# Patient Record
Sex: FEMALE | Race: WHITE | NOT HISPANIC OR LATINO | Employment: OTHER | URBAN - METROPOLITAN AREA
[De-identification: names, ages, dates, MRNs, and addresses within clinical notes are randomized per-mention and may not be internally consistent; named-entity substitution may affect disease eponyms.]

---

## 2017-01-25 ENCOUNTER — COMMUNICATION - HEALTHEAST (OUTPATIENT)
Dept: FAMILY MEDICINE | Facility: CLINIC | Age: 72
End: 2017-01-25

## 2017-01-25 DIAGNOSIS — G89.29 CHRONIC MIDLINE LOW BACK PAIN WITHOUT SCIATICA: ICD-10-CM

## 2017-01-25 DIAGNOSIS — M54.50 CHRONIC MIDLINE LOW BACK PAIN WITHOUT SCIATICA: ICD-10-CM

## 2017-02-16 ENCOUNTER — COMMUNICATION - HEALTHEAST (OUTPATIENT)
Dept: SCHEDULING | Facility: CLINIC | Age: 72
End: 2017-02-16

## 2017-02-27 ENCOUNTER — RECORDS - HEALTHEAST (OUTPATIENT)
Dept: BONE DENSITY | Facility: CLINIC | Age: 72
End: 2017-02-27

## 2017-02-27 ENCOUNTER — RECORDS - HEALTHEAST (OUTPATIENT)
Dept: ADMINISTRATIVE | Facility: OTHER | Age: 72
End: 2017-02-27

## 2017-02-27 DIAGNOSIS — M81.0 AGE-RELATED OSTEOPOROSIS WITHOUT CURRENT PATHOLOGICAL FRACTURE: ICD-10-CM

## 2017-02-28 ENCOUNTER — OFFICE VISIT - HEALTHEAST (OUTPATIENT)
Dept: INTERNAL MEDICINE | Facility: CLINIC | Age: 72
End: 2017-02-28

## 2017-02-28 DIAGNOSIS — M81.0 OSTEOPOROSIS, SENILE: ICD-10-CM

## 2017-03-23 ENCOUNTER — COMMUNICATION - HEALTHEAST (OUTPATIENT)
Dept: FAMILY MEDICINE | Facility: CLINIC | Age: 72
End: 2017-03-23

## 2017-03-23 DIAGNOSIS — I10 HYPERTENSION: ICD-10-CM

## 2017-03-31 ENCOUNTER — COMMUNICATION - HEALTHEAST (OUTPATIENT)
Dept: FAMILY MEDICINE | Facility: CLINIC | Age: 72
End: 2017-03-31

## 2017-03-31 DIAGNOSIS — F41.9 ANXIETY: ICD-10-CM

## 2017-04-14 ENCOUNTER — COMMUNICATION - HEALTHEAST (OUTPATIENT)
Dept: FAMILY MEDICINE | Facility: CLINIC | Age: 72
End: 2017-04-14

## 2017-04-14 DIAGNOSIS — F41.9 ANXIETY: ICD-10-CM

## 2017-06-14 ENCOUNTER — COMMUNICATION - HEALTHEAST (OUTPATIENT)
Dept: FAMILY MEDICINE | Facility: CLINIC | Age: 72
End: 2017-06-14

## 2017-06-15 ENCOUNTER — COMMUNICATION - HEALTHEAST (OUTPATIENT)
Dept: FAMILY MEDICINE | Facility: CLINIC | Age: 72
End: 2017-06-15

## 2017-06-15 DIAGNOSIS — F41.9 ANXIETY: ICD-10-CM

## 2017-08-07 ENCOUNTER — COMMUNICATION - HEALTHEAST (OUTPATIENT)
Dept: FAMILY MEDICINE | Facility: CLINIC | Age: 72
End: 2017-08-07

## 2017-08-29 ENCOUNTER — AMBULATORY - HEALTHEAST (OUTPATIENT)
Dept: NURSING | Facility: CLINIC | Age: 72
End: 2017-08-29

## 2017-08-29 DIAGNOSIS — M81.0 OSTEOPOROSIS, SENILE: ICD-10-CM

## 2017-09-01 ENCOUNTER — COMMUNICATION - HEALTHEAST (OUTPATIENT)
Dept: FAMILY MEDICINE | Facility: CLINIC | Age: 72
End: 2017-09-01

## 2017-09-11 ENCOUNTER — COMMUNICATION - HEALTHEAST (OUTPATIENT)
Dept: FAMILY MEDICINE | Facility: CLINIC | Age: 72
End: 2017-09-11

## 2017-09-11 DIAGNOSIS — I10 HYPERTENSION: ICD-10-CM

## 2017-09-11 DIAGNOSIS — Z78.0 MENOPAUSE: ICD-10-CM

## 2017-10-12 ENCOUNTER — OFFICE VISIT - HEALTHEAST (OUTPATIENT)
Dept: FAMILY MEDICINE | Facility: CLINIC | Age: 72
End: 2017-10-12

## 2017-10-12 DIAGNOSIS — Z23 NEED FOR IMMUNIZATION AGAINST INFLUENZA: ICD-10-CM

## 2017-10-12 DIAGNOSIS — F41.9 ANXIETY: ICD-10-CM

## 2017-10-12 DIAGNOSIS — E55.9 VITAMIN D DEFICIENCY: ICD-10-CM

## 2017-10-12 DIAGNOSIS — Z80.3 FAMILY HISTORY OF BREAST CANCER: ICD-10-CM

## 2017-10-12 DIAGNOSIS — I10 ESSENTIAL HYPERTENSION WITH GOAL BLOOD PRESSURE LESS THAN 130/80: ICD-10-CM

## 2017-10-12 DIAGNOSIS — Z00.01 ENCOUNTER FOR GENERAL ADULT MEDICAL EXAMINATION WITH ABNORMAL FINDINGS: ICD-10-CM

## 2017-10-12 DIAGNOSIS — M81.0 OSTEOPOROSIS, SENILE: ICD-10-CM

## 2017-10-12 DIAGNOSIS — G89.29 CHRONIC MIDLINE LOW BACK PAIN WITHOUT SCIATICA: ICD-10-CM

## 2017-10-12 DIAGNOSIS — M54.50 CHRONIC MIDLINE LOW BACK PAIN WITHOUT SCIATICA: ICD-10-CM

## 2017-10-12 DIAGNOSIS — M25.511 RIGHT SHOULDER PAIN: ICD-10-CM

## 2017-10-12 LAB
CHOLEST SERPL-MCNC: 149 MG/DL
FASTING STATUS PATIENT QL REPORTED: NORMAL
HDLC SERPL-MCNC: 57 MG/DL
LDLC SERPL CALC-MCNC: 75 MG/DL
TRIGL SERPL-MCNC: 85 MG/DL

## 2017-10-12 ASSESSMENT — MIFFLIN-ST. JEOR: SCORE: 836.52

## 2017-10-13 LAB — HCV AB SERPL QL IA: NEGATIVE

## 2017-10-28 ENCOUNTER — COMMUNICATION - HEALTHEAST (OUTPATIENT)
Dept: FAMILY MEDICINE | Facility: CLINIC | Age: 72
End: 2017-10-28

## 2017-10-28 DIAGNOSIS — F41.9 ANXIETY: ICD-10-CM

## 2017-11-13 ENCOUNTER — HOSPITAL ENCOUNTER (OUTPATIENT)
Dept: MAMMOGRAPHY | Facility: HOSPITAL | Age: 72
Discharge: HOME | End: 2017-11-13
Attending: FAMILY MEDICINE

## 2017-11-13 DIAGNOSIS — Z80.3 FAMILY HISTORY OF BREAST CANCER: ICD-10-CM

## 2017-11-27 ENCOUNTER — RECORDS - HEALTHEAST (OUTPATIENT)
Dept: ADMINISTRATIVE | Facility: OTHER | Age: 72
End: 2017-11-27

## 2017-11-29 ENCOUNTER — RECORDS - HEALTHEAST (OUTPATIENT)
Dept: ADMINISTRATIVE | Facility: OTHER | Age: 72
End: 2017-11-29

## 2017-12-18 ENCOUNTER — RECORDS - HEALTHEAST (OUTPATIENT)
Dept: ADMINISTRATIVE | Facility: OTHER | Age: 72
End: 2017-12-18

## 2017-12-27 ENCOUNTER — OFFICE VISIT - HEALTHEAST (OUTPATIENT)
Dept: FAMILY MEDICINE | Facility: CLINIC | Age: 72
End: 2017-12-27

## 2017-12-27 ENCOUNTER — AMBULATORY - HEALTHEAST (OUTPATIENT)
Dept: FAMILY MEDICINE | Facility: CLINIC | Age: 72
End: 2017-12-27

## 2017-12-27 DIAGNOSIS — G89.29 CHRONIC MIDLINE LOW BACK PAIN WITHOUT SCIATICA: ICD-10-CM

## 2017-12-27 DIAGNOSIS — F41.9 ANXIETY: ICD-10-CM

## 2017-12-27 DIAGNOSIS — M54.50 CHRONIC MIDLINE LOW BACK PAIN WITHOUT SCIATICA: ICD-10-CM

## 2018-02-26 ENCOUNTER — COMMUNICATION - HEALTHEAST (OUTPATIENT)
Dept: SCHEDULING | Facility: CLINIC | Age: 73
End: 2018-02-26

## 2018-03-03 ENCOUNTER — COMMUNICATION - HEALTHEAST (OUTPATIENT)
Dept: FAMILY MEDICINE | Facility: CLINIC | Age: 73
End: 2018-03-03

## 2018-03-03 ENCOUNTER — COMMUNICATION - HEALTHEAST (OUTPATIENT)
Dept: SCHEDULING | Facility: CLINIC | Age: 73
End: 2018-03-03

## 2018-03-03 DIAGNOSIS — R00.2 PALPITATIONS: ICD-10-CM

## 2018-03-05 ENCOUNTER — COMMUNICATION - HEALTHEAST (OUTPATIENT)
Dept: FAMILY MEDICINE | Facility: CLINIC | Age: 73
End: 2018-03-05

## 2018-03-05 DIAGNOSIS — E16.2 HYPOGLYCEMIA: ICD-10-CM

## 2018-03-06 ENCOUNTER — OFFICE VISIT - HEALTHEAST (OUTPATIENT)
Dept: INTERNAL MEDICINE | Facility: CLINIC | Age: 73
End: 2018-03-06

## 2018-03-06 ENCOUNTER — COMMUNICATION - HEALTHEAST (OUTPATIENT)
Dept: FAMILY MEDICINE | Facility: CLINIC | Age: 73
End: 2018-03-06

## 2018-03-06 DIAGNOSIS — M81.0 OSTEOPOROSIS, SENILE: ICD-10-CM

## 2018-03-06 DIAGNOSIS — E16.2 HYPOGLYCEMIA: ICD-10-CM

## 2018-03-06 ASSESSMENT — MIFFLIN-ST. JEOR: SCORE: 846.82

## 2018-03-16 ENCOUNTER — OFFICE VISIT - HEALTHEAST (OUTPATIENT)
Dept: ENDOCRINOLOGY | Facility: CLINIC | Age: 73
End: 2018-03-16

## 2018-03-16 DIAGNOSIS — E16.2 HYPOGLYCEMIA: ICD-10-CM

## 2018-03-16 DIAGNOSIS — M81.0 OSTEOPOROSIS, SENILE: ICD-10-CM

## 2018-03-16 ASSESSMENT — MIFFLIN-ST. JEOR: SCORE: 822.01

## 2018-03-22 ENCOUNTER — COMMUNICATION - HEALTHEAST (OUTPATIENT)
Dept: FAMILY MEDICINE | Facility: CLINIC | Age: 73
End: 2018-03-22

## 2018-03-22 ENCOUNTER — OFFICE VISIT - HEALTHEAST (OUTPATIENT)
Dept: FAMILY MEDICINE | Facility: CLINIC | Age: 73
End: 2018-03-22

## 2018-03-22 DIAGNOSIS — E16.1 POSTPRANDIAL HYPOGLYCEMIA: ICD-10-CM

## 2018-03-22 DIAGNOSIS — R00.2 PALPITATIONS: ICD-10-CM

## 2018-03-22 DIAGNOSIS — I10 ESSENTIAL HYPERTENSION: ICD-10-CM

## 2018-03-22 DIAGNOSIS — E16.2 HYPOGLYCEMIA: ICD-10-CM

## 2018-03-26 ENCOUNTER — COMMUNICATION - HEALTHEAST (OUTPATIENT)
Dept: FAMILY MEDICINE | Facility: CLINIC | Age: 73
End: 2018-03-26

## 2018-04-05 ENCOUNTER — COMMUNICATION - HEALTHEAST (OUTPATIENT)
Dept: ADMINISTRATIVE | Facility: CLINIC | Age: 73
End: 2018-04-05

## 2018-04-15 ENCOUNTER — COMMUNICATION - HEALTHEAST (OUTPATIENT)
Dept: SCHEDULING | Facility: CLINIC | Age: 73
End: 2018-04-15

## 2018-04-16 ENCOUNTER — OFFICE VISIT - HEALTHEAST (OUTPATIENT)
Dept: FAMILY MEDICINE | Facility: CLINIC | Age: 73
End: 2018-04-16

## 2018-04-16 ENCOUNTER — RECORDS - HEALTHEAST (OUTPATIENT)
Dept: GENERAL RADIOLOGY | Facility: CLINIC | Age: 73
End: 2018-04-16

## 2018-04-16 DIAGNOSIS — M54.2 NECK PAIN: ICD-10-CM

## 2018-04-16 DIAGNOSIS — M54.2 CERVICALGIA: ICD-10-CM

## 2018-04-16 ASSESSMENT — MIFFLIN-ST. JEOR: SCORE: 838.8

## 2018-04-23 ENCOUNTER — HOSPITAL ENCOUNTER (OUTPATIENT)
Dept: NUTRITION | Facility: HOSPITAL | Age: 73
Discharge: HOME | End: 2018-04-23
Attending: FAMILY MEDICINE

## 2018-04-23 DIAGNOSIS — E16.2 HYPOGLYCEMIA: ICD-10-CM

## 2018-04-24 ENCOUNTER — COMMUNICATION - HEALTHEAST (OUTPATIENT)
Dept: FAMILY MEDICINE | Facility: CLINIC | Age: 73
End: 2018-04-24

## 2018-04-24 ENCOUNTER — AMBULATORY - HEALTHEAST (OUTPATIENT)
Dept: FAMILY MEDICINE | Facility: CLINIC | Age: 73
End: 2018-04-24

## 2018-04-24 DIAGNOSIS — M54.2 NECK PAIN: ICD-10-CM

## 2018-04-27 ENCOUNTER — HOSPITAL ENCOUNTER (OUTPATIENT)
Dept: PHYSICAL MEDICINE AND REHAB | Facility: CLINIC | Age: 73
Discharge: HOME | End: 2018-04-27
Attending: NURSE PRACTITIONER

## 2018-04-27 DIAGNOSIS — M79.18 MYOFASCIAL PAIN: ICD-10-CM

## 2018-04-27 DIAGNOSIS — M47.812 FACET ARTHROPATHY, CERVICAL: ICD-10-CM

## 2018-04-27 DIAGNOSIS — M50.30 DEGENERATION OF CERVICAL INTERVERTEBRAL DISC: ICD-10-CM

## 2018-04-27 ASSESSMENT — MIFFLIN-ST. JEOR: SCORE: 826.55

## 2018-04-29 ENCOUNTER — COMMUNICATION - HEALTHEAST (OUTPATIENT)
Dept: FAMILY MEDICINE | Facility: CLINIC | Age: 73
End: 2018-04-29

## 2018-04-30 ENCOUNTER — RECORDS - HEALTHEAST (OUTPATIENT)
Dept: ADMINISTRATIVE | Facility: OTHER | Age: 73
End: 2018-04-30

## 2018-05-07 ENCOUNTER — OFFICE VISIT - HEALTHEAST (OUTPATIENT)
Dept: PHYSICAL THERAPY | Facility: REHABILITATION | Age: 73
End: 2018-05-07

## 2018-05-07 ENCOUNTER — COMMUNICATION - HEALTHEAST (OUTPATIENT)
Dept: ADMINISTRATIVE | Facility: CLINIC | Age: 73
End: 2018-05-07

## 2018-05-07 DIAGNOSIS — M81.0 OSTEOPOROSIS, SENILE: ICD-10-CM

## 2018-05-07 DIAGNOSIS — R29.3 POOR POSTURE: ICD-10-CM

## 2018-05-07 DIAGNOSIS — M62.81 GENERALIZED MUSCLE WEAKNESS: ICD-10-CM

## 2018-05-07 DIAGNOSIS — M54.2 CERVICAL PAIN: ICD-10-CM

## 2018-05-10 ENCOUNTER — OFFICE VISIT - HEALTHEAST (OUTPATIENT)
Dept: PHYSICAL THERAPY | Facility: REHABILITATION | Age: 73
End: 2018-05-10

## 2018-05-10 DIAGNOSIS — M62.81 GENERALIZED MUSCLE WEAKNESS: ICD-10-CM

## 2018-05-10 DIAGNOSIS — M54.2 CERVICAL PAIN: ICD-10-CM

## 2018-05-10 DIAGNOSIS — R29.3 POOR POSTURE: ICD-10-CM

## 2018-05-14 ENCOUNTER — RECORDS - HEALTHEAST (OUTPATIENT)
Dept: ADMINISTRATIVE | Facility: OTHER | Age: 73
End: 2018-05-14

## 2018-05-14 ENCOUNTER — OFFICE VISIT - HEALTHEAST (OUTPATIENT)
Dept: PHYSICAL THERAPY | Facility: REHABILITATION | Age: 73
End: 2018-05-14

## 2018-05-14 DIAGNOSIS — M62.81 GENERALIZED MUSCLE WEAKNESS: ICD-10-CM

## 2018-05-14 DIAGNOSIS — R29.3 POOR POSTURE: ICD-10-CM

## 2018-05-14 DIAGNOSIS — M54.2 CERVICAL PAIN: ICD-10-CM

## 2018-05-17 ENCOUNTER — OFFICE VISIT - HEALTHEAST (OUTPATIENT)
Dept: PHYSICAL THERAPY | Facility: REHABILITATION | Age: 73
End: 2018-05-17

## 2018-05-17 ENCOUNTER — COMMUNICATION - HEALTHEAST (OUTPATIENT)
Dept: ADMINISTRATIVE | Facility: CLINIC | Age: 73
End: 2018-05-17

## 2018-05-17 DIAGNOSIS — R29.3 POOR POSTURE: ICD-10-CM

## 2018-05-17 DIAGNOSIS — M62.81 GENERALIZED MUSCLE WEAKNESS: ICD-10-CM

## 2018-05-17 DIAGNOSIS — M54.2 CERVICAL PAIN: ICD-10-CM

## 2018-05-23 ENCOUNTER — OFFICE VISIT - HEALTHEAST (OUTPATIENT)
Dept: PHYSICAL THERAPY | Facility: REHABILITATION | Age: 73
End: 2018-05-23

## 2018-05-23 DIAGNOSIS — M62.81 GENERALIZED MUSCLE WEAKNESS: ICD-10-CM

## 2018-05-23 DIAGNOSIS — M54.2 CERVICAL PAIN: ICD-10-CM

## 2018-05-23 DIAGNOSIS — R29.3 POOR POSTURE: ICD-10-CM

## 2018-05-25 ENCOUNTER — HOSPITAL ENCOUNTER (OUTPATIENT)
Dept: PHYSICAL MEDICINE AND REHAB | Facility: CLINIC | Age: 73
Discharge: HOME | End: 2018-05-25
Attending: PHYSICIAN ASSISTANT

## 2018-05-25 DIAGNOSIS — M47.812 FACET ARTHROPATHY, CERVICAL: ICD-10-CM

## 2018-05-25 DIAGNOSIS — M50.30 DEGENERATION OF CERVICAL INTERVERTEBRAL DISC: ICD-10-CM

## 2018-05-25 DIAGNOSIS — M79.18 MYOFASCIAL PAIN: ICD-10-CM

## 2018-05-30 ENCOUNTER — OFFICE VISIT - HEALTHEAST (OUTPATIENT)
Dept: PHYSICAL THERAPY | Facility: REHABILITATION | Age: 73
End: 2018-05-30

## 2018-05-30 DIAGNOSIS — M62.81 GENERALIZED MUSCLE WEAKNESS: ICD-10-CM

## 2018-05-30 DIAGNOSIS — M54.2 CERVICAL PAIN: ICD-10-CM

## 2018-05-30 DIAGNOSIS — R29.3 POOR POSTURE: ICD-10-CM

## 2018-06-07 ENCOUNTER — OFFICE VISIT - HEALTHEAST (OUTPATIENT)
Dept: PHYSICAL THERAPY | Facility: REHABILITATION | Age: 73
End: 2018-06-07

## 2018-06-07 DIAGNOSIS — M62.81 GENERALIZED MUSCLE WEAKNESS: ICD-10-CM

## 2018-06-07 DIAGNOSIS — M54.2 CERVICAL PAIN: ICD-10-CM

## 2018-06-07 DIAGNOSIS — R29.3 POOR POSTURE: ICD-10-CM

## 2018-06-13 ENCOUNTER — OFFICE VISIT - HEALTHEAST (OUTPATIENT)
Dept: PHYSICAL THERAPY | Facility: REHABILITATION | Age: 73
End: 2018-06-13

## 2018-06-13 DIAGNOSIS — M54.2 CERVICAL PAIN: ICD-10-CM

## 2018-06-13 DIAGNOSIS — R29.3 POOR POSTURE: ICD-10-CM

## 2018-06-13 DIAGNOSIS — M62.81 GENERALIZED MUSCLE WEAKNESS: ICD-10-CM

## 2018-06-14 ENCOUNTER — OFFICE VISIT - HEALTHEAST (OUTPATIENT)
Dept: FAMILY MEDICINE | Facility: CLINIC | Age: 73
End: 2018-06-14

## 2018-06-14 DIAGNOSIS — E55.9 VITAMIN D DEFICIENCY: ICD-10-CM

## 2018-06-14 DIAGNOSIS — E16.2 HYPOGLYCEMIA: ICD-10-CM

## 2018-06-14 DIAGNOSIS — I10 ESSENTIAL HYPERTENSION: ICD-10-CM

## 2018-06-14 DIAGNOSIS — M81.0 OSTEOPOROSIS, SENILE: ICD-10-CM

## 2018-06-14 LAB
ANION GAP SERPL CALCULATED.3IONS-SCNC: 12 MMOL/L (ref 5–18)
BUN SERPL-MCNC: 29 MG/DL (ref 8–28)
CALCIUM SERPL-MCNC: 9 MG/DL (ref 8.5–10.5)
CHLORIDE BLD-SCNC: 103 MMOL/L (ref 98–107)
CO2 SERPL-SCNC: 25 MMOL/L (ref 22–31)
CREAT SERPL-MCNC: 0.77 MG/DL (ref 0.6–1.1)
GFR SERPL CREATININE-BSD FRML MDRD: >60 ML/MIN/1.73M2
GLUCOSE BLD-MCNC: 84 MG/DL (ref 70–125)
HBA1C MFR BLD: 5.8 % (ref 3.5–6)
POTASSIUM BLD-SCNC: 4.3 MMOL/L (ref 3.5–5)
SODIUM SERPL-SCNC: 140 MMOL/L (ref 136–145)

## 2018-06-22 ENCOUNTER — HOSPITAL ENCOUNTER (OUTPATIENT)
Dept: PHYSICAL MEDICINE AND REHAB | Facility: CLINIC | Age: 73
Discharge: HOME | End: 2018-06-22
Attending: PHYSICIAN ASSISTANT

## 2018-06-22 DIAGNOSIS — M54.2 CERVICAL SPINE PAIN: ICD-10-CM

## 2018-06-22 DIAGNOSIS — M50.30 DEGENERATION OF CERVICAL INTERVERTEBRAL DISC: ICD-10-CM

## 2018-06-22 DIAGNOSIS — M47.812 FACET ARTHROPATHY, CERVICAL: ICD-10-CM

## 2018-06-22 DIAGNOSIS — M12.88 OTHER SPECIFIC ARTHROPATHIES, NOT ELSEWHERE CLASSIFIED, OTHER SPECIFIED SITE: ICD-10-CM

## 2018-06-27 ENCOUNTER — OFFICE VISIT - HEALTHEAST (OUTPATIENT)
Dept: PHYSICAL THERAPY | Facility: REHABILITATION | Age: 73
End: 2018-06-27

## 2018-06-27 DIAGNOSIS — R29.3 POOR POSTURE: ICD-10-CM

## 2018-06-27 DIAGNOSIS — M62.81 GENERALIZED MUSCLE WEAKNESS: ICD-10-CM

## 2018-06-27 DIAGNOSIS — M54.2 CERVICAL PAIN: ICD-10-CM

## 2018-06-29 ENCOUNTER — HOSPITAL ENCOUNTER (OUTPATIENT)
Dept: CT IMAGING | Facility: CLINIC | Age: 73
Discharge: HOME | End: 2018-06-29
Attending: PHYSICIAN ASSISTANT

## 2018-07-03 ENCOUNTER — COMMUNICATION - HEALTHEAST (OUTPATIENT)
Dept: PHYSICAL MEDICINE AND REHAB | Facility: CLINIC | Age: 73
End: 2018-07-03

## 2018-07-03 DIAGNOSIS — M47.812 FACET ARTHROPATHY, CERVICAL: ICD-10-CM

## 2018-07-03 DIAGNOSIS — M54.2 NECK PAIN: ICD-10-CM

## 2018-07-09 ENCOUNTER — HOSPITAL ENCOUNTER (OUTPATIENT)
Dept: RADIOLOGY | Facility: CLINIC | Age: 73
Discharge: HOME | End: 2018-07-09
Attending: PHYSICIAN ASSISTANT

## 2018-07-09 DIAGNOSIS — M54.2 NECK PAIN: ICD-10-CM

## 2018-07-09 DIAGNOSIS — M47.812 FACET ARTHROPATHY, CERVICAL: ICD-10-CM

## 2018-07-09 DIAGNOSIS — M12.88 OTHER SPECIFIC ARTHROPATHIES, NOT ELSEWHERE CLASSIFIED, OTHER SPECIFIED SITE: ICD-10-CM

## 2018-07-10 ENCOUNTER — COMMUNICATION - HEALTHEAST (OUTPATIENT)
Dept: FAMILY MEDICINE | Facility: CLINIC | Age: 73
End: 2018-07-10

## 2018-07-11 ENCOUNTER — COMMUNICATION - HEALTHEAST (OUTPATIENT)
Dept: PHYSICAL MEDICINE AND REHAB | Facility: CLINIC | Age: 73
End: 2018-07-11

## 2018-07-12 ENCOUNTER — COMMUNICATION - HEALTHEAST (OUTPATIENT)
Dept: FAMILY MEDICINE | Facility: CLINIC | Age: 73
End: 2018-07-12

## 2018-07-13 ENCOUNTER — COMMUNICATION - HEALTHEAST (OUTPATIENT)
Dept: ADMINISTRATIVE | Facility: CLINIC | Age: 73
End: 2018-07-13

## 2018-07-13 ENCOUNTER — HOSPITAL ENCOUNTER (OUTPATIENT)
Dept: PHYSICAL MEDICINE AND REHAB | Facility: CLINIC | Age: 73
Discharge: HOME | End: 2018-07-13
Attending: PHYSICIAN ASSISTANT

## 2018-07-13 DIAGNOSIS — M47.812 FACET ARTHROPATHY, CERVICAL: ICD-10-CM

## 2018-07-13 DIAGNOSIS — M50.30 DEGENERATION OF CERVICAL INTERVERTEBRAL DISC: ICD-10-CM

## 2018-07-23 ENCOUNTER — AMBULATORY - HEALTHEAST (OUTPATIENT)
Dept: INTERNAL MEDICINE | Facility: CLINIC | Age: 73
End: 2018-07-23

## 2018-07-23 DIAGNOSIS — M81.0 OSTEOPOROSIS, SENILE: ICD-10-CM

## 2018-08-15 ENCOUNTER — AMBULATORY - HEALTHEAST (OUTPATIENT)
Dept: ENDOCRINOLOGY | Facility: CLINIC | Age: 73
End: 2018-08-15

## 2018-08-15 DIAGNOSIS — M81.0 OSTEOPOROSIS, SENILE: ICD-10-CM

## 2018-08-31 ENCOUNTER — AMBULATORY - HEALTHEAST (OUTPATIENT)
Dept: FAMILY MEDICINE | Facility: CLINIC | Age: 73
End: 2018-08-31

## 2018-08-31 ENCOUNTER — AMBULATORY - HEALTHEAST (OUTPATIENT)
Dept: LAB | Facility: CLINIC | Age: 73
End: 2018-08-31

## 2018-08-31 DIAGNOSIS — Z00.00 HEALTH CARE MAINTENANCE: ICD-10-CM

## 2018-08-31 DIAGNOSIS — M81.0 OSTEOPOROSIS, SENILE: ICD-10-CM

## 2018-08-31 LAB
CALCIUM SERPL-MCNC: 9.4 MG/DL (ref 8.5–10.5)
CREAT SERPL-MCNC: 0.76 MG/DL (ref 0.6–1.1)
GFR SERPL CREATININE-BSD FRML MDRD: >60 ML/MIN/1.73M2
POTASSIUM BLD-SCNC: 4.7 MMOL/L (ref 3.5–5)

## 2018-09-04 LAB
25(OH)D3 SERPL-MCNC: 41 NG/ML (ref 30–80)
25(OH)D3 SERPL-MCNC: 41 NG/ML (ref 30–80)

## 2018-09-07 ENCOUNTER — OFFICE VISIT - HEALTHEAST (OUTPATIENT)
Dept: ENDOCRINOLOGY | Facility: CLINIC | Age: 73
End: 2018-09-07

## 2018-09-07 DIAGNOSIS — M81.0 OSTEOPOROSIS, SENILE: ICD-10-CM

## 2018-09-07 ASSESSMENT — MIFFLIN-ST. JEOR: SCORE: 850.14

## 2018-09-11 ENCOUNTER — AMBULATORY - HEALTHEAST (OUTPATIENT)
Dept: NURSING | Facility: CLINIC | Age: 73
End: 2018-09-11

## 2018-10-23 ENCOUNTER — COMMUNICATION - HEALTHEAST (OUTPATIENT)
Dept: FAMILY MEDICINE | Facility: CLINIC | Age: 73
End: 2018-10-23

## 2018-10-23 DIAGNOSIS — M54.50 CHRONIC MIDLINE LOW BACK PAIN WITHOUT SCIATICA: ICD-10-CM

## 2018-10-23 DIAGNOSIS — G89.29 CHRONIC MIDLINE LOW BACK PAIN WITHOUT SCIATICA: ICD-10-CM

## 2018-11-12 ENCOUNTER — RECORDS - HEALTHEAST (OUTPATIENT)
Dept: ADMINISTRATIVE | Facility: OTHER | Age: 73
End: 2018-11-12

## 2018-11-13 ENCOUNTER — COMMUNICATION - HEALTHEAST (OUTPATIENT)
Dept: FAMILY MEDICINE | Facility: CLINIC | Age: 73
End: 2018-11-13

## 2018-11-16 ENCOUNTER — HOSPITAL ENCOUNTER (OUTPATIENT)
Dept: MAMMOGRAPHY | Facility: CLINIC | Age: 73
Discharge: HOME | End: 2018-11-16
Attending: FAMILY MEDICINE

## 2018-11-16 DIAGNOSIS — Z12.31 VISIT FOR SCREENING MAMMOGRAM: ICD-10-CM

## 2018-11-23 ENCOUNTER — OFFICE VISIT - HEALTHEAST (OUTPATIENT)
Dept: FAMILY MEDICINE | Facility: CLINIC | Age: 73
End: 2018-11-23

## 2018-11-23 DIAGNOSIS — Z00.01 ENCOUNTER FOR GENERAL ADULT MEDICAL EXAMINATION WITH ABNORMAL FINDINGS: ICD-10-CM

## 2018-11-23 DIAGNOSIS — H90.3 BILATERAL SENSORINEURAL HEARING LOSS: ICD-10-CM

## 2018-11-23 DIAGNOSIS — I10 ESSENTIAL HYPERTENSION WITH GOAL BLOOD PRESSURE LESS THAN 130/80: ICD-10-CM

## 2018-11-23 DIAGNOSIS — H61.23 CERUMINOSIS, BILATERAL: ICD-10-CM

## 2018-11-23 DIAGNOSIS — E55.9 VITAMIN D DEFICIENCY: ICD-10-CM

## 2018-11-23 DIAGNOSIS — R73.01 IMPAIRED FASTING GLUCOSE: ICD-10-CM

## 2018-11-23 DIAGNOSIS — R05.3 CHRONIC COUGH: ICD-10-CM

## 2018-11-23 LAB
ANION GAP SERPL CALCULATED.3IONS-SCNC: 12 MMOL/L (ref 5–18)
BUN SERPL-MCNC: 25 MG/DL (ref 8–28)
CALCIUM SERPL-MCNC: 9.2 MG/DL (ref 8.5–10.5)
CHLORIDE BLD-SCNC: 102 MMOL/L (ref 98–107)
CHOLEST SERPL-MCNC: 195 MG/DL
CO2 SERPL-SCNC: 26 MMOL/L (ref 22–31)
CREAT SERPL-MCNC: 0.77 MG/DL (ref 0.6–1.1)
ERYTHROCYTE [DISTWIDTH] IN BLOOD BY AUTOMATED COUNT: 13.6 % (ref 11–14.5)
FASTING STATUS PATIENT QL REPORTED: YES
GFR SERPL CREATININE-BSD FRML MDRD: >60 ML/MIN/1.73M2
GLUCOSE BLD-MCNC: 85 MG/DL (ref 70–125)
HBA1C MFR BLD: 5.8 % (ref 3.5–6)
HCT VFR BLD AUTO: 40.5 % (ref 35–47)
HDLC SERPL-MCNC: 71 MG/DL
HGB BLD-MCNC: 14 G/DL (ref 12–16)
LDLC SERPL CALC-MCNC: 112 MG/DL
MCH RBC QN AUTO: 30.3 PG (ref 27–34)
MCHC RBC AUTO-ENTMCNC: 34.5 G/DL (ref 32–36)
MCV RBC AUTO: 88 FL (ref 80–100)
PLATELET # BLD AUTO: 209 THOU/UL (ref 140–440)
PMV BLD AUTO: 7.8 FL (ref 7–10)
POTASSIUM BLD-SCNC: 4.4 MMOL/L (ref 3.5–5)
RBC # BLD AUTO: 4.61 MILL/UL (ref 3.8–5.4)
SODIUM SERPL-SCNC: 140 MMOL/L (ref 136–145)
TRIGL SERPL-MCNC: 58 MG/DL
WBC: 5.5 THOU/UL (ref 4–11)

## 2018-11-23 ASSESSMENT — MIFFLIN-ST. JEOR: SCORE: 869.63

## 2018-11-24 ENCOUNTER — COMMUNICATION - HEALTHEAST (OUTPATIENT)
Dept: FAMILY MEDICINE | Facility: CLINIC | Age: 73
End: 2018-11-24

## 2018-11-24 DIAGNOSIS — I10 ESSENTIAL HYPERTENSION: ICD-10-CM

## 2018-11-26 ENCOUNTER — RECORDS - HEALTHEAST (OUTPATIENT)
Dept: ADMINISTRATIVE | Facility: OTHER | Age: 73
End: 2018-11-26

## 2018-12-06 ENCOUNTER — COMMUNICATION - HEALTHEAST (OUTPATIENT)
Dept: FAMILY MEDICINE | Facility: CLINIC | Age: 73
End: 2018-12-06

## 2018-12-06 DIAGNOSIS — E16.1 POSTPRANDIAL HYPOGLYCEMIA: ICD-10-CM

## 2018-12-11 ENCOUNTER — COMMUNICATION - HEALTHEAST (OUTPATIENT)
Dept: FAMILY MEDICINE | Facility: CLINIC | Age: 73
End: 2018-12-11

## 2018-12-13 ENCOUNTER — COMMUNICATION - HEALTHEAST (OUTPATIENT)
Dept: FAMILY MEDICINE | Facility: CLINIC | Age: 73
End: 2018-12-13

## 2018-12-13 DIAGNOSIS — M54.50 CHRONIC MIDLINE LOW BACK PAIN WITHOUT SCIATICA: ICD-10-CM

## 2018-12-13 DIAGNOSIS — G89.29 CHRONIC MIDLINE LOW BACK PAIN WITHOUT SCIATICA: ICD-10-CM

## 2018-12-19 ENCOUNTER — OFFICE VISIT - HEALTHEAST (OUTPATIENT)
Dept: FAMILY MEDICINE | Facility: CLINIC | Age: 73
End: 2018-12-19

## 2018-12-19 DIAGNOSIS — G89.29 CHRONIC MIDLINE LOW BACK PAIN WITHOUT SCIATICA: ICD-10-CM

## 2018-12-19 DIAGNOSIS — M54.50 CHRONIC MIDLINE LOW BACK PAIN WITHOUT SCIATICA: ICD-10-CM

## 2019-02-06 ENCOUNTER — RECORDS - HEALTHEAST (OUTPATIENT)
Dept: ADMINISTRATIVE | Facility: OTHER | Age: 74
End: 2019-02-06

## 2019-02-13 ENCOUNTER — AMBULATORY - HEALTHEAST (OUTPATIENT)
Dept: ENDOCRINOLOGY | Facility: CLINIC | Age: 74
End: 2019-02-13

## 2019-02-13 DIAGNOSIS — E16.2 HYPOGLYCEMIA: ICD-10-CM

## 2019-03-01 ENCOUNTER — RECORDS - HEALTHEAST (OUTPATIENT)
Dept: BONE DENSITY | Facility: CLINIC | Age: 74
End: 2019-03-01

## 2019-03-01 ENCOUNTER — RECORDS - HEALTHEAST (OUTPATIENT)
Dept: ADMINISTRATIVE | Facility: OTHER | Age: 74
End: 2019-03-01

## 2019-03-01 DIAGNOSIS — M81.0 AGE-RELATED OSTEOPOROSIS WITHOUT CURRENT PATHOLOGICAL FRACTURE: ICD-10-CM

## 2019-03-04 ENCOUNTER — AMBULATORY - HEALTHEAST (OUTPATIENT)
Dept: LAB | Facility: CLINIC | Age: 74
End: 2019-03-04

## 2019-03-04 DIAGNOSIS — E16.2 HYPOGLYCEMIA: ICD-10-CM

## 2019-03-04 LAB
CREAT SERPL-MCNC: 0.76 MG/DL (ref 0.6–1.1)
GFR SERPL CREATININE-BSD FRML MDRD: >60 ML/MIN/1.73M2
POTASSIUM BLD-SCNC: 4.2 MMOL/L (ref 3.5–5)

## 2019-03-13 ENCOUNTER — COMMUNICATION - HEALTHEAST (OUTPATIENT)
Dept: FAMILY MEDICINE | Facility: CLINIC | Age: 74
End: 2019-03-13

## 2019-03-19 ENCOUNTER — OFFICE VISIT - HEALTHEAST (OUTPATIENT)
Dept: INTERNAL MEDICINE | Facility: CLINIC | Age: 74
End: 2019-03-19

## 2019-03-19 DIAGNOSIS — M81.0 OSTEOPOROSIS, SENILE: ICD-10-CM

## 2019-03-22 ENCOUNTER — COMMUNICATION - HEALTHEAST (OUTPATIENT)
Dept: ENDOCRINOLOGY | Facility: CLINIC | Age: 74
End: 2019-03-22

## 2019-03-22 DIAGNOSIS — M81.0 OSTEOPOROSIS, SENILE: ICD-10-CM

## 2019-05-15 ENCOUNTER — RECORDS - HEALTHEAST (OUTPATIENT)
Dept: ADMINISTRATIVE | Facility: OTHER | Age: 74
End: 2019-05-15

## 2019-05-16 ENCOUNTER — RECORDS - HEALTHEAST (OUTPATIENT)
Dept: ADMINISTRATIVE | Facility: OTHER | Age: 74
End: 2019-05-16

## 2019-06-06 ENCOUNTER — COMMUNICATION - HEALTHEAST (OUTPATIENT)
Dept: FAMILY MEDICINE | Facility: CLINIC | Age: 74
End: 2019-06-06

## 2019-06-06 DIAGNOSIS — F41.9 ANXIETY: ICD-10-CM

## 2019-06-07 ENCOUNTER — AMBULATORY - HEALTHEAST (OUTPATIENT)
Dept: ENDOCRINOLOGY | Facility: CLINIC | Age: 74
End: 2019-06-07

## 2019-06-07 DIAGNOSIS — M81.0 OSTEOPOROSIS, SENILE: ICD-10-CM

## 2019-08-27 ENCOUNTER — OFFICE VISIT - HEALTHEAST (OUTPATIENT)
Dept: FAMILY MEDICINE | Facility: CLINIC | Age: 74
End: 2019-08-27

## 2019-08-27 DIAGNOSIS — S01.81XD LACERATION OF FOREHEAD, SUBSEQUENT ENCOUNTER: ICD-10-CM

## 2019-09-10 ENCOUNTER — COMMUNICATION - HEALTHEAST (OUTPATIENT)
Dept: FAMILY MEDICINE | Facility: CLINIC | Age: 74
End: 2019-09-10

## 2019-09-10 ENCOUNTER — COMMUNICATION - HEALTHEAST (OUTPATIENT)
Dept: ENDOCRINOLOGY | Facility: CLINIC | Age: 74
End: 2019-09-10

## 2019-09-10 DIAGNOSIS — F41.9 ANXIETY: ICD-10-CM

## 2019-09-10 DIAGNOSIS — M81.0 OSTEOPOROSIS, SENILE: ICD-10-CM

## 2019-09-19 ENCOUNTER — COMMUNICATION - HEALTHEAST (OUTPATIENT)
Dept: SCHEDULING | Facility: CLINIC | Age: 74
End: 2019-09-19

## 2019-09-24 ENCOUNTER — COMMUNICATION - HEALTHEAST (OUTPATIENT)
Dept: ENDOCRINOLOGY | Facility: CLINIC | Age: 74
End: 2019-09-24

## 2019-09-24 DIAGNOSIS — M81.0 OSTEOPOROSIS, SENILE: ICD-10-CM

## 2019-09-24 RX ORDER — ACARBOSE 25 MG/1
TABLET ORAL
Qty: 270 TABLET | Refills: 1 | Status: SHIPPED | OUTPATIENT
Start: 2019-09-24 | End: 2024-04-23

## 2019-10-08 ENCOUNTER — AMBULATORY - HEALTHEAST (OUTPATIENT)
Dept: NURSING | Facility: CLINIC | Age: 74
End: 2019-10-08

## 2019-10-18 ENCOUNTER — OFFICE VISIT - HEALTHEAST (OUTPATIENT)
Dept: FAMILY MEDICINE | Facility: CLINIC | Age: 74
End: 2019-10-18

## 2019-10-18 DIAGNOSIS — H90.3 BILATERAL SENSORINEURAL HEARING LOSS: ICD-10-CM

## 2019-10-18 DIAGNOSIS — R73.01 IMPAIRED FASTING GLUCOSE: ICD-10-CM

## 2019-10-18 DIAGNOSIS — Z00.01 ENCOUNTER FOR GENERAL ADULT MEDICAL EXAMINATION WITH ABNORMAL FINDINGS: ICD-10-CM

## 2019-10-18 DIAGNOSIS — I10 ESSENTIAL HYPERTENSION WITH GOAL BLOOD PRESSURE LESS THAN 130/80: ICD-10-CM

## 2019-10-18 DIAGNOSIS — E55.9 VITAMIN D DEFICIENCY: ICD-10-CM

## 2019-10-18 DIAGNOSIS — M81.0 OSTEOPOROSIS, SENILE: ICD-10-CM

## 2019-10-18 DIAGNOSIS — E16.2 HYPOGLYCEMIA: ICD-10-CM

## 2019-10-18 LAB
ANION GAP SERPL CALCULATED.3IONS-SCNC: 6 MMOL/L (ref 5–18)
BUN SERPL-MCNC: 21 MG/DL (ref 8–28)
CALCIUM SERPL-MCNC: 9.7 MG/DL (ref 8.5–10.5)
CHLORIDE BLD-SCNC: 101 MMOL/L (ref 98–107)
CO2 SERPL-SCNC: 32 MMOL/L (ref 22–31)
CREAT SERPL-MCNC: 0.78 MG/DL (ref 0.6–1.1)
GFR SERPL CREATININE-BSD FRML MDRD: >60 ML/MIN/1.73M2
GLUCOSE BLD-MCNC: 76 MG/DL (ref 70–125)
HBA1C MFR BLD: 5.8 % (ref 3.5–6)
POTASSIUM BLD-SCNC: 4.7 MMOL/L (ref 3.5–5)
SODIUM SERPL-SCNC: 139 MMOL/L (ref 136–145)

## 2019-10-18 ASSESSMENT — MIFFLIN-ST. JEOR: SCORE: 889.76

## 2019-10-18 ASSESSMENT — ANXIETY QUESTIONNAIRES
1. FEELING NERVOUS, ANXIOUS, OR ON EDGE: SEVERAL DAYS
2. NOT BEING ABLE TO STOP OR CONTROL WORRYING: SEVERAL DAYS

## 2019-10-21 LAB
25(OH)D3 SERPL-MCNC: 43.8 NG/ML (ref 30–80)
25(OH)D3 SERPL-MCNC: 43.8 NG/ML (ref 30–80)

## 2019-10-30 ENCOUNTER — COMMUNICATION - HEALTHEAST (OUTPATIENT)
Dept: ADMINISTRATIVE | Facility: CLINIC | Age: 74
End: 2019-10-30

## 2019-11-06 ENCOUNTER — RECORDS - HEALTHEAST (OUTPATIENT)
Dept: ADMINISTRATIVE | Facility: OTHER | Age: 74
End: 2019-11-06

## 2019-11-08 ENCOUNTER — AMBULATORY - HEALTHEAST (OUTPATIENT)
Dept: ENDOCRINOLOGY | Facility: CLINIC | Age: 74
End: 2019-11-08

## 2019-11-08 ENCOUNTER — COMMUNICATION - HEALTHEAST (OUTPATIENT)
Dept: ENDOCRINOLOGY | Facility: CLINIC | Age: 74
End: 2019-11-08

## 2019-11-08 DIAGNOSIS — E16.2 HYPOGLYCEMIA: ICD-10-CM

## 2019-11-20 ENCOUNTER — HOSPITAL ENCOUNTER (OUTPATIENT)
Dept: MAMMOGRAPHY | Facility: CLINIC | Age: 74
Discharge: HOME | End: 2019-11-20
Attending: FAMILY MEDICINE

## 2019-11-20 DIAGNOSIS — Z12.31 VISIT FOR SCREENING MAMMOGRAM: ICD-10-CM

## 2019-12-05 ENCOUNTER — RECORDS - HEALTHEAST (OUTPATIENT)
Dept: ADMINISTRATIVE | Facility: OTHER | Age: 74
End: 2019-12-05

## 2019-12-09 ENCOUNTER — COMMUNICATION - HEALTHEAST (OUTPATIENT)
Dept: FAMILY MEDICINE | Facility: CLINIC | Age: 74
End: 2019-12-09

## 2019-12-09 DIAGNOSIS — G89.29 CHRONIC MIDLINE LOW BACK PAIN WITHOUT SCIATICA: ICD-10-CM

## 2019-12-09 DIAGNOSIS — M54.50 CHRONIC MIDLINE LOW BACK PAIN WITHOUT SCIATICA: ICD-10-CM

## 2019-12-11 ENCOUNTER — OFFICE VISIT - HEALTHEAST (OUTPATIENT)
Dept: FAMILY MEDICINE | Facility: CLINIC | Age: 74
End: 2019-12-11

## 2019-12-11 ENCOUNTER — AMBULATORY - HEALTHEAST (OUTPATIENT)
Dept: FAMILY MEDICINE | Facility: CLINIC | Age: 74
End: 2019-12-11

## 2019-12-11 DIAGNOSIS — M54.50 CHRONIC MIDLINE LOW BACK PAIN WITHOUT SCIATICA: ICD-10-CM

## 2019-12-11 DIAGNOSIS — G89.29 CHRONIC MIDLINE LOW BACK PAIN WITHOUT SCIATICA: ICD-10-CM

## 2019-12-11 DIAGNOSIS — I10 ESSENTIAL HYPERTENSION: ICD-10-CM

## 2019-12-13 ENCOUNTER — RECORDS - HEALTHEAST (OUTPATIENT)
Dept: ADMINISTRATIVE | Facility: OTHER | Age: 74
End: 2019-12-13

## 2020-01-14 ENCOUNTER — COMMUNICATION - HEALTHEAST (OUTPATIENT)
Dept: FAMILY MEDICINE | Facility: CLINIC | Age: 75
End: 2020-01-14

## 2020-01-14 DIAGNOSIS — M54.50 CHRONIC MIDLINE LOW BACK PAIN WITHOUT SCIATICA: ICD-10-CM

## 2020-01-14 DIAGNOSIS — G89.29 CHRONIC MIDLINE LOW BACK PAIN WITHOUT SCIATICA: ICD-10-CM

## 2020-01-20 ENCOUNTER — AMBULATORY - HEALTHEAST (OUTPATIENT)
Dept: FAMILY MEDICINE | Facility: CLINIC | Age: 75
End: 2020-01-20

## 2020-01-20 DIAGNOSIS — M81.0 SENILE OSTEOPOROSIS: ICD-10-CM

## 2020-01-20 DIAGNOSIS — Z92.29 HISTORY OF DRUG THERAPY: ICD-10-CM

## 2020-03-16 ENCOUNTER — COMMUNICATION - HEALTHEAST (OUTPATIENT)
Dept: FAMILY MEDICINE | Facility: CLINIC | Age: 75
End: 2020-03-16

## 2020-03-16 DIAGNOSIS — F41.9 ANXIETY: ICD-10-CM

## 2020-03-21 ENCOUNTER — COMMUNICATION - HEALTHEAST (OUTPATIENT)
Dept: FAMILY MEDICINE | Facility: CLINIC | Age: 75
End: 2020-03-21

## 2020-04-09 ENCOUNTER — OFFICE VISIT - HEALTHEAST (OUTPATIENT)
Dept: FAMILY MEDICINE | Facility: CLINIC | Age: 75
End: 2020-04-09

## 2020-04-09 DIAGNOSIS — E16.2 HYPOGLYCEMIA: ICD-10-CM

## 2020-04-09 DIAGNOSIS — R73.01 IMPAIRED FASTING GLUCOSE: ICD-10-CM

## 2020-04-09 DIAGNOSIS — M81.0 OSTEOPOROSIS, SENILE: ICD-10-CM

## 2020-04-09 DIAGNOSIS — M81.0 SENILE OSTEOPOROSIS: ICD-10-CM

## 2020-04-09 DIAGNOSIS — H90.3 BILATERAL SENSORINEURAL HEARING LOSS: ICD-10-CM

## 2020-04-09 DIAGNOSIS — I10 ESSENTIAL HYPERTENSION WITH GOAL BLOOD PRESSURE LESS THAN 130/80: ICD-10-CM

## 2020-04-14 ENCOUNTER — OFFICE VISIT - HEALTHEAST (OUTPATIENT)
Dept: INTERNAL MEDICINE | Facility: CLINIC | Age: 75
End: 2020-04-14

## 2020-04-14 DIAGNOSIS — M81.0 OSTEOPOROSIS, SENILE: ICD-10-CM

## 2020-05-28 ENCOUNTER — RECORDS - HEALTHEAST (OUTPATIENT)
Dept: ADMINISTRATIVE | Facility: OTHER | Age: 75
End: 2020-05-28

## 2020-06-25 ENCOUNTER — COMMUNICATION - HEALTHEAST (OUTPATIENT)
Dept: FAMILY MEDICINE | Facility: CLINIC | Age: 75
End: 2020-06-25

## 2020-06-25 DIAGNOSIS — F41.9 ANXIETY: ICD-10-CM

## 2020-08-05 ENCOUNTER — RECORDS - HEALTHEAST (OUTPATIENT)
Dept: ADMINISTRATIVE | Facility: OTHER | Age: 75
End: 2020-08-05

## 2020-08-06 ENCOUNTER — OFFICE VISIT - HEALTHEAST (OUTPATIENT)
Dept: FAMILY MEDICINE | Facility: CLINIC | Age: 75
End: 2020-08-06

## 2020-08-06 DIAGNOSIS — F41.9 ANXIETY: ICD-10-CM

## 2020-08-06 DIAGNOSIS — M81.0 OSTEOPOROSIS, SENILE: ICD-10-CM

## 2020-08-06 DIAGNOSIS — I10 ESSENTIAL HYPERTENSION WITH GOAL BLOOD PRESSURE LESS THAN 130/80: ICD-10-CM

## 2020-08-06 DIAGNOSIS — K21.9 GASTROESOPHAGEAL REFLUX DISEASE WITHOUT ESOPHAGITIS: ICD-10-CM

## 2020-08-06 DIAGNOSIS — R05.3 CHRONIC COUGH: ICD-10-CM

## 2020-08-06 DIAGNOSIS — E16.2 HYPOGLYCEMIA: ICD-10-CM

## 2020-08-06 DIAGNOSIS — H90.3 BILATERAL SENSORINEURAL HEARING LOSS: ICD-10-CM

## 2020-08-06 DIAGNOSIS — M81.0 SENILE OSTEOPOROSIS: ICD-10-CM

## 2020-10-08 ENCOUNTER — COMMUNICATION - HEALTHEAST (OUTPATIENT)
Dept: INTERNAL MEDICINE | Facility: CLINIC | Age: 75
End: 2020-10-08

## 2020-10-20 ENCOUNTER — OFFICE VISIT - HEALTHEAST (OUTPATIENT)
Dept: FAMILY MEDICINE | Facility: CLINIC | Age: 75
End: 2020-10-20

## 2020-10-20 DIAGNOSIS — E16.2 HYPOGLYCEMIA: ICD-10-CM

## 2020-10-20 DIAGNOSIS — E55.9 VITAMIN D DEFICIENCY: ICD-10-CM

## 2020-10-20 DIAGNOSIS — R05.3 CHRONIC COUGH: ICD-10-CM

## 2020-10-20 DIAGNOSIS — I10 ESSENTIAL HYPERTENSION WITH GOAL BLOOD PRESSURE LESS THAN 130/80: ICD-10-CM

## 2020-10-20 DIAGNOSIS — Z00.01 ENCOUNTER FOR GENERAL ADULT MEDICAL EXAMINATION WITH ABNORMAL FINDINGS: ICD-10-CM

## 2020-10-20 DIAGNOSIS — K21.9 GASTROESOPHAGEAL REFLUX DISEASE WITHOUT ESOPHAGITIS: ICD-10-CM

## 2020-10-20 DIAGNOSIS — H90.3 BILATERAL SENSORINEURAL HEARING LOSS: ICD-10-CM

## 2020-10-20 DIAGNOSIS — R73.01 IMPAIRED FASTING GLUCOSE: ICD-10-CM

## 2020-10-20 DIAGNOSIS — M81.0 SENILE OSTEOPOROSIS: ICD-10-CM

## 2020-10-20 DIAGNOSIS — F41.9 ANXIETY: ICD-10-CM

## 2020-10-20 LAB
ANION GAP SERPL CALCULATED.3IONS-SCNC: 8 MMOL/L (ref 5–18)
BUN SERPL-MCNC: 17 MG/DL (ref 8–28)
CALCIUM SERPL-MCNC: 9.4 MG/DL (ref 8.5–10.5)
CHLORIDE BLD-SCNC: 101 MMOL/L (ref 98–107)
CO2 SERPL-SCNC: 30 MMOL/L (ref 22–31)
CREAT SERPL-MCNC: 0.78 MG/DL (ref 0.6–1.1)
GFR SERPL CREATININE-BSD FRML MDRD: >60 ML/MIN/1.73M2
GLUCOSE BLD-MCNC: 96 MG/DL (ref 70–125)
HBA1C MFR BLD: 5.8 %
POTASSIUM BLD-SCNC: 4.4 MMOL/L (ref 3.5–5)
SODIUM SERPL-SCNC: 139 MMOL/L (ref 136–145)

## 2020-10-20 ASSESSMENT — MIFFLIN-ST. JEOR: SCORE: 892.88

## 2020-10-21 LAB
25(OH)D3 SERPL-MCNC: 41 NG/ML (ref 30–80)
25(OH)D3 SERPL-MCNC: 41 NG/ML (ref 30–80)

## 2020-10-21 ASSESSMENT — ANXIETY QUESTIONNAIRES
5. BEING SO RESTLESS THAT IT IS HARD TO SIT STILL: NOT AT ALL
3. WORRYING TOO MUCH ABOUT DIFFERENT THINGS: SEVERAL DAYS
7. FEELING AFRAID AS IF SOMETHING AWFUL MIGHT HAPPEN: NOT AT ALL
6. BECOMING EASILY ANNOYED OR IRRITABLE: NOT AT ALL
IF YOU CHECKED OFF ANY PROBLEMS ON THIS QUESTIONNAIRE, HOW DIFFICULT HAVE THESE PROBLEMS MADE IT FOR YOU TO DO YOUR WORK, TAKE CARE OF THINGS AT HOME, OR GET ALONG WITH OTHER PEOPLE: NOT DIFFICULT AT ALL
4. TROUBLE RELAXING: NOT AT ALL
GAD7 TOTAL SCORE: 1
1. FEELING NERVOUS, ANXIOUS, OR ON EDGE: NOT AT ALL
2. NOT BEING ABLE TO STOP OR CONTROL WORRYING: NOT AT ALL

## 2020-10-21 ASSESSMENT — PATIENT HEALTH QUESTIONNAIRE - PHQ9: SUM OF ALL RESPONSES TO PHQ QUESTIONS 1-9: 0

## 2020-11-01 ENCOUNTER — COMMUNICATION - HEALTHEAST (OUTPATIENT)
Dept: FAMILY MEDICINE | Facility: CLINIC | Age: 75
End: 2020-11-01

## 2020-11-01 DIAGNOSIS — M54.50 CHRONIC MIDLINE LOW BACK PAIN WITHOUT SCIATICA: ICD-10-CM

## 2020-11-01 DIAGNOSIS — G89.29 CHRONIC MIDLINE LOW BACK PAIN WITHOUT SCIATICA: ICD-10-CM

## 2020-11-02 ENCOUNTER — HOSPITAL ENCOUNTER (OUTPATIENT)
Dept: MAMMOGRAPHY | Facility: CLINIC | Age: 75
Discharge: HOME | End: 2020-11-02
Attending: FAMILY MEDICINE

## 2020-11-02 DIAGNOSIS — Z12.31 VISIT FOR SCREENING MAMMOGRAM: ICD-10-CM

## 2020-11-02 RX ORDER — CELECOXIB 200 MG/1
CAPSULE ORAL
Qty: 90 CAPSULE | Refills: 3 | Status: SHIPPED | OUTPATIENT
Start: 2020-11-02 | End: 2022-03-02

## 2020-11-05 ENCOUNTER — AMBULATORY - HEALTHEAST (OUTPATIENT)
Dept: NURSING | Facility: CLINIC | Age: 75
End: 2020-11-05

## 2020-11-30 ENCOUNTER — COMMUNICATION - HEALTHEAST (OUTPATIENT)
Dept: FAMILY MEDICINE | Facility: CLINIC | Age: 75
End: 2020-11-30

## 2020-11-30 DIAGNOSIS — G89.29 CHRONIC MIDLINE LOW BACK PAIN WITHOUT SCIATICA: ICD-10-CM

## 2020-11-30 DIAGNOSIS — M54.50 CHRONIC MIDLINE LOW BACK PAIN WITHOUT SCIATICA: ICD-10-CM

## 2020-12-01 RX ORDER — NORTRIPTYLINE HYDROCHLORIDE 50 MG/1
CAPSULE ORAL
Qty: 90 CAPSULE | Refills: 3 | Status: SHIPPED | OUTPATIENT
Start: 2020-12-01 | End: 2021-12-01

## 2020-12-08 ENCOUNTER — RECORDS - HEALTHEAST (OUTPATIENT)
Dept: ADMINISTRATIVE | Facility: OTHER | Age: 75
End: 2020-12-08

## 2020-12-29 ENCOUNTER — RECORDS - HEALTHEAST (OUTPATIENT)
Dept: ADMINISTRATIVE | Facility: OTHER | Age: 75
End: 2020-12-29

## 2020-12-30 ENCOUNTER — COMMUNICATION - HEALTHEAST (OUTPATIENT)
Dept: FAMILY MEDICINE | Facility: CLINIC | Age: 75
End: 2020-12-30

## 2020-12-30 DIAGNOSIS — F41.9 ANXIETY: ICD-10-CM

## 2020-12-31 ENCOUNTER — OFFICE VISIT - HEALTHEAST (OUTPATIENT)
Dept: FAMILY MEDICINE | Facility: CLINIC | Age: 75
End: 2020-12-31

## 2020-12-31 DIAGNOSIS — G89.29 CHRONIC MIDLINE LOW BACK PAIN WITHOUT SCIATICA: ICD-10-CM

## 2020-12-31 DIAGNOSIS — E55.9 VITAMIN D DEFICIENCY: ICD-10-CM

## 2020-12-31 DIAGNOSIS — I10 ESSENTIAL HYPERTENSION WITH GOAL BLOOD PRESSURE LESS THAN 130/80: ICD-10-CM

## 2020-12-31 DIAGNOSIS — M81.0 OSTEOPOROSIS, SENILE: ICD-10-CM

## 2020-12-31 DIAGNOSIS — H90.3 BILATERAL SENSORINEURAL HEARING LOSS: ICD-10-CM

## 2020-12-31 DIAGNOSIS — F41.9 ANXIETY: ICD-10-CM

## 2020-12-31 DIAGNOSIS — E16.2 HYPOGLYCEMIA: ICD-10-CM

## 2020-12-31 DIAGNOSIS — R05.3 CHRONIC COUGH: ICD-10-CM

## 2020-12-31 DIAGNOSIS — R00.2 PALPITATIONS: ICD-10-CM

## 2020-12-31 DIAGNOSIS — M54.50 CHRONIC MIDLINE LOW BACK PAIN WITHOUT SCIATICA: ICD-10-CM

## 2020-12-31 ASSESSMENT — MIFFLIN-ST. JEOR: SCORE: 892.88

## 2021-03-18 ENCOUNTER — COMMUNICATION - HEALTHEAST (OUTPATIENT)
Dept: FAMILY MEDICINE | Facility: CLINIC | Age: 76
End: 2021-03-18

## 2021-03-18 DIAGNOSIS — F41.9 ANXIETY: ICD-10-CM

## 2021-03-18 RX ORDER — LORAZEPAM 0.5 MG/1
TABLET ORAL
Qty: 180 TABLET | Refills: 1 | Status: SHIPPED | OUTPATIENT
Start: 2021-03-18 | End: 2021-08-25

## 2021-04-01 ENCOUNTER — OFFICE VISIT - HEALTHEAST (OUTPATIENT)
Dept: FAMILY MEDICINE | Facility: CLINIC | Age: 76
End: 2021-04-01

## 2021-04-01 ENCOUNTER — COMMUNICATION - HEALTHEAST (OUTPATIENT)
Dept: ADMINISTRATIVE | Facility: CLINIC | Age: 76
End: 2021-04-01

## 2021-04-01 DIAGNOSIS — E16.2 HYPOGLYCEMIA: ICD-10-CM

## 2021-04-01 DIAGNOSIS — H90.3 BILATERAL SENSORINEURAL HEARING LOSS: ICD-10-CM

## 2021-04-01 DIAGNOSIS — I10 ESSENTIAL HYPERTENSION WITH GOAL BLOOD PRESSURE LESS THAN 130/80: ICD-10-CM

## 2021-04-01 DIAGNOSIS — Z01.818 PREOPERATIVE EXAMINATION: ICD-10-CM

## 2021-04-01 DIAGNOSIS — H26.9 CATARACT OF BOTH EYES, UNSPECIFIED CATARACT TYPE: ICD-10-CM

## 2021-04-01 DIAGNOSIS — G89.29 CHRONIC MIDLINE LOW BACK PAIN WITHOUT SCIATICA: ICD-10-CM

## 2021-04-01 DIAGNOSIS — Z01.818 PREOP GENERAL PHYSICAL EXAM: ICD-10-CM

## 2021-04-01 DIAGNOSIS — M54.50 CHRONIC MIDLINE LOW BACK PAIN WITHOUT SCIATICA: ICD-10-CM

## 2021-04-01 DIAGNOSIS — E55.9 VITAMIN D DEFICIENCY: ICD-10-CM

## 2021-04-01 DIAGNOSIS — M81.0 OSTEOPOROSIS, SENILE: ICD-10-CM

## 2021-04-01 DIAGNOSIS — F41.9 ANXIETY: ICD-10-CM

## 2021-04-27 ENCOUNTER — AMBULATORY - HEALTHEAST (OUTPATIENT)
Dept: INTERNAL MEDICINE | Facility: CLINIC | Age: 76
End: 2021-04-27

## 2021-04-27 DIAGNOSIS — M81.0 OSTEOPOROSIS, SENILE: ICD-10-CM

## 2021-04-27 DIAGNOSIS — Z92.29 PERSONAL HISTORY OF OTHER DRUG THERAPY: ICD-10-CM

## 2021-05-04 ENCOUNTER — RECORDS - HEALTHEAST (OUTPATIENT)
Dept: ADMINISTRATIVE | Facility: OTHER | Age: 76
End: 2021-05-04

## 2021-05-04 ENCOUNTER — OFFICE VISIT - HEALTHEAST (OUTPATIENT)
Dept: INTERNAL MEDICINE | Facility: CLINIC | Age: 76
End: 2021-05-04

## 2021-05-04 ENCOUNTER — RECORDS - HEALTHEAST (OUTPATIENT)
Dept: BONE DENSITY | Facility: CLINIC | Age: 76
End: 2021-05-04

## 2021-05-04 DIAGNOSIS — M81.0 OSTEOPOROSIS, SENILE: ICD-10-CM

## 2021-05-04 DIAGNOSIS — M81.0 AGE-RELATED OSTEOPOROSIS WITHOUT CURRENT PATHOLOGICAL FRACTURE: ICD-10-CM

## 2021-05-06 ENCOUNTER — COMMUNICATION - HEALTHEAST (OUTPATIENT)
Dept: FAMILY MEDICINE | Facility: CLINIC | Age: 76
End: 2021-05-06

## 2021-05-06 DIAGNOSIS — I10 ESSENTIAL HYPERTENSION WITH GOAL BLOOD PRESSURE LESS THAN 130/80: ICD-10-CM

## 2021-05-07 LAB
GLIADIN IGA SER-ACNC: 0.7 U/ML
GLIADIN IGG SER-ACNC: <0.4 U/ML
IGA SERPL-MCNC: 135 MG/DL (ref 65–400)
TTG IGA SER-ACNC: 0.3 U/ML
TTG IGG SER-ACNC: <0.6 U/ML

## 2021-05-23 ENCOUNTER — COMMUNICATION - HEALTHEAST (OUTPATIENT)
Dept: FAMILY MEDICINE | Facility: CLINIC | Age: 76
End: 2021-05-23

## 2021-05-23 DIAGNOSIS — I10 ESSENTIAL HYPERTENSION WITH GOAL BLOOD PRESSURE LESS THAN 130/80: ICD-10-CM

## 2021-05-24 RX ORDER — ATENOLOL 25 MG/1
TABLET ORAL
Qty: 90 TABLET | Refills: 3 | Status: SHIPPED | OUTPATIENT
Start: 2021-05-24 | End: 2021-11-09

## 2021-05-25 ENCOUNTER — RECORDS - HEALTHEAST (OUTPATIENT)
Dept: ADMINISTRATIVE | Facility: CLINIC | Age: 76
End: 2021-05-25

## 2021-05-26 ENCOUNTER — RECORDS - HEALTHEAST (OUTPATIENT)
Dept: ADMINISTRATIVE | Facility: CLINIC | Age: 76
End: 2021-05-26

## 2021-05-26 VITALS — SYSTOLIC BLOOD PRESSURE: 110 MMHG | DIASTOLIC BLOOD PRESSURE: 72 MMHG

## 2021-05-27 ENCOUNTER — RECORDS - HEALTHEAST (OUTPATIENT)
Dept: ADMINISTRATIVE | Facility: CLINIC | Age: 76
End: 2021-05-27

## 2021-05-27 VITALS
HEART RATE: 72 BPM | DIASTOLIC BLOOD PRESSURE: 80 MMHG | OXYGEN SATURATION: 99 % | WEIGHT: 105.7 LBS | SYSTOLIC BLOOD PRESSURE: 140 MMHG

## 2021-05-27 VITALS — DIASTOLIC BLOOD PRESSURE: 82 MMHG | SYSTOLIC BLOOD PRESSURE: 120 MMHG

## 2021-05-27 ASSESSMENT — PATIENT HEALTH QUESTIONNAIRE - PHQ9: SUM OF ALL RESPONSES TO PHQ QUESTIONS 1-9: 0

## 2021-05-28 ENCOUNTER — RECORDS - HEALTHEAST (OUTPATIENT)
Dept: ADMINISTRATIVE | Facility: CLINIC | Age: 76
End: 2021-05-28

## 2021-05-28 ASSESSMENT — ANXIETY QUESTIONNAIRES: GAD7 TOTAL SCORE: 1

## 2021-05-29 NOTE — TELEPHONE ENCOUNTER
Controlled Substance Refill Request  Medication:   Requested Prescriptions     Pending Prescriptions Disp Refills     LORazepam (ATIVAN) 0.5 MG tablet [Pharmacy Med Name: LORAZEPAM  0.5MG  TAB] 180 tablet      Sig: TAKE 1 TABLET BY MOUTH  EVERY 6 HOURS AS NEEDED FOR ANXIETY     Date Last Fill: 3/14/19  Pharmacy: [facility]   Submit electronically to pharmacy  Controlled Substance Agreement on File:   Encounter-Level CSA Scan Date:    There are no encounter-level csa scan date.       Last office visit: Last office visit pertaining to requested medication was 3/19/19.

## 2021-05-30 VITALS — BODY MASS INDEX: 21.32 KG/M2 | WEIGHT: 102 LBS

## 2021-05-31 ENCOUNTER — RECORDS - HEALTHEAST (OUTPATIENT)
Dept: ADMINISTRATIVE | Facility: CLINIC | Age: 76
End: 2021-05-31

## 2021-05-31 VITALS — WEIGHT: 100.7 LBS | HEIGHT: 58 IN | BODY MASS INDEX: 21.14 KG/M2

## 2021-05-31 VITALS — BODY MASS INDEX: 20.96 KG/M2 | WEIGHT: 100.3 LBS

## 2021-05-31 NOTE — PROGRESS NOTES
Assessment/Plan:    1. Laceration of forehead, subsequent encounter  Forehead laceration status post fall August 18, 2019.  8 simple interrupted sutures removed without difficulty.  Steri-Strip placed following.  Local wound hygiene reviewed.  Follow-up with worsening or nonimprovement.          Subjective:    Bibiana Mcrae is seen today for forehead laceration.  Occurred August 18, 2019.  Was leaving [employer].  Tripped where concrete and that blacktop with uneven edge.  Caused her to fall forward landing unfortunately on left side of face.  Laceration occurring.  Seen for suture placement x8.  Mild headache initially continues to improve.  No confusion or sedation.  No vision change.    Past Surgical History:   Procedure Laterality Date     BREAST CYST ASPIRATION Left     While ago     HYSTERECTOMY  1985     OOPHORECTOMY Bilateral 1985     DC REMOVAL OF OVARY(S)      Description: Oophorectomy;  Recorded: 12/17/2009;     DC TOTAL ABDOM HYSTERECTOMY      Description: Total Abdominal Hysterectomy;  Recorded: 12/17/2009;        Family History   Problem Relation Age of Onset     Breast cancer Mother 52     Cancer Maternal Grandfather 71        prostate     Prostate cancer Maternal Grandfather         Past Medical History:   Diagnosis Date     Anxiety      Breast cyst     While ago        Social History     Tobacco Use     Smoking status: Never Smoker     Smokeless tobacco: Never Used   Substance Use Topics     Alcohol use: No     Drug use: No        Current Outpatient Medications   Medication Sig Dispense Refill     acarbose (PRECOSE) 25 MG tablet TAKE 1 TABLET BY MOUTH 3  TIMES A DAY WITH MEALS 270 tablet 1     aspirin (RIA ASPIRIN) 325 MG tablet Take 2 tablets (650 mg total) by mouth 2 (two) times a day before meals. 100 tablet 3     atenolol (TENORMIN) 25 MG tablet TAKE 1 TABLET BY MOUTH  DAILY 90 tablet 3     BIOTIN ORAL Take 5,000 mcg by mouth 2 (two) times a day.        blood glucose control high,low (ACCU-CHEK  JB CONTROL SOLN) Soln Meter check once ever 3 months 1 each 0     blood glucose test (ACCU-CHEK JB PLUS TEST STRP) strips USE TO TEST 3 TIMES DAILY.. 300 strip 3     calcium-vitamin D (CALCIUM-VITAMIN D) 500 mg(1,250mg) -200 unit per tablet Take 2 tablets by mouth 2 (two) times a day with meals.       celecoxib (CELEBREX) 200 MG capsule TAKE ONE CAPSULE BY MOUTH  DAILY 90 capsule 3     denosumab 60 mg/mL Syrg Inject 60 mg under the skin once.       diphenhydrAMINE (BENADRYL) 25 mg tablet Take 50 mg by mouth at bedtime.       generic lancets (ACCU-CHEK MULTICLIX LANCET) Dispense brand per patient's insurance at pharmacy discretion. 300 each 3     ketoconazole (NIZORAL) 2 % shampoo   11     LORazepam (ATIVAN) 0.5 MG tablet TAKE 1 TABLET BY MOUTH  EVERY 6 HOURS AS NEEDED FOR ANXIETY 180 tablet 0     multivitamin with minerals (THERA-M) 9 mg iron-400 mcg Tab tablet Take 1 tablet by mouth daily.       nortriptyline (PAMELOR) 50 MG capsule Take 1 capsule (50 mg total) by mouth at bedtime. 90 capsule 3     oxyCODONE (ROXICODONE) 5 MG immediate release tablet Take 1 tablet (5 mg total) by mouth every 6 (six) hours as needed for pain. 8 tablet 0     azithromycin (ZITHROMAX) 250 MG tablet Take 2 tabs on day one, and then 1 tab on days 2-5.. 6 tablet 0     SHINGRIX, PF, 50 mcg/0.5 mL SusR ADM 0.5ML IM UTD  0     Current Facility-Administered Medications   Medication Dose Route Frequency Provider Last Rate Last Dose     denosumab 60 mg (PROLIA 60 mg/ml)  60 mg Subcutaneous Q6 Months Gustavo Tran MD   60 mg at 03/19/19 1634          Objective:    Vitals:    08/27/19 1433   BP: 100/60   Pulse: 67   SpO2: 99%   Weight: 109 lb (49.4 kg)      Body mass index is 22.78 kg/m .    Alert.  Facial ecchymoses from recent trauma otherwise irregular laceration forehead with 8 simple sutures in place removed without difficulty.  Steri-Strip Place following.  Neurologic exam otherwise appears nonfocal.      This note has been  dictated using voice recognition software and as a result may contain minor grammatical errors and unintended word substitutions.

## 2021-06-01 ENCOUNTER — RECORDS - HEALTHEAST (OUTPATIENT)
Dept: ADMINISTRATIVE | Facility: CLINIC | Age: 76
End: 2021-06-01

## 2021-06-01 VITALS — BODY MASS INDEX: 22.59 KG/M2 | HEIGHT: 57 IN | WEIGHT: 104.7 LBS

## 2021-06-01 VITALS — WEIGHT: 105 LBS | BODY MASS INDEX: 22.72 KG/M2

## 2021-06-01 VITALS — BODY MASS INDEX: 22.72 KG/M2 | WEIGHT: 105 LBS

## 2021-06-01 VITALS — BODY MASS INDEX: 22.07 KG/M2 | WEIGHT: 102 LBS

## 2021-06-01 VITALS — WEIGHT: 102 LBS | HEIGHT: 57 IN | BODY MASS INDEX: 22.01 KG/M2

## 2021-06-01 VITALS — BODY MASS INDEX: 21.62 KG/M2 | HEIGHT: 58 IN | WEIGHT: 103 LBS

## 2021-06-01 VITALS — BODY MASS INDEX: 23.13 KG/M2 | HEIGHT: 57 IN | WEIGHT: 107.2 LBS

## 2021-06-01 VITALS — BODY MASS INDEX: 21.79 KG/M2 | WEIGHT: 101 LBS | HEIGHT: 57 IN

## 2021-06-01 NOTE — TELEPHONE ENCOUNTER
Controlled Substance Refill Request  Medication:   Requested Prescriptions     Pending Prescriptions Disp Refills     LORazepam (ATIVAN) 0.5 MG tablet [Pharmacy Med Name: LORAZEPAM  0.5MG  TAB] 180 tablet      Sig: TAKE 1 TABLET BY MOUTH  EVERY 6 HOURS AS NEEDED FOR ANXIETY     Date Last Fill: 6/10/19  [facility]   Submit electronically to pharmacy  Controlled Substance Agreement on File:   Encounter-Level CSA Scan Date:    There are no encounter-level csa scan date.       Last office visit: Last office visit pertaining to requested medication was 8/27/19.

## 2021-06-01 NOTE — TELEPHONE ENCOUNTER
Pending appointment w/[facility]ella scheduled for 01.30.2020    Please authorize refill asap.     [facility] on file     Bibiana @ [phone]

## 2021-06-01 NOTE — TELEPHONE ENCOUNTER
RN triage   Call from daughter Rosa Isela - incomplete consent on chart -- pt gave verbal consent   Pt stung yesterday around 1900 by bee or wasp -- L flank   Red last evening   Today - lots of swelling - softball size -- and itchy --   Has applied ice and hydrocotisone cream -- and taken benadryl -- --not helping   No diff breathing or swallowing -- no other swelling - no hives/ rash   Center area is red  -- no streaks   Blood sugar this AM = 77 --- usually 99 in AM   Pt has not eaten yet and has not taken acarbose   Daughter will have pt eat now  Reviewed home care advice   Per protocol = should be seen  Transferred to    Goldie Calero RN [facility] RN triage        Reason for Disposition    Swelling is huge (e.g., > 4 inches or 10 cm, spreads beyond wrist or ankle)    Protocols used: BEE STING-A-OH

## 2021-06-02 VITALS — BODY MASS INDEX: 22.67 KG/M2 | HEIGHT: 58 IN | WEIGHT: 108 LBS

## 2021-06-02 VITALS — BODY MASS INDEX: 22.78 KG/M2 | WEIGHT: 109 LBS

## 2021-06-02 VITALS — BODY MASS INDEX: 23.58 KG/M2 | WEIGHT: 112.8 LBS

## 2021-06-02 NOTE — PROGRESS NOTES
Assessment and Plan:       1. Encounter for general adult medical examination with abnormal findings  Annual wellness visit completed.  Risks associated with some noted decreased memory with clock drawing and word recall.  Utilizes bilateral hearing aids with good results.  Did fall previously however no recurrent concerns for this.  Immunizations were reviewed and up-to-date.  Annual wellness visits to continue.    2. Essential hypertension with goal blood pressure less than 130/80  Hypertension reviewed.  Atenolol 25 mg daily continues.  No side effects described.  - Basic Metabolic Panel    3. Impaired fasting glucose  Dietary and exercise modifications reviewed.  Will check A1c.  - Glycosylated Hemoglobin A1c  - Basic Metabolic Panel    4. Hypoglycemia  History of hypoglycemia for which she uses Precose 25 mg 3 times daily with meals to prevent low blood sugar.    5. Vitamin D deficiency  Vitamin D deficiency.  Ensure adequate replacement.  - Vitamin D, Total (25-Hydroxy)    6. Bilateral sensorineural hearing loss  Bilaterally sensorineural hearing loss.  Bilateral hearing aids in place.    7. Osteoporosis, senile  Prolia 60 mg every 6 months.        The patient's current medical problems were reviewed.    I have had an Advance Directives discussion with the patient.     The following health maintenance schedule was reviewed with the patient and provided in printed form in the after visit summary:   Health Maintenance   Topic Date Due     MEDICARE ANNUAL WELLNESS VISIT  11/23/2019     FALL RISK ASSESSMENT  11/23/2019     MAMMOGRAM  11/16/2020     DXA SCAN  03/01/2021     ADVANCE CARE PLANNING  11/23/2023     COLONOSCOPY  09/06/2026     TD 18+ HE  11/23/2028     HEPATITIS C SCREENING  Completed     PNEUMOCOCCAL IMMUNIZATION 65+ LOW/MEDIUM RISK  Completed     INFLUENZA VACCINE RULE BASED  Completed     ZOSTER VACCINES  Completed        Subjective:     Chief Complaint: Bibiana Mcrae is an 74 y.o. female here  for an Annual Wellness visit.     HPI: In general doing well.  Atenolol 25 mg daily for hypertension.  History of impaired fasting glucose.  Has had episode of hypoglycemia previously and now is on Precose 25 mg 3 times daily with meals.  Vitamin D deficiency history.  Prolia every 6 months for osteoporosis.  Bilateral hearing aids for sensorineural hearing loss.  No chest pain.  Denies recent illness.  Immunizations reviewed and up-to-date he did receive flu shot in the last month through local pharmacy.  Prior fall 2019 with forehead laceration otherwise.  Had tripped on uneven blacktop after leaving [employer].  No recurrent concerns for falls etc.    Review of Systems:  Please see above.  The rest of the review of systems are negative for all systems.    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   No siblings   Surgery: emergency RLQ incarcerated strangulated femoral hernia repair 08; [facility]  due to endometriosis; ulcer surgery ; [id number] hernia repair (Dr. JULY Wen); lap choly 12 with Dr. Hayder Montgomery [phone]    Patient Care Team:  Z[facility] Woody [facility] as PCP [facility] (Endocrinology)  Zack Woody MD as Assigned PCP     Patient Active Problem List   Diagnosis     Vitamin D Deficiency     Essential hypertension with goal blood pressure less than 130/80     Lump Or Mass In Breast     Postmenopausal Atrophic Vaginitis     Lower Back Pain     Anxiety     Osteoporosis, senile     Peptic ulcer     Osteoarthritis of thumb, left     Palpitations     Heart palpitations     Shortness of breath     Hypoglycemia     Bilateral sensorineural hearing loss     Impaired fasting glucose     Diverticular disease of  large intestine     First degree hemorrhoids     Past Medical History:   Diagnosis Date     Anxiety      Breast cyst     While ago      Past Surgical History:   Procedure Laterality Date     BREAST CYST ASPIRATION Left     While ago     HYSTERECTOMY  1985     OOPHORECTOMY Bilateral 1985     GA REMOVAL OF OVARY(S)      Description: Oophorectomy;  Recorded: 12/17/2009;     GA TOTAL ABDOM HYSTERECTOMY      Description: Total Abdominal Hysterectomy;  Recorded: 12/17/2009;      Family History   Problem Relation Age of Onset     Breast cancer Mother 52     Cancer Maternal Grandfather 71        prostate     Prostate cancer Maternal Grandfather       Social History     Socioeconomic History     Marital status:      Spouse name: Not on file     Number of children: Not on file     Years of education: Not on file     Highest education level: Not on file   Occupational History     Not on file   Social Needs     Financial resource strain: Not on file     Food insecurity:     Worry: Not on file     Inability: Not on file     Transportation needs:     Medical: Not on file     Non-medical: Not on file   Tobacco Use     Smoking status: Never Smoker     Smokeless tobacco: Never Used   Substance and Sexual Activity     Alcohol use: No     Drug use: No     Sexual activity: Not on file   Lifestyle     Physical activity:     Days per week: Not on file     Minutes per session: Not on file     Stress: Not on file   Relationships     Social connections:     Talks on phone: Not on file     Gets together: Not on file     Attends Rastafari service: Not on file     Active member of club or organization: Not on file     Attends meetings of clubs or organizations: Not on file     Relationship status: Not on file     Intimate partner violence:     Fear of current or ex partner: Not on file     Emotionally abused: Not on file     Physically abused: Not on file     Forced sexual activity: Not on file   Other Topics Concern     Not on file    Social History Narrative     Not on file      Current Outpatient Medications   Medication Sig Dispense Refill     acarbose (PRECOSE) 25 MG tablet TAKE 1 TABLET BY MOUTH 3  TIMES A DAY WITH MEALS 270 tablet 1     aspirin (RIA ASPIRIN) 325 MG tablet Take 2 tablets (650 mg total) by mouth 2 (two) times a day before meals. 100 tablet 3     atenolol (TENORMIN) 25 MG tablet TAKE 1 TABLET BY MOUTH  DAILY 90 tablet 3     BIOTIN ORAL Take 5,000 mcg by mouth 2 (two) times a day.        blood glucose control high,low (ACCU-CHEK JB CONTROL SOLN) Soln Meter check once ever 3 months 1 each 0     blood glucose test (ACCU-CHEK JB PLUS TEST STRP) strips USE TO TEST 3 TIMES DAILY.. 300 strip 3     calcium-vitamin D (CALCIUM-VITAMIN D) 500 mg(1,250mg) -200 unit per tablet Take 2 tablets by mouth 2 (two) times a day with meals.       celecoxib (CELEBREX) 200 MG capsule TAKE ONE CAPSULE BY MOUTH  DAILY 90 capsule 3     denosumab 60 mg/mL Syrg Inject 60 mg under the skin once.       diphenhydrAMINE (BENADRYL) 25 mg tablet Take 50 mg by mouth at bedtime.       generic lancets (ACCU-CHEK MULTICLIX LANCET) Dispense brand per patient's insurance at pharmacy discretion. 300 each 3     LORazepam (ATIVAN) 0.5 MG tablet TAKE 1 TABLET BY MOUTH  EVERY 6 HOURS AS NEEDED FOR ANXIETY 180 tablet 1     multivitamin with minerals (THERA-M) 9 mg iron-400 mcg Tab tablet Take 1 tablet by mouth daily.       nortriptyline (PAMELOR) 50 MG capsule Take 1 capsule (50 mg total) by mouth at bedtime. 90 capsule 3     ketoconazole (NIZORAL) 2 % shampoo   11     oxyCODONE (ROXICODONE) 5 MG immediate release tablet Take 1 tablet (5 mg total) by mouth every 6 (six) hours as needed for pain. 8 tablet 0     Current Facility-Administered Medications   Medication Dose Route Frequency Provider Last Rate Last Dose     denosumab 60 mg (PROLIA 60 mg/ml)  60 mg Subcutaneous Q6 Months Gustavo Tran MD   60 mg at 10/08/19 1309      Objective:   Vital Signs:   Visit  "Vitals  /68 (Patient Site: Left Arm, Patient Position: Sitting, Cuff Size: Adult Regular)   Pulse 66   Ht 4' 10.27\" (1.48 m)   Wt 111 lb 8 oz (50.6 kg)   LMP 11/16/1985   SpO2 98%   BMI 23.09 kg/m         VisionScreening:  No exam data present     PHYSICAL EXAM  General Appearance: Alert, pleasant, appears stated age  Head: Normocephalic, without obvious abnormality  Eyes: PERRL, conjunctiva/corneas clear, EOM's intact.  Glasses.  Ears: Normal TM's and external ear canals, both ears  Nose: Nares normal, septum midline,mucosa normal, no drainage  Throat: Lips, mucosa, and tongue normal; teeth and gums normal; oropharynx is clear  Neck: Supple,without lymphadenopathy or thyromegally  Lungs: Clear to auscultation bilaterally, respirations unlabored  Breasts: Nopalpable masses, tenderness, asymmetry, or nipple discharge. No axillary or supraclavicular lymphadenopathy  Heart: Regular rate and rhythm, no murmur   Abdomen: Soft, non-tender, no masses, no organomegaly  Pelvic:Not examined  Extremities: Extremities with strong and symmetric pulses, no cyanosis or edema  Skin: Skin color, texture normal, no rashes or lesions  Neurologic: Normal      Assessment Results 10/18/2019   Activities of Daily Living No help needed   Instrumental Activities of Daily Living No help needed   Get Up and Go Score -   Mini Cog Total Score 5   Some recent data might be hidden     A Mini-Cog score of 0-2 suggests the possibility of dementia, score of 3-5 suggests no dementia    Identified Health Risks:     Patient's advanced directive was discussed and I am comfortable with the patient's wishes.        "

## 2021-06-02 NOTE — PROGRESS NOTES
"Prolia Injection Phone Screen      Screening questions have been asked 2-3 days prior to administration visit for Prolia. If any questions are answered with \"Yes,\" this phone encounter were will routed to ordering provider for further evaluation.     1.  When was the last injection?  3/19/19    2.  Has insurance for this injection been verified?  Yes    3.  Did you experience any new onset achiness or rashes that lasted for over a month with your previous Prolia injection?   No    4.  Do you have a fever over 101?F or a new deep cough that is unusual for you today? No    5.  Have you started any new medications in the last 6 months that you were told could affect your immune system? These may have been prescribed by oncologist, transplant, rheumatology, or dermatology.   No    6.  In the last 6 months have you have gastric bypass or parathyroid surgery?   No    7.  Do you plan dental work requiring drilling into the bone such as implants/extractions or oral surgery in the next 2-3 months?   No    8. Do you have new insurance since the last injection? No    Patient informed if symptoms discussed above present prior to their administration appointment, they are to notify clinic immediately.     Michelle Whiting              "

## 2021-06-02 NOTE — TELEPHONE ENCOUNTER
Dr Tran,    Patient believes she is due for a bone dxa and is wondering if you can put in an order for that.    She will be coming to see you in April and is hoping to schedule a bone Dxa for the same day.     OKTLDM

## 2021-06-02 NOTE — TELEPHONE ENCOUNTER
Left message informing pt that she just had a DEXA scan on 3/1/2019, so she is not due until after 3/1/2021. No need for DEXA order at this time

## 2021-06-02 NOTE — TELEPHONE ENCOUNTER
Patient Returning Call  Reason for call:  Order   Information relayed to patient:  Informed patient that she had her last scan completed on 3/01/2019 and is not due to have this repeated until 2021.  Patient has additional questions:  No  If YES, what are your questions/concerns:  N/A  Okay to leave a detailed message?: No call back needed

## 2021-06-02 NOTE — PATIENT INSTRUCTIONS - HE
Advance Directive  Patient s advance directive was discussed and I am comfortable with the patient s wishes.  Patient Education   Personalized Prevention Plan  You are due for the preventive services outlined below.  Your care team is available to assist you in scheduling these services.  If you have already completed any of these items, please share that information with your care team to update in your medical record.  Health Maintenance   Topic Date Due     MEDICARE ANNUAL WELLNESS VISIT  11/23/2019     FALL RISK ASSESSMENT  11/23/2019     MAMMOGRAM  11/16/2020     DXA SCAN  03/01/2021     ADVANCE CARE PLANNING  11/23/2023     COLONOSCOPY  09/06/2026     TD 18+ HE  11/23/2028     HEPATITIS C SCREENING  Completed     PNEUMOCOCCAL IMMUNIZATION 65+ LOW/MEDIUM RISK  Completed     INFLUENZA VACCINE RULE BASED  Completed     ZOSTER VACCINES  Completed

## 2021-06-03 VITALS — BODY MASS INDEX: 22.78 KG/M2 | WEIGHT: 109 LBS

## 2021-06-03 VITALS
DIASTOLIC BLOOD PRESSURE: 68 MMHG | HEART RATE: 66 BPM | OXYGEN SATURATION: 98 % | HEIGHT: 58 IN | SYSTOLIC BLOOD PRESSURE: 110 MMHG | BODY MASS INDEX: 23.41 KG/M2 | WEIGHT: 111.5 LBS

## 2021-06-04 VITALS
HEART RATE: 89 BPM | WEIGHT: 113.7 LBS | BODY MASS INDEX: 23.55 KG/M2 | SYSTOLIC BLOOD PRESSURE: 122 MMHG | OXYGEN SATURATION: 92 % | DIASTOLIC BLOOD PRESSURE: 79 MMHG

## 2021-06-04 VITALS
DIASTOLIC BLOOD PRESSURE: 60 MMHG | SYSTOLIC BLOOD PRESSURE: 120 MMHG | BODY MASS INDEX: 23.19 KG/M2 | HEART RATE: 85 BPM | WEIGHT: 112 LBS | OXYGEN SATURATION: 100 %

## 2021-06-04 NOTE — PROGRESS NOTES
Assessment/Plan:    1. 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  No sciatica.  Stable currently.  No longer on bare aspirin.  Using Aleve 220 mg using 1 tablet twice daily.  Celebrex 200 mg once daily with nortriptyline 50 mg at bedtime.  No side effects.  Will continue currently and reassess on annual basis per prior recommendation.          Subjective:    Bibiana Mcrae is seen today for  work comp follow-up.  Date of injury .  Initial injury occurred while working for [employer].  Had worked for [employer] for 31 years.  Had done significant heavy lifting, repetitive in nature, placing files into a tub.  Subsequently did see specialist with the decision to pursue conservative treatments.  In general doing well.  Does update med list.  Using Aleve 220 mg one tablet twice daily.  No longer on Mal aspirin 325 mg taking 2 capsules twice daily with meals.  Also using Celebrex 200 mg daily as well as nortriptyline 50 mg at bedtime.  In general feels that symptoms have been relatively stable with infrequent exacerbation only.      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   No siblings   Surgery: emergency RLQ incarcerated strangulated femoral hernia repair 08; [facility]  due to endometriosis; ulcer surgery ; [id number] hernia repair (Dr. JULY Wen); lap choly 12 with Dr. Hayder Montgomery [phone]    Patient h/o metal in right eye. Cannot have MRI exams.    Past Surgical History:   Procedure Laterality Date     BREAST CYST ASPIRATION Left     While ago     HYSTERECTOMY  1985     OOPHORECTOMY Bilateral 1985     ND REMOVAL OF OVARY(S)      Description: Oophorectomy;   Recorded: 12/17/2009;     MT TOTAL ABDOM HYSTERECTOMY      Description: Total Abdominal Hysterectomy;  Recorded: 12/17/2009;        Family History   Problem Relation Age of Onset     Breast cancer Mother 52     Cancer Maternal Grandfather 71        prostate     Prostate cancer Maternal Grandfather         Past Medical History:   Diagnosis Date     Anxiety      Breast cyst     While ago        Social History     Tobacco Use     Smoking status: Never Smoker     Smokeless tobacco: Never Used   Substance Use Topics     Alcohol use: No     Drug use: No        Current Outpatient Medications   Medication Sig Dispense Refill     acarbose (PRECOSE) 25 MG tablet TAKE 1 TABLET BY MOUTH 3  TIMES A DAY WITH MEALS 270 tablet 1     BIOTIN ORAL Take 5,000 mcg by mouth 2 (two) times a day.        blood glucose control high,low (ACCU-CHEK JB CONTROL SOLN) Soln Meter check once ever 3 months 1 each 0     blood glucose test (ACCU-CHEK JB PLUS TEST STRP) strips USE TO TEST 3 TIMES DAILY.. 300 strip 3     calcium-vitamin D (CALCIUM-VITAMIN D) 500 mg(1,250mg) -200 unit per tablet Take 2 tablets by mouth 2 (two) times a day with meals.       celecoxib (CELEBREX) 200 MG capsule TAKE ONE CAPSULE BY MOUTH  DAILY 90 capsule 3     denosumab 60 mg/mL Syrg Inject 60 mg under the skin once.       diphenhydrAMINE (BENADRYL) 25 mg tablet Take 50 mg by mouth at bedtime.       generic lancets (ACCU-CHEK MULTICLIX LANCET) Dispense brand per patient's insurance at pharmacy discretion. 300 each 3     LORazepam (ATIVAN) 0.5 MG tablet TAKE 1 TABLET BY MOUTH  EVERY 6 HOURS AS NEEDED FOR ANXIETY 180 tablet 1     multivitamin with minerals (THERA-M) 9 mg iron-400 mcg Tab tablet Take 1 tablet by mouth daily.       nortriptyline (PAMELOR) 50 MG capsule Take 1 capsule (50 mg total) by mouth at bedtime. 90 capsule 3     aspirin (RIA ASPIRIN) 325 MG tablet Take 2 tablets (650 mg total) by mouth 2 (two) times a day before meals. 100 tablet 3     atenolol  (TENORMIN) 25 MG tablet Take 1 tablet (25 mg total) by mouth daily. 90 tablet 3     Current Facility-Administered Medications   Medication Dose Route Frequency Provider Last Rate Last Dose     denosumab 60 mg (PROLIA 60 mg/ml)  60 mg Subcutaneous Q6 Months Gustavo Tran MD   60 mg at 10/08/19 1309          Objective:    Vitals:    12/11/19 1501   BP: 120/60   Pulse: 85   SpO2: 100%   Weight: 112 lb (50.8 kg)      Body mass index is 23.19 kg/m .    Alert and oriented.  No apparent distress at rest.  Transfers slowly due to chronic lower back pain.  Exam with DTRs symmetric today at patella bilateral as well as as bilateral Achilles, symmetric.      This note has been dictated using voice recognition software and as a result may contain minor grammatical errors and unintended word substitutions.

## 2021-06-04 NOTE — TELEPHONE ENCOUNTER
RN cannot approve Refill Request    RN can NOT refill this medication med is not covered by policy/route to provider. Last office visit: 8/27/2019 Zack Woody MD Last Physical: 10/18/2019 Last MTM visit: Visit date not found Last visit same specialty: 8/27/2019 Zack Woody MD.  Next visit within 3 mo: Visit date not found  Next physical within 3 mo: Visit date not found      oRsana Polo, [facility] Triage/Med Refill 12/9/2019    Requested Prescriptions   Pending Prescriptions Disp Refills     celecoxib (CELEBREX) 200 MG capsule [Pharmacy Med Name: CELECOXIB  200MG  CAP] 90 capsule 3     Sig: TAKE ONE CAPSULE BY MOUTH  DAILY       There is no refill protocol information for this order

## 2021-06-05 VITALS
HEART RATE: 77 BPM | DIASTOLIC BLOOD PRESSURE: 64 MMHG | OXYGEN SATURATION: 99 % | BODY MASS INDEX: 22.56 KG/M2 | SYSTOLIC BLOOD PRESSURE: 142 MMHG | TEMPERATURE: 98.9 F | WEIGHT: 107 LBS

## 2021-06-05 VITALS
OXYGEN SATURATION: 98 % | DIASTOLIC BLOOD PRESSURE: 70 MMHG | WEIGHT: 114 LBS | HEART RATE: 81 BPM | HEIGHT: 58 IN | BODY MASS INDEX: 23.93 KG/M2 | SYSTOLIC BLOOD PRESSURE: 138 MMHG

## 2021-06-05 VITALS
HEART RATE: 75 BPM | HEIGHT: 58 IN | WEIGHT: 114 LBS | SYSTOLIC BLOOD PRESSURE: 122 MMHG | BODY MASS INDEX: 23.93 KG/M2 | OXYGEN SATURATION: 98 % | DIASTOLIC BLOOD PRESSURE: 74 MMHG

## 2021-06-05 NOTE — TELEPHONE ENCOUNTER
Refill Approved    Rx renewed per Medication Renewal Policy. Medication was last renewed on 12/19/18.    Edith Garcia, [facility] Triage/Med Refill 1/17/2020     Requested Prescriptions   Pending Prescriptions Disp Refills     nortriptyline (PAMELOR) 50 MG capsule [Pharmacy Med Name: NORTRIPTYLINE  50MG  CAP] 90 capsule 3     Sig: TAKE 1 CAPSULE BY MOUTH AT  BEDTIME       Tricyclics/Misc Antidepressant/Antianxiety Meds Refill Protocol Passed - 1/14/2020  2:16 PM        Passed - PCP or prescribing provider visit in last year     Last office visit with prescriber/PCP: 12/11/2019 Zack Woody MD OR same dept: 12/11/2019 Zack Woody MD OR same specialty: 12/11/2019 Zack Woody MD  Last physical: 10/18/2019 Last MTM visit: Visit date not found   Next visit within 3 mo: Visit date not found  Next physical within 3 mo: Visit date not found  Prescriber OR PCP: Zack Woody MD  Last diagnosis associated with med order: 1. Chronic midline low back pain without sciatica  - nortriptyline (PAMELOR) 50 MG capsule [Pharmacy Med Name: NORTRIPTYLINE  50MG  CAP]; TAKE 1 CAPSULE BY MOUTH AT  BEDTIME  Dispense: 90 capsule; Refill: 3    If protocol passes may refill for 12 months if within 3 months of last provider visit (or a total of 15 months).

## 2021-06-06 NOTE — TELEPHONE ENCOUNTER
Controlled Substance Refill Request  Medication Name:   Requested Prescriptions     Pending Prescriptions Disp Refills     LORazepam (ATIVAN) 0.5 MG tablet [Pharmacy Med Name: LORAZEPAM  0.5MG  TAB] 180 tablet 0     Sig: TAKE 1 TABLET BY MOUTH  EVERY 6 HOURS AS NEEDED FOR ANXIETY     Date Last Fill: 9/11/19  [facility]  Submit electronically to pharmacy  Controlled Substance Agreement on file:   Encounter-Level CSA Scan Date:    There are no encounter-level csa scan date.        Last office visit:  12/11/19

## 2021-06-07 NOTE — PATIENT INSTRUCTIONS - HE
Prolia 9th today.  Prolia 10th in 6 months with my nurse. I will see you in 1 year.    DXA in 4/2021 .   Phone number to schedule [phone].    Daily calcium need is 0822-2242 mg a day from the diet and supplements.  Calcium citrate is easier to digest.  Vitamin D 2000 IU daily recommended.

## 2021-06-07 NOTE — PROGRESS NOTES
1. Osteoporosis, senile  DXA Bone Density Scan     Patient was educated on safety of Prolia utilizing Patient Counseling Chart for Healthcare Providers, as outlined by the Prolia REMS progam.     Return in about 6 months (around 10/14/2020) for Prolia injection.    Patient Instructions   Prolia 9th today.  Prolia 10th in 6 months with my nurse. I will see you in 1 year.    DXA in 4/2021 .   Phone number to schedule [phone].    Daily calcium need is 9949-7959 mg a day from the diet and supplements.  Calcium citrate is easier to digest.  Vitamin D 2000 IU daily recommended.        Chief Complaint   Patient presents with     Osteoporosis Follow Up     Osteo F/U       /79   Pulse 89   Wt 113 lb 11.2 oz (51.6 kg)   LMP 11/16/1985   SpO2 92%   BMI 23.55 kg/m        Did you experience any problems with previous Prolia injection? no  Any medication change in the last 6 months? no  Did you take prednisone or other immunosupressant drugs in the last 6 months   (chemo, transplant, rheum, dermatology conditions)? no  Did you have any serious infection in the last 6 months?no  Any recent hospitalizations?no  Do you plan any dental work in the next 2-3 months?no  How much calcium do you take daily from the diet and supplements?1200 mg  How much vit D do you take daily? 2000 IU  Last DXA? 4/2019 Reviewed and discussed      Patient is here today for the 9th Prolia injection. Patient tolerated previous injections well.   We discussed calcium and vit D daily needs today.   Next Prolia injection will be in 6 months.     Patient was educated on safety of Prolia utilizing Patient Counseling Chart for Healthcare Providers, as outlined by the Prolia REMS progam.     15 minutes spent with the patient and more then 50 % of the time in counseling about osteoporosis, available osteoporosis treatment options, medication side effects and contraindications, supplement use and weightbearing exercise.    This note has been  dictated using voice recognition software. Any grammatical or context distortions are unintentional and inherent to the software      Patient Active Problem List   Diagnosis     Vitamin D Deficiency     Essential hypertension with goal blood pressure less than 130/80     Lump Or Mass In Breast     Postmenopausal Atrophic Vaginitis     Lower Back Pain     Anxiety     Osteoporosis, senile     Peptic ulcer     Osteoarthritis of thumb, left     Palpitations     Heart palpitations     Shortness of breath     Hypoglycemia     Bilateral sensorineural hearing loss     Impaired fasting glucose     Diverticular disease of large intestine     First degree hemorrhoids       Current Outpatient Medications on File Prior to Visit   Medication Sig Dispense Refill     atenolol (TENORMIN) 25 MG tablet Take 1 tablet (25 mg total) by mouth daily. 90 tablet 3     BIOTIN ORAL Take 5,000 mcg by mouth 2 (two) times a day.        calcium-vitamin D (CALCIUM-VITAMIN D) 500 mg(1,250mg) -200 unit per tablet Take 2 tablets by mouth 2 (two) times a day with meals.       celecoxib (CELEBREX) 200 MG capsule TAKE ONE CAPSULE BY MOUTH  DAILY 90 capsule 3     denosumab 60 mg/mL Syrg Inject 60 mg under the skin once.       LORazepam (ATIVAN) 0.5 MG tablet TAKE 1 TABLET BY MOUTH  EVERY 6 HOURS AS NEEDED FOR ANXIETY 180 tablet 0     multivitamin with minerals (THERA-M) 9 mg iron-400 mcg Tab tablet Take 1 tablet by mouth daily.       nortriptyline (PAMELOR) 50 MG capsule TAKE 1 CAPSULE BY MOUTH AT  BEDTIME 90 capsule 3     acarbose (PRECOSE) 25 MG tablet TAKE 1 TABLET BY MOUTH 3  TIMES A DAY WITH MEALS 270 tablet 1     blood glucose control high,low (ACCU-CHEK JB CONTROL SOLN) Soln Meter check once ever 3 months 1 each 0     blood glucose test (ACCU-CHEK JB PLUS TEST STRP) strips USE TO TEST 3 TIMES DAILY.. 300 strip 3     diphenhydrAMINE (BENADRYL) 25 mg tablet Take 50 mg by mouth at bedtime.       generic lancets (ACCU-CHEK MULTICLIX LANCET)  Dispense brand per patient's insurance at pharmacy discretion. 300 each 3     Current Facility-Administered Medications on File Prior to Visit   Medication Dose Route Frequency Provider Last Rate Last Dose     denosumab 60 mg (PROLIA 60 mg/ml)  60 mg Subcutaneous Q6 Months Gustavo Tran MD   60 mg at 10/08/19 1309     denosumab 60 mg (PROLIA 60 mg/ml)  60 mg Subcutaneous Q6 Months Zack Woody MD

## 2021-06-07 NOTE — PROGRESS NOTES
"Bibiana Mcrae is a 74 y.o. female who is being evaluated via a billable telephone visit.      The patient has been notified of following:     \"This telephone visit will be conducted via a call between you and your physician/provider. We have found that certain health care needs can be provided without the need for a physical exam.  This service lets us provide the care you need with a short phone conversation.  If a prescription is necessary we can send it directly to your pharmacy.  If lab work is needed we can place an order for that and you can then stop by our lab to have the test done at a later time.    Telephone visits are billed at different rates depending on your insurance coverage. During this emergency period, for some insurers they may be billed the same as an in-person visit.  Please reach out to your insurance provider with any questions.    If during the course of the call the physician/provider feels a telephone visit is not appropriate, you will not be charged for this service.\"    Patient has given verbal consent to a Telephone visit? Yes    Patient would like to receive their AVS by AVS Preference: Kathy.    Bibiana Mcrae complains of    Chief Complaint   Patient presents with     Medication Management       I have reviewed and updated the patient's Past Medical History, Social History, Family History and Medication List.    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   No siblings   Surgery: emergency RLQ incarcerated strangulated femoral hernia repair 08; [facility]  due to endometriosis; ulcer surgery ; [id number] hernia repair (Dr. JULY Wen); lap choly 12 with Dr. Hayder Montgomery " [phone]      Additional provider notes: Virtual visit completed today.  History of hypertension.  Continues atenolol 25 mg daily.  Today's blood pressure 120/82.  Did not check pulse rate.  Has not had any issues and pulse rate of 85 noted at prior visit December 11, 2019 while on atenolol.  Patient utilizing Precose 25 mg 3 times daily for hypoglycemia.  Prior A1c of 5.8% October 18, 2019.  Lorazepam infrequently for anxiety management.  Prolia 60 mg every 6 months and is scheduled to receive this next Tuesday.  History of vitamin D deficiency.  Noted bilateral sensorineural hearing loss and utilizes bilateral hearing aids.          Assessment/Plan:    1. Essential hypertension with goal blood pressure less than 130/80  Underlying hypertension.  Atenolol 25 mg daily.  Tolerating well.  Blood pressure 120/82 on recheck.    2. Impaired fasting glucose  Impaired fasting glucose.  Prior A1c of 5.8% and will reassess at annual wellness visit in 6 months.    3. Hypoglycemia  Patient has had issues with hypoglycemia historically and utilizes Precose 25 mg 3 times daily.    4. Senile osteoporosis  Known history of osteoporosis.  Continues Prolia 60 mg every 6 months and has repeat injection next Tuesday.    5. Bilateral sensorineural hearing loss  Bilateral hearing aids utilized.        Phone call duration:  11 minutes    Zack Woody MD

## 2021-06-07 NOTE — TELEPHONE ENCOUNTER
Central PA team  [phone]  Pool: [facility] PA MED ([id number])          PA has been initiated.       PA form completed and faxed insurance via Cover My Meds     Key:  [id number]     Medication:  Nortriptyline     Insurance:  [employer] part D        Response will be received via fax and may take up to 5-10 business days depending on plan

## 2021-06-07 NOTE — TELEPHONE ENCOUNTER
Prior Authorization Request  Who s requesting:  Pharmacy  Pharmacy Name and Location: [facility]   Medication Name: nortriptyline (PAMELOR) 50 MG capsule   Insurance Plan: [employer]   Insurance Member ID Number:  [id number]  CoverMyMeds Key: N/A  Informed patient that prior authorizations can take up to 10 business days for response:   NA  Okay to leave a detailed message: No

## 2021-06-09 NOTE — TELEPHONE ENCOUNTER
Controlled Substance Refill Request  Medication Name:   Requested Prescriptions     Pending Prescriptions Disp Refills     LORazepam (ATIVAN) 0.5 MG tablet 180 tablet 0     Sig: Take 1 tablet (0.5 mg total) by mouth every 6 (six) hours as needed for anxiety.     Date Last Fill: 3/18/2020  [facility]  Submit electronically to pharmacy  Controlled Substance Agreement on file:   Encounter-Level CSA Scan Date:    There are no encounter-level csa scan date.        Last office visit:  4/14/2020

## 2021-06-09 NOTE — PROGRESS NOTES
1. Osteoporosis, senile  denosumab 60 mg (PROLIA 60 mg/ml)       Return in about 6 months (around 8/28/2017) for Recheck.    Patient Instructions   Prolia 3rd today.  Prolia 4th in 6 months with my nurse. I will see you in 1 year.  DXA in 2 year .   Phone number to schedule [phone].  Please avoid any extensive dental work as implants and teeth extractions for the next 1-2 months.  Daily calcium need is 8887-1780 mg a day from the diet and supplements.  Calcium citrate is easier to digest.  Vitamin D 2000 IU daily recommended.      Chief Complaint   Patient presents with     Osteoporosis Follow Up       Visit Vitals     /70     Pulse 68     Wt 102 lb (46.3 kg)     BMI 21.32 kg/m2         Did you experience any problems with previous Prolia injection? no  Any medication change in the last 6 months? no  Did you take prednisone or other immunosupressant drugs in the last 6 months   (chemo, transplant, rheum, dermatology conditions)? no  Did you have any serious infection in the last 6 months?no  Any recent hospitalizations?no  Do you plan any dental work in the next 2-3 months?no  How much calcium do you take daily from the diet and supplements?1200 mg  How much vit D do you take daily? 2000 IU  Last DXA?2/27/17 - reviewed and dicussed today      Patient is here today for the 3rd Prolia injection. Patient tolerated previous injections well.   We discussed calcium and vit D daily needs today.   Next Prolia injection will be in 6 months.     15 minutes spent with the patient and more then 50 % of the time in counseling.  This note has been dictated using voice recognition software. Any grammatical or context distortions are unintentional and inherent to the software      Patient Active Problem List   Diagnosis     Vitamin D Deficiency     Hypertension     Lump Or Mass In Breast     Postmenopausal Atrophic Vaginitis     Lower Back Pain     Anxiety     Osteoporosis, senile     Peptic ulcer     Osteoarthritis of  thumb, left       Current Outpatient Prescriptions on File Prior to Visit   Medication Sig Dispense Refill     atenolol (TENORMIN) 50 MG tablet TAKE 1 TABLET DAILY 90 tablet 3     BIOTIN ORAL Take 5,000 mcg by mouth 3 (three) times a day.       CALCIUM CITRATE/VITAMIN D3 (CITRACAL + D ORAL) Take by mouth.       celecoxib (CELEBREX) 200 MG capsule Take 1 capsule (200 mg total) by mouth daily. 90 capsule 3     hydrocortisone (ANUSOL-HC) 2.5 % rectal cream Apply rectally 2 times daily 30 g 5     ketoconazole (NIZORAL) 2 % shampoo Apply topically 2 (two) times a week. Apply to damp skin, lather, leave on 5 minutes, and rinse       LORazepam (ATIVAN) 0.5 MG tablet TAKE 1/2 TO 1 TABLET EVERY 8 HOURS AS NEEDED FOR ANXIETY 180 tablet 1     MULTIVIT &MINERALS/FERROUS FUM (MULTI VITAMIN ORAL) Take 1 tablet by mouth daily.       nortriptyline (PAMELOR) 25 MG capsule Take 1-2 capsules (25-50 mg total) by mouth bedtime. 180 capsule 3     PREMARIN vaginal cream INSERT 1 APPLICATORFUL     VAGINALLY ONCE EVERY THIRD DAY 60 g 3     No current facility-administered medications on file prior to visit.

## 2021-06-10 NOTE — PROGRESS NOTES
"Bibiana Mcrae is a 75 y.o. female who is being evaluated via a billable telephone visit.      The patient has been notified of following:     \"This telephone visit will be conducted via a call between you and your physician/provider. We have found that certain health care needs can be provided without the need for a physical exam.  This service lets us provide the care you need with a short phone conversation.  If a prescription is necessary we can send it directly to your pharmacy.  If lab work is needed we can place an order for that and you can then stop by our lab to have the test done at a later time.    Telephone visits are billed at different rates depending on your insurance coverage. During this emergency period, for some insurers they may be billed the same as an in-person visit.  Please reach out to your insurance provider with any questions.    If during the course of the call the physician/provider feels a telephone visit is not appropriate, you will not be charged for this service.\"    Patient has given verbal consent to a Telephone visit? Yes    What phone number would you like to be contacted at? [phone]    Patient would like to receive their AVS by AVS Preference: Kathy.    Additional provider notes: Virtual visit completed.  Med check.  Underlying hypertension.  States blood pressure 110/72 today.  Tolerates atenolol 25 mg daily.  Needs refill for mail order pharmacy.  Lorazepam half milligram typically twice daily however will take 3 times a day at times.  Needs refill for this as well for anxiety management.  Precose 25 mg 3 times daily with history of hypoglycemia.  No history of diabetes.  Bilateral hearing aids for sensorineural hearing loss.  Prolia every 6 months for osteoporosis management.  Omeprazole 20 mg recently increased from once a day up to twice a day with reflux as well as described chronic cough.  Normal appetite.  No unintentional weight loss.  Does have scheduled " physical October 20, 2020.  Comprehensive review of systems as above otherwise all negative.        Assessment/Plan:    1. Essential hypertension with goal blood pressure less than 130/80  Hypertension well-controlled blood pressure 110/72 on home check today.  Refill on atenolol 25 mg daily provided.  Would consider discontinuation at follow-up with history of hypoglycemia if blood pressures remain well controlled.  - atenoloL (TENORMIN) 25 MG tablet; Take 1 tablet (25 mg total) by mouth daily.  Dispense: 90 tablet; Refill: 3    2. Hypoglycemia  Currently continues Precose 25 mg 3 times daily without recurrent concerns for hypoglycemia.    3. Anxiety  Lorazepam half milligram twice daily with occasional 3 times daily use.  Refill of medication provided with caution for fatigue, falls risk etc. reviewed.  Patient elects to remain on medication because of benefits and stability of dosing.  - LORazepam (ATIVAN) 0.5 MG tablet; Take 1 tablet (0.5 mg total) by mouth every 6 (six) hours as needed for anxiety.  Dispense: 180 tablet; Refill: 1    4. Senile osteoporosis  Prolia every 6 months continues.    5. Osteoporosis, senile  As above.    6. Bilateral sensorineural hearing loss  Lateral hearing aids utilized.    7. Chronic cough  Recent dose increase of omeprazole 20 mg from once a day up to twice a day.    8. Gastroesophageal reflux disease without esophagitis  Patient does also describe reflux symptoms that is benefited by omeprazole use.        Phone call duration:  11 minutes    Zack Woody MD

## 2021-06-12 NOTE — PATIENT INSTRUCTIONS - HE
Advance Directive  Patient s advance directive was discussed and I am comfortable with the patient s wishes.  Patient Education   Personalized Prevention Plan  You are due for the preventive services outlined below.  Your care team is available to assist you in scheduling these services.  If you have already completed any of these items, please share that information with your care team to update in your medical record.  Health Maintenance   Topic Date Due     INFLUENZA VACCINE RULE BASED (1) 08/01/2020     DXA SCAN  03/01/2021     MEDICARE ANNUAL WELLNESS VISIT  10/20/2021     FALL RISK ASSESSMENT  10/20/2021     LIPID  11/23/2023     ADVANCE CARE PLANNING  10/18/2024     COLORECTAL CANCER SCREENING  09/06/2026     TD 18+ HE  11/23/2028     HEPATITIS C SCREENING  Completed     Pneumococcal Vaccine: 65+ Years  Completed     ZOSTER VACCINES  Completed     Pneumococcal Vaccine: Pediatrics (0 to 5 Years) and At-Risk Patients (6 to 64 Years)  Aged Out     HEPATITIS B VACCINES  Aged Out

## 2021-06-12 NOTE — TELEPHONE ENCOUNTER
Patient had a tooth extraction today and would like to know if it's going to be safe for her to have her prolia injection on Thursday 10.15.2020.  She was told it was a tooth extraction, but it's considered oral surgery.    Please advise    Bibiana @ [phone]

## 2021-06-12 NOTE — PROGRESS NOTES
Chief Complaint   Patient presents with     PROLIA #4     1. Did you experience any problems with previous Prolia injection? NO  2. Do you feel sick today?(fever, RS, GI,  issues)? NO  3. Any medication changes in the last 6 months? NO  4. Did you take prednisone or other immunosuppressant drugs in the last 6 months?(chemo, transplant, rheu, dermatology conditions)? NO  5. Did you have any serious infection in the last 6 months? (pancreatitis) NO  6. Any recent hospitalizations/ surgeries (especially gastric bypass, thyroid, parathyroid)? NO  7. Do you plan any dental work?(especially implants and extractions) in the next 2-3 months? NO/ will be putting a crow on around September 8. Did you have any fractures in the last year? NO    Pt states she had root canal done last Wednesday, Dr. Tran notified and is okay to give Prolia.  Per Cherrie MCDOWELL No needed PA for Prolia for this time visit. Any questions/ concerns regarding to PA please contact to Cherrie MCDOWELL Thank you   Yearly F/U appointment  with Dr. Tran is made.     Romina Vernon, [facility] 8/29/2017 2:09 PM

## 2021-06-12 NOTE — TELEPHONE ENCOUNTER
Patient rescheduled for 11/5/2020 for prolia.  Aminata Bain CMA ............... 5:15 PM, 10/08/20

## 2021-06-12 NOTE — PROGRESS NOTES
Assessment and Plan:     Patient has been advised of split billing requirements and indicates understanding: No     1. Encounter for general adult medical examination with abnormal findings  Annual wellness visit completed.  Risks associated with poor clock face and time indication otherwise 3 word recall normal.  Utilizes hearing aids for hearing loss.  Risk assessment otherwise unremarkable.  Annual wellness visits to continue.    2. Essential hypertension with goal blood pressure less than 130/80  Hypertension appears stable continues use of atenolol 25 mg daily.  - Basic Metabolic Panel    3. Hypoglycemia  History of hypoglycemia.  Benefits of acarbose 25 mg 3 times daily continues.    4. Anxiety  Anxiety history.  Lorazepam half milligram using twice daily typically up to 3 times daily as needed.    5. Senile osteoporosis  Prolia 60 mg every 6 months continues.  Ensure adequate vitamin D and calcium replacement as well as exercise as tolerated.    6. Bilateral sensorineural hearing loss  Bilateral hearing aids in place.  Benefits described.    7. Chronic cough  Chronic cough resolved with omeprazole 20 mg twice daily.    8. Gastroesophageal reflux disease without esophagitis  As above, reflux benefits with resolution of chronic cough described.    9. Impaired fasting glucose  Impaired fasting glucose history with prior A1c of 5.8% October 18, 2019.  - Glycosylated Hemoglobin A1c    10. Vitamin D deficiency  Vitamin D deficiency.  Ensure adequate replacement.  - Vitamin D, Total (25-Hydroxy)    PHQ 9 questionnaire 0 out of 27 and ALLISON-7 questionnaire 1 out of 21.    The patient's current medical problems were reviewed.    I have had an Advance Directives discussion with the patient.     The following health maintenance schedule was reviewed with the patient and provided in printed form in the after visit summary:   Health Maintenance   Topic Date Due     DXA SCAN  03/01/2021     MEDICARE ANNUAL WELLNESS VISIT   10/20/2021     FALL RISK ASSESSMENT  10/20/2021     LIPID  2023     ADVANCE CARE PLANNING  10/20/2025     COLORECTAL CANCER SCREENING  2026     TD 18+ HE  2028     HEPATITIS C SCREENING  Completed     Pneumococcal Vaccine: 65+ Years  Completed     INFLUENZA VACCINE RULE BASED  Completed     ZOSTER VACCINES  Completed     Pneumococcal Vaccine: Pediatrics (0 to 5 Years) and At-Risk Patients (6 to 64 Years)  Aged Out     HEPATITIS B VACCINES  Aged Out        Subjective:     Chief Complaint: Bibiana Mcrae is an 75 y.o. female here for an Annual Wellness visit.     HPI: In general has been doing relatively well.  History of hypoglycemia.  Acarbose 25 mg 3 times daily utilized.  No concerns.  Lorazepam half milligram typically 1 dose twice daily however may take up to 3 times daily for anxiety management.  Chronic lower back pain with benefits of Celebrex 200 mg once daily.  Nortriptyline 50 mg at bedtime for benefits of insomnia as well.  Atenolol 25 mg daily for underlying hypertension.  Does not require refill.  Prolia every 6 months currently for osteoporosis management.  Hearing aid utilization for bilateral sensorineural hearing loss.  Chronic cough improved with omeprazole 20 mg twice daily without breakthrough symptoms or reflux.  Mammogram scheduled for .  Immunizations up-to-date and did receive seasonal flu shot outside of this office.  Most recent bone density scan 2020.  Colonoscopy 2016 and told no further need for colon cancer screening.  Denies recent fever cough or shortness of breath.    Review of Systems:  Please see above.  The rest of the review of systems are negative for all systems.    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   No siblings   Surgery: emergency RLQ incarcerated strangulated femoral hernia repair 9/28/08; [place]-BSO 1985 due to endometriosis; ulcer surgery 1978; 12/04/0915-[id number] hernia repair (Dr. JULY Wen); lap choly 9/6/12 with Dr. Hayder Montgomery [phone]     Patient Care Team:  [facility] Woody [facility] as PCP [facility] (Endocrinology)  Zack Woody MD as Assigned PCP     Patient Active Problem List   Diagnosis     Vitamin D Deficiency     Essential hypertension with goal blood pressure less than 130/80     Postmenopausal Atrophic Vaginitis     Lower Back Pain     Anxiety     Osteoporosis, senile     Osteoarthritis of thumb, left     Palpitations     Heart palpitations     Shortness of breath     Hypoglycemia     Bilateral sensorineural hearing loss     Impaired fasting glucose     Diverticular disease of large intestine     First degree hemorrhoids     Chronic cough     Past Medical History:   Diagnosis Date     Anxiety      Breast cyst     While ago      Past Surgical History:   Procedure Laterality Date     BREAST CYST ASPIRATION Left     While ago     HYSTERECTOMY  1985     OOPHORECTOMY Bilateral 1985     DE REMOVAL OF OVARY(S)      Description: Oophorectomy;  Recorded: 12/17/2009;     DE TOTAL ABDOM HYSTERECTOMY      Description: Total Abdominal Hysterectomy;  Recorded: 12/17/2009;      Family History   Problem Relation Age of Onset     Breast cancer Mother 52     Cancer Maternal Grandfather 71        prostate     Prostate cancer Maternal Grandfather       Social History     Socioeconomic History     Marital status:      Spouse name: Not on file     Number of children: Not on file     Years of education: Not on file     Highest education level: Not on file   Occupational History     Not on file   Social Needs     Financial resource strain: Not on file     Food insecurity     Worry: Not on file     Inability: Not on file     Transportation needs     Medical:  Not on file     Non-medical: Not on file   Tobacco Use     Smoking status: Never Smoker     Smokeless tobacco: Never Used   Substance and Sexual Activity     Alcohol use: No     Drug use: No     Sexual activity: Not on file   Lifestyle     Physical activity     Days per week: Not on file     Minutes per session: Not on file     Stress: Not on file   Relationships     Social connections     Talks on phone: Not on file     Gets together: Not on file     Attends Congregational service: Not on file     Active member of club or organization: Not on file     Attends meetings of clubs or organizations: Not on file     Relationship status: Not on file     Intimate partner violence     Fear of current or ex partner: Not on file     Emotionally abused: Not on file     Physically abused: Not on file     Forced sexual activity: Not on file   Other Topics Concern     Not on file   Social History Narrative     Not on file      Current Outpatient Medications   Medication Sig Dispense Refill     acarbose (PRECOSE) 25 MG tablet TAKE 1 TABLET BY MOUTH 3  TIMES A DAY WITH MEALS 270 tablet 1     atenoloL (TENORMIN) 25 MG tablet Take 1 tablet (25 mg total) by mouth daily. 90 tablet 3     BIOTIN ORAL Take 5,000 mcg by mouth 2 (two) times a day.        blood glucose control high,low (ACCU-CHEK JB CONTROL SOLN) Soln Meter check once ever 3 months 1 each 0     blood glucose test (ACCU-CHEK JB PLUS TEST STRP) strips USE TO TEST 3 TIMES DAILY.. 300 strip 3     calcium-vitamin D (CALCIUM-VITAMIN D) 500 mg(1,250mg) -200 unit per tablet Take 2 tablets by mouth 2 (two) times a day with meals.       celecoxib (CELEBREX) 200 MG capsule TAKE ONE CAPSULE BY MOUTH  DAILY 90 capsule 3     denosumab 60 mg/mL Syrg Inject 60 mg under the skin once.       generic lancets (ACCU-CHEK MULTICLIX LANCET) Dispense brand per patient's insurance at pharmacy discretion. 300 each 3     LORazepam (ATIVAN) 0.5 MG tablet Take 1 tablet (0.5 mg total) by mouth every 6  "(six) hours as needed for anxiety. 180 tablet 1     multivitamin with minerals (THERA-M) 9 mg iron-400 mcg Tab tablet Take 1 tablet by mouth daily.       nortriptyline (PAMELOR) 50 MG capsule TAKE 1 CAPSULE BY MOUTH AT  BEDTIME 90 capsule 3     diphenhydrAMINE (BENADRYL) 25 mg tablet Take 50 mg by mouth at bedtime.       Current Facility-Administered Medications   Medication Dose Route Frequency Provider Last Rate Last Dose     denosumab 60 mg (PROLIA 60 mg/ml)  60 mg Subcutaneous Q6 Months Gustavo Tran MD   60 mg at 04/14/20 1317      Objective:   Vital Signs:   Visit Vitals  /70   Pulse 81   Ht 4' 9.75\" (1.467 m)   Wt 114 lb (51.7 kg)   LMP 11/16/1985   SpO2 98%   BMI 24.03 kg/m           VisionScreening:  No exam data present     PHYSICAL EXAM  General Appearance: Alert, pleasant, appears stated age  Head: Normocephalic, without obvious abnormality  Eyes: PERRL, conjunctiva/corneas clear, EOM's intact.  Glasses.  Ears:  Bilateral hearing aids in place.  Patient declines external auditory canal evaluation.  Nose: Nares normal, septum midline,mucosa normal, no drainage  Throat: Lips, mucosa, and tongue normal; teeth and gums normal; oropharynx is clear  Neck: Supple,without lymphadenopathy or thyromegally  Lungs: Clear to auscultation bilaterally, respirations unlabored  Breasts: Nopalpable masses, tenderness, asymmetry, or nipple discharge. No axillary or supraclavicular lymphadenopathy  Heart: Regular rate and rhythm, no murmur   Abdomen: Soft, non-tender, no masses, no organomegaly  Pelvic:Not examined  Extremities: Extremities with strong and symmetric pulses, no cyanosis or edema  Skin: Skin color, texture normal, no rashes or lesions  Neurologic: Normal      Assessment Results 10/20/2020   Activities of Daily Living No help needed   Instrumental Activities of Daily Living No help needed   Get Up and Go Score Less than 12 seconds   Mini Cog Total Score 3   Some recent data might be hidden     A " Mini-Cog score of 0-2 suggests the possibility of dementia, score of 3-5 suggests no dementia      Identified Health Risks:     Patient's advanced directive was discussed and I am comfortable with the patient's wishes.

## 2021-06-12 NOTE — TELEPHONE ENCOUNTER
RN cannot approve Refill Request    RN can NOT refill this medication med is not covered by policy/route to provider. Last office visit: 12/11/2019 Zack Woody MD Last Physical: 10/20/2020 Last MTM visit: Visit date not found Last visit same specialty: 12/11/2019 Zack Woody MD.  Next visit within 3 mo: Visit date not found  Next physical within 3 mo: Visit date not found      Ana Luisa Nieves, [facility] Triage/Med Refill 11/1/2020    Requested Prescriptions   Pending Prescriptions Disp Refills     celecoxib (CELEBREX) 200 MG capsule [Pharmacy Med Name: CELECOXIB  200MG  CAP] 90 capsule 3     Sig: TAKE ONE CAPSULE BY MOUTH  DAILY       There is no refill protocol information for this order

## 2021-06-12 NOTE — PROGRESS NOTES
"Prolia Injection Phone Screen      Screening questions have been asked 2-3 days prior to administration visit for Prolia. If any questions are answered with \"Yes,\" this phone encounter were will routed to ordering provider for further evaluation.     1.  When was the last injection?  4/14/2020    2.  Has insurance for this injection been verified?  Yes    3.  Did you experience any new onset achiness or rashes that lasted for over a month with your previous Prolia injection?   No    4.  Do you have a fever over 101?F or a new deep cough that is unusual for you today? No    5.  Have you started any new medications in the last 6 months that you were told could affect your immune system? These may have been prescribed by oncologist, transplant, rheumatology, or dermatology.   No    6.  In the last 6 months have you have gastric bypass or parathyroid surgery?   No    7.  Do you plan dental work requiring drilling into the bone such as implants/extractions or oral surgery in the next 2-3 months?   No    8. Do you have new insurance since the last injection?    Patient informed if symptoms discussed above present prior to their administration appointment, they are to notify clinic immediately.     Lisa M Bloch              "

## 2021-06-13 NOTE — TELEPHONE ENCOUNTER
Refill Approved    Rx renewed per Medication Renewal Policy. Medication was last renewed on 1/17/20.    Edith Garcia, [facility] Triage/Med Refill 12/1/2020     Requested Prescriptions   Pending Prescriptions Disp Refills     nortriptyline (PAMELOR) 50 MG capsule [Pharmacy Med Name: NORTRIPTYLINE  50MG  CAP] 90 capsule 3     Sig: TAKE 1 CAPSULE BY MOUTH AT  BEDTIME       Tricyclics/Misc Antidepressant/Antianxiety Meds Refill Protocol Passed - 11/30/2020  1:46 PM        Passed - PCP or prescribing provider visit in last year     Last office visit with prescriber/PCP: 12/11/2019 Zack Woody MD OR same dept: 12/11/2019 Zack Woody MD OR same specialty: 12/11/2019 Zack Woody MD  Last physical: 10/20/2020 Last MTM visit: Visit date not found   Next visit within 3 mo: Visit date not found  Next physical within 3 mo: Visit date not found  Prescriber OR PCP: Zack Woody MD  Last diagnosis associated with med order: 1. Chronic midline low back pain without sciatica  - nortriptyline (PAMELOR) 50 MG capsule [Pharmacy Med Name: NORTRIPTYLINE  50MG  CAP]; TAKE 1 CAPSULE BY MOUTH AT  BEDTIME  Dispense: 90 capsule; Refill: 3    If protocol passes may refill for 12 months if within 3 months of last provider visit (or a total of 15 months).

## 2021-06-13 NOTE — PROGRESS NOTES
"  Assessment and Plan:     1. Encounter for general adult medical examination with abnormal findings  Annual wellness visit completed.  High-dose flu shot given otherwise immunizations reviewed and up-to-date.  Cognitive testing normal.  Risks associated with decreased hearing however patient utilizes bilateral hearing aids and will continue with her current concern.  Routine hepatitis C screen based on age recommendations.  Prior colonoscopy September 6, 2016.  - Hepatitis C Antibody (Anti-HCV)    2. Need for immunization against influenza    - Influenza High Dose, Seasonal 65+ yrs    3. Essential hypertension with goal blood pressure less than 130/80  Hypertension at goal with blood pressure 126/70 on recheck.  Continue atenolol 50 mg daily.  - Lipid Cascade    4. Osteoporosis, senile  We will check routine labs as follow-up otherwise continue Prolia every 6 months.  Patient will clarify with osteoporosis MD regarding future need for vaginal estrogen however I have suggested that she can wean from this safely due to potential risks.  - Basic Metabolic Panel  - HM2(CBC w/o Differential)  - Thyroid Stimulating Hormone (TSH)  - Parathyroid Hormone Intact    5. Vitamin D deficiency  Recheck vitamin D level.  Currently using \"4 tablets\" daily.  Will confirm dose likely between 1000 and 5000 units daily.  - Vitamin D, Total (25-Hydroxy)    6. Chronic midline low back pain without sciatica  Work comp related mid  Low back pain without current sciatica, stable.  Routine follow-up for work comp related issues as noted.    7. Anxiety  As needed treatment for anxiety with lorazepam otherwise doing well.    8. Family history of breast cancer  Family history of breast cancer identified.  Patient request annual mammogram.  Referral placed.  - Mammo Screening Bilateral; Future    9. Right shoulder pain  Please see procedure note regarding right shoulder pain question rotator cuff tendinitis currently with impingement syndrome.  " Improvement noted following injection will follow-up on as-needed basis if persistent concerns or worsening with consideration of shoulder x-ray etc. at that time with likely benefit with outpatient physical therapy services if needed.  - Arthrocentesis  - methylPREDNISolone acetate injection 80 mg (DEPO-MEDROL); Inject 1 mL (80 mg total) into the joint once.         The patient's current medical problems were reviewed.    I have had an Advance Directives discussion with the patient.     The following health maintenance schedule was reviewed with the patient and provided in printed form in the after visit summary:   Health Maintenance   Topic Date Due     TD 18+ HE  08/21/2018     FALL RISK ASSESSMENT  10/12/2018     MAMMOGRAM  11/01/2018     DXA SCAN  02/27/2019     ADVANCE DIRECTIVES DISCUSSED WITH PATIENT  11/03/2021     COLONOSCOPY  09/06/2026     PNEUMOCOCCAL POLYSACCHARIDE VACCINE AGE 65 AND OVER  Completed     INFLUENZA VACCINE RULE BASED  Completed     PNEUMOCOCCAL CONJUGATE VACCINE FOR ADULTS (PCV13 OR PREVNAR)  Completed     ZOSTER VACCINE  Completed        Subjective:   Chief Complaint: Bibiana Mcrae is an 72 y.o. female here for an Annual Wellness visit.     HPI: Patient seen today for annual wellness visit.  In general doing well.  History of hypertension.  Continues atenolol 50 mg daily.  Does have some chronic low back pain issues historically did work comp related injury however this has been stable.  Uses lorazepam on as-needed basis for anxiety.  History of atrophic vaginitis.  Uses estrogen vaginal every 3 days.  History of osteoporosis with T score -2.8 lumbar spine L1-3 most recently February 27, 2017 with recommendation for two-year follow-up otherwise continues Prolia injection every 6 months.  Has not had hepatitis C screen in the past would like to have this completed.  Needs flu shot today.  Wants her ears flushed.  Right shoulder pain.  Worse over past 3 weeks.  Denies significant  concerns for osteoarthritis issues historically.  No specific injury or trauma.  No bruising.  Distal CMS appears intact regarding patient concerns.  Apprehensive review of systems as above otherwise all negative.    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   No siblings   Surgery: emergency RLQ incarcerated strangulated femoral hernia repair 08; [place]-BSO  due to endometriosis; ulcer surgery ; [id number] hernia repair (Dr. JULY Wen); lap choly 12 with Dr. Hayder Montgomery [phone]     Review of Systems:  Please see above.  The rest of the review of systems are negative for all systems.    Patient Care Team:  Zack Woody MD as PCP - General     Patient Active Problem List   Diagnosis     Vitamin D Deficiency     Hypertension     Lump Or Mass In Breast     Postmenopausal Atrophic Vaginitis     Lower Back Pain     Anxiety     Osteoporosis, senile     Peptic ulcer     Osteoarthritis of thumb, left     Past Medical History:   Diagnosis Date     Anxiety      Breast cyst       Past Surgical History:   Procedure Laterality Date     BREAST CYST ASPIRATION Bilateral     Rt      Llt  ??     HYSTERECTOMY      age 40     OOPHORECTOMY Bilateral     age 40     [place] REMOVAL OF OVARY(S)      Description: Oophorectomy;  Recorded: 2009;     TN TOTAL ABDOM HYSTERECTOMY      Description: Total Abdominal Hysterectomy;  Recorded: 2009;      Family History   Problem Relation Age of Onset     Breast cancer Mother 52     Cancer Maternal Grandfather 71     prostate     Prostate cancer Maternal Grandfather       Social History     Social History     Marital status:      Spouse name: N/A     Number of children: N/A     Years of education: N/A     Occupational History  "    Not on file.     Social History Main Topics     Smoking status: Never Smoker     Smokeless tobacco: Never Used     Alcohol use No     Drug use: No     Sexual activity: Not on file     Other Topics Concern     Not on file     Social History Narrative      Current Outpatient Prescriptions   Medication Sig Dispense Refill     atenolol (TENORMIN) 50 MG tablet Take 1 tablet by mouth  daily 90 tablet 3     BIOTIN ORAL Take 5,000 mcg by mouth 3 (three) times a day.       CALCIUM CITRATE/VITAMIN D3 (CITRACAL + D ORAL) Take by mouth.       celecoxib (CELEBREX) 200 MG capsule Take 1 capsule (200 mg total) by mouth daily. 90 capsule 3     ketoconazole (NIZORAL) 2 % shampoo Apply topically 2 (two) times a week. Apply to damp skin, lather, leave on 5 minutes, and rinse       LORazepam (ATIVAN) 0.5 MG tablet Take 1/2 to 1 tablet by  mouth every 8 hours as  needed for anxiety 180 tablet 1     MULTIVIT &MINERALS/FERROUS FUM (MULTI VITAMIN ORAL) Take 1 tablet by mouth daily.       nortriptyline (PAMELOR) 25 MG capsule Take 1-2 capsules (25-50 mg total) by mouth bedtime. 180 capsule 3     PREMARIN vaginal cream Insert 1 applicatorful  vaginally once every third  day 60 g 1     No current facility-administered medications for this visit.       Objective:   Vital Signs:   Visit Vitals     /70     Pulse 72     Ht 4' 10\" (1.473 m)     Wt 100 lb 11.2 oz (45.7 kg)     Breastfeeding No     BMI 21.05 kg/m2        VisionScreening:  No exam data present     PHYSICAL EXAM    General Appearance: Alert, pleasant, appears stated age.    Head: Normocephalic, without obvious abnormality  Eyes: PERRL, conjunctiva/corneas clear, EOM's intact  Ears: Normal TM's and external ear canals, both ears.  Bilateral dry cerumen removed with curette without complication.  Bilateral hearing aids utilized.  Nose: Nares normal, septum midline,mucosa normal, no drainage  Throat: Lips, mucosa, and tongue normal; teeth and gums normal; oropharynx is " clear  Neck: Supple,without lymphadenopathy or thyromegally  Lungs: Clear to auscultation bilaterally, respirations unlabored  Breasts: Nopalpable masses, tenderness, asymmetry, or nipple discharge. No axillary or supraclavicular lymphadenopathy  Heart: Regular rate and rhythm, no murmur   Abdomen: Soft, non-tender, no masses, no organomegaly  Pelvic:Not examined  Extremities: Extremities with strong and symmetric pulses, no cyanosis or edema.  Positive Chun impingement sign involving right shoulder with equivocal Neer impingement sign.  Distal CMS appears intact upper and lower extremities.  Skin: Skin color, texture normal, no rashes or lesions  Neurologic: Normal      Assessment Results 10/12/2017   Activities of Daily Living No help needed   Instrumental Activities of Daily Living No help needed   Get Up and Go Score Less than 12 seconds   Mini Cog Total Score 4   Some recent data might be hidden     A Mini-Cog score of 0-2 suggests the possibility of dementia, score of 3-5 suggests no dementia    Identified Health Risks:     Patient's advanced directive was discussed and I am comfortable with the patient's wishes.

## 2021-06-13 NOTE — PROGRESS NOTES
Joint Aspiration/Injection  Date/Time: 10/12/2017 12:33 PM  Performed by: ESTER GUSMAN  Authorized by: ESTER GUSMAN   Indications: pain   Body area: shoulder  Joint: right shoulder  Local anesthesia used: yes    Anesthesia:  Local anesthesia used: yes  Local Anesthetic: lidocaine 1% without epinephrine  Anesthetic total: 1 mL    Sedation:  Patient sedated: no  Preparation: Patient was prepped and draped in the usual sterile fashion.  Needle size: 22 G  Ultrasound guidance: no  Approach: posterior  Methylprednisolone amount: 80 mg  Lidocaine 1% amount: 2 mL  Patient tolerance: Patient tolerated the procedure well with no immediate complications

## 2021-06-14 NOTE — TELEPHONE ENCOUNTER
Controlled Substance Refill Request  Medication Name:   Requested Prescriptions     Pending Prescriptions Disp Refills     LORazepam (ATIVAN) 0.5 MG tablet [Pharmacy Med Name: LORAZEPAM  0.5MG  TAB] 180 tablet 0     Sig: TAKE 1 TABLET BY MOUTH  EVERY 6 HOURS AS NEEDED FOR ANXIETY     Date Last Fill: 8/6/20  Requested Pharmacy: [facility]  Submit electronically to pharmacy  Controlled Substance Agreement on file:   Encounter-Level CSA Scan Date:    There are no encounter-level csa scan date.        Last office visit:  12/31/20

## 2021-06-14 NOTE — PROGRESS NOTES
Assessment/Plan:    1. Chronic midline low back pain without sciatica  Chronic midline lower back pain secondary to work comp injury August 1999.  No sciatica. She reports having intermittent dull pain in her low back, relieved with her Celebrex (200 mg once daily) and nortriptyline (50 mg at bedtime). No radiation of pain. No numbness or tingling distally. No other associated symptoms.  Anticipate recheck in office in 1 year.      I have had an Advance Directives discussion with the patient.         Subjective:    Bibiana Mcrae is seen today for work comp follow-up. Date of injury was August, 1999. Initial injury occurred while working for [employer]. Had worked for [employer] for 31 years. Had done significant heavy lifting, repetitive in nature, placing files into a tub. Subsequently did see specialist with the decision to pursue conservative treatments. In general has been doing well. She has had intermittent episodes of dull low back pain similar to prior episodes of chronic low back pain. Denies numbness or tingling distally. No radiation of pain. No other associated symptoms. She takes Celebrex 200 mg daily and nortriptyline 50 mg at bedtime which have provided relief.  She has otherwise been in her usual state of health.     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, )         Past Surgical History:   Procedure Laterality Date     BREAST CYST ASPIRATION Left     While ago     HYSTERECTOMY  1985     OOPHORECTOMY Bilateral 1985     ID REMOVAL OF OVARY(S)      Description: Oophorectomy;  Recorded: 12/17/2009;     ID TOTAL ABDOM HYSTERECTOMY      Description: Total Abdominal Hysterectomy;  Recorded: 12/17/2009;        Family History   Problem Relation Age of Onset     Breast cancer Mother 52     Cancer Maternal Grandfather 71        prostate     Prostate cancer Maternal Grandfather         Past Medical History:   Diagnosis Date     Anxiety      Breast cyst     While ago     "    Social History     Tobacco Use     Smoking status: Never Smoker     Smokeless tobacco: Never Used   Substance Use Topics     Alcohol use: No     Drug use: No        Current Outpatient Medications   Medication Sig Dispense Refill     acarbose (PRECOSE) 25 MG tablet TAKE 1 TABLET BY MOUTH 3  TIMES A DAY WITH MEALS 270 tablet 1     atenoloL (TENORMIN) 25 MG tablet Take 1 tablet (25 mg total) by mouth daily. 90 tablet 3     BIOTIN ORAL Take 5,000 mcg by mouth 2 (two) times a day.        blood glucose control high,low (ACCU-CHEK JB CONTROL SOLN) Soln Meter check once ever 3 months 1 each 0     blood glucose test (ACCU-CHEK JB PLUS TEST STRP) strips USE TO TEST 3 TIMES DAILY.. 300 strip 3     calcium-vitamin D (CALCIUM-VITAMIN D) 500 mg(1,250mg) -200 unit per tablet Take 2 tablets by mouth 2 (two) times a day with meals.       celecoxib (CELEBREX) 200 MG capsule TAKE ONE CAPSULE BY MOUTH  DAILY 90 capsule 3     denosumab 60 mg/mL Syrg Inject 60 mg under the skin once.       generic lancets (ACCU-CHEK MULTICLIX LANCET) Dispense brand per patient's insurance at pharmacy discretion. 300 each 3     LORazepam (ATIVAN) 0.5 MG tablet Take 1 tablet (0.5 mg total) by mouth every 6 (six) hours as needed for anxiety. 180 tablet 1     multivitamin with minerals (THERA-M) 9 mg iron-400 mcg Tab tablet Take 1 tablet by mouth daily.       nortriptyline (PAMELOR) 50 MG capsule TAKE 1 CAPSULE BY MOUTH AT  BEDTIME 90 capsule 3     No current facility-administered medications for this visit.           Objective:    Vitals:    12/31/20 1647   BP: 122/74   Pulse: 75   SpO2: 98%   Weight: 114 lb (51.7 kg)   Height: 4' 9.75\" (1.467 m)      Body mass index is 24.03 kg/m .    Awake, alert, and oriented x3. No apparent distress at rest. Transfers slowly due to chronic lower back pain. Exam with DTRs symmetric today at patella bilaterally.       This note has been dictated using voice recognition software and as a result may contain minor " grammatical errors and unintended word substitutions.

## 2021-06-15 NOTE — PROGRESS NOTES
Assessment:    1. Chronic midline low back pain without sciatica  nortriptyline (PAMELOR) 50 MG capsule    celecoxib (CELEBREX) 200 MG capsule         Plan:    25 minutes total time with patient, > 50% with counseling and coordination of cares.  Discussed chronic back pain management.  Recommend maintaining maximum dose of Celebrex as 200 mg daily and to not increase dose to BID dosing (400 mg total dose/day) due to cardiovascular risk factors.  Patient agrees.  Will continue Celebrex 200 mg daily. Previously had increased nortriptyline from 25 mg to 50 mg daily at bedtime for improved pain control as well as benefits with secondary insomnia.  New rx sent to pharmacy with new sig.  Notify with worsening nonimprovement, otherwise annual physical exams anticipated.  Declines PT/OT eval and treat.          Subjective:    Bibiana Mcrae is seen today for work comp follow-up.  Work comp injury 1999.  Stable.  Initial injury occurred while working  for [employer] x31 years.  Had done significant heavy lifting, repetitive in nature, placing files into a tub.  Subsequently, did see specialist with a  decision to pursue conservative treatments.   Using Celebrex 200 mg daily.  Nortriptyline 25 mg at bedtime.  No radicular symptoms of lower back pain.  Needs refills on medications.  Would be interested in trying higher dose nortriptyline at bedtime due to sleep disruption due to lower back pain.     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   No siblings   Surgery: emergency RLQ incarcerated strangulated femoral hernia repair 08; [facility]  due to endometriosis; ulcer surgery ; [id number] hernia repair (Dr. JULY Wen); lap choly 12 with Dr. Hayder Montgomery [phone]      Past Surgical History:   Procedure Laterality Date     BREAST CYST ASPIRATION Bilateral     Rt 2013     Llt  ??     HYSTERECTOMY      age 40     OOPHORECTOMY Bilateral     age 40     [facility] REMOVAL OF OVARY(S)      Description: Oophorectomy;  Recorded: 12/17/2009;     VA TOTAL ABDOM HYSTERECTOMY      Description: Total Abdominal Hysterectomy;  Recorded: 12/17/2009;        Family History   Problem Relation Age of Onset     Breast cancer Mother 52     Cancer Maternal Grandfather 71     prostate     Prostate cancer Maternal Grandfather         Past Medical History:   Diagnosis Date     Anxiety      Breast cyst         Social History   Substance Use Topics     Smoking status: Never Smoker     Smokeless tobacco: Never Used     Alcohol use No        Current Outpatient Prescriptions   Medication Sig Dispense Refill     atenolol (TENORMIN) 50 MG tablet Take 1 tablet by mouth  daily 90 tablet 3     BIOTIN ORAL Take 5,000 mcg by mouth 3 (three) times a day.       CALCIUM CITRATE/VITAMIN D3 (CITRACAL + D ORAL) Take by mouth.       celecoxib (CELEBREX) 200 MG capsule Take 1 capsule (200 mg total) by mouth daily. 90 capsule 3     ketoconazole (NIZORAL) 2 % shampoo Apply topically 2 (two) times a week. Apply to damp skin, lather, leave on 5 minutes, and rinse       MULTIVIT &MINERALS/FERROUS FUM (MULTI VITAMIN ORAL) Take 1 tablet by mouth daily.       nortriptyline (PAMELOR) 50 MG capsule Take 1 capsule (50 mg total) by mouth at bedtime. 90 capsule 3     PREMARIN vaginal cream Insert 1 applicatorful  vaginally once every third  day 60 g 1     LORazepam (ATIVAN) 0.5 MG tablet TAKE 1/2 TO 1 TABLET BY  MOUTH EVERY 8 HOURS AS  NEEDED FOR ANXIETY 180 tablet 1     No current facility-administered medications for this visit.           Objective:    Vitals:    12/27/17 1302   BP: 130/70   Pulse: 64   Weight: 100 lb 4.8 oz (45.5 kg)      Body mass index is 20.96 kg/(m^2).    Alert.  No apparent distress.  Transfers independently however  slowly.  Minimal midline lumbar back tenderness without significant evidence for scoliosis or thoracic kyphosis etc.  DTRs symmetric lower extremities.  No peripheral edema.

## 2021-06-16 PROBLEM — H90.3 BILATERAL SENSORINEURAL HEARING LOSS: Status: ACTIVE | Noted: 2018-11-23

## 2021-06-16 PROBLEM — R73.01 IMPAIRED FASTING GLUCOSE: Status: ACTIVE | Noted: 2018-11-23

## 2021-06-16 NOTE — PROGRESS NOTES
Progress Note    Reason for Visit:      Progress Note:    HPI: I am seeing this patient on emergent basis she came to the clinic late by half an hour.  I treat her daughter for diabetes.  She is a 72-year-old female patient who 3 weeks ago her daughter went to see how she found her to have chest discomfort shortness of breath and she was confused she went to the hospital and in the hospital they found that her blood sugar was 59 after she has eating breakfast.    The patient has significant past past medical history of partial gastrectomy in 1979 because of stomach ulcers and she also had cholecystectomy and hysterectomy back in 85.    She takes Tylenol quite regularly 4 tablets a day and she also takes Celebrex.    She takes calcium 2 tablets a day and Librax 200 mg once a day and atenolol 50 mg for blood pressure.    The patient lives alone she does not smoke or drink she also takes night nortriptyline.    Thyroid exam is normal.      Component      Latest Ref Rng & Units 10/12/2017 2/26/2018 2/26/2018 2/26/2018            3:58 PM  5:01 PM  8:07 PM   Cholesterol      <=199 mg/dL 149      Triglycerides      <=149 mg/dL 85      HDL Cholesterol      >=50 mg/dL 57      LDL Calculated      <=129 mg/dL 75      Patient Fasting > 8hrs?       Unknown      Vitamin D, Total (25-Hydroxy)      30.0 - 80.0 ng/mL 47.7      TSH      0.30 - 5.00 uIU/mL 0.75      PTH      10 - 86 pg/mL 78      Glucose, POC      mg/dL   187 167   Hemoglobin A1c      4.2 - 6.1 %         Component      Latest Ref Rng & Units 2/27/2018 2/27/2018 2/27/2018 2/27/2018           6:05 AM  7:46 AM 11:26 AM  4:27 PM   Cholesterol      <=199 mg/dL       Triglycerides      <=149 mg/dL       HDL Cholesterol      >=50 mg/dL       LDL Calculated      <=129 mg/dL       Patient Fasting > 8hrs?             Vitamin D, Total (25-Hydroxy)      30.0 - 80.0 ng/mL       TSH      0.30 - 5.00 uIU/mL       PTH      10 - 86 pg/mL       Glucose, POC      mg/dL  69 155 121    Hemoglobin A1c      4.2 - 6.1 % 5.8        Component      Latest Ref Rng & Units 2/27/2018           8:44 PM   Cholesterol      <=199 mg/dL    Triglycerides      <=149 mg/dL    HDL Cholesterol      >=50 mg/dL    LDL Calculated      <=129 mg/dL    Patient Fasting > 8hrs?          Vitamin D, Total (25-Hydroxy)      30.0 - 80.0 ng/mL    TSH      0.30 - 5.00 uIU/mL    PTH      10 - 86 pg/mL    Glucose, POC      mg/dL 113   Hemoglobin A1c      4.2 - 6.1 %          Review of Systems:    Nervous System: No headache, dizziness, fainting or memory loss. No tingling sensation of hand or feet.  Ears: No hearing loss or ringing in the ears  Eyes: No blurring of vision, redness, itching or dryness.  Nose: No nosebleed or loss of smell  Mouth: No mouth sores or loss of taste  Throat: No hoarseness or difficulty swallowing  Neck: No enlarged thyroid or lymph nodes.  Heart: No chest pain, palpitation or irregular heartbeat. No swelling of hands or feet  Lungs: No shortness of breath, cough, night sweats, wheezing or hemoptysis.  Gastrointestinal: No nausea or vomiting, constipation or diarrhea.  No acid reflux, abdominal pain or blood in stools.  Kidney/Bladdr: No polyuria, polydipsia, nocturia or hematuria.  Genital/Sexual: No loss of libido  Skin: No rash, hair loss or hirsutism.  No abnormal striae  Muscles/Joints/Bones: No morning stiffness, muscle aches and pain or loss of height.    Current Medications:  Current Outpatient Prescriptions   Medication Sig     acetaminophen (TYLENOL) 500 MG tablet Take 1,000 mg by mouth every 6 (six) hours as needed for pain.     atenolol (TENORMIN) 50 MG tablet Take 50 mg by mouth daily.     BIOTIN ORAL Take 5,000 mcg by mouth 3 (three) times a day.     blood glucose test (CONTOUR TEST STRIPS) strips Contour Next Test Strips. Dispense brand per patient's insurance at pharmacy discretion.     blood-glucose meter Misc Use 1 Device As Directed as needed.     calcium-vitamin D (CALCIUM-VITAMIN D)  500 mg(1,250mg) -200 unit per tablet Take 2 tablets by mouth 2 (two) times a day with meals.     celecoxib (CELEBREX) 200 MG capsule Take 1 capsule (200 mg total) by mouth daily.     conjugated estrogens (PREMARIN) vaginal cream Insert 2 g into the vagina 3 (three) times a week.      diphenhydrAMINE (BENADRYL) 25 mg tablet Take 50 mg by mouth at bedtime.     generic lancets (ACCU-CHEK FASTCLIX) Use 1 each As Directed daily. Dispense brand per patient's insurance at pharmacy discretion.     generic lancets (MICROLET LANCET) Use 1 each As Directed as needed. Dispense brand per patient's insurance at pharmacy discretion.     ketoconazole (NIZORAL) 2 % shampoo Apply 1 application topically as needed. Apply to damp skin, lather, leave on 5 minutes, and rinse      LORazepam (ATIVAN) 0.5 MG tablet Take 0.25-0.5 mg by mouth every 8 (eight) hours as needed for anxiety.     multivitamin with minerals (THERA-M) 9 mg iron-400 mcg Tab tablet Take 1 tablet by mouth daily.     nortriptyline (PAMELOR) 50 MG capsule Take 1 capsule (50 mg total) by mouth at bedtime.     ONETOUCH ULTRA TEST strips USE 1 EACH AS DIRECTED DAILY.       Patients Active Problems:  Patient Active Problem List   Diagnosis     Vitamin D Deficiency     Hypertension     Lump Or Mass In Breast     Postmenopausal Atrophic Vaginitis     Lower Back Pain     Anxiety     Osteoporosis, senile     Peptic ulcer     Osteoarthritis of thumb, left     Palpitations     Heart palpitations     Shortness of breath     Hypoglycemia       History:   reports that she has never smoked. She has never used smokeless tobacco. She reports that she does not drink alcohol or use illicit drugs.   reports that she has never smoked. She has never used smokeless tobacco. She reports that she does not drink alcohol or use illicit drugs.  History   Smoking Status     Never Smoker   Smokeless Tobacco     Never Used      reports that she has never smoked. She has never used smokeless tobacco.  She reports that she does not drink alcohol or use illicit drugs.  History   Sexual Activity     Sexual activity: Not on file     Past Medical History:   Diagnosis Date     Anxiety      Breast cyst      Family History   Problem Relation Age of Onset     Breast cancer Mother 52     Cancer Maternal Grandfather 71     prostate     Prostate cancer Maternal Grandfather      Past Medical History:   Diagnosis Date     Anxiety      Breast cyst      Past Surgical History:   Procedure Laterality Date     BREAST CYST ASPIRATION Bilateral     Rt 2013     Llt  ??     HYSTERECTOMY      age 40     OOPHORECTOMY Bilateral     age 40     AL REMOVAL OF OVARY(S)      Description: Oophorectomy;  Recorded: 12/17/2009;     AL TOTAL ABDOM HYSTERECTOMY      Description: Total Abdominal Hysterectomy;  Recorded: 12/17/2009;       Vitals   vitals were not taken for this visit.        Exam  General appearance: The patient looked well, not in acute distress.  Eyes: no evidence of thyroid eye disease.   Retinal exam: No evidence of diabetic retinopathy.  Mouth and Throat: Normal  Neck: No evidence of thyromegaly, enlarged lymph node or tenderness  Chest: Trachea is central. Chest is clear to auscultation and percussion. Breat sounds are normal.  Cardiovascular exam: JVP is not raised. Heart sounds are normal, no murmurs or rub  Peripheral pulses are palpable.   Abdomen: No masses or tenderness.    Back: No vertebral tenderness or kyphosis.  Extremities: No evidence of leg edema.   Skin: Normal to touch.  No abnormal striae  Neurologic exam:  Visual fields are intact by confrontation, grossly intact. No evidence of peripheral neuropathy.  Detailed foot exam normal.        Diagnosis:  No diagnosis found.    Orders:   No orders of the defined types were placed in this encounter.        Assessment and Plan: prediabetes and postprandial hypoglycemia.    I discussed with the patient the pathophysiology of the disease that due to her gastric surgery she  "is more prone to develop dumping syndrome or postprandial hypoglycemia I.  I did advise patient avoid simple carbohydrates and eat more \"complex carbs diet.    Hypoglycemia mainly happens after she eats breakfast.    I would put the patient on Precose 25 mg with breakfast.    Her vitamin D is 47.7 TSH 0.75 creatinine 0.69 A1c 5.8.    I discussed with the patient avoid taking Tylenol.    I will see the patient again in 6 month.  We can consider also given her metformin.    I have reviewed and ordered clinical lab test    I have reviewed and ordered radiology tests.    I have reviewed and ordered her medication as required.    I have reviewed her test results and advised with the performing physician.    I have reviewed the patient's old records.    I have reviewed and summarized the patient old records.    I did spend 60 minutes with the patient more than 50% was spent on counseling and managing her care.    "

## 2021-06-16 NOTE — PROGRESS NOTES
"  1. Osteoporosis, senile  DXA Bone Density Scan       Return in about 6 months (around 9/6/2018) for Recheck.    Patient Instructions   Prolia 5th today.  Prolia 6th in 6 months with my nurse. I will see you in 1 year.  DXA in 2/2019 .   Phone number to schedule [phone].  Please avoid any extensive dental work as implants and teeth extractions for the next 1-2 months.  Daily calcium need is 3831-9270 mg a day from the diet and supplements.  Calcium citrate is easier to digest.  Vitamin D 2000 IU daily recommended.      Chief Complaint   Patient presents with     Osteoporosis Follow Up       /58  Pulse (!) 58  Ht 4' 9.99\" (1.473 m)  Wt 103 lb (46.7 kg)  SpO2 98%  BMI 21.53 kg/m2      Did you experience any problems with previous Prolia injection? no  Any medication change in the last 6 months? no  Did you take prednisone or other immunosupressant drugs in the last 6 months   (chemo, transplant, rheum, dermatology conditions)? no  Did you have any serious infection in the last 6 months?no  Any recent hospitalizations?no  Do you plan any dental work in the next 2-3 months?no  How much calcium do you take daily from the diet and supplements?1200 mg  How much vit D do you take daily? 2000 IU  Last DXA?2/2017      Patient is here today for the 5th Prolia injection. Patient tolerated previous injections well.   We discussed calcium and vit D daily needs today.   Next Prolia injection will be in 6 months.     15 minutes spent with the patient and more then 50 % of the time in counseling.  This note has been dictated using voice recognition software. Any grammatical or context distortions are unintentional and inherent to the software      Patient Active Problem List   Diagnosis     Vitamin D Deficiency     Hypertension     Lump Or Mass In Breast     Postmenopausal Atrophic Vaginitis     Lower Back Pain     Anxiety     Osteoporosis, senile     Peptic ulcer     Osteoarthritis of thumb, left     Palpitations "     Heart palpitations     Shortness of breath     Hypoglycemia       Current Outpatient Prescriptions on File Prior to Visit   Medication Sig Dispense Refill     acetaminophen (TYLENOL) 500 MG tablet Take 1,000 mg by mouth every 6 (six) hours as needed for pain.       acetaminophen-caffeine (EXCEDRIN TENSION HEADACHE) 500-65 mg Tab per tablet Take 1 tablet by mouth daily.       atenolol (TENORMIN) 50 MG tablet Take 50 mg by mouth daily.       BIOTIN ORAL Take 5,000 mcg by mouth 3 (three) times a day.       blood glucose test (CONTOUR TEST STRIPS) strips Contour Next Test Strips. Dispense brand per patient's insurance at pharmacy discretion. 50 strip 11     calcium-vitamin D (CALCIUM-VITAMIN D) 500 mg(1,250mg) -200 unit per tablet Take 2 tablets by mouth 2 (two) times a day with meals.       celecoxib (CELEBREX) 200 MG capsule Take 1 capsule (200 mg total) by mouth daily. 90 capsule 3     conjugated estrogens (PREMARIN) vaginal cream Insert 2 g into the vagina 3 (three) times a week.        diphenhydrAMINE (BENADRYL) 25 mg tablet Take 50 mg by mouth at bedtime.       generic lancets (MICROLET LANCET) Use 1 each As Directed as needed. Dispense brand per patient's insurance at pharmacy discretion. 50 each 11     ketoconazole (NIZORAL) 2 % shampoo Apply 1 application topically as needed. Apply to damp skin, lather, leave on 5 minutes, and rinse        LORazepam (ATIVAN) 0.5 MG tablet Take 0.25-0.5 mg by mouth every 8 (eight) hours as needed for anxiety.       multivitamin with minerals (THERA-M) 9 mg iron-400 mcg Tab tablet Take 1 tablet by mouth daily.       nortriptyline (PAMELOR) 50 MG capsule Take 1 capsule (50 mg total) by mouth at bedtime. 90 capsule 3     No current facility-administered medications on file prior to visit.

## 2021-06-16 NOTE — PROGRESS NOTES
[facility]  [address]  [facility] [address]  Dept: [phone]  Dept Fax: [phone]  Primary Provider: Ester Gusman MD  Pre-op Performing Provider: ESTER GUSMAN    {Provider  Link to PREOP SmartSet Use this to apply standard patient instructions to AVS; includes medication directions, common orders, guidelines for anemia, warfarin, additional testing :[id number]}  PREOPERATIVE EVALUATION:  Today's date: 4/1/2021    Bibiana Mcrae is a 75 y.o. female who presents for a preoperative evaluation.    Surgical Information:  Surgery/Procedure:  bilateral cataracts  Surgery Location: [facility]  Surgeon: LICO  Surgery Date:  left eye on 4/8/21  &  right eye on 4/15/21  Time of Surgery: TBD  Where patient plans to recover: At home with family  Fax number for surgical facility: [phone]    Type of Anesthesia Anticipated: to be determined    Assessment & Plan      The proposed surgical procedure is considered LOW risk.    Patient with h/o metal in right eye. Cannot have MRI exams.    Preoperative examination  Preoperative examination completed for scheduled bilateral cataract repair.  No contraindication identified to scheduled procedure.    Cataract of both eyes, unspecified cataract type  Bilateral cataracts noted with scheduled repair as above.    Essential hypertension with goal blood pressure less than 130/80  Blood pressure managed currently with atenolol 25 mg daily with blood pressure 142/64 on recheck.    Hypoglycemia  Described history of hypoglycemia and continues Precose 25 mg 3 times daily without recurrent concerns.    Vitamin D deficiency  Vitamin D deficiency history and does continue current vitamin D supplementation along with calcium.    Osteoporosis, senile  Prolia 60 mg every 6 months.    Anxiety  Anxiety managed with lorazepam half milligram 1 or 2 doses daily historically.    Bilateral sensorineural hearing loss  Bilateral sensorineural hearing loss and  does utilize hearing aids however not currently.    Chronic midline low back pain without sciatica  Chronic lower back pain work comp related, stable.           Risks and Recommendations:  The patient has the following additional risks and recommendations for perioperative complications:   - No identified additional risk factors other than previously addressed    Medication Instructions:  Patient is to take all scheduled medications on the day of surgery    RECOMMENDATION:  APPROVAL GIVEN to proceed with proposed procedure, without further diagnostic evaluation.    [id number]}    Subjective     HPI related to upcoming procedure: Recently noted bilateral cataracts.  Scheduled cataract repair procedure.  Patient with work comp related back injury 1999 and continues to do well with Celebrex 200 mg daily nortriptyline 50 mg at bedtime.  Treatment for hypertension with atenolol 25 mg daily.  Due to history of mild hypoglycemia does benefit from Precose 25 mg 3 times daily.  Lorazepam half milligram using 2-3 times daily on as-needed basis however typically no more than 2 doses.  Prolia 60 mg every 6 months for osteoporosis management and continues vitamin D and calcium supplementation.  Has bilateral hearing aids however not using currently today.  Chronic cough resolved with omeprazole use.  Denies recent illness.  Had Moderna COVID-19 vaccination series in February.    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   No siblings   Surgery: emergency RLQ incarcerated strangulated femoral hernia repair 08; [place]-BSO  due to endometriosis; ulcer surgery ; [id number] hernia repair (Dr. JULY Wen); lap choly 12 with Dr. Hayder Montgomery [phone]       Preop Questions 2021    Have you ever had a heart attack or stroke? No   Have you ever had surgery on your heart or blood vessels, such as a stent placement, a coronary artery bypass, or surgery on an artery in your head, neck, heart, or legs? No   Do you have chest pain with activity? No   Do you have a history of  heart failure? No   Do you currently have a cold, bronchitis or symptoms of other infection? No   Do you have a cough, shortness of breath, or wheezing? No   Do you or anyone in your family have previous history of blood clots? No   Do you or does anyone in your family have a serious bleeding problem such as prolonged bleeding following surgeries or cuts? No   Have you ever had problems with anemia or been told to take iron pills? No   Have you had any abnormal blood loss such as black, tarry or bloody stools, or abnormal vaginal bleeding? No   Have you ever had a blood transfusion? YES - tolerated well   Have you ever had a transfusion reaction? No   Are you willing to have a blood transfusion if it is medically needed before, during, or after your surgery? Yes   Have you or any of your relatives ever had problems with anesthesia? No   Do you have sleep apnea, excessive snoring or daytime drowsiness? No   Do you have any artifical heart valves or other implanted medical devices like a pacemaker, defibrillator, or continuous glucose monitor? No   Do you have artificial joints? No   Are you allergic to latex? No     Health Care Directive:  Patient has a Health Care Directive on file.     Preoperative Review of :    reviewed - controlled substances reflected in medication list.    HYPERTENSION - Patient has longstanding history of HTN , currently denies any symptoms referable to elevated blood pressure. Specifically denies chest pain, palpitations, dyspnea, orthopnea, PND or peripheral edema. Blood pressure readings have been in normal range. Current medication regimen is as listed below. Patient denies any side effects  of medication.       Review of Systems  CONSTITUTIONAL: NEGATIVE for fever, chills, change in weight  ENT/MOUTH: Negative for ear, mouth, and throat problems  RESP: NEGATIVE for significant cough or SOB  CV: NEGATIVE for chest pain, palpitations or peripheral edema    Patient Active Problem List    Diagnosis Date Noted     Chronic cough 08/06/2020     Bilateral sensorineural hearing loss 11/23/2018     Impaired fasting glucose 11/23/2018     Palpitations 02/26/2018     Heart palpitations 02/26/2018     Shortness of breath      Hypoglycemia      Osteoarthritis of thumb, left 11/03/2016     Diverticular disease of large intestine 09/06/2016     First degree hemorrhoids 09/06/2016     Osteoporosis, senile 02/18/2016     Anxiety 11/06/2015     Vitamin D Deficiency      Essential hypertension with goal blood pressure less than 130/80      Postmenopausal Atrophic Vaginitis      Lower Back Pain      Past Medical History:   Diagnosis Date     Anxiety      Breast cyst     While ago     Past Surgical History:   Procedure Laterality Date     BREAST CYST ASPIRATION Left     While ago     HYSTERECTOMY  1985     OOPHORECTOMY Bilateral 1985     SD REMOVAL OF OVARY(S)      Description: Oophorectomy;  Recorded: 12/17/2009;     SD TOTAL ABDOM HYSTERECTOMY      Description: Total Abdominal Hysterectomy;  Recorded: 12/17/2009;     Current Outpatient Medications   Medication Sig Dispense Refill     acarbose (PRECOSE) 25 MG tablet TAKE 1 TABLET BY MOUTH 3  TIMES A DAY WITH MEALS 270 tablet 1     atenoloL (TENORMIN) 25 MG tablet Take 1 tablet (25 mg total) by mouth daily. 90 tablet 3     BIOTIN ORAL Take 5,000 mcg by mouth 2 (two) times a day.        calcium-vitamin D (CALCIUM-VITAMIN D) 500 mg(1,250mg) -200 unit per tablet Take 2 tablets by mouth 2 (two) times a day with meals.       celecoxib (CELEBREX) 200 MG capsule TAKE ONE CAPSULE BY MOUTH  DAILY 90 capsule 3     denosumab 60 mg/mL Syrg Inject 60 mg under the skin once.        LORazepam (ATIVAN) 0.5 MG tablet TAKE 1 TABLET BY MOUTH  EVERY 6 HOURS AS NEEDED FOR ANXIETY 180 tablet 1     multivitamin with minerals (THERA-M) 9 mg iron-400 mcg Tab tablet Take 1 tablet by mouth daily.       nortriptyline (PAMELOR) 50 MG capsule TAKE 1 CAPSULE BY MOUTH AT  BEDTIME 90 capsule 3     blood glucose control high,low (ACCU-CHEK JB CONTROL SOLN) Soln Meter check once ever 3 months 1 each 0     blood glucose test (ACCU-CHEK JB PLUS TEST STRP) strips USE TO TEST 3 TIMES DAILY.. 300 strip 3     generic lancets (ACCU-CHEK MULTICLIX LANCET) Dispense brand per patient's insurance at pharmacy discretion. 300 each 3     No current facility-administered medications for this visit.        Allergies   Allergen Reactions     Prochlorperazine Edisylate Other (See Comments)     Extrapyramidal side effects       Social History     Tobacco Use     Smoking status: Never Smoker     Smokeless tobacco: Never Used   Substance Use Topics     Alcohol use: No      Family History   Problem Relation Age of Onset     Breast cancer Mother 52     Cancer Maternal Grandfather 71        prostate     Prostate cancer Maternal Grandfather      Social History     Substance and Sexual Activity   Drug Use No        Objective     /64   Pulse 77   Temp 98.9  F (37.2  C)   Wt 107 lb (48.5 kg)   LMP 11/16/1985   SpO2 99%   BMI 22.56 kg/m    Physical Exam  GENERAL APPEARANCE: healthy, alert and no distress  EYES: Eyes grossly normal to inspection, PERRL, conjunctivae and sclerae normal and bilateral cataracts.  Wearing glasses.  HENT: ear canals and TM's normal and nose and mouth without ulcers or lesions.  Not currently using her hearing aids.  RESP: lungs clear to auscultation - no rales, rhonchi or wheezes  CV: regular rate and rhythm, normal S1 S2, no S3 or S4 and no murmur, click or rub  ABDOMEN: soft, nontender, no HSM or masses and bowel sounds normal  NEURO: Normal strength and tone, sensory exam grossly normal,  mentation intact and speech normal    Recent Labs   Lab Test 10/20/20  1432 10/18/19  1435    139   K 4.4 4.7   CREATININE 0.78 0.78        PRE-OP Diagnostics:   No labs were ordered during this visit.  No EKG required for low risk surgery (cataract, skin procedure, breast biopsy, etc).    REVISED CARDIAC RISK INDEX (RCRI)   The patient has the following serious cardiovascular risks for perioperative complications:   - No serious cardiac risks = 0 points    RCRI INTERPRETATION: 0 points: Class I (very low risk - 0.4% complication rate)         Signed Electronically by: Zack Woody MD    Copy of this evaluation report is provided to requesting physician.

## 2021-06-16 NOTE — TELEPHONE ENCOUNTER
Telephone Encounter by Aliya Moon at 3/23/2020  4:02 PM     Author: Aliya Moon Service: -- Author Type: --    Filed: 3/23/2020  4:04 PM Encounter Date: 3/21/2020 Status: Signed    : Aliya Moon APPROVED:    Approval start date: 3/23/2020  Approval end date:  12/31/2020    Pharmacy has been notified of approval and will contact patient when medication is ready for pickup.

## 2021-06-16 NOTE — TELEPHONE ENCOUNTER
Reason for Call:  Other      Detailed comments: Work comp insurance company calling to review medication with PCP to determine which are work comp related and which are general health related.  Anjali @ [facility] [phone]    Call taken on 4/1/2021 at 3:17 PM by Laura L Goldberg

## 2021-06-16 NOTE — TELEPHONE ENCOUNTER
Controlled Substance Refill Request  Medication Name:   Requested Prescriptions     Pending Prescriptions Disp Refills     LORazepam (ATIVAN) 0.5 MG tablet [Pharmacy Med Name: LORAZEPAM  0.5MG  TAB] 180 tablet 0     Sig: TAKE 1 TABLET BY MOUTH  EVERY 6 HOURS AS NEEDED FOR ANXIETY     Date Last Fill: 1/4/21  Requested Pharmacy: [facility]  Submit electronically to pharmacy  Controlled Substance Agreement on file:   Encounter-Level CSA Scan Date:    There are no encounter-level csa scan date.        Last office visit:  12/31/20

## 2021-06-16 NOTE — PROGRESS NOTES
Assessment/Plan:    1. Essential hypertension  Hypertension well controlled.  Resolved palpitations.  Patient elects to decrease atenolol from 50 mg 125 mg daily for hypertension management as well as concerns with postprandial hypoglycemia.  Will reassess at follow-up in 3 months.  - atenolol (TENORMIN) 25 MG tablet; Take 1 tablet (25 mg total) by mouth daily.  Dispense: 90 tablet; Refill: 3    2. Palpitations  Reviewed 2 week event monitor with out significant cardiac arrhythmia and only mildly abnormal due to intraventricular conduction delay.  Continue beta-blocker as above.    3. Postprandial hypoglycemia  Postprandial hypoglycemia noted.  Gastric surgery previously.  Seen by Dr. Albert as outpatient who recommended use of acarbose 25 mg daily in the morning due to potential for dumping syndrome versus postprandial hypoglycemia.  Was to monitor blood sugars and states ranging between 60 and 70 often.  Checking blood sugars 3 times daily currently.  Lancets, test strips etc. sent to local pharmacy.  - blood glucose test (ACCU-CHEK JB PLUS TEST STRP) strips; 1 test strip 3x day  Dispense: 300 strip; Refill: 3  - blood glucose control high,low (ACCU-CHEK JB CONTROL SOLN) Soln; Meter check once ever 3 months  Dispense: 1 each; Refill: 0  - Ambulatory referral to Nutrition Services  - generic lancets (ACCU-CHEK MULTICLIX LANCET); Dispense brand per patient's insurance at pharmacy discretion.  Dispense: 300 each; Refill: 3          Subjective:    Bibiana Mcrae is seen today for hospital follow-up.  Was admitted to [facility] debris 26 to February 28, 2018 with noted palpitations and symptoms of shortness of breath and chest tightness.  Patient underwent coronary CT angiogram without evidence for detectable stenosis or plaque.  Total Agatston calcium score of 6.  Due to episode of hypoglycemia had been provided blood glucose monitoring and outpatient follow-up with Dr. Albert who recommended trial of acarbose  25 mg daily with breakfast and consideration for metformin to help with dumping syndrome versus postprandial hypoglycemia.  Advised complex carbohydrate diet.  No history of type 2 diabetes.    Reviewed hospital discharge summary from 3/1/18:  72-year-old female history of hypertension, anxiety who presented with episode of shortness of breath palpitations and chest discomfort.  In the emergency room patient's heart rate ranged between 56 and 73 bpm.  Blood pressure range between 108 239 mmHg systolic.  Chest x-ray was normal and d-dimer was negative.  EKG revealed no acute ischemic changes and troponin I was normal.  Patient's BNP was mildly elevated at 187 and her plasma glucose level was 56.  Patient did receive D50 in the emergency room.     Patient was admitted for chest pain rule out protocol and serial troponins were negative.  Echocardiogram revealed normal LVEF with no wall motion abnormality.  Patient had mild tricuspid, mitral and aortic valve insufficiency.  Cardiac monitoring did not reveal significant arrhythmia.  She did have frequent PVCs noted.  She underwent nuclear medicine stress test on February 28 which revealed reversible change in mid to distal inferolateral wall which was felt to be most likely secondary to diaphragm attenuation.  After discussion with Dr. Basilio from cardiology was recommended patient undergoes coronary CT angiogram to further assess.  CTA coronary revealed normal coronary anatomy with no detectable stenosis or plaque.  Patient had a total Agatston calcium score of 6.     Patient was recommended blood glucose monitoring if recurrence of palpitations or symptoms.  Diabetes education provided patient with blood glucose monitor.  She will check fasting blood sugars over the next week to 2 until follow-up with primary care.  Patient was recommended ACT monitor for 14 days to further assess for arrhythmia versus PVC frequency.  Of note patient was not symptomatic with episodes of  PVCs during hospitalization.    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   No siblings   Surgery: emergency RLQ incarcerated strangulated femoral hernia repair 08; [place]-[facility]  due to endometriosis; ulcer surgery ; [id number] hernia repair (Dr. JULY Wen); lap choly 12 with Dr. Hayder Montgomery [phone]    Patient h/o metal in right eye. Cannot have MRI exams.    Past Surgical History:   Procedure Laterality Date     BREAST CYST ASPIRATION Bilateral     Rt      Llt  ??     HYSTERECTOMY      age 40     OOPHORECTOMY Bilateral     age 40     TN REMOVAL OF OVARY(S)      Description: Oophorectomy;  Recorded: 2009;     TN TOTAL ABDOM HYSTERECTOMY      Description: Total Abdominal Hysterectomy;  Recorded: 2009;        Family History   Problem Relation Age of Onset     Breast cancer Mother 52     Cancer Maternal Grandfather 71     prostate     Prostate cancer Maternal Grandfather         Past Medical History:   Diagnosis Date     Anxiety      Breast cyst         Social History   Substance Use Topics     Smoking status: Never Smoker     Smokeless tobacco: Never Used     Alcohol use No        Current Outpatient Prescriptions   Medication Sig Dispense Refill     acarbose (PRECOSE) 25 MG tablet Take 1 tablet (25 mg total) by mouth daily. With breakfast 90 tablet 1     acetaminophen (TYLENOL) 500 MG tablet Take 1,000 mg by mouth every 6 (six) hours as needed for pain.       atenolol (TENORMIN) 25 MG tablet Take 1 tablet (25 mg total) by mouth daily. 90 tablet 3     BIOTIN ORAL Take 5,000 mcg by mouth 3 (three) times a day.       blood glucose control high,low (ACCU-CHEK JB CONTROL SOLN) Soln Meter check once ever 3 months 1 each 0     blood glucose test  (ACCU-CHEK JB PLUS TEST STRP) strips 1 test strip 3x day 300 strip 3     calcium-vitamin D (CALCIUM-VITAMIN D) 500 mg(1,250mg) -200 unit per tablet Take 2 tablets by mouth 2 (two) times a day with meals.       celecoxib (CELEBREX) 200 MG capsule Take 1 capsule (200 mg total) by mouth daily. 90 capsule 3     conjugated estrogens (PREMARIN) vaginal cream Insert 2 g into the vagina 3 (three) times a week.        diphenhydrAMINE (BENADRYL) 25 mg tablet Take 50 mg by mouth at bedtime.       ketoconazole (NIZORAL) 2 % shampoo Apply 1 application topically as needed. Apply to damp skin, lather, leave on 5 minutes, and rinse        LANCETS (ACCU-CHEK MULTICLIX LANCET [facility]) Use As Directed. Testing 3x a day       LORazepam (ATIVAN) 0.5 MG tablet Take 0.25-0.5 mg by mouth every 8 (eight) hours as needed for anxiety.       multivitamin with minerals (THERA-M) 9 mg iron-400 mcg Tab tablet Take 1 tablet by mouth daily.       nortriptyline (PAMELOR) 50 MG capsule Take 1 capsule (50 mg total) by mouth at bedtime. 90 capsule 3     generic lancets (ACCU-CHEK MULTICLIX LANCET) Dispense brand per patient's insurance at pharmacy discretion. 300 each 3     No current facility-administered medications for this visit.           Objective:    Vitals:    03/22/18 1335   BP: 110/60   Pulse: 60   SpO2: 99%   Weight: 102 lb (46.3 kg)      Body mass index is 22.07 kg/(m^2).    Alert.  No apparent distress.  Chest clear.  Cardiac exam regular.  No cardiac ectopy or murmur.  Extremities warm and dry.  Transfers independently.      NM Pharmacologic Stress Test 2/28/18:  Conclusion        When compared to the images of 4/12/2012, poor image quality not able to compare.    Lexiscan stress ECG is negative for inducible myocardial ischemia. Frequent PVCs are noticed during stress test.    Lexiscan stress nuclear study is abnormal. There is mild reversible change in mid to distal inferolateral wall which is caused by most likely diaphragm  attenuation, could not completely exclude ischemia.    It is also noticed that there is transient ischemic dilation during stress test which could represent multiple vessel disease versus nonspecific of findings.    Normal left ventricular size, wall motion and function. The calculated left ventricular ejection fraction is 66%.         Comments: The patient had significant artifact of diaphragm attenuation in current and previous nuclear stress tests.  Based on current image findings, the patient may have a coronary CT angiogram for further cardiac evaluation if clinically indicated.

## 2021-06-16 NOTE — TELEPHONE ENCOUNTER
Pt only has 2 meds that are part of her work comp - celebrex 200mg once a day. and nortiptyline 50mg at bedtime

## 2021-06-16 NOTE — PATIENT INSTRUCTIONS - HE

## 2021-06-17 NOTE — PROGRESS NOTES
[facility]   Cervical Thoracic Initial Evaluation    Patient Name: Bibiana Mcrae  Date of evaluation: 5/7/2018  Referral Diagnosis: Facet arthropathy, cervical  Referring provider: Lay Downs PA-C  Visit Diagnosis:     ICD-10-CM    1. Cervical pain M54.2    2. Generalized muscle weakness M62.81    3. Poor posture R29.3        Assessment:      Pt. is appropriate for skilled PT intervention as outlined in the Plan of Care (POC).  Pt. is a good candidate for skilled PT services to improve pain levels and function.  Patient presents with cervical pain after plunging a toilet, carrying the wet rugs and the clean up last month.  She did have an X-ray with the results listed below.  She has limited and painful cervical AROM and decreased strength as well as demonstrating poor posture.  Functional limitations are described as occurring with: turning head, sleeping, carrying objects, reaching out with either UE.  Feel patient will benefit from physical therapy to work on limitations and return to prior level of function.  Patient does have a torn right rotator cuff which is generally painful for her.  She did not want to do anything today that may irritate that shoulder.    Goals:  Pt. will be independent with home exercise program in : 12 weeks  Pt will: Able to turn head to the left with more ease for driving or conversation within 12 weeks  Pt will: Able to sleep through the night without waking due to pain within 12 weeks  Pt will: Able to reach out with either UE and not have any increase in neck pain within 12 weeks  Pt will: Able to carry objects, > 8#, such as laundry, with pain 0-2/10 within 12 weeks    Patient's expectations/goals are realistic.    Barriers to Learning or Achieving Goals:  Co-morbidities or other medical factors.  HTN, osteoporosis    POC, goals, and pathology of condition were reviewed with patient.  Patient is in agreement with the POC.       Plan / Patient Instructions:         Plan of Care:   Authorization / Certification Start Date: 05/07/18  Authorization / Certification End Date: 08/04/18  Authorization / Certification Number of Visits: 12  Communication with: Referral Source  Patient Related Instruction: Nature of Condition;Treatment plan and rationale;Self Care instruction;Basis of treatment;Posture  Times per Week: 2  Number of Weeks: 12  Number of Visits: 12  Therapeutic Exercise: ROM;Stretching;Strengthening  Neuromuscular Reeducation: kinesio tape;posture;core  Manual Therapy: soft tissue mobilization;myofascial release;joint mobilization;muscle energy;strain counterstrain  Modalities: TENS;electrical stimulation    Plan for next visit: review cervical stretches                               Add: pectoralis stretch, midback stretch                               MFR and gentle cervical traction                               Edu on posture     Subjective:         Social information:   Living Situation:single family home, lives alone and stairs  with railing (stairs only for laundry in basement)   Occupation:retired   Equipment Available: None    History of Present Illness:    Bibiana is a 73 y.o. female who presents to therapy today with complaints of right cervical pain. Date of onset/duration of symptoms is April 2018.  Onset was sudden.  She was plunging the toilet and noticed some soreness in her neck.  She then had to carry the wet rugs down the stairs and clean up the mess.  She has had a twinge in her neck here and there but nothing like the pain she experienced with this.  Symptoms are intermittent and not improving. She had an X-ray: FINDINGS: Mild cervical dextrocurvature and cervicothoracic levocurvature. No definite radiographic evidence for fracture or significant subluxation. Advanced multilevel cervical spondylosis including advanced loss of disc height with prominent endplate/uncovertebral spurring from C3 through C7. Mild cervical facet arthropathy.  Prevertebral soft tissues within normal limits for thickness. Included lung apices are clear  Denies numbness or tingling into either extremity.    Patient has a right RC tear.  No plans for surgery in the near future      Pain Rating:3  Pain rating at best: 0  Pain rating at worst: 7  Pain description: sharp, shooting and zings    Functional limitations are described as occurring with:   turning head, sleeping, carrying objects, reaching out with either UE    Patient reports benefit from:  Ice, Aspirin         Objective:      Note: Items left blank indicates the item was not performed or not indicated at the time of the evaluation.    Patient Outcome Measures :    Neck Disability Score in %: 50   Scores range from 0-100%, where a score of 0% represents minimal pain and maximal function. The minmal clinically important difference is a score reduction of 10%.    Cervical Thoracic Examination  1. Cervical pain     2. Generalized muscle weakness     3. Poor posture       Involved side: Right   Right side dominant  Posture Observation:      General standing posture is Mild to moderate winging of scapula, moderate decrease in cervical, thoracic and lumbar lordosis, . Right shoulder complex moderately elevated compared to left, moderate to severe forward shoulders, moderate forward head    Cervical ROM:    Within Normal Limits Unless Noted  Date: 5/7/18     *Indicate scale AROM AROM AROM   Cervical Flexion 23     Cervical Extension 64      Right Left Right Left Right Left   Cervical Sidebending 8, pain 12, tender       Cervical Rotation 42, pain 30, most pain       Cervical Protraction      Cervical Retraction      Thoracic Flexion      Thoracic Extension      Thoracic Sidebending         Thoracic Rotation           Strength   Due to R-RC tear pt did not want strength or AROM testing on right  Date: 5/7/18     Cervical Myotomes/5 Right Left Right Left Right Left   Cervical Flexion (C1-2)  4       Cervical Sidebending (C3)  4 4       Shoulder Elevation (C4) 4 4       Shoulder Abduction (C5)  4       Elbow Flexion (C6)  5       Elbow Extension (C7)  4+       Wrist Flexion (C7)         Wrist Extension (C6)         Thumb abduction (C8)         Finger Abduction (T1)           Sensation         Reflex Testing  Cervical Dermatomes Right Left UE Reflexes Right Left   Back of the Head (C2) - - Biceps (C5-6)     Supraclavicular Fossa (C3) - - Brachioradialis (C5-6)     AC Joint (C4) - - Triceps (C7-8)     Lateral Biceps (C5) - - Shell s test     Palmar Thumb (C6) - - LE Reflexes     Palmar 3rd Finger (C7)   Patellar (L3-4)     Palmar 5th Finger (C8)   Achilles (S1-2)     Ulnar Forearm (T1)   Babinski Response         Flexibility: fair to poor throughout cervical region             Palpation: bilateral: UT, LS, SO region, rhomboids, pectoralis major and minor with all R>L,                    Right SCM,     Passive Mobility-Joint Integrity: Hypomobile.    Cervical Special Tests    Cervical Special Tests Right Left UE Nerve Mobility Right Left   Cervical compression pain  Median nerve     Cervical distraction pain  Ulnar nerve     Spurling s test   Radial nerve     Shoulder abduction sign   Thoracic outlet     Deep neck flexor endurance test   Reyes     Upper cervical rotation   Adson s     Sharper-Stalin   Cervical rotation lateral flexion     Alar ligament test   Other:     Other:   Other:       UE Screen: unable to screen right UE due to torn RC.  Left UE is weak, but did nto reproduce any neck symptoms                      Left shoulder AROM is within functional limits for all    Exercises:  Exercise #1: Cervical AROM: flexion, extension and rotation   Hold 10-15 seconds X 1-3 reps  Comment #1: chin tuck  10 seconds x 10 reps  Exercise #2: scapular retraction   10 seconds X 10 reps    Treatment Today     TREATMENT MINUTES COMMENTS   Evaluation 25 Cervical/thoracic   Self-care/ Home management     Manual therapy 15 Pt supine with bolster  under knees;  MFR bilateral SO region, UT, LS, cervical extensors,  right SCM,--all very light pressure layers I only  Gentle manual cervical traction   Neuromuscular Re-education     Therapeutic Activity     Therapeutic Exercises 15 Edu on DX, POC, see above or flow sheet for start of HEP   Gait training     Modality__________________                Total 55    Blank areas are intentional and mean the treatment did not include these items.     PT Evaluation Code: (Please list factors)  Patient History/Comorbidities: HTN, osteoporosis  Examination: decrease cervical AROM, decrease UE and cervical strength, decrease functional activities-see above  Clinical Presentation: stable  Clinical Decision Making: low    Patient History/  Comorbidities Examination  (body structures and functions, activity limitations, and/or participation restrictions) Clinical Presentation Clinical Decision Making (Complexity)   No documented Comorbidities or personal factors 1-2 Elements Stable and/or uncomplicated Low   1-2 documented comorbidities or personal factor 3 Elements Evolving clinical presentation with changing characteristics Moderate   3-4 documented comorbidities or personal factors 4 or more Unstable and unpredictable High              Sheri Tatum, PT  5/7/2018  11:58 AM

## 2021-06-17 NOTE — TELEPHONE ENCOUNTER
RN cannot approve Refill Request    RN can NOT refill this medication   Patient request early refill. Medication last filled 8/6/20 for qty 90 refill 3. Provider to advise on request. . Last office visit: 12/31/2020 Zack Woody MD Last Physical: 4/1/2021 Last MTM visit: Visit date not found Last visit same specialty: 12/31/2020 Zack Woody MD.  Next visit within 3 mo: Visit date not found  Next physical within 3 mo: Visit date not found      Aleksey Waldrop, [facility] Connection Triage/Med Refill 5/24/2021    Requested Prescriptions   Pending Prescriptions Disp Refills     atenoloL (TENORMIN) 25 MG tablet [Pharmacy Med Name: ATENOLOL  25MG  TAB] 90 tablet 3     Sig: TAKE 1 TABLET BY MOUTH  DAILY       Beta-Blockers Refill Protocol Passed - 5/23/2021  7:25 PM        Passed - PCP or prescribing provider visit in past 12 months or next 3 months     Last office visit with prescriber/PCP: 12/31/2020 Zack Woody MD OR same dept: 12/31/2020 Zack Woody MD OR same specialty: 12/31/2020 Zack Woody MD  Last physical: 4/1/2021 Last MTM visit: Visit date not found   Next visit within 3 mo: Visit date not found  Next physical within 3 mo: Visit date not found  Prescriber OR PCP: Zack Woody MD  Last diagnosis associated with med order: 1. Essential hypertension with goal blood pressure less than 130/80  - atenoloL (TENORMIN) 25 MG tablet [Pharmacy Med Name: ATENOLOL  25MG  TAB]; TAKE 1 TABLET BY MOUTH  DAILY  Dispense: 90 tablet; Refill: 3    If protocol passes may refill for 12 months if within 3 months of last provider visit (or a total of 15 months).             Passed - Blood pressure filed in past 12 months     BP Readings from Last 1 Encounters:   05/04/21 140/80

## 2021-06-17 NOTE — TELEPHONE ENCOUNTER
RN cannot approve Refill Request    RN can NOT refill this medication RX was sent on 08/06/2020 for a 1 year supply. Should have 1 refill on file.. Last office visit: 12/31/2020 Zack Woody MD Last Physical: 4/1/2021 Last MTM visit: Visit date not found Last visit same specialty: 12/31/2020 Zack Woody MD.  Next visit within 3 mo: Visit date not found  Next physical within 3 mo: Visit date not found      Elvira Edward, [facility] Connection Triage/Med Refill 5/6/2021    Requested Prescriptions   Pending Prescriptions Disp Refills     atenoloL (TENORMIN) 25 MG tablet [Pharmacy Med Name: ATENOLOL  25MG  TAB] 90 tablet 3     Sig: TAKE 1 TABLET BY MOUTH  DAILY       Beta-Blockers Refill Protocol Passed - 5/6/2021  3:47 AM        Passed - PCP or prescribing provider visit in past 12 months or next 3 months     Last office visit with prescriber/PCP: 12/31/2020 Zack Woody MD OR same dept: 12/31/2020 Zack Woody MD OR same specialty: 12/31/2020 Zack Woody MD  Last physical: 4/1/2021 Last MTM visit: Visit date not found   Next visit within 3 mo: Visit date not found  Next physical within 3 mo: Visit date not found  Prescriber OR PCP: Zack Woody MD  Last diagnosis associated with med order: 1. Essential hypertension with goal blood pressure less than 130/80  - atenoloL (TENORMIN) 25 MG tablet [Pharmacy Med Name: ATENOLOL  25MG  TAB]; TAKE 1 TABLET BY MOUTH  DAILY  Dispense: 90 tablet; Refill: 3    If protocol passes may refill for 12 months if within 3 months of last provider visit (or a total of 15 months).             Passed - Blood pressure filed in past 12 months     BP Readings from Last 1 Encounters:   05/04/21 140/80

## 2021-06-17 NOTE — PROGRESS NOTES
ASSESSMENT: Bibiana Mcrae is a 73 y.o. female with past medical history significant for vitamin D deficiency, hypertension, anxiety, osteoporosis, heart palpitations who presents today for new patient evaluation of a 2 week history of right upper cervical pain without radicular symptoms.  X-ray of the cervical spine shows advanced multilevel cervical spondylosis.  I believe that the patient's pain is related to a flareup of cervical facet arthropathy.  Patient also has secondary myofascial pain.  She is neurologically intact.    NDI:  56  Who 5: 5    PLAN:  A shared decision making model was used.  The patient's values and choices were respected.  The following represents what was discussed and decided upon by the physician assistant and the patient.      1.  DIAGNOSTIC TESTS: I reviewed the x-ray cervical spine.  No further diagnostic tests were ordered as it would not change our plans at this time.  Patient is neurologically intact and does not have any red flags.  If pain fails to improve with conservative treatment, recommend obtaining a CT cervical spine.  Patient cannot have an MRI because she has some steel in her eye, I believe    2.  PHYSICAL THERAPY:  Discussed the importance of core strengthening, ROM, stretching exercises with the patient and how each of these entities is important in decreasing pain.  Explained to the patient that the purpose of physical therapy is to teach the patient a home exercise program.  These exercises need to be performed every day in order to decrease pain and prevent future occurrences of pain.   I placed an order for the patient begin physical therapy.  The patient's daughter requested that she be seen by Annette.    3.  MEDICATIONS: No changes are made to the patient's medications.  She was prescribed Tylenol 3 by her primary care provider.  If patient needs additional refills of Tylenol 3, I recommend that she request these from her primary care provider.  If her primary  care provider is not comfortable providing a refill, I would provide a one-time telephone refill for Tylenol No. 3 one tab every 6 hours as needed, #30 with no refills.  -Patient is also using a full-strength aspirin for pain.    4.  INTERVENTIONS: No interventions were ordered.  Patient may benefit from interventional pain management she feels improved with conservative treatment, depending on the results of advanced imaging studies.    5.  PATIENT EDUCATION: I recommended that the patient avoid heavy lifting and excessive strain on her neck.  Recommended that she continue using ice as needed.  -The patient is in agreement the above plan.  All questions were answered.    6.  FOLLOW-UP:   I will see the patient back in the clinic in about 4 weeks.  If she has any questions or concerns in the meantime, she should not hesitate contact our clinic.        SUBJECTIVE:  Bibiana Mcrae  Is a 73 y.o. female who presents today in consultation at the request of Ruth Ann Madera CNP for new patient evaluation of neck pain.  Patient states that her pain began about 2 weeks ago.  Her toilet flooded and she was plunging the toilet aggressively.  She then had to carry heavy wet rugs down the stairs to clean them.  Later that night, she had difficulty sleeping because of pain in her neck.  It became more severe and she went to her primary care clinic on April 16, 2018.  An x-ray of the cervical spine was obtained and she was prescribed Tylenol 3.  She is referred to our clinic for further evaluation and treatment.  She states that since she was seen at her primary care clinic her pain has improved.  However, she continues to have a great deal of pain.  The patient states that she has not had any previous history of neck pain.    The patient complains of pain localized to the right upper cervical region, toward the base of the skull.  She describes the pain as a sharp pain.  She states that she hears cracking in her neck.  The  cracking in the neck was actually present before the pain began.  She denies any radiation of the pain into the shoulder or down the arm.  She does have occasional headache associated with the neck pain.  Her pain is aggravated with moving her neck, especially turning into the left.  The patient's daughter notes that her range of motion was very restricted with rotation to the left.  Her pain is alleviated with rest and ice.  The patient denies any numbness, tingling, or weakness down the arms.  She denies any loss of bowel or bladder control.  She admits to some difficulty with balance over the past several weeks.  She denies any falls.  She denies any recent fevers, chills, or sweats.    The patient has not had any previous treatments for her neck pain.  No physical therapy.  No chiropractor.  No spine surgery or spine injections.  The patient has been using Tylenol 3.  This has been helpful, especially with sleeping at night.  She is also taking a full-strength aspirin for pain.    Current Outpatient Prescriptions on File Prior to Encounter   Medication Sig Dispense Refill     acarbose (PRECOSE) 25 MG tablet Take 1 tablet (25 mg total) by mouth daily. With breakfast 90 tablet 1     acetaminophen-codeine (TYLENOL #3) 300-30 mg per tablet Take 1 tablet by mouth every 6 (six) hours as needed for pain. 30 tablet 0     atenolol (TENORMIN) 25 MG tablet Take 1 tablet (25 mg total) by mouth daily. 90 tablet 3     BIOTIN ORAL Take 5,000 mcg by mouth 3 (three) times a day.       blood glucose control high,low (ACCU-CHEK JB CONTROL SOLN) Soln Meter check once ever 3 months 1 each 0     blood glucose test (ACCU-CHEK JB PLUS TEST STRP) strips 1 test strip 3x day 300 strip 3     calcium-vitamin D (CALCIUM-VITAMIN D) 500 mg(1,250mg) -200 unit per tablet Take 2 tablets by mouth 2 (two) times a day with meals.       celecoxib (CELEBREX) 200 MG capsule Take 1 capsule (200 mg total) by mouth daily. 90 capsule 3     conjugated  estrogens (PREMARIN) vaginal cream Insert 2 g into the vagina 3 (three) times a week.        denosumab 60 mg/mL Syrg Inject 60 mg under the skin once.       diphenhydrAMINE (BENADRYL) 25 mg tablet Take 50 mg by mouth at bedtime.       generic lancets (ACCU-CHEK FASTCLIX) Use 1 each As Directed 3 (three) times a day. Dispense brand per patient's insurance at pharmacy discretion. 300 each 3     generic lancets (ACCU-CHEK MULTICLIX LANCET) Dispense brand per patient's insurance at pharmacy discretion. 300 each 3     ketoconazole (NIZORAL) 2 % shampoo Apply 1 application topically as needed. Apply to damp skin, lather, leave on 5 minutes, and rinse        LANCETS (ACCU-CHEK MULTICLIX LANCET MISC) Use As Directed. Testing 3x a day       LORazepam (ATIVAN) 0.5 MG tablet Take 0.25-0.5 mg by mouth every 8 (eight) hours as needed for anxiety.       multivitamin with minerals (THERA-M) 9 mg iron-400 mcg Tab tablet Take 1 tablet by mouth daily.       nortriptyline (PAMELOR) 50 MG capsule Take 1 capsule (50 mg total) by mouth at bedtime. 90 capsule 3       Allergies   Allergen Reactions     Prochlorperazine Edisylate Other (See Comments)     Extrapyramidal side effects       Past Medical History:   Diagnosis Date     Anxiety      Breast cyst         Patient Active Problem List   Diagnosis     Vitamin D Deficiency     Hypertension     Lump Or Mass In Breast     Postmenopausal Atrophic Vaginitis     Lower Back Pain     Anxiety     Osteoporosis, senile     Peptic ulcer     Osteoarthritis of thumb, left     Palpitations     Heart palpitations     Shortness of breath     Hypoglycemia       Past Surgical History:   Procedure Laterality Date     BREAST CYST ASPIRATION Bilateral     Rt 2013     Llt  ??     HYSTERECTOMY      age 40     OOPHORECTOMY Bilateral     age 40     NV REMOVAL OF OVARY(S)      Description: Oophorectomy;  Recorded: 12/17/2009;     NV TOTAL ABDOM HYSTERECTOMY      Description: Total Abdominal Hysterectomy;   Recorded: 12/17/2009;       Family History   Problem Relation Age of Onset     Breast cancer Mother 52     Cancer Maternal Grandfather 71     prostate     Prostate cancer Maternal Grandfather    Daughter has diabetes      Social history: The patient is .  She is at home.  She is retired from bank.  He denies tobacco.    ROS: Positive for irregular heartbeat, anxiety, poor sleep quality, back pain, joint pain, headache.  Specifically negative for dysphagia, imbalance, fine motor skill difficulties, bowel/bladder dysfunction, fevers,chills, appetite changes, unexplained weight loss.   Otherwise 13 systems reviewed are negative.  Please see the patient's intake questionnaire from today for details.      OBJECTIVE:  PHYSICAL EXAMINATION:  CONSTITUTIONAL:  Vital signs as above.  No acute distress.  The patient is well nourished and well groomed.  PSYCHIATRIC:  The patient is awake, alert, oriented to person, place, time and answering questions appropriately with clear speech.    HEENT:  Normocephalic, atraumatic.  Sclera clear.    SKIN:  Skin over the face, bilateral upper extremities, and neck is clean, dry, intact without rashes.  LYMPH NODES:  No palpable or tender anterior/posterior cervical, submandibular, or supraclavicular lymph nodes.    MUSCLE STRENGTH:  5/5 strength for the bilateral shoulder abductors, elbow flexors/extensors, wrist extensors, finger flexors/abductors.  NEURO:  CN III-XII are grossly intact.  2/4 symmetric biceps, brachioradialis, triceps reflexes bilaterally.  Sensation to light touch is intact over bilateral upper extremities throughout.  Negative Fuentes's bilaterally.    VASCULAR:  2/4 radial pulses bilaterally.  Warm upper limbs bilaterally.  Capillary refill in the upper extremities is less than 1 second.  MUSCULOSKELETAL: Tender to palpation over the right upper cervical facets, at C1-2 and C2-3. Hypertonicity of the right upper cervical paraspinals.  No significant tenderness  palpation of the greater occipital nerve.  Cervical range of motion is intact with extension, mildly restricted with extension, severely restricted with lateral rotation to the left and moderately restricted with lateral rotation to the right.  Patient had a positive Kemps test on the right.    RESULTS:    XR CERVICAL SPINE 2 - 3 VWS  4/16/2018 2:40 PM     INDICATION: Neck pain for 3 days  COMPARISON: None.     FINDINGS: Mild cervical dextrocurvature and cervicothoracic levocurvature. No definite radiographic evidence for fracture or significant subluxation. Advanced multilevel cervical spondylosis including advanced loss of disc height with prominent   endplate/uncovertebral spurring from C3 through C7. Mild cervical facet arthropathy. Prevertebral soft tissues within normal limits for thickness. Included lung apices are clear.

## 2021-06-17 NOTE — PROGRESS NOTES
1. Osteoporosis, senile  Celiac(Gluten)Antibody Panel     The following steps were completed to comply with the REMS program for Prolia:    Reviewed information in the Medication Guide and Patient Counseling Chart, including the serious risks of Prolia  and the symptoms of each risk.    Advised patient to seek prompt medical attention if they have signs or symptoms of any of the serious risks.    Provided each patient a copy of the Medication Guide and Patient Brochure    Return in about 6 months (around 11/4/2021) for Prolia injection.    Patient Instructions   Prolia 11th today.  Prolia 12th in 6 months with my nurse. I will see you in 1 year.    DXA in 2 years .   Phone number to schedule [phone].    Daily calcium need is 0327-7107 mg a day from the diet and supplements.  Calcium citrate is easier to digest.  Vitamin D 2000 IU daily recommended.      Chief Complaint   Patient presents with     Osteoporosis Follow Up     Osteo F/U       /80   Pulse 72   Wt 105 lb 11.2 oz (47.9 kg)   LMP 11/16/1985   SpO2 99%   BMI 22.28 kg/m        Did you experience any problems with previous Prolia injection? no  Any medication change in the last 6 months? no  Did you take prednisone or other immunosupressant drugs in the last 6 months   (chemo, transplant, rheum, dermatology conditions)? no  Did you have any serious infection in the last 6 months?no  Any recent hospitalizations?no  Do you plan any dental work in the next 2-3 months?no  How much calcium do you take daily from the diet and supplements?1200 mg  How much vit D do you take daily? 2000 IU  Last DXA?Done today,  Reviewed and discussed      Patient is here today for the 11th Prolia injection. Patient tolerated previous injections well.   We discussed calcium and vit D daily needs today.   I reviewed the lab results:  Component      Latest Ref Rng & Units 10/20/2020   Sodium      136 - 145 mmol/L 139   Potassium      3.5 - 5.0 mmol/L 4.4   Chloride       98 - 107 mmol/L 101   CO2      22 - 31 mmol/L 30   Anion Gap, Calculation      5 - 18 mmol/L 8   Glucose      70 - 125 mg/dL 96   Calcium      8.5 - 10.5 mg/dL 9.4   BUN      8 - 28 mg/dL 17   Creatinine      0.60 - 1.10 mg/dL 0.78   GFR MDRD Af Amer      >60 mL/min/1.73m2 >60   GFR MDRD Non Af Amer      >60 mL/min/1.73m2 >60   Vitamin D, Total (25-Hydroxy)      30.0 - 80.0 ng/mL 41.0       ROS: complete ROS negative.  Physical exam:  Constitutional:  oriented to person, place, and time, appears well-nourished. No distress.     I would like to check celiac panel since spine bone density is very low and did not improve as expected.    Next Prolia injection will be in 6 months.     Patient was educated on safety of Prolia utilizing Patient Counseling Chart for Healthcare Providers, as outlined by the Prolia REMS progam.         This note has been dictated using voice recognition software. Any grammatical or context distortions are unintentional and inherent to the software      Patient Active Problem List   Diagnosis     Essential hypertension with goal blood pressure less than 130/80     Postmenopausal Atrophic Vaginitis     Anxiety     Osteoporosis, senile     Bilateral sensorineural hearing loss     Impaired fasting glucose     Diverticular disease of large intestine     First degree hemorrhoids       Current Outpatient Medications on File Prior to Visit   Medication Sig Dispense Refill     acarbose (PRECOSE) 25 MG tablet TAKE 1 TABLET BY MOUTH 3  TIMES A DAY WITH MEALS 270 tablet 1     atenoloL (TENORMIN) 25 MG tablet Take 1 tablet (25 mg total) by mouth daily. 90 tablet 3     BIOTIN ORAL Take 5,000 mcg by mouth 2 (two) times a day.        calcium-vitamin D (CALCIUM-VITAMIN D) 500 mg(1,250mg) -200 unit per tablet Take 2 tablets by mouth 2 (two) times a day with meals.       celecoxib (CELEBREX) 200 MG capsule TAKE ONE CAPSULE BY MOUTH  DAILY 90 capsule 3     denosumab 60 mg/mL Syrg Inject 60 mg under the skin  once.       LORazepam (ATIVAN) 0.5 MG tablet TAKE 1 TABLET BY MOUTH  EVERY 6 HOURS AS NEEDED FOR ANXIETY 180 tablet 1     multivitamin with minerals (THERA-M) 9 mg iron-400 mcg Tab tablet Take 1 tablet by mouth daily.       nortriptyline (PAMELOR) 50 MG capsule TAKE 1 CAPSULE BY MOUTH AT  BEDTIME 90 capsule 3     omeprazole (PRILOSEC) 20 MG capsule Take 20 mg by mouth daily before breakfast.       Current Facility-Administered Medications on File Prior to Visit   Medication Dose Route Frequency Provider Last Rate Last Admin     [START ON 5/11/2021] denosumab 60 mg (PROLIA 60 mg/ml)  60 mg Subcutaneous Q6 Months Gustavo Tran MD
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
## 2021-06-17 NOTE — PROGRESS NOTES
"Nutrition Assessment    Bibiana Mcrae is a 72 yo female seen for out patient MNT inital assessment. Her daughter, Rosa Isela, is present for this appointment. Pt came prepared with a food diary of the past 2 months as well as blood sugar logs for the same time period. Her blood sugars are lower in the am (~80) and higher at night, ranging from 113-248. It is immediately apparent to me that the pt consumes almost nothing by fruit in the evening (~3-4 servings from 8pm-10 pm) with maybe 2 oz nuts/seeds during this time.    Her acarbose was recently increased to 25 mg BID. Prior to starting acarbose she was symptomatically hypoglycemic (slurring, weak, shaky, confused) in the morning and after working out. 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    Pt and dtr would like to know if pt could possibly come off meds if her diet improves. RD told pt and dtr that MD and pt must make that decision.    Referring diagnosis: Other non-diabetic hypoglycemia    Height:  4'9\"  Most recent weight: 104 lbs  BMI: 22  BMI indication: 18.5-24.9 normal weight    Nutrition intervention that addressed medical nutrition therapy for above diagnosis: consistent meal times, consistent CHO intake across all meals    RD reviewed other: pairing CHO with protein/fat at meals, complex CHO from whole foods.      Lifestyle changes made prior to nutrition session: Initiation of acarbose. Pt's daughter is diabetic and pt has attended many diabetic diet education appointments with her. Pt also checking BG TID    Assessment of diet/weight history: Pt is an extremely picky eater and will not eat most condiments, fish, beans except in chili, dairy products, protein/granola bars, avocado, olives, and certain food combinations (i.e. Peanut butter and apples).   Does not drink caffeine or alcohol.    Breakfast (10 am) -- Whole grain toast, 1 poached egg, OJ  Lunch--- 1/2 [employer] sandwich most days (only eats meat, does not eat bun), sometimes a " dessert  Snack: air-popped popcorn  Dinner (8-10pm): fruit, vegetables, ~2 oz sunflower seeds and/or nuts    Assessment of physical activity: [employer] treadmill 3-4 days/week    Goals:  1.) Get in both CHO and protein/fat with each meal/snack. We drafted ~8 options in the appointment today.  2.) Split meals into 4-6 small meals/large snacks per day (~2 CHO choices). May be helpful to have set snack times.  3.) Eat something before and after exercising to prevent hypoglycemic episodes.    Patient had no barriers to learning, verbalized understanding, and compliance is expected.    Phone number provided for questions. Recommended follow-up in  as needed.    Thank you for this consult. Call me at [phone] for questions.

## 2021-06-17 NOTE — PATIENT INSTRUCTIONS - HE
Prolia 11th today.  Prolia 12th in 6 months with my nurse. I will see you in 1 year.    DXA in 2 years .   Phone number to schedule [phone].    Daily calcium need is 7146-5209 mg a day from the diet and supplements.  Calcium citrate is easier to digest.  Vitamin D 2000 IU daily recommended.

## 2021-06-17 NOTE — PROGRESS NOTES
"[facility] Daily Progress Note    Patient Name: Bibiana Mcrae  Date of evaluation: 5/7/2018  Today's Date: 5/10/2018   Visit Number: 2/12  Referral Diagnosis: Facet arthropathy, cervical  Referring provider: Lay Downs PA-C  Visit Diagnosis:     ICD-10-CM    1. Cervical pain M54.2    2. Generalized muscle weakness M62.81    3. Poor posture R29.3        Assessment:       Patient is gradually improving since onset a few weeks ago. She is having less sharp pains overall, but still has them. Continues to benefit from further PT.    Goals:  Pt. will be independent with home exercise program in : 12 weeks  Pt will: Able to turn head to the left with more ease for driving or conversation within 12 weeks  Pt will: Able to sleep through the night without waking due to pain within 12 weeks  Pt will: Able to reach out with either UE and not have any increase in neck pain within 12 weeks  Pt will: Able to carry objects, > 8#, such as laundry, with pain 0-2/10 within 12 weeks     Plan / Patient Instructions:      Plan for next visit: Add: pectoralis stretch (if comfortable on right shoulder), midback stretch                               MFR and gentle cervical traction                               Edu on posture         Try prone scap retraction ex as able    Note: patient has known rotator cuff tear on right shoulder and feels like it's sore at times     Subjective:       Patient states the sharp shooting pains are less frequent now, is starting to improve.   Pain: \"a little tender\" 5-6/10, not shooting today.       Objective:       Slightly more sore after working on MT today.   States she is sleeping pretty well lately.   Tender to light pressure on right sub occipitals, myofascial restrictions on L UT/Levator > R.    Date: 5/7/18     *Indicate scale AROM AROM AROM   Cervical Flexion 23     Cervical Extension 64      Right Left Right Left Right Left   Cervical Sidebending 8, pain 12, tender     "   Cervical Rotation 42, pain 30, most pain       Cervical Protraction      Cervical Retraction      Thoracic Flexion      Thoracic Extension      Thoracic Sidebending         Thoracic Rotation           Exercises:  Exercise #1: Cervical AROM: flexion, extension and rotation  verbal reives  Comment #1: chin tuck watched form  Exercise #2: scapular retraction watche form, mild cues    Treatment Today     TREATMENT MINUTES COMMENTS   Evaluation     Self-care/ Home management     Manual therapy 18 Pt supine with bolster under knees;  MFR bilateral SO region, UT, LS, cervical extensors,  --all very light pressure layers I-II only  Gentle manual cervical traction   Neuromuscular Re-education     Therapeutic Activity     Therapeutic Exercises 8 Reviewed her HEP, patient doing well so far   Gait training     Modality__________________                Total 26    Blank areas are intentional and mean the treatment did not include these items.        Nella Starks, PT  5/10/2018

## 2021-06-18 NOTE — PROGRESS NOTES
Assessment:   Bibiana Mcrae is a 73 y.o. y.o. female with past medical history significant for vitamin D deficiency, hypertension, anxiety, osteoporosis, heart palpitations who presents today for follow-up regarding a 6 week history of right upper cervical pain without radicular symptoms.  X-ray cervical spine shows advanced multilevel cervical spondylosis.  I believe patient's pain is related to flareup of cervical facet arthropathy.  She has secondary myofascial pain.  She has had 60% improvement in her pain with therapy over the past 4 weeks. She remains neurologically intact.       Plan:     A shared decision making plan was used.  The patient's values and choices were respected.  The following represents what was discussed and decided upon by the physician assistant and the patient.      1.  DIAGNOSTIC TESTS: I reviewed the cervical spine.  I offered to obtain a CT cervical spine, but the patient declined.  If pain fails to improve further, or if it flares up again, I would recommend a CT cervical spine.  She cannot have an MRI because she has some steel in her eye.    2.  PHYSICAL THERAPY: The patient is currently in physical therapy.  She has had 5 symptoms of March.  She is doing her home exercises twice daily.  I recommended that she continue with the home exercises.    3.  MEDICATIONS: No changes are made to the patient's medications.  She uses Tylenol 3 as needed.  She uses aspirin as needed.  She uses Celebrex 200 mg twice daily.  Uses lorazepam 2 tabs twice daily.    4.  INTERVENTIONS: No interventions were ordered.  If pain fails to improve further, or if her pain flares up, she may benefit from interventional pain management, depending on the results of the CT scan.    5.  PATIENT EDUCATION: The patient is in agreement with the above plan.  All questions were answered.    6.  FOLLOW-UP: I will see the patient back in clinic in 4 weeks.  If she has any questions or concerns in the meantime, she  should not hesitate to contact our clinic.    Subjective:     Bibiana Mcrae is a 73 y.o. female who presents today for follow-up regarding a 6 week history of right neck pain.  I saw the patient in consultation on April 27, 2018.  At that time I referred her to physical therapy.  The patient has had 5 sessions of physical therapy.  She reports 60% improvement in her symptoms.    The patient states that she has occasional zinging pain in the right upper neck.  She states that this occurs several times per week.  It only lasts for a few seconds.  It resolved on its own.  Turning her neck seems to bring on the zinging pain.  It is alleviated with keeping her neck in neutral position.  She denies any pain radiating into the shoulders or down the arms.  She denies any numbness, tingling, weakness down the arms.  She does have daily headaches.  She does not think that they are related to her neck, although her daughter feels that they could be related.  She rates her pain today as a 5-10.  At its best it is a 4-10.  At its worst it is a 10 out of 10.    As mentioned above, the patient had 5 sessions of physical therapy.  She is doing her home exercises twice per day every day.  She is a Tylenol 3 and aspirin as needed.  She uses Celebrex 200 mg daily.  She also has Lorazepam 2 tabs daily.    Medical history is reviewed and is unchanged in the interim.    Family history is reviewed and is unchanged in the interim.    Review of Systems:  Positive for headache, nausea/vomiting.  Negative for numbness/tingling, loss of bowel/bladder control, footdrop, weakness, dizziness, blurry vision, balance changes.     Objective:   CONSTITUTIONAL:  Vital signs as above.  No acute distress.  The patient is well nourished and well groomed.    PSYCHIATRIC:  The patient is awake, alert, oriented to person, place and time.  The patient is answering questions appropriately with clear speech.  Normal affect.  HEENT: Normocephalic,  atraumatic.  Sclera clear.  Neck is supple.  SKIN:  Skin over the face, neck bilateral upper extremities is clean, dry, intact without rashes.  MUSCULOSKELETAL: The patient has 5/5 strength for the bilateral shoulder abductors, elbow flexors/extensors, wrist extensors, finger flexors/abductors.  Tender to palpation over the right upper cervical facets, at the C2-3 region, and extending into the occipital area.  NEUROLOGICAL:   Sensation to light touch is intact over bilateral upper extremities throughout.    RESULTS:    XR CERVICAL SPINE 2 - 3 VWS  4/16/2018 2:40 PM      INDICATION: Neck pain for 3 days  COMPARISON: None.      FINDINGS: Mild cervical dextrocurvature and cervicothoracic levocurvature. No definite radiographic evidence for fracture or significant subluxation. Advanced multilevel cervical spondylosis including advanced loss of disc height with prominent   endplate/uncovertebral spurring from C3 through C7. Mild cervical facet arthropathy. Prevertebral soft tissues within normal limits for thickness. Included lung apices are clear.

## 2021-06-18 NOTE — PROGRESS NOTES
[facility] Daily Progress Note    Patient Name: Bibiana Mcrae  Date of evaluation: 5/7/2018  Today's Date: 5/23/2018   Visit Number: 5/12  Referral Diagnosis: Facet arthropathy, cervical  Referring provider: Lay Downs PA-C  Visit Diagnosis:     ICD-10-CM    1. Cervical pain M54.2    2. Generalized muscle weakness M62.81    3. Poor posture R29.3        Assessment:       Patient is gradually improving since onset a few weeks ago. She is having less sharp pains overall, but still has them. Continues to benefit from further PT.     Goals:  Pt. will be independent with home exercise program: PROGRESSING  Pt will: Able to turn head to the left with more ease for driving or conversation: PROGRESSING  Pt will: Able to sleep through the night without waking due to pain: MET  Pt will: Able to reach out with either UE and not have any increase in neck pain: PROGRESSING, but still bothers neck a little.  Pt will: Able to carry objects, > 8#, such as laundry, with pain 0-2/10: MET, able to do most of her normal lifting without neck pain.     Plan / Patient Instructions:      Plan for next visit: continue with MT as indicated.    Note: patient has known rotator cuff tear on right shoulder and feels like it's sore at times     Subjective:        Patient is reporting 60-70% improvement overall.   She does still get zingers periodically, but much less often, not even every day any longer.   Still going to the gym regularly.     Objective:       Tender to light pressure on right sub occipitals, myofascial restrictions improving and overall, is improving in tenderness    Date: 5/7/18 5/14/2018 5/23/2018   *Indicate scale AROM AROM AROM   Cervical Flexion 23 50 (chin to chest) 50 (chin to chest)   Cervical Extension 64 60 65, full ROM    Right Left Right Left Right Left   Cervical Sidebending 8, pain 12, tender 10, pulling on the left 10, pulling on the right 12 12   Cervical Rotation 42, pain 30, most pain 60,  "\"tender but not bad\" 40, more pain than going to the right 60 50     Exercises:  Exercise #1: Cervical AROM: flexion, extension and rotation  verbal reives  Comment #1: chin tuck watched form  Exercise #2: scapular retraction watche form, mild cues    Treatment Today     TREATMENT MINUTES COMMENTS   Evaluation     Self-care/ Home management     Manual therapy 15 Pt supine:  MFR bilateral SO region, UT, LS, cervical extensors,  --all very light pressure layers I-II only   Neuromuscular Re-education     Therapeutic Activity     Therapeutic Exercises 10 Time included for reassessment of ROM and reiterating keeping up with CA routines and CROM/scap motions.   Gait training     Modality__________________                Total 25    Blank areas are intentional and mean the treatment did not include these items.        Nella Starks, DPT, CLT  5/23/2018               "

## 2021-06-18 NOTE — PROGRESS NOTES
Assessment:   Bibiana Mcrae is a 73 y.o. y.o. female with past medical history significant for vitamin D deficiency, hypertension, anxiety, osteoporosis, heart palpitations who presents today for follow-up regarding a 10 week history of right upper cervical pain without radicular symptoms.  X-ray cervical spine shows advanced multilevel cervical spondylosis.  I believe patient's pain is related to flareup of cervical facet arthropathy.  She is secondary myofascial pain.  She has had 70-75% improvement in her pain with physical therapy.       Plan:     A shared decision making plan was used.  The patient's values and choices were respected.  The following represents what was discussed and decided upon by the physician assistant and the patient.      1.  DIAGNOSTIC TESTS: I reviewed the x-ray cervical spine.  I did order a CT cervical spine for further evaluation.  She cannot have an MRI because she has some steel in her eye.    2.  PHYSICAL THERAPY: Patient is currently in physical therapy.  She has had 8 sessions so far.  She has 1 more scheduled.  I encouraged her to continue doing her home exercises.    3.  MEDICATIONS: No changes are made to the patient's medications.  She has Tylenol 3 to use as needed.  She uses aspirin as needed.  She uses Celebrex 200 mg twice daily.  She also uses lorazepam 2 tabs twice daily.    4.  INTERVENTIONS: No interventions were ordered.  We will await the results of the CT cervical spine.  The patient states that with her current pain level, she does not think that she is interested in a injection, but if her pain were to worsen, she would like to pursue this.    5.  PATIENT EDUCATION: Patient is in agreement with the above plan.  All questions were answered.    6.  FOLLOW-UP: A nurse will call patient with the results of the CT cervical spine.  She has any questions or concerns in the meantime, she should not contact our clinic.    Subjective:     Bibiana Mcrae is a 73 y.o.  "female who presents today for follow-up regarding a 10 week history of right neck pain.  I last saw the patient on May 25, 2018.  At that time I recommended the patient continue physical therapy, she was making improvement.  Patient has now had 8 sessions of physical therapy.  She states that she has had 70-75% improvement in her pain.    Patient states that she has ongoing intermittent right neck pain.  Pain is most severe in the upper cervical spine.  She states that she has \"zingers\" of pain which come and go.  She states that the length of time that the pain lasts is getting shorter.  He denies any pain radiating down the arm.  She rates her pain today as a 2 out of 10.  At its best it is a 0-10.  At its worst it is an 8 out of 10.  Patient's pain is aggravated with turning her neck to the left.  He denies any new symptoms since she was last seen.  She has a numb sensation in the same area as her pain in her neck, but she denies any numbness or tingling down the arms.  She denies weakness.    Patient has had 8 sessions of physical therapy.  She does her home exercises.  She uses Tylenol 3 as needed.  She takes aspirin as needed.  She uses Celebrex 200 mg twice daily.  She also uses lorazepam 2 tabs twice daily.    Medical history is reviewed and is unchanged in the interim.    Family history is reviewed and is unchanged in the interim.    Review of Systems:  Positive for numbness/gently, headache.  Negative for loss of bowel/bladder control, footdrop, weakness, dizziness, nausea/vomiting, blurry vision, balance changes.     Objective:   CONSTITUTIONAL:  Vital signs as above.  No acute distress.  The patient is well nourished and well groomed.    PSYCHIATRIC:  The patient is awake, alert, oriented to person, place and time.  The patient is answering questions appropriately with clear speech.  Normal affect.  HEENT: Normocephalic, atraumatic.  Sclera clear.  Neck is supple.  SKIN:  Skin over the face, neck " bilateral upper extremities is clean, dry, intact without rashes.  MUSCULOSKELETAL: The patient has 5/5 strength for the bilateral shoulder abductors, elbow flexors/extensors, wrist extensors, finger flexors/abductors.  Cervical range of motion is moderately restricted lateral rotation left.  Otherwise, cervical range of motion okay with flexion, extension, and lateral rotation right.  Patient has a positive Kemps test on the right.  NEUROLOGICAL:    Sensation to light touch is intact over bilateral upper extremities throughout.    RESULTS:  XR CERVICAL SPINE 2 - 3 VWS  4/16/2018 2:40 PM      INDICATION: Neck pain for 3 days  COMPARISON: None.      FINDINGS: Mild cervical dextrocurvature and cervicothoracic levocurvature. No definite radiographic evidence for fracture or significant subluxation. Advanced multilevel cervical spondylosis including advanced loss of disc height with prominent   endplate/uncovertebral spurring from C3 through C7. Mild cervical facet arthropathy. Prevertebral soft tissues within normal limits for thickness. Included lung apices are clear.

## 2021-06-18 NOTE — PROGRESS NOTES
[facility] Daily Progress Note    Patient Name: Bibiana Mcrae  Date of evaluation: 5/7/2018  Today's Date: 5/17/2018   Visit Number: 4/12  Referral Diagnosis: Facet arthropathy, cervical  Referring provider: Lay Downs PA-C  Visit Diagnosis:     ICD-10-CM    1. Cervical pain M54.2    2. Generalized muscle weakness M62.81    3. Poor posture R29.3        Assessment:       Patient is gradually improving since onset a few weeks ago. She is having less sharp pains overall, but still has them. Continues to benefit from further PT. ROM is much improved already since initial visit.    Goals:  Pt. will be independent with home exercise program in : 12 weeks  Pt will: Able to turn head to the left with more ease for driving or conversation within 12 weeks  Pt will: Able to sleep through the night without waking due to pain within 12 weeks  Pt will: Able to reach out with either UE and not have any increase in neck pain within 12 weeks  Pt will: Able to carry objects, > 8#, such as laundry, with pain 0-2/10 within 12 weeks     Plan / Patient Instructions:      Plan for next visit: Add: pectoralis stretch (if comfortable on right shoulder), midback stretch                               MFR and gentle cervical traction - focus on left rotation next session.                                Edu on posture         Try prone scap retraction ex as able    Note: patient has known rotator cuff tear on right shoulder and feels like it's sore at times     Subjective:       Doing well with her neck. Feeling 70% improved thus far. Less zingers in general. Did have zinger for about a minute while driving last week.      Objective:       Tender to light pressure on right sub occipitals, myofascial restrictions improving and overall, is improving in tenderness    Date: 5/7/18 5/14/2018    *Indicate scale AROM AROM AROM   Cervical Flexion 23 50 (chin to chest)    Cervical Extension 64 60     Right Left Right Left Right  "Left   Cervical Sidebending 8, pain 12, tender 10, pulling on the left 10, pulling on the right     Cervical Rotation 42, pain 30, most pain 60, \"tender but not bad\" 40, more pain than going to the right     Cervical Protraction      Cervical Retraction      Thoracic Flexion      Thoracic Extension      Thoracic Sidebending         Thoracic Rotation           Exercises:  Exercise #1: Cervical AROM: flexion, extension and rotation  verbal reives  Comment #1: chin tuck watched form  Exercise #2: scapular retraction watche form, mild cues    Treatment Today     TREATMENT MINUTES COMMENTS   Evaluation     Self-care/ Home management     Manual therapy 25 Pt supine:  MFR bilateral SO region, UT, LS, cervical extensors,  --all very light pressure layers I-II only  Gentle manual cervical traction   Neuromuscular Re-education     Therapeutic Activity     Therapeutic Exercises     Gait training     Modality__________________                Total 25    Blank areas are intentional and mean the treatment did not include these items.        Nella Starks, DPT, CLT  5/17/2018               "

## 2021-06-18 NOTE — PROGRESS NOTES
[facility] Daily Progress Note    Patient Name: Bibiana Mcrae  Date of evaluation: 5/7/2018  Today's Date: 5/30/2018   Visit Number: 5/12  Referral Diagnosis: Facet arthropathy, cervical  Referring provider: Lay Downs PA-C  Visit Diagnosis:     ICD-10-CM    1. Cervical pain M54.2    2. Generalized muscle weakness M62.81    3. Poor posture R29.3        Assessment:       Patient is doing very well. No issues lately with her neck. We will start to spread visits out to weekly vs. 2 times per week. Patient ok with this plan. Her ROM has improved since eval.   Patient is gradually improving since onset a few weeks ago. She is having less sharp pains overall, but still has them. Continues to benefit from further PT.     Goals:  Pt. will be independent with home exercise program: PROGRESSING  Pt will: Able to turn head to the left with more ease for driving or conversation: PROGRESSING  Pt will: Able to sleep through the night without waking due to pain: MET  Pt will: Able to reach out with either UE and not have any increase in neck pain: PROGRESSING, but still bothers neck a little.  Pt will: Able to carry objects, > 8#, such as laundry, with pain 0-2/10: MET, able to do most of her normal lifting without neck pain.     Plan / Patient Instructions:      Plan for next visit: continue with MT as indicated. Continue 1x/wk for 2-4 more weeks.    Note: patient has known rotator cuff tear on right shoulder and feels like it's sore at times     Subjective:        Patient is reporting 60-70% improvement overall.   She does still get zingers periodically, but much less often, not even every day any longer.   Still going to the gym regularly.     Objective:       Tender to light pressure on right sub occipitals, myofascial restrictions improving and overall, is improving in tenderness    Date: 5/7/18 5/14/2018 5/23/2018   *Indicate scale AROM AROM AROM   Cervical Flexion 23 50 (chin to chest) 50 (chin to  "chest)   Cervical Extension 64 60 65, full ROM    Right Left Right Left Right Left   Cervical Sidebending 8, pain 12, tender 10, pulling on the left 10, pulling on the right 12 12   Cervical Rotation 42, pain 30, most pain 60, \"tender but not bad\" 40, more pain than going to the right 60 50     Exercises:  Exercise #1: Cervical AROM: flexion, extension and rotation  verbal reives  Comment #1: chin tuck watched form  Exercise #2: scapular retraction watche form, mild cues    Treatment Today   5/30/2018   TREATMENT MINUTES COMMENTS   Evaluation     Self-care/ Home management     Manual therapy 23 Pt supine:  MFR bilateral SO region, UT, LS, cervical extensors,  --all very light pressure layers I-II only   Neuromuscular Re-education     Therapeutic Activity     Therapeutic Exercises     Gait training     Modality__________________                Total 23    Blank areas are intentional and mean the treatment did not include these items.        Nella Starks, DPT, CLT  5/30/2018               "

## 2021-06-18 NOTE — PROGRESS NOTES
"[facility] Discharge Summary    Patient Name: Bibiana Mcrae  Date of evaluation: 5/7/2018  Today's Date: 6/27/2018   Visit Number: 9/12  Referral Diagnosis: Facet arthropathy, cervical  Referring provider: Lay Downs PA-C  Visit Diagnosis:     ICD-10-CM    1. Cervical pain M54.2    2. Generalized muscle weakness M62.81    3. Poor posture R29.3        Assessment:       Overall, patient is feeling pretty good with her neck. She isn't having pain on a regular basis. She is 90% improved. She still does experience sharp pains at times. No further PT scheduled at this time.     Goals:  Pt. will be independent with home exercise program: MET  Pt will: Able to turn head to the left with more ease for driving or conversation: IMPROVED, still limited going to the left  Pt will: Able to sleep through the night without waking due to pain: MET  Pt will: Able to reach out with either UE and not have any increase in neck pain: MET, less pain in neck, but still has some residual shoulder pain that hs been there for a long time.  Pt will: Able to carry objects, > 8#, such as laundry, with pain 0-2/10: MET, able to do most of her normal lifting without neck pain.     Plan / Patient Instructions:      DC PT     Subjective:        Patient is reporting she still feels improved overall, getting zingers periodically, but much less often, not even every day any longer and the pain isn't lingering.  Still going to the gym regularly. Still doing her neck stretches.     Objective:       NDI improved from 50% to 22%    Date: 5/7/18 5/14/2018 6/27/2018   *Indicate scale AROM AROM AROM   Cervical Flexion 23 50 (chin to chest) 50 (chin to chest)   Cervical Extension 64 60 65, full ROM    Right Left Right Left Right Left   Cervical Sidebending 8, pain 12, tender 10, pulling on the left 10, pulling on the right 15 15   Cervical Rotation 42, pain 30, most pain 60, \"tender but not bad\" 40, more pain than going to the right 60 45 "     Exercises:  Exercise #1: Cervical AROM: flexion, extension and rotation  verbal reives  Comment #1: chin tuck watched form  Exercise #2: scapular retraction watche form, mild cues    Treatment Today   6/27/2018   TREATMENT MINUTES COMMENTS   Evaluation     Self-care/ Home management     Manual therapy 15 Pt supine:  MFR bilateral SO region, UT, LS, cervical extensors,  Layer 1-3, doing well with heavier pressure in areas compared to initial visit.   Neuromuscular Re-education     Therapeutic Activity     Therapeutic Exercises 10 Time included for remasuring ROM   Gait training     Modality__________________                Total 25    Blank areas are intentional and mean the treatment did not include these items.        Nella Starks, DPT, CLT  6/27/2018

## 2021-06-18 NOTE — PROGRESS NOTES
[facility] Daily Progress Note    Patient Name: Bibiana Mcrae  Date of evaluation: 5/7/2018  Today's Date: 6/7/2018   Visit Number: 7/12  Referral Diagnosis: Facet arthropathy, cervical  Referring provider: Lay Downs PA-C  Visit Diagnosis:     ICD-10-CM    1. Cervical pain M54.2    2. Generalized muscle weakness M62.81    3. Poor posture R29.3        Assessment:       Patient is doing very well. She is getting a few shots of pain that last a few seconds occasionally, but not like they previously had been. Her ROM has improved since eval.     Goals:  Pt. will be independent with home exercise program: PROGRESSING  Pt will: Able to turn head to the left with more ease for driving or conversation: PROGRESSING  Pt will: Able to sleep through the night without waking due to pain: MET  Pt will: Able to reach out with either UE and not have any increase in neck pain: PROGRESSING, but still bothers neck a little.  Pt will: Able to carry objects, > 8#, such as laundry, with pain 0-2/10: MET, able to do most of her normal lifting without neck pain.     Plan / Patient Instructions:      Plan for next visit: continue with MT as indicated.     Note: patient has known rotator cuff tear on right shoulder and feels like it's sore at times     Subjective:        Patient is reporting she still feels improved overall, getting zingers periodically, but much less often, not even every day any longer and the pain isn't lingering.  Still going to the gym regularly. Still doing her neck stretches.     Objective:       Tender to light pressure on right sub occipitals, myofascial restrictions improving and overall, is improving in tenderness    Date: 5/7/18 5/14/2018 5/23/2018   *Indicate scale AROM AROM AROM   Cervical Flexion 23 50 (chin to chest) 50 (chin to chest)   Cervical Extension 64 60 65, full ROM    Right Left Right Left Right Left   Cervical Sidebending 8, pain 12, tender 10, pulling on the left 10, pulling  "on the right 12 12   Cervical Rotation 42, pain 30, most pain 60, \"tender but not bad\" 40, more pain than going to the right 60 50     Exercises:  Exercise #1: Cervical AROM: flexion, extension and rotation  verbal reives  Comment #1: chin tuck watched form  Exercise #2: scapular retraction watche form, mild cues    Treatment Today   6/7/2018   TREATMENT MINUTES COMMENTS   Evaluation     Self-care/ Home management     Manual therapy 23 Pt supine:  MFR bilateral SO region, UT, LS, cervical extensors,  Layer 1-3, doing well with heavier pressure in areas.   Neuromuscular Re-education     Therapeutic Activity     Therapeutic Exercises 4 Reviewed all ex today.    Gait training     Modality__________________                Total 27    Blank areas are intentional and mean the treatment did not include these items.        Nella Starks, DPT, CLT  6/7/2018               "

## 2021-06-18 NOTE — PROGRESS NOTES
[facility] Daily Progress Note    Patient Name: Bibiana Mcrae  Date of evaluation: 5/7/2018  Today's Date: 6/13/2018   Visit Number: 8/12  Referral Diagnosis: Facet arthropathy, cervical  Referring provider: Lay Downs PA-C  Visit Diagnosis:     ICD-10-CM    1. Cervical pain M54.2    2. Generalized muscle weakness M62.81    3. Poor posture R29.3        Assessment:       Patient is doing very well. She is getting a few shots of pain that last a few seconds occasionally, but not like they previously had been. Her ROM has improved since eval.     Goals:  Pt. will be independent with home exercise program: PROGRESSING  Pt will: Able to turn head to the left with more ease for driving or conversation: PROGRESSING  Pt will: Able to sleep through the night without waking due to pain: MET  Pt will: Able to reach out with either UE and not have any increase in neck pain: PROGRESSING, but still bothers neck a little.  Pt will: Able to carry objects, > 8#, such as laundry, with pain 0-2/10: MET, able to do most of her normal lifting without neck pain.     Plan / Patient Instructions:      Plan for next visit: continue with MT as indicated. Patient going to f/u in 1.5-2 weeks, possible DC that day.    Note: patient has known rotator cuff tear on right shoulder and feels like it's sore at times     Subjective:        Patient is reporting she still feels improved overall, getting zingers periodically, but much less often, not even every day any longer and the pain isn't lingering.  Still going to the gym regularly. Still doing her neck stretches.     Objective:         Still feels a little tight to patient going to the left with rotation, but she notes she can check her blind spots better.    Date: 5/7/18 5/14/2018 6/13/2018   *Indicate scale AROM AROM AROM   Cervical Flexion 23 50 (chin to chest) 50 (chin to chest)   Cervical Extension 64 60 65, full ROM    Right Left Right Left Right Left   Cervical  "Sidebending 8, pain 12, tender 10, pulling on the left 10, pulling on the right 15 15   Cervical Rotation 42, pain 30, most pain 60, \"tender but not bad\" 40, more pain than going to the right 63 50     Exercises:  Exercise #1: Cervical AROM: flexion, extension and rotation  verbal reives  Comment #1: chin tuck watched form  Exercise #2: scapular retraction watche form, mild cues    Treatment Today   6/13/2018   TREATMENT MINUTES COMMENTS   Evaluation     Self-care/ Home management     Manual therapy 25 Pt supine:  MFR bilateral SO region, UT, LS, cervical extensors,  Layer 1-3, doing well with heavier pressure in areas compared to initial visit.   Neuromuscular Re-education     Therapeutic Activity     Therapeutic Exercises 5 Time included for remasuring ROM   Gait training     Modality__________________                Total 30    Blank areas are intentional and mean the treatment did not include these items.        Nella Starks, DPT, CLT  6/13/2018               "

## 2021-06-19 NOTE — PROGRESS NOTES
Assessment:   Bibiana Mcrae is a 73 y.o. y.o. female with past medical history significant for vitamin D deficiency, hypertension, anxiety, osteoporosis, heart palpitations who presents today for follow-up regarding a 3 to four-month history of right upper cervical pain without radicular symptoms.  CT cervical spine shows advanced degenerative changes throughout the cervical spine.  I think she is most symptomatic from the advanced right atlantoaxial degenerative change.  She has had 70-75% improvement in her pain with physical therapy, but continues to have significant restriction with lateral rotation to the left.         Plan:     A shared decision making plan was used.  The patient's values and choices were respected.  The following represents what was discussed and decided upon by the physician assistant and the patient.      1.  DIAGNOSTIC TESTS: I reviewed the CT cervical spine and flexion-extension cervical spine x-rays in detail with the patient and her daughter.she cannot have an MRI because she has some steel in her eye.    2.  PHYSICAL THERAPY: Patient completed 9 sessions of physical therapy with improvement.  Encouraged to continue doing her home exercises.    3.  MEDICATIONS: No changes are made to the patient's medications.  Uses Celebrex 200 mg twice daily.  She has Tylenol 3 available which he takes as needed.  She also uses lorazepam twice daily.    4.  INTERVENTIONS: No interventions were ordered.  I told the patient that as long as her pain is at a low level and the pain is intermittent, I would not recommend interventional pain management.  If her pain were to become more severe, we could consider a right C1-C2 facet joint injection.    5.  PATIENT EDUCATION: I told the patient that I would recommend heavy lifting and overhead reaching/work.  Otherwise, I think she do activity as tolerated.    -The patient unfortunately with advanced degenerative change at C1-C2, loss of range of motion is  "difficult.  She should keep doing her range of motion exercises to try to maintain the range of motion that she does have.  I told the patient that sometimes people fuse these joints, but then you lose your lateral rotation range of motion entirely.    6.  FOLLOW-UP: Patient follow-up with me as needed.  If she has any questions or concerns, she should not hesitate to call.    Subjective:     Bibiana Mcrae is a 73 y.o. female who presents today for follow-up regarding right neck pain.  I last saw the patient on June 22, 2018.  At that time I ordered a CT cervical spine.  She returns today to review those results.    Patient denies any significant change in her pain since she was last seen.  She continues to have intermittent pain on the right side of her neck.  The pain is at the craniocervical junction.  She states that she gets \"zingers\" of pain when she turns her neck the wrong way.  The patient states that the pain comes quickly but then resolves.  Previously, the pain would persist.  She denies any pain radiating down the arms.  She denies any numbness or tingling down the arms.  She feels some weakness.  She also has headaches.  She rates her pain today is a 0-10.  At its best it is a 0-10.  At its worst it is a 10 out of 10.  Pain tends to be aggravated with the neck.  She states that she has restriction with lateral rotation to the left.  Pain is alleviated with applying ice.    Patient attended 9 sessions of physical therapy.  She does her home exercises.  Uses Celebrex 200 mg twice daily, lorazepam twice daily, Tylenol 3 as needed, aspirin as needed.    Past medical history is reviewed and is unchanged in the interim.    Family history is reviewed and is unchanged in the interim.      Review of Systems:  Positive for weakness, headache, dizziness.  Negative for numbness/tingling, loss of bowel/bladder control, footdrop, nausea/vomiting, blurry vision, balance changes.       Objective:   CONSTITUTIONAL:  " Vital signs as above.  No acute distress.  The patient is well nourished and well groomed.    PSYCHIATRIC:  The patient is awake, alert, oriented to person, place and time.  The patient is answering questions appropriately with clear speech.  Normal affect.  HEENT: Normocephalic, atraumatic.  Sclera clear.  Neck is supple.  SKIN:  Skin over the face, neck bilateral upper extremities is clean, dry, intact without rashes.  MUSCULOSKELETAL: The patient has 5/5 strength for the bilateral shoulder abductors, elbow flexors/extensors, wrist extensors, finger flexors/abductors.  Tender to palpation right cervical spine at the craniocervical junction.  Range of motion is significantly restricted with lateral rotation to the  left.  More mild restriction with lateral rotation to the right.  NEUROLOGICAL:   Sensation to light touch is intact over bilateral upper extremities throughout.    RESULTS:    XR CERVICAL SPINE FLEXION EXTENSION 2 - 3 VWS  7/9/2018 1:39 PM     INDICATION: Neck pain, C3-C4 subluxation on CT  COMPARISON: CT cervical spine 06/29/2018; plain film cervical spine 04/16/2018.     FINDINGS: Cervical levels seen to C7-T1 on the lateral view below this are obscured by overlying shoulder artifact. Neutral flexion and extension radiographs are obtained. 1.5 mm anterolisthesis C3 on C4 on the neutral view which increases to 2.5 mm on   flexion and reduces to 1 mm on extension. No other subluxation. Marked degenerative disc disease C3-C4 and C6-C7 and mild changes C2-C3 and C7-T1. The prevertebral soft tissues are normal in thickness.    CT cervical spine from [facility] dated June 29, 2018.  This shows 25  extra scoliosis apex C4-5 with 30  cervicothoracic levoscoliosis.  There is a chronic right C7 pedicle fracture.  There is advanced right atlantoaxial degenerative change.  There is moderate right T1-T2 foraminal stenosis.  Moderate to severe left C3-4 and C4-5 foraminal stenoses.  No high-grade spinal canal  stenosis.  Please see report for further details.

## 2021-06-20 NOTE — PROGRESS NOTES
1. Did you experience any problems with previous Prolia injection? no  2. Do you feel sick today?(fever, RS, GI,  issues)? no  3. Any medication changes in the last 6 months? no  4. Did you take prednisone or other immunosuppressant drugs in the last 6 months?(chemo, transplant, rheu, dermatology conditions)? no  5. Did you have any serious infection in the last 6 months? (pancreatitis) no  6. Any recent hospitalizations/ surgeries (especially gastric bypass, thyroid, parathyroid)? no  7. Do you plan any dental work?(especially implants and extractions) in the next 2-3 months? no  8. Did you have any fractures in the last year? no      Bibiana did sign Advanced Beneficiary Notice in clinic.

## 2021-06-20 NOTE — PROGRESS NOTES
Progress Note    Reason for Visit:  Chief Complaint     Osteoporosis; reactive Hypoglycemia          Progress Note:    HPI:     This pleasant 73-year-old female patient is here for follow-up because of postprandial hypoglycemia.  She came to the clinic accompanied by her daughter.      Component      Latest Ref Rng & Units 2/27/2018 6/14/2018 8/31/2018   Sodium      136 - 145 mmol/L 138 140    Anion Gap, Calculation      5 - 18 mmol/L 5 12    Glucose      70 - 125 mg/dL 89 84    Potassium      3.5 - 5.0 mmol/L 4.6 4.3 4.7   BUN      8 - 28 mg/dL 16 29 (H)    Calcium      8.5 - 10.5 mg/dL 8.6 9.0 9.4   Chloride      98 - 107 mmol/L 107 103    Creatinine      0.60 - 1.10 mg/dL 0.69 0.77 0.76   CO2      22 - 31 mmol/L 26 25    GFR MDRD Non Af Amer      >60 mL/min/1.73m2 >60 >60 >60   GFR MDRD Af Amer      >60 mL/min/1.73m2 >60 >60 >60   Vitamin D, Total (25-Hydroxy)      30.0 - 80.0 ng/mL   41.0   Hemoglobin A1c      3.5 - 6.0 % 5.8 5.8        Review of Systems:    Nervous System: No headache, dizziness, fainting or memory loss. No tingling sensation of hand or feet.  Ears: No hearing loss or ringing in the ears  Eyes: No blurring of vision, redness, itching or dryness.  Nose: No nosebleed or loss of smell  Mouth: No mouth sores or loss of taste  Throat: No hoarseness or difficulty swallowing  Neck: No enlarged thyroid or lymph nodes.  Heart: No chest pain, palpitation or irregular heartbeat. No swelling of hands or feet  Lungs: No shortness of breath, cough, night sweats, wheezing or hemoptysis.  Gastrointestinal: No nausea or vomiting, constipation or diarrhea.  No acid reflux, abdominal pain or blood in stools.  Kidney/Bladdr: No polyuria, polydipsia, nocturia or hematuria.  Genital/Sexual: No loss of libido  Skin: No rash, hair loss or hirsutism.  No abnormal striae  Muscles/Joints/Bones: No morning stiffness, muscle aches and pain or loss of height.    Current Medications:  Current Outpatient Prescriptions    Medication Sig     acarbose (PRECOSE) 25 MG tablet Take 1 tablet (25 mg total) by mouth 2 (two) times a day. With breakfast     atenolol (TENORMIN) 25 MG tablet Take 1 tablet (25 mg total) by mouth daily.     BIOTIN ORAL Take 5,000 mcg by mouth 2 (two) times a day.      blood glucose control high,low (ACCU-CHEK JB CONTROL SOLN) Soln Meter check once ever 3 months     blood glucose test (ACCU-CHEK JB PLUS TEST STRP) strips 1 test strip 3x day (Patient taking differently: Test 2 times daily..)     calcium-vitamin D (CALCIUM-VITAMIN D) 500 mg(1,250mg) -200 unit per tablet Take 2 tablets by mouth 2 (two) times a day with meals.     celecoxib (CELEBREX) 200 MG capsule Take 1 capsule (200 mg total) by mouth daily.     conjugated estrogens (PREMARIN) vaginal cream Insert 2 g into the vagina 3 (three) times a week.      denosumab 60 mg/mL Syrg Inject 60 mg under the skin once.     diphenhydrAMINE (BENADRYL) 25 mg tablet Take 50 mg by mouth at bedtime.     generic lancets (ACCU-CHEK FASTCLIX) Use 1 each As Directed 3 (three) times a day. Dispense brand per patient's insurance at pharmacy discretion.     generic lancets (ACCU-CHEK MULTICLIX LANCET) Dispense brand per patient's insurance at pharmacy discretion.     ketoconazole (NIZORAL) 2 % shampoo Apply 1 application topically as needed. Apply to damp skin, lather, leave on 5 minutes, and rinse      LANCETS (ACCU-CHEK MULTICLIX LANCET MISC) Use As Directed. Testing 3x a day     LORazepam (ATIVAN) 0.5 MG tablet Take 0.5-1 tablets (0.25-0.5 mg total) by mouth every 8 (eight) hours as needed for anxiety.     multivitamin with minerals (THERA-M) 9 mg iron-400 mcg Tab tablet Take 1 tablet by mouth daily.     nortriptyline (PAMELOR) 50 MG capsule Take 1 capsule (50 mg total) by mouth at bedtime.       Patients Active Problems:  Patient Active Problem List   Diagnosis     Vitamin D Deficiency     Hypertension     Lump Or Mass In Breast     Postmenopausal Atrophic Vaginitis  "    Lower Back Pain     Anxiety     Osteoporosis, senile     Peptic ulcer     Osteoarthritis of thumb, left     Palpitations     Heart palpitations     Shortness of breath     Hypoglycemia       History:   reports that she has never smoked. She has never used smokeless tobacco. She reports that she does not drink alcohol or use illicit drugs.   reports that she has never smoked. She has never used smokeless tobacco. She reports that she does not drink alcohol or use illicit drugs.  History   Smoking Status     Never Smoker   Smokeless Tobacco     Never Used      reports that she has never smoked. She has never used smokeless tobacco. She reports that she does not drink alcohol or use illicit drugs.  History   Sexual Activity     Sexual activity: Not on file     Past Medical History:   Diagnosis Date     Anxiety      Breast cyst      Family History   Problem Relation Age of Onset     Breast cancer Mother 52     Cancer Maternal Grandfather 71     prostate     Prostate cancer Maternal Grandfather      Past Medical History:   Diagnosis Date     Anxiety      Breast cyst      Past Surgical History:   Procedure Laterality Date     BREAST CYST ASPIRATION Bilateral     Rt 2013     Llt  ??     HYSTERECTOMY      age 40     OOPHORECTOMY Bilateral     age 40     SC REMOVAL OF OVARY(S)      Description: Oophorectomy;  Recorded: 12/17/2009;     SC TOTAL ABDOM HYSTERECTOMY      Description: Total Abdominal Hysterectomy;  Recorded: 12/17/2009;       Vitals   height is 4' 9\" (1.448 m) and weight is 107 lb 3.2 oz (48.6 kg). Her blood pressure is 104/60.         Exam  General appearance: The patient looked well, not in acute distress.  Eyes: no evidence of thyroid eye disease.   Retinal exam: No evidence of diabetic retinopathy.  Mouth and Throat: Normal  Neck: No evidence of thyromegaly, enlarged lymph node or tenderness  Chest: Trachea is central. Chest is clear to auscultation and percussion. Breat sounds are normal.  Cardiovascular " exam: JVP is not raised. Heart sounds are normal, no murmurs or rub  Peripheral pulses are palpable.   Abdomen: No masses or tenderness.    Back: No vertebral tenderness or kyphosis.  Extremities: No evidence of leg edema.   Skin: Normal to touch.  No abnormal striae  Neurologic exam:  Visual fields are intact by confrontation, grossly intact. No evidence of peripheral neuropathy.  Detailed foot exam normal.        Diagnosis:  No diagnosis found.    Orders:   No orders of the defined types were placed in this encounter.        Assessment and Plan: Postprandial hypoglycemia.  I have started the patient on Precose 25 mg twice a day the hypoglycemia has improved she gets it once or twice a week but it is not as severe and it is definitely related to food which she become dramatic with slurring.    I did advise her to check her blood sugar when she is getting these symptoms.  And we will increase that Precose to, 25 mg 3 times a day.    She takes atenolol 25 mg for blood pressure blood pressure is well controlled 104/60.  Calcium 2 tablets twice a day Celebrex Prolia she has taken roughly between 4 and 6 injection this is arranged by Dr. mcarthur at [facility] she is doing well with no side effects.    I reviewed her glucometer it ranges between 87 barely 220.  Her A1c is 5.8.    She had partial gastrectomy back in 79 creatinine is normal at 0.76 vitamin D is 41.  She is taking calcium 2 tablets twice a day will continue as above in 6 months.    I have reviewed and ordered clinical lab test    I have reviewed and ordered radiology tests.    I have reviewed and ordered her medication as required.    I have reviewed her test results and advised with the performing physician.    I have reviewed the patient's old records.    I have reviewed and summarized the patient old records.    I did spend 40 minutes with the patient more than 50% was spent on counseling and managing her care.

## 2021-06-21 NOTE — PROGRESS NOTES
Assessment and Plan:       1. Encounter for general adult medical examination with abnormal findings  Annual wellness visit completed.  Risks associated with hearing loss for which she utilizes hearing aids.  This is remained stable.  Annual wellness visits to continue.  Tetanus booster provided.  Immunizations otherwise up-to-date.  - Td, Preservative Free (green label)    2. Essential hypertension with goal blood pressure less than 130/80  Check basic metabolic panel for med monitoring otherwise continues atenolol 25 mg daily.  - Basic Metabolic Panel  - Lipid Cascade    3. Impaired fasting glucose  Check A1c and fasting glucose today with impaired fasting glucose history.  - Basic Metabolic Panel  - Glycosylated Hemoglobin A1c    4. Vitamin D deficiency  History of vitamin D deficiency most recent level of 41 on August 31, 2018 and continues current supplement.    5. Bilateral sensorineural hearing loss  Chronic bilateral sensorineural hearing loss.  Hearing aids in place.  Cerumen impaction.  Ear lavage performed today with curette removal personally performed.  Tolerated well.    6. Chronic cough  Chronic cough.  Chest x-ray appears stable.  Prolonged concerns.  Requesting antibiotic coverage.  Z-Vu prescribed.  Notify persistent concerns or worsening.  Check CBC.  - XR Chest 2 Views  - HM2(CBC w/o Differential)  - azithromycin (ZITHROMAX) 250 MG tablet; Take 2 tabs on day one, and then 1 tab on days 2-5..  Dispense: 6 tablet; Refill: 0    7. Ceruminosis, bilateral  As above, ear lavage performed personally.  Curette removal of cerumen.  Tolerated well.  No evidence for middle ear infection.  - Ear cerumen removal; Future        The patient's current medical problems were reviewed.    I have had an Advance Directives discussion with the patient.     The following health maintenance schedule was reviewed with the patient and provided in printed form in the after visit summary:   Health Maintenance   Topic  Date Due     ZOSTER VACCINES (2 of 3) 02/10/2009     TD 18+ HE  2018     FALL RISK ASSESSMENT  10/12/2018     DXA SCAN  2019     MAMMOGRAM  2020     ADVANCE DIRECTIVES DISCUSSED WITH PATIENT  10/12/2022     COLONOSCOPY  2026     PNEUMOCOCCAL POLYSACCHARIDE VACCINE AGE 65 AND OVER  Completed     INFLUENZA VACCINE RULE BASED  Completed     PNEUMOCOCCAL CONJUGATE VACCINE FOR ADULTS (PCV13 OR PREVNAR)  Completed        Subjective:     Chief Complaint: Bibiana Mcrae is an 73 y.o. female here for an Annual Wellness visit.     HPI: Patient seen today for annual wellness visit.  Decreased hearing.  Uses hearing aids.  Wants ears were rinsed due to wax buildup which is happened in the past.  Hypertension history as well as palpitations.  Continues atenolol 25 mg after prior dose of 50 mg daily.  Impaired fasting glucose history of prior A1c of 5.8%.  Tries to stay active.  No significant dietary indiscretions noted.  Has cough.  Somewhat productive.  No hemoptysis.  Symptoms since .  No change in medication etc.  No fevers.  No nausea or vomiting.  No diarrhea.    Review of Systems:  Please see above.  The rest of the review of systems are negative for all systems.    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   No siblings   Surgery: emergency RLQ incarcerated strangulated femoral hernia repair 08; [place]  due to endometriosis; ulcer surgery ; 0914-[id number]-spylghau hernia repair (Dr. JULY Wen); lap choly 12 with Dr. Hayder Montgomery [phone]    Patient Care Team:  Zack Woody MD as PCP - General  Jordi Albert MD as Physician (Endocrinology)     Patient Active Problem List   Diagnosis     Vitamin D Deficiency     Essential hypertension with  goal blood pressure less than 130/80     Lump Or Mass In Breast     Postmenopausal Atrophic Vaginitis     Lower Back Pain     Anxiety     Osteoporosis, senile     Peptic ulcer     Osteoarthritis of thumb, left     Palpitations     Heart palpitations     Shortness of breath     Hypoglycemia     Bilateral sensorineural hearing loss     Impaired fasting glucose     Past Medical History:   Diagnosis Date     Anxiety      Breast cyst     While ago      Past Surgical History:   Procedure Laterality Date     BREAST CYST ASPIRATION Left     While ago     HYSTERECTOMY  1985     OOPHORECTOMY Bilateral 1985     [place] REMOVAL OF OVARY(S)      Description: Oophorectomy;  Recorded: 12/17/2009;     [place] TOTAL ABDOM HYSTERECTOMY      Description: Total Abdominal Hysterectomy;  Recorded: 12/17/2009;      Family History   Problem Relation Age of Onset     Breast cancer Mother 52     Cancer Maternal Grandfather 71        prostate     Prostate cancer Maternal Grandfather       Social History     Socioeconomic History     Marital status:      Spouse name: Not on file     Number of children: Not on file     Years of education: Not on file     Highest education level: Not on file   Social Needs     Financial resource strain: Not on file     Food insecurity - worry: Not on file     Food insecurity - inability: Not on file     Transportation needs - medical: Not on file     Transportation needs - non-medical: Not on file   Occupational History     Not on file   Tobacco Use     Smoking status: Never Smoker     Smokeless tobacco: Never Used   Substance and Sexual Activity     Alcohol use: No     Drug use: No     Sexual activity: Not on file   Other Topics Concern     Not on file   Social History Narrative     Not on file      Current Outpatient Medications   Medication Sig Dispense Refill     acarbose (PRECOSE) 25 MG tablet Take 1 tablet (25 mg total) by mouth 3 (three) times a day. With breakfast 180 tablet 1     atenolol (TENORMIN) 25 MG  tablet Take 1 tablet (25 mg total) by mouth daily. 90 tablet 3     BIOTIN ORAL Take 5,000 mcg by mouth 2 (two) times a day.        blood glucose control high,low (ACCU-CHEK JB CONTROL SOLN) Soln Meter check once ever 3 months 1 each 0     blood glucose test (ACCU-CHEK JB PLUS TEST STRP) strips 1 test strip 3x day (Patient taking differently: Test 2 times daily..) 300 strip 3     calcium-vitamin D (CALCIUM-VITAMIN D) 500 mg(1,250mg) -200 unit per tablet Take 2 tablets by mouth 2 (two) times a day with meals.       celecoxib (CELEBREX) 200 MG capsule Take 1 capsule (200 mg total) by mouth daily. 90 capsule 3     denosumab 60 mg/mL Syrg Inject 60 mg under the skin once.       diphenhydrAMINE (BENADRYL) 25 mg tablet Take 50 mg by mouth at bedtime.       generic lancets (ACCU-CHEK FASTCLIX) Use 1 each As Directed 3 (three) times a day. Dispense brand per patient's insurance at pharmacy discretion. 300 each 3     generic lancets (ACCU-CHEK MULTICLIX LANCET) Dispense brand per patient's insurance at pharmacy discretion. 300 each 3     LORazepam (ATIVAN) 0.5 MG tablet Take 0.5-1 tablets (0.25-0.5 mg total) by mouth every 8 (eight) hours as needed for anxiety. 180 tablet 1     multivitamin with minerals (THERA-M) 9 mg iron-400 mcg Tab tablet Take 1 tablet by mouth daily.       nortriptyline (PAMELOR) 50 MG capsule TAKE 1 CAPSULE BY MOUTH AT  BEDTIME 90 capsule 1     azithromycin (ZITHROMAX) 250 MG tablet Take 2 tabs on day one, and then 1 tab on days 2-5.. 6 tablet 0     conjugated estrogens (PREMARIN) vaginal cream Insert 2 g into the vagina 3 (three) times a week.        ketoconazole (NIZORAL) 2 % shampoo Apply 1 application topically as needed. Apply to damp skin, lather, leave on 5 minutes, and rinse        LANCETS (ACCU-CHEK MULTICLIX LANCET MISC) Use As Directed. Testing 3x a day       No current facility-administered medications for this visit.       Objective:   Vital Signs:   Visit Vitals  /66   Pulse 78  "  Ht 4' 10\" (1.473 m)   Wt 108 lb (49 kg)   LMP 11/16/1985   BMI 22.57 kg/m         VisionScreening:  No exam data present     PHYSICAL EXAM  General Appearance: Alert, pleasant, appears stated age  Head: Normocephalic, without obvious abnormality  Eyes: PERRL, conjunctiva/corneas clear, EOM's intact  Ears: Normal TM's and external ear canals, both ears.  Initial bilateral cerumen impaction removed with curette and ear lavage.  Tolerated well.  Nose: Nares normal, septum midline,mucosa normal, no drainage  Throat: Lips, mucosa, and tongue normal; teeth and gums normal; oropharynx is clear  Neck: Supple,without lymphadenopathy or thyromegally  Lungs: Clear to auscultation bilaterally, respirations unlabored  Breasts: Nopalpable masses, tenderness, asymmetry, or nipple discharge. No axillary or supraclavicular lymphadenopathy  Heart: Regular rate and rhythm, no murmur   Abdomen: Soft, non-tender, no masses, no organomegaly  Pelvic:Not examined  Extremities: Extremities with strong and symmetric pulses, no cyanosis or edema  Skin: Skin color, texture normal, no rashes or lesions  Neurologic: Normal      Assessment Results 11/23/2018   Activities of Daily Living No help needed   Instrumental Activities of Daily Living No help needed   Get Up and Go Score Less than 12 seconds   Mini Cog Total Score 3   Some recent data might be hidden     A Mini-Cog score of 0-2 suggests the possibility of dementia, score of 3-5 suggests no dementia    Identified Health Risks:     Patient's advanced directive was discussed and I am comfortable with the patient's wishes.        XR CHEST 2 VIEWS  11/23/2018 1:15 PM     INDICATION: Cough  COMPARISON: 02/26/2018.     FINDINGS: The lungs are clear of focal consolidation. There is no pneumothorax or pleural effusion. The heart size and pulmonary vascularity are normal. Thoracic aorta is mildly tortuous. There are surgical clips in the right upper quadrant and in the   expected region of the " gastroesophageal junction. There has been no significant change from the prior study.          BILATERAL FULL FIELD DIGITAL SCREENING MAMMOGRAM WITH TOMOSYNTHESIS     Performed on: 11/16/18        Compared to: 11/13/2017 Mammo Screening Bilateral, 11/01/2016 Mammo Screening Bilateral, and 11/03/2015 Mammo Screening Bilateral     Findings:The breasts are heterogeneously dense, which may obscure small masses.There is no radiographic evidence of malignancy.  Benign appearing calcifications are not significantly changed.  This study was evaluated with the assistance of Computer-Aided Detection.  Breast Tomosynthesis was used in interpretation. Repeat routine screening mammogram in one year is recommended.     ACR BI-RADS Category 2: Benign

## 2021-06-22 NOTE — PROGRESS NOTES
Assessment/Plan:    1. Chronic midline low back pain without sciatica  Chronic lower back pain, work comp related, without sciatica.  States using Ria aspirin 325 mg taking 2 tablets twice daily with meals.  Also continues nortriptyline 50 mg at bedtime and Celebrex 200 mg once daily.  - aspirin (RIA ASPIRIN) 325 MG tablet; Take 2 tablets (650 mg total) by mouth 2 (two) times a day before meals.  Dispense: 100 tablet; Refill: 3  - nortriptyline (PAMELOR) 50 MG capsule; Take 1 capsule (50 mg total) by mouth at bedtime.  Dispense: 90 capsule; Refill: 3          Subjective:    Bibiana Mcrae is seen today for work comp follow-up.  Date of injury 1990.  In general doing well.  Does update med list and states using barrier aspirin 325 mg taking 2 capsules twice daily with meals without GI distress.  Also using Celebrex 200 mg daily nortriptyline 50 mg at bedtime.  In general feels that symptoms have been relatively stable with infrequent exacerbation only..        Reviewed office note from 17:  Bibiana Mcrae is seen today for work comp follow-up.  Work comp injury 1999.  Stable.  Initial injury occurred while working for [employer] x31 years.  Had done significant heavy lifting, repetitive in nature, placing files into a tub.  Subsequently, did see specialist with a decision to pursue conservative treatments.   Continues Ria  mg two capsules (650 mg) twice daily with meals.  Using Celebrex 200 mg daily.  Nortriptyline 50 mg at bedtime.  No radicular symptoms of lower back pain.  Needs refills on medications.  Would be interested in trying higher dose nortriptyline at bedtime due to sleep disruption due to lower back pain.        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   No siblings   Surgery: emergency RLQ incarcerated strangulated femoral hernia repair 9/28/08; [place] 1985 due to endometriosis; ulcer surgery 1978; 12/04/0943-X-aassccyk hernia repair (Dr. JULY Wen); lap choly 9/6/12 with Dr. Hayder Montgomery [phone]    Past Surgical History:   Procedure Laterality Date     BREAST CYST ASPIRATION Left     While ago     HYSTERECTOMY  1985     OOPHORECTOMY Bilateral 1985     [place] REMOVAL OF OVARY(S)      Description: Oophorectomy;  Recorded: 12/17/2009;     [place] TOTAL ABDOM HYSTERECTOMY      Description: Total Abdominal Hysterectomy;  Recorded: 12/17/2009;        Family History   Problem Relation Age of Onset     Breast cancer Mother 52     Cancer Maternal Grandfather 71        prostate     Prostate cancer Maternal Grandfather         Past Medical History:   Diagnosis Date     Anxiety      Breast cyst     While ago        Social History     Tobacco Use     Smoking status: Never Smoker     Smokeless tobacco: Never Used   Substance Use Topics     Alcohol use: No     Drug use: No        Current Outpatient Medications   Medication Sig Dispense Refill     acarbose (PRECOSE) 25 MG tablet Take 1 tablet (25 mg total) by mouth 3 (three) times a day. With breakfast 180 tablet 1     atenolol (TENORMIN) 25 MG tablet TAKE 1 TABLET BY MOUTH  DAILY 90 tablet 3     azithromycin (ZITHROMAX) 250 MG tablet Take 2 tabs on day one, and then 1 tab on days 2-5.. 6 tablet 0     BIOTIN ORAL Take 5,000 mcg by mouth 2 (two) times a day.        blood glucose control high,low (ACCU-CHEK JB CONTROL SOLN) Soln Meter check once ever 3 months 1 each 0     blood glucose test (ACCU-CHEK JB PLUS TEST STRP) strips USE TO TEST 3 TIMES DAILY.. 300 strip 3     calcium-vitamin D (CALCIUM-VITAMIN D) 500 mg(1,250mg) -200 unit per tablet Take 2 tablets by mouth 2 (two) times a day with meals.       celecoxib (CELEBREX) 200 MG capsule TAKE ONE CAPSULE BY MOUTH  DAILY 90 capsule 3     denosumab 60 mg/mL  Syrg Inject 60 mg under the skin once.       diphenhydrAMINE (BENADRYL) 25 mg tablet Take 50 mg by mouth at bedtime.       generic lancets (ACCU-CHEK MULTICLIX LANCET) Dispense brand per patient's insurance at pharmacy discretion. 300 each 3     LORazepam (ATIVAN) 0.5 MG tablet TAKE 1/2 TO 1 TABLET(0.25 TO 0.5 MG) BY MOUTH EVERY 8 HOURS AS NEEDED FOR ANXIETY 180 tablet 0     multivitamin with minerals (THERA-M) 9 mg iron-400 mcg Tab tablet Take 1 tablet by mouth daily.       nortriptyline (PAMELOR) 50 MG capsule Take 1 capsule (50 mg total) by mouth at bedtime. 90 capsule 3     aspirin (RIA ASPIRIN) 325 MG tablet Take 2 tablets (650 mg total) by mouth 2 (two) times a day before meals. 100 tablet 3     No current facility-administered medications for this visit.           Objective:    Vitals:    12/19/18 1444   BP: 130/60   Pulse: 67   SpO2: 98%   Weight: 109 lb (49.4 kg)      Body mass index is 22.78 kg/m .    Alert.  Transfers somewhat slowly otherwise independently.  Musculoskeletal exam appears baseline with deep tendon reflexes diminished at the patella bilateral otherwise 2+ Achilles DTRs symmetric lower extremities.      This note has been dictated using voice recognition software and as a result may contain minor grammatical errors and unintended word substitutions.

## 2021-06-25 NOTE — PATIENT INSTRUCTIONS - HE
Prolia 7th today.  Prolia 8th in 6 months with my nurse. I will see you in 1 year    DXA in 3/2021 .   Phone number to schedule [phone].    Daily calcium need is 8371-2143 mg a day from the diet and supplements.  Calcium citrate is easier to digest.  Vitamin D 2000 IU daily recommended.

## 2021-06-25 NOTE — PROGRESS NOTES
No diagnosis found.  Patient was educated on safety of Prolia utilizing Patient Counseling Chart for Healthcare Providers, as outlined by the Prolia REMS progam.     Return in about 6 months (around 9/19/2019) for Recheck, Prolia injection.    Patient Instructions   Prolia 7th today.  Prolia 8th in 6 months with my nurse. I will see you in 1 year    DXA in 3/2021 .   Phone number to schedule [phone].    Daily calcium need is 4965-7114 mg a day from the diet and supplements.  Calcium citrate is easier to digest.  Vitamin D 2000 IU daily recommended.        Chief Complaint   Patient presents with     Osteoporosis Follow Up     discuss dexa results       /60   Pulse 68   Wt 112 lb 12.8 oz (51.2 kg)   LMP 11/16/1985   BMI 23.58 kg/m        Did you experience any problems with previous Prolia injection? no  Any medication change in the last 6 months? no  Did you take prednisone or other immunosupressant drugs in the last 6 months   (chemo, transplant, rheum, dermatology conditions)? no  Did you have any serious infection in the last 6 months?no  Any recent hospitalizations?no  Do you plan any dental work in the next 2-3 months?no  How much calcium do you take daily from the diet and supplements?1200 mg  How much vit D do you take daily? 2000 IU  Last DXA? 3/1/19  Reviewed and discussed      Patient is here today for the 7th Prolia injection. Patient tolerated previous injections well.   We discussed calcium and vit D daily needs today.   Next Prolia injection will be in 6 months.     15 minutes spent with the patient and more then 50 % of the time in counseling.  This note has been dictated using voice recognition software. Any grammatical or context distortions are unintentional and inherent to the software      Patient Active Problem List   Diagnosis     Vitamin D Deficiency     Essential hypertension with goal blood pressure less than 130/80     Lump Or Mass In Breast     Postmenopausal Atrophic  Vaginitis     Lower Back Pain     Anxiety     Osteoporosis, senile     Peptic ulcer     Osteoarthritis of thumb, left     Palpitations     Heart palpitations     Shortness of breath     Hypoglycemia     Bilateral sensorineural hearing loss     Impaired fasting glucose       Current Outpatient Medications on File Prior to Visit   Medication Sig Dispense Refill     acarbose (PRECOSE) 25 MG tablet Take 1 tablet (25 mg total) by mouth 3 (three) times a day. With breakfast 180 tablet 1     aspirin (RIA ASPIRIN) 325 MG tablet Take 2 tablets (650 mg total) by mouth 2 (two) times a day before meals. 100 tablet 3     atenolol (TENORMIN) 25 MG tablet TAKE 1 TABLET BY MOUTH  DAILY 90 tablet 3     azithromycin (ZITHROMAX) 250 MG tablet Take 2 tabs on day one, and then 1 tab on days 2-5.. 6 tablet 0     BIOTIN ORAL Take 5,000 mcg by mouth 2 (two) times a day.        blood glucose control high,low (ACCU-CHEK JB CONTROL SOLN) Soln Meter check once ever 3 months 1 each 0     blood glucose test (ACCU-CHEK JB PLUS TEST STRP) strips USE TO TEST 3 TIMES DAILY.. 300 strip 3     calcium-vitamin D (CALCIUM-VITAMIN D) 500 mg(1,250mg) -200 unit per tablet Take 2 tablets by mouth 2 (two) times a day with meals.       celecoxib (CELEBREX) 200 MG capsule TAKE ONE CAPSULE BY MOUTH  DAILY 90 capsule 3     denosumab 60 mg/mL Syrg Inject 60 mg under the skin once.       diphenhydrAMINE (BENADRYL) 25 mg tablet Take 50 mg by mouth at bedtime.       generic lancets (ACCU-CHEK MULTICLIX LANCET) Dispense brand per patient's insurance at pharmacy discretion. 300 each 3     ketoconazole (NIZORAL) 2 % shampoo   11     LORazepam (ATIVAN) 0.5 MG tablet Take 1 tablet (0.5 mg total) by mouth every 6 (six) hours as needed for anxiety. 180 tablet 0     multivitamin with minerals (THERA-M) 9 mg iron-400 mcg Tab tablet Take 1 tablet by mouth daily.       nortriptyline (PAMELOR) 50 MG capsule Take 1 capsule (50 mg total) by mouth at bedtime. 90 capsule 3      SHINGRIX, PF, 50 mcg/0.5 mL SusR ADM 0.5ML IM UTD  0     No current facility-administered medications on file prior to visit.

## 2021-07-03 NOTE — PROGRESS NOTES
[facility] Daily Progress Note    Patient Name: Bibiana Mcrae  Date of evaluation: 5/7/2018  Today's Date: 5/14/2018   Visit Number: 3/12  Referral Diagnosis: Facet arthropathy, cervical  Referring provider: Lay Downs PA-C  Visit Diagnosis:     ICD-10-CM    1. Cervical pain M54.2    2. Generalized muscle weakness M62.81    3. Poor posture R29.3        Assessment:       Patient is gradually improving since onset a few weeks ago. She is having less sharp pains overall, but still has them. Continues to benefit from further PT. ROM is much improved already since initial visit.    Goals:  Pt. will be independent with home exercise program in : 12 weeks  Pt will: Able to turn head to the left with more ease for driving or conversation within 12 weeks  Pt will: Able to sleep through the night without waking due to pain within 12 weeks  Pt will: Able to reach out with either UE and not have any increase in neck pain within 12 weeks  Pt will: Able to carry objects, > 8#, such as laundry, with pain 0-2/10 within 12 weeks     Plan / Patient Instructions:      Plan for next visit: Add: pectoralis stretch (if comfortable on right shoulder), midback stretch                               MFR and gentle cervical traction - focus on left rotation next session.                                Edu on posture         Try prone scap retraction ex as able    Note: patient has known rotator cuff tear on right shoulder and feels like it's sore at times     Subjective:       Shooting pains, hasn't had since last visit that she was in.    Pain: don't really have any pain right now.      Objective:       States she is sleeping pretty well.  Tender to light pressure on right sub occipitals, myofascial restrictions on L UT/Levator > R.    Date: 5/7/18 5/14/2018    *Indicate scale AROM AROM AROM   Cervical Flexion 23 50 (chin to chest)    Cervical Extension 64 60     Right Left Right Left Right Left   Cervical Sidebending  "8, pain 12, tender 10, pulling on the left 10, pulling on the right     Cervical Rotation 42, pain 30, most pain 60, \"tender but not bad\" 40, more pain than going to the right     Cervical Protraction      Cervical Retraction      Thoracic Flexion      Thoracic Extension      Thoracic Sidebending         Thoracic Rotation           Exercises:  Exercise #1: Cervical AROM: flexion, extension and rotation  verbal reives  Comment #1: chin tuck watched form  Exercise #2: scapular retraction watche form, mild cues    Treatment Today     TREATMENT MINUTES COMMENTS   Evaluation     Self-care/ Home management     Manual therapy 25 Pt supine:  MFR bilateral SO region, UT, LS, cervical extensors,  --all very light pressure layers I-II only  Gentle manual cervical traction   Neuromuscular Re-education     Therapeutic Activity     Therapeutic Exercises 5 Time included for remeasuring ROM.    Gait training     Modality__________________                Total 30    Blank areas are intentional and mean the treatment did not include these items.        Nella Starks, DPT, CLT  5/14/2018               " Awake/Alert/Cooperative

## 2021-07-03 NOTE — ADDENDUM NOTE
Addendum Note by Court Donohue at 3/6/2018  3:52 PM     Author: Court Donohue Service: -- Author Type: Certified Medical Assistant    Filed: 3/6/2018  3:52 PM Encounter Date: 3/6/2018 Status: Signed    : Court Donohue    Addended by: COURT DONOHUE on: 3/6/2018 03:52 PM        Modules accepted: Orders         no

## 2021-07-03 NOTE — ADDENDUM NOTE
Addendum Note by Bloch, Lisa M, CMA at 3/19/2019  1:40 PM     Author: Bloch, Lisa M, CMA Service: -- Author Type: Certified Medical Assistant    Filed: 3/19/2019  4:34 PM Encounter Date: 3/19/2019 Status: Signed    : Bloch, Lisa M, CMA (Certified Medical Assistant)    Addended by: BLOCH, LISA M on: 3/19/2019 04:34 PM        Modules accepted: Orders

## 2021-07-06 ENCOUNTER — RECORDS - HEALTHEAST (OUTPATIENT)
Dept: ADMINISTRATIVE | Facility: OTHER | Age: 76
End: 2021-07-06

## 2021-07-13 ENCOUNTER — RECORDS - HEALTHEAST (OUTPATIENT)
Dept: ADMINISTRATIVE | Facility: CLINIC | Age: 76
End: 2021-07-13

## 2021-07-21 ENCOUNTER — RECORDS - HEALTHEAST (OUTPATIENT)
Dept: ADMINISTRATIVE | Facility: CLINIC | Age: 76
End: 2021-07-21

## 2021-07-22 ENCOUNTER — RECORDS - HEALTHEAST (OUTPATIENT)
Dept: FAMILY MEDICINE | Facility: CLINIC | Age: 76
End: 2021-07-22

## 2021-07-22 DIAGNOSIS — Z12.31 OTHER SCREENING MAMMOGRAM: ICD-10-CM

## 2021-08-25 DIAGNOSIS — F41.9 ANXIETY: ICD-10-CM

## 2021-08-25 RX ORDER — LORAZEPAM 0.5 MG/1
TABLET ORAL
Qty: 180 TABLET | Refills: 1 | Status: SHIPPED | OUTPATIENT
Start: 2021-08-25 | End: 2021-11-09

## 2021-10-26 ENCOUNTER — TRANSCRIBE ORDERS (OUTPATIENT)
Dept: INTERNAL MEDICINE | Facility: CLINIC | Age: 76
End: 2021-10-26

## 2021-10-26 DIAGNOSIS — M81.0 OSTEOPOROSIS, SENILE: Primary | ICD-10-CM

## 2021-10-26 DIAGNOSIS — Z92.29 PERSONAL HISTORY OF OTHER DRUG THERAPY: ICD-10-CM

## 2021-10-26 NOTE — PROGRESS NOTES
As part of the required manual data conversion process for integration, this encounter was created to document a CAM (Clinic Administered Medication) order. This information was copied from the [facility] patient's chart to the [facility] patient chart.     Loli Edwards, Margarita  October 26, 2021

## 2021-10-28 ENCOUNTER — TELEPHONE (OUTPATIENT)
Dept: INTERNAL MEDICINE | Facility: CLINIC | Age: 76
End: 2021-10-28

## 2021-10-28 NOTE — TELEPHONE ENCOUNTER
Patient has a Prolia injection apointment scheduled for 11.15.2021 and is also scheduled for a root canal on 12.10.2021.   Should she hold off on receiving the prolia injection until after the root canal? If yes, how long should she hold off on receiving the prolia injection for?     Bibiana @ [phone]

## 2021-10-29 NOTE — TELEPHONE ENCOUNTER
CECILIA Mccarty with  Message that she can proceed with her Prolia injection, Root canal will not effect it or vice versa.

## 2021-11-03 ENCOUNTER — ANCILLARY PROCEDURE (OUTPATIENT)
Dept: MAMMOGRAPHY | Facility: CLINIC | Age: 76
End: 2021-11-03
Attending: FAMILY MEDICINE
Payer: COMMERCIAL

## 2021-11-03 DIAGNOSIS — Z12.31 SCREENING MAMMOGRAM, ENCOUNTER FOR: ICD-10-CM

## 2021-11-03 PROCEDURE — 77063 BREAST TOMOSYNTHESIS BI: CPT

## 2021-11-05 ASSESSMENT — ACTIVITIES OF DAILY LIVING (ADL): CURRENT_FUNCTION: NO ASSISTANCE NEEDED

## 2021-11-09 ENCOUNTER — OFFICE VISIT (OUTPATIENT)
Dept: FAMILY MEDICINE | Facility: CLINIC | Age: 76
End: 2021-11-09
Payer: COMMERCIAL

## 2021-11-09 ENCOUNTER — TELEPHONE (OUTPATIENT)
Dept: FAMILY MEDICINE | Facility: CLINIC | Age: 76
End: 2021-11-09

## 2021-11-09 VITALS
HEART RATE: 65 BPM | WEIGHT: 99.4 LBS | HEIGHT: 57 IN | SYSTOLIC BLOOD PRESSURE: 140 MMHG | DIASTOLIC BLOOD PRESSURE: 70 MMHG | OXYGEN SATURATION: 99 % | BODY MASS INDEX: 21.45 KG/M2

## 2021-11-09 DIAGNOSIS — F41.9 ANXIETY: ICD-10-CM

## 2021-11-09 DIAGNOSIS — E16.2 HYPOGLYCEMIA: ICD-10-CM

## 2021-11-09 DIAGNOSIS — G89.29 CHRONIC MIDLINE LOW BACK PAIN WITHOUT SCIATICA: ICD-10-CM

## 2021-11-09 DIAGNOSIS — H90.3 BILATERAL SENSORINEURAL HEARING LOSS: ICD-10-CM

## 2021-11-09 DIAGNOSIS — I10 ESSENTIAL HYPERTENSION WITH GOAL BLOOD PRESSURE LESS THAN 130/80: ICD-10-CM

## 2021-11-09 DIAGNOSIS — M54.50 CHRONIC MIDLINE LOW BACK PAIN WITHOUT SCIATICA: ICD-10-CM

## 2021-11-09 DIAGNOSIS — M81.0 OSTEOPOROSIS, SENILE: ICD-10-CM

## 2021-11-09 DIAGNOSIS — Z00.01 ENCOUNTER FOR GENERAL ADULT MEDICAL EXAMINATION WITH ABNORMAL FINDINGS: Primary | ICD-10-CM

## 2021-11-09 DIAGNOSIS — R73.01 IMPAIRED FASTING GLUCOSE: ICD-10-CM

## 2021-11-09 LAB
ANION GAP SERPL CALCULATED.3IONS-SCNC: 10 MMOL/L (ref 5–18)
BUN SERPL-MCNC: 13 MG/DL (ref 8–28)
CALCIUM SERPL-MCNC: 9.8 MG/DL (ref 8.5–10.5)
CHLORIDE BLD-SCNC: 102 MMOL/L (ref 98–107)
CO2 SERPL-SCNC: 26 MMOL/L (ref 22–31)
CREAT SERPL-MCNC: 0.8 MG/DL (ref 0.6–1.1)
GFR SERPL CREATININE-BSD FRML MDRD: 72 ML/MIN/1.73M2
GLUCOSE BLD-MCNC: 101 MG/DL (ref 70–125)
HBA1C MFR BLD: 5.6 % (ref 0–5.6)
POTASSIUM BLD-SCNC: 4.8 MMOL/L (ref 3.5–5)
SODIUM SERPL-SCNC: 138 MMOL/L (ref 136–145)

## 2021-11-09 PROCEDURE — 99397 PER PM REEVAL EST PAT 65+ YR: CPT | Performed by: FAMILY MEDICINE

## 2021-11-09 PROCEDURE — 80048 BASIC METABOLIC PNL TOTAL CA: CPT | Performed by: FAMILY MEDICINE

## 2021-11-09 PROCEDURE — 99214 OFFICE O/P EST MOD 30 MIN: CPT | Mod: 25 | Performed by: FAMILY MEDICINE

## 2021-11-09 PROCEDURE — 83036 HEMOGLOBIN GLYCOSYLATED A1C: CPT | Performed by: FAMILY MEDICINE

## 2021-11-09 PROCEDURE — 36415 COLL VENOUS BLD VENIPUNCTURE: CPT | Performed by: FAMILY MEDICINE

## 2021-11-09 PROCEDURE — 82306 VITAMIN D 25 HYDROXY: CPT | Performed by: FAMILY MEDICINE

## 2021-11-09 RX ORDER — HYDROXYZINE PAMOATE 25 MG/1
25 CAPSULE ORAL 3 TIMES DAILY PRN
Qty: 180 CAPSULE | Refills: 3 | Status: SHIPPED | OUTPATIENT
Start: 2021-11-09 | End: 2022-03-02

## 2021-11-09 RX ORDER — ATENOLOL 50 MG/1
50 TABLET ORAL DAILY
Qty: 90 TABLET | Refills: 3 | Status: SHIPPED | OUTPATIENT
Start: 2021-11-09 | End: 2022-09-30

## 2021-11-09 ASSESSMENT — ACTIVITIES OF DAILY LIVING (ADL): CURRENT_FUNCTION: NO ASSISTANCE NEEDED

## 2021-11-09 ASSESSMENT — ANXIETY QUESTIONNAIRES
4. TROUBLE RELAXING: NOT AT ALL
GAD7 TOTAL SCORE: 0
2. NOT BEING ABLE TO STOP OR CONTROL WORRYING: NOT AT ALL
8. IF YOU CHECKED OFF ANY PROBLEMS, HOW DIFFICULT HAVE THESE MADE IT FOR YOU TO DO YOUR WORK, TAKE CARE OF THINGS AT HOME, OR GET ALONG WITH OTHER PEOPLE?: NOT DIFFICULT AT ALL
GAD7 TOTAL SCORE: 0
5. BEING SO RESTLESS THAT IT IS HARD TO SIT STILL: NOT AT ALL
3. WORRYING TOO MUCH ABOUT DIFFERENT THINGS: NOT AT ALL
1. FEELING NERVOUS, ANXIOUS, OR ON EDGE: NOT AT ALL
7. FEELING AFRAID AS IF SOMETHING AWFUL MIGHT HAPPEN: NOT AT ALL
7. FEELING AFRAID AS IF SOMETHING AWFUL MIGHT HAPPEN: NOT AT ALL
GAD7 TOTAL SCORE: 0
6. BECOMING EASILY ANNOYED OR IRRITABLE: NOT AT ALL

## 2021-11-09 ASSESSMENT — MIFFLIN-ST. JEOR: SCORE: 818.72

## 2021-11-09 ASSESSMENT — PATIENT HEALTH QUESTIONNAIRE - PHQ9
10. IF YOU CHECKED OFF ANY PROBLEMS, HOW DIFFICULT HAVE THESE PROBLEMS MADE IT FOR YOU TO DO YOUR WORK, TAKE CARE OF THINGS AT HOME, OR GET ALONG WITH OTHER PEOPLE: NOT DIFFICULT AT ALL
SUM OF ALL RESPONSES TO PHQ QUESTIONS 1-9: 0
SUM OF ALL RESPONSES TO PHQ QUESTIONS 1-9: 0

## 2021-11-09 NOTE — PATIENT INSTRUCTIONS
Patient Education   Personalized Prevention Plan  You are due for the preventive services outlined below.  Your care team is available to assist you in scheduling these services.  If you have already completed any of these items, please share that information with your care team to update in your medical record.  Health Maintenance Due   Topic Date Due     ANNUAL REVIEW OF  ORDERS  Never done     FALL RISK ASSESSMENT  10/20/2021       Signs of Hearing Loss      Hearing much better with one ear can be a sign of hearing loss.   Hearing loss is a problem shared by many people. In fact, it is one of the most common health problems, particularly as people age. Most people age 65 and older have some hearing loss. By age 80, almost everyone does. Hearing loss often occurs slowly over the years. So you may not realize your hearing has gotten worse.  Have your hearing checked  Call your healthcare provider if you:    Have to strain to hear normal conversation    Have to watch other people s faces very carefully to follow what they re saying    Need to ask people to repeat what they ve said    Often misunderstand what people are saying    Turn the volume of the television or radio up so high that others complain    Feel that people are mumbling when they re talking to you    Find that the effort to hear leaves you feeling tired and irritated    Notice, when using the phone, that you hear better with one ear than the other  [employer] last reviewed this educational content on 1/1/2020 2000-2021 [employer]. All rights reserved. This information is not intended as a substitute for professional medical care. Always follow your healthcare professional's instructions.

## 2021-11-09 NOTE — PROGRESS NOTES
"SUBJECTIVE:   Bibiana Mcrae is a 76 year old female who presents for Preventive Visit.      Patient seen today for annual wellness visit.  Reviewed history showing evidence for hypertension currently on atenolol 25 mg daily.  Prior concern for hypoglycemia now on acarbose 25 mg 3 times daily without recurrent issues.  Using Prolia 60 mg every 6 months for osteoporosis management.  Vitamin D deficiency history as well.  Lorazepam half milligram typically up to 2 tablets daily for described anxiety benefits.  Using nortriptyline 50 mg at bedtime and Celebrex 200 mg each day for chronic lower back pain described as work-related, longstanding, stable.  Hearing aids utilized for hearing loss associate with bilateral sensorineural hearing loss.  Comprehensive review of systems as above otherwise all negative.      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   No siblings   Surgery: emergency RLQ incarcerated strangulated femoral hernia repair 08; [facility]  due to endometriosis; ulcer surgery ; [id number] hernia repair (Dr. JULY Wen); lap choly 12 with Dr. Hayder Montgomery [phone]      Patient has been advised of split billing requirements and indicates understanding: Yes   Are you in the first 12 months of your Medicare coverage?  No    Healthy Habits:     In general, how would you rate your overall health?  Good    Frequency of exercise:  6-7 days/week    Duration of exercise:  15-30 minutes    Do you usually eat at least 4 servings of fruit and vegetables a day, include whole grains    & fiber and avoid regularly eating high fat or \"junk\" foods?  Yes    Taking medications regularly:  Yes    Medication side effects:  None    Ability to successfully perform activities " of daily living:  No assistance needed    Home Safety:  No safety concerns identified    Hearing Impairment:  Difficulty following a conversation in a noisy restaurant or crowded room, feel that people are mumbling or not speaking clearly and difficult to understand a speaker at a public meeting or [employer] service    In the past 6 months, have you been bothered by leaking of urine?  No    In general, how would you rate your overall mental or emotional health?  Good      PHQ-2 Total Score: 0    Additional concerns today:  No    Do you feel safe in your environment? Yes    Have you ever done Advance Care Planning? (For example, a Health Directive, POLST, or a discussion with a medical provider or your loved ones about your wishes): Yes, advance care planning is on file.       Fall risk  Fallen 2 or more times in the past year?: No  Any fall with injury in the past year?: No    Cognitive Screening   1) Repeat 3 items (Leader, Season, Table)    2) Clock draw: ABNORMAL   3) 3 item recall: Recalls 2 objects   Results: ABNORMAL clock, 1-2 items recalled: PROBABLE COGNITIVE IMPAIRMENT, **INFORM PROVIDER**    Mini-CogTM Copyright RUBA Rodriguez. Licensed by the author for use in [facility]; reprinted with permission ([email or web address]@[email or web address]). All rights reserved.      Do you have sleep apnea, excessive snoring or daytime drowsiness?: no    Reviewed and updated as needed this visit by clinical staff  Tobacco  Allergies  Meds  Problems            Reviewed and updated as needed this visit by Provider   Allergies  Meds  Problems           Social History     Tobacco Use     Smoking status: Never Smoker     Smokeless tobacco: Never Used   Substance Use Topics     Alcohol use: No     If you drink alcohol do you typically have >3 drinks per day or >7 drinks per week? No    Alcohol Use 11/9/2021   Prescreen: >3 drinks/day or >7 drinks/week? -   Prescreen: >3 drinks/day or >7 drinks/week? No               Current providers  sharing in care for this patient include:   Patient Care Team:  Zack Woody MD as PCP - General  Zack Woody MD as Assigned PCP  Gustavo Tran MD as Physician (Internal Medicine)    The following health maintenance items are reviewed in Epic and correct as of today:  Health Maintenance Due   Topic Date Due     ANNUAL REVIEW OF HM ORDERS  Never done     Lab work is in process  Labs reviewed in EPIC  BP Readings from Last 3 Encounters:   11/09/21 (!) 140/70   05/04/21 (!) 140/80   04/01/21 (!) 142/64    Wt Readings from Last 3 Encounters:   11/09/21 45.1 kg (99 lb 6.4 oz)   05/04/21 47.9 kg (105 lb 11.2 oz)   04/01/21 48.5 kg (107 lb)                  Patient Active Problem List   Diagnosis     Essential hypertension with goal blood pressure less than 130/80     Postmenopausal Atrophic Vaginitis     Anxiety     Osteoporosis, senile     Bilateral sensorineural hearing loss     Impaired fasting glucose     Diverticular disease of large intestine     First degree hemorrhoids     Past Surgical History:   Procedure Laterality Date     BREAST CYST ASPIRATION Left     While ago     C REMOVAL OF OVARY(S)      Description: Oophorectomy;  Recorded: 12/17/2009;     C TOTAL ABDOM HYSTERECTOMY      Description: Total Abdominal Hysterectomy;  Recorded: 12/17/2009;     HYSTERECTOMY  1985     OOPHORECTOMY Bilateral 1985       Social History     Tobacco Use     Smoking status: Never Smoker     Smokeless tobacco: Never Used   Substance Use Topics     Alcohol use: No     Family History   Problem Relation Age of Onset     Breast Cancer Mother 52.00     Cancer Maternal Grandfather 71.00        prostate     Prostate Cancer Maternal Grandfather          Current Outpatient Medications   Medication Sig Dispense Refill     acarbose (PRECOSE) 25 MG tablet [ACARBOSE (PRECOSE) 25 MG TABLET] TAKE 1 TABLET BY MOUTH 3  TIMES A DAY WITH MEALS 270 tablet 1     atenolol (TENORMIN) 50 MG tablet Take 1 tablet (50 mg) by mouth daily 90  "tablet 3     BIOTIN ORAL [BIOTIN ORAL] Take 5,000 mcg by mouth 2 (two) times a day.        calcium-vitamin D (CALCIUM-VITAMIN D) 500 mg(1,250mg) -200 unit per tablet [CALCIUM-VITAMIN D (CALCIUM-VITAMIN D) 500 MG(1,250MG) -200 UNIT PER TABLET] Take 2 tablets by mouth 2 (two) times a day with meals.       celecoxib (CELEBREX) 200 MG capsule [CELECOXIB (CELEBREX) 200 MG CAPSULE] TAKE ONE CAPSULE BY MOUTH  DAILY 90 capsule 3     denosumab 60 mg/mL Syrg [DENOSUMAB 60 MG/ML SYRG] Inject 60 mg under the skin once.       hydrOXYzine (VISTARIL) 25 MG capsule Take 1 capsule (25 mg) by mouth 3 times daily as needed for anxiety 180 capsule 3     multivitamin with minerals (THERA-M) 9 mg iron-400 mcg Tab tablet [MULTIVITAMIN WITH MINERALS (THERA-M) 9 MG IRON-400 MCG TAB TABLET] Take 1 tablet by mouth daily.       nortriptyline (PAMELOR) 50 MG capsule [NORTRIPTYLINE (PAMELOR) 50 MG CAPSULE] TAKE 1 CAPSULE BY MOUTH AT  BEDTIME 90 capsule 3     omeprazole (PRILOSEC) 20 MG capsule [OMEPRAZOLE (PRILOSEC) 20 MG CAPSULE] Take 20 mg by mouth daily before breakfast.       Allergies   Allergen Reactions     Prochlorperazine Edisylate [Prochlorperazine] Other (See Comments)     Extrapyramidal side effects     Immunizations reviewed and up-to-date including Moderna COVID-19 third shot booster November 1, 2021.    Mammogram Screening - Patient over age 75, has elected to continue with screening.  Pertinent mammograms are reviewed under the imaging tab.  Recent mammogram November 3, 2021 without evidence for malignancy.    Review of Systems  Constitutional, HEENT, cardiovascular, pulmonary, GI, , musculoskeletal, neuro, skin, endocrine and psych systems are negative, except as otherwise noted.    OBJECTIVE:   BP (!) 140/70   Pulse 65   Ht 1.454 m (4' 9.25\")   Wt 45.1 kg (99 lb 6.4 oz)   SpO2 99%   BMI 21.32 kg/m   Estimated body mass index is 21.32 kg/m  as calculated from the following:    Height as of this encounter: 1.454 m (4' " "9.25\").    Weight as of this encounter: 45.1 kg (99 lb 6.4 oz).     Physical Exam  GENERAL: healthy, alert and no distress.  EYES: Eyes grossly normal to inspection, PERRL and conjunctivae and sclerae normal  HENT: ear canals and TM's normal, nose and mouth without ulcers or lesions  NECK: no adenopathy, no asymmetry, masses, or scars and thyroid normal to palpation  RESP: lungs clear to auscultation - no rales, rhonchi or wheezes  BREAST: normal without masses, tenderness or nipple discharge and no palpable axillary masses or adenopathy  CV: regular rate and rhythm, normal S1 S2, no S3 or S4, no murmur, click or rub, no peripheral edema and peripheral pulses strong  ABDOMEN: soft, nontender, no hepatosplenomegaly, no masses and bowel sounds normal  MS: no gross musculoskeletal defects noted, no edema  SKIN: no suspicious lesions or rashes  NEURO: Normal strength and tone, mentation intact and speech normal  PSYCH: mentation appears delayed, affect flat; appears somewhat sedated    Diagnostic Test Results:  Labs reviewed in Epic  Results for orders placed or performed in visit on 11/09/21 (from the past 24 hour(s))   Hemoglobin A1c   Result Value Ref Range    Hemoglobin A1C 5.6 0.0 - 5.6 %       ASSESSMENT / PLAN:     Encounter for general adult medical examination with abnormal findings  Annual wellness visit completed.  Risks associated with hearing difficulties, chronic.  Difficulties with clock face drawing and proper placement of hands of clock in order to demonstrate 11:10.  Did discuss need to wean from chronic benzodiazepine use and reassess at follow-up no later than 3 months.    Essential hypertension with goal blood pressure less than 130/80  Hypertension suboptimal control blood pressure 146/66 on recheck.  Increase atenolol from 25 mg daily up to 50 mg daily reassess blood pressure at follow-up in 3 months.  - atenolol (TENORMIN) 50 MG tablet  Dispense: 90 tablet; Refill: 3  - Basic metabolic panel  - " "Basic metabolic panel    Anxiety  Hydroxyzine 25 mg use 1 tablet 3 times daily as needed sparingly for anxiety management.  Sedative side effects reviewed.  Wean from lorazepam half milligram which she is using up to 2 tablets daily currently.  - hydrOXYzine (VISTARIL) 25 MG capsule  Dispense: 180 capsule; Refill: 3    Hypoglycemia  Acarbose 25 mg 3 times daily continues.  Prior A1c 5.9% February 24, 2021.    Osteoporosis, senile  Ensure normal electrolytes and vitamin D \"continue use of Prolia 60 mg every 6 months.  - Vitamin D Deficiency  - Vitamin D Deficiency    Bilateral sensorineural hearing loss  Bilateral sensorineural hearing loss reviewed.  Stable.    Chronic midline low back pain without sciatica  Chronic midline lower back pain without sciatica.  Utilizes nortriptyline 50 mg in the evening as well as Celebrex 200 mg once daily for work-related low back issues, stable.    Impaired fasting glucose  As above, recent A1c 5.9% will reassess to ensure stable findings while continuing acarbose 25 mg 3 times daily for hypoglycemic prevention.  - Hemoglobin A1c  - Basic metabolic panel  - Hemoglobin A1c  - Basic metabolic panel       Patient has been advised of split billing requirements and indicates understanding: No  COUNSELING:  Reviewed preventive health counseling, as reflected in patient instructions       Regular exercise       Healthy diet/nutrition       Vision screening       Hearing screening       Dental care       Bladder control       Fall risk prevention       Osteoporosis prevention/bone health       Colon cancer screening       (Razia)menopause management    Estimated body mass index is 21.32 kg/m  as calculated from the following:    Height as of this encounter: 1.454 m (4' 9.25\").    Weight as of this encounter: 45.1 kg (99 lb 6.4 oz).    Weight management plan noted, stable and monitoring    She reports that she has never smoked. She has never used smokeless tobacco.      Appropriate " preventive services were discussed with this patient, including applicable screening as appropriate for cardiovascular disease, diabetes, osteopenia/osteoporosis, and glaucoma.  As appropriate for age/gender, discussed screening for colorectal cancer, prostate cancer, breast cancer, and cervical cancer. Checklist reviewing preventive services available has been given to the patient.    Reviewed patients plan of care and provided an AVS. The Basic Care Plan (routine screening as documented in Health Maintenance) for Bibiana meets the Care Plan requirement. This Care Plan has been established and reviewed with the Patient.    Counseling Resources:  ATP IV Guidelines  Pooled Cohorts Equation Calculator  Breast Cancer Risk Calculator  Breast Cancer: Medication to Reduce Risk  FRAX Risk Assessment  ICSI Preventive Guidelines  Dietary Guidelines for Americans, 2010  [employer] MyPlate  ASA Prophylaxis  Lung CA Screening    Zack Woody MD  [facility]    Identified Health Risks:  Answers for HPI/ROS submitted by the patient on 11/9/2021  If you checked off any problems, how difficult have these problems made it for you to do your work, take care of things at home, or get along with other people?: Not difficult at all  PHQ9 TOTAL SCORE: 0  ALLISON 7 TOTAL SCORE: 0        The patient was provided with written information regarding signs of hearing loss.

## 2021-11-09 NOTE — TELEPHONE ENCOUNTER
PRIOR AUTHORIZATION DENIED - COMPLETED VIA EPA     Medication: hydrOXYzine (VISTARIL) 25 MG capsule - DENIED     Denial Date:  11/09/2021    Denial Rational: You must first try and fail at least TWO of the following:  (A) Buspirone.  (B) Mirtazapine.  (C) Sertraline        Appeal Information:

## 2021-11-10 LAB — DEPRECATED CALCIDIOL+CALCIFEROL SERPL-MC: 52 UG/L (ref 30–80)

## 2021-11-10 ASSESSMENT — ANXIETY QUESTIONNAIRES: GAD7 TOTAL SCORE: 0

## 2021-11-10 ASSESSMENT — PATIENT HEALTH QUESTIONNAIRE - PHQ9: SUM OF ALL RESPONSES TO PHQ QUESTIONS 1-9: 0

## 2021-11-12 DIAGNOSIS — F41.9 ANXIETY: Primary | ICD-10-CM

## 2021-11-12 RX ORDER — MIRTAZAPINE 7.5 MG/1
7.5 TABLET, FILM COATED ORAL AT BEDTIME
Qty: 30 TABLET | Refills: 5 | Status: SHIPPED | OUTPATIENT
Start: 2021-11-12 | End: 2022-03-02

## 2021-11-29 ENCOUNTER — ALLIED HEALTH/NURSE VISIT (OUTPATIENT)
Dept: FAMILY MEDICINE | Facility: CLINIC | Age: 76
End: 2021-11-29
Payer: COMMERCIAL

## 2021-11-29 DIAGNOSIS — M81.0 OSTEOPOROSIS: Primary | ICD-10-CM

## 2021-11-29 PROCEDURE — 96372 THER/PROPH/DIAG INJ SC/IM: CPT | Performed by: INTERNAL MEDICINE

## 2021-11-29 PROCEDURE — 99207 PR NO CHARGE NURSE ONLY: CPT

## 2021-11-29 NOTE — PROGRESS NOTES
"Prolia Injection Phone Screen      Screening questions have been asked 2-3 days prior to administration visit for Prolia. If any questions are answered with \"Yes,\" this phone encounter were will routed to ordering provider for further evaluation.     1.  When was the last injection?  5/4/21    2.  Has insurance for this injection been verified?  Yes    3.  Did you experience any new onset achiness or rashes that lasted for over a month with your previous Prolia injection?   No    4.  Do you have a fever over 101?F or a new deep cough that is unusual for you today? No    5.  Have you started any new medications in the last 6 months that you were told could affect your immune system? These may have been prescribed by oncologist, transplant, rheumatology, or dermatology.   No    6.  In the last 6 months have you have gastric bypass or parathyroid surgery?   No    7.  Do you plan dental work requiring drilling into the bone such as implants/extractions or oral surgery in the next 2-3 months?   No    8. Do you have new insurance since the last injection? No    9. Have you received the Covid-19 vaccine? Yes  If No - Proceed with Prolia injection  If Yes - Date of vaccination 2/18/2021, 1/21/2021, 1/20/2021. Will need to wait until 2 weeks after 2nd dose of Covid-19 vaccine before administering Prolia       Patient informed if symptoms discussed above present prior to their administration appointment, they are to notify clinic immediately.     Cristiane Wen            "

## 2021-11-30 DIAGNOSIS — G89.29 CHRONIC MIDLINE LOW BACK PAIN WITHOUT SCIATICA: ICD-10-CM

## 2021-11-30 DIAGNOSIS — M54.50 CHRONIC MIDLINE LOW BACK PAIN WITHOUT SCIATICA: ICD-10-CM

## 2021-12-01 RX ORDER — NORTRIPTYLINE HYDROCHLORIDE 50 MG/1
CAPSULE ORAL
Qty: 90 CAPSULE | Refills: 3 | Status: SHIPPED | OUTPATIENT
Start: 2021-12-01 | End: 2023-01-26

## 2021-12-01 NOTE — TELEPHONE ENCOUNTER
"Routing refill request to provider for review/approval because:  BP not in range.    Last Written Prescription Date:  12/1/20  Last Fill Quantity: 90,  # refills: 3   Last office visit provider:  11/9/21     Requested Prescriptions   Pending Prescriptions Disp Refills     nortriptyline (PAMELOR) 50 MG capsule [Pharmacy Med Name: NORTRIPTYLINE  50MG  CAP] 90 capsule 3     Sig: TAKE 1 CAPSULE BY MOUTH AT  BEDTIME       Tricyclic Agents ( Annual appt and no PHQ9) Failed - 11/30/2021  4:07 AM        Failed - Blood Pressure under 140/90 in past 12 mos     BP Readings from Last 3 Encounters:   11/09/21 (!) 140/70   05/04/21 (!) 140/80   04/01/21 (!) 142/64                 Passed - Recent (12 mo) or future (30 days) visit within authorizing provider's specialty     Patient has had an office visit with the authorizing provider or a provider within the authorizing providers department within the previous 12 mos or has a future within next 30 days. See \"Patient Info\" tab in inbasket, or \"Choose Columns\" in Meds & Orders section of the refill encounter.              Passed - Medication is active on med list        Passed - Patient is age 18 or older        Passed - Patient is not pregnant        Passed - No positive pregnancy test on record in past 12 mos             Aleksey Waldrop RN 12/01/21 1:10 PM  "

## 2021-12-03 ENCOUNTER — MYC MEDICAL ADVICE (OUTPATIENT)
Dept: FAMILY MEDICINE | Facility: CLINIC | Age: 76
End: 2021-12-03
Payer: COMMERCIAL

## 2021-12-03 DIAGNOSIS — F41.9 ANXIETY: Primary | ICD-10-CM

## 2021-12-06 RX ORDER — BUSPIRONE HYDROCHLORIDE 7.5 MG/1
7.5 TABLET ORAL 2 TIMES DAILY
Qty: 180 TABLET | Refills: 3 | Status: SHIPPED | OUTPATIENT
Start: 2021-12-06 | End: 2022-11-26

## 2021-12-30 ENCOUNTER — TRANSFERRED RECORDS (OUTPATIENT)
Dept: HEALTH INFORMATION MANAGEMENT | Facility: CLINIC | Age: 76
End: 2021-12-30
Payer: COMMERCIAL

## 2022-02-26 ASSESSMENT — ANXIETY QUESTIONNAIRES
5. BEING SO RESTLESS THAT IT IS HARD TO SIT STILL: NOT AT ALL
7. FEELING AFRAID AS IF SOMETHING AWFUL MIGHT HAPPEN: SEVERAL DAYS
4. TROUBLE RELAXING: SEVERAL DAYS
GAD7 TOTAL SCORE: 6
1. FEELING NERVOUS, ANXIOUS, OR ON EDGE: SEVERAL DAYS
7. FEELING AFRAID AS IF SOMETHING AWFUL MIGHT HAPPEN: SEVERAL DAYS
6. BECOMING EASILY ANNOYED OR IRRITABLE: SEVERAL DAYS
GAD7 TOTAL SCORE: 6
2. NOT BEING ABLE TO STOP OR CONTROL WORRYING: SEVERAL DAYS
3. WORRYING TOO MUCH ABOUT DIFFERENT THINGS: SEVERAL DAYS
GAD7 TOTAL SCORE: 6

## 2022-02-26 ASSESSMENT — PATIENT HEALTH QUESTIONNAIRE - PHQ9
SUM OF ALL RESPONSES TO PHQ QUESTIONS 1-9: 5
SUM OF ALL RESPONSES TO PHQ QUESTIONS 1-9: 5
10. IF YOU CHECKED OFF ANY PROBLEMS, HOW DIFFICULT HAVE THESE PROBLEMS MADE IT FOR YOU TO DO YOUR WORK, TAKE CARE OF THINGS AT HOME, OR GET ALONG WITH OTHER PEOPLE: NOT DIFFICULT AT ALL

## 2022-02-27 ASSESSMENT — PATIENT HEALTH QUESTIONNAIRE - PHQ9: SUM OF ALL RESPONSES TO PHQ QUESTIONS 1-9: 5

## 2022-02-27 ASSESSMENT — ANXIETY QUESTIONNAIRES: GAD7 TOTAL SCORE: 6

## 2022-03-02 ENCOUNTER — OFFICE VISIT (OUTPATIENT)
Dept: FAMILY MEDICINE | Facility: CLINIC | Age: 77
End: 2022-03-02
Payer: COMMERCIAL

## 2022-03-02 VITALS
OXYGEN SATURATION: 98 % | WEIGHT: 100 LBS | SYSTOLIC BLOOD PRESSURE: 140 MMHG | HEART RATE: 76 BPM | BODY MASS INDEX: 21.45 KG/M2 | DIASTOLIC BLOOD PRESSURE: 70 MMHG

## 2022-03-02 DIAGNOSIS — I10 ESSENTIAL HYPERTENSION WITH GOAL BLOOD PRESSURE LESS THAN 130/80: Primary | ICD-10-CM

## 2022-03-02 DIAGNOSIS — G89.29 CHRONIC MIDLINE LOW BACK PAIN WITHOUT SCIATICA: ICD-10-CM

## 2022-03-02 DIAGNOSIS — M54.50 CHRONIC MIDLINE LOW BACK PAIN WITHOUT SCIATICA: ICD-10-CM

## 2022-03-02 DIAGNOSIS — F41.9 ANXIETY: ICD-10-CM

## 2022-03-02 DIAGNOSIS — M81.0 OSTEOPOROSIS, SENILE: ICD-10-CM

## 2022-03-02 PROCEDURE — 99214 OFFICE O/P EST MOD 30 MIN: CPT | Performed by: FAMILY MEDICINE

## 2022-03-02 RX ORDER — CELECOXIB 200 MG/1
CAPSULE ORAL
Qty: 90 CAPSULE | Refills: 3 | Status: SHIPPED | OUTPATIENT
Start: 2022-03-02 | End: 2023-03-20

## 2022-03-02 NOTE — PROGRESS NOTES
Assessment/Plan:    Essential hypertension with goal blood pressure less than 130/80  Hypertension improved control blood pressure 122/68 on recheck today.  Continuing atenolol which had been increased from 25 mg up to 50 mg daily at time of annual wellness visit November 9, 2021.  Anticipate reassessment at annual wellness visit after November 9, 2022.  - REVIEW OF HEALTH MAINTENANCE PROTOCOL ORDERS    Anxiety  Anxiety concerns.  Improvement following weaning from lorazepam per my request.  Initial attempts at use of Remeron as well as hydroxyzine however subsequent switch to buspirone 7.5 mg twice daily with ongoing benefits.  We will continue medication as noted.    Chronic midline low back pain without sciatica  Chronic lower back pain stable.  No longer requiring work comp follow-up visits.  Celebrex 200 mg once daily continues.  Continues nortriptyline 50 mg at bedtime.  - celecoxib (CELEBREX) 200 MG capsule  Dispense: 90 capsule; Refill: 3    Osteoporosis, senile  Prolia injections every 6 months.        Subjective:    Bibiana MORGAN darin is seen today for follow-up evaluation.  Had been seen for wellness visit November 9, 2021 with suboptimal blood pressure control.  Instructed patient to increase atenolol from 25 mg up to 50 mg daily which she has tolerated well.  No side effects.  Also had been on lorazepam.  Instructions to wean from lorazepam and switch to other anxiolytic medication.  Hydroxyzine with risk based on age.  Remeron 7.5 mg substitute and apparently switched subsequently to buspirone 7.5 mg twice daily instead.  This has been helpful after 2 weeks of use and ongoing benefits noted.  Prolia 60 mg every 6 months for osteoporosis.  Nortriptyline 50 mg at bedtime and Celebrex 200 mg daily for chronic lower back pain work comp related injury however does not require any further work comp follow-up visits ongoing.  Continues acarbose 25 mg 3 times daily for hypoglycemic prevention.    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   No siblings   Surgery: emergency RLQ incarcerated strangulated femoral hernia repair 08; [facility]  due to endometriosis; ulcer surgery ; [id number] hernia repair (Dr. JULY Wen); lap choly 12 with Dr. Hayder Montgomery; left eye cataract 21 and right eye cataract 4/15/21     Past Surgical History:   Procedure Laterality Date     BREAST CYST ASPIRATION Left     While ago     HYSTERECTOMY       OOPHORECTOMY Bilateral      [facility] REMOVAL OF OVARY(S)      Description: Oophorectomy;  Recorded: 2009;     ZZC TOTAL ABDOM HYSTERECTOMY      Description: Total Abdominal Hysterectomy;  Recorded: 2009;        Family History   Problem Relation Age of Onset     Breast Cancer Mother 52.00     Cancer Maternal Grandfather 71.00        prostate     Prostate Cancer Maternal Grandfather         Past Medical History:   Diagnosis Date     Anxiety      Breast cyst     While ago        Social History     Tobacco Use     Smoking status: Never Smoker     Smokeless tobacco: Never Used   Substance Use Topics     Alcohol use: No     Drug use: No        Current Outpatient Medications   Medication Sig Dispense Refill     acarbose (PRECOSE) 25 MG tablet [ACARBOSE (PRECOSE) 25 MG TABLET] TAKE 1 TABLET BY MOUTH 3  TIMES A DAY WITH MEALS 270 tablet 1     atenolol (TENORMIN) 50 MG tablet Take 1 tablet (50 mg) by mouth daily 90 tablet 3     BIOTIN ORAL [BIOTIN ORAL] Take 5,000 mcg by mouth 2 (two) times a day.        busPIRone (BUSPAR) 7.5 MG tablet Take 1 tablet (7.5 mg) by mouth 2 times daily 180 tablet 3     calcium-vitamin D (CALCIUM-VITAMIN D) 500 mg(1,250mg) -200 unit per tablet [CALCIUM-VITAMIN D (CALCIUM-VITAMIN D) 500 MG(1,250MG) -200 UNIT PER  TABLET] Take 2 tablets by mouth 2 (two) times a day with meals.       celecoxib (CELEBREX) 200 MG capsule [CELECOXIB (CELEBREX) 200 MG CAPSULE] TAKE ONE CAPSULE BY MOUTH  DAILY 90 capsule 3     denosumab 60 mg/mL Syrg [DENOSUMAB 60 MG/ML SYRG] Inject 60 mg under the skin once.       multivitamin with minerals (THERA-M) 9 mg iron-400 mcg Tab tablet [MULTIVITAMIN WITH MINERALS (THERA-M) 9 MG IRON-400 MCG TAB TABLET] Take 1 tablet by mouth daily.       nortriptyline (PAMELOR) 50 MG capsule TAKE 1 CAPSULE BY MOUTH AT  BEDTIME 90 capsule 3     omeprazole (PRILOSEC) 20 MG capsule [OMEPRAZOLE (PRILOSEC) 20 MG CAPSULE] Take 20 mg by mouth daily before breakfast.            Objective:    Vitals:    03/02/22 1252   BP: (!) 140/70   Pulse: 76   SpO2: 98%   Weight: 45.4 kg (100 lb)      Body mass index is 21.45 kg/m .    Alert.  No apparent distress.  Cooperative and forthcoming.  Blood pressure 122/68 on recheck.  Chest clear.  Cardiac exam regular.  Extremities warm and dry.      This note has been dictated using voice recognition software and as a result may contain minor grammatical errors and unintended word substitutions.   Answers for HPI/ROS submitted by the patient on 2/26/2022  If you checked off any problems, how difficult have these problems made it for you to do your work, take care of things at home, or get along with other people?: Not difficult at all  PHQ9 TOTAL SCORE: 5  ALLISON 7 TOTAL SCORE: 6  Do you check your blood pressure regularly outside of the clinic?: No  Are your blood pressures ever more than 140 on the top number (systolic) OR more than 90 on the bottom number (diastolic)? (For example, greater than 140/90): Yes  Are you following a low salt diet?: No  Depression/Anxiety: Depression & Anxiety  Status since last visit:: good  Anxiety since last: : medium  Other associated symptoms of depression:: No  Other associated symotome: : No  Significant life event: : No  Anxious:: No  Current substance use::  No  How many servings of fruits and vegetables do you eat daily?: 4 or more  On average, how many sweetened beverages do you drink each day (Examples: soda, juice, sweet tea, etc.  Do NOT count diet or artificially sweetened beverages)?: 1  How many minutes a day do you exercise enough to make your heart beat faster?: 20 to 29  How many days a week do you exercise enough to make your heart beat faster?: 7  How many days per week do you miss taking your medication?: 0

## 2022-03-02 NOTE — PROGRESS NOTES
Answers for HPI/ROS submitted by the patient on 2/26/2022  If you checked off any problems, how difficult have these problems made it for you to do your work, take care of things at home, or get along with other people?: Not difficult at all  PHQ9 TOTAL SCORE: 5  ALLISON 7 TOTAL SCORE: 6  Do you check your blood pressure regularly outside of the clinic?: No  Are your blood pressures ever more than 140 on the top number (systolic) OR more than 90 on the bottom number (diastolic)? (For example, greater than 140/90): Yes  Are you following a low salt diet?: No  Depression/Anxiety: Depression & Anxiety  Status since last visit:: good  Anxiety since last: : medium  Other associated symptoms of depression:: No  Other associated symotome: : No  Significant life event: : No  Anxious:: No  Current substance use:: No  How many servings of fruits and vegetables do you eat daily?: 4 or more  On average, how many sweetened beverages do you drink each day (Examples: soda, juice, sweet tea, etc.  Do NOT count diet or artificially sweetened beverages)?: 1  How many minutes a day do you exercise enough to make your heart beat faster?: 20 to 29  How many days a week do you exercise enough to make your heart beat faster?: 7  How many days per week do you miss taking your medication?: 0

## 2022-05-24 ENCOUNTER — TRANSFERRED RECORDS (OUTPATIENT)
Dept: HEALTH INFORMATION MANAGEMENT | Facility: CLINIC | Age: 77
End: 2022-05-24
Payer: COMMERCIAL

## 2022-06-01 ENCOUNTER — TRANSFERRED RECORDS (OUTPATIENT)
Dept: HEALTH INFORMATION MANAGEMENT | Facility: CLINIC | Age: 77
End: 2022-06-01
Payer: COMMERCIAL

## 2022-06-14 DIAGNOSIS — M81.0 OSTEOPOROSIS, SENILE: Primary | ICD-10-CM

## 2022-06-14 DIAGNOSIS — Z92.29 PERSONAL HISTORY OF OTHER DRUG THERAPY: ICD-10-CM

## 2022-06-16 ENCOUNTER — OFFICE VISIT (OUTPATIENT)
Dept: INTERNAL MEDICINE | Facility: CLINIC | Age: 77
End: 2022-06-16
Payer: COMMERCIAL

## 2022-06-16 VITALS
OXYGEN SATURATION: 100 % | HEART RATE: 63 BPM | BODY MASS INDEX: 21.34 KG/M2 | HEIGHT: 57 IN | SYSTOLIC BLOOD PRESSURE: 136 MMHG | WEIGHT: 98.9 LBS | DIASTOLIC BLOOD PRESSURE: 60 MMHG

## 2022-06-16 DIAGNOSIS — K21.00 GASTROESOPHAGEAL REFLUX DISEASE WITH ESOPHAGITIS WITHOUT HEMORRHAGE: ICD-10-CM

## 2022-06-16 DIAGNOSIS — M81.0 OSTEOPOROSIS, SENILE: Primary | ICD-10-CM

## 2022-06-16 PROCEDURE — 99214 OFFICE O/P EST MOD 30 MIN: CPT | Mod: 25 | Performed by: INTERNAL MEDICINE

## 2022-06-16 PROCEDURE — 96372 THER/PROPH/DIAG INJ SC/IM: CPT | Performed by: INTERNAL MEDICINE

## 2022-06-16 NOTE — PATIENT INSTRUCTIONS
Prolia 13th today.  Prolia 14th in 6 months with my nurse. I will see you in 1 year.    DXA in 5/2023 .   Phone number to schedule [phone].    Daily calcium need is 4352-7739 mg a day from the diet and supplements.  Calcium citrate is easier to digest.  Vitamin D 2000 IU daily recommended.

## 2022-06-16 NOTE — PROGRESS NOTES
"  (M81.0) Osteoporosis, senile  (primary encounter diagnosis)  Comment: Tolerating Prolia treatment well.      (K21.00) Gastroesophageal reflux disease with esophagitis without hemorrhage  Comment: On omeprazole.      Acid blocking medications, such as Aciphex, Nexium, Prevacid, Protonix, Prilosec and Nexium are commonly used for heartburn. These medications can increase osteoporosis and fracture risk. A large study of more than 13,000 patients concluded that hip fracture risk increases by 22% after one year of taking the medication and by 54% after 3 years.           Patient was educated on safety of Prolia utilizing Patient Counseling Chart for Healthcare Providers, as outlined by the Prolia REMS progam.     Return in about 6 months (around 12/16/2022) for Prolia with CSS.    Patient Instructions   Prolia 13th today.  Prolia 14th in 6 months with my nurse. I will see you in 1 year.    DXA in 5/2023 .   Phone number to schedule [phone].    Daily calcium need is 3722-6435 mg a day from the diet and supplements.  Calcium citrate is easier to digest.  Vitamin D 2000 IU daily recommended.           /60 (BP Location: Right arm, Patient Position: Sitting, Cuff Size: Adult Regular)   Pulse 63   Ht 1.454 m (4' 9.25\")   Wt 44.9 kg (98 lb 14.4 oz)   SpO2 100%   BMI 21.22 kg/m        Did you experience any problems with previous Prolia injection? no  Any medication change in the last 6 months? no  Did you take prednisone or other immunosupressant drugs in the last 6 months   (chemo, transplant, rheum, dermatology conditions)? no  Did you have any serious infection in the last 6 months?no  Any recent hospitalizations?no  Do you plan any dental work in the next 2-3 months?no  How much calcium do you take daily from the diet and supplements?1200 mg  How much vit D do you take daily? 2000 IU  Last DXA? 5/2021 Reviewed and discussed      Patient is here today for the 13th Prolia injection. Patient tolerated " previous injections well.   We discussed calcium and vit D daily needs today.   I reviewed the lab results:   Ref Range & Units 2 mo ago Resulting Agency   Sodium 136 - 145 mmol/L 139  [facility]   Potassium 3.5 - 5.1 mmol/L 4.1  [facility]   Chloride 98 - 109 mmol/L 102  [facility]   CO2 20 - 29 mmol/L 28  [facility]   Anion Gap 7 - 16 mmol/L 9  [facility]   Calcium 8.4 - 10.4 mg/dL 9.4  [facility]   BUN 7 - 26 mg/dL 17  [facility]   Creatinine 0.55 - 1.02 mg/dL 0.67  [facility]   GFR, Estimated >60 mL/min/1.73m2 >60  [facility]   Glucose 70 - 100 mg/dL 100       Component      Latest Ref Rng & Units 11/9/2021   Vitamin D Deficiency screening      30 - 80 ug/L 52           We discussed high risk of rebound vertebral fractures when Prolia suddenly stopped.    Next Prolia injection will be in 6 months.           This note has been dictated using voice recognition software. Any grammatical or context distortions are unintentional and inherent to the software      Patient Active Problem List   Diagnosis     Essential hypertension with goal blood pressure less than 130/80     Postmenopausal Atrophic Vaginitis     Anxiety     Osteoporosis, senile     Bilateral sensorineural hearing loss     Impaired fasting glucose     Diverticular disease of large intestine     First degree hemorrhoids     Gastroesophageal reflux disease with esophagitis without hemorrhage       Current Outpatient Medications   Medication     acarbose (PRECOSE) 25 MG tablet     atenolol (TENORMIN) 50 MG tablet     BIOTIN ORAL     busPIRone (BUSPAR) 7.5 MG tablet     calcium-vitamin D (CALCIUM-VITAMIN D) 500 mg(1,250mg) -200 unit per tablet     celecoxib (CELEBREX) 200 MG capsule     denosumab 60 mg/mL Syrg     multivitamin with minerals (THERA-M) 9 mg iron-400 mcg Tab tablet     nortriptyline (PAMELOR) 50 MG  capsule     omeprazole (PRILOSEC) 20 MG capsule     Current Facility-Administered Medications   Medication     denosumab (PROLIA) injection 60 mg

## 2022-08-30 DIAGNOSIS — I10 ESSENTIAL HYPERTENSION WITH GOAL BLOOD PRESSURE LESS THAN 130/80: ICD-10-CM

## 2022-08-30 RX ORDER — ATENOLOL 50 MG/1
TABLET ORAL
Qty: 90 TABLET | Refills: 3 | OUTPATIENT
Start: 2022-08-30

## 2022-09-25 ENCOUNTER — HEALTH MAINTENANCE LETTER (OUTPATIENT)
Age: 77
End: 2022-09-25

## 2022-09-29 DIAGNOSIS — I10 ESSENTIAL HYPERTENSION WITH GOAL BLOOD PRESSURE LESS THAN 130/80: ICD-10-CM

## 2022-09-30 RX ORDER — ATENOLOL 50 MG/1
TABLET ORAL
Qty: 90 TABLET | Refills: 3 | Status: SHIPPED | OUTPATIENT
Start: 2022-09-30 | End: 2023-09-10

## 2022-09-30 NOTE — TELEPHONE ENCOUNTER
"Early refill request  Last Written Prescription Date:  11/9/21  Last Fill Quantity: 90,  # refills: 3   Last office visit provider:  6/16/22     Requested Prescriptions   Pending Prescriptions Disp Refills     atenolol (TENORMIN) 50 MG tablet [Pharmacy Med Name: Atenolol 50 MG Oral Tablet] 90 tablet 3     Sig: TAKE 1 TABLET BY MOUTH  DAILY       Beta-Blockers Protocol Passed - 9/29/2022  2:04 PM        Passed - Blood pressure under 140/90 in past 12 months     BP Readings from Last 3 Encounters:   06/16/22 136/60   03/02/22 (!) 140/70   11/09/21 (!) 140/70                 Passed - Patient is age 6 or older        Passed - Recent (12 mo) or future (30 days) visit within the authorizing provider's specialty     Patient has had an office visit with the authorizing provider or a provider within the authorizing providers department within the previous 12 mos or has a future within next 30 days. See \"Patient Info\" tab in inbasket, or \"Choose Columns\" in Meds & Orders section of the refill encounter.              Passed - Medication is active on med list             Delaney Castillo RN 09/30/22 12:33 PM  "

## 2022-11-07 ENCOUNTER — ANCILLARY PROCEDURE (OUTPATIENT)
Dept: MAMMOGRAPHY | Facility: CLINIC | Age: 77
End: 2022-11-07
Attending: FAMILY MEDICINE
Payer: COMMERCIAL

## 2022-11-07 DIAGNOSIS — Z12.31 VISIT FOR SCREENING MAMMOGRAM: ICD-10-CM

## 2022-11-07 PROCEDURE — 77067 SCR MAMMO BI INCL CAD: CPT

## 2022-11-15 ENCOUNTER — TRANSFERRED RECORDS (OUTPATIENT)
Dept: HEALTH INFORMATION MANAGEMENT | Facility: CLINIC | Age: 77
End: 2022-11-15

## 2022-11-25 DIAGNOSIS — F41.9 ANXIETY: ICD-10-CM

## 2022-11-26 RX ORDER — BUSPIRONE HYDROCHLORIDE 7.5 MG/1
TABLET ORAL
Qty: 180 TABLET | Refills: 1 | Status: SHIPPED | OUTPATIENT
Start: 2022-11-26 | End: 2023-05-24

## 2022-11-26 NOTE — TELEPHONE ENCOUNTER
"Last Written Prescription Date:  12/6/21  Last Fill Quantity: 180,  # refills: 3   Last office visit provider:  6/16/22     Requested Prescriptions   Pending Prescriptions Disp Refills     busPIRone (BUSPAR) 7.5 MG tablet [Pharmacy Med Name: BUSPIRONE  7.5MG  TAB] 180 tablet 3     Sig: TAKE 1 TABLET BY MOUTH  TWICE DAILY       Atypical Antidepressants Protocol Passed - 11/25/2022  3:39 PM        Passed - Recent (12 mo) or future (30 days) visit within the authorizing provider's specialty     Patient has had an office visit with the authorizing provider or a provider within the authorizing providers department within the previous 12 mos or has a future within next 30 days. See \"Patient Info\" tab in inbasket, or \"Choose Columns\" in Meds & Orders section of the refill encounter.              Passed - Medication active on med list        Passed - Patient is age 18 or older        Passed - No active pregnancy on record        Passed - No positive pregnancy test in past 12 mos             Edith Garcia RN 11/26/22 5:32 PM  "

## 2022-11-29 DIAGNOSIS — F41.9 ANXIETY: ICD-10-CM

## 2022-11-30 RX ORDER — BUSPIRONE HYDROCHLORIDE 7.5 MG/1
TABLET ORAL
Qty: 180 TABLET | Refills: 1 | OUTPATIENT
Start: 2022-11-30

## 2022-12-21 ENCOUNTER — ALLIED HEALTH/NURSE VISIT (OUTPATIENT)
Dept: FAMILY MEDICINE | Facility: CLINIC | Age: 77
End: 2022-12-21
Payer: COMMERCIAL

## 2022-12-21 DIAGNOSIS — M81.0 OSTEOPOROSIS: Primary | ICD-10-CM

## 2022-12-21 PROCEDURE — 96372 THER/PROPH/DIAG INJ SC/IM: CPT | Performed by: INTERNAL MEDICINE

## 2022-12-21 PROCEDURE — 99207 PR NO CHARGE NURSE ONLY: CPT

## 2022-12-21 NOTE — PROGRESS NOTES
"Prolia Injection Phone Screen      Screening questions have been asked 2-3 days prior to administration visit for Prolia. If any questions are answered with \"Yes,\" this phone encounter were will routed to ordering provider for further evaluation.     1.  When was the last injection?  6/16/22    2.  Has insurance for this injection been verified?  Yes    3.  Did you experience any new onset achiness or rashes that lasted for over a month with your previous Prolia injection?   No    4.  Do you have a fever over 101?F or a new deep cough that is unusual for you today? No    5.  Have you started any new medications in the last 6 months that you were told could affect your immune system? These may have been prescribed by oncologist, transplant, rheumatology, or dermatology.   No    6.  In the last 6 months have you have gastric bypass or parathyroid surgery?   No    7.  Do you plan dental work requiring drilling into the bone such as implants/extractions or oral surgery in the next 2-3 months?   No    Patient informed if symptoms discussed above present prior to their administration appointment, they are to notify clinic immediately.   The following steps were completed to comply with the REMS program for Prolia:  1. Ordering provider has previously reviewed information in the Medication Guide and Patient Counseling Chart, including the serious risks of Prolia  and the symptoms of each risk and have been advised to seek prompt medical attention if they have signs or symptoms of any of the serious risks.  2. Provided each patient a copy of the Medication Guide and Patient Brochure.  See MAR for administration details.   Indication: Prolia  (denosumab) is a prescription medicine used to treat osteoporosis in patients who:   Are at high risk for fracture, meaning patients who have had a fracture related to osteoporosis, or who have multiple risk factors for fracture; Cannot use another osteoporosis medicine or other " osteoporosis medicines did not work well.   The timeline for early/late injections would be 4 weeks early and any time after the 6 month ema. If a patient receives their injection late, then the subsequent injection would be 6 months from the date that they actually received the injection    Have the screening questions been asked prior to this administration? Yes      Joy C Steinert, [facility]

## 2022-12-29 ENCOUNTER — OFFICE VISIT (OUTPATIENT)
Dept: FAMILY MEDICINE | Facility: CLINIC | Age: 77
End: 2022-12-29
Payer: COMMERCIAL

## 2022-12-29 VITALS
HEIGHT: 57 IN | HEART RATE: 70 BPM | SYSTOLIC BLOOD PRESSURE: 126 MMHG | RESPIRATION RATE: 18 BRPM | BODY MASS INDEX: 20.76 KG/M2 | OXYGEN SATURATION: 99 % | DIASTOLIC BLOOD PRESSURE: 66 MMHG | WEIGHT: 96.2 LBS

## 2022-12-29 DIAGNOSIS — Z00.00 ENCOUNTER FOR MEDICARE ANNUAL WELLNESS EXAM: Primary | ICD-10-CM

## 2022-12-29 DIAGNOSIS — I10 ESSENTIAL HYPERTENSION WITH GOAL BLOOD PRESSURE LESS THAN 130/80: ICD-10-CM

## 2022-12-29 DIAGNOSIS — E16.2 HYPOGLYCEMIA: ICD-10-CM

## 2022-12-29 DIAGNOSIS — K21.00 GASTROESOPHAGEAL REFLUX DISEASE WITH ESOPHAGITIS WITHOUT HEMORRHAGE: ICD-10-CM

## 2022-12-29 DIAGNOSIS — F41.9 ANXIETY: ICD-10-CM

## 2022-12-29 DIAGNOSIS — R73.01 IMPAIRED FASTING GLUCOSE: ICD-10-CM

## 2022-12-29 DIAGNOSIS — H90.3 BILATERAL SENSORINEURAL HEARING LOSS: ICD-10-CM

## 2022-12-29 DIAGNOSIS — M81.0 OSTEOPOROSIS, SENILE: ICD-10-CM

## 2022-12-29 DIAGNOSIS — R41.3 MEMORY LOSS: ICD-10-CM

## 2022-12-29 DIAGNOSIS — J47.9 BRONCHIECTASIS WITHOUT COMPLICATION (H): ICD-10-CM

## 2022-12-29 DIAGNOSIS — E55.9 VITAMIN D DEFICIENCY: ICD-10-CM

## 2022-12-29 LAB
ANION GAP SERPL CALCULATED.3IONS-SCNC: 12 MMOL/L (ref 7–15)
BUN SERPL-MCNC: 11 MG/DL (ref 8–23)
CALCIUM SERPL-MCNC: 10.2 MG/DL (ref 8.8–10.2)
CHLORIDE SERPL-SCNC: 98 MMOL/L (ref 98–107)
CHOLEST SERPL-MCNC: 189 MG/DL
CREAT SERPL-MCNC: 0.61 MG/DL (ref 0.51–0.95)
DEPRECATED HCO3 PLAS-SCNC: 28 MMOL/L (ref 22–29)
ERYTHROCYTE [DISTWIDTH] IN BLOOD BY AUTOMATED COUNT: 13.6 % (ref 10–15)
GFR SERPL CREATININE-BSD FRML MDRD: >90 ML/MIN/1.73M2
GLUCOSE SERPL-MCNC: 116 MG/DL (ref 70–99)
HBA1C MFR BLD: 5.9 % (ref 0–5.6)
HCT VFR BLD AUTO: 39.6 % (ref 35–47)
HDLC SERPL-MCNC: 80 MG/DL
HGB BLD-MCNC: 13.2 G/DL (ref 11.7–15.7)
LDLC SERPL CALC-MCNC: 93 MG/DL
MCH RBC QN AUTO: 31.1 PG (ref 26.5–33)
MCHC RBC AUTO-ENTMCNC: 33.3 G/DL (ref 31.5–36.5)
MCV RBC AUTO: 93 FL (ref 78–100)
NONHDLC SERPL-MCNC: 109 MG/DL
PLATELET # BLD AUTO: 227 10E3/UL (ref 150–450)
POTASSIUM SERPL-SCNC: 4.1 MMOL/L (ref 3.4–5.3)
RBC # BLD AUTO: 4.25 10E6/UL (ref 3.8–5.2)
SODIUM SERPL-SCNC: 138 MMOL/L (ref 136–145)
TRIGL SERPL-MCNC: 80 MG/DL
WBC # BLD AUTO: 4.2 10E3/UL (ref 4–11)

## 2022-12-29 PROCEDURE — 80048 BASIC METABOLIC PNL TOTAL CA: CPT | Performed by: FAMILY MEDICINE

## 2022-12-29 PROCEDURE — 82306 VITAMIN D 25 HYDROXY: CPT | Performed by: FAMILY MEDICINE

## 2022-12-29 PROCEDURE — 80061 LIPID PANEL: CPT | Performed by: FAMILY MEDICINE

## 2022-12-29 PROCEDURE — 85027 COMPLETE CBC AUTOMATED: CPT | Performed by: FAMILY MEDICINE

## 2022-12-29 PROCEDURE — 99214 OFFICE O/P EST MOD 30 MIN: CPT | Mod: 25 | Performed by: FAMILY MEDICINE

## 2022-12-29 PROCEDURE — G0439 PPPS, SUBSEQ VISIT: HCPCS | Performed by: FAMILY MEDICINE

## 2022-12-29 PROCEDURE — 83036 HEMOGLOBIN GLYCOSYLATED A1C: CPT | Performed by: FAMILY MEDICINE

## 2022-12-29 PROCEDURE — 36415 COLL VENOUS BLD VENIPUNCTURE: CPT | Performed by: FAMILY MEDICINE

## 2022-12-29 ASSESSMENT — ENCOUNTER SYMPTOMS
DYSURIA: 0
NERVOUS/ANXIOUS: 0
HEADACHES: 0
DIZZINESS: 0
MYALGIAS: 0
FEVER: 0
HEMATURIA: 0
NAUSEA: 0
SHORTNESS OF BREATH: 0
EYE PAIN: 0
DIARRHEA: 0
HEARTBURN: 0
ABDOMINAL PAIN: 0
FREQUENCY: 0
PARESTHESIAS: 0
HEMATOCHEZIA: 0
SORE THROAT: 0
PALPITATIONS: 0
JOINT SWELLING: 0
WEAKNESS: 0
ARTHRALGIAS: 0
CHILLS: 0
COUGH: 0
CONSTIPATION: 0
BREAST MASS: 0

## 2022-12-29 ASSESSMENT — ACTIVITIES OF DAILY LIVING (ADL): CURRENT_FUNCTION: NO ASSISTANCE NEEDED

## 2022-12-29 NOTE — PROGRESS NOTES
SUBJECTIVE:   Bibiana is a 77 year old who presents for Preventive Visit.       Patient has been advised of split billing requirements and indicates understanding: Yes  Are you in the first 12 months of your Medicare coverage?  No        Annual wellness visit completed.  Risk questionnaire reviewed in detail.  Patient does not recognize significant memory difficulties.  Patient does have history of hypertension treated with atenolol 50 mg daily.  Had previously been on lorazepam for anxiety and sleep but now utilizing buspirone 7.5 mg twice daily which she tolerates well and seems to be effective.  Chronic low back pain as well.  Has used Celebrex 200 mg daily historically.  Osteoporosis historically is well treated with Prolia every 6 months.  Impaired fasting glucose.  Has had episodes of hypoglycemia and utilizes acarbose 25 mg 3 times daily.  Omeprazole 20 mg daily for reflux management.  Immunizations reviewed and up-to-date.  Had colonoscopy 2016.  Nonfasting today.  Prior A1c of 5.9%.  Comprehensive review of systems as above otherwise all negative.      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   No siblings   Surgery: emergency RLQ incarcerated strangulated femoral hernia repair 08; [place]-BSO  due to endometriosis; ulcer surgery ; [id number] hernia repair (Dr. JULY Wen); lap choly 12 with Dr. Hayder Montgomery; left eye cataract 21 and right eye cataract 4/15/21         Healthy Habits:     In general, how would you rate your overall health?  Good    Frequency of exercise:  4-5 days/week    Duration of exercise:  30-45 minutes    Do you usually eat at least 4 servings of fruit and vegetables a day, include whole grains    & fiber and avoid  "regularly eating high fat or \"junk\" foods?  Yes    Taking medications regularly:  Yes    Medication side effects:  None    Ability to successfully perform activities of daily living:  No assistance needed    Home Safety:  No safety concerns identified    Hearing Impairment:  Difficulty following a conversation in a noisy restaurant or crowded room, find that men's voices are easier to understand than woman's and difficulty understanding soft or whispered speech    In the past 6 months, have you been bothered by leaking of urine?  No    In general, how would you rate your overall mental or emotional health?  Good      PHQ-2 Total Score: 2    Additional concerns today:  No      Have you ever done Advance Care Planning? (For example, a Health Directive, POLST, or a discussion with a medical provider or your loved ones about your wishes): Yes, advance care planning is on file.       Fall risk  Fallen 2 or more times in the past year?: No  Any fall with injury in the past year?: No    Cognitive Screening   1) Repeat 3 items (Leader, Season, Table)    2) Clock draw: ABNORMAL    3) 3 item recall: Recalls 2 objects   Results: ABNORMAL clock, 1-2 items recalled: PROBABLE COGNITIVE IMPAIRMENT, **INFORM PROVIDER**    Mini-CogTM Copyright RUBA Rodriguez. Licensed by the author for use in [facility]; reprinted with permission (soob@[facility]). All rights reserved.      Do you have sleep apnea, excessive snoring or daytime drowsiness?: no    Reviewed and updated as needed this visit by clinical staff                  Reviewed and updated as needed this visit by Provider                 Social History     Tobacco Use     Smoking status: Never     Smokeless tobacco: Never   Substance Use Topics     Alcohol use: No     If you drink alcohol do you typically have >3 drinks per day or >7 drinks per week? No    Alcohol Use 12/29/2022   Prescreen: >3 drinks/day or >7 drinks/week? No   Prescreen: >3 drinks/day or >7 drinks/week? - "               Current providers sharing in care for this patient include:   Patient Care Team:  Zack Woody MD as PCP - General  Zack Woody MD as Assigned PCP  Gustavo Tran MD as Physician (Internal Medicine)    The following health maintenance items are reviewed in Epic and correct as of today:  Health Maintenance   Topic Date Due     ANNUAL REVIEW OF HM ORDERS  03/02/2023     LIPID  11/23/2023     MEDICARE ANNUAL WELLNESS VISIT  12/29/2023     FALL RISK ASSESSMENT  12/29/2023     ADVANCE CARE PLANNING  12/29/2027     DTAP/TDAP/TD IMMUNIZATION (6 - Td or Tdap) 11/23/2028     DEXA  05/04/2036     HEPATITIS C SCREENING  Completed     PHQ-2 (once per calendar year)  Completed     INFLUENZA VACCINE  Completed     Pneumococcal Vaccine: 65+ Years  Completed     ZOSTER IMMUNIZATION  Completed     COVID-19 Vaccine  Completed     IPV IMMUNIZATION  Aged Out     MENINGITIS IMMUNIZATION  Aged Out     COLORECTAL CANCER SCREENING  Discontinued     Lab work is in process  Labs reviewed in EPIC  BP Readings from Last 3 Encounters:   12/29/22 126/66   06/16/22 136/60   03/02/22 (!) 140/70    Wt Readings from Last 3 Encounters:   12/29/22 43.6 kg (96 lb 3.2 oz)   06/16/22 44.9 kg (98 lb 14.4 oz)   03/02/22 45.4 kg (100 lb)                  Patient Active Problem List   Diagnosis     Essential hypertension with goal blood pressure less than 130/80     Postmenopausal Atrophic Vaginitis     Anxiety     Osteoporosis, senile     Bilateral sensorineural hearing loss     Impaired fasting glucose     Diverticular disease of large intestine     First degree hemorrhoids     Gastroesophageal reflux disease with esophagitis without hemorrhage     Bronchiectasis without complication (H)     Past Surgical History:   Procedure Laterality Date     BREAST CYST ASPIRATION Left     While ago     HYSTERECTOMY  1985     OOPHORECTOMY Bilateral 1985     [facility] REMOVAL OF OVARY(S)      Description: Oophorectomy;  Recorded: 12/17/2009;     [facility]  TOTAL ABDOM HYSTERECTOMY      Description: Total Abdominal Hysterectomy;  Recorded: 12/17/2009;       Social History     Tobacco Use     Smoking status: Never     Smokeless tobacco: Never   Substance Use Topics     Alcohol use: No     Family History   Problem Relation Age of Onset     Breast Cancer Mother 52.00     Cancer Maternal Grandfather 71.00        prostate     Prostate Cancer Maternal Grandfather          Current Outpatient Medications   Medication Sig Dispense Refill     acarbose (PRECOSE) 25 MG tablet [ACARBOSE (PRECOSE) 25 MG TABLET] TAKE 1 TABLET BY MOUTH 3  TIMES A DAY WITH MEALS 270 tablet 1     atenolol (TENORMIN) 50 MG tablet TAKE 1 TABLET BY MOUTH  DAILY 90 tablet 3     BIOTIN ORAL [BIOTIN ORAL] Take 5,000 mcg by mouth 2 (two) times a day.        busPIRone (BUSPAR) 7.5 MG tablet TAKE 1 TABLET BY MOUTH  TWICE DAILY 180 tablet 1     calcium-vitamin D (CALCIUM-VITAMIN D) 500 mg(1,250mg) -200 unit per tablet [CALCIUM-VITAMIN D (CALCIUM-VITAMIN D) 500 MG(1,250MG) -200 UNIT PER TABLET] Take 2 tablets by mouth 2 (two) times a day with meals.       celecoxib (CELEBREX) 200 MG capsule [CELECOXIB (CELEBREX) 200 MG CAPSULE] TAKE ONE CAPSULE BY MOUTH  DAILY 90 capsule 3     denosumab 60 mg/mL Syrg [DENOSUMAB 60 MG/ML SYRG] Inject 60 mg under the skin once.       multivitamin with minerals (THERA-M) 9 mg iron-400 mcg Tab tablet [MULTIVITAMIN WITH MINERALS (THERA-M) 9 MG IRON-400 MCG TAB TABLET] Take 1 tablet by mouth daily.       nortriptyline (PAMELOR) 50 MG capsule TAKE 1 CAPSULE BY MOUTH AT  BEDTIME 90 capsule 3     omeprazole (PRILOSEC) 20 MG capsule [OMEPRAZOLE (PRILOSEC) 20 MG CAPSULE] Take 20 mg by mouth daily before breakfast.       Allergies   Allergen Reactions     Prochlorperazine Edisylate [Prochlorperazine] Other (See Comments)     Extrapyramidal side effects     Immunizations UTD    Mammogram Screening - Patient over age 75, has elected to continue with screening.  Pertinent mammograms are reviewed  "under the imaging tab.    Review of Systems   Constitutional: Negative for chills and fever.   HENT: Positive for ear pain and hearing loss. Negative for congestion and sore throat.    Eyes: Negative for pain and visual disturbance.   Respiratory: Negative for cough and shortness of breath.    Cardiovascular: Negative for chest pain, palpitations and peripheral edema.   Gastrointestinal: Negative for abdominal pain, constipation, diarrhea, heartburn, hematochezia and nausea.   Breasts:  Negative for tenderness, breast mass and discharge.   Genitourinary: Negative for dysuria, frequency, genital sores, hematuria, pelvic pain, urgency, vaginal bleeding and vaginal discharge.   Musculoskeletal: Negative for arthralgias, joint swelling and myalgias.   Skin: Negative for rash.   Neurological: Negative for dizziness, weakness, headaches and paresthesias.   Psychiatric/Behavioral: Negative for mood changes. The patient is not nervous/anxious.      Constitutional, HEENT, cardiovascular, pulmonary, GI, , musculoskeletal, neuro, skin, endocrine and psych systems are negative, except as otherwise noted.    OBJECTIVE:   /66   Pulse 70   Resp 18   Ht 1.454 m (4' 9.25\")   Wt 43.6 kg (96 lb 3.2 oz)   LMP  (LMP Unknown)   SpO2 99%   BMI 20.64 kg/m   Estimated body mass index is 20.64 kg/m  as calculated from the following:    Height as of this encounter: 1.454 m (4' 9.25\").    Weight as of this encounter: 43.6 kg (96 lb 3.2 oz).     Physical Exam  GENERAL APPEARANCE: healthy, alert and no distress  EYES: Eyes grossly normal to inspection, PERRL and conjunctivae and sclerae normal  HENT: ear canals and TM's normal, nose and mouth without ulcers or lesions, oropharynx clear and oral mucous membranes moist  NECK: no adenopathy, no asymmetry, masses, or scars and thyroid normal to palpation  RESP: lungs clear to auscultation - no rales, rhonchi or wheezes  BREAST: normal without masses, tenderness or nipple discharge and " no palpable axillary masses or adenopathy  CV: regular rate and rhythm, normal S1 S2, no S3 or S4, no murmur, click or rub, no peripheral edema and peripheral pulses strong  ABDOMEN: soft, nontender, no hepatosplenomegaly, no masses and bowel sounds normal  MS: no musculoskeletal defects are noted and gait is age appropriate without ataxia  SKIN: no suspicious lesions or rashes  NEURO: Normal strength and tone, sensory exam grossly normal, mentation intact and speech normal  PSYCH: mentation appears normal and affect normal/bright    Diagnostic Test Results:  Labs reviewed in Epic  Results for orders placed or performed in visit on 12/29/22 (from the past 24 hour(s))   CBC with platelets   Result Value Ref Range    WBC Count 4.2 4.0 - 11.0 10e3/uL    RBC Count 4.25 3.80 - 5.20 10e6/uL    Hemoglobin 13.2 11.7 - 15.7 g/dL    Hematocrit 39.6 35.0 - 47.0 %    MCV 93 78 - 100 fL    MCH 31.1 26.5 - 33.0 pg    MCHC 33.3 31.5 - 36.5 g/dL    RDW 13.6 10.0 - 15.0 %    Platelet Count 227 150 - 450 10e3/uL   Hemoglobin A1c   Result Value Ref Range    Hemoglobin A1C 5.9 (H) 0.0 - 5.6 %       ASSESSMENT / PLAN:     Encounter for Medicare annual wellness exam  Annual wellness visit completed.  Risk questionnaire reviewed in detail.  Annual wellness visits to continue.    Bronchiectasis without complication (H)  Bronchiectasis history.  Stable.  CBC updated.  Non-smoker.  No recent respiratory illness.  Immunizations up-to-date involving Covid-19, pneumococcal and influenza protection.  - CBC with platelets  - CBC with platelets    Essential hypertension with goal blood pressure less than 130/80  Hypertension appears well controlled blood pressure 126/66 on recheck and will continue atenolol 50 mg daily.  - Basic metabolic panel  - Lipid panel reflex to direct LDL Non-fasting  - Basic metabolic panel  - Lipid panel reflex to direct LDL Non-fasting    Impaired fasting glucose  Update A1c today with history of impaired fasting  glucose.  Nonfasting currently.  We will continue to follow with reassessment of 6-month interval  - Basic metabolic panel  - Hemoglobin A1c  - Basic metabolic panel  - Hemoglobin A1c    Osteoporosis, senile  Osteoporosis historically.  Prolia every 6 months.    Bilateral sensorineural hearing loss  Bilateral hearing aid utilization historically    Gastroesophageal reflux disease with esophagitis without hemorrhage  Reflux management ongoing without described breakthrough symptoms.  Omeprazole 20 mg daily as needed.    Anxiety  Benefits of buspirone 7.5 mg twice daily ongoing.    Hypoglycemia  Acarbose 25 mg 3 times daily without recurrent episodes of hypoglycemia.    Vitamin D deficiency  Ensure adequate vitamin D replacement.  - Vitamin D Deficiency  - Vitamin D Deficiency    Memory loss  Cognitive testing today with abnormal clock and hand position as well as missing 1 of 3 words on recall.  Repeat test with slums test at follow-up in 6 months.       Patient has been advised of split billing requirements and indicates understanding: Yes      COUNSELING:  Reviewed preventive health counseling, as reflected in patient instructions       Regular exercise       Healthy diet/nutrition       Vision screening       Hearing screening       Dental care       Bladder control       Fall risk prevention       Osteoporosis prevention/bone health       Colon cancer screening       (Razia)menopause management       Advanced Planning         She reports that she has never smoked. She has never used smokeless tobacco.      Appropriate preventive services were discussed with this patient, including applicable screening as appropriate for cardiovascular disease, diabetes, osteopenia/osteoporosis, and glaucoma.  As appropriate for age/gender, discussed screening for colorectal cancer, prostate cancer, breast cancer, and cervical cancer. Checklist reviewing preventive services available has been given to the patient.    Reviewed  patients plan of care and provided an AVS. The Intermediate Care Plan ( asthma action plan, low back pain action plan, and migraine action plan) for Bibiana meets the Care Plan requirement. This Care Plan has been established and reviewed with the Patient.          Zack Woody MD  [facility]    Identified Health Risks:   DISCHARGE

## 2022-12-30 LAB — DEPRECATED CALCIDIOL+CALCIFEROL SERPL-MC: 59 UG/L (ref 20–75)

## 2023-01-26 DIAGNOSIS — M54.50 CHRONIC MIDLINE LOW BACK PAIN WITHOUT SCIATICA: ICD-10-CM

## 2023-01-26 DIAGNOSIS — G89.29 CHRONIC MIDLINE LOW BACK PAIN WITHOUT SCIATICA: ICD-10-CM

## 2023-01-26 RX ORDER — NORTRIPTYLINE HYDROCHLORIDE 50 MG/1
CAPSULE ORAL
Qty: 90 CAPSULE | Refills: 3 | Status: SHIPPED | OUTPATIENT
Start: 2023-01-26 | End: 2024-01-17

## 2023-01-27 NOTE — TELEPHONE ENCOUNTER
"Last Written Prescription Date:  12/1/21  Last Fill Quantity: 90,  # refills: 3   Last office visit provider:  12/29/22     Requested Prescriptions   Pending Prescriptions Disp Refills     nortriptyline (PAMELOR) 50 MG capsule [Pharmacy Med Name: Nortriptyline HCl 50 MG Oral Capsule] 90 capsule 3     Sig: TAKE 1 CAPSULE BY MOUTH AT  BEDTIME       Tricyclic Agents ( Annual appt and no PHQ9) Passed - 1/26/2023  4:22 AM        Passed - Blood Pressure under 140/90 in past 12 mos     BP Readings from Last 3 Encounters:   12/29/22 126/66   06/16/22 136/60   03/02/22 (!) 140/70                 Passed - Recent (12 mo) or future (30 days) visit within authorizing provider's specialty     Patient has had an office visit with the authorizing provider or a provider within the authorizing providers department within the previous 12 mos or has a future within next 30 days. See \"Patient Info\" tab in inbasket, or \"Choose Columns\" in Meds & Orders section of the refill encounter.              Passed - Medication is active on med list        Passed - Patient is age 18 or older        Passed - Patient is not pregnant        Passed - No positive pregnancy test on record in past 12 mos             Edith Garcia RN 01/26/23 6:25 PM  "

## 2023-03-20 DIAGNOSIS — G89.29 CHRONIC MIDLINE LOW BACK PAIN WITHOUT SCIATICA: ICD-10-CM

## 2023-03-20 DIAGNOSIS — M54.50 CHRONIC MIDLINE LOW BACK PAIN WITHOUT SCIATICA: ICD-10-CM

## 2023-03-20 RX ORDER — CELECOXIB 200 MG/1
CAPSULE ORAL
Qty: 90 CAPSULE | Refills: 3 | Status: SHIPPED | OUTPATIENT
Start: 2023-03-20 | End: 2024-04-25

## 2023-03-20 NOTE — TELEPHONE ENCOUNTER
"Routing refill request to provider for review/approval because:  Labs not current:  Last done 2/27/2018    Last Written Prescription Date:  3/2/2022  Last Fill Quantity: 90,  # refills: 3   Last office visit provider:  12/29/2022     Requested Prescriptions   Pending Prescriptions Disp Refills     celecoxib (CELEBREX) 200 MG capsule [Pharmacy Med Name: Celecoxib 200 MG Oral Capsule] 90 capsule 3     Sig: TAKE 1 CAPSULE BY MOUTH  DAILY       NSAID Medications Failed - 3/20/2023  1:47 PM        Failed - Normal ALT on file in past 12 months     Recent Labs   Lab Test 02/27/18  0605   ALT 45             Failed - Normal AST on file in past 12 months     Recent Labs   Lab Test 02/27/18  0605   AST 36             Failed - Patient is age 6-64 years        Passed - Blood pressure under 140/90 in past 12 months     BP Readings from Last 3 Encounters:   12/29/22 126/66   06/16/22 136/60   03/02/22 (!) 140/70                 Passed - Recent (12 mo) or future (30 days) visit within the authorizing provider's specialty     Patient has had an office visit with the authorizing provider or a provider within the authorizing providers department within the previous 12 mos or has a future within next 30 days. See \"Patient Info\" tab in inbasket, or \"Choose Columns\" in Meds & Orders section of the refill encounter.              Passed - Normal CBC on file in past 12 months     Recent Labs   Lab Test 12/29/22  1456   WBC 4.2   RBC 4.25   HGB 13.2   HCT 39.6                    Passed - Medication is active on med list        Passed - No active pregnancy on record        Passed - Normal serum creatinine on file in past 12 months     Recent Labs   Lab Test 12/29/22  1456   CR 0.61       Ok to refill medication if creatinine is low          Passed - No positive pregnancy test in past 12 months             Janene De Leon RN 03/20/23 1:47 PM  "

## 2023-05-19 ENCOUNTER — TELEPHONE (OUTPATIENT)
Dept: FAMILY MEDICINE | Facility: CLINIC | Age: 78
End: 2023-05-19

## 2023-05-19 ENCOUNTER — OFFICE VISIT (OUTPATIENT)
Dept: FAMILY MEDICINE | Facility: CLINIC | Age: 78
End: 2023-05-19
Payer: COMMERCIAL

## 2023-05-19 VITALS
BODY MASS INDEX: 21.04 KG/M2 | OXYGEN SATURATION: 98 % | DIASTOLIC BLOOD PRESSURE: 66 MMHG | SYSTOLIC BLOOD PRESSURE: 104 MMHG | WEIGHT: 98.1 LBS | RESPIRATION RATE: 20 BRPM | TEMPERATURE: 98.4 F | HEART RATE: 76 BPM

## 2023-05-19 DIAGNOSIS — R41.3 MEMORY LOSS: ICD-10-CM

## 2023-05-19 DIAGNOSIS — R41.3 MEMORY LOSS: Primary | ICD-10-CM

## 2023-05-19 LAB
ERYTHROCYTE [DISTWIDTH] IN BLOOD BY AUTOMATED COUNT: 14 % (ref 10–15)
HCT VFR BLD AUTO: 39.6 % (ref 35–47)
HGB BLD-MCNC: 12.9 G/DL (ref 11.7–15.7)
MCH RBC QN AUTO: 30.6 PG (ref 26.5–33)
MCHC RBC AUTO-ENTMCNC: 32.6 G/DL (ref 31.5–36.5)
MCV RBC AUTO: 94 FL (ref 78–100)
PLATELET # BLD AUTO: 211 10E3/UL (ref 150–450)
RBC # BLD AUTO: 4.21 10E6/UL (ref 3.8–5.2)
WBC # BLD AUTO: 6.5 10E3/UL (ref 4–11)

## 2023-05-19 PROCEDURE — 80053 COMPREHEN METABOLIC PANEL: CPT | Performed by: FAMILY MEDICINE

## 2023-05-19 PROCEDURE — 82607 VITAMIN B-12: CPT | Performed by: FAMILY MEDICINE

## 2023-05-19 PROCEDURE — 85027 COMPLETE CBC AUTOMATED: CPT | Performed by: FAMILY MEDICINE

## 2023-05-19 PROCEDURE — 36415 COLL VENOUS BLD VENIPUNCTURE: CPT | Performed by: FAMILY MEDICINE

## 2023-05-19 PROCEDURE — 99214 OFFICE O/P EST MOD 30 MIN: CPT | Performed by: FAMILY MEDICINE

## 2023-05-19 PROCEDURE — 86780 TREPONEMA PALLIDUM: CPT | Performed by: FAMILY MEDICINE

## 2023-05-19 RX ORDER — DONEPEZIL HYDROCHLORIDE 5 MG/1
5 TABLET, FILM COATED ORAL AT BEDTIME
Qty: 90 TABLET | Refills: 3 | Status: SHIPPED | OUTPATIENT
Start: 2023-05-19 | End: 2023-10-20

## 2023-05-19 RX ORDER — DONEPEZIL HYDROCHLORIDE 5 MG/1
5 TABLET, FILM COATED ORAL AT BEDTIME
Qty: 30 TABLET | Refills: 3 | Status: SHIPPED | OUTPATIENT
Start: 2023-05-19 | End: 2023-05-19

## 2023-05-19 ASSESSMENT — ANXIETY QUESTIONNAIRES
5. BEING SO RESTLESS THAT IT IS HARD TO SIT STILL: NOT AT ALL
4. TROUBLE RELAXING: NOT AT ALL
3. WORRYING TOO MUCH ABOUT DIFFERENT THINGS: NOT AT ALL
GAD7 TOTAL SCORE: 0
1. FEELING NERVOUS, ANXIOUS, OR ON EDGE: NOT AT ALL
7. FEELING AFRAID AS IF SOMETHING AWFUL MIGHT HAPPEN: NOT AT ALL
GAD7 TOTAL SCORE: 0
8. IF YOU CHECKED OFF ANY PROBLEMS, HOW DIFFICULT HAVE THESE MADE IT FOR YOU TO DO YOUR WORK, TAKE CARE OF THINGS AT HOME, OR GET ALONG WITH OTHER PEOPLE?: NOT DIFFICULT AT ALL
IF YOU CHECKED OFF ANY PROBLEMS ON THIS QUESTIONNAIRE, HOW DIFFICULT HAVE THESE PROBLEMS MADE IT FOR YOU TO DO YOUR WORK, TAKE CARE OF THINGS AT HOME, OR GET ALONG WITH OTHER PEOPLE: NOT DIFFICULT AT ALL
GAD7 TOTAL SCORE: 0
2. NOT BEING ABLE TO STOP OR CONTROL WORRYING: NOT AT ALL
7. FEELING AFRAID AS IF SOMETHING AWFUL MIGHT HAPPEN: NOT AT ALL
6. BECOMING EASILY ANNOYED OR IRRITABLE: NOT AT ALL

## 2023-05-19 ASSESSMENT — PATIENT HEALTH QUESTIONNAIRE - PHQ9
SUM OF ALL RESPONSES TO PHQ QUESTIONS 1-9: 3
10. IF YOU CHECKED OFF ANY PROBLEMS, HOW DIFFICULT HAVE THESE PROBLEMS MADE IT FOR YOU TO DO YOUR WORK, TAKE CARE OF THINGS AT HOME, OR GET ALONG WITH OTHER PEOPLE: SOMEWHAT DIFFICULT
SUM OF ALL RESPONSES TO PHQ QUESTIONS 1-9: 3

## 2023-05-19 ASSESSMENT — PAIN SCALES - GENERAL: PAINLEVEL: NO PAIN (0)

## 2023-05-19 NOTE — TELEPHONE ENCOUNTER
Received fax from [employer] that patient is requesting 90 day supply at a time. I changed the qty , but kept the number of refills. Please review and send if appropriate.

## 2023-05-19 NOTE — PROGRESS NOTES
Assessment/Plan:    Memory loss  Memory loss concerns noted.  Slums test documenting 17 points out of 30 with score consistent with presumed dementia etiology.  Lab assessment updated.  Donepezil 5 mg at bedtime.  Neurology referral placed for further evaluation and and treatment options.  Reassess in office no later than 3 months.  Patient's daughter Rosa Isela was with today and able to participate in treatment plan recommendations.  - REVIEW OF HEALTH MAINTENANCE PROTOCOL ORDERS  - Vitamin B12  - CBC with platelets  - Treponema Abs w Reflex to RPR and Titer  - Adult Neurology  Referral  - Comprehensive metabolic panel  - donepezil (ARICEPT) 5 MG tablet  Dispense: 30 tablet; Refill: 3  - Vitamin B12  - CBC with platelets  - Treponema Abs w Reflex to RPR and Titer  - Comprehensive metabolic panel        Subjective:    Bibiana Mcrae is seen today for memory difficulties.  Progressive.  We did review this at her prior wellness visit 2022.  Patient has not completed slums testing apparently.  Patient does recognize some memory difficulties however does not describe significant worsening however slow progression likely.  Comprehensive review of systems as above otherwise all negative.  Patient does describe family history and patient's father of likely Alzheimer's disease.  Patient without headache.  No other focal neurologic concern.  Denies history of TIA or CVA issues.  Home medications remained stable.  No history of hepatic cirrhosis.  Does have underlying history of bronchiectasis.  No hypoxia associated with O2 sats today 98% room air.  Atenolol 50 mg daily for hypertension while utilizing buspirone 7.5 mg twice daily for anxiety management.       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   No siblings   Surgery: emergency RLQ incarcerated strangulated femoral hernia repair 9/28/08; [place]-BSO 1985 due to endometriosis; ulcer surgery 1978; 12/04/0997-[id number] hernia repair (Dr. JULY Wen); lap choly 9/6/12 with Dr. Hayder Montgomery; left eye cataract 4/8/21 and right eye cataract 4/15/21       Past Surgical History:   Procedure Laterality Date     BREAST CYST ASPIRATION Left     While ago     HYSTERECTOMY  1985     OOPHORECTOMY Bilateral 1985     [facility] REMOVAL OF OVARY(S)      Description: Oophorectomy;  Recorded: 12/17/2009;     [facility] TOTAL ABDOM HYSTERECTOMY      Description: Total Abdominal Hysterectomy;  Recorded: 12/17/2009;        Family History   Problem Relation Age of Onset     Breast Cancer Mother 52.00     Cancer Maternal Grandfather 71.00        prostate     Prostate Cancer Maternal Grandfather         Past Medical History:   Diagnosis Date     Anxiety      Breast cyst     While ago        Social History     Tobacco Use     Smoking status: Never     Smokeless tobacco: Never   Substance Use Topics     Alcohol use: No     Drug use: No        Current Outpatient Medications   Medication Sig Dispense Refill     acarbose (PRECOSE) 25 MG tablet [ACARBOSE (PRECOSE) 25 MG TABLET] TAKE 1 TABLET BY MOUTH 3  TIMES A DAY WITH MEALS 270 tablet 1     atenolol (TENORMIN) 50 MG tablet TAKE 1 TABLET BY MOUTH  DAILY 90 tablet 3     BIOTIN ORAL [BIOTIN ORAL] Take 5,000 mcg by mouth 2 (two) times a day.        busPIRone (BUSPAR) 7.5 MG tablet TAKE 1 TABLET BY MOUTH  TWICE DAILY 180 tablet 1     calcium-vitamin D (CALCIUM-VITAMIN D) 500 mg(1,250mg) -200 unit per tablet [CALCIUM-VITAMIN D (CALCIUM-VITAMIN D) 500 MG(1,250MG) -200 UNIT PER TABLET] Take 2 tablets by mouth 2 (two) times a day with meals.       celecoxib (CELEBREX) 200 MG capsule TAKE 1 CAPSULE BY MOUTH  DAILY 90 capsule 3     denosumab 60 mg/mL Syrg [DENOSUMAB 60 MG/ML SYRG] Inject 60 mg under the skin once.       donepezil (ARICEPT) 5  MG tablet Take 1 tablet (5 mg) by mouth At Bedtime 30 tablet 3     multivitamin with minerals (THERA-M) 9 mg iron-400 mcg Tab tablet [MULTIVITAMIN WITH MINERALS (THERA-M) 9 MG IRON-400 MCG TAB TABLET] Take 1 tablet by mouth daily.       nortriptyline (PAMELOR) 50 MG capsule TAKE 1 CAPSULE BY MOUTH AT  BEDTIME 90 capsule 3     omeprazole (PRILOSEC) 20 MG capsule [OMEPRAZOLE (PRILOSEC) 20 MG CAPSULE] Take 20 mg by mouth daily before breakfast.            Objective:    Vitals:    05/19/23 1057   BP: 104/66   Pulse: 76   Resp: 20   Temp: 98.4  F (36.9  C)   SpO2: 98%   Weight: 44.5 kg (98 lb 1.6 oz)      Body mass index is 21.04 kg/m .    Alert.  Quiet affect.  Patient's daughter Rosa Isela is with today.  Chest clear with symmetric expansion.  Cardiac exam regular.  Extremities warm and dry.  Does demonstrate some forgetfulness throughout history portion of visit.      This note has been dictated using voice recognition software and as a result may contain minor grammatical errors and unintended word substitutions.         Answers for HPI/ROS submitted by the patient on 5/19/2023  If you checked off any problems, how difficult have these problems made it for you to do your work, take care of things at home, or get along with other people?: Somewhat difficult  PHQ9 TOTAL SCORE: 3  ALLISON 7 TOTAL SCORE: 0  What is the reason for your visit today? : mental  How many servings of fruits and vegetables do you eat daily?: 4 or more  On average, how many sweetened beverages do you drink each day (Examples: soda, juice, sweet tea, etc.  Do NOT count diet or artificially sweetened beverages)?: 2  How many minutes a day do you exercise enough to make your heart beat faster?: 30 to 60  How many days a week do you exercise enough to make your heart beat faster?: 6  How many days per week do you miss taking your medication?: 0

## 2023-05-20 LAB
ALBUMIN SERPL BCG-MCNC: 4.4 G/DL (ref 3.5–5.2)
ALP SERPL-CCNC: 74 U/L (ref 35–104)
ALT SERPL W P-5'-P-CCNC: 30 U/L (ref 10–35)
ANION GAP SERPL CALCULATED.3IONS-SCNC: 13 MMOL/L (ref 7–15)
AST SERPL W P-5'-P-CCNC: 41 U/L (ref 10–35)
BILIRUB SERPL-MCNC: 0.4 MG/DL
BUN SERPL-MCNC: 17.6 MG/DL (ref 8–23)
CALCIUM SERPL-MCNC: 9.6 MG/DL (ref 8.8–10.2)
CHLORIDE SERPL-SCNC: 103 MMOL/L (ref 98–107)
CREAT SERPL-MCNC: 0.64 MG/DL (ref 0.51–0.95)
DEPRECATED HCO3 PLAS-SCNC: 23 MMOL/L (ref 22–29)
GFR SERPL CREATININE-BSD FRML MDRD: 90 ML/MIN/1.73M2
GLUCOSE SERPL-MCNC: 88 MG/DL (ref 70–99)
POTASSIUM SERPL-SCNC: 4.6 MMOL/L (ref 3.4–5.3)
PROT SERPL-MCNC: 6.7 G/DL (ref 6.4–8.3)
SODIUM SERPL-SCNC: 139 MMOL/L (ref 136–145)
T PALLIDUM AB SER QL: NONREACTIVE
VIT B12 SERPL-MCNC: 798 PG/ML (ref 232–1245)

## 2023-05-23 ENCOUNTER — NURSE TRIAGE (OUTPATIENT)
Dept: FAMILY MEDICINE | Facility: CLINIC | Age: 78
End: 2023-05-23
Payer: COMMERCIAL

## 2023-05-23 ENCOUNTER — TELEPHONE (OUTPATIENT)
Dept: INTERNAL MEDICINE | Facility: CLINIC | Age: 78
End: 2023-05-23
Payer: COMMERCIAL

## 2023-05-23 ENCOUNTER — TRANSFERRED RECORDS (OUTPATIENT)
Dept: HEALTH INFORMATION MANAGEMENT | Facility: CLINIC | Age: 78
End: 2023-05-23
Payer: COMMERCIAL

## 2023-05-23 DIAGNOSIS — F41.9 ANXIETY: ICD-10-CM

## 2023-05-23 DIAGNOSIS — M81.0 OSTEOPOROSIS, SENILE: Primary | ICD-10-CM

## 2023-05-23 NOTE — TELEPHONE ENCOUNTER
Reason for call:  Other     Patient called regarding (reason for call): prescription    Additional comments: Patient's daughter is calling and wondering if there is some thing different her mother can take because the medication Donepezil is making her sick. Please advise and call patient's daughter back please and thank you.    Phone number to reach patient:  Home number on file [phone] (home)    Best Time:  any    Can we leave a detailed message on this number?  YES

## 2023-05-23 NOTE — TELEPHONE ENCOUNTER
Left message with daughter, Rosa Isela, to return phone call. See previous phone call notes from 5/23/23. Please route to RN queue for nurse triage when patient or daughter call back.    Fallon Aguilera RN, BSN  [facility]

## 2023-05-23 NOTE — TELEPHONE ENCOUNTER
"Nurse SBAR    Situation:  See phone call from daughter on 5/23/23. CTC on file for daughters, Ladi and Rosa Isela. Spoke to Rosa Isela and patient regarding possible side effects of Donepezil.    Background:  Hx of memory loss. SLUMS score of 17/30. See provider notes from PCP on 5/19/23. Referral sent to Adult Neurology on 5/19/23.    Assessment:  Patient reports she is \"sick to her stomach\" since she started the donepezil. She has had nausea, vomiting, and diarrhea every time she tried the medication. Denies blood in vomit or diarrhea. The last time she took the donepezil was the night before last and she is still nauseated. She has been eating soup due to nausea. She reports some lightheadedness upon standing with taking the medication, but no dizziness or headaches.     Recommendation:  Care advice given. Patient verbalizes understanding and agrees with plan. Patient would like a different medication than the donepezil due to side effects. Will route to PCP to review and advise.     Reason for Disposition    Caller wants to use a complementary or alternative medicine    Additional Information    Negative: Intentional drug overdose and suicidal thoughts or ideas    Negative: Drug overdose and triager unable to answer question    Negative: Caller requesting a renewal or refill of a medicine patient is currently taking    Negative: Caller requesting information unrelated to medicine    Negative: Caller requesting information about COVID-19 Vaccine    Negative: Caller requesting information about Emergency Contraception    Negative: Caller requesting information about Combined Birth Control Pills    Negative: Caller requesting information about Progestin Birth Control Pills    Negative: Caller requesting information about Post-Op pain or medicines    Negative: Caller requesting a prescription antibiotic (such as penicillin) for Strep throat and has a positive culture result    Negative: Caller requesting a prescription " anti-viral med (such as Tamiflu) and has influenza (flu) symptoms    Negative: Immunization reaction suspected    Negative: Rash while taking a medicine or within 3 days of stopping it    Negative: Asthma and having symptoms of asthma (cough, wheezing, etc.)    Negative: Symptom of illness (e.g., headache, abdominal pain, earache, vomiting) that are more than mild    Negative: Breastfeeding questions about mother's medicines and diet    Negative: MORE THAN A DOUBLE DOSE of a prescription or over-the-counter (OTC) drug    Negative: DOUBLE DOSE (an extra dose or lesser amount) of prescription drug and any symptoms (e.g., dizziness, nausea, pain, sleepiness)    Negative: DOUBLE DOSE (an extra dose or lesser amount) of over-the-counter (OTC) drug and any symptoms (e.g., dizziness, nausea, pain, sleepiness)    Negative: Took another person's prescription drug    Negative: Prescription not at pharmacy and was prescribed by PCP recently  (Exception: triager has access to EMR and prescription is recorded there. Go to Home Care and confirm for pharmacy.)    Negative: Pharmacy calling with prescription question and triager unable to answer question    Negative: Caller has URGENT medicine question about med that PCP or specialist prescribed and triager unable to answer question    Negative: Caller has NON-URGENT medicine question about med that PCP or specialist prescribed and triager unable to answer question    Protocols used: MEDICATION QUESTION CALL-A-OH    Fallon Aguilera RN, BSN  [facility]

## 2023-05-23 NOTE — TELEPHONE ENCOUNTER
Pt needs Dexa order placed to she can get it done before her appt with you in June. Questions call pt at [phone]

## 2023-05-24 RX ORDER — BUSPIRONE HYDROCHLORIDE 7.5 MG/1
TABLET ORAL
Qty: 180 TABLET | Refills: 3 | Status: SHIPPED | OUTPATIENT
Start: 2023-05-24 | End: 2024-06-03

## 2023-05-24 NOTE — TELEPHONE ENCOUNTER
Provider Response to 2nd Level Triage Request    I have reviewed the RN documentation. My recommendation is:  Discontinued the donepezil 5 mg at bedtime.  Remain off medicine for at least a week.  Ensure complete resolution of symptoms.  Then resume medication at half tablet (2.5 mg) dose at bedtime.  Please notify of recurrent symptoms on this lower dose after restarting.  Otherwise we would consider medication change to Namenda.

## 2023-05-24 NOTE — TELEPHONE ENCOUNTER
Provider Recommendation Follow Up:   Reached caregiver, daughter, Rosa Isela. Informed of provider's recommendations. Daughter, Rosa Isela, verbalized understanding and agrees with the plan. CTC on file. Rosa Isela will relay information to her mother. Will call back for any questions or concerns.    Fallon Aguilera RN, BSN  [facility]

## 2023-05-24 NOTE — TELEPHONE ENCOUNTER
Let pt know the order has been placed and they should be calling to get her scheduled. Also gave her the number in case she needs to call them.

## 2023-05-24 NOTE — TELEPHONE ENCOUNTER
Prescription approved per [facility] Refill Protocol.    ROBY MachadoN, RN  [facility]

## 2023-06-01 DIAGNOSIS — Z92.29 PERSONAL HISTORY OF OTHER DRUG THERAPY: ICD-10-CM

## 2023-06-01 DIAGNOSIS — M81.0 OSTEOPOROSIS, SENILE: Primary | ICD-10-CM

## 2023-06-21 ENCOUNTER — OFFICE VISIT (OUTPATIENT)
Dept: INTERNAL MEDICINE | Facility: CLINIC | Age: 78
End: 2023-06-21
Payer: COMMERCIAL

## 2023-06-21 ENCOUNTER — ANCILLARY PROCEDURE (OUTPATIENT)
Dept: BONE DENSITY | Facility: CLINIC | Age: 78
End: 2023-06-21
Attending: INTERNAL MEDICINE
Payer: COMMERCIAL

## 2023-06-21 VITALS
HEIGHT: 57 IN | RESPIRATION RATE: 16 BRPM | HEART RATE: 58 BPM | WEIGHT: 95.1 LBS | SYSTOLIC BLOOD PRESSURE: 117 MMHG | BODY MASS INDEX: 20.52 KG/M2 | TEMPERATURE: 97.7 F | DIASTOLIC BLOOD PRESSURE: 60 MMHG | OXYGEN SATURATION: 99 %

## 2023-06-21 DIAGNOSIS — M81.0 OSTEOPOROSIS, SENILE: Primary | ICD-10-CM

## 2023-06-21 DIAGNOSIS — M81.0 OSTEOPOROSIS, SENILE: ICD-10-CM

## 2023-06-21 PROCEDURE — 99213 OFFICE O/P EST LOW 20 MIN: CPT | Mod: 25 | Performed by: INTERNAL MEDICINE

## 2023-06-21 PROCEDURE — 77080 DXA BONE DENSITY AXIAL: CPT | Performed by: PHYSICIAN ASSISTANT

## 2023-06-21 PROCEDURE — 96372 THER/PROPH/DIAG INJ SC/IM: CPT | Performed by: INTERNAL MEDICINE

## 2023-06-21 NOTE — PROGRESS NOTES
"  (M81.0) Osteoporosis, senile  (primary encounter diagnosis)  Comment: Tolerating Prolia treatment well. She reports no fragility fracture or change in her health.  DXA done today was reviewed and discussed with the patient.  Labs reviewed:  Component      Latest Ref Rng 5/19/2023  12:02 PM   Sodium      136 - 145 mmol/L 139    Potassium      3.4 - 5.3 mmol/L 4.6    Chloride      98 - 107 mmol/L 103    Carbon Dioxide (CO2)      22 - 29 mmol/L 23    Anion Gap      7 - 15 mmol/L 13    Urea Nitrogen      8.0 - 23.0 mg/dL 17.6    Creatinine      0.51 - 0.95 mg/dL 0.64    Calcium      8.8 - 10.2 mg/dL 9.6    Glucose      70 - 99 mg/dL 88    Alkaline Phosphatase      35 - 104 U/L 74    AST      10 - 35 U/L 41 (H)    ALT      10 - 35 U/L 30    Protein Total      6.4 - 8.3 g/dL 6.7    Albumin      3.5 - 5.2 g/dL 4.4    Bilirubin Total      <=1.2 mg/dL 0.4    GFR Estimate      >60 mL/min/1.73m2 90       Component      Latest Ref Rng 12/29/2022  2:56 PM   Vitamin D Deficiency screening      20 - 75 ug/L 59        Supplements reviewed and she will continue the same.        Patient was educated on safety of Prolia utilizing Patient Counseling Chart for Healthcare Providers, as outlined by the Prolia REMS progam.     Return in about 6 months (around 12/21/2023) for Prolia with CSS.    Patient Instructions   Prolia 15th today.  Prolia 16th in 6 months with my nurse. I will see you in 1 year.    DXA in 2 years .   Phone number to schedule [phone].    Daily calcium need is 0179-1808 mg a day from the diet and supplements.  Calcium citrate is easier to digest.  Vitamin D 2000 IU daily recommended.    Risk of rebound vertebral fractures is higher when Prolia suddenly stopped or dose was missed.      Prolia and Covid vaccine should be  for at least a week.           /60   Pulse 58   Temp 97.7  F (36.5  C)   Resp 16   Ht 1.454 m (4' 9.25\")   Wt 43.1 kg (95 lb 1.6 oz)   LMP  (LMP Unknown)   SpO2 99%   BMI " 20.40 kg/m        Did you experience any problems with previous Prolia injection? no  Any medication change in the last 6 months? no  Did you take prednisone or other immunosupressant drugs in the last 6 months   (chemo, transplant, rheum, dermatology conditions)? no  Did you have any serious infection in the last 6 months?no  Any recent hospitalizations?no  Do you plan any dental work in the next 2-3 months?no  How much calcium do you take daily from the diet and supplements?1200 mg  How much vit D do you take daily? 2000 IU  Last DXA? Done today  Reviewed and discussed      Patient is here today for the  Prolia injection. Patient tolerated previous injections well.   We discussed calcium and vit D daily needs today.     We discussed high risk of rebound vertebral fractures when Prolia suddenly stopped.    Next Prolia injection will be in 6 months.           This note has been dictated using voice recognition software. Any grammatical or context distortions are unintentional and inherent to the software      Patient Active Problem List   Diagnosis     Essential hypertension with goal blood pressure less than 130/80     Postmenopausal Atrophic Vaginitis     Anxiety     Osteoporosis, senile     Bilateral sensorineural hearing loss     Impaired fasting glucose     Diverticular disease of large intestine     First degree hemorrhoids     Gastroesophageal reflux disease with esophagitis without hemorrhage     Bronchiectasis without complication (H)       Current Outpatient Medications   Medication     acarbose (PRECOSE) 25 MG tablet     atenolol (TENORMIN) 50 MG tablet     BIOTIN ORAL     busPIRone (BUSPAR) 7.5 MG tablet     calcium-vitamin D (CALCIUM-VITAMIN D) 500 mg(1,250mg) -200 unit per tablet     celecoxib (CELEBREX) 200 MG capsule     denosumab 60 mg/mL Syrg     donepezil (ARICEPT) 5 MG tablet     multivitamin with minerals (THERA-M) 9 mg iron-400 mcg Tab tablet     nortriptyline (PAMELOR) 50 MG capsule      omeprazole (PRILOSEC) 20 MG capsule     Current Facility-Administered Medications   Medication     denosumab (PROLIA) injection 60 mg

## 2023-06-21 NOTE — PATIENT INSTRUCTIONS
Prolia 15th today.  Prolia 16th in 6 months with my nurse. I will see you in 1 year.    DXA in 2 years .   Phone number to schedule [phone].    Daily calcium need is 3241-0776 mg a day from the diet and supplements.  Calcium citrate is easier to digest.  Vitamin D 2000 IU daily recommended.    Risk of rebound vertebral fractures is higher when Prolia suddenly stopped or dose was missed.      Prolia and Covid vaccine should be  for at least a week.

## 2023-08-24 PROBLEM — R15.9 INCONTINENCE OF FECES: Status: ACTIVE | Noted: 2023-08-24

## 2023-08-24 PROBLEM — R19.8 DIGESTIVE SYMPTOM: Status: ACTIVE | Noted: 2022-05-24

## 2023-08-24 PROBLEM — R19.8 IRREGULAR BOWEL HABITS: Status: ACTIVE | Noted: 2023-08-24

## 2023-08-24 PROBLEM — R05.3 CHRONIC COUGH: Status: ACTIVE | Noted: 2021-05-19

## 2023-08-24 PROBLEM — Z87.19 STATUS POST DILATION OF ESOPHAGEAL NARROWING: Status: ACTIVE | Noted: 2021-05-19

## 2023-08-24 PROBLEM — Z98.890 STATUS POST DILATION OF ESOPHAGEAL NARROWING: Status: ACTIVE | Noted: 2021-05-19

## 2023-08-24 PROBLEM — Z90.49 HISTORY OF CHOLECYSTECTOMY: Status: ACTIVE | Noted: 2023-08-24

## 2023-08-25 ENCOUNTER — OFFICE VISIT (OUTPATIENT)
Dept: FAMILY MEDICINE | Facility: CLINIC | Age: 78
End: 2023-08-25
Payer: COMMERCIAL

## 2023-08-25 VITALS
OXYGEN SATURATION: 97 % | WEIGHT: 93.4 LBS | HEART RATE: 65 BPM | RESPIRATION RATE: 19 BRPM | BODY MASS INDEX: 20.15 KG/M2 | DIASTOLIC BLOOD PRESSURE: 64 MMHG | SYSTOLIC BLOOD PRESSURE: 122 MMHG | TEMPERATURE: 98.4 F | HEIGHT: 57 IN

## 2023-08-25 DIAGNOSIS — F41.9 ANXIETY: ICD-10-CM

## 2023-08-25 DIAGNOSIS — J47.9 BRONCHIECTASIS WITHOUT COMPLICATION (H): ICD-10-CM

## 2023-08-25 DIAGNOSIS — I10 ESSENTIAL HYPERTENSION WITH GOAL BLOOD PRESSURE LESS THAN 130/80: ICD-10-CM

## 2023-08-25 DIAGNOSIS — R11.0 NAUSEA: ICD-10-CM

## 2023-08-25 DIAGNOSIS — R41.3 MEMORY LOSS: Primary | ICD-10-CM

## 2023-08-25 PROCEDURE — 99214 OFFICE O/P EST MOD 30 MIN: CPT | Performed by: FAMILY MEDICINE

## 2023-08-25 ASSESSMENT — PAIN SCALES - GENERAL: PAINLEVEL: NO PAIN (0)

## 2023-08-25 NOTE — PROGRESS NOTES
"Assessment/Plan:    Memory loss  Cognitive impairment consistent with early dementia with slums score of 17 out of 30 previously noted.  Had tried donepezil 5 mg at bedtime however some nausea so decreased to 2.5 mg daily with resolution of nausea.  Patient will now increase to 5 mg at bedtime again does have scheduled evaluation with Dr. Girard neurology 2023.  Reassess at annual wellness visit in early 2024.    Nausea  Notify of recurrent nausea associated with dose increase of donepezil from 2.5 mg up to 5 mg at bedtime.    Essential hypertension with goal blood pressure less than 130/80  Atenolol 50 mg daily continuing with blood pressure 122/64 on recheck.    Anxiety  Patient continues utilization of buspirone 7.5 mg twice daily.    Bronchiectasis without complication  Stable findings.  Asymptomatic currently without hypoxia.           Subjective:    Bibiana Mcrae is seen today for follow-up evaluation.  Memory loss concerns.  Had been seen May 19, 2023.  Family history of Alzheimer's dementia in patient's father with \"hardening of the arteries\".  Patient started on donepezil 5 mg at bedtime.  Felt sick to her stomach.  Decreased to 2.5 mg daily.  Resolution of symptoms.  Lab assessment including B12, CBC, comprehensive metabolic panel and TSH as well as syphilis screen negative.  Long-term memory continues to be excellent according to patient's daughter Ladi who is with today however short-term memory not as good.  Described history of bronchiectasis.  Asymptomatic currently.  Comprehensive review of systems as above otherwise all negative.      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   No siblings   Surgery: emergency RLQ incarcerated strangulated " femoral hernia repair 9/28/08; [place]-BSO 1985 due to endometriosis; ulcer surgery 1978; 12/04/0945-[id number] hernia repair (Dr. JULY Wen); lap choly 9/6/12 with Dr. Hayder Montgomery; left eye cataract 4/8/21 and right eye cataract 4/15/21     SLUMS = 17/30 on 5/19/23     [id number]       Past Surgical History:   Procedure Laterality Date    BREAST CYST ASPIRATION Left     While ago    HYSTERECTOMY  1985    OOPHORECTOMY Bilateral 1985    [facility] REMOVAL OF OVARY(S)      Description: Oophorectomy;  Recorded: 12/17/2009;    ZZC TOTAL ABDOM HYSTERECTOMY      Description: Total Abdominal Hysterectomy;  Recorded: 12/17/2009;        Family History   Problem Relation Age of Onset    Breast Cancer Mother 52.00    Cancer Maternal Grandfather 71.00        prostate    Prostate Cancer Maternal Grandfather         Past Medical History:   Diagnosis Date    Anxiety     Breast cyst     While ago        Social History     Tobacco Use    Smoking status: Never    Smokeless tobacco: Never   Substance Use Topics    Alcohol use: No    Drug use: No        Current Outpatient Medications   Medication Sig Dispense Refill    acarbose (PRECOSE) 25 MG tablet [ACARBOSE (PRECOSE) 25 MG TABLET] TAKE 1 TABLET BY MOUTH 3  TIMES A DAY WITH MEALS 270 tablet 1    atenolol (TENORMIN) 50 MG tablet TAKE 1 TABLET BY MOUTH  DAILY 90 tablet 3    BIOTIN ORAL [BIOTIN ORAL] Take 5,000 mcg by mouth 2 (two) times a day.       busPIRone (BUSPAR) 7.5 MG tablet TAKE 1 TABLET BY MOUTH TWICE  DAILY 180 tablet 3    calcium-vitamin D (CALCIUM-VITAMIN D) 500 mg(1,250mg) -200 unit per tablet [CALCIUM-VITAMIN D (CALCIUM-VITAMIN D) 500 MG(1,250MG) -200 UNIT PER TABLET] Take 2 tablets by mouth 2 (two) times a day with meals.      celecoxib (CELEBREX) 200 MG capsule TAKE 1 CAPSULE BY MOUTH  DAILY 90 capsule 3    denosumab 60 mg/mL Syrg [DENOSUMAB 60 MG/ML SYRG] Inject 60 mg under the skin once.      donepezil (ARICEPT) 5 MG tablet Take 1 tablet (5 mg) by mouth At Bedtime 90 tablet 3     "multivitamin with minerals (THERA-M) 9 mg iron-400 mcg Tab tablet [MULTIVITAMIN WITH MINERALS (THERA-M) 9 MG IRON-400 MCG TAB TABLET] Take 1 tablet by mouth daily.      nortriptyline (PAMELOR) 50 MG capsule TAKE 1 CAPSULE BY MOUTH AT  BEDTIME 90 capsule 3    omeprazole (PRILOSEC) 20 MG capsule [OMEPRAZOLE (PRILOSEC) 20 MG CAPSULE] Take 20 mg by mouth daily before breakfast.            Objective:    Vitals:    08/25/23 1105 08/25/23 1139   BP: (!) 149/75 122/64   BP Location: Left arm    Patient Position: Sitting    Cuff Size: Adult Small    Pulse: 65    Resp: 19    Temp: 98.4  F (36.9  C)    TempSrc: Temporal    SpO2: 97%    Weight: 42.4 kg (93 lb 6.4 oz)    Height: 1.454 m (4' 9.24\")       Body mass index is 20.04 kg/m .    Alert.  No apparent distress.  Pleasant.  Long-term historian appears good.  Patient did feel that she had an appointment scheduled with me in December which is not the case.  Blood pressure 122/64 with regular heart rate on exam.      This note has been dictated using voice recognition software and as a result may contain minor grammatical errors and unintended word substitutions.         Answers submitted by the patient for this visit:  General Questionnaire (Submitted on 8/25/2023)  Chief Complaint: Chronic problems general questions HPI Form  What is the reason for your visit today? : to  How many servings of fruits and vegetables do you eat daily?: 4 or more  On average, how many sweetened beverages do you drink each day (Examples: soda, juice, sweet tea, etc.  Do NOT count diet or artificially sweetened beverages)?: 1  How many minutes a day do you exercise enough to make your heart beat faster?: 30 to 60  How many days a week do you exercise enough to make your heart beat faster?: 4  How many days per week do you miss taking your medication?: 0    "

## 2023-09-10 DIAGNOSIS — I10 ESSENTIAL HYPERTENSION WITH GOAL BLOOD PRESSURE LESS THAN 130/80: ICD-10-CM

## 2023-09-10 RX ORDER — ATENOLOL 50 MG/1
TABLET ORAL
Qty: 90 TABLET | Refills: 3 | Status: ON HOLD | OUTPATIENT
Start: 2023-09-10 | End: 2024-04-12

## 2023-09-10 NOTE — TELEPHONE ENCOUNTER
"Last Written Prescription Date:  9/30/2022  Last Fill Quantity: 90,  # refills: 3   Last office visit provider:  8/25/2023     Requested Prescriptions   Pending Prescriptions Disp Refills    atenolol (TENORMIN) 50 MG tablet [Pharmacy Med Name: Atenolol 50 MG Oral Tablet] 90 tablet 3     Sig: TAKE 1 TABLET BY MOUTH  DAILY       Beta-Blockers Protocol Passed - 9/10/2023  7:19 AM        Passed - Blood pressure under 140/90 in past 12 months     BP Readings from Last 3 Encounters:   08/25/23 122/64   06/21/23 117/60   05/19/23 104/66                 Passed - Patient is age 6 or older        Passed - Recent (12 mo) or future (30 days) visit within the authorizing provider's specialty     Patient has had an office visit with the authorizing provider or a provider within the authorizing providers department within the previous 12 mos or has a future within next 30 days. See \"Patient Info\" tab in inbasket, or \"Choose Columns\" in Meds & Orders section of the refill encounter.              Passed - Medication is active on med list             Sheree Silva RN 09/10/23 7:58 AM  "

## 2023-10-12 DIAGNOSIS — R41.3 MEMORY LOSS: ICD-10-CM

## 2023-10-12 RX ORDER — DONEPEZIL HYDROCHLORIDE 5 MG/1
5 TABLET, FILM COATED ORAL AT BEDTIME
Qty: 90 TABLET | Refills: 3 | OUTPATIENT
Start: 2023-10-12

## 2023-10-18 DIAGNOSIS — R41.3 MEMORY LOSS: ICD-10-CM

## 2023-10-18 RX ORDER — DONEPEZIL HYDROCHLORIDE 5 MG/1
5 TABLET, FILM COATED ORAL AT BEDTIME
Qty: 90 TABLET | Refills: 3 | OUTPATIENT
Start: 2023-10-18

## 2023-10-19 DIAGNOSIS — R41.3 MEMORY LOSS: ICD-10-CM

## 2023-10-19 RX ORDER — DONEPEZIL HYDROCHLORIDE 5 MG/1
5 TABLET, FILM COATED ORAL AT BEDTIME
Qty: 90 TABLET | Refills: 3 | OUTPATIENT
Start: 2023-10-19

## 2023-10-20 DIAGNOSIS — R41.3 MEMORY LOSS: ICD-10-CM

## 2023-10-20 RX ORDER — DONEPEZIL HYDROCHLORIDE 5 MG/1
5 TABLET, FILM COATED ORAL AT BEDTIME
Qty: 90 TABLET | Refills: 3 | Status: CANCELLED | OUTPATIENT
Start: 2023-10-20

## 2023-10-20 RX ORDER — DONEPEZIL HYDROCHLORIDE 5 MG/1
5 TABLET, FILM COATED ORAL AT BEDTIME
Qty: 90 TABLET | Refills: 3 | Status: SHIPPED | OUTPATIENT
Start: 2023-10-20 | End: 2024-04-16

## 2023-10-20 NOTE — TELEPHONE ENCOUNTER
Pending Prescriptions:                       Disp   Refills    donepezil (ARICEPT) 5 MG tablet           90 tab*3            Sig: Take 1 tablet (5 mg) by mouth at bedtime    Pt is low on medication.

## 2023-11-08 ENCOUNTER — ANCILLARY PROCEDURE (OUTPATIENT)
Dept: MAMMOGRAPHY | Facility: CLINIC | Age: 78
End: 2023-11-08
Attending: FAMILY MEDICINE
Payer: COMMERCIAL

## 2023-11-08 DIAGNOSIS — Z12.31 VISIT FOR SCREENING MAMMOGRAM: ICD-10-CM

## 2023-11-08 PROCEDURE — 77067 SCR MAMMO BI INCL CAD: CPT

## 2023-12-12 ENCOUNTER — TRANSFERRED RECORDS (OUTPATIENT)
Dept: HEALTH INFORMATION MANAGEMENT | Facility: CLINIC | Age: 78
End: 2023-12-12
Payer: COMMERCIAL

## 2023-12-29 ENCOUNTER — ALLIED HEALTH/NURSE VISIT (OUTPATIENT)
Dept: FAMILY MEDICINE | Facility: CLINIC | Age: 78
End: 2023-12-29
Payer: COMMERCIAL

## 2023-12-29 DIAGNOSIS — M81.0 OSTEOPOROSIS, SENILE: Primary | ICD-10-CM

## 2023-12-29 PROCEDURE — 96372 THER/PROPH/DIAG INJ SC/IM: CPT | Performed by: INTERNAL MEDICINE

## 2023-12-29 PROCEDURE — 99207 PR NO CHARGE NURSE ONLY: CPT

## 2023-12-29 NOTE — PROGRESS NOTES
Indication: Prolia  (denosumab) is a prescription medicine used to treat osteoporosis in patients who:   Are at high risk for fracture, meaning patients who have had a fracture related to osteoporosis, or who have multiple risk factors for fracture   Cannot use another osteoporosis medicine or other osteoporosis medicines did not work well   The timeline for early/late injections would be 4 weeks early and any time after the 6 month ema. If a patient receives their injection late, then the subsequent injection would be 6 months from the date that they actually received the injection    1.  When was the last injection?  6/21/23  2.  Did they check with their insurance for this calendar year?  Yes   3.  Is there an order in the chart?  Yes   4.  Has the patient had dental work involving the bone in the past month or will have work in the next 6 months?  No   5.  Did you have the patient wait 15 minutes after the injection?  Yes   6.  Remember to use .injection under the medication notes    The following steps were completed to comply with the REMS program for Prolia:  Reviewed information in the Medication Guide and Patient Counseling Chart, including the serious risks of Prolia  and the symptoms of each risk.  Advised patient to seek prompt medical attention if they have signs or symptoms of any of the serious risks.  Provided each patient a copy of the Medication Guide and Patient Brochure.     Clinic Administered Medication Documentation      Prolia Documentation    Indication: Prolia  (denosumab) is a prescription medicine used to treat osteoporosis in patients who:   Are at high risk for fracture, meaning patients who have had a fracture related to osteoporosis, or who have multiple risk factors for fracture.  Cannot use another osteoporosis medicine or other osteoporosis medicines did not work well.  The timeline for early/late injections would be 4 weeks early and any time after the 6 month ema. If a patient  receives their injection late, then the subsequent injection would be 6 months from the date that they actually received the injection.    When was the last injection?  23  Was the last injection at least 6 months ago? Yes  Has the prior authorization been completed?  Yes  Is there an active order (written within the past 365 days, with administrations remaining, not ) in the chart?  Yes  Patient denies any dental work involving the bone (e.g. tooth extraction or dental implants) in the past 4 weeks?  Yes  Patient denies plans for any dental work involving the bone (e.g. tooth extraction or dental implants) in the next 4 weeks? Yes    The following steps were completed to comply with the REMS program for Prolia:  Reviewed information in the Medication Guide and Patient Counseling Chart, including the serious risks of Prolia  and the symptoms of each risk.  Advised patient to seek prompt medical attention if they have signs or symptoms of any of the serious risks.  Provided each patient a copy of the Medication Guide and Patient Brochure.    Prior to injection, verified patient identity using patient's name and date of birth. Medication was administered. Please see MAR and medication order for additional information. Patient instructed to remain in clinic for 15 minutes and report any adverse reaction to staff immediately.    Vial/Syringe: Syringe  Was this medication supplied by the patient? No  Verified that the patient has refills remaining in their prescription.     Wale PINON RN

## 2024-01-17 DIAGNOSIS — G89.29 CHRONIC MIDLINE LOW BACK PAIN WITHOUT SCIATICA: ICD-10-CM

## 2024-01-17 DIAGNOSIS — M54.50 CHRONIC MIDLINE LOW BACK PAIN WITHOUT SCIATICA: ICD-10-CM

## 2024-01-17 RX ORDER — NORTRIPTYLINE HYDROCHLORIDE 50 MG/1
CAPSULE ORAL
Qty: 90 CAPSULE | Refills: 2 | Status: SHIPPED | OUTPATIENT
Start: 2024-01-17

## 2024-03-02 ENCOUNTER — HEALTH MAINTENANCE LETTER (OUTPATIENT)
Age: 79
End: 2024-03-02

## 2024-04-10 ENCOUNTER — APPOINTMENT (OUTPATIENT)
Dept: CT IMAGING | Facility: HOSPITAL | Age: 79
End: 2024-04-10
Payer: COMMERCIAL

## 2024-04-10 ENCOUNTER — HOSPITAL ENCOUNTER (OUTPATIENT)
Facility: HOSPITAL | Age: 79
Setting detail: OBSERVATION
Discharge: HOME | End: 2024-04-12
Attending: STUDENT IN AN ORGANIZED HEALTH CARE EDUCATION/TRAINING PROGRAM | Admitting: INTERNAL MEDICINE
Payer: COMMERCIAL

## 2024-04-10 ENCOUNTER — NURSE TRIAGE (OUTPATIENT)
Dept: NURSING | Facility: CLINIC | Age: 79
End: 2024-04-10
Payer: COMMERCIAL

## 2024-04-10 DIAGNOSIS — R41.0 CONFUSION: ICD-10-CM

## 2024-04-10 DIAGNOSIS — R53.1 GENERALIZED WEAKNESS: ICD-10-CM
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
## 2024-04-10 DIAGNOSIS — R45.1 AGITATION: ICD-10-CM

## 2024-04-10 DIAGNOSIS — I10 PRIMARY HYPERTENSION: Primary | ICD-10-CM

## 2024-04-10 DIAGNOSIS — K86.89 PANCREATIC MASS: ICD-10-CM

## 2024-04-10 DIAGNOSIS — E87.6 HYPOKALEMIA: ICD-10-CM

## 2024-04-10 DIAGNOSIS — N17.9 AKI (ACUTE KIDNEY INJURY) (H): ICD-10-CM

## 2024-04-10 PROBLEM — E83.52 HYPERCALCEMIA: Status: ACTIVE | Noted: 2024-04-10

## 2024-04-10 LAB
ALBUMIN SERPL BCG-MCNC: 3.5 G/DL (ref 3.5–5.2)
ALBUMIN UR-MCNC: NEGATIVE MG/DL
ALP SERPL-CCNC: 98 U/L (ref 40–150)
ALT SERPL W P-5'-P-CCNC: 14 U/L (ref 0–50)
AMORPH CRY #/AREA URNS HPF: ABNORMAL /HPF
ANION GAP SERPL CALCULATED.3IONS-SCNC: 11 MMOL/L (ref 7–15)
APPEARANCE UR: ABNORMAL
AST SERPL W P-5'-P-CCNC: 43 U/L (ref 0–45)
BASOPHILS # BLD AUTO: 0 10E3/UL (ref 0–0.2)
BASOPHILS NFR BLD AUTO: 1 %
BILIRUB DIRECT SERPL-MCNC: <0.2 MG/DL (ref 0–0.3)
BILIRUB SERPL-MCNC: 0.4 MG/DL
BILIRUB UR QL STRIP: NEGATIVE
BUN SERPL-MCNC: 18.6 MG/DL (ref 8–23)
CALCIUM SERPL-MCNC: 12 MG/DL (ref 8.8–10.2)
CHLORIDE SERPL-SCNC: 99 MMOL/L (ref 98–107)
COLOR UR AUTO: ABNORMAL
CREAT SERPL-MCNC: 1.08 MG/DL (ref 0.51–0.95)
DEPRECATED HCO3 PLAS-SCNC: 31 MMOL/L (ref 22–29)
EGFRCR SERPLBLD CKD-EPI 2021: 52 ML/MIN/1.73M2
EOSINOPHIL # BLD AUTO: 0.3 10E3/UL (ref 0–0.7)
EOSINOPHIL NFR BLD AUTO: 4 %
ERYTHROCYTE [DISTWIDTH] IN BLOOD BY AUTOMATED COUNT: 14 % (ref 10–15)
FLUAV RNA SPEC QL NAA+PROBE: NEGATIVE
FLUBV RNA RESP QL NAA+PROBE: NEGATIVE
GLUCOSE SERPL-MCNC: 117 MG/DL (ref 70–99)
GLUCOSE UR STRIP-MCNC: NEGATIVE MG/DL
HCT VFR BLD AUTO: 36.8 % (ref 35–47)
HGB BLD-MCNC: 11.8 G/DL (ref 11.7–15.7)
HGB UR QL STRIP: NEGATIVE
HOLD SPECIMEN: NORMAL
IMM GRANULOCYTES # BLD: 0 10E3/UL
IMM GRANULOCYTES NFR BLD: 0 %
KETONES UR STRIP-MCNC: NEGATIVE MG/DL
LEUKOCYTE ESTERASE UR QL STRIP: NEGATIVE
LIPASE SERPL-CCNC: 16 U/L (ref 13–60)
LYMPHOCYTES # BLD AUTO: 1.2 10E3/UL (ref 0.8–5.3)
LYMPHOCYTES NFR BLD AUTO: 15 %
MAGNESIUM SERPL-MCNC: 1.9 MG/DL (ref 1.7–2.3)
MCH RBC QN AUTO: 30.3 PG (ref 26.5–33)
MCHC RBC AUTO-ENTMCNC: 32.1 G/DL (ref 31.5–36.5)
MCV RBC AUTO: 94 FL (ref 78–100)
MONOCYTES # BLD AUTO: 0.8 10E3/UL (ref 0–1.3)
MONOCYTES NFR BLD AUTO: 10 %
NEUTROPHILS # BLD AUTO: 5.6 10E3/UL (ref 1.6–8.3)
NEUTROPHILS NFR BLD AUTO: 70 %
NITRATE UR QL: NEGATIVE
NRBC # BLD AUTO: 0 10E3/UL
NRBC BLD AUTO-RTO: 0 /100
PH UR STRIP: 7.5 [PH] (ref 5–7)
PLATELET # BLD AUTO: 202 10E3/UL (ref 150–450)
POTASSIUM SERPL-SCNC: 4.2 MMOL/L (ref 3.4–5.3)
PROT SERPL-MCNC: 5.9 G/DL (ref 6.4–8.3)
RBC # BLD AUTO: 3.9 10E6/UL (ref 3.8–5.2)
RBC URINE: 0 /HPF
RSV RNA SPEC NAA+PROBE: NEGATIVE
SARS-COV-2 RNA RESP QL NAA+PROBE: NEGATIVE
SODIUM SERPL-SCNC: 141 MMOL/L (ref 135–145)
SP GR UR STRIP: 1.01 (ref 1–1.03)
SQUAMOUS EPITHELIAL: <1 /HPF
TROPONIN T SERPL HS-MCNC: 19 NG/L
TROPONIN T SERPL HS-MCNC: 24 NG/L
TSH SERPL DL<=0.005 MIU/L-ACNC: 0.82 UIU/ML (ref 0.3–4.2)
UROBILINOGEN UR STRIP-MCNC: <2 MG/DL
VIT B12 SERPL-MCNC: 1129 PG/ML (ref 232–1245)
VIT D+METAB SERPL-MCNC: 52 NG/ML (ref 20–50)
WBC # BLD AUTO: 8 10E3/UL (ref 4–11)
WBC URINE: 2 /HPF

## 2024-04-10 PROCEDURE — 81001 URINALYSIS AUTO W/SCOPE: CPT | Performed by: STUDENT IN AN ORGANIZED HEALTH CARE EDUCATION/TRAINING PROGRAM

## 2024-04-10 PROCEDURE — 250N000013 HC RX MED GY IP 250 OP 250 PS 637: Performed by: HOSPITALIST

## 2024-04-10 PROCEDURE — 80053 COMPREHEN METABOLIC PANEL: CPT | Performed by: STUDENT IN AN ORGANIZED HEALTH CARE EDUCATION/TRAINING PROGRAM

## 2024-04-10 PROCEDURE — 82306 VITAMIN D 25 HYDROXY: CPT | Performed by: HOSPITALIST

## 2024-04-10 PROCEDURE — 87637 SARSCOV2&INF A&B&RSV AMP PRB: CPT | Performed by: STUDENT IN AN ORGANIZED HEALTH CARE EDUCATION/TRAINING PROGRAM

## 2024-04-10 PROCEDURE — 96361 HYDRATE IV INFUSION ADD-ON: CPT

## 2024-04-10 PROCEDURE — 99285 EMERGENCY DEPT VISIT HI MDM: CPT | Mod: 25

## 2024-04-10 PROCEDURE — 84484 ASSAY OF TROPONIN QUANT: CPT | Performed by: STUDENT IN AN ORGANIZED HEALTH CARE EDUCATION/TRAINING PROGRAM

## 2024-04-10 PROCEDURE — G0378 HOSPITAL OBSERVATION PER HR: HCPCS

## 2024-04-10 PROCEDURE — 84484 ASSAY OF TROPONIN QUANT: CPT

## 2024-04-10 PROCEDURE — 99223 1ST HOSP IP/OBS HIGH 75: CPT | Performed by: HOSPITALIST

## 2024-04-10 PROCEDURE — 120N000001 HC R&B MED SURG/OB

## 2024-04-10 PROCEDURE — 70450 CT HEAD/BRAIN W/O DYE: CPT

## 2024-04-10 PROCEDURE — 36415 COLL VENOUS BLD VENIPUNCTURE: CPT

## 2024-04-10 PROCEDURE — 36415 COLL VENOUS BLD VENIPUNCTURE: CPT | Performed by: HOSPITALIST

## 2024-04-10 PROCEDURE — 258N000003 HC RX IP 258 OP 636: Performed by: HOSPITALIST

## 2024-04-10 PROCEDURE — 250N000011 HC RX IP 250 OP 636: Performed by: HOSPITALIST

## 2024-04-10 PROCEDURE — 84443 ASSAY THYROID STIM HORMONE: CPT | Performed by: HOSPITALIST

## 2024-04-10 PROCEDURE — 72128 CT CHEST SPINE W/O DYE: CPT

## 2024-04-10 PROCEDURE — 82248 BILIRUBIN DIRECT: CPT | Performed by: HOSPITALIST

## 2024-04-10 PROCEDURE — 36415 COLL VENOUS BLD VENIPUNCTURE: CPT | Performed by: STUDENT IN AN ORGANIZED HEALTH CARE EDUCATION/TRAINING PROGRAM

## 2024-04-10 PROCEDURE — 96372 THER/PROPH/DIAG INJ SC/IM: CPT | Performed by: HOSPITALIST

## 2024-04-10 PROCEDURE — 72131 CT LUMBAR SPINE W/O DYE: CPT

## 2024-04-10 PROCEDURE — 83735 ASSAY OF MAGNESIUM: CPT | Performed by: HOSPITALIST

## 2024-04-10 PROCEDURE — 93005 ELECTROCARDIOGRAM TRACING: CPT | Performed by: STUDENT IN AN ORGANIZED HEALTH CARE EDUCATION/TRAINING PROGRAM

## 2024-04-10 PROCEDURE — 85025 COMPLETE CBC W/AUTO DIFF WBC: CPT

## 2024-04-10 PROCEDURE — 72125 CT NECK SPINE W/O DYE: CPT

## 2024-04-10 PROCEDURE — 258N000003 HC RX IP 258 OP 636

## 2024-04-10 PROCEDURE — 82607 VITAMIN B-12: CPT | Performed by: HOSPITALIST

## 2024-04-10 PROCEDURE — 83690 ASSAY OF LIPASE: CPT | Performed by: HOSPITALIST

## 2024-04-10 RX ORDER — FLUOCINOLONE ACETONIDE 0.11 MG/ML
OIL TOPICAL 2 TIMES DAILY
COMMUNITY
End: 2024-07-16

## 2024-04-10 RX ORDER — PANTOPRAZOLE SODIUM 40 MG/1
40 TABLET, DELAYED RELEASE ORAL
Status: DISCONTINUED | OUTPATIENT
Start: 2024-04-11 | End: 2024-04-12 | Stop reason: HOSPADM

## 2024-04-10 RX ORDER — BUSPIRONE HYDROCHLORIDE 7.5 MG/1
7.5 TABLET ORAL 2 TIMES DAILY
Status: DISCONTINUED | OUTPATIENT
Start: 2024-04-10 | End: 2024-04-12 | Stop reason: HOSPADM

## 2024-04-10 RX ORDER — AMOXICILLIN 250 MG
1 CAPSULE ORAL 2 TIMES DAILY PRN
Status: DISCONTINUED | OUTPATIENT
Start: 2024-04-10 | End: 2024-04-12 | Stop reason: HOSPADM

## 2024-04-10 RX ORDER — LIDOCAINE 40 MG/G
CREAM TOPICAL
Status: DISCONTINUED | OUTPATIENT
Start: 2024-04-10 | End: 2024-04-12 | Stop reason: HOSPADM

## 2024-04-10 RX ORDER — ENOXAPARIN SODIUM 100 MG/ML
40 INJECTION SUBCUTANEOUS EVERY 24 HOURS
Status: DISCONTINUED | OUTPATIENT
Start: 2024-04-10 | End: 2024-04-12 | Stop reason: HOSPADM

## 2024-04-10 RX ORDER — AMOXICILLIN 250 MG
2 CAPSULE ORAL 2 TIMES DAILY PRN
Status: DISCONTINUED | OUTPATIENT
Start: 2024-04-10 | End: 2024-04-12 | Stop reason: HOSPADM

## 2024-04-10 RX ORDER — HYDRALAZINE HYDROCHLORIDE 20 MG/ML
10 INJECTION INTRAMUSCULAR; INTRAVENOUS EVERY 4 HOURS PRN
Status: DISCONTINUED | OUTPATIENT
Start: 2024-04-10 | End: 2024-04-12 | Stop reason: HOSPADM

## 2024-04-10 RX ORDER — ACETAMINOPHEN 325 MG/1
325-650 TABLET ORAL EVERY 6 HOURS PRN
COMMUNITY

## 2024-04-10 RX ORDER — HYDRALAZINE HYDROCHLORIDE 10 MG/1
10 TABLET, FILM COATED ORAL EVERY 4 HOURS PRN
Status: DISCONTINUED | OUTPATIENT
Start: 2024-04-10 | End: 2024-04-12 | Stop reason: HOSPADM

## 2024-04-10 RX ORDER — SODIUM CHLORIDE 9 MG/ML
INJECTION, SOLUTION INTRAVENOUS CONTINUOUS
Status: DISCONTINUED | OUTPATIENT
Start: 2024-04-10 | End: 2024-04-11

## 2024-04-10 RX ORDER — DONEPEZIL HYDROCHLORIDE 5 MG/1
5 TABLET, FILM COATED ORAL AT BEDTIME
Status: DISCONTINUED | OUTPATIENT
Start: 2024-04-10 | End: 2024-04-12 | Stop reason: HOSPADM

## 2024-04-10 RX ORDER — POLYETHYLENE GLYCOL 3350 17 G/17G
17 POWDER, FOR SOLUTION ORAL 2 TIMES DAILY PRN
Status: DISCONTINUED | OUTPATIENT
Start: 2024-04-10 | End: 2024-04-12 | Stop reason: HOSPADM

## 2024-04-10 RX ORDER — ONDANSETRON 2 MG/ML
4 INJECTION INTRAMUSCULAR; INTRAVENOUS EVERY 6 HOURS PRN
Status: DISCONTINUED | OUTPATIENT
Start: 2024-04-10 | End: 2024-04-12 | Stop reason: HOSPADM

## 2024-04-10 RX ORDER — ONDANSETRON 4 MG/1
4 TABLET, ORALLY DISINTEGRATING ORAL EVERY 6 HOURS PRN
Status: DISCONTINUED | OUTPATIENT
Start: 2024-04-10 | End: 2024-04-12 | Stop reason: HOSPADM

## 2024-04-10 RX ORDER — AMLODIPINE BESYLATE 2.5 MG/1
2.5 TABLET ORAL DAILY
Status: DISCONTINUED | OUTPATIENT
Start: 2024-04-11 | End: 2024-04-11

## 2024-04-10 RX ORDER — AMLODIPINE BESYLATE 2.5 MG/1
2.5 TABLET ORAL DAILY
Status: DISCONTINUED | OUTPATIENT
Start: 2024-04-10 | End: 2024-04-10

## 2024-04-10 RX ORDER — BISACODYL 10 MG
10 SUPPOSITORY, RECTAL RECTAL DAILY PRN
Status: DISCONTINUED | OUTPATIENT
Start: 2024-04-10 | End: 2024-04-12 | Stop reason: HOSPADM

## 2024-04-10 RX ORDER — ACETAMINOPHEN 325 MG/1
650 TABLET ORAL EVERY 4 HOURS PRN
Status: DISCONTINUED | OUTPATIENT
Start: 2024-04-10 | End: 2024-04-12 | Stop reason: HOSPADM

## 2024-04-10 RX ORDER — ACETAMINOPHEN 650 MG/1
650 SUPPOSITORY RECTAL EVERY 4 HOURS PRN
Status: DISCONTINUED | OUTPATIENT
Start: 2024-04-10 | End: 2024-04-12 | Stop reason: HOSPADM

## 2024-04-10 RX ADMIN — SODIUM CHLORIDE 1000 ML: 9 INJECTION, SOLUTION INTRAVENOUS at 13:46

## 2024-04-10 RX ADMIN — DONEPEZIL HYDROCHLORIDE 5 MG: 5 TABLET, FILM COATED ORAL at 21:37

## 2024-04-10 RX ADMIN — ENOXAPARIN SODIUM 40 MG: 30 INJECTION SUBCUTANEOUS at 21:38

## 2024-04-10 RX ADMIN — BUSPIRONE HYDROCHLORIDE 7.5 MG: 7.5 TABLET ORAL at 21:37

## 2024-04-10 RX ADMIN — SODIUM CHLORIDE: 9 INJECTION, SOLUTION INTRAVENOUS at 17:12

## 2024-04-10 ASSESSMENT — ACTIVITIES OF DAILY LIVING (ADL)
ADLS_ACUITY_SCORE: 35
ADLS_ACUITY_SCORE: 35
ADLS_ACUITY_SCORE: 40
ADLS_ACUITY_SCORE: 35
DEPENDENT_IADLS:: TRANSPORTATION;SHOPPING
ADLS_ACUITY_SCORE: 35

## 2024-04-10 ASSESSMENT — COLUMBIA-SUICIDE SEVERITY RATING SCALE - C-SSRS
2. HAVE YOU ACTUALLY HAD ANY THOUGHTS OF KILLING YOURSELF IN THE PAST MONTH?: NO
1. IN THE PAST MONTH, HAVE YOU WISHED YOU WERE DEAD OR WISHED YOU COULD GO TO SLEEP AND NOT WAKE UP?: NO
6. HAVE YOU EVER DONE ANYTHING, STARTED TO DO ANYTHING, OR PREPARED TO DO ANYTHING TO END YOUR LIFE?: NO

## 2024-04-10 NOTE — ED PROVIDER NOTES
Emergency Department Midlevel Supervisory Note     I had a face to face encounter with this patient seen by the Advanced Practice Provider (RK). I personally made/approved the management plan and take responsibility for the patient management. I personally saw patient and performed a substantive portion of the visit including all aspects of the medical decision making.     ED Course:  12:00 PM  Chary Moeller PA-C  staffed patient with me. I agree with their assessment and plan of management, and I will see the patient.  12:26 PM I met with the patient to introduce myself, gather additional history, perform my initial exam, and discuss the plan.     Brief HPI:     Bibiana Mcrae is a 78 year old female who presents for evaluation of confusion, falls.    Brief Physical Exam: BP (!) 189/82   Pulse 62   Temp 98  F (36.7  C) (Temporal)   Resp 18   Wt 39.1 kg (86 lb 4.8 oz)   LMP  (LMP Unknown)   SpO2 97%   BMI 18.52 kg/m    Constitutional:  Alert, in no acute distress  EYES: Conjunctivae clear  HENT:  Atraumatic  Respiratory:  Respirations even, unlabored, in no acute respiratory distress  Cardiovascular:  Regular rate and rhythm, good peripheral perfusion  GI: Soft, non-distended, non-tender  Musculoskeletal:  Moves all 4 extremities equally, grossly symmetrical strength  Integument: Warm & dry. No appreciable rash, erythema.  Neurologic:  Alert & oriented, speech clear and fluent, no focal deficits noted  Psych: Normal mood and affect    MDM:  78-year-old female who lives alone brought in by daughters for evaluation of confusion and falls x 2.  Patient reportedly had a fall Monday afternoon or evening, then had a fall again at about 3 AM on Tuesday.  Daughter found with a bloody nose on Tuesday, slight bruise to her forehead, sounds like she likely hit her head and both of those falls.  Patient complaining of significant back pain, difficulty standing or ambulating.  Patient cannot recall the first fall, though  the second fall she reports that she thinks her foot slipped on the hardwood floor despite wearing slippers.  Patient had some C and T-spine tenderness thus imaging of the head and entire spine was done.    Will road test, if patient unable to ambulate or pass road test, will admit.     1. Fall, initial encounter    2. Generalized weakness    3. Confusion        Labs and Imaging:  Results for orders placed or performed during the hospital encounter of 04/10/24   Head CT w/o contrast    Impression    IMPRESSION:  1.  No acute intracranial hemorrhage, extra-axial fluid collection, or mass effect.  2.  Brain atrophy and presumed chronic small vessel ischemic changes, as described.  3.  Unchanged nonspecific lobulated lesion nearly completely opacifying the left sphenoid sinus.     CT Cervical Spine w/o Contrast    Impression    IMPRESSION:  1.  No acute cervical spine fracture.  2.  Multilevel degenerative changes (moderate/advanced), as described.   CT Thoracic Spine w/o Contrast    Impression    IMPRESSION:  1.  No acute thoracic spine fracture identified.  2.  Degenerative changes, as described.     CT Lumbar Spine w/o Contrast    Impression    IMPRESSION:  1.  Transitional anatomy at the lumbosacral junction, as described.  2.  No acute lumbar spine fracture is identified.  3.  Grade I anterolisthesis of L3 on L4 and grade II anterolisthesis of L4 on L5.   4.  Multilevel degenerative changes, as described.  5.  Indeterminate findings in the partially visualized abdomen and pelvis, as described. Recommend follow-up CT abdomen and pelvis with contrast for further evaluation.   Asymptomatic Influenza A/B, RSV, & SARS-CoV2 PCR (COVID-19) Nasopharyngeal    Specimen: Nasopharyngeal; Swab   Result Value Ref Range    Influenza A PCR Negative Negative    Influenza B PCR Negative Negative    RSV PCR Negative Negative    SARS CoV2 PCR Negative Negative   Extra Blue Top Tube   Result Value Ref Range    Hold Specimen JIC     Extra Red Top Tube   Result Value Ref Range    Hold Specimen JIC    Extra Green Top (Lithium Heparin) Tube   Result Value Ref Range    Hold Specimen JIC    Extra Purple Top Tube   Result Value Ref Range    Hold Specimen JIC    Extra Green Top (Lithium Heparin) ON ICE   Result Value Ref Range    Hold Specimen JIC    CBC with platelets and differential   Result Value Ref Range    WBC Count 8.0 4.0 - 11.0 10e3/uL    RBC Count 3.90 3.80 - 5.20 10e6/uL    Hemoglobin 11.8 11.7 - 15.7 g/dL    Hematocrit 36.8 35.0 - 47.0 %    MCV 94 78 - 100 fL    MCH 30.3 26.5 - 33.0 pg    MCHC 32.1 31.5 - 36.5 g/dL    RDW 14.0 10.0 - 15.0 %    Platelet Count 202 150 - 450 10e3/uL    % Neutrophils 70 %    % Lymphocytes 15 %    % Monocytes 10 %    % Eosinophils 4 %    % Basophils 1 %    % Immature Granulocytes 0 %    NRBCs per 100 WBC 0 <1 /100    Absolute Neutrophils 5.6 1.6 - 8.3 10e3/uL    Absolute Lymphocytes 1.2 0.8 - 5.3 10e3/uL    Absolute Monocytes 0.8 0.0 - 1.3 10e3/uL    Absolute Eosinophils 0.3 0.0 - 0.7 10e3/uL    Absolute Basophils 0.0 0.0 - 0.2 10e3/uL    Absolute Immature Granulocytes 0.0 <=0.4 10e3/uL    Absolute NRBCs 0.0 10e3/uL   Basic metabolic panel   Result Value Ref Range    Sodium 141 135 - 145 mmol/L    Potassium 4.2 3.4 - 5.3 mmol/L    Chloride 99 98 - 107 mmol/L    Carbon Dioxide (CO2) 31 (H) 22 - 29 mmol/L    Anion Gap 11 7 - 15 mmol/L    Urea Nitrogen 18.6 8.0 - 23.0 mg/dL    Creatinine 1.08 (H) 0.51 - 0.95 mg/dL    GFR Estimate 52 (L) >60 mL/min/1.73m2    Calcium 12.0 (H) 8.8 - 10.2 mg/dL    Glucose 117 (H) 70 - 99 mg/dL   Troponin T, High Sensitivity (now)   Result Value Ref Range    Troponin T, High Sensitivity 24 (H) <=14 ng/L     I have reviewed the relevant laboratory studies above.      EKG: I reviewed and independently interpreted the patient's EKG, with comments made as listed below. Please see scanned EKG for full report.   Sinus bradycardia with first-degree AV block and PACs, bifascicular block.   Rate 57.  .  .  QTc 408.  No STEMI.    Procedures:  I was present for the key portions of procedures documented in RK/midlevel note, see midlevel note for further details.    Tristan Martin MD  [facility] EMERGENCY DEPARTMENT  [address] [place] [id number]  [phone]     Tristan Martin MD  04/11/24 2532

## 2024-04-10 NOTE — PHARMACY-ADMISSION MEDICATION HISTORY
Pharmacist Admission Medication History    Admission medication history is complete. The information provided in this note is only as accurate as the sources available at the time of the update.    Information Source(s): Patient, Family member, and CareEverywhere/SureScripts via in-person    Pertinent Information:     Changes made to PTA medication list:  Added: fluocinolone, acetaminophen   Deleted: None  Changed: calcium     Allergies reviewed with patient and updates made in EHR: yes    Medication History Completed By: Agustina Tolliver [facility] 4/10/2024 5:58 PM    PTA Med List   Medication Sig Last Dose    acarbose (PRECOSE) 25 MG tablet [ACARBOSE (PRECOSE) 25 MG TABLET] TAKE 1 TABLET BY MOUTH 3  TIMES A DAY WITH MEALS 4/10/2024 at AM    acetaminophen (TYLENOL) 325 MG tablet Take 325-650 mg by mouth every 6 hours as needed for mild pain Past Week    atenolol (TENORMIN) 50 MG tablet TAKE 1 TABLET BY MOUTH  DAILY 4/10/2024 at AM    Biotin 5 MG TABS Take 5,000 mcg by mouth daily Past Week    busPIRone (BUSPAR) 7.5 MG tablet TAKE 1 TABLET BY MOUTH TWICE  DAILY 4/9/2024 at PM    calcium carbonate (OS-JUSTIN) 1500 (600 Ca) MG tablet Take 600 mg by mouth daily Past Week    celecoxib (CELEBREX) 200 MG capsule TAKE 1 CAPSULE BY MOUTH  DAILY 4/10/2024 at AM    donepezil (ARICEPT) 5 MG tablet Take 1 tablet (5 mg) by mouth at bedtime 4/9/2024 at PM    fluocinolone acetonide (DERMA SMOOTHE/FS BODY) 0.01 % external oil Apply topically 2 times daily 5 drops to both ears twice daily Past Week    multivitamin with minerals (THERA-M) 9 mg iron-400 mcg Tab tablet [MULTIVITAMIN WITH MINERALS (THERA-M) 9 MG IRON-400 MCG TAB TABLET] Take 1 tablet by mouth daily. Past Week    nortriptyline (PAMELOR) 50 MG capsule TAKE 1 CAPSULE BY MOUTH AT  BEDTIME 4/9/2024 at PM    omeprazole (PRILOSEC) 20 MG capsule [OMEPRAZOLE (PRILOSEC) 20 MG CAPSULE] Take 20 mg by mouth daily before breakfast. 4/10/2024 at AM

## 2024-04-10 NOTE — ED TRIAGE NOTES
Pt had been falling in the last 2 days April 8th and 9th  at 3 Am.  Pt thinks she tripped.  Daughter concerned that pt is more confused and having trouble  walking now., stumbling, not on blood thinners.  Daughter has been noticing this changes since last night and she saw her  normal last  Sunday  pt has hypertension and osteoporosis.  Has a contusion on back of head.  Deneis any c spine pain on palpation.  Also has  R ear infection on otic drops

## 2024-04-10 NOTE — TELEPHONE ENCOUNTER
Nurse Triage SBAR    Is this a 2nd Level Triage? NO    Situation:   Falls    Background:   N/a    Assessment:   Daughter is calling concerned about patient's falls.  She had a fall 2 days ago and yesterday.  She has been with the patient since 2030 yesterday and noted that her posture is different.  She is unsteady.  She also notes confusion.  Pt kept calling her daughter and kept asking her the same questions.  She is unsure about what medications she has taken.  She has a bump on her head.  Had a bloody nose yesterday.  No visible bleeding right now.    Protocol Recommended Disposition:   Call  Now    Recommendation:   Advised daughter that 911 should be called.  Pt had refused to go to the hospital last night.  Daughter states that she will bring the patient to the hospital instead.  Encouraged daughter to call 911.  Daughter states she will call if she is not able to transport patient.  Care advice given.  Daughter verbalized understanding.    Theresa Day, RN, BSN Nurse Triage Advisor 4/10/2024 5:13 AM        Reason for Disposition   Shock suspected (e.g., cold/pale/clammy skin, too weak to stand)    Additional Information   Negative: Major injury from dangerous force (e.g., fall > 10 feet or 3 meters)   Negative: [1] Major bleeding (e.g., actively dripping or spurting) AND [2] can't be stopped    Protocols used: Falls and Wonrnav-L-HC

## 2024-04-10 NOTE — ED PROVIDER NOTES
Emergency Department Encounter   NAME: Bibiana Mcrae  AGE: 78 year old female  YOB: 1945  MRN: [id number]    PCP: Zack Woody  ED PROVIDER: Chary Moeller PA-C    Evaluation Date & Time:   No admission date for patient encounter.    CHIEF COMPLAINT:  Fall and Altered Mental Status      Impression and Plan   MDM: 77 yo F with a history of dementia and osteoporosis presents for evaluation of falls and confusion. History is gathered mostly from daughter as patient is quite confused. Two falls followed by increased confusion and trouble standing straight. No thinners, vision change, headache. On arrival patient is hypertensive, but otherwise vitally stable. Afebrile. On exam patient is oriented to self. Tenderness to palpation of cervical and thoracic spine. No abdominal tenderness. Able to range extremities without pain. Given falls, confusion, and spinal tenderness will proceed with head and spine imaging to assess for bleed, fracture. Differential for fall/confusion includes viral illness, UTI, ACS, arrhythmia, electrolyte derangement, among others.    ED Course as of 04/10/24 1614   Wed Apr 10, 2024   1215 I met and introduced myself to the patient. I gathered initial history and performed my physical exam. We discussed plan for initial workup.      1220 I have staffed the patient with Dr. Martin, ED MD, who has evaluated the patient and agrees with all aspects of today's care.      1230 Asymptomatic Influenza A/B, RSV, & SARS-CoV2 PCR (COVID-19) Nasopharyngeal  Negative viral panel.    1234 CBC with platelets + differential  No leukocytosis, tachycardia, fever concerning for sepsis. No anemia.    1253 Troponin T, High Sensitivity(!): 24  Will repeat to trend. EKG without signs of ischemia.   1331 Basic metabolic panel(!)  DARLENE, likely prerenal given poor PO intake. Starting fluids. No concerning electrolyte derangement.    1331 Updated patient and family with lab results thus far. Patient  appears less confused, able to answer my questions better.   1345 BP(!): 158/70  Improving    1416 No fractures or acute intracranial pathology on imaging. Radiologist did note soft tissue thickening around the pancreas and cystic lesion of left abdomen.   1530 Discussed imaging and lab results with patient and her two daughters. We discussed the soft tissue thickening around the pancreas and the need for out patient follow up to investigate this further. We had a conversation regarding disposition and Bibiana was interested in trying to walk using a walker.    1540 Troponin T, High Sensitivity(!): 19  Decreased, low suspicion for ACS.    1540 UA with Microscopic reflex to Culture(!)  Not concerning for UTI.    1540 Discussed UA and repeat troponin with patient and family. Plan to road test.   1557 Patient unable to use the restroom or do personal cares independently, but able to ambulate with walker. Patient expresses feeling uncomfortable with going home as she lives alone. I think this is reasonable. Placed call to hospitalist.    1613 Discussed case with Dr. Ragland, hospitalist who accepts this admission. Patient and family updated.       Medical Decision Making  Obtained supplemental history:Supplemental history obtained?: Documented in chart and Family Member/Significant Other  Reviewed external records: External records reviewed?: Documented in chart, Outpatient Record: ENT 4/5/24 & family practice 8/25/23, and Prior Imaging: DXA 6/21/23  Care impacted by chronic illness:Dementia  Care significantly affected by social determinants of health:No Support for Care at Home  Did you consider but not order tests?: In addition to work-up documented, I considered the following work up: none  Did you interpret images independently?: My independent interpretation of imaging: none  Consultation discussion with other provider:Did you involve another provider (consultant, , pharmacy, etc.)?: No  Admit.   At the  conclusion of the encounter I discussed the results of all the tests and the disposition. The questions were answered. The patient or family acknowledged understanding and was agreeable with the care plan.    FINAL IMPRESSION:    ICD-10-CM    1. Fall, initial encounter  W19.XXXA Walker Order      2. Generalized weakness  R53.1 Adult Cardiology Eval  Referral     Walker Order      3. Confusion  R41.0       4. DARLENE (acute kidney injury) (H24)  N17.9             MEDICATIONS GIVEN IN THE EMERGENCY DEPARTMENT:  Medications   sodium chloride 0.9% BOLUS 1,000 mL (1,000 mLs Intravenous $Started 4/10/24 1346)         NEW PRESCRIPTIONS STARTED AT TODAY'S ED VISIT:  New Prescriptions    No medications on file         HPI   Patient information was obtained from: patient and daughter    Use of Intrepreter: N/A     Bibiana Mcrae is a 78 year old female with a pertinent history of dementia, osteoporosis,  who presents to the ED with her daughter for evaluation of falls and confusion. Patient is confused and provides a limited history. Majority of the history is gathered from her daughter.     Bibiana had two falls - Monday late afternoon and Tuesday around 3am. Her daughter was unaware of these falls until Tuesday evening when she came to check on her mother. At that time patient had a bloody nose and her bathroom was found to have blood on the floor. Patient is unable to tell us if she tripped on something or what caused the fall. Her daughter notes increased confusion since Tuesday evening and that she isn't standing up straight. Reports back pain since falling. Denies fever, chills, abdominal pain, dysuria, urinary frequency, nausea, vomiting, diarrhea.       REVIEW OF SYSTEMS:  Pertinent positive and negative symptoms per HPI.       Medical History     Past Medical History:   Diagnosis Date    Anxiety     Breast cyst        Past Surgical History:   Procedure Laterality Date    BREAST CYST ASPIRATION Left     While  ago    HYSTERECTOMY  1985    OOPHORECTOMY Bilateral 1985    [facility] REMOVAL OF OVARY(S)      Description: Oophorectomy;  Recorded: 12/17/2009;    [facility] TOTAL ABDOM HYSTERECTOMY      Description: Total Abdominal Hysterectomy;  Recorded: 12/17/2009;       Family History   Problem Relation Age of Onset    Breast Cancer Mother 52.00    Cancer Maternal Grandfather 71.00        prostate    Prostate Cancer Maternal Grandfather        Social History     Tobacco Use    Smoking status: Never    Smokeless tobacco: Never   Substance Use Topics    Alcohol use: No    Drug use: No       acarbose (PRECOSE) 25 MG tablet  atenolol (TENORMIN) 50 MG tablet  BIOTIN ORAL  busPIRone (BUSPAR) 7.5 MG tablet  calcium-vitamin D (CALCIUM-VITAMIN D) 500 mg(1,250mg) -200 unit per tablet  celecoxib (CELEBREX) 200 MG capsule  denosumab 60 mg/mL Syrg  donepezil (ARICEPT) 5 MG tablet  multivitamin with minerals (THERA-M) 9 mg iron-400 mcg Tab tablet  nortriptyline (PAMELOR) 50 MG capsule  omeprazole (PRILOSEC) 20 MG capsule          Physical Exam     First Vitals:  Patient Vitals for the past 24 hrs:   BP Temp Temp src Pulse Resp SpO2 Weight   04/10/24 1530 (!) 166/63 -- -- 63 -- 99 % --   04/10/24 1516 (!) 172/76 -- -- 63 -- 99 % --   04/10/24 1515 (!) 189/82 -- -- 62 -- 97 % --   04/10/24 1445 (!) 174/75 -- -- 63 -- 98 % --   04/10/24 1430 (!) 180/77 -- -- 63 -- 99 % --   04/10/24 1415 (!) 196/80 -- -- 50 -- 98 % --   04/10/24 1400 (!) 182/79 -- -- 58 -- 98 % --   04/10/24 1345 (!) 184/74 -- -- 53 -- 98 % --   04/10/24 1330 (!) 158/70 -- -- 58 -- 100 % --   04/10/24 1315 (!) 182/75 -- -- 60 18 98 % --   04/10/24 1300 (!) 190/81 -- -- 50 18 98 % --   04/10/24 1235 (!) 178/77 -- -- 58 20 98 % --   04/10/24 1230 (!) 178/77 -- -- 59 -- 98 % --   04/10/24 1225 (!) 192/88 -- -- 58 -- 94 % --   04/10/24 1145 -- -- -- -- -- -- 39.1 kg (86 lb 4.8 oz)   04/10/24 1142 -- -- -- -- 20 -- --   04/10/24 1132 (!) 187/86 98  F (36.7  C) Temporal 57 -- 100 % --        PHYSICAL EXAM:   General Appearance:  Alert, cooperative, no distress, appears stated age  HENT: Normocephalic. Bruising to forehead. Mucous membranes moist   Eyes: Conjunctiva clear, Lids normal. No discharge.   Respiratory: No distress. Lungs clear to ausculation bilaterally. No wheezes, rhonchi or stridor  Cardiovascular: Regular rate and rhythm, no murmur. Normal cap refill. No peripheral edema  GI: Abdomen soft, nontender, normal bowel sounds  : No CVA tenderness  Musculoskeletal: Moving all extremities. No gross deformities. Tenderness to palpation of cervical and thoracic spine - no crepitus or steps offs appreciated. Full upper and lower extremity ROM. Chest stable to AP/Lateral compression. 2+ PT & radial pulses bilaterally.  Integument: Warm, dry, no rashes or lesions  Neurologic: Alert and orientated x3. No focal deficits. CN II-XII intact.  Psych: Normal mood and affect      Results     LAB:  All pertinent labs reviewed and interpreted  Labs Ordered and Resulted from Time of ED Arrival to Time of ED Departure   ROUTINE UA WITH MICROSCOPIC REFLEX TO CULTURE - Abnormal       Result Value    Color Urine Light Yellow      Appearance Urine Turbid (*)     Glucose Urine Negative      Bilirubin Urine Negative      Ketones Urine Negative      Specific Gravity Urine 1.009      Blood Urine Negative      pH Urine 7.5 (*)     Protein Albumin Urine Negative      Urobilinogen Urine <2.0      Nitrite Urine Negative      Leukocyte Esterase Urine Negative      Amorphous Crystals Urine Few (*)     RBC Urine 0      WBC Urine 2      Squamous Epithelials Urine <1     BASIC METABOLIC PANEL - Abnormal    Sodium 141      Potassium 4.2      Chloride 99      Carbon Dioxide (CO2) 31 (*)     Anion Gap 11      Urea Nitrogen 18.6      Creatinine 1.08 (*)     GFR Estimate 52 (*)     Calcium 12.0 (*)     Glucose 117 (*)    TROPONIN T, HIGH SENSITIVITY - Abnormal    Troponin T, High Sensitivity 24 (*)    TROPONIN T, HIGH  SENSITIVITY - Abnormal    Troponin T, High Sensitivity 19 (*)    INFLUENZA A/B, RSV, & SARS-COV2 PCR - Normal    Influenza A PCR Negative      Influenza B PCR Negative      RSV PCR Negative      SARS CoV2 PCR Negative     CBC WITH PLATELETS AND DIFFERENTIAL    WBC Count 8.0      RBC Count 3.90      Hemoglobin 11.8      Hematocrit 36.8      MCV 94      MCH 30.3      MCHC 32.1      RDW 14.0      Platelet Count 202      % Neutrophils 70      % Lymphocytes 15      % Monocytes 10      % Eosinophils 4      % Basophils 1      % Immature Granulocytes 0      NRBCs per 100 WBC 0      Absolute Neutrophils 5.6      Absolute Lymphocytes 1.2      Absolute Monocytes 0.8      Absolute Eosinophils 0.3      Absolute Basophils 0.0      Absolute Immature Granulocytes 0.0      Absolute NRBCs 0.0         RADIOLOGY:  CT Lumbar Spine w/o Contrast   Final Result   IMPRESSION:   1.  Transitional anatomy at the lumbosacral junction, as described.   2.  No acute lumbar spine fracture is identified.   3.  Grade I anterolisthesis of L3 on L4 and grade II anterolisthesis of L4 on L5.    4.  Multilevel degenerative changes, as described.   5.  Indeterminate findings in the partially visualized abdomen and pelvis, as described. Recommend follow-up CT abdomen and pelvis with contrast for further evaluation.      CT Thoracic Spine w/o Contrast   Final Result   IMPRESSION:   1.  No acute thoracic spine fracture identified.   2.  Degenerative changes, as described.         CT Cervical Spine w/o Contrast   Final Result   IMPRESSION:   1.  No acute cervical spine fracture.   2.  Multilevel degenerative changes (moderate/advanced), as described.      Head CT w/o contrast   Final Result   IMPRESSION:   1.  No acute intracranial hemorrhage, extra-axial fluid collection, or mass effect.   2.  Brain atrophy and presumed chronic small vessel ischemic changes, as described.   3.  Unchanged nonspecific lobulated lesion nearly completely opacifying the left  sphenoid sinus.           Chary Moeller PA-C   Emergency Medicine   [facility] EMERGENCY DEPARTMENT       Chary Moeller PA-C  04/10/24 3147

## 2024-04-10 NOTE — CONSULTS
"Care Management Initial Consult    General Information  Assessment completed with: Patient, Children, Bibiana and daughters Prakash  Type of CM/SW Visit: Initial Assessment    Primary Care Provider verified and updated as needed: Yes   Readmission within the last 30 days: no previous admission in last 30 days      Reason for Consult: discharge planning  Advance Care Planning: Advance Care Planning Reviewed: no concerns identified          Communication Assessment  Patient's communication style: spoken language (English or Bilingual)          Living Environment:   People in home: alone     Current living Arrangements: house (\"4 steps to get inside the house and then it is a rambler so she can mostly stay on the main level. She does go to the basement to do her laundry\".)      Able to return to prior arrangements: yes       Family/Social Support:  Care provided by: self  Provides care for: no one  Marital Status:   Children          Description of Support System: Supportive, Involved    Support Assessment: Adequate family and caregiver support, Adequate social supports, Patient communicates needs well met    Current Resources:   Patient receiving home care services: No     Community Resources: None  Equipment currently used at home: none  Supplies currently used at home: Hearing Aid Batteries, Other (\"hearing aids she cannot wear right now, glasses\")    Employment/Financial:  Employment Status: retired     Employment/ Comments: \"no  history\"  Financial Concerns:     Referral to Financial Worker: No       Does the patient's insurance plan have a 3 day qualifying hospital stay waiver?  Yes     Which insurance plan 3 day waiver is available? Alternative insurance waiver    Will the waiver be used for post-acute placement? Undetermined at this time    Functional Status:  Prior to admission patient needed assistance:   Dependent ADLs:: Independent, Ambulation-no assistive device  Dependent " "IADLs:: Transportation, Shopping (\"family helps with transportation and shopping\")  Assesssment of Functional Status: Not at baseline with ADL Functioning, Not at baseline with mobility, Not at  functional baseline    Mental Health Status:          Chemical Dependency Status:                Values/Beliefs:  Spiritual, Cultural Beliefs, Hoahaoism Practices, Values that affect care:                 Additional Information:  Bibiana lives in a house alone. The house has \"4 steps to get inside the house and then it is a rambler so she can mostly stay on the main level. She does go to the basement to do her laundry\".    She is independent with ADLs and most IADLs, but lately she has been weak and had a fall. Her daughters help with transportation and shopping. She does not have any mobility equipment at home.    Unknown discharge needs at this time; awaiting PT/OT recommendations. \"She wants a walker from the hospital to go home with. They are aware it is not a 4WW and no seat. We don't want that kind. We just want a normal metal walker\".    Family to transport at discharge.    PICKERING was given and discussed. All questions were answered.    CM to follow for medical progression of care, discharge recommendations, and final discharge plan.    Anjali Saenz RN    "

## 2024-04-10 NOTE — ED NOTES
Pt ambulated to bathroom with Tech successfully. Tech recommends pt use walker for mobility needs as pt is unsteady and a fall risk without walker. Pt is unable to toilet, dress, and bathe without assistance.

## 2024-04-10 NOTE — DISCHARGE INSTRUCTIONS
[facility] (HHA)    Services Available   PT, OT, RN, SW   Home Health Services      Address   [address] [address]             Contact Information    [phone]

## 2024-04-10 NOTE — H&P
"Admission History and Physical   Bibiana Mcrae,  1945, MRN [id number]    [facility]    PCP: Zack Woody, [phone]          Extended Emergency Contact Information  Primary Emergency Contact: Rosa Isela Mcrae   [facility]  Home Phone: [phone]  Mobile Phone: [phone]  Relation: Daughter  Secondary Emergency Contact: Mary Jo Cross   [facility]  Home Phone: [phone]  Relation: Other       Assessment and Plan     78F cognitive disorder, HTN, anxiety who presents with several days of weakness, anorexia, worsening confusion and falls related to generalized weakness.    #DARLENE - Cr up to 1.06 from baseline of 0.6.  Sarcopenia and Cr likely overestimates renal function. IVF    #Hypercalcemia - likely from dehydration but also with this peripancreatic soft tissue finding which will be evaluated by CT A/P tomorrow with contrast after getting IVF tonight.  -Check vitamin D.  IVF.      #Cognitive impairment - awaiting formal neuro evaluation outpatient.  Recent SLUMS of  and atrophy on CT brain.  Likely dementia  -check TSH and B12    #Weakness/Falls - due to above  -monitor on tele given daughter report of bradycardia  -PT/OT.  Lives alone    #Anxiety    Needs med rec and appropriate home meds resumed    Checklist:  Code Status: Full Code    Diet: Combination Diet Regular Diet Adult      Deutsch Catheter: Not present  Lines: None     DVT px:  Enoxaparin (Lovenox) SQ        Auto-populated based on system request - if needs to be addressed in treatment plan they will be addressed above:  \"  Clinically Significant Risk Factors Present on Admission           # Hypercalcemia: Highest Ca = 12 mg/dL in last 2 days, will monitor as appropriate          # Hypertension: Noted on problem list                   \"  Advanced Care Planning  I anticipate the patient will be admitted to the hospital for at least 2 midnights for the evaluation and treatment of the conditions discussed " above.     Chief Complaint: Falls and weakness     HPI:    Bibiana Mcrae is a 78 year old female with cognitive disorder, HTN, anxiety who presents with several days of weakness, anorexia, worsening confusion and falls related to generalized weakness.  Hit her head on one of the falls.  No infectious symptoms.  Lives alone and daughters brought in for evaluation.    In the ER: negative trauma evaluation    History is provided by patient and daughter       Physical Exam:  Temp:  [98  F (36.7  C)] 98  F (36.7  C)  Pulse:  [50-63] 63  Resp:  [18-20] 18  BP: (158-196)/(63-88) 166/63  SpO2:  [94 %-100 %] 99 %  BP (!) 166/63   Pulse 63   Temp 98  F (36.7  C) (Temporal)   Resp 18   Wt 39.1 kg (86 lb 4.8 oz)   LMP  (LMP Unknown)   SpO2 99%   BMI 18.52 kg/m       General:  Alert, cooperative, no distress,  Appears stated age, frail  Neurologic:  oriented to person and hospital, facial symmetry preserved, fluent speech. Moves all 4 spontaneously  Psych: calm, mood and affect appropriate to situation  HEENT:  Anicteric, MMM, unremarkable dentition  CV: RRR no MRG, normal S1 and S2, no edema  Lungs: CTAB.  Easyrespirations  Abd: thin, soft, NT, normoactive BS  Skin: no rashes noted on exposed skin.   Central Lines and Tubes: None (no babin, CVC, feeding tubes)         Pertinent Test Findings  Radiology Results (results reviewed):   Results for orders placed or performed during the hospital encounter of 04/10/24   Head CT w/o contrast    Impression    IMPRESSION:  1.  No acute intracranial hemorrhage, extra-axial fluid collection, or mass effect.  2.  Brain atrophy and presumed chronic small vessel ischemic changes, as described.  3.  Unchanged nonspecific lobulated lesion nearly completely opacifying the left sphenoid sinus.     CT Cervical Spine w/o Contrast    Impression    IMPRESSION:  1.  No acute cervical spine fracture.  2.  Multilevel degenerative changes (moderate/advanced), as described.   CT Thoracic Spine  w/o Contrast    Impression    IMPRESSION:  1.  No acute thoracic spine fracture identified.  2.  Degenerative changes, as described.     CT Lumbar Spine w/o Contrast    Impression    IMPRESSION:  1.  Transitional anatomy at the lumbosacral junction, as described.  2.  No acute lumbar spine fracture is identified.  3.  Grade I anterolisthesis of L3 on L4 and grade II anterolisthesis of L4 on L5.   4.  Multilevel degenerative changes, as described.  5.  Indeterminate findings in the partially visualized abdomen and pelvis, as described. Recommend follow-up CT abdomen and pelvis with contrast for further evaluation.       EKG (personally reviewed): sinus han, no acute ischemic changes,       Medical History  Past Medical History:   Diagnosis Date    Anxiety     Breast cyst     While ago        Surgical History  Past Surgical History:   Procedure Laterality Date    BREAST CYST ASPIRATION Left     While ago    HYSTERECTOMY  1985    OOPHORECTOMY Bilateral 1985    ZZC REMOVAL OF OVARY(S)      Description: Oophorectomy;  Recorded: 12/17/2009;    ZZC TOTAL ABDOM HYSTERECTOMY      Description: Total Abdominal Hysterectomy;  Recorded: 12/17/2009;          Social History  Social History     Tobacco Use    Smoking status: Never    Smokeless tobacco: Never   Substance Use Topics    Alcohol use: No    Drug use: No          Allergies  Allergies   Allergen Reactions    Prochlorperazine Edisylate [Prochlorperazine] Other (See Comments)     Extrapyramidal side effects    Family History  I have reviewed this patient's family history and updated it with pertinent information if needed.  Family History   Problem Relation Age of Onset    Breast Cancer Mother 52.00    Cancer Maternal Grandfather 71.00        prostate    Prostate Cancer Maternal Grandfather                  Prior to Admission Medications   Prior to Admission Medications   Prescriptions Last Dose Informant Patient Reported? Taking?   BIOTIN ORAL   Yes No   Sig: [BIOTIN  ORAL] Take 5,000 mcg by mouth 2 (two) times a day.    acarbose (PRECOSE) 25 MG tablet   No No   Sig: [ACARBOSE (PRECOSE) 25 MG TABLET] TAKE 1 TABLET BY MOUTH 3  TIMES A DAY WITH MEALS   atenolol (TENORMIN) 50 MG tablet   No No   Sig: TAKE 1 TABLET BY MOUTH  DAILY   busPIRone (BUSPAR) 7.5 MG tablet   No No   Sig: TAKE 1 TABLET BY MOUTH TWICE  DAILY   calcium-vitamin D (CALCIUM-VITAMIN D) 500 mg(1,250mg) -200 unit per tablet   Yes No   Sig: [CALCIUM-VITAMIN D (CALCIUM-VITAMIN D) 500 MG(1,250MG) -200 UNIT PER TABLET] Take 2 tablets by mouth 2 (two) times a day with meals.   celecoxib (CELEBREX) 200 MG capsule   No No   Sig: TAKE 1 CAPSULE BY MOUTH  DAILY   denosumab 60 mg/mL Syrg   Yes No   Sig: [DENOSUMAB 60 MG/ML SYRG] Inject 60 mg under the skin once.   donepezil (ARICEPT) 5 MG tablet   No No   Sig: Take 1 tablet (5 mg) by mouth at bedtime   multivitamin with minerals (THERA-M) 9 mg iron-400 mcg Tab tablet   Yes No   Sig: [MULTIVITAMIN WITH MINERALS (THERA-M) 9 MG IRON-400 MCG TAB TABLET] Take 1 tablet by mouth daily.   nortriptyline (PAMELOR) 50 MG capsule   No No   Sig: TAKE 1 CAPSULE BY MOUTH AT  BEDTIME   omeprazole (PRILOSEC) 20 MG capsule   Yes No   Sig: [OMEPRAZOLE (PRILOSEC) 20 MG CAPSULE] Take 20 mg by mouth daily before breakfast.      Facility-Administered Medications: None              Pertinent Labs    Most Recent 3 CBC's:  Recent Labs   Lab Test 04/10/24  1150 05/19/23  1202 12/29/22  1456   WBC 8.0 6.5 4.2   HGB 11.8 12.9 13.2   MCV 94 94 93    211 227     Most Recent 3 BMP's:  Recent Labs   Lab Test 04/10/24  1150 05/19/23  1202 12/29/22  1456    139 138   POTASSIUM 4.2 4.6 4.1   CHLORIDE 99 103 98   CO2 31* 23 28   BUN 18.6 17.6 11.0   CR 1.08* 0.64 0.61   ANIONGAP 11 13 12   JUSTIN 12.0* 9.6 10.2   * 88 116*     Most Recent 2 LFT's:  Recent Labs   Lab Test 04/10/24  1505 05/19/23  1202   AST 43 41*   ALT 14 30   ALKPHOS 98 74   BILITOTAL 0.4 0.4     Most Recent 3 INR's:No lab  results found.  Most Recent 3 Troponin's:No lab results found.    Medical Decision Making        Medical Decision Making               Oscar Ragland MD, [facility]  Internal Medicine Hospitalist  4/10/2024

## 2024-04-10 NOTE — H&P (VIEW-ONLY)
"Admission History and Physical   Bibiana Mcrae,  1945, MRN [id number]    [facility]    PCP: Zack Woody, [phone]          Extended Emergency Contact Information  Primary Emergency Contact: Rosa Isela Mcrae   [facility]  Home Phone: [phone]  Mobile Phone: [phone]  Relation: Daughter  Secondary Emergency Contact: Mary Jo Cross   [facility]  Home Phone: [phone]  Relation: Other       Assessment and Plan     78F cognitive disorder, HTN, anxiety who presents with several days of weakness, anorexia, worsening confusion and falls related to generalized weakness.    #DARLENE - Cr up to 1.06 from baseline of 0.6.  Sarcopenia and Cr likely overestimates renal function. IVF    #Hypercalcemia - likely from dehydration but also with this peripancreatic soft tissue finding which will be evaluated by CT A/P tomorrow with contrast after getting IVF tonight.  -Check vitamin D.  IVF.      #Cognitive impairment - awaiting formal neuro evaluation outpatient.  Recent SLUMS of  and atrophy on CT brain.  Likely dementia  -check TSH and B12    #Weakness/Falls - due to above  -monitor on tele given daughter report of bradycardia  -PT/OT.  Lives alone    #Anxiety    Needs med rec and appropriate home meds resumed    Checklist:  Code Status: Full Code    Diet: Combination Diet Regular Diet Adult      Deutsch Catheter: Not present  Lines: None     DVT px:  Enoxaparin (Lovenox) SQ        Auto-populated based on system request - if needs to be addressed in treatment plan they will be addressed above:  \"  Clinically Significant Risk Factors Present on Admission           # Hypercalcemia: Highest Ca = 12 mg/dL in last 2 days, will monitor as appropriate          # Hypertension: Noted on problem list                   \"  Advanced Care Planning  I anticipate the patient will be admitted to the hospital for at least 2 midnights for the evaluation and treatment of the conditions discussed " above.     Chief Complaint: Falls and weakness     HPI:    Bibiana Mcrae is a 78 year old female with cognitive disorder, HTN, anxiety who presents with several days of weakness, anorexia, worsening confusion and falls related to generalized weakness.  Hit her head on one of the falls.  No infectious symptoms.  Lives alone and daughters brought in for evaluation.    In the ER: negative trauma evaluation    History is provided by patient and daughter       Physical Exam:  Temp:  [98  F (36.7  C)] 98  F (36.7  C)  Pulse:  [50-63] 63  Resp:  [18-20] 18  BP: (158-196)/(63-88) 166/63  SpO2:  [94 %-100 %] 99 %  BP (!) 166/63   Pulse 63   Temp 98  F (36.7  C) (Temporal)   Resp 18   Wt 39.1 kg (86 lb 4.8 oz)   LMP  (LMP Unknown)   SpO2 99%   BMI 18.52 kg/m       General:  Alert, cooperative, no distress,  Appears stated age, frail  Neurologic:  oriented to person and hospital, facial symmetry preserved, fluent speech. Moves all 4 spontaneously  Psych: calm, mood and affect appropriate to situation  HEENT:  Anicteric, MMM, unremarkable dentition  CV: RRR no MRG, normal S1 and S2, no edema  Lungs: CTAB.  Easyrespirations  Abd: thin, soft, NT, normoactive BS  Skin: no rashes noted on exposed skin.   Central Lines and Tubes: None (no babin, CVC, feeding tubes)         Pertinent Test Findings  Radiology Results (results reviewed):   Results for orders placed or performed during the hospital encounter of 04/10/24   Head CT w/o contrast    Impression    IMPRESSION:  1.  No acute intracranial hemorrhage, extra-axial fluid collection, or mass effect.  2.  Brain atrophy and presumed chronic small vessel ischemic changes, as described.  3.  Unchanged nonspecific lobulated lesion nearly completely opacifying the left sphenoid sinus.     CT Cervical Spine w/o Contrast    Impression    IMPRESSION:  1.  No acute cervical spine fracture.  2.  Multilevel degenerative changes (moderate/advanced), as described.   CT Thoracic Spine  w/o Contrast    Impression    IMPRESSION:  1.  No acute thoracic spine fracture identified.  2.  Degenerative changes, as described.     CT Lumbar Spine w/o Contrast    Impression    IMPRESSION:  1.  Transitional anatomy at the lumbosacral junction, as described.  2.  No acute lumbar spine fracture is identified.  3.  Grade I anterolisthesis of L3 on L4 and grade II anterolisthesis of L4 on L5.   4.  Multilevel degenerative changes, as described.  5.  Indeterminate findings in the partially visualized abdomen and pelvis, as described. Recommend follow-up CT abdomen and pelvis with contrast for further evaluation.       EKG (personally reviewed): sinus han, no acute ischemic changes,       Medical History  Past Medical History:   Diagnosis Date    Anxiety     Breast cyst     While ago        Surgical History  Past Surgical History:   Procedure Laterality Date    BREAST CYST ASPIRATION Left     While ago    HYSTERECTOMY  1985    OOPHORECTOMY Bilateral 1985    ZZC REMOVAL OF OVARY(S)      Description: Oophorectomy;  Recorded: 12/17/2009;    ZZC TOTAL ABDOM HYSTERECTOMY      Description: Total Abdominal Hysterectomy;  Recorded: 12/17/2009;          Social History  Social History     Tobacco Use    Smoking status: Never    Smokeless tobacco: Never   Substance Use Topics    Alcohol use: No    Drug use: No          Allergies  Allergies   Allergen Reactions    Prochlorperazine Edisylate [Prochlorperazine] Other (See Comments)     Extrapyramidal side effects    Family History  I have reviewed this patient's family history and updated it with pertinent information if needed.  Family History   Problem Relation Age of Onset    Breast Cancer Mother 52.00    Cancer Maternal Grandfather 71.00        prostate    Prostate Cancer Maternal Grandfather                  Prior to Admission Medications   Prior to Admission Medications   Prescriptions Last Dose Informant Patient Reported? Taking?   BIOTIN ORAL   Yes No   Sig: [BIOTIN  ORAL] Take 5,000 mcg by mouth 2 (two) times a day.    acarbose (PRECOSE) 25 MG tablet   No No   Sig: [ACARBOSE (PRECOSE) 25 MG TABLET] TAKE 1 TABLET BY MOUTH 3  TIMES A DAY WITH MEALS   atenolol (TENORMIN) 50 MG tablet   No No   Sig: TAKE 1 TABLET BY MOUTH  DAILY   busPIRone (BUSPAR) 7.5 MG tablet   No No   Sig: TAKE 1 TABLET BY MOUTH TWICE  DAILY   calcium-vitamin D (CALCIUM-VITAMIN D) 500 mg(1,250mg) -200 unit per tablet   Yes No   Sig: [CALCIUM-VITAMIN D (CALCIUM-VITAMIN D) 500 MG(1,250MG) -200 UNIT PER TABLET] Take 2 tablets by mouth 2 (two) times a day with meals.   celecoxib (CELEBREX) 200 MG capsule   No No   Sig: TAKE 1 CAPSULE BY MOUTH  DAILY   denosumab 60 mg/mL Syrg   Yes No   Sig: [DENOSUMAB 60 MG/ML SYRG] Inject 60 mg under the skin once.   donepezil (ARICEPT) 5 MG tablet   No No   Sig: Take 1 tablet (5 mg) by mouth at bedtime   multivitamin with minerals (THERA-M) 9 mg iron-400 mcg Tab tablet   Yes No   Sig: [MULTIVITAMIN WITH MINERALS (THERA-M) 9 MG IRON-400 MCG TAB TABLET] Take 1 tablet by mouth daily.   nortriptyline (PAMELOR) 50 MG capsule   No No   Sig: TAKE 1 CAPSULE BY MOUTH AT  BEDTIME   omeprazole (PRILOSEC) 20 MG capsule   Yes No   Sig: [OMEPRAZOLE (PRILOSEC) 20 MG CAPSULE] Take 20 mg by mouth daily before breakfast.      Facility-Administered Medications: None              Pertinent Labs    Most Recent 3 CBC's:  Recent Labs   Lab Test 04/10/24  1150 05/19/23  1202 12/29/22  1456   WBC 8.0 6.5 4.2   HGB 11.8 12.9 13.2   MCV 94 94 93    211 227     Most Recent 3 BMP's:  Recent Labs   Lab Test 04/10/24  1150 05/19/23  1202 12/29/22  1456    139 138   POTASSIUM 4.2 4.6 4.1   CHLORIDE 99 103 98   CO2 31* 23 28   BUN 18.6 17.6 11.0   CR 1.08* 0.64 0.61   ANIONGAP 11 13 12   JUSTIN 12.0* 9.6 10.2   * 88 116*     Most Recent 2 LFT's:  Recent Labs   Lab Test 04/10/24  1505 05/19/23  1202   AST 43 41*   ALT 14 30   ALKPHOS 98 74   BILITOTAL 0.4 0.4     Most Recent 3 INR's:No lab  results found.  Most Recent 3 Troponin's:No lab results found.    Medical Decision Making        Medical Decision Making               Oscar Ragland MD, [facility]  Internal Medicine Hospitalist  4/10/2024

## 2024-04-11 ENCOUNTER — APPOINTMENT (OUTPATIENT)
Dept: CT IMAGING | Facility: HOSPITAL | Age: 79
End: 2024-04-11
Attending: HOSPITALIST
Payer: COMMERCIAL

## 2024-04-11 ENCOUNTER — APPOINTMENT (OUTPATIENT)
Dept: OCCUPATIONAL THERAPY | Facility: HOSPITAL | Age: 79
End: 2024-04-11
Attending: HOSPITALIST
Payer: COMMERCIAL

## 2024-04-11 ENCOUNTER — APPOINTMENT (OUTPATIENT)
Dept: PHYSICAL THERAPY | Facility: HOSPITAL | Age: 79
End: 2024-04-11
Attending: HOSPITALIST
Payer: COMMERCIAL

## 2024-04-11 LAB
ANION GAP SERPL CALCULATED.3IONS-SCNC: 11 MMOL/L (ref 7–15)
BUN SERPL-MCNC: 16.8 MG/DL (ref 8–23)
CALCIUM SERPL-MCNC: 10.5 MG/DL (ref 8.8–10.2)
CHLORIDE SERPL-SCNC: 104 MMOL/L (ref 98–107)
CREAT SERPL-MCNC: 0.94 MG/DL (ref 0.51–0.95)
DEPRECATED HCO3 PLAS-SCNC: 27 MMOL/L (ref 22–29)
EGFRCR SERPLBLD CKD-EPI 2021: 62 ML/MIN/1.73M2
ERYTHROCYTE [DISTWIDTH] IN BLOOD BY AUTOMATED COUNT: 14.1 % (ref 10–15)
GLUCOSE SERPL-MCNC: 115 MG/DL (ref 70–99)
HCT VFR BLD AUTO: 34.5 % (ref 35–47)
HGB BLD-MCNC: 11.2 G/DL (ref 11.7–15.7)
MAGNESIUM SERPL-MCNC: 1.7 MG/DL (ref 1.7–2.3)
MCH RBC QN AUTO: 30 PG (ref 26.5–33)
MCHC RBC AUTO-ENTMCNC: 32.5 G/DL (ref 31.5–36.5)
MCV RBC AUTO: 93 FL (ref 78–100)
PLATELET # BLD AUTO: 186 10E3/UL (ref 150–450)
POTASSIUM SERPL-SCNC: 3.4 MMOL/L (ref 3.4–5.3)
POTASSIUM SERPL-SCNC: 3.4 MMOL/L (ref 3.4–5.3)
RBC # BLD AUTO: 3.73 10E6/UL (ref 3.8–5.2)
SODIUM SERPL-SCNC: 142 MMOL/L (ref 135–145)
WBC # BLD AUTO: 8.7 10E3/UL (ref 4–11)

## 2024-04-11 PROCEDURE — 97166 OT EVAL MOD COMPLEX 45 MIN: CPT | Mod: GO

## 2024-04-11 PROCEDURE — 96374 THER/PROPH/DIAG INJ IV PUSH: CPT | Mod: 59

## 2024-04-11 PROCEDURE — G0378 HOSPITAL OBSERVATION PER HR: HCPCS

## 2024-04-11 PROCEDURE — 99232 SBSQ HOSP IP/OBS MODERATE 35: CPT | Performed by: HOSPITALIST

## 2024-04-11 PROCEDURE — 258N000003 HC RX IP 258 OP 636: Performed by: HOSPITALIST

## 2024-04-11 PROCEDURE — 36415 COLL VENOUS BLD VENIPUNCTURE: CPT | Performed by: HOSPITALIST

## 2024-04-11 PROCEDURE — 97162 PT EVAL MOD COMPLEX 30 MIN: CPT | Mod: GP

## 2024-04-11 PROCEDURE — 80048 BASIC METABOLIC PNL TOTAL CA: CPT | Performed by: HOSPITALIST

## 2024-04-11 PROCEDURE — 96361 HYDRATE IV INFUSION ADD-ON: CPT

## 2024-04-11 PROCEDURE — 74177 CT ABD & PELVIS W/CONTRAST: CPT

## 2024-04-11 PROCEDURE — 85027 COMPLETE CBC AUTOMATED: CPT | Performed by: HOSPITALIST

## 2024-04-11 PROCEDURE — 84132 ASSAY OF SERUM POTASSIUM: CPT | Performed by: HOSPITALIST

## 2024-04-11 PROCEDURE — 97535 SELF CARE MNGMENT TRAINING: CPT | Mod: GO

## 2024-04-11 PROCEDURE — 250N000011 HC RX IP 250 OP 636: Performed by: HOSPITALIST

## 2024-04-11 PROCEDURE — 96372 THER/PROPH/DIAG INJ SC/IM: CPT | Mod: 59 | Performed by: HOSPITALIST

## 2024-04-11 PROCEDURE — 250N000013 HC RX MED GY IP 250 OP 250 PS 637: Performed by: HOSPITALIST

## 2024-04-11 PROCEDURE — 97530 THERAPEUTIC ACTIVITIES: CPT | Mod: GP

## 2024-04-11 PROCEDURE — 83735 ASSAY OF MAGNESIUM: CPT | Performed by: HOSPITALIST

## 2024-04-11 PROCEDURE — 97116 GAIT TRAINING THERAPY: CPT | Mod: GP

## 2024-04-11 RX ORDER — IOPAMIDOL 755 MG/ML
44 INJECTION, SOLUTION INTRAVASCULAR ONCE
Status: COMPLETED | OUTPATIENT
Start: 2024-04-11 | End: 2024-04-11

## 2024-04-11 RX ORDER — NORTRIPTYLINE HYDROCHLORIDE 50 MG/1
50 CAPSULE ORAL AT BEDTIME
Status: DISCONTINUED | OUTPATIENT
Start: 2024-04-11 | End: 2024-04-11

## 2024-04-11 RX ORDER — NORTRIPTYLINE HCL 25 MG
25 CAPSULE ORAL
Status: DISCONTINUED | OUTPATIENT
Start: 2024-04-11 | End: 2024-04-12

## 2024-04-11 RX ORDER — AMLODIPINE BESYLATE 5 MG/1
5 TABLET ORAL DAILY
Status: DISCONTINUED | OUTPATIENT
Start: 2024-04-12 | End: 2024-04-12 | Stop reason: HOSPADM

## 2024-04-11 RX ORDER — LANOLIN ALCOHOL/MO/W.PET/CERES
3 CREAM (GRAM) TOPICAL AT BEDTIME
Status: DISCONTINUED | OUTPATIENT
Start: 2024-04-11 | End: 2024-04-12

## 2024-04-11 RX ORDER — NORTRIPTYLINE HYDROCHLORIDE 50 MG/1
50 CAPSULE ORAL
Status: DISCONTINUED | OUTPATIENT
Start: 2024-04-11 | End: 2024-04-11

## 2024-04-11 RX ORDER — ACARBOSE 25 MG/1
25 TABLET ORAL
Status: DISCONTINUED | OUTPATIENT
Start: 2024-04-11 | End: 2024-04-12 | Stop reason: HOSPADM

## 2024-04-11 RX ORDER — SODIUM CHLORIDE 9 MG/ML
INJECTION, SOLUTION INTRAVENOUS CONTINUOUS
Status: DISCONTINUED | OUTPATIENT
Start: 2024-04-11 | End: 2024-04-11

## 2024-04-11 RX ORDER — POTASSIUM CHLORIDE 1500 MG/1
20 TABLET, EXTENDED RELEASE ORAL ONCE
Status: COMPLETED | OUTPATIENT
Start: 2024-04-11 | End: 2024-04-11

## 2024-04-11 RX ORDER — AMLODIPINE BESYLATE 2.5 MG/1
2.5 TABLET ORAL ONCE
Qty: 1 TABLET | Refills: 0 | Status: COMPLETED | OUTPATIENT
Start: 2024-04-11 | End: 2024-04-11

## 2024-04-11 RX ADMIN — Medication 3 MG: at 19:52

## 2024-04-11 RX ADMIN — QUETIAPINE 12.5 MG: 25 TABLET, FILM COATED ORAL at 19:51

## 2024-04-11 RX ADMIN — HYDRALAZINE HYDROCHLORIDE 12.5 MG: 25 TABLET, FILM COATED ORAL at 20:01

## 2024-04-11 RX ADMIN — POTASSIUM CHLORIDE 20 MEQ: 1500 TABLET, EXTENDED RELEASE ORAL at 21:00

## 2024-04-11 RX ADMIN — ACARBOSE 25 MG: 25 TABLET ORAL at 17:34

## 2024-04-11 RX ADMIN — IOPAMIDOL 42 ML: 755 INJECTION, SOLUTION INTRAVENOUS at 13:22

## 2024-04-11 RX ADMIN — AMLODIPINE BESYLATE 2.5 MG: 2.5 TABLET ORAL at 08:45

## 2024-04-11 RX ADMIN — BUSPIRONE HYDROCHLORIDE 7.5 MG: 7.5 TABLET ORAL at 19:54

## 2024-04-11 RX ADMIN — POTASSIUM CHLORIDE 20 MEQ: 1500 TABLET, EXTENDED RELEASE ORAL at 13:50

## 2024-04-11 RX ADMIN — SODIUM CHLORIDE: 9 INJECTION, SOLUTION INTRAVENOUS at 05:23

## 2024-04-11 RX ADMIN — HYDRALAZINE HYDROCHLORIDE 12.5 MG: 25 TABLET, FILM COATED ORAL at 16:24

## 2024-04-11 RX ADMIN — NORTRIPTYLINE HYDROCHLORIDE 25 MG: 25 CAPSULE ORAL at 19:54

## 2024-04-11 RX ADMIN — Medication 1 MG: at 01:45

## 2024-04-11 RX ADMIN — DONEPEZIL HYDROCHLORIDE 5 MG: 5 TABLET, FILM COATED ORAL at 20:02

## 2024-04-11 RX ADMIN — ACETAMINOPHEN 650 MG: 325 TABLET ORAL at 04:36

## 2024-04-11 RX ADMIN — ENOXAPARIN SODIUM 40 MG: 30 INJECTION SUBCUTANEOUS at 19:52

## 2024-04-11 RX ADMIN — BUSPIRONE HYDROCHLORIDE 7.5 MG: 7.5 TABLET ORAL at 08:45

## 2024-04-11 RX ADMIN — HYDRALAZINE HYDROCHLORIDE 10 MG: 20 INJECTION INTRAMUSCULAR; INTRAVENOUS at 00:28

## 2024-04-11 RX ADMIN — AMLODIPINE BESYLATE 2.5 MG: 2.5 TABLET ORAL at 14:41

## 2024-04-11 RX ADMIN — ACARBOSE 25 MG: 25 TABLET ORAL at 12:44

## 2024-04-11 RX ADMIN — PANTOPRAZOLE SODIUM 40 MG: 40 TABLET, DELAYED RELEASE ORAL at 08:45

## 2024-04-11 ASSESSMENT — ACTIVITIES OF DAILY LIVING (ADL)
ADLS_ACUITY_SCORE: 29
ADLS_ACUITY_SCORE: 29
ADLS_ACUITY_SCORE: 40
ADLS_ACUITY_SCORE: 29
ADLS_ACUITY_SCORE: 40
ADLS_ACUITY_SCORE: 29
ADLS_ACUITY_SCORE: 40
ADLS_ACUITY_SCORE: 40
ADLS_ACUITY_SCORE: 28
ADLS_ACUITY_SCORE: 40
ADLS_ACUITY_SCORE: 28
ADLS_ACUITY_SCORE: 28
ADLS_ACUITY_SCORE: 40

## 2024-04-11 NOTE — PLAN OF CARE
Problem: Adult Inpatient Plan of Care  Goal: Optimal Comfort and Wellbeing  4/11/2024 0449 by Valencia Monet RN  Outcome: Not Progressing  4/11/2024 0415 by Valencia Monet RN  Outcome: Not Progressing     Problem: Fall Injury Risk  Goal: Absence of Fall and Fall-Related Injury  4/11/2024 0449 by Valencia Monet RN  Outcome: Not Progressing  4/11/2024 0415 by Valencia Monet RN  Outcome: Not Progressing  Intervention: Identify and Manage Contributors  Recent Flowsheet Documentation  Taken 4/10/2024 2100 by Valencia Monet RN  Medication Review/Management: medications reviewed  Intervention: Promote Injury-Free Environment  Recent Flowsheet Documentation  Taken 4/10/2024 2100 by Valencia Monet RN  Safety Promotion/Fall Prevention:   assistive device/personal items within reach   activity supervised   room near nurse's station   Goal Outcome Evaluation:    Patient AO x2/3 but over estimates her abilities, denies nausea, reported headache around 0430 so prn tylenol was given. Patient was given melatonin but still was only able to sleep minimally. Tele was NSR/han w BBB. Assist of 1 to bathroom with walker, daughter in room at night. IVF running.     Valencia Monet RN

## 2024-04-11 NOTE — PROGRESS NOTES
"[facility] Hospitalist Progress Note  [facility]  Admission date: 4/10/2024    Summary:  78F cognitive disorder, HTN, anxiety who presents with several days of weakness, anorexia, worsening confusion and falls related to generalized weakness.     Assessment/Plan    #DARLENE - improving with IVF.  Hold celebrex.  Sarcopenia and Cr likely overestimates renal function.      #Hypercalcemia - likely from dehydration but also with this peripancreatic soft tissue finding which will be evaluated by CT A/P today.  -vitamin D slightly elevated.    -Stop os-rahat for now.    -if getting denosumab may in future need to resume supplementation.  TBD.     #Cognitive impairment - awaiting formal neuro evaluation outpatient.  Recent SLUMS of 17/30 and atrophy on CT brain.  Likely dementia  -TSH and B12 WNL  -aricept     #Weakness/Falls - due to above  -monitor on tele given daughter report of bradycardia  -PT/OT.  Lives alone    #HTN - change atenolol to norvasc to due bradycardia.     #Anxiety - continue pamelor, buspar, seroquel HS PRN.    #Insomnia - resume pamelor, melatonin PRN    #post prandial hyperglycemia - acarbose    Checklist:  Code Status: Full Code    Diet: Combination Diet Regular Diet Adult     DVT px:  Enoxaparin (Lovenox) SQ    Disposition and Discharge Planning    Medically Ready for Discharge: 1-2 days    Depends on findings on CT. Work with PT etc.    System Identified Risk Variables  Auto-populated based on system request - if needs to be addressed in treatment plan they will be addressed above:  \"  Clinically Significant Risk Factors Present on Admission           # Hypercalcemia: Highest Ca = 12 mg/dL in last 2 days, will monitor as appropriate        # Hypertension: Noted on problem list                 \"    Interval Events/Subjective/Notable results:  Didn't sleep well.  A little confused    Reviewed: BMP, CBC  Discussed with: daughter    Objective    Vital signs in last 24 hours  Temp:  [98  F (36.7 "  C)-98.7  F (37.1  C)] 98.7  F (37.1  C)  Pulse:  [50-70] 67  Resp:  [14-26] 24  BP: (142-197)/(63-89) 166/74  SpO2:  [94 %-100 %] 98 % O2 Device: None (Room air)    Weight:   86 lbs 4.8 oz  Body mass index is 18.68 kg/m .    Intake/Output last 3 shifts  I/O last 3 completed shifts:  In: 1937.5 [I.V.:937.5; IV Piggyback:1000]  Out: -     Physical Exam  General:  Alert, cooperative, no distress    Neurologic: confused, facial symmetry preserved, fluent speech.   Psych: calm  HEENT:  Anicteric, MMM  CV: no edema  Lungs:  Easyrespirations  Skin: no rashes noted on exposed skin.    Deutsch Catheter: Not present  Lines: None          Medical Decision Making               Oscar Ragland MD, [facility]  Internal Medicine Hospitalist

## 2024-04-11 NOTE — CONSULTS
Consultation - Hematology/Oncology  Bibiana Mcrae,  1945, MRN [id number]    Admitting Dx: Confusion [R41.0]  Generalized weakness [R53.1]  DARLENE (acute kidney injury) (H24) [N17.9]  Fall, initial encounter [W19.XXXA]    PCP: Zack Woody, [phone]   Code status:  Full Code       Extended Emergency Contact Information  Primary Emergency Contact: Rosa Isela Mcrae   [employer]  Home Phone: [phone]  Mobile Phone: [phone]  Relation: Daughter  Secondary Emergency Contact: Mary Jo Cross   [employer]  Home Phone: [phone]  Relation: Other       Assessment and Plan     1.  Pancreatic masses: Will need tissue biopsy.  Would also request a CT scan of the chest with contrast for staging.  The biopsy can be done either as an inpatient or an outpatient depending on the patient's overall course.  Will try to get it done as soon as possible.  I personally viewed the CT abdomen images.  I think the biopsy can be done percutaneously.  I met with the patient and her daughter.  The plan is reviewed.  Questions were answered.  We can follow-up after we have the biopsy results back.    2.  Neurologic impairment: Has some trouble providing a coherent history.  Formal neurologic evaluation as an outpatient as planned.  B12, and TSH are normal.       Chief Complaint <principal problem not specified>       HPI      We have been requested by [facility] to evaluate Bibiana Mcrae who is a 78 year old year old female for a new finding of an abdominal mass.  The patient presented to the hospital yesterday with complaints of some confusion and falls.  She was brought in by her daughters.  She lives at home by herself.  Upon evaluation in the emergency room she was complaining of back pain and difficulty standing or ambulating.  While in the emergency room she had a CT scan of the cervical, thoracic, and lumbar spine.  No evidence of any fracture noted however on the lumbar spine films there were some  indeterminant abdominal findings.  She then went on to have CT of the abdomen and pelvis today which showed multilobulated soft tissue masses in the distal body and tail of the pancreas.  Clinically she is not having any new abdominal symptoms.  She is not the best historian.  But denies abdominal pain nausea or vomiting.  Her daughter states that she has been asymptomatic from a GI standpoint.       Medical History  Past Medical History:   Diagnosis Date    Anxiety     Breast cyst     While ago      Surgical History  She  has a past surgical history that includes TOTAL ABDOM HYSTERECTOMY; REMOVAL OF OVARY(S); Hysterectomy (1985); Oophorectomy (Bilateral, 1985); and Breast Cyst Aspiration (Left).   Social History  Reviewed, and she  reports that she has never smoked. She has never used smokeless tobacco. She reports that she does not drink alcohol and does not use drugs.   Allergies  Allergies   Allergen Reactions    Prochlorperazine Edisylate [Prochlorperazine] Other (See Comments)     Extrapyramidal side effects    Family History  Reviewed, and family history includes Breast Cancer (age of onset: 52.00) in her mother; Cancer (age of onset: 71.00) in her maternal grandfather; Prostate Cancer in her maternal grandfather.   Psychosocial Needs  Social History     Social History Narrative    Not on file     Additional psychosocial needs reviewed per nursing assessment.     Prior to Admission Medications   Medications Prior to Admission   Medication Sig Dispense Refill Last Dose    acarbose (PRECOSE) 25 MG tablet [ACARBOSE (PRECOSE) 25 MG TABLET] TAKE 1 TABLET BY MOUTH 3  TIMES A DAY WITH MEALS 270 tablet 1 4/10/2024 at AM    acetaminophen (TYLENOL) 325 MG tablet Take 325-650 mg by mouth every 6 hours as needed for mild pain   Past Week    atenolol (TENORMIN) 50 MG tablet TAKE 1 TABLET BY MOUTH  DAILY 90 tablet 3 4/10/2024 at AM    Biotin 5 MG TABS Take 5,000 mcg by mouth daily   Past Week    busPIRone (BUSPAR) 7.5 MG  tablet TAKE 1 TABLET BY MOUTH TWICE  DAILY 180 tablet 3 4/9/2024 at PM    calcium carbonate (OS-JUSTIN) 1500 (600 Ca) MG tablet Take 600 mg by mouth daily   Past Week    celecoxib (CELEBREX) 200 MG capsule TAKE 1 CAPSULE BY MOUTH  DAILY 90 capsule 3 4/10/2024 at AM    donepezil (ARICEPT) 5 MG tablet Take 1 tablet (5 mg) by mouth at bedtime 90 tablet 3 4/9/2024 at PM    fluocinolone acetonide (DERMA SMOOTHE/FS BODY) 0.01 % external oil Apply topically 2 times daily 5 drops to both ears twice daily   Past Week    multivitamin with minerals (THERA-M) 9 mg iron-400 mcg Tab tablet [MULTIVITAMIN WITH MINERALS (THERA-M) 9 MG IRON-400 MCG TAB TABLET] Take 1 tablet by mouth daily.   Past Week    nortriptyline (PAMELOR) 50 MG capsule TAKE 1 CAPSULE BY MOUTH AT  BEDTIME 90 capsule 2 4/9/2024 at PM    omeprazole (PRILOSEC) 20 MG capsule [OMEPRAZOLE (PRILOSEC) 20 MG CAPSULE] Take 20 mg by mouth daily before breakfast.   4/10/2024 at AM    denosumab 60 mg/mL Syrg [DENOSUMAB 60 MG/ML SYRG] Inject 60 mg under the skin once.             Review of Systems:    As above in the history.     Review of Systems otherwise Negative for:  General: chills, fever or night sweats  Psychological: anxiety or depression  Ophthalmic: blurry vision, double vision or loss of vision, vision change  ENT: epistaxis, oral lesions, hearing changes  Hematological and Lymphatic: bleeding, bruising, jaundice, swollen lymph nodes  Endocrine: hot flashes, unexpected weight changes  Respiratory: cough, hemoptysis, orthopnea or shortness of breath/[place]  Cardiovascular: chest pain, edema, palpitations or PND  Gastrointestinal: abdominal pain, blood in stools, change in bowel habits, constipation, diarrhea or nausea/vomiting  Genito-Urinary: change in urinary stream, incontinence, frequency/urgency  Musculoskeletal: joint pain, stiffness, swelling, muscle pain or weakness  Neurological: headaches, numbness/tingling  Dermatological: lumps and rash    ECOG performance  status is 2    Physical Exam:    Vitals:    04/11/24 0442 04/11/24 0839 04/11/24 1422 04/11/24 1557   BP: (!) 166/74 (!) 173/81 (!) 189/87 (!) 202/90   Pulse: 67 69 75 73   Resp: 24 29 18 18   Temp: 98.7  F (37.1  C) 98.8  F (37.1  C) 98.5  F (36.9  C) 97.7  F (36.5  C)   TempSrc: Oral Oral Oral Oral   SpO2: 98% 98% 96% 100%   Weight:       Height:         General: patient appears stated age of 78 year old. Nontoxic and in no distress.   HEENT: Head: atraumatic, normocephalic. Sclerae anicteric.  Chest:  Normal respiratory effort  Cardiac:  No edema.   Abdomen: abdomen is non-distended  Extremities: normal tone and muscle bulk.  Skin: no lesions or rash. Warm and dry.   CNS: alert.       Pertinent Labs:    Lab Results: personally reviewed.   Lab Results   Component Value Date     04/11/2024    CO2 27 04/11/2024    CO2 26 11/09/2021    BUN 16.8 04/11/2024    BUN 13 11/09/2021     Lab Results   Component Value Date    WBC 8.7 04/11/2024    HGB 11.2 04/11/2024    HCT 34.5 04/11/2024    MCV 93 04/11/2024     04/11/2024            Pertinent Radiology  Radiology Results: Personally reviewed image/s and Personally reviewed impression/s    No results found.

## 2024-04-11 NOTE — PROGRESS NOTES
Occupational Therapy        04/11/24 1400   Appointment Info   Signing Clinician's Name / Credentials (OT) JESUS Obregon   Rehab Comments (OT) d/t cognition, information gained through daughter (present) & chart review   Quick Adds   Quick Adds Certification   Living Environment   People in Home alone   Current Living Arrangements house  (one level home)   Home Accessibility stairs to enter home   Number of Stairs, Main Entrance 2   Stair Railings, Main Entrance railing on right side (ascending)   Transportation Anticipated family or friend will provide   Living Environment Comments Pt lives alone and her daughters will work out something to have someone home with her all the time   Self-Care   Current Activity Tolerance good   Equipment Currently Used at Home none   Fall history within last six months yes   Number of times patient has fallen within last six months 2   Activity/Exercise/Self-Care Comment I w/ ADLs @ baseline   Instrumental Activities of Daily Living (IADL)   IADL Comments I w/ IADLs @ baseline, per daughter report pt completes med management & tracks in notebook   General Information   Onset of Illness/Injury or Date of Surgery 04/10/24   Referring Physician Oscar Ragland MD   Patient/Family Therapy Goal Statement (OT) none stated   Additional Occupational Profile Info/Pertinent History of Current Problem Bibiana Mcrae is a 78 year old female with cognitive disorder, HTN, anxiety who presents with several days of weakness, anorexia, worsening confusion and falls related to generalized weakness.  Hit her head on one of the falls.   Existing Precautions/Restrictions fall   Cognitive Status Examination   Orientation Status not oriented to person, place or time  (per daughter report, pt believes in hotel)   Affect/Mental Status (Cognitive) confused   Follows Commands follows one-step commands   Cognitive Status Comments per daughter, cog deficits @ baseline & has scheduled neuro eval   Range of  Motion Comprehensive   General Range of Motion no range of motion deficits identified   Strength Comprehensive (MMT)   General Manual Muscle Testing (MMT) Assessment no strength deficits identified   Muscle Tone Assessment   Muscle Tone Quick Adds No deficits were identified   Bed Mobility   Bed Mobility supine-sit;sit-supine   Supine-Sit [place] (Bed Mobility) supervision  (increased time)   Sit-Supine [place] (Bed Mobility) supervision   Assistive Device (Bed Mobility) bed rails   Comment (Bed Mobility) elevated HOB   Transfers   Transfers sit-stand transfer   Sit-Stand Transfer   Sit-Stand [place] (Transfers) contact guard   Assistive Device (Sit-Stand Transfers) walker, front-wheeled   Balance   Balance Assessment sitting static balance   Sitting Balance: Static supervision   Position, Sitting Balance sitting edge of bed   Clinical Impression   Criteria for Skilled Therapeutic Interventions Met (OT) Yes, treatment indicated   OT Diagnosis Confusion & weakness   OT Problem List-Impairments impacting ADL problems related to;activity tolerance impaired;cognition;mobility;strength   Assessment of Occupational Performance 3-5 Performance Deficits   Identified Performance Deficits g/h, LB dressing, trsfrs, functional endurance/mobility   Planned Therapy Interventions (OT) ADL retraining;IADL retraining;cognition;transfer training   Clinical Decision Making Complexity (OT) detailed assessment/moderate complexity   Risk & Benefits of therapy have been explained evaluation/treatment results reviewed;care plan/treatment goals reviewed   OT Total Evaluation Time   OT Eval, Moderate Complexity Minutes (42926) 15   OT Goals   Therapy Frequency (OT) Daily   OT Predicted Duration/Target Date for Goal Attainment 04/18/24   OT Goals Hygiene/Grooming;Lower Body Dressing;Cognition   OT: Hygiene/Grooming independent   OT: Lower Body Dressing Modified independent   OT: Cognitive Patient/caregiver will verbalize  understanding of cognitive assessment results/recommendations as needed for safe discharge planning   Interventions   Interventions Quick Adds Self-Care/Home Management   Self-Care/Home Management   Self-Care/Home Mgmt/ADL, Compensatory, Meal Prep Minutes (18471) 10   Symptoms Noted During/After Treatment (Meal Preparation/Planning Training) fatigue   Treatment Detail/Skilled Intervention Eval completed, intervention started. Pt ambulated into BR w/ CGA & FWW. Upon entering the BR, pt turned w/o walker - vocal cues for continued use. Pt. pulled down pants/briefs w/ FWW & min A. Pt stand/sit w/ CGA & grab bars. Pt i'ly completed toilet hygiene. Pt sit/stand w/ SBA, grab bars, & FWW. Pt did not initially use walker when ambulating to sink - provided verbal cues to use for stability.Pt completed handwashing routine @ sink w/ CGA & cuing for safe body mechanics (use sink for stabilization). Pt. ambualted to EOB w/ CGA & FWW, vocal cuing to keep 4 points of contact on floor w/ walker - noted fatigued. Pt stand/sit on EOB w/ CGA, FWW, & elevated seat. Pt. sit/supine w/ SBA & bedrails. Pt dependent to doff shoes (daughter completed). Pt bed alarm activated & call light w/in reach.   OT Discharge Planning   OT Plan chained g/h, LB dressing, functional endurance/mobility w/ FWW, monitor cog   OT Discharge Recommendation (DC Rec) home with home care occupational therapy  (possibly home with increased supervision due to cognition)   OT Rationale for DC Rec Pt appears confused, per pt daughter patient has cognitive deficits w/ ongoing decline at baseline. Per daughter report, pt lives alone & completes all ADLs/IADLs @ baseline, including med management. With current cognition, pt would benefit from long term care facility to ensure safety w/ ADLs/IADLs. Continue to monitor/assess cognition & modify recommendations as needed.   OT Brief overview of current status CGA, cog deficits, FWW   Total Session Time   Timed Code  Treatment Minutes 10   Total Session Time (sum of timed and untimed services) 25    [facility]  OUTPATIENT OCCUPATIONAL THERAPY  EVALUATION  PLAN OF TREATMENT FOR OUTPATIENT REHABILITATION  (COMPLETE FOR INITIAL CLAIMS ONLY)  Patient's Last Name, First Name, M.I.  YOB: 1945  Bibiana Mcrae                          Provider's Name  [facility] Medical Record No.  [id number]                             Onset Date:  04/10/24   Start of Care Date:      Type:     ___PT   _X_OT   ___SLP Medical Diagnosis:                       OT Diagnosis:  Confusion & weakness Visits from SOC:  1     See note for plan of treatment, functional goals and certification details    I CERTIFY THE NEED FOR THESE SERVICES FURNISHED UNDER        THIS PLAN OF TREATMENT AND WHILE UNDER MY CARE     (Physician co-signature of this document indicates review and certification of the therapy plan).

## 2024-04-11 NOTE — PROGRESS NOTES
04/11/24 0900   Appointment Info   Signing Clinician's Name / Credentials (PT) Lorena Staley PT   Living Environment   Current Living Arrangements house  (one level home.)   Home Accessibility stairs to enter home   Number of Stairs, Main Entrance 2   Stair Railings, Main Entrance railing on right side (ascending)   Transportation Anticipated family or friend will provide   Living Environment Comments Pt lives alone and her daughters will work out something to have someone home with her all the time.   Self-Care   Current Activity Tolerance good   Equipment Currently Used at Home none   Fall history within last six months yes   Number of times patient has fallen within last six months 2   General Information   Onset of Illness/Injury or Date of Surgery 04/10/24   Referring Physician Oscar Ragland MD   Patient/Family Therapy Goals Statement (PT) Pt and her daughters plan to take the pt home at [place]   Pertinent History of Current Problem (include personal factors and/or comorbidities that impact the POC) Per the chart, 78F cognitive disorder, HTN, anxiety who presents with several days of weakness, anorexia, worsening confusion and falls related to generalized weakness.   Existing Precautions/Restrictions fall  (Call light by the pt and pt is sitting with her family members in the room.)   Weight-Bearing Status - LLE weight-bearing as tolerated   Weight-Bearing Status - RLE weight-bearing as tolerated   Cognition   Orientation Status (Cognition) oriented to;person  (and month)   Follows Commands (Cognition) 50-74% accuracy   Cognitive Status Comments Increased cues for all tasks.   Pain Assessment   Patient Currently in Pain No   Range of Motion (ROM)   Range of Motion ROM is WNL   ROM Comment Bilat LEs   Strength (Manual Muscle Testing)   Strength Comments bilat LEs grossly 4/5   Bed Mobility   Comment, (Bed Mobility) Supine>sit with SBA   Transfers   Comment, (Transfers) Sit<>stand with the FWW with SBA   Gait/Stairs  (Locomotion)   [place] (Gait) contact guard   Assistive Device (Gait) walker, front-wheeled  (FWW)   Distance in Feet (Gait) 20'   Pattern (Gait) step-through;swing-through   Deviations/Abnormal Patterns (Gait) gait speed decreased   Balance   Balance Comments CGA with the FWW.   Sensory Examination   Sensory Perception WNL   Sensory Perception Comments Bilat LEs   Clinical Impression   Criteria for Skilled Therapeutic Intervention Yes, treatment indicated   PT Diagnosis (PT) Impaired functional mobility,   Influenced by the following impairments weakness, dec bal, confusion,   Functional limitations due to impairments bed mobility, transfers, gait and steps   Clinical Presentation (PT Evaluation Complexity) stable   Clinical Presentation Rationale Pt presents medically diagnosed.   Clinical Decision Making (Complexity) low complexity   Planned Therapy Interventions (PT) bed mobility training;gait training;stair training;strengthening;transfer training   Risk & Benefits of therapy have been explained evaluation/treatment results reviewed;care plan/treatment goals reviewed;patient;daughter;risks/benefits reviewed;participants voiced agreement with care plan   PT Total Evaluation Time   PT Eval, Moderate Complexity Minutes (12245) 15   Physical Therapy Goals   PT Frequency Daily   PT Predicted Duration/Target Date for Goal Attainment 04/13/24   PT Goals Bed Mobility;Transfers;Gait;Stairs   PT: Bed Mobility Modified independent;Supine to/from sit;Rolling   PT: Transfers Modified independent;Sit to/from stand;Bed to/from chair;Assistive device   PT: Gait Supervision/stand-by assist;Rolling walker;Greater than 200 feet   PT: Stairs Supervision/stand-by assist;2 stairs;Rail on right   Interventions   Interventions Quick Adds Gait Training;Therapeutic Activity;Therapeutic Procedure   Therapeutic Activity   Therapeutic Activities: dynamic activities to improve functional performance Minutes (45692) 23   Treatment  Detail/Skilled Intervention Supine>sit SBA with cues to use the rail.  Sit<>stand x 3 reps with cues for hand placement.  Increased cues for technique and demo.  PT educate the pt on how the walker works and how to use it.  PT was fitted and issued a FWW.  PT did review some dc recommendations for home such as having a shower chair, possible getting a bed rail and PT did recommend home PT and she should use the walker all the tiime at home and should have someone there all the time.   Gait Training   Gait Training Minutes (37868) 10   Treatment Detail/Skilled Intervention Pt walked 200' with the FWW and needed cues to stay closer to the walker and not to carry the walker.  Pt did have a lot of questions about the walker. Pt and the family wanted a FWW for home.  Cues for posture.   Distance in Feet 200'   [place] Level (Gait Training) contact guard   Physical Assistance Level (Gait Training) verbal cues;1 person assist   Weight Bearing (Gait Training) weight-bearing as tolerated   Assistive Device (Gait Training) rolling walker  (FWW)   Pattern Analysis (Gait Training) swing-through gait   Gait Analysis Deviations decreased mony;decreased step length   Impairments (Gait Analysis/Training) balance impaired;strength decreased   PT Discharge Planning   PT Plan Gait w FWW, ex, steps.   PT Discharge Recommendation (DC Rec) home with home care physical therapy   PT Rationale for DC Rec Pt is confused and needs increased cues for tasks.  PT does recommend the pt use FWW at the tiime and have assist with mobility.    PT did isssue and fit the pt with a FWW.  Family plans to take the pt home and have someone stay with her. 24 hour assist is recommended at this point.   Home PT is recommended at [place].   PT Brief overview of current status PT eval. bed mobility with SBA, transfers with FWW multiple time  with CGA.  Ambulated 200' with the FWW with CGA.   PT Equipment Needed at Discharge walker, rolling  (FWW fitted and  issued.)   Total Session Time   Timed Code Treatment Minutes 33   Total Session Time (sum of timed and untimed services) 48

## 2024-04-11 NOTE — UTILIZATION REVIEW
"Inpatient to Observation note:    Admission Status; Secondary Review Determination     Under the authority of the Utilization Management Committee, the utilization review process indicated a secondary review on the above patient.  The review outcome is based on review of the medical records, discussions with staff, and applying clinical experience noted on the date of the review.          (x) Observation Status Appropriate - This patient does not meet hospital inpatient criteria and is placed in observation status. If this patient's primary payer is Medicare and was admitted as an inpatient, Condition Code 44 should be used and patient status changed to \"observation\".     RATIONALE FOR DETERMINATION   78 years old female with past medical history significant for hypertension, anxiety, and dementia presented for evaluation of fall and confusion.  Found to have acute kidney injury and creatinine has improved with IV fluid.  CT head and spine without acute finding.      The severity of illness, intensity of service provided, expected LOS and risk for adverse outcome make the care appropriate for further observation; however, doesn't meet criteria for hospital inpatient admission. Dr Ragland notified of this determination.        The information on this document is developed by the utilization review team in order for the business office to ensure compliance.  This only denotes the appropriateness of proper admission status and does not reflect the quality of care rendered.         The definitions of Inpatient Status and Observation Status used in making the determination above are those provided in the CMS Coverage Manual, Chapter 1 and Chapter 6, section 70.4.      Sincerely,  Maykel Loyola MD  Utilization Review  Physician Advisor  [facility].   "

## 2024-04-11 NOTE — PLAN OF CARE
PRIMARY DIAGNOSIS: GENERALIZED WEAKNESS    OUTPATIENT/OBSERVATION GOALS TO BE MET BEFORE DISCHARGE  1. Orthostatic performed: Yes:                                    2. Tolerating PO medications: Yes    3. Return to near baseline physical activity: Yes    4. Cleared for discharge by consultants (if involved): No    Discharge Planner Nurse   Safe discharge environment identified: No  Barriers to discharge: Yes       Entered by: Manjit Cisneros RN 04/11/2024 2:07 PM     Please review provider order for any additional goals.   Nurse to notify provider when observation goals have been met and patient is ready for discharge.Goal Outcome Evaluation:

## 2024-04-12 ENCOUNTER — TRANSCRIBE ORDERS (OUTPATIENT)
Dept: OTHER | Age: 79
End: 2024-04-12

## 2024-04-12 ENCOUNTER — APPOINTMENT (OUTPATIENT)
Dept: PHYSICAL THERAPY | Facility: HOSPITAL | Age: 79
End: 2024-04-12
Payer: COMMERCIAL

## 2024-04-12 ENCOUNTER — TELEPHONE (OUTPATIENT)
Dept: INTERVENTIONAL RADIOLOGY/VASCULAR | Facility: HOSPITAL | Age: 79
End: 2024-04-12
Payer: COMMERCIAL

## 2024-04-12 ENCOUNTER — APPOINTMENT (OUTPATIENT)
Dept: OCCUPATIONAL THERAPY | Facility: HOSPITAL | Age: 79
End: 2024-04-12
Payer: COMMERCIAL

## 2024-04-12 VITALS
WEIGHT: 86.3 LBS | RESPIRATION RATE: 16 BRPM | DIASTOLIC BLOOD PRESSURE: 79 MMHG | SYSTOLIC BLOOD PRESSURE: 167 MMHG | HEART RATE: 92 BPM | HEIGHT: 57 IN | BODY MASS INDEX: 18.62 KG/M2 | TEMPERATURE: 98.9 F | OXYGEN SATURATION: 98 %

## 2024-04-12 DIAGNOSIS — R89.7 ABNORMAL BIOPSY RESULT: Primary | ICD-10-CM

## 2024-04-12 LAB
ANION GAP SERPL CALCULATED.3IONS-SCNC: 12 MMOL/L (ref 7–15)
BUN SERPL-MCNC: 10.9 MG/DL (ref 8–23)
CALCIUM SERPL-MCNC: 10.4 MG/DL (ref 8.8–10.2)
CHLORIDE SERPL-SCNC: 104 MMOL/L (ref 98–107)
CREAT SERPL-MCNC: 0.83 MG/DL (ref 0.51–0.95)
DEPRECATED HCO3 PLAS-SCNC: 26 MMOL/L (ref 22–29)
EGFRCR SERPLBLD CKD-EPI 2021: 72 ML/MIN/1.73M2
ERYTHROCYTE [DISTWIDTH] IN BLOOD BY AUTOMATED COUNT: 14.1 % (ref 10–15)
GLUCOSE SERPL-MCNC: 110 MG/DL (ref 70–99)
HCT VFR BLD AUTO: 34.6 % (ref 35–47)
HGB BLD-MCNC: 11.5 G/DL (ref 11.7–15.7)
HOLD SPECIMEN: NORMAL
HOLD SPECIMEN: NORMAL
INR PPP: 1.05 (ref 0.85–1.15)
MAGNESIUM SERPL-MCNC: 1.8 MG/DL (ref 1.7–2.3)
MCH RBC QN AUTO: 30.7 PG (ref 26.5–33)
MCHC RBC AUTO-ENTMCNC: 33.2 G/DL (ref 31.5–36.5)
MCV RBC AUTO: 92 FL (ref 78–100)
PLATELET # BLD AUTO: 180 10E3/UL (ref 150–450)
POTASSIUM SERPL-SCNC: 3.4 MMOL/L (ref 3.4–5.3)
POTASSIUM SERPL-SCNC: 3.9 MMOL/L (ref 3.4–5.3)
RBC # BLD AUTO: 3.75 10E6/UL (ref 3.8–5.2)
SODIUM SERPL-SCNC: 142 MMOL/L (ref 135–145)
WBC # BLD AUTO: 7.7 10E3/UL (ref 4–11)

## 2024-04-12 PROCEDURE — 97130 THER IVNTJ EA ADDL 15 MIN: CPT | Mod: GO

## 2024-04-12 PROCEDURE — 99207 PR NO BILLABLE SERVICE THIS VISIT: CPT | Performed by: INTERNAL MEDICINE

## 2024-04-12 PROCEDURE — 84132 ASSAY OF SERUM POTASSIUM: CPT | Performed by: INTERNAL MEDICINE

## 2024-04-12 PROCEDURE — 36415 COLL VENOUS BLD VENIPUNCTURE: CPT | Performed by: HOSPITALIST

## 2024-04-12 PROCEDURE — G0378 HOSPITAL OBSERVATION PER HR: HCPCS

## 2024-04-12 PROCEDURE — 36415 COLL VENOUS BLD VENIPUNCTURE: CPT | Performed by: INTERNAL MEDICINE

## 2024-04-12 PROCEDURE — 250N000011 HC RX IP 250 OP 636

## 2024-04-12 PROCEDURE — 83735 ASSAY OF MAGNESIUM: CPT | Performed by: HOSPITALIST

## 2024-04-12 PROCEDURE — 96372 THER/PROPH/DIAG INJ SC/IM: CPT

## 2024-04-12 PROCEDURE — 97110 THERAPEUTIC EXERCISES: CPT | Mod: GP

## 2024-04-12 PROCEDURE — 85027 COMPLETE CBC AUTOMATED: CPT | Performed by: HOSPITALIST

## 2024-04-12 PROCEDURE — 36415 COLL VENOUS BLD VENIPUNCTURE: CPT | Performed by: STUDENT IN AN ORGANIZED HEALTH CARE EDUCATION/TRAINING PROGRAM

## 2024-04-12 PROCEDURE — 99239 HOSP IP/OBS DSCHRG MGMT >30: CPT | Performed by: INTERNAL MEDICINE

## 2024-04-12 PROCEDURE — 250N000013 HC RX MED GY IP 250 OP 250 PS 637: Performed by: INTERNAL MEDICINE

## 2024-04-12 PROCEDURE — 97535 SELF CARE MNGMENT TRAINING: CPT | Mod: GO

## 2024-04-12 PROCEDURE — 250N000013 HC RX MED GY IP 250 OP 250 PS 637: Performed by: HOSPITALIST

## 2024-04-12 PROCEDURE — 80048 BASIC METABOLIC PNL TOTAL CA: CPT | Performed by: HOSPITALIST

## 2024-04-12 PROCEDURE — 85610 PROTHROMBIN TIME: CPT | Performed by: STUDENT IN AN ORGANIZED HEALTH CARE EDUCATION/TRAINING PROGRAM

## 2024-04-12 PROCEDURE — 84207 ASSAY OF VITAMIN B-6: CPT | Performed by: INTERNAL MEDICINE

## 2024-04-12 PROCEDURE — 99222 1ST HOSP IP/OBS MODERATE 55: CPT | Performed by: INTERNAL MEDICINE

## 2024-04-12 PROCEDURE — 97116 GAIT TRAINING THERAPY: CPT | Mod: GP

## 2024-04-12 PROCEDURE — 82525 ASSAY OF COPPER: CPT | Performed by: INTERNAL MEDICINE

## 2024-04-12 RX ORDER — POTASSIUM CHLORIDE 1500 MG/1
20 TABLET, EXTENDED RELEASE ORAL DAILY
Qty: 5 TABLET | Refills: 0 | Status: SHIPPED | OUTPATIENT
Start: 2024-04-12 | End: 2024-04-17

## 2024-04-12 RX ORDER — OLANZAPINE 10 MG/2ML
2.5 INJECTION, POWDER, FOR SOLUTION INTRAMUSCULAR ONCE
Status: COMPLETED | OUTPATIENT
Start: 2024-04-12 | End: 2024-04-12

## 2024-04-12 RX ORDER — AMLODIPINE BESYLATE 5 MG/1
5 TABLET ORAL DAILY
Qty: 30 TABLET | Refills: 0 | Status: SHIPPED | OUTPATIENT
Start: 2024-04-13 | End: 2024-04-25

## 2024-04-12 RX ORDER — QUETIAPINE FUMARATE 25 MG/1
25 TABLET, FILM COATED ORAL 2 TIMES DAILY PRN
Qty: 20 TABLET | Refills: 0 | Status: SHIPPED | OUTPATIENT
Start: 2024-04-12 | End: 2024-04-23

## 2024-04-12 RX ORDER — POTASSIUM CHLORIDE 1500 MG/1
20 TABLET, EXTENDED RELEASE ORAL ONCE
Qty: 1 TABLET | Refills: 0 | Status: COMPLETED | OUTPATIENT
Start: 2024-04-12 | End: 2024-04-12

## 2024-04-12 RX ORDER — QUETIAPINE FUMARATE 25 MG/1
25 TABLET, FILM COATED ORAL 2 TIMES DAILY PRN
Status: DISCONTINUED | OUTPATIENT
Start: 2024-04-12 | End: 2024-04-12 | Stop reason: HOSPADM

## 2024-04-12 RX ORDER — DEXTROSE MONOHYDRATE, SODIUM CHLORIDE, AND POTASSIUM CHLORIDE 50; 1.49; 4.5 G/1000ML; G/1000ML; G/1000ML
INJECTION, SOLUTION INTRAVENOUS CONTINUOUS
Status: DISCONTINUED | OUTPATIENT
Start: 2024-04-12 | End: 2024-04-12

## 2024-04-12 RX ADMIN — PANTOPRAZOLE SODIUM 40 MG: 40 TABLET, DELAYED RELEASE ORAL at 09:49

## 2024-04-12 RX ADMIN — BUSPIRONE HYDROCHLORIDE 7.5 MG: 7.5 TABLET ORAL at 09:48

## 2024-04-12 RX ADMIN — HYDRALAZINE HYDROCHLORIDE 12.5 MG: 25 TABLET, FILM COATED ORAL at 14:47

## 2024-04-12 RX ADMIN — POTASSIUM CHLORIDE 20 MEQ: 1500 TABLET, EXTENDED RELEASE ORAL at 09:49

## 2024-04-12 RX ADMIN — ACARBOSE 25 MG: 25 TABLET ORAL at 13:07

## 2024-04-12 RX ADMIN — HYDRALAZINE HYDROCHLORIDE 12.5 MG: 25 TABLET, FILM COATED ORAL at 09:48

## 2024-04-12 RX ADMIN — AMLODIPINE BESYLATE 5 MG: 5 TABLET ORAL at 09:49

## 2024-04-12 RX ADMIN — ACARBOSE 25 MG: 25 TABLET ORAL at 09:48

## 2024-04-12 RX ADMIN — OLANZAPINE 2.5 MG: 10 INJECTION, POWDER, FOR SOLUTION INTRAMUSCULAR at 00:29

## 2024-04-12 ASSESSMENT — ACTIVITIES OF DAILY LIVING (ADL)
ADLS_ACUITY_SCORE: 30
ADLS_ACUITY_SCORE: 29
ADLS_ACUITY_SCORE: 30
ADLS_ACUITY_SCORE: 29
ADLS_ACUITY_SCORE: 30
ADLS_ACUITY_SCORE: 29
ADLS_ACUITY_SCORE: 29
ADLS_ACUITY_SCORE: 30

## 2024-04-12 NOTE — PLAN OF CARE
PRIMARY DIAGNOSIS: GENERALIZED WEAKNESS    OUTPATIENT/OBSERVATION GOALS TO BE MET BEFORE DISCHARGE  1. Orthostatic performed: N/A    2. Tolerating PO medications: Yes    3. Return to near baseline physical activity: Yes    4. Cleared for discharge by consultants (if involved): No    Discharge Planner Nurse   Safe discharge environment identified: No  Barriers to discharge: Yes       Entered by: Marilyn Lang RN 04/12/2024 3:52 AM     Please review provider order for any additional goals.   Nurse to notify provider when observation goals have been met and patient is ready for discharge.Goal Outcome Evaluation:

## 2024-04-12 NOTE — PROGRESS NOTES
"PRIMARY DIAGNOSIS: \"GENERIC\" NURSING  OUTPATIENT/OBSERVATION GOALS TO BE MET BEFORE DISCHARGE:  ADLs back to baseline: No    Activity and level of assistance: Up with standby assistance.    Pain status: Pain free.    Return to near baseline physical activity: No     Discharge Planner Nurse   Safe discharge environment identified: No  Barriers to discharge: Yes       Entered by: DEVORAH CHAVEZ RN 04/11/2024 11:34 PM     Please review provider order for any additional goals.   Nurse to notify provider when observation goals have been met and patient is ready for discharge.    Patient A and O only to self and family. Blood pressure elevated early in shift, up to 202/90. Brought down to 145/79 with scheduled hydralazine.   Patient on tele  but patches came off and patient refused to allow them to be applied. Provider notified.   Serequil given for restlessness. Family in room.    "

## 2024-04-12 NOTE — PLAN OF CARE
Physical Therapy Discharge Summary    Reason for therapy discharge:    Discharged to home with home therapy.    Progress towards therapy goal(s). See goals on Care Plan in [facility] electronic health record for goal details.  Goals partially met.  Barriers to achieving goals:   discharge from facility.    Therapy recommendation(s):    Continued therapy is recommended.  Rationale/Recommendations:  further strengthening and endurance activities would be beneficial.    Clarissa Jin, SPT  4/12/2024

## 2024-04-12 NOTE — DISCHARGE SUMMARY
[facility]    Discharge Summary  Hospitalist    Date of Admission:  4/10/2024  Date of Discharge:  4/12/2024  Discharging Provider: Sarah Chris MD  Date of Service (when I saw the patient): 04/12/24    Discharge Diagnoses   New pancreatic mass  Hypercalcemia  DARLENE  Cognitive impairment  General weakness and fall    History of Present Illness   Bibiana Mcrae is an 78 year old female who presented with weakness, anorexia, worsening confusion and falls    Hospital Course   78F cognitive disorder, HTN, anxiety who presents with several days of weakness, anorexia, worsening confusion and falls related to generalized weakness. CT showing pancreatic and peripancreatic mass and hypercalcemia.      Pancreatic and peripancreatic mass  -IR for CT guided biopsy. Holding Lovenox; NPO   -concerning for cancer; appreciate oncologist consultation.  -Patient did eat breakfast and thus IR cannot do biopsy today and won't do biopsy over weekend since it is not emergent.  -pt and family decided not to wait for the biopsy (which would be next week) and would like to go home today and arrange for outpt biosy  -instructed to follow up closely with pcp, oncologist and IR (biopsy asap)     #DARLENE - improving with IVF.       #Hypercalcemia - likely from dehydration but also possible due to pancreatic mass (malignant?)  -vitamin D slightly elevated.    -Stop os-rahat for now.    -if getting denosumab may in future need to resume supplementation. Defer to PCP.     #Cognitive impairment - awaiting formal neuro evaluation outpatient.  Recent SLUMS of 17/30 and atrophy on CT brain.  Likely dementia  -TSH and B12 WNL  -aricept     #Weakness/Falls - due to above  -monitor on tele given daughter report of bradycardia  -PT/OT.  Recommended home health care, pt, ot.       #HTN - change atenolol to norvasc to due bradycardia.     #Anxiety - continue pamelor, buspar, seroquel HS PRN.     #Insomnia - resume pamelor,  melatonin PRN     #post prandial hyperglycemia - acarbose    Mild hypokalemia  -k 3.4; due to poor oral intake  -supplement for 5 days    Sarah Chris MD    Significant Results and Procedures   See below    Pending Results   These results will be followed up by pcp  Unresulted Labs Ordered in the Past 30 Days of this Admission       Date and Time Order Name Status Description    4/12/2024  8:41 AM Copper level In process     4/12/2024  8:41 AM Vitamin B6 In process     4/12/2024  7:28 AM Potassium In process             Code Status   Full Code       Primary Care Physician   Zack Woody    General.  Awake alert oriented not in acute distress. Fatigue and small figure with malnutrition  HEENT.  Pupils equal round react to light, anicteric, EOM intact.  Neck supple no JVD.  CVS regular rhythm no murmur gallops.  Lungs.  Clear to auscultation bilateral no wheezing or rales.  Abdomen.  Soft nontender bowel sounds present.  Extremities.  No edema no calf tenderness.  Neurological.  Awake and alert. No focal deficit. General weakness  Skin no rash. No pallor.  Psych. Impaired memory and cognition       Discharge Disposition   Discharged to home with family, [facility] (PT, OT, SW, RN)  Condition at discharge: Fair    Consultations This Hospital Stay   PHYSICAL THERAPY ADULT IP CONSULT  OCCUPATIONAL THERAPY ADULT IP CONSULT  CARE MANAGEMENT / SOCIAL WORK IP CONSULT  INTERVENTIONAL RADIOLOGY ADULT/PEDS IP CONSULT  HEMATOLOGY & ONCOLOGY IP CONSULT  SOCIAL WORK IP CONSULT    Time Spent on this Encounter   I, Sarah Chris MD, personally saw the patient today and spent greater than 30 minutes discharging this patient.    Discharge Orders      Adult Cardiology Eval  Referral      Home Care Referral      Reason for your hospital stay    Pancreatic mass  Hypercalcemia  General weakness and fall  HTN  Mild hypokalemia     Follow-up and recommended labs and tests     Follow up with primary care provider, Zack Woody,  within 3-7 days to evaluate medication change, for hospital follow- up, and regarding new diagnosis.  The following labs/tests are recommended: cbc,cmp, magnesium.  Follow up with pcp for further management if calcium is high.  Follow up with Interventional radiologist for pancreatic mass biopsy.   Follow up with Oncologist as needed if biopsy showing malignancy     Activity    Your activity upon discharge: activity as tolerated; fall precaution; use assist for walking     When to contact your care team    Call your primary doctor if you have any of the following: increased pain or nausea, vomiting, fever or any concerns.     Discharge Instructions    Interventional Radiologist for outpatient pancreatic mass biopsy asap     Walker Order     Diet    Follow this diet upon discharge: Orders Placed This Encounter      Regular as tolerated     Discharge Medications   Current Discharge Medication List        START taking these medications    Details   amLODIPine (NORVASC) 5 MG tablet Take 1 tablet (5 mg) by mouth daily for 30 days  Qty: 30 tablet, Refills: 0    Associated Diagnoses: Primary hypertension      potassium chloride eliana ER (KLOR-CON M20) 20 MEQ CR tablet Take 1 tablet (20 mEq) by mouth daily for 5 days  Qty: 5 tablet, Refills: 0    Associated Diagnoses: Hypokalemia           CONTINUE these medications which have NOT CHANGED    Details   acarbose (PRECOSE) 25 MG tablet [ACARBOSE (PRECOSE) 25 MG TABLET] TAKE 1 TABLET BY MOUTH 3  TIMES A DAY WITH MEALS  Qty: 270 tablet, Refills: 1    Associated Diagnoses: Osteoporosis, senile      acetaminophen (TYLENOL) 325 MG tablet Take 325-650 mg by mouth every 6 hours as needed for mild pain      atenolol (TENORMIN) 50 MG tablet TAKE 1 TABLET BY MOUTH  DAILY  Qty: 90 tablet, Refills: 3    Associated Diagnoses: Essential hypertension with goal blood pressure less than 130/80      Biotin 5 MG TABS Take 5,000 mcg by mouth daily      busPIRone (BUSPAR) 7.5 MG tablet TAKE 1  TABLET BY MOUTH TWICE  DAILY  Qty: 180 tablet, Refills: 3    Associated Diagnoses: Anxiety      celecoxib (CELEBREX) 200 MG capsule TAKE 1 CAPSULE BY MOUTH  DAILY  Qty: 90 capsule, Refills: 3    Comments: Requesting 1 year supply  Associated Diagnoses: Chronic midline low back pain without sciatica      donepezil (ARICEPT) 5 MG tablet Take 1 tablet (5 mg) by mouth at bedtime  Qty: 90 tablet, Refills: 3    Associated Diagnoses: Memory loss      fluocinolone acetonide (DERMA SMOOTHE/FS BODY) 0.01 % external oil Apply topically 2 times daily 5 drops to both ears twice daily      multivitamin with minerals (THERA-M) 9 mg iron-400 mcg Tab tablet [MULTIVITAMIN WITH MINERALS (THERA-M) 9 MG IRON-400 MCG TAB TABLET] Take 1 tablet by mouth daily.      nortriptyline (PAMELOR) 50 MG capsule TAKE 1 CAPSULE BY MOUTH AT  BEDTIME  Qty: 90 capsule, Refills: 2    Comments: Please send a replace/new response with 90-Day Supply if appropriate to maximize member benefit. Requesting 1 year supply.  Associated Diagnoses: Chronic midline low back pain without sciatica      omeprazole (PRILOSEC) 20 MG capsule [OMEPRAZOLE (PRILOSEC) 20 MG CAPSULE] Take 20 mg by mouth daily before breakfast.           STOP taking these medications       calcium carbonate (OS-JUSTIN) 1500 (600 Ca) MG tablet Comments:   Reason for Stopping:         denosumab 60 mg/mL Syrg Comments:   Reason for Stopping:           Hypercalcemia      Allergies   Allergies   Allergen Reactions    Prochlorperazine Edisylate [Prochlorperazine] Other (See Comments)     Extrapyramidal side effects     Data   Most Recent 3 CBC's:  Recent Labs   Lab Test 04/12/24  0506 04/11/24  0824 04/10/24  1150   WBC 7.7 8.7 8.0   HGB 11.5* 11.2* 11.8   MCV 92 93 94    186 202      Most Recent 3 BMP's:  Recent Labs   Lab Test 04/12/24  0506 04/11/24  1825 04/11/24  0824 04/10/24  1150     --  142 141   POTASSIUM 3.4 3.4 3.4 4.2   CHLORIDE 104  --  104 99   CO2 26  --  27 31*   BUN 10.9   --  16.8 18.6   CR 0.83  --  0.94 1.08*   ANIONGAP 12  --  11 11   JUSTIN 10.4*  --  10.5* 12.0*   *  --  115* 117*     Most Recent 2 LFT's:  Recent Labs   Lab Test 04/10/24  1505 05/19/23  1202   AST 43 41*   ALT 14 30   ALKPHOS 98 74   BILITOTAL 0.4 0.4     Most Recent INR's and Anticoagulation Dosing History:  Anticoagulation Dose History          Latest Ref Rng & Units 4/12/2024   Recent Dosing and Labs   INR 0.85 - 1.15 1.05      Most Recent 3 Troponin's:No lab results found.  Most Recent Cholesterol Panel:  Recent Labs   Lab Test 12/29/22  1456   CHOL 189   LDL 93   HDL 80   TRIG 80     Most Recent 6 Bacteria Isolates From Any Culture (See EPIC Reports for Culture Details):No lab results found.  Most Recent TSH, T4 and A1c Labs:  Recent Labs   Lab Test 04/10/24  1505 12/29/22  1456   TSH 0.82  --    A1C  --  5.9*     Results for orders placed or performed during the hospital encounter of 04/10/24   Head CT w/o contrast    Narrative    EXAM: CT HEAD WITHOUT CONTRAST  LOCATION: [facility]  DATE: 04/10/2024    INDICATION: Fall, possible trauma.  COMPARISON: None.  TECHNIQUE: Routine CT Head without IV contrast. Multiplanar reformats. Dose reduction techniques were used.    FINDINGS:  INTRACRANIAL CONTENTS: No intracranial hemorrhage, extra-axial collection, or mass effect.  No CT evidence of acute infarct. Moderate-to-severe presumed chronic small vessel ischemic changes. Mild generalized volume loss. No hydrocephalus. Scattered   intracranial atherosclerotic calcifications.    VISUALIZED ORBITS/SINUSES/MASTOIDS: Prior bilateral cataract surgery. Visualized portions of the orbits are otherwise unremarkable. Lobulated soft tissue lesion with small internal calcification nearly completely opacifying the left sphenoid sinus. This   appears similar to the prior cervical spine CT. This is nonspecific, but may represent a prominent retention cyst or polyp, although a mycetoma is also  possible. Otherwise, the visualized paranasal sinuses are free of significant disease. No middle ear   or mastoid effusion.    BONES/SOFT TISSUES: No acute abnormality.      Impression    IMPRESSION:  1.  No acute intracranial hemorrhage, extra-axial fluid collection, or mass effect.  2.  Brain atrophy and presumed chronic small vessel ischemic changes, as described.  3.  Unchanged nonspecific lobulated lesion nearly completely opacifying the left sphenoid sinus.     CT Cervical Spine w/o Contrast    Narrative    EXAM: CT CERVICAL SPINE W/O CONTRAST  LOCATION: [facility]  DATE: 4/10/2024    INDICATION: Fall, possible trauma.  COMPARISON: CT cervical spine 08/18/2019.  TECHNIQUE: Routine CT Cervical Spine without IV contrast. Multiplanar reformats. Dose reduction techniques were used.    FINDINGS:  VERTEBRA: No acute cervical spine fracture identified. 3 mm anterolisthesis of C3 on C4 and minimal anterolisthesis of C7 on T1, T1 on T2 and T2 on T3, similar to the prior exam. There is mild to moderate sigmoid curvature of the visualized   cervicothoracic spine with dextroconvex curvature of the mid cervical spine and levoconvex curvature of the upper thoracic spine.     CANAL/FORAMINA: There is moderate to severe or severe disc space narrowing at C3-C4, C4-C5, C5-C6 and C6-C7. Scattered marginal endplate and uncovertebral spurs. Advanced degenerative arthropathy of the right lateral atlantoaxial joint with associated   remodeling. Developmentally bifid posterior arch of C1. Mild rotation of C1 on C2, which may be positional, although is nonspecific. Multilevel degenerative facet disease, most advanced on the left at C3-C4. Multilevel disc osteophyte complexes.   Prominent ligamentum flavum thickening calcification at the C7-T1 level. Mild to moderate multilevel spinal canal stenosis. No definite high-grade/severe spinal canal narrowing identified, accounting for technique. Overall, findings  appear similar.   Varying degrees of multilevel degenerative neural foraminal stenosis also appear relatively unchanged compared to the prior exam.    PARASPINAL: Bilateral carotid bifurcation atherosclerotic calcifications. The visualized paravertebral soft tissues otherwise appear unremarkable.      Impression    IMPRESSION:  1.  No acute cervical spine fracture.  2.  Multilevel degenerative changes (moderate/advanced), as described.   CT Thoracic Spine w/o Contrast    Narrative    EXAM: CT THORACIC SPINE W/O CONTRAST  LOCATION: [facility]  DATE: 4/10/2024    INDICATION: Fall, tenderness.  COMPARISON: None.  TECHNIQUE: Routine CT Thoracic Spine without IV contrast. Multiplanar reformats. Dose reduction techniques were used.     FINDINGS:  VERTEBRA: No acute thoracic spine fracture identified. There appears to be some degree of diffuse osseous demineralization, which somewhat limits evaluation for fracture. Minimal anterolisthesis of T2 on T3. Mild levoconvex curvature of the upper   thoracic spine and mild broad dextroconvex curvature of the mid to lower thoracic spine. A few areas of lucency within the thoracic vertebral bodies are noted, which could be due to osseous demineralization, although other lesions such as an intraosseous   hemangioma cannot be completely excluded. No obvious destructive/aggressive appearing osseous lesions.    CANAL/FORAMINA: Multilevel degenerative changes are present. No definite high-grade central spinal canal stenosis. There is a central disc herniation at T8-T9 that contributes to at least mild spinal canal stenosis. Moderate/moderate to severe right   T1-T2 neural foraminal stenosis. Relatively lesser degrees of mild and moderate neural foraminal stenosis seen elsewhere.    PARASPINAL: Atherosclerotic calcifications of the aorta. Mitral annular calcification. Partially visualized surgical material in the lower mediastinal region/upper abdomen.  Cholecystectomy clips in the right upper quadrant abdomen partially visualized.      Impression    IMPRESSION:  1.  No acute thoracic spine fracture identified.  2.  Degenerative changes, as described.     CT Lumbar Spine w/o Contrast    Narrative    EXAM: CT LUMBAR SPINE W/O CONTRAST  LOCATION: [facility]  DATE: 4/10/2024    INDICATION: Fall, trauma.  COMPARISON: None.  TECHNIQUE: Routine CT Lumbar Spine without IV contrast. Multiplanar reformats. Dose reduction techniques were used.     FINDINGS:  VERTEBRA: Transitional thoracolumbar and lumbosacral junction anatomy. Correlation is made with the thoracic spine CT of same day. There appear to be tiny ribs at T12 bilaterally. I will presume that there are 5 lumbar vertebral bodies including a   transitional partially sacralized L5 vertebra. There is marginal ankylosis across the developmentally diminutive L5-S1 disc space.    No definite acute lumbar spine fracture identified. Grade I anterolisthesis of L3 on L4 measuring 6 mm. Grade II anterolisthesis of L4 on L5 measuring approximately 10 mm. Mildly exaggerated lumbar lordosis. Minimal levoconvex curvature.    CANAL/FORAMINA: Severe disc space narrowing at L4-L5. Moderate disc space narrowing at L3-L4. Multilevel degenerative facet disease, severe at L3-L4 and L4-L5 bilaterally. Multilevel disc bulges. Scattered ligamentum flavum thickening and calcification.   There appears to be at least moderate central spinal canal stenosis at L3-L4. There also may be up to moderate central spinal canal stenosis at L4-L5. There is up to moderate neural foraminal stenosis on the left at L3-L4. Mild to moderate bordering on   moderate neural foraminal stenosis elsewhere in the lower lumbar spine.    PARASPINAL: Partially visualized postsurgical changes in the upper abdomen. There appears to be abnormal soft tissue thickening in the region of the pancreas along the course of the splenic artery and the  left upper abdomen, incompletely evaluated and of   uncertain etiology. There is a large cystic-appearing lesion in the left abdomen measuring up to 87 mm in axial plane (series 5 image 75) incompletely evaluated. Scattered atherosclerotic calcifications of the aortoiliac arteries.      Impression    IMPRESSION:  1.  Transitional anatomy at the lumbosacral junction, as described.  2.  No acute lumbar spine fracture is identified.  3.  Grade I anterolisthesis of L3 on L4 and grade II anterolisthesis of L4 on L5.   4.  Multilevel degenerative changes, as described.  5.  Indeterminate findings in the partially visualized abdomen and pelvis, as described. Recommend follow-up CT abdomen and pelvis with contrast for further evaluation.   CT Abdomen Pelvis w Contrast    Narrative    EXAM: CT ABDOMEN PELVIS W CONTRAST  LOCATION: [facility]  DATE: 4/11/2024    INDICATION: evaluate peripancreatic soft tissue mass seen on CT lumbar spine  COMPARISON: CT spine 4/10/2024  TECHNIQUE: CT scan of the abdomen and pelvis was performed following injection of IV contrast. Multiplanar reformats were obtained. Dose reduction techniques were used.  CONTRAST: isovue [id number] 42ml    FINDINGS:   LOWER CHEST: Partially imaged coronary artery calcifications.    HEPATOBILIARY: Cholecystectomy.    PANCREAS: The parenchyma is atrophic and there is mild main duct dilation up to 0.5 cm in the head. In the distal body and tail, there are multilobulated soft tissue masses measuring 4.9 x 4.4 cm (3/51) and 6.7 x 5.2 cm (3/66).    SPLEEN: Normal.    ADRENAL GLANDS: Normal.    KIDNEYS/BLADDER: Severely dilated left ureter with probable ureterocele and abnormal inferior insertion. Right kidney is normal.    BOWEL: The pancreatic/peripancreatic soft tissue masses have broad contact with the lesser curvature of the stomach near a partial gastrectomy resection margin. The distal stomach is narrowed and compressed, but there is no  upstream dilation. Colonic   diverticulosis, but no acute inflammation or obstruction.    LYMPH NODES: Normal.    VASCULATURE: Normal.    PELVIC ORGANS: Normal.    MUSCULOSKELETAL: Multilevel degenerative changes with transitional lumbosacral anatomy. Anterolisthesis of L3 on L4 and L4 on L5.       Impression    IMPRESSION:   1.  Multilobulated soft tissue masses in the pancreatic/peripancreatic region. Primary considerations include pancreatic ductal adenocarcinoma, gastrointestinal stromal tumor (arising from the adjacent stomach), or malignant periceliac lymph nodes.   Suggest tissue sampling.    2.  Severely dilated left ureter with morphology suggesting duplicated collecting system. 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

## 2024-04-12 NOTE — PROGRESS NOTES
[facility]    Medicine Progress Note - Hospitalist Service    Date of Admission:  4/10/2024    Assessment & Plan      78F cognitive disorder, HTN, anxiety who presents with several days of weakness, anorexia, worsening confusion and falls related to generalized weakness.     Pancreatic and peripancreatic mass  -IR for CT guided biopsy. Holding Lovenox; NPO   -concerning for cancer; await biopsy and pathology     #DARLENE - improving with IVF.  Hold celebrex.  Sarcopenia and Cr likely overestimates renal function.      #Hypercalcemia - likely from dehydration but also with this peripancreatic soft tissue finding which will be evaluated by CT A/P today.  -vitamin D slightly elevated.    -Stop os-rahat for now.    -if getting denosumab may in future need to resume supplementation.  TBD.     #Cognitive impairment - awaiting formal neuro evaluation outpatient.  Recent SLUMS of 17/30 and atrophy on CT brain.  Likely dementia  -TSH and B12 WNL  -aricept     #Weakness/Falls - due to above  -monitor on tele given daughter report of bradycardia  -PT/OT.  Lives alone     #HTN - change atenolol to norvasc to due bradycardia.     #Anxiety - continue pamelor, buspar, seroquel HS PRN.     #Insomnia - resume pamelor, melatonin PRN     #post prandial hyperglycemia - acarbose        Diet: NPO per Anesthesia Guidelines for Procedure/Surgery Except for: Meds  NPO per Anesthesia Guidelines for Procedure/Surgery Except for: Meds, Ice Chips    DVT Prophylaxis: Enoxaparin (Lovenox) SQ  Deutsch Catheter: Not present  Lines: None     Cardiac Monitoring: ACTIVE order. Indication: Bradycardias (48 hours)  Code Status: Full Code      Clinically Significant Risk Factors Present on Admission           # Hypercalcemia: Highest Ca = 12 mg/dL in last 2 days, will monitor as appropriate        # Hypertension: Noted on problem list                 Disposition Plan     Medically Ready for Discharge: Anticipated Tomorrow    Barrier:  pancreatic biopsy  Home with [facility] on 4/13?         Sarah Chris MD  Hospitalist Service  [facility]  Securely message with [employer] (more info)  Text page via [employer] Paging/Directory   ______________________________________________________________________    Interval History   Very weak but denies pain, no f/c, no n/v, no cp/sob    Physical Exam   Vital Signs: Temp: 98.2  F (36.8  C) Temp src: Oral BP: (!) 164/81 Pulse: 89   Resp: 16 SpO2: 98 % O2 Device: None (Room air)    Weight: 86 lbs 4.8 oz    General.  Awake alert oriented not in acute distress. Fatigue and small figure with malnutrition  HEENT.  Pupils equal round react to light, anicteric, EOM intact.  Neck supple no JVD.  CVS regular rhythm no murmur gallops.  Lungs.  Clear to auscultation bilateral no wheezing or rales.  Abdomen.  Soft nontender bowel sounds present.  Extremities.  No edema no calf tenderness.  Neurological.  Awake and alert. No focal deficit. General weakness  Skin no rash. No pallor.  Psych. Impaired memory and cognition    Medical Decision Making       56 MINUTES SPENT BY ME on the date of service doing chart review, history, exam, documentation & further activities per the note.      Data     I have personally reviewed the following data over the past 24 hrs:    7.7  \   11.5 (L)   / 180     142 104 10.9 /  110 (H)   3.4 26 0.83 \       Imaging results reviewed over the past 24 hrs:   Recent Results (from the past 24 hour(s))   CT Abdomen Pelvis w Contrast    Narrative    EXAM: CT ABDOMEN PELVIS W CONTRAST  LOCATION: [facility]  DATE: 4/11/2024    INDICATION: evaluate peripancreatic soft tissue mass seen on CT lumbar spine  COMPARISON: CT spine 4/10/2024  TECHNIQUE: CT scan of the abdomen and pelvis was performed following injection of IV contrast. Multiplanar reformats were obtained. Dose reduction techniques were used.  CONTRAST: isovue 370 42ml    FINDINGS:   LOWER CHEST: Partially  imaged coronary artery calcifications.    HEPATOBILIARY: Cholecystectomy.    PANCREAS: The parenchyma is atrophic and there is mild main duct dilation up to 0.5 cm in the head. In the distal body and tail, there are multilobulated soft tissue masses measuring 4.9 x 4.4 cm (3/51) and 6.7 x 5.2 cm (3/66).    SPLEEN: Normal.    ADRENAL GLANDS: Normal.    KIDNEYS/BLADDER: Severely dilated left ureter with probable ureterocele and abnormal inferior insertion. Right kidney is normal.    BOWEL: The pancreatic/peripancreatic soft tissue masses have broad contact with the lesser curvature of the stomach near a partial gastrectomy resection margin. The distal stomach is narrowed and compressed, but there is no upstream dilation. Colonic   diverticulosis, but no acute inflammation or obstruction.    LYMPH NODES: Normal.    VASCULATURE: Normal.    PELVIC ORGANS: Normal.    MUSCULOSKELETAL: Multilevel degenerative changes with transitional lumbosacral anatomy. Anterolisthesis of L3 on L4 and L4 on L5.       Impression    IMPRESSION:   1.  Multilobulated soft tissue masses in the pancreatic/peripancreatic region. Primary considerations include pancreatic ductal adenocarcinoma, gastrointestinal stromal tumor (arising from the adjacent stomach), or malignant periceliac lymph nodes.   Suggest tissue sampling.    2.  Severely dilated left ureter with morphology suggesting duplicated collecting system. 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

## 2024-04-12 NOTE — PROGRESS NOTES
Care Management Follow Up    Length of Stay (days): 1    Expected Discharge Date: 04/13/2024     Concerns to be Addressed: discharge planning     Patient plan of care discussed at interdisciplinary rounds: Yes    Anticipated Discharge Disposition:  pending recommendations, anticipate home with home care      Anticipated Discharge Services:  PT, OT. RN and SW to assist with resources  Anticipated Discharge DME:  per team    Patient/family educated on Medicare website which has current facility and service quality ratings:  yes  Education Provided on the Discharge Plan:  yes  Patient/Family in Agreement with the Plan:      Referrals Placed by CM/SW:  Home care hub  Private pay costs discussed: Private pay for home care agencies, assisted living, meals on wheels.     Additional Information:  SW met with pt and her 2 daughters Ladi and Rosa Isela. They report pt has fallen recently, is becoming confused and lives alone and will need supports. They requested information on Meals on Wheels, [facility], private home care companies, information on assisted living. SW explained recommendations have been made for home care PT. OT . They are agreeable but pt will need more supports. SW explained transition to assisted living and that it is a private pay or medical assistance payor source for those without assets. Pt has assets. Pt working with oncology and SW offered to follow as recommendations are presented. Daughters expressed understanding.   Family requests ACP documents. Printed and provided.  Family working on POA paperwork, has ability to find notary.   Resources provided.     ANDREW Gomez

## 2024-04-12 NOTE — PROGRESS NOTES
Care Management Discharge Note    Discharge Date: 04/12/2024       Discharge Disposition:  home with home care  Discharge Services:  PT OT  Discharge DME:  n/a    Discharge Transportation: family or friend will provide    Private pay costs discussed: Not applicable    Education Provided on the Discharge Plan:  yes  Persons Notified of Discharge Plans: yes  Patient/Family in Agreement with the Plan: yes      Handoff Referral Completed: Yes    Additional Information:  Discharge plan is for pt to return home with home care services; RN PT OT. All parties aware, and agreeable to discharge plan. [facility] intake notified of discharge. No other needs from  at this time. Family to transport.  1:34 PM    Brenna Kjellberg, BSW LSW  4/12/2024

## 2024-04-12 NOTE — PLAN OF CARE
Occupational Therapy Discharge Summary    Reason for therapy discharge:    Discharged to home with home therapy.    Progress towards therapy goal(s). See goals on Care Plan in [facility] electronic health record for goal details.  Goals partially met.  Barriers to achieving goals:   discharge from facility.    Therapy recommendation(s):    Continued therapy is recommended.  Rationale/Recommendations:  Recommend home OT to address safety w/ ADLs/IADLs. Continue to monitor and assess cognition to ensure safe placement.

## 2024-04-12 NOTE — PLAN OF CARE
PRIMARY DIAGNOSIS: GENERALIZED WEAKNESS    OUTPATIENT/OBSERVATION GOALS TO BE MET BEFORE DISCHARGE  1. Orthostatic performed: No    2. Tolerating PO medications: Yes    3. Return to near baseline physical activity: No    4. Cleared for discharge by consultants (if involved): No    Discharge Planner Nurse   Safe discharge environment identified: No  Barriers to discharge: Yes       Entered by: Lucy Robison RN 04/12/2024 11:33 AM     Please review provider order for any additional goals.   Nurse to notify provider when observation goals have been met and patient is ready for discharge.Goal Outcome Evaluation:       Patient pleasant and cooperative this morning was orientated to place time year and that she is her to be checked out. Daughters are at bedside assisting with communication and toileting etc. Plan to have CT BX done. Awaiting plan and orders.

## 2024-04-12 NOTE — PROGRESS NOTES
PRIMARY DIAGNOSIS: GENERALIZED WEAKNESS    OUTPATIENT/OBSERVATION GOALS TO BE MET BEFORE DISCHARGE  1. Orthostatic performed: No    2. Tolerating PO medications: Yes    3. Return to near baseline physical activity: No    4. Cleared for discharge by consultants (if involved): No    Discharge Planner Nurse   Safe discharge environment identified: No  Barriers to discharge: Yes       Entered by: DEVORAH CHAVEZ RN 04/11/2024 11:33 PM     Please review provider order for any additional goals.   Nurse to notify provider when observation goals have been met and patient is ready for discharge.

## 2024-04-12 NOTE — PROGRESS NOTES
04/11/24 0900   Appointment Info   Signing Clinician's Name / Credentials (PT) Lorena Staley PT   Quick Adds   Quick Adds Certification   Living Environment   Current Living Arrangements house  (one level home.)   Home Accessibility stairs to enter home   Number of Stairs, Main Entrance 2   Stair Railings, Main Entrance railing on right side (ascending)   Transportation Anticipated family or friend will provide   Living Environment Comments Pt lives alone and her daughters will work out something to have someone home with her all the time.   Self-Care   Current Activity Tolerance good   Equipment Currently Used at Home none   Fall history within last six months yes   Number of times patient has fallen within last six months 2   General Information   Onset of Illness/Injury or Date of Surgery 04/10/24   Referring Physician Oscar Ragland MD   Patient/Family Therapy Goals Statement (PT) Pt and her daughters plan to take the pt home at ID   Pertinent History of Current Problem (include personal factors and/or comorbidities that impact the POC) Per the chart, 78F cognitive disorder, HTN, anxiety who presents with several days of weakness, anorexia, worsening confusion and falls related to generalized weakness.   Existing Precautions/Restrictions fall  (Call light by the pt and pt is sitting with her family members in the room.)   Weight-Bearing Status - LLE weight-bearing as tolerated   Weight-Bearing Status - RLE weight-bearing as tolerated   Cognition   Orientation Status (Cognition) oriented to;person  (and month)   Follows Commands (Cognition) 50-74% accuracy   Cognitive Status Comments Increased cues for all tasks.   Pain Assessment   Patient Currently in Pain No   Range of Motion (ROM)   Range of Motion ROM is WNL   ROM Comment Bilat LEs   Strength (Manual Muscle Testing)   Strength Comments bilat LEs grossly 4/5   Bed Mobility   Comment, (Bed Mobility) Supine>sit with SBA   Transfers   Comment, (Transfers)  Sit<>stand with the FWW with SBA   Gait/Stairs (Locomotion)   [place] Level (Gait) contact guard   Assistive Device (Gait) walker, front-wheeled  (FWW)   Distance in Feet (Gait) 20'   Pattern (Gait) step-through;swing-through   Deviations/Abnormal Patterns (Gait) gait speed decreased   Balance   Balance Comments CGA with the FWW.   Sensory Examination   Sensory Perception WNL   Sensory Perception Comments Bilat LEs   Clinical Impression   Criteria for Skilled Therapeutic Intervention Yes, treatment indicated   PT Diagnosis (PT) Impaired functional mobility,   Influenced by the following impairments weakness, dec bal, confusion,   Functional limitations due to impairments bed mobility, transfers, gait and steps   Clinical Presentation (PT Evaluation Complexity) stable   Clinical Presentation Rationale Pt presents medically diagnosed.   Clinical Decision Making (Complexity) low complexity   Planned Therapy Interventions (PT) bed mobility training;gait training;stair training;strengthening;transfer training   Risk & Benefits of therapy have been explained evaluation/treatment results reviewed;care plan/treatment goals reviewed;patient;daughter;risks/benefits reviewed;participants voiced agreement with care plan   PT Total Evaluation Time   PT Eval, Moderate Complexity Minutes (20330) 15   Therapy Certification   Start of care date 04/11/24   Certification date from 04/11/24   Certification date to 04/13/24   Medical Diagnosis Confusion, weakness , fall.   Physical Therapy Goals   PT Frequency Daily   PT Predicted Duration/Target Date for Goal Attainment 04/13/24   PT Goals Bed Mobility;Transfers;Gait;Stairs   PT: Bed Mobility Modified independent;Supine to/from sit;Rolling   PT: Transfers Modified independent;Sit to/from stand;Bed to/from chair;Assistive device   PT: Gait Supervision/stand-by assist;Rolling walker;Greater than 200 feet   PT: Stairs Supervision/stand-by assist;2 stairs;Rail on right   Interventions    Interventions Quick Adds Gait Training;Therapeutic Activity;Therapeutic Procedure   Therapeutic Activity   Therapeutic Activities: dynamic activities to improve functional performance Minutes (59863) 23   Treatment Detail/Skilled Intervention Supine>sit SBA with cues to use the rail.  Sit<>stand x 3 reps with cues for hand placement.  Increased cues for technique and demo.  PT educate the pt on how the walker works and how to use it.  PT was fitted and issued a FWW.  PT did review some [place] recommendations for home such as having a shower chair, possible getting a bed rail and PT did recommend home PT and she should use the walker all the tiime at home and should have someone there all the time.   Gait Training   Gait Training Minutes (14585) 10   Treatment Detail/Skilled Intervention Pt walked 200' with the FWW and needed cues to stay closer to the walker and not to carry the walker.  Pt did have a lot of questions about the walker. Pt and the family wanted a FWW for home.  Cues for posture.   Distance in Feet 200'   [place] Level (Gait Training) contact guard   Physical Assistance Level (Gait Training) verbal cues;1 person assist   Weight Bearing (Gait Training) weight-bearing as tolerated   Assistive Device (Gait Training) rolling walker  (FWW)   Pattern Analysis (Gait Training) swing-through gait   Gait Analysis Deviations decreased mony;decreased step length   Impairments (Gait Analysis/Training) balance impaired;strength decreased   PT Discharge Planning   PT Plan Gait w FWW, ex, steps.   PT Discharge Recommendation (DC Rec) home with home care physical therapy   PT Rationale for DC Rec Pt is confused and needs increased cues for tasks.  PT does recommend the pt use FWW at the tiime and have assist with mobility.    PT did isssue and fit the pt with a FWW.  Family plans to take the pt home and have someone stay with her. 24 hour assist is recommended at this point.   Home PT is recommended at [place].   PT  Brief overview of current status PT eval. bed mobility with SBA, transfers with FWW multiple time  with CGA.  Ambulated 200' with the FWW with CGA.   PT Equipment Needed at Discharge walker, rolling  (FWW fitted and issued.)   Total Session Time   Timed Code Treatment Minutes 33   Total Session Time (sum of timed and untimed services) 48   [facility]  OUTPATIENT PHYSICAL THERAPY EVALUATION  PLAN OF TREATMENT FOR OUTPATIENT REHABILITATION  (COMPLETE FOR INITIAL CLAIMS ONLY)  Patient's Last Name, First Name, M.I.  YOB: 1945  Bibiana Mcrae                        Provider's Name  [facility] Medical Record No.  [id number]                             Onset Date:  04/10/24   Start of Care Date:  (P) 04/11/24   Type:     _X_PT   ___OT   ___SLP Medical Diagnosis:  (P) Confusion, weakness , fall.              PT Diagnosis:  Impaired functional mobility, Visits from SOC:  1     See note for plan of treatment, functional goals and certification details    I CERTIFY THE NEED FOR THESE SERVICES FURNISHED UNDER        THIS PLAN OF TREATMENT AND WHILE UNDER MY CARE     (Physician co-signature of this document indicates review and certification of the therapy plan).

## 2024-04-12 NOTE — PLAN OF CARE
PRIMARY DIAGNOSIS: GENERALIZED WEAKNESS    OUTPATIENT/OBSERVATION GOALS TO BE MET BEFORE DISCHARGE  1. Orthostatic performed: No    2. Tolerating PO medications: Yes    3. Return to near baseline physical activity: Yes    4. Cleared for discharge by consultants (if involved): Yes    Discharge Planner Nurse   Safe discharge environment identified: Yes  Barriers to discharge: No       Entered by: Lucy Robison RN 04/12/2024 3:18 PM     Please review provider order for any additional goals.   Nurse to notify provider when observation goals have been met and patient is ready for discharge.Goal Outcome Evaluation:       Patient discharging to home with daughters. Will have follow up biopsy of pancreas on Monday April 15 th. Discussed with patient and daughters medication changes and to monitor BP and Pulse due to Medications she is taking. Patient and daughters verbalized understanding.

## 2024-04-12 NOTE — PLAN OF CARE
PRIMARY DIAGNOSIS: GENERALIZED WEAKNESS    OUTPATIENT/OBSERVATION GOALS TO BE MET BEFORE DISCHARGE  1. Orthostatic performed: N/A    2. Tolerating PO medications: Yes    3. Return to near baseline physical activity: Yes    4. Cleared for discharge by consultants (if involved): N/A    Discharge Planner Nurse   Safe discharge environment identified: No  Barriers to discharge: Yes       Entered by: Marilyn Lang RN 04/12/2024 5:57 AM     Please review provider order for any additional goals.   Nurse to notify provider when observation goals have been met and patient is ready for discharge.Goal Outcome Evaluation:         Patients daughter stayed with patient over night. At the start of the shift patient was very paranoid and agitated. She was yelling at all staff members to get out of her room and leave her alone. She was refusing labs and all cares. Patients daughter requested something to help her mom calm down. It was explained to her that at this point an IM injection would probably be the best option since she was so upset and agitated. Nursing suggested to first try and turn the lights and TV off and provide a quite environment and leave her along for a bit to see if she would calm down first. Patients daughter said she had already tried that and it didn't work and that she wanted the injection. Patient was given 2.5mg IM Zyprexa with good results. Patient calm and cooperative after med given.

## 2024-04-12 NOTE — CONSULTS
Imaging reviewed - can proceed with CT guided biopsy of pancreatic mass under moderate sedation. Will need to be NPO and ideally hold Lovenox for 24 hours.     Loco Acosta MD

## 2024-04-14 DIAGNOSIS — R19.00 INTRAABDOMINAL MASS: Primary | ICD-10-CM

## 2024-04-14 LAB — COPPER SERPL-MCNC: 141 UG/DL

## 2024-04-15 ENCOUNTER — NURSE TRIAGE (OUTPATIENT)
Dept: NURSING | Facility: CLINIC | Age: 79
End: 2024-04-15
Payer: COMMERCIAL

## 2024-04-15 ENCOUNTER — HOSPITAL ENCOUNTER (OUTPATIENT)
Dept: CT IMAGING | Facility: HOSPITAL | Age: 79
Discharge: HOME | End: 2024-04-15
Attending: STUDENT IN AN ORGANIZED HEALTH CARE EDUCATION/TRAINING PROGRAM | Admitting: STUDENT IN AN ORGANIZED HEALTH CARE EDUCATION/TRAINING PROGRAM
Payer: COMMERCIAL

## 2024-04-15 ENCOUNTER — PATIENT OUTREACH (OUTPATIENT)
Dept: ONCOLOGY | Facility: CLINIC | Age: 79
End: 2024-04-15
Payer: COMMERCIAL

## 2024-04-15 ENCOUNTER — MEDICAL CORRESPONDENCE (OUTPATIENT)
Dept: HEALTH INFORMATION MANAGEMENT | Facility: CLINIC | Age: 79
End: 2024-04-15

## 2024-04-15 ENCOUNTER — PATIENT OUTREACH (OUTPATIENT)
Dept: CARE COORDINATION | Facility: CLINIC | Age: 79
End: 2024-04-15
Payer: COMMERCIAL

## 2024-04-15 ENCOUNTER — TELEPHONE (OUTPATIENT)
Dept: FAMILY MEDICINE | Facility: CLINIC | Age: 79
End: 2024-04-15
Payer: COMMERCIAL

## 2024-04-15 VITALS
RESPIRATION RATE: 18 BRPM | SYSTOLIC BLOOD PRESSURE: 145 MMHG | DIASTOLIC BLOOD PRESSURE: 74 MMHG | HEART RATE: 90 BPM | TEMPERATURE: 97.8 F | OXYGEN SATURATION: 98 %

## 2024-04-15 DIAGNOSIS — K86.89 PANCREATIC MASS: Primary | ICD-10-CM

## 2024-04-15 LAB
ATRIAL RATE - MUSE: 57 BPM
DIASTOLIC BLOOD PRESSURE - MUSE: NORMAL MMHG
INTERPRETATION ECG - MUSE: NORMAL
P AXIS - MUSE: 40 DEGREES
PR INTERVAL - MUSE: 220 MS
QRS DURATION - MUSE: 150 MS
QT - MUSE: 420 MS
QTC - MUSE: 408 MS
R AXIS - MUSE: -61 DEGREES
SYSTOLIC BLOOD PRESSURE - MUSE: NORMAL MMHG
T AXIS - MUSE: -12 DEGREES
VENTRICULAR RATE- MUSE: 57 BPM

## 2024-04-15 PROCEDURE — 77012 CT SCAN FOR NEEDLE BIOPSY: CPT

## 2024-04-15 PROCEDURE — 88333 PATH CONSLTJ SURG CYTO XM 1: CPT | Mod: TC | Performed by: STUDENT IN AN ORGANIZED HEALTH CARE EDUCATION/TRAINING PROGRAM

## 2024-04-15 PROCEDURE — 99152 MOD SED SAME PHYS/QHP 5/>YRS: CPT

## 2024-04-15 PROCEDURE — 250N000011 HC RX IP 250 OP 636: Performed by: RADIOLOGY

## 2024-04-15 PROCEDURE — 88368 INSITU HYBRIDIZATION MANUAL: CPT | Mod: 26 | Performed by: MEDICAL GENETICS

## 2024-04-15 PROCEDURE — 88369 M/PHMTRC ALYSISHQUANT/SEMIQ: CPT | Mod: 26 | Performed by: MEDICAL GENETICS

## 2024-04-15 PROCEDURE — 88360 TUMOR IMMUNOHISTOCHEM/MANUAL: CPT | Mod: TC | Performed by: STUDENT IN AN ORGANIZED HEALTH CARE EDUCATION/TRAINING PROGRAM

## 2024-04-15 PROCEDURE — 88275 CYTOGENETICS 100-300: CPT | Performed by: STUDENT IN AN ORGANIZED HEALTH CARE EDUCATION/TRAINING PROGRAM

## 2024-04-15 RX ORDER — IBUPROFEN 400 MG/1
400 TABLET, FILM COATED ORAL EVERY 6 HOURS PRN
Status: CANCELLED | OUTPATIENT
Start: 2024-04-15

## 2024-04-15 RX ORDER — FLUMAZENIL 0.1 MG/ML
0.2 INJECTION, SOLUTION INTRAVENOUS
Status: DISCONTINUED | OUTPATIENT
Start: 2024-04-15 | End: 2024-04-16 | Stop reason: HOSPADM

## 2024-04-15 RX ORDER — NALOXONE HYDROCHLORIDE 0.4 MG/ML
0.2 INJECTION, SOLUTION INTRAMUSCULAR; INTRAVENOUS; SUBCUTANEOUS
Status: DISCONTINUED | OUTPATIENT
Start: 2024-04-15 | End: 2024-04-16 | Stop reason: HOSPADM

## 2024-04-15 RX ORDER — ACETAMINOPHEN 325 MG/1
650 TABLET ORAL EVERY 4 HOURS PRN
Status: CANCELLED | OUTPATIENT
Start: 2024-04-15

## 2024-04-15 RX ORDER — FENTANYL CITRATE 50 UG/ML
25-50 INJECTION, SOLUTION INTRAMUSCULAR; INTRAVENOUS EVERY 5 MIN PRN
Status: DISCONTINUED | OUTPATIENT
Start: 2024-04-15 | End: 2024-04-16 | Stop reason: HOSPADM

## 2024-04-15 RX ORDER — NALOXONE HYDROCHLORIDE 0.4 MG/ML
0.4 INJECTION, SOLUTION INTRAMUSCULAR; INTRAVENOUS; SUBCUTANEOUS
Status: DISCONTINUED | OUTPATIENT
Start: 2024-04-15 | End: 2024-04-16 | Stop reason: HOSPADM

## 2024-04-15 RX ORDER — ONDANSETRON 2 MG/ML
4 INJECTION INTRAMUSCULAR; INTRAVENOUS EVERY 6 HOURS PRN
Status: DISCONTINUED | OUTPATIENT
Start: 2024-04-15 | End: 2024-04-16 | Stop reason: HOSPADM

## 2024-04-15 RX ADMIN — MIDAZOLAM HYDROCHLORIDE 0.25 MG: 1 INJECTION, SOLUTION INTRAMUSCULAR; INTRAVENOUS at 08:53

## 2024-04-15 RX ADMIN — FENTANYL CITRATE 25 MCG: 50 INJECTION, SOLUTION INTRAMUSCULAR; INTRAVENOUS at 08:57

## 2024-04-15 RX ADMIN — ONDANSETRON 4 MG: 2 INJECTION INTRAMUSCULAR; INTRAVENOUS at 08:34

## 2024-04-15 NOTE — IR NOTE
Patient Name: Bibiana Mcrae  Medical Record Number: [id number]  Today's Date: 4/15/2024    Procedure: Pancrease biopsy  Proceduralist: Dr Phalen      Sedation medications administered: 0.25 mg midazolam and 25 mcg fentanyl   Sedation time: 21 minutes    Patient and family verbalized understanding of discharge teaching. Wheelchair at discharge.

## 2024-04-15 NOTE — TELEPHONE ENCOUNTER
July 2nd appointment confirmed for injection of Prolia. No other questions. Consent to communicate on file.  Nina Worrell RN  [facility]    Reason for Disposition   General information question, no triage required and triager able to answer question    Additional Information   Negative: New-onset or worsening symptoms, see that protocol (e.g., diarrhea, runny nose, sore throat)   Negative: Medicine question not related to refill or renewal   Negative: Requesting a renewal or refill of a medicine patient is currently taking   Negative: Questions or concerns about high blood pressure   Negative: Nursing judgment   Negative: Nursing judgment   Negative: Nursing judgment   Negative: Requesting lab results and adult stable (no new symptoms, not worsening)   Negative: Requesting referral to a specialist   Negative: Questions about durable medical equipment ordered and triager unable to answer   Negative: Requesting regular office appointment and adult stable (no new symptoms, not worsening)   Negative: Health information question, no triage required and triager able to answer question    Protocols used: Information Only Call - No Triage-A-[place]

## 2024-04-15 NOTE — INTERVAL H&P NOTE
I have reviewed the surgical (or preoperative) H&P that is linked to this encounter, and examined the patient. There are no significant changes    Clinical Conditions Present on Arrival:  Clinically Significant Risk Factors Present on Admission          # Hypercalcemia: Highest Ca = 12 mg/dL in last 30 days, will monitor as appropriate

## 2024-04-15 NOTE — PROGRESS NOTES
Clinic Care Coordination Contact  [facility] RN spoke with patient's daughter Rosa Isela today. She stated she and patient were in the middle of a home care visit and requested writer call back tomorrow morning. Writer will contact patient and her daughter tomorrow morning.

## 2024-04-15 NOTE — PRE-PROCEDURE
GENERAL PRE-PROCEDURE:   Procedure:  Computed Tomography (CT) guided abdominal mass biopsy with moderate sedation  Date/Time:  4/15/2024 8:34 AM    Written consent obtained?: Yes    Risks and benefits: Risks, benefits and alternatives were discussed    Consent given by:  Patient  Patient states understanding of procedure being performed: Yes    Patient's understanding of procedure matches consent: Yes    Procedure consent matches procedure scheduled: Yes    Expected level of sedation:  Moderate  Appropriately NPO:  Yes  Mallampati  :  Grade 2- soft palate, base of uvula, tonsillar pillars, and portion of posterior pharyngeal wall visible  Lungs:  Lungs clear with good breath sounds bilaterally  Heart:  Normal heart sounds and rate  History & Physical reviewed:  History and physical reviewed and no updates needed  Statement of review:  I have reviewed the lab findings, diagnostic data, medications, and the plan for sedation

## 2024-04-15 NOTE — TELEPHONE ENCOUNTER
Tracy, RN, at [facility] is calling for initial skilled nursing, PT, OT, and medical social work-see below:      Home Care is calling regarding an established patient with [facility].       Requesting orders from: Zack Woody  Provider is following patient: Yes  Is this a 60-day recertification request?  No    Orders Requested    Skilled Nursing  Request for initial evaluation and treatment (one time)     Physical Therapy  Request for initial evaluation and treatment (one time)     Occupational Therapy  Request for initial evaluation and treatment (one time)     Social Work  Request for initial evaluation and treatment (one time)     Confirmed ok to leave a detailed message with call back.  Contact information confirmed and updated as needed.    Fallon Aguilera RN, BSN  [facility]

## 2024-04-15 NOTE — PROGRESS NOTES
Oncology self-referral received from pt's daughter, Rosa Isela. Pt is hospitalized at [facility] for falls, weakness.    (See my bookmarks for pertinent records in Epic)    Hx 4/10/2024 - 4/12/2024 ED N for falls, weakness. CT head, spine, pelvis reveals masses in panc/peripanc regions. Dr Florian consulted on pt's case while inpatient on 4/12/24. Plan for outpt biopsy and Onc follow up.     Pt completed biopsy of pancreas today (4/15/24) [facility], results pending. First available Onc at [facility] is also Dr Florian 4/24/24 (at N location), 30min opening. Will offer this appt to pt, and daughter Rosa Isela, or schedule per their preference for presumed pancreatic malignancy.      Joey Tompkins RN  Oncology Nurse Navigator  [facility]  [phone]

## 2024-04-16 ENCOUNTER — OFFICE VISIT (OUTPATIENT)
Dept: FAMILY MEDICINE | Facility: CLINIC | Age: 79
End: 2024-04-16
Payer: COMMERCIAL

## 2024-04-16 ENCOUNTER — PATIENT OUTREACH (OUTPATIENT)
Dept: CARE COORDINATION | Facility: CLINIC | Age: 79
End: 2024-04-16

## 2024-04-16 ENCOUNTER — TELEPHONE (OUTPATIENT)
Dept: INTERNAL MEDICINE | Facility: CLINIC | Age: 79
End: 2024-04-16

## 2024-04-16 ENCOUNTER — OFFICE VISIT (OUTPATIENT)
Dept: CARDIOLOGY | Facility: CLINIC | Age: 79
End: 2024-04-16
Payer: COMMERCIAL

## 2024-04-16 VITALS
BODY MASS INDEX: 18.83 KG/M2 | RESPIRATION RATE: 16 BRPM | SYSTOLIC BLOOD PRESSURE: 116 MMHG | OXYGEN SATURATION: 100 % | DIASTOLIC BLOOD PRESSURE: 64 MMHG | WEIGHT: 87 LBS | HEART RATE: 111 BPM

## 2024-04-16 VITALS
RESPIRATION RATE: 113 BRPM | SYSTOLIC BLOOD PRESSURE: 150 MMHG | WEIGHT: 86.6 LBS | TEMPERATURE: 98.2 F | BODY MASS INDEX: 18.74 KG/M2 | DIASTOLIC BLOOD PRESSURE: 80 MMHG | OXYGEN SATURATION: 99 % | HEART RATE: 113 BPM

## 2024-04-16 DIAGNOSIS — I44.30 AVB (ATRIOVENTRICULAR BLOCK): ICD-10-CM

## 2024-04-16 DIAGNOSIS — I10 ESSENTIAL HYPERTENSION WITH GOAL BLOOD PRESSURE LESS THAN 130/80: ICD-10-CM

## 2024-04-16 DIAGNOSIS — K86.89 PANCREATIC MASS: Primary | ICD-10-CM

## 2024-04-16 DIAGNOSIS — D64.9 NORMOCHROMIC NORMOCYTIC ANEMIA: ICD-10-CM

## 2024-04-16 DIAGNOSIS — E83.52 HYPERCALCEMIA: ICD-10-CM

## 2024-04-16 DIAGNOSIS — K86.89 PANCREATIC MASS: ICD-10-CM

## 2024-04-16 DIAGNOSIS — R00.1 BRADYCARDIA: Primary | ICD-10-CM

## 2024-04-16 DIAGNOSIS — R06.02 SHORTNESS OF BREATH: ICD-10-CM

## 2024-04-16 DIAGNOSIS — R53.1 GENERALIZED WEAKNESS: ICD-10-CM

## 2024-04-16 DIAGNOSIS — I45.9 INTRAVENTRICULAR CONDUCTION DELAY: ICD-10-CM

## 2024-04-16 LAB
CA-I BLD-MCNC: 5.1 MG/DL (ref 4.4–5.2)
ERYTHROCYTE [DISTWIDTH] IN BLOOD BY AUTOMATED COUNT: 14.5 % (ref 10–15)
HCT VFR BLD AUTO: 35.7 % (ref 35–47)
HGB BLD-MCNC: 11.5 G/DL (ref 11.7–15.7)
MCH RBC QN AUTO: 30.8 PG (ref 26.5–33)
MCHC RBC AUTO-ENTMCNC: 32.2 G/DL (ref 31.5–36.5)
MCV RBC AUTO: 96 FL (ref 78–100)
PLATELET # BLD AUTO: 261 10E3/UL (ref 150–450)
RBC # BLD AUTO: 3.73 10E6/UL (ref 3.8–5.2)
WBC # BLD AUTO: 10.4 10E3/UL (ref 4–11)

## 2024-04-16 PROCEDURE — 85027 COMPLETE CBC AUTOMATED: CPT | Performed by: FAMILY MEDICINE

## 2024-04-16 PROCEDURE — 99204 OFFICE O/P NEW MOD 45 MIN: CPT | Performed by: INTERNAL MEDICINE

## 2024-04-16 PROCEDURE — 36415 COLL VENOUS BLD VENIPUNCTURE: CPT | Performed by: FAMILY MEDICINE

## 2024-04-16 PROCEDURE — 80048 BASIC METABOLIC PNL TOTAL CA: CPT | Performed by: FAMILY MEDICINE

## 2024-04-16 PROCEDURE — 82330 ASSAY OF CALCIUM: CPT | Performed by: FAMILY MEDICINE

## 2024-04-16 PROCEDURE — 99214 OFFICE O/P EST MOD 30 MIN: CPT | Performed by: FAMILY MEDICINE

## 2024-04-16 RX ORDER — CIPROFLOXACIN AND DEXAMETHASONE 3; 1 MG/ML; MG/ML
4 SUSPENSION/ DROPS AURICULAR (OTIC) 2 TIMES DAILY
COMMUNITY
End: 2024-04-23

## 2024-04-16 ASSESSMENT — PAIN SCALES - GENERAL: PAINLEVEL: NO PAIN (0)

## 2024-04-16 NOTE — PROGRESS NOTES
HEART CARE ENCOUNTER CONSULTATON NOTE      [facility]  [phone]      Assessment/Recommendations   Assessment:   1.  Bradycardia with no significant pauses noted during recent hospitalization.  Likely secondary to atenolol (renally cleared),  hypercalcemia and hypokalemia  2.  Pancreatic mass concerning for pancreatic cancer  3.  Hypercalcemia, 12 on recent admission  4.  Recent acute kidney injury  5.  Weakness  6.  Dyspnea on exertion    Plan:   1.  Complete echocardiogram for dyspnea on exertion  2.  Avoid AV blocking agents.  Medication side effect to atenolol.  Discontinue long-term.  3.  The patient has issues with tachycardia or bradycardia would recommend 24-hour Holter monitor.  4.  Mild tachycardia is likely secondary to beta-blocker withdrawal.  5.  Continue amlodipine for hypertension         History of Present Illness/Subjective    HPI: Bibiana Mcrae is a 79 year old female no prior cardiac history presents to cardiology clinic in consultation for sinus bradycardia, AV block.    Patient was noticing 2 to 3-month history of progressive weight loss, dyspnea, fatigue, confusion and severe muscle weakness.  She sustained a fall which prompted her to come to the emergency department.  Further evaluation in the emergency department is noted that she had hypercalcemia, hypokalemia and sinus bradycardia with first-degree AV block.  She also had interventricular conduction delay as well.  She is on atenolol with acute kidney injury which likely propagated worsening AV conduction.  She had no syncopal episode.  Fall appears to be related to severe muscle weakness possibly from electrolyte derangement.    Of note there was evidence of significant pancreatic mass with multiple other masses throughout the chest cavity concerning for metastatic disease.  Biopsy was completed.  Pending oncology evaluation    Twelve-lead EKG from April 10, 2024.  Sinus bradycardia heart rate 57 bpm.   First-degree AV block.  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    Echocardiogram Results:2018  Narrative & Impression  1. Normal left ventricular size and systolic performance with a visually estimated ejection fraction of 55-60%.   2. There is mild aortic insufficiency.   3. There is mild to moderate mitral insufficiency.   4. There is mild to moderate tricuspid insufficiency.   5. Normal right ventricular size and systolic performance.   6. There is mild left atrial enlargement.     Coronary Angiogram: 2018  Narrative & Impression     The total Agatston calcium score is 6. A calcium score in this range places the individual in the 25th percentile when compared to an age and gender matched control group and implies a low risk of cardiac events in the next ten years (less than 1% per   year).    Normal coronary anatomy with no detectable stenosis or plaque           Physical Examination  Review of Systems   Vitals: /64 (BP Location: Right arm, Patient Position: Sitting, Cuff Size: Adult Regular)   Pulse 111   Resp 16   Wt 39.5 kg (87 lb)   LMP  (LMP Unknown)   SpO2 100%   BMI 18.83 kg/m    BMI= Body mass index is 18.83 kg/m .  Wt Readings from Last 3 Encounters:   04/16/24 39.5 kg (87 lb)   04/10/24 39.1 kg (86 lb 4.8 oz)   08/25/23 42.4 kg (93 lb 6.4 oz)        Pleasant, thin2   ENT/Mouth: membranes moist, no oral lesions or bleeding gums.      EYES:  no scleral icterus, normal conjunctivae       Chest/Lungs:   lungs are clear to auscultation, no rales or wheezing, no sternal scar, equal chest wall expansion    Cardiovascular:   Regular. Normal first and second heart sounds with no murmurs, rubs, or gallops; the carotid, radial and posterior tibial pulses are intact, Jugular venous pressure normal, no edema bilaterally    Abdomen:  no tenderness; bowel sounds are present   Extremities: no cyanosis or clubbing   Skin: no xanthelasma, warm.    Neurologic: normal  bilateral, no  tremors     Psychiatric: alert and oriented x3, calm        Please refer above for cardiac ROS details.        Medical History  Surgical History Family History Social History   Past Medical History:   Diagnosis Date    Anxiety     Breast cyst     While ago     Past Surgical History:   Procedure Laterality Date    BREAST CYST ASPIRATION Left     While ago    HYSTERECTOMY  1985    OOPHORECTOMY Bilateral 1985    ZZC REMOVAL OF OVARY(S)      Description: Oophorectomy;  Recorded: 12/17/2009;    ZZC TOTAL ABDOM HYSTERECTOMY      Description: Total Abdominal Hysterectomy;  Recorded: 12/17/2009;     Family History   Problem Relation Age of Onset    Breast Cancer Mother 52.00    Cancer Maternal Grandfather 71.00        prostate    Prostate Cancer Maternal Grandfather         Social History     Socioeconomic History    Marital status:      Spouse name: Not on file    Number of children: Not on file    Years of education: Not on file    Highest education level: Not on file   Occupational History    Not on file   Tobacco Use    Smoking status: Never    Smokeless tobacco: Never   Substance and Sexual Activity    Alcohol use: No    Drug use: No    Sexual activity: Not on file   Other Topics Concern    Not on file   Social History Narrative    Not on file     Social Determinants of Health     Financial Resource Strain: Not on file   Food Insecurity: Not on file   Transportation Needs: Not on file   Physical Activity: Not on file   Stress: Not on file   Social Connections: Not on file   Interpersonal Safety: Not on file   Housing Stability: Not on file           Medications  Allergies   Current Outpatient Medications   Medication Sig Dispense Refill    acarbose (PRECOSE) 25 MG tablet [ACARBOSE (PRECOSE) 25 MG TABLET] TAKE 1 TABLET BY MOUTH 3  TIMES A DAY WITH MEALS 270 tablet 1    acetaminophen (TYLENOL) 325 MG tablet Take 325-650 mg by mouth every 6 hours as needed for mild pain      amLODIPine (NORVASC) 5 MG tablet Take 1  tablet (5 mg) by mouth daily for 30 days 30 tablet 0    Biotin 5 MG TABS Take 5,000 mcg by mouth daily      busPIRone (BUSPAR) 7.5 MG tablet TAKE 1 TABLET BY MOUTH TWICE  DAILY 180 tablet 3    celecoxib (CELEBREX) 200 MG capsule TAKE 1 CAPSULE BY MOUTH  DAILY 90 capsule 3    donepezil (ARICEPT) 5 MG tablet Take 1 tablet (5 mg) by mouth at bedtime 90 tablet 3    fluocinolone acetonide (DERMA SMOOTHE/FS BODY) 0.01 % external oil Apply topically 2 times daily 5 drops to both ears twice daily      multivitamin with minerals (THERA-M) 9 mg iron-400 mcg Tab tablet [MULTIVITAMIN WITH MINERALS (THERA-M) 9 MG IRON-400 MCG TAB TABLET] Take 1 tablet by mouth daily.      nortriptyline (PAMELOR) 50 MG capsule TAKE 1 CAPSULE BY MOUTH AT  BEDTIME 90 capsule 2    omeprazole (PRILOSEC) 20 MG capsule [OMEPRAZOLE (PRILOSEC) 20 MG CAPSULE] Take 20 mg by mouth daily before breakfast.      potassium chloride eliana ER (KLOR-CON M20) 20 MEQ CR tablet Take 1 tablet (20 mEq) by mouth daily for 5 days 5 tablet 0    QUEtiapine (SEROQUEL) 25 MG tablet Take 1 tablet (25 mg) by mouth 2 times daily as needed (agitation) 20 tablet 0       Allergies   Allergen Reactions    Prochlorperazine Edisylate [Prochlorperazine] Other (See Comments)     Extrapyramidal side effects          Lab Results    Chemistry/lipid CBC Cardiac Enzymes/BNP/TSH/INR   Recent Labs   Lab Test 12/29/22  1456   CHOL 189   HDL 80   LDL 93   TRIG 80     Recent Labs   Lab Test 12/29/22  1456 11/23/18  1317 10/12/17  1248   LDL 93 112 75     Recent Labs   Lab Test 04/12/24  1216 04/12/24  0506   NA  --  142   POTASSIUM 3.9 3.4   CHLORIDE  --  104   CO2  --  26   GLC  --  110*   BUN  --  10.9   CR  --  0.83   GFRESTIMATED  --  72   JUSTIN  --  10.4*     Recent Labs   Lab Test 04/12/24  0506 04/11/24  0824 04/10/24  1150   CR 0.83 0.94 1.08*     Recent Labs   Lab Test 12/29/22  1456 11/09/21  1539 10/20/20  1432   A1C 5.9* 5.6 5.8*          Recent Labs   Lab Test 04/12/24  0506   WBC 7.7    HGB 11.5*   HCT 34.6*   MCV 92        Recent Labs   Lab Test 04/12/24  0506 04/11/24  0824 04/10/24  1150   HGB 11.5* 11.2* 11.8    Recent Labs   Lab Test 02/27/18  1242 02/27/18  0605 02/27/18  0001   TROPONINI <0.01 <0.01 <0.01     Recent Labs   Lab Test 02/26/18  1558   *     Recent Labs   Lab Test 04/10/24  1505   TSH 0.82     Recent Labs   Lab Test 04/12/24  1216   INR 1.05        Ghulam Reina DO

## 2024-04-16 NOTE — TELEPHONE ENCOUNTER
Dr. Tran,     Pt fell last week, she was in the hospital over the weekend. She had some testing done. They stopped her Calcium because it was too high. Daughter would like you to review the Labs/imaging that was done in the hospital and let her know what the next step should be. Please call Rosa Isela at [phone].

## 2024-04-16 NOTE — LETTER
[facility] CARE COORDINATION  [facility]    April 16, 2024    Bibiana Mcrae  [address] [address]      Dear Bibiana,    I am a clinic care coordinator who works with Zack Woody MD with the [facility]. I wanted to thank you for spending the time to talk with me.  Below is a description of clinic care coordination and how I can further assist you.       The clinic care coordination team is made up of a registered nurse, , financial resource worker and community health worker who understand the health care system. The goal of clinic care coordination is to help you manage your health and improve access to the health care system. Our team works alongside your provider to assist you in determining your health and social needs. We can help you obtain health care and community resources, providing you with necessary information and education. We can work with you through any barriers and develop a care plan that helps coordinate and strengthen the communication between you and your care team.  Our services are voluntary and are offered without charge to you personally.    Please feel free to contact me with any questions or concerns regarding care coordination and what we can offer.      We are focused on providing you with the highest-quality healthcare experience possible.    Sincerely,     David Myhre, RN   [facility] RN   [phone]

## 2024-04-16 NOTE — PATIENT INSTRUCTIONS
Please contact direct nurses line Monday through Friday 8 AM to 5 PM @ [phone]    After-hours contact cardiology office at [phone].    Plan:    Stop atenolol long term   Monitor heart rates.  Goal <110 bpm.    Complete echo

## 2024-04-16 NOTE — LETTER
4/16/2024    Zack Woody MD  [address]  [place] [address]    RE: Bibiana Mcrae       Dear Colleague,     I had the pleasure of seeing Bibiana Mcrae in the [facility].    HEART CARE ENCOUNTER CONSULTATON NOTE      [facility]  [phone]      Assessment/Recommendations   Assessment:   1.  Bradycardia with no significant pauses noted during recent hospitalization.  Likely secondary to atenolol (renally cleared),  hypercalcemia and hypokalemia  2.  Pancreatic mass concerning for pancreatic cancer  3.  Hypercalcemia, 12 on recent admission  4.  Recent acute kidney injury  5.  Weakness  6.  Dyspnea on exertion    Plan:   1.  Complete echocardiogram for dyspnea on exertion  2.  Avoid AV blocking agents.  Medication side effect to atenolol.  Discontinue long-term.  3.  The patient has issues with tachycardia or bradycardia would recommend 24-hour Holter monitor.  4.  Mild tachycardia is likely secondary to beta-blocker withdrawal.  5.  Continue amlodipine for hypertension         History of Present Illness/Subjective    HPI: Bibiana Mcrae is a 79 year old female no prior cardiac history presents to cardiology clinic in consultation for sinus bradycardia, AV block.    Patient was noticing 2 to 3-month history of progressive weight loss, dyspnea, fatigue, confusion and severe muscle weakness.  She sustained a fall which prompted her to come to the emergency department.  Further evaluation in the emergency department is noted that she had hypercalcemia, hypokalemia and sinus bradycardia with first-degree AV block.  She also had interventricular conduction delay as well.  She is on atenolol with acute kidney injury which likely propagated worsening AV conduction.  She had no syncopal episode.  Fall appears to be related to severe muscle weakness possibly from electrolyte derangement.    Of note there was evidence of significant pancreatic mass with multiple  other masses throughout the chest cavity concerning for metastatic disease.  Biopsy was completed.  Pending oncology evaluation    Twelve-lead EKG from April 10, 2024.  Sinus bradycardia heart rate 57 bpm.  First-degree AV block.  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    Echocardiogram Results:2018  Narrative & Impression  1. Normal left ventricular size and systolic performance with a visually estimated ejection fraction of 55-60%.   2. There is mild aortic insufficiency.   3. There is mild to moderate mitral insufficiency.   4. There is mild to moderate tricuspid insufficiency.   5. Normal right ventricular size and systolic performance.   6. There is mild left atrial enlargement.     Coronary Angiogram: 2018  Narrative & Impression     The total Agatston calcium score is 6. A calcium score in this range places the individual in the 25th percentile when compared to an age and gender matched control group and implies a low risk of cardiac events in the next ten years (less than 1% per   year).    Normal coronary anatomy with no detectable stenosis or plaque           Physical Examination  Review of Systems   Vitals: /64 (BP Location: Right arm, Patient Position: Sitting, Cuff Size: Adult Regular)   Pulse 111   Resp 16   Wt 39.5 kg (87 lb)   LMP  (LMP Unknown)   SpO2 100%   BMI 18.83 kg/m    BMI= Body mass index is 18.83 kg/m .  Wt Readings from Last 3 Encounters:   04/16/24 39.5 kg (87 lb)   04/10/24 39.1 kg (86 lb 4.8 oz)   08/25/23 42.4 kg (93 lb 6.4 oz)        Pleasant, thin2   ENT/Mouth: membranes moist, no oral lesions or bleeding gums.      EYES:  no scleral icterus, normal conjunctivae       Chest/Lungs:   lungs are clear to auscultation, no rales or wheezing, no sternal scar, equal chest wall expansion    Cardiovascular:   Regular. Normal first and second heart sounds with no murmurs, rubs, or gallops; the carotid, radial and posterior tibial pulses are intact, Jugular venous  pressure normal, no edema bilaterally    Abdomen:  no tenderness; bowel sounds are present   Extremities: no cyanosis or clubbing   Skin: no xanthelasma, warm.    Neurologic: normal  bilateral, no tremors     Psychiatric: alert and oriented x3, calm        Please refer above for cardiac ROS details.        Medical History  Surgical History Family History Social History   Past Medical History:   Diagnosis Date    Anxiety     Breast cyst     While ago     Past Surgical History:   Procedure Laterality Date    BREAST CYST ASPIRATION Left     While ago    HYSTERECTOMY  1985    OOPHORECTOMY Bilateral 1985    ZC REMOVAL OF OVARY(S)      Description: Oophorectomy;  Recorded: 12/17/2009;    ZZC TOTAL ABDOM HYSTERECTOMY      Description: Total Abdominal Hysterectomy;  Recorded: 12/17/2009;     Family History   Problem Relation Age of Onset    Breast Cancer Mother 52.00    Cancer Maternal Grandfather 71.00        prostate    Prostate Cancer Maternal Grandfather         Social History     Socioeconomic History    Marital status:      Spouse name: Not on file    Number of children: Not on file    Years of education: Not on file    Highest education level: Not on file   Occupational History    Not on file   Tobacco Use    Smoking status: Never    Smokeless tobacco: Never   Substance and Sexual Activity    Alcohol use: No    Drug use: No    Sexual activity: Not on file   Other Topics Concern    Not on file   Social History Narrative    Not on file     Social Determinants of Health     Financial Resource Strain: Not on file   Food Insecurity: Not on file   Transportation Needs: Not on file   Physical Activity: Not on file   Stress: Not on file   Social Connections: Not on file   Interpersonal Safety: Not on file   Housing Stability: Not on file           Medications  Allergies   Current Outpatient Medications   Medication Sig Dispense Refill    acarbose (PRECOSE) 25 MG tablet [ACARBOSE (PRECOSE) 25 MG TABLET] TAKE 1  TABLET BY MOUTH 3  TIMES A DAY WITH MEALS 270 tablet 1    acetaminophen (TYLENOL) 325 MG tablet Take 325-650 mg by mouth every 6 hours as needed for mild pain      amLODIPine (NORVASC) 5 MG tablet Take 1 tablet (5 mg) by mouth daily for 30 days 30 tablet 0    Biotin 5 MG TABS Take 5,000 mcg by mouth daily      busPIRone (BUSPAR) 7.5 MG tablet TAKE 1 TABLET BY MOUTH TWICE  DAILY 180 tablet 3    celecoxib (CELEBREX) 200 MG capsule TAKE 1 CAPSULE BY MOUTH  DAILY 90 capsule 3    donepezil (ARICEPT) 5 MG tablet Take 1 tablet (5 mg) by mouth at bedtime 90 tablet 3    fluocinolone acetonide (DERMA SMOOTHE/FS BODY) 0.01 % external oil Apply topically 2 times daily 5 drops to both ears twice daily      multivitamin with minerals (THERA-M) 9 mg iron-400 mcg Tab tablet [MULTIVITAMIN WITH MINERALS (THERA-M) 9 MG IRON-400 MCG TAB TABLET] Take 1 tablet by mouth daily.      nortriptyline (PAMELOR) 50 MG capsule TAKE 1 CAPSULE BY MOUTH AT  BEDTIME 90 capsule 2    omeprazole (PRILOSEC) 20 MG capsule [OMEPRAZOLE (PRILOSEC) 20 MG CAPSULE] Take 20 mg by mouth daily before breakfast.      potassium chloride eliana ER (KLOR-CON M20) 20 MEQ CR tablet Take 1 tablet (20 mEq) by mouth daily for 5 days 5 tablet 0    QUEtiapine (SEROQUEL) 25 MG tablet Take 1 tablet (25 mg) by mouth 2 times daily as needed (agitation) 20 tablet 0       Allergies   Allergen Reactions    Prochlorperazine Edisylate [Prochlorperazine] Other (See Comments)     Extrapyramidal side effects          Lab Results    Chemistry/lipid CBC Cardiac Enzymes/BNP/TSH/INR   Recent Labs   Lab Test 12/29/22  1456   CHOL 189   HDL 80   LDL 93   TRIG 80     Recent Labs   Lab Test 12/29/22  1456 11/23/18  1317 10/12/17  1248   LDL 93 112 75     Recent Labs   Lab Test 04/12/24  1216 04/12/24  0506   NA  --  142   POTASSIUM 3.9 3.4   CHLORIDE  --  104   CO2  --  26   GLC  --  110*   BUN  --  10.9   CR  --  0.83   GFRESTIMATED  --  72   JUSTIN  --  10.4*     Recent Labs   Lab Test  04/12/24  0506 04/11/24  0824 04/10/24  1150   CR 0.83 0.94 1.08*     Recent Labs   Lab Test 12/29/22  1456 11/09/21  1539 10/20/20  1432   A1C 5.9* 5.6 5.8*          Recent Labs   Lab Test 04/12/24  0506   WBC 7.7   HGB 11.5*   HCT 34.6*   MCV 92        Recent Labs   Lab Test 04/12/24  0506 04/11/24  0824 04/10/24  1150   HGB 11.5* 11.2* 11.8    Recent Labs   Lab Test 02/27/18  1242 02/27/18  0605 02/27/18  0001   TROPONINI <0.01 <0.01 <0.01     Recent Labs   Lab Test 02/26/18  1558   *     Recent Labs   Lab Test 04/10/24  1505   TSH 0.82     Recent Labs   Lab Test 04/12/24  1216   INR 1.05        Ghulam Reina DO        Thank you for allowing me to participate in the care of your patient.      Sincerely,     Ghulam Reina DO     [facility]  cc:   Chary Moeller PA-C  [facility]  [address] [place] [address]

## 2024-04-16 NOTE — PROGRESS NOTES
Clinic Care Coordination Contact  [facility]: Post-Discharge Note  SITUATION                                                      Admission:    Admission Date: 04/10/24   Reason for Admission: weakness, anorexia, worsening confusion, falls.  Discharge:   Discharge Date: 04/12/24  Discharge Diagnosis: new pancreatic mass, hypcalcemia, DARLENE, cognitive impairment, general weakness and fall    BACKGROUND                                                      Per hospital discharge summary and inpatient provider notes:  78F cognitive disorder, HTN, anxiety who presents with several days of weakness, anorexia, worsening confusion and falls related to generalized weakness. CT showing pancreatic and peripancreatic mass and hypercalcemia.      Pancreatic and peripancreatic mass  -IR for CT guided biopsy. Holding Lovenox; NPO   -concerning for cancer; appreciate oncologist consultation.  -Patient did eat breakfast and thus IR cannot do biopsy today and won't do biopsy over weekend since it is not emergent.  -pt and family decided not to wait for the biopsy (which would be next week) and would like to go home today and arrange for outpt biosy  -instructed to follow up closely with pcp, oncologist and IR (biopsy asap)     #DARLENE - improving with IVF.       #Hypercalcemia - likely from dehydration but also possible due to pancreatic mass (malignant?)  -vitamin D slightly elevated.    -Stop os-rahat for now.    -if getting denosumab may in future need to resume supplementation. Defer to PCP.     #Cognitive impairment - awaiting formal neuro evaluation outpatient.  Recent SLUMS of 17/30 and atrophy on CT brain.  Likely dementia  -TSH and B12 WNL  -aricept     #Weakness/Falls - due to above  -monitor on tele given daughter report of bradycardia  -PT/OT.  Recommended home health care, pt, ot.       #HTN - change atenolol to norvasc to due bradycardia.     #Anxiety - continue pamelor, buspar, seroquel HS PRN.     #Insomnia - resume  "pamelor, melatonin PRN     #post prandial hyperglycemia - acarbose     Mild hypokalemia  -k 3.4; due to poor oral intake  -supplement for 5 days    ASSESSMENT           Discharge Assessment  How are you doing now that you are home?: Patient's daughter said, \"She is ok\".  How are your symptoms? (Red Flag symptoms escalate to triage hotline per guidelines): Improved  Do you feel your condition is stable enough to be safe at home until your provider visit?: Yes  Does the patient have their discharge instructions? : Yes  Does the patient have questions regarding their discharge instructions? : No  Were you started on any new medications or were there changes to any of your previous medications? : Yes  Does the patient have all of their medications?: Yes  Do you have questions regarding any of your medications? : No  Do you have all of your needed medical supplies or equipment (DME)?  (i.e. oxygen tank, CPAP, cane, etc.): Yes  Discharge follow-up appointment scheduled within 14 calendar days? : Yes  Discharge Follow Up Appointment Date: 04/16/24  Discharge Follow Up Appointment Scheduled with?: Primary Care Provider         Post-op (Clinicians Only)  Eating & Drinking: eating and drinking without complaints/concerns  PO Intake: regular diet  Bowel Function: normal  Date of last BM: 04/15/24  Urinary Status: voiding without complaint/concerns    Care Management       Care Mgmt General Assessment      CCC RN spoke with patient's daughter Rosa Isela today. She stated patient was doing \"ok\" at home. She reported home care started yesterday. Rosa Isela and his sisters are taking turns staying with her currently. They are assisting her with her medications, which they said she is taking with encouragement. Patient has a follow up with PCP today and they plan to attend this appointment. Discussed Care Coordination with Rosa Isela. Will send letting explaining program through [employer] and through the mail per her request.                    PLAN      "                                                 Outpatient Plan:  Home with home care    Future Appointments   Date Time Provider Department Center   4/16/2024 12:50 PM [facility]   4/16/2024  3:20 PM Zack Woody MD [facility]   4/19/2024 11:30 AM Emanuel Mann MD NURAD[facility]   4/24/2024 10:00 AM Inocencio Florian MD [facility]   4/25/2024 11:30 AM Zack Woody MD [facility]   7/2/2024  4:00 PM Gustavo Tran MD [facility]         For any urgent concerns, please contact our 24 hour nurse triage line: [phone] ([phone])         David J. Myhre, RN

## 2024-04-17 PROBLEM — R19.8 DIGESTIVE SYMPTOM: Status: RESOLVED | Noted: 2022-05-24 | Resolved: 2024-04-17

## 2024-04-17 PROBLEM — R53.1 GENERALIZED WEAKNESS: Status: RESOLVED | Noted: 2024-04-10 | Resolved: 2024-04-17

## 2024-04-17 PROBLEM — E83.52 HYPERCALCEMIA: Status: RESOLVED | Noted: 2024-04-10 | Resolved: 2024-04-17

## 2024-04-17 PROBLEM — N17.9 AKI (ACUTE KIDNEY INJURY) (H): Status: RESOLVED | Noted: 2024-04-10 | Resolved: 2024-04-17
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
## 2024-04-17 PROBLEM — Z90.49 HISTORY OF CHOLECYSTECTOMY: Status: RESOLVED | Noted: 2023-08-24 | Resolved: 2024-04-17

## 2024-04-17 PROBLEM — R41.0 CONFUSION: Status: RESOLVED | Noted: 2024-04-10 | Resolved: 2024-04-17

## 2024-04-17 LAB
ANION GAP SERPL CALCULATED.3IONS-SCNC: 9 MMOL/L (ref 7–15)
BUN SERPL-MCNC: 19 MG/DL (ref 8–23)
CALCIUM SERPL-MCNC: 9.9 MG/DL (ref 8.8–10.2)
CHLORIDE SERPL-SCNC: 105 MMOL/L (ref 98–107)
CREAT SERPL-MCNC: 0.72 MG/DL (ref 0.51–0.95)
DEPRECATED HCO3 PLAS-SCNC: 27 MMOL/L (ref 22–29)
EGFRCR SERPLBLD CKD-EPI 2021: 85 ML/MIN/1.73M2
GLUCOSE SERPL-MCNC: 115 MG/DL (ref 70–99)
POTASSIUM SERPL-SCNC: 4.8 MMOL/L (ref 3.4–5.3)
SODIUM SERPL-SCNC: 141 MMOL/L (ref 135–145)

## 2024-04-17 NOTE — TELEPHONE ENCOUNTER
I spoke with Ros aIsela, she was agreeable to scheduling a virtual visit with Dr. Tran at her next opening 04/23/2024, appointment scheduled.    SAFIA Xavier

## 2024-04-17 NOTE — PROGRESS NOTES
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  She scored 23/30 on MoCA and likely has major neurocognitive disorder of Alzheimer's type.  Due to her recent diagnosis of pancreatic mass family wants to complete oncology evaluation before deciding on long-term management.  Previous workup included CT brain that showed atrophy and chronic small vessel ischemic changes with nonspecific lobulated lesion completely opacifying the left sphenoid sinus.  She was previously started on Aricept but unable to tolerate higher dose.  I have recommended:    1.  Switch to Exelon patch 4.6 mg per 24 hours.  If she is able to tolerate in 3 months I will consider increasing it further  2.  Lab work to include folate, MMA, RPR, TSH, B1, B12 and vitamin E  3.  Patient was encouraged to use hearing aids  4.  They are scheduled to see oncology next week and afterwards will decide if she needs neuropsychological testing.  Similarly they are holding off ENT evaluation of the sphenoid lesion.  5.  Follow-up in 3 months    Thank you again for this referral, please feel free to contact me if you have any questions.    Emanuel Mann MD  [facility]     REASON FOR CONSULTATION Neurocognitive Disorder        HISTORY OF PRESENT ILLNESS     We have been requested by Dr. Woody to evaluate Bibiana Mcrae who is a 79 year old  female for cognitive decline    Patient is 79-year-old female with history of HTN, SNHL, anxiety, B-cell lymphoma,  hypercalcemia who was referred for evaluation of cognitive decline.  Patient is accompanied by 2 daughters who reports that over the last couple of years there has been a steady decline in her short-term memory.  She also started having visual hallucinations and also noticed gradual decline in her ability to walk to a point that she uses a walker.  She lives alone and her daughters check on her regularly.  She is still managing her own finances.  There is no history of wandering off home or any auditory hallucinations.  She does report some bowel incontinence but denies bladder incontinence.  Daughters feel that in addition to memory decline there has been a change in her personality and she is more irritable which is unlike her.     PROBLEM LIST   Patient Active Problem List   Diagnosis    Essential hypertension with goal blood pressure less than 130/80    Anxiety    Osteoporosis, senile    Bilateral sensorineural hearing loss    Impaired fasting glucose    Diverticular disease of large intestine    Gastroesophageal reflux disease with esophagitis without hemorrhage    Bronchiectasis without complication (H)    Chronic cough    Incontinence of feces    Irregular bowel habits    Status post dilation of esophageal narrowing    Metal foreign body in eye region    MRI contraindicated due to metal in eye         PAST MEDICAL & SURGICAL HISTORY     Past Medical History:   Patient  has a past medical history of Anxiety, Breast cyst, and History of cholecystectomy (08/24/2023).    Surgical History:  She  has a past surgical history that includes TOTAL ABDOM HYSTERECTOMY; REMOVAL OF OVARY(S); Hysterectomy (1985); Oophorectomy (Bilateral, 1985); and Breast Cyst Aspiration (Left).     SOCIAL HISTORY     Reviewed, and she  reports that she has never smoked. She has never used smokeless tobacco. She reports that she does not drink alcohol and does not use drugs.     FAMILY HISTORY     Reviewed, and family history includes  Breast Cancer (age of onset: 52.00) in her mother; Cancer (age of onset: 71.00) in her maternal grandfather; Prostate Cancer in her maternal grandfather.     ALLERGIES     Allergies   Allergen Reactions    Prochlorperazine Edisylate [Prochlorperazine] Other (See Comments)     Extrapyramidal side effects         REVIEW OF SYSTEMS     A 12 point review of system was performed and was negative except as outlined in the history of present illness.     HOME MEDICATIONS     Current Outpatient Rx   Medication Sig Dispense Refill    acarbose (PRECOSE) 25 MG tablet [ACARBOSE (PRECOSE) 25 MG TABLET] TAKE 1 TABLET BY MOUTH 3  TIMES A DAY WITH MEALS (Patient taking differently: Take 25 mg by mouth 3 times daily (with meals)) 270 tablet 1    acetaminophen (TYLENOL) 325 MG tablet Take 325-650 mg by mouth every 6 hours as needed for mild pain      amLODIPine (NORVASC) 5 MG tablet Take 1 tablet (5 mg) by mouth daily for 30 days 30 tablet 0    Biotin 5 MG TABS Take 5,000 mcg by mouth daily      busPIRone (BUSPAR) 7.5 MG tablet TAKE 1 TABLET BY MOUTH TWICE  DAILY 180 tablet 3    celecoxib (CELEBREX) 200 MG capsule TAKE 1 CAPSULE BY MOUTH  DAILY 90 capsule 3    ciprofloxacin-dexAMETHasone (CIPRODEX) 0.3-0.1 % otic suspension Place 4 drops into both ears 2 times daily      denosumab (PROLIA) 60 MG/ML SOSY injection Inject 60 mg Subcutaneous every 6 months      fluocinolone acetonide (DERMA SMOOTHE/FS BODY) 0.01 % external oil Apply topically 2 times daily 5 drops to both ears twice daily      multivitamin with minerals (THERA-M) 9 mg iron-400 mcg Tab tablet [MULTIVITAMIN WITH MINERALS (THERA-M) 9 MG IRON-400 MCG TAB TABLET] Take 1 tablet by mouth daily.      nortriptyline (PAMELOR) 50 MG capsule TAKE 1 CAPSULE BY MOUTH AT  BEDTIME 90 capsule 2    omeprazole (PRILOSEC) 20 MG capsule [OMEPRAZOLE (PRILOSEC) 20 MG CAPSULE] Take 20 mg by mouth daily before breakfast.      Probiotic Product (SOLUBLE FIBER/PROBIOTICS PO)       QUEtiapine  "(SEROQUEL) 25 MG tablet Take 1 tablet (25 mg) by mouth 2 times daily as needed (agitation) 20 tablet 0    rivastigmine (EXELON) 4.6 MG/24HR 24 hr patch Place 1 patch onto the skin daily 30 patch 11    UNABLE TO FIND Take 2 Tablespoonful by mouth every morning MEDICATION NAME: biofiber 8 oz with water           PHYSICAL EXAM     Vital signs  /70 (BP Location: Right arm, Patient Position: Sitting)   Pulse 109   Ht 1.448 m (4' 9\")   Wt 39.5 kg (87 lb)   LMP  (LMP Unknown)   BMI 18.83 kg/m      Weight:   87 lbs 0 oz  Jacob Cognitive Assessment:    Jacob Cognitive Assessment (MOCA)  Visuospatial/Executive : 1  Naming: 3  Attention - Digits: 1  Attention - Letters: 1  Attention - Subtraction: 3  Language - Repeat: 2  Language - Fluency : 0  Abstraction: 2  Delayed Recall: 3  Orientation: 6  Education: 1  MOCA Score: 23  Administered by: : Candi Smith CMA     Chicken Cognitive Assessment Score:  MOCA Score: 23/30.    Elderly female who is alert and oriented vital signs are reviewed and documented in electronic medical record.  Neck supple.  Neurologically speech normal cranial nerves II through XII are intact except she is hard of hearing.  She has decreased mass with normal tone strength 5/5 reflexes 1+ she walks with a walker.     PERTINENT DIAGNOSTIC STUDIES     Following studies were reviewed:     CT BRAIN 4/10/2024  1.  No acute intracranial hemorrhage, extra-axial fluid collection, or mass effect.  2.  Brain atrophy and presumed chronic small vessel ischemic changes, as described.  3.  Unchanged nonspecific lobulated lesion nearly completely opacifying the left sphenoid sinus.    CT CERVICAL SPINE 4/10/2024  1.  No acute cervical spine fracture.  2.  Multilevel degenerative changes (moderate/advanced), as described.    CT THORACIC SPINE 4/10/2024  1.  No acute thoracic spine fracture identified.  2.  Degenerative changes, as described.    CT LUMBAR SPINE 4/10/2024  1.  Transitional anatomy at " the lumbosacral junction, as described.  2.  No acute lumbar spine fracture is identified.  3.  Grade I anterolisthesis of L3 on L4 and grade II anterolisthesis of L4 on L5.   4.  Multilevel degenerative changes, as described.  5.  Indeterminate findings in the partially visualized abdomen and pelvis, as described. Recommend follow-up CT abdomen and pelvis with contrast for further evaluation.    CT ABDOMEN PELVIS 4/11/2024  1.  Multilobulated soft tissue masses in the pancreatic/peripancreatic region. Primary considerations include pancreatic ductal adenocarcinoma, gastrointestinal stromal tumor (arising from the adjacent stomach), or malignant periceliac lymph nodes.   Suggest tissue sampling.  2.  Severely dilated left ureter with morphology suggesting duplicated collecting system. 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     PERTINENT LABS  Following labs were reviewed:  Office Visit on 04/16/2024   Component Date Value Ref Range Status    Calcium Ionized Whole Blood 04/16/2024 5.1  4.4 - 5.2 mg/dL Final    Sodium 04/16/2024 141  135 - 145 mmol/L Final    Potassium 04/16/2024 4.8  3.4 - 5.3 mmol/L Final    Chloride 04/16/2024 105  98 - 107 mmol/L Final    Carbon Dioxide (CO2) 04/16/2024 27  22 - 29 mmol/L Final    Anion Gap 04/16/2024 9  7 - 15 mmol/L Final    Urea Nitrogen 04/16/2024 19.0  8.0 - 23.0 mg/dL Final    Creatinine 04/16/2024 0.72  0.51 - 0.95 mg/dL Final    GFR Estimate 04/16/2024 85  >60 mL/min/1.73m2 Final    Calcium 04/16/2024 9.9  8.8 - 10.2 mg/dL Final    Glucose 04/16/2024 115 (H)  70 - 99 mg/dL Final    WBC Count 04/16/2024 10.4  4.0 - 11.0 10e3/uL Final    RBC Count 04/16/2024 3.73 (L)  3.80 - 5.20 10e6/uL Final    Hemoglobin 04/16/2024 11.5 (L)  11.7 - 15.7 g/dL Final    Hematocrit 04/16/2024 35.7  35.0 - 47.0 % Final    MCV 04/16/2024 96  78 - 100 fL Final    MCH 04/16/2024 30.8  26.5 - 33.0 pg Final    MCHC 04/16/2024 32.2   31.5 - 36.5 g/dL Final    RDW 04/16/2024 14.5  10.0 - 15.0 % Final    Platelet Count 04/16/2024 261  150 - 450 10e3/uL Final   Hospital Outpatient Visit on 04/15/2024   Component Date Value Ref Range Status    Case Report 04/15/2024    Final                    Value:Surgical Pathology Report                         Case: [id number]                                  Authorizing Provider:  Loco Acosta MD        Collected:           04/15/2024 08:50 AM          Ordering Location:     [facility]      Received:            04/15/2024 09:59 AM                                 [facility] CT                                                           Pathologist:           Waldo Turner MD                                                        Specimen:    Pancreas, Pancreatic mass                                                                  Final Diagnosis 04/15/2024    Final                    Value:This result contains rich text formatting which cannot be displayed here.    Comment 04/15/2024    Final                    Value:This result contains rich text formatting which cannot be displayed here.    Clinical Information 04/15/2024    Final                    Value:This result contains rich text formatting which cannot be displayed here.    Gross Description 04/15/2024    Final                    Value:This result contains rich text formatting which cannot be displayed here.    Microscopic Description 04/15/2024    Final                    Value:This result contains rich text formatting which cannot be displayed here.    Special Stains 04/15/2024    Final                    Value:This result contains rich text formatting which cannot be displayed here.    Disclaimer 04/15/2024    Final                    Value:This result contains rich text formatting which cannot be displayed here.    MCRS 04/15/2024 Yes (A)  N/A Final    Performing Labs 04/15/2024    Final                    Value:This result  contains rich text formatting which cannot be displayed here.   Admission on 04/10/2024, Discharged on 04/12/2024   Component Date Value Ref Range Status    Color Urine 04/10/2024 Light Yellow  Colorless, Straw, Light Yellow, Yellow Final    Appearance Urine 04/10/2024 Turbid (A)  Clear Final    Glucose Urine 04/10/2024 Negative  Negative mg/dL Final    Bilirubin Urine 04/10/2024 Negative  Negative Final    Ketones Urine 04/10/2024 Negative  Negative mg/dL Final    Specific Gravity Urine 04/10/2024 1.009  1.001 - 1.030 Final    Blood Urine 04/10/2024 Negative  Negative Final    pH Urine 04/10/2024 7.5 (H)  5.0 - 7.0 Final    Protein Albumin Urine 04/10/2024 Negative  Negative mg/dL Final    Urobilinogen Urine 04/10/2024 <2.0  <2.0 mg/dL Final    Nitrite Urine 04/10/2024 Negative  Negative Final    Leukocyte Esterase Urine 04/10/2024 Negative  Negative Final    Amorphous Crystals Urine 04/10/2024 Few (A)  None Seen /HPF Final    RBC Urine 04/10/2024 0  <=2 /HPF Final    WBC Urine 04/10/2024 2  <=5 /HPF Final    Squamous Epithelials Urine 04/10/2024 <1  <=1 /HPF Final    Ventricular Rate 04/10/2024 57  BPM Final    Atrial Rate 04/10/2024 57  BPM Final    [place] Interval 04/10/2024 220  ms Final    QRS Duration 04/10/2024 150  ms Final    QT 04/10/2024 420  ms Final    QTc 04/10/2024 408  ms Final    P Axis 04/10/2024 40  degrees Final    R AXIS 04/10/2024 -61  degrees Final    T Axis 04/10/2024 -12  degrees Final    Interpretation ECG 04/10/2024    Final                    Value:Sinus bradycardia with 1st degree A-V block with Premature atrial complexes  Right bundle branch block  Left anterior fascicular block  ** Bifascicular block **  Abnormal ECG  When compared with ECG of 26-FEB-2018 19:48,  Premature ventricular complexes are no longer Present  Premature atrial complexes are now Present  T wave inversion more evident in Anterior leads  Confirmed by SEE ED PROVIDER NOTE FOR, ECG INTERPRETATION ([id number]),  RACHEL,  FADUMO ([id number]) on 4/15/2024 4:56:29 AM      Influenza A PCR 04/10/2024 Negative  Negative Final    Influenza B PCR 04/10/2024 Negative  Negative Final    RSV PCR 04/10/2024 Negative  Negative Final    SARS CoV2 PCR 04/10/2024 Negative  Negative Final    Hold Specimen 04/10/2024 [facility]   Final    Hold Specimen 04/10/2024 JI   Final    Hold Specimen 04/10/2024 JI   Final    Hold Specimen 04/10/2024 [facility]   Final    Hold Specimen 04/10/2024 [facility]   Final    WBC Count 04/10/2024 8.0  4.0 - 11.0 10e3/uL Final    RBC Count 04/10/2024 3.90  3.80 - 5.20 10e6/uL Final    Hemoglobin 04/10/2024 11.8  11.7 - 15.7 g/dL Final    Hematocrit 04/10/2024 36.8  35.0 - 47.0 % Final    MCV 04/10/2024 94  78 - 100 fL Final    MCH 04/10/2024 30.3  26.5 - 33.0 pg Final    MCHC 04/10/2024 32.1  31.5 - 36.5 g/dL Final    RDW 04/10/2024 14.0  10.0 - 15.0 % Final    Platelet Count 04/10/2024 202  150 - 450 10e3/uL Final    % Neutrophils 04/10/2024 70  % Final    % Lymphocytes 04/10/2024 15  % Final    % Monocytes 04/10/2024 10  % Final    % Eosinophils 04/10/2024 4  % Final    % Basophils 04/10/2024 1  % Final    % Immature Granulocytes 04/10/2024 0  % Final    NRBCs per 100 WBC 04/10/2024 0  <1 /100 Final    Absolute Neutrophils 04/10/2024 5.6  1.6 - 8.3 10e3/uL Final    Absolute Lymphocytes 04/10/2024 1.2  0.8 - 5.3 10e3/uL Final    Absolute Monocytes 04/10/2024 0.8  0.0 - 1.3 10e3/uL Final    Absolute Eosinophils 04/10/2024 0.3  0.0 - 0.7 10e3/uL Final    Absolute Basophils 04/10/2024 0.0  0.0 - 0.2 10e3/uL Final    Absolute Immature Granulocytes 04/10/2024 0.0  <=0.4 10e3/uL Final    Absolute NRBCs 04/10/2024 0.0  10e3/uL Final    Sodium 04/10/2024 141  135 - 145 mmol/L Final    Potassium 04/10/2024 4.2  3.4 - 5.3 mmol/L Final    Chloride 04/10/2024 99  98 - 107 mmol/L Final    Carbon Dioxide (CO2) 04/10/2024 31 (H)  22 - 29 mmol/L Final    Anion Gap 04/10/2024 11  7 - 15 mmol/L Final    Urea Nitrogen 04/10/2024 18.6  8.0 - 23.0 mg/dL Final     Creatinine 04/10/2024 1.08 (H)  0.51 - 0.95 mg/dL Final    GFR Estimate 04/10/2024 52 (L)  >60 mL/min/1.73m2 Final    Calcium 04/10/2024 12.0 (H)  8.8 - 10.2 mg/dL Final    Glucose 04/10/2024 117 (H)  70 - 99 mg/dL Final    Troponin T, High Sensitivity 04/10/2024 24 (H)  <=14 ng/L Final    Troponin T, High Sensitivity 04/10/2024 19 (H)  <=14 ng/L Final    Protein Total 04/10/2024 5.9 (L)  6.4 - 8.3 g/dL Final    Albumin 04/10/2024 3.5  3.5 - 5.2 g/dL Final    Bilirubin Total 04/10/2024 0.4  <=1.2 mg/dL Final    Alkaline Phosphatase 04/10/2024 98  40 - 150 U/L Final    AST 04/10/2024 43  0 - 45 U/L Final    ALT 04/10/2024 14  0 - 50 U/L Final    Bilirubin Direct 04/10/2024 <0.20  0.00 - 0.30 mg/dL Final    Lipase 04/10/2024 16  13 - 60 U/L Final    Magnesium 04/10/2024 1.9  1.7 - 2.3 mg/dL Final    Vitamin B12 04/10/2024 1,129  232 - 1,245 pg/mL Final    TSH 04/10/2024 0.82  0.30 - 4.20 uIU/mL Final    Vitamin D, Total (25-Hydroxy) 04/10/2024 52 (H)  20 - 50 ng/mL Final    Sodium 04/11/2024 142  135 - 145 mmol/L Final    Potassium 04/11/2024 3.4  3.4 - 5.3 mmol/L Final    Chloride 04/11/2024 104  98 - 107 mmol/L Final    Carbon Dioxide (CO2) 04/11/2024 27  22 - 29 mmol/L Final    Anion Gap 04/11/2024 11  7 - 15 mmol/L Final    Urea Nitrogen 04/11/2024 16.8  8.0 - 23.0 mg/dL Final    Creatinine 04/11/2024 0.94  0.51 - 0.95 mg/dL Final    GFR Estimate 04/11/2024 62  >60 mL/min/1.73m2 Final    Calcium 04/11/2024 10.5 (H)  8.8 - 10.2 mg/dL Final    Glucose 04/11/2024 115 (H)  70 - 99 mg/dL Final    WBC Count 04/11/2024 8.7  4.0 - 11.0 10e3/uL Final    RBC Count 04/11/2024 3.73 (L)  3.80 - 5.20 10e6/uL Final    Hemoglobin 04/11/2024 11.2 (L)  11.7 - 15.7 g/dL Final    Hematocrit 04/11/2024 34.5 (L)  35.0 - 47.0 % Final    MCV 04/11/2024 93  78 - 100 fL Final    MCH 04/11/2024 30.0  26.5 - 33.0 pg Final    MCHC 04/11/2024 32.5  31.5 - 36.5 g/dL Final    RDW 04/11/2024 14.1  10.0 - 15.0 % Final    Platelet Count 04/11/2024  186  150 - 450 10e3/uL Final    Potassium 04/11/2024 3.4  3.4 - 5.3 mmol/L Final    Magnesium 04/11/2024 1.7  1.7 - 2.3 mg/dL Final    Sodium 04/12/2024 142  135 - 145 mmol/L Final    Potassium 04/12/2024 3.4  3.4 - 5.3 mmol/L Final    Chloride 04/12/2024 104  98 - 107 mmol/L Final    Carbon Dioxide (CO2) 04/12/2024 26  22 - 29 mmol/L Final    Anion Gap 04/12/2024 12  7 - 15 mmol/L Final    Urea Nitrogen 04/12/2024 10.9  8.0 - 23.0 mg/dL Final    Creatinine 04/12/2024 0.83  0.51 - 0.95 mg/dL Final    GFR Estimate 04/12/2024 72  >60 mL/min/1.73m2 Final    Calcium 04/12/2024 10.4 (H)  8.8 - 10.2 mg/dL Final    Glucose 04/12/2024 110 (H)  70 - 99 mg/dL Final    WBC Count 04/12/2024 7.7  4.0 - 11.0 10e3/uL Final    RBC Count 04/12/2024 3.75 (L)  3.80 - 5.20 10e6/uL Final    Hemoglobin 04/12/2024 11.5 (L)  11.7 - 15.7 g/dL Final    Hematocrit 04/12/2024 34.6 (L)  35.0 - 47.0 % Final    MCV 04/12/2024 92  78 - 100 fL Final    MCH 04/12/2024 30.7  26.5 - 33.0 pg Final    MCHC 04/12/2024 33.2  31.5 - 36.5 g/dL Final    RDW 04/12/2024 14.1  10.0 - 15.0 % Final    Platelet Count 04/12/2024 180  150 - 450 10e3/uL Final    Magnesium 04/12/2024 1.8  1.7 - 2.3 mg/dL Final    Potassium 04/12/2024 3.9  3.4 - 5.3 mmol/L Final    Copper 04/12/2024 141.0  80.0 - 155.0 ug/dL Final    INR 04/12/2024 1.05  0.85 - 1.15 Final    Hold Specimen 04/12/2024 [facility]   Final    Hold Specimen 04/12/2024 [facility]   Final        Total time spent for face to face visit, reviewing labs/imaging studies, counseling and coordination of care was: 1 Hour spent on the date of the encounter doing chart review, review of outside records, review of test results, interpretation of tests, patient visit, and documentation     The longitudinal plan of care for the diagnosis(es)/condition(s) as documented were addressed during this visit. Due to the added complexity in care, I will continue to support Bibiana in the subsequent management and with ongoing continuity of  care.    This note was dictated using voice recognition software.  Any grammatical or context distortions are unintentional and inherent to the software.    Orders Placed This Encounter   Procedures    Folate    Methylmalonic Acid    Treponema Abs w Reflex to RPR and Titer    TSH with free T4 reflex    Vitamin B1 whole blood    Vitamin B12    Vitamin E      New Prescriptions    RIVASTIGMINE (EXELON) 4.6 MG/24HR 24 HR PATCH    Place 1 patch onto the skin daily      Modified Medications    No medications on file

## 2024-04-18 ENCOUNTER — TELEPHONE (OUTPATIENT)
Dept: FAMILY MEDICINE | Facility: CLINIC | Age: 79
End: 2024-04-18
Payer: COMMERCIAL

## 2024-04-18 NOTE — TELEPHONE ENCOUNTER
Home Care is calling regarding an established patient with [facility].       Requesting orders from: Zack Woody  Provider is following patient: Yes  Is this a 60-day recertification request?  No    Orders Requested    Physical Therapy  Request for initial certification (first set of orders)   Frequency: Once a week for 4 weeks, once every other week for two visits for two months.     Information was gathered and will be sent to provider for review.  RN will contact Home Care with information after provider review.  Confirmed ok to leave a detailed message with call back.  Contact information confirmed and updated as needed.    Rustam Alegria RN

## 2024-04-19 ENCOUNTER — OFFICE VISIT (OUTPATIENT)
Dept: NEUROLOGY | Facility: CLINIC | Age: 79
End: 2024-04-19
Attending: FAMILY MEDICINE
Payer: COMMERCIAL

## 2024-04-19 ENCOUNTER — LAB (OUTPATIENT)
Dept: LAB | Facility: HOSPITAL | Age: 79
End: 2024-04-19
Payer: COMMERCIAL

## 2024-04-19 VITALS
SYSTOLIC BLOOD PRESSURE: 117 MMHG | DIASTOLIC BLOOD PRESSURE: 70 MMHG | HEART RATE: 109 BPM | HEIGHT: 57 IN | WEIGHT: 87 LBS | BODY MASS INDEX: 18.77 KG/M2

## 2024-04-19 DIAGNOSIS — R41.9 NEUROCOGNITIVE DISORDER: Primary | ICD-10-CM

## 2024-04-19 DIAGNOSIS — R41.9 NEUROCOGNITIVE DISORDER: ICD-10-CM

## 2024-04-19 PROBLEM — S00.259A: Status: ACTIVE | Noted: 2024-04-19

## 2024-04-19 PROBLEM — Z53.09 MRI CONTRAINDICATED DUE TO METAL IMPLANT: Status: ACTIVE | Noted: 2024-04-19

## 2024-04-19 LAB
FOLATE SERPL-MCNC: 31.8 NG/ML (ref 4.6–34.8)
LAB DIRECTOR COMMENTS: ABNORMAL
LAB DIRECTOR DISCLAIMER: ABNORMAL
LAB DIRECTOR INTERPRETATION: ABNORMAL
LAB DIRECTOR METHODOLOGY: ABNORMAL
LAB DIRECTOR RESULTS: ABNORMAL
SPECIMEN DESCRIPTION: ABNORMAL
TSH SERPL DL<=0.005 MIU/L-ACNC: 0.97 UIU/ML (ref 0.3–4.2)

## 2024-04-19 PROCEDURE — 88341 IMHCHEM/IMCYTCHM EA ADD ANTB: CPT | Mod: 26 | Performed by: PATHOLOGY

## 2024-04-19 PROCEDURE — G2211 COMPLEX E/M VISIT ADD ON: HCPCS | Performed by: PSYCHIATRY & NEUROLOGY

## 2024-04-19 PROCEDURE — 88333 PATH CONSLTJ SURG CYTO XM 1: CPT | Mod: 26 | Performed by: PATHOLOGY

## 2024-04-19 PROCEDURE — 84443 ASSAY THYROID STIM HORMONE: CPT

## 2024-04-19 PROCEDURE — 83921 ORGANIC ACID SINGLE QUANT: CPT

## 2024-04-19 PROCEDURE — 88360 TUMOR IMMUNOHISTOCHEM/MANUAL: CPT | Mod: 26 | Performed by: PATHOLOGY

## 2024-04-19 PROCEDURE — 88365 INSITU HYBRIDIZATION (FISH): CPT | Mod: 26 | Performed by: PATHOLOGY

## 2024-04-19 PROCEDURE — 84446 ASSAY OF VITAMIN E: CPT

## 2024-04-19 PROCEDURE — 84425 ASSAY OF VITAMIN B-1: CPT

## 2024-04-19 PROCEDURE — 99205 OFFICE O/P NEW HI 60 MIN: CPT | Performed by: PSYCHIATRY & NEUROLOGY

## 2024-04-19 PROCEDURE — 88364 INSITU HYBRIDIZATION (FISH): CPT | Mod: 26 | Performed by: PATHOLOGY

## 2024-04-19 PROCEDURE — 82607 VITAMIN B-12: CPT

## 2024-04-19 PROCEDURE — 36415 COLL VENOUS BLD VENIPUNCTURE: CPT

## 2024-04-19 PROCEDURE — 88342 IMHCHEM/IMCYTCHM 1ST ANTB: CPT | Mod: 26 | Performed by: PATHOLOGY

## 2024-04-19 PROCEDURE — 82746 ASSAY OF FOLIC ACID SERUM: CPT

## 2024-04-19 PROCEDURE — 88305 TISSUE EXAM BY PATHOLOGIST: CPT | Mod: 26 | Performed by: PATHOLOGY

## 2024-04-19 PROCEDURE — 86780 TREPONEMA PALLIDUM: CPT

## 2024-04-19 RX ORDER — RIVASTIGMINE 4.6 MG/24H
1 PATCH, EXTENDED RELEASE TRANSDERMAL DAILY
Qty: 30 PATCH | Refills: 11 | Status: SHIPPED | OUTPATIENT
Start: 2024-04-19 | End: 2024-04-23

## 2024-04-19 ASSESSMENT — MONTREAL COGNITIVE ASSESSMENT (MOCA)
6. READ LIST OF DIGITS [FORWARD/BACKWARD]: 1
13. ORIENTATION SUBSCORE: 6
8. SERIAL SUBTRACTION OF 7S: 3
VISUOSPATIAL/EXECUTIVE SUBSCORE: 1
9. REPEAT EACH SENTENCE: 2
WHAT LEVEL OF EDUCATION WAS ATTAINED: 1
11. FOR EACH PAIR OF WORDS, WHAT CATEGORY DO THEY BELONG TO (OUT OF 2): 2
7. [VIGILENCE] TAP WHEN HEARING DESIGNATED LETTER: 1
4. NAME EACH OF THE THREE ANIMALS SHOWN: 3
WHAT IS THE TOTAL SCORE (OUT OF 30): 23
10. [FLUENCY] NAME WORDS STARTING WITH DESIGNATED LETTER: 0
12. MEMORY INDEX SCORE: 3

## 2024-04-19 NOTE — LETTER
2024         RE: Bibiana Mcrae  [address]  [facility] [address]        Dear Colleague,    Thank you for referring your patient, Bibiana Mcrae, to the [facility] [place]. Please see a copy of my visit note below.    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  She scored 23/30 on MoCA and likely has major neurocognitive disorder of Alzheimer's type.  Due to her recent diagnosis of pancreatic mass family wants to complete oncology evaluation before deciding on long-term management.  Previous workup included CT brain that showed atrophy and chronic small vessel ischemic changes with nonspecific lobulated lesion completely opacifying the left sphenoid sinus.  She was previously started on Aricept but unable to tolerate higher dose.  I have recommended:    1.  Switch to Exelon patch 4.6 mg per 24 hours.  If she is able to tolerate in 3 months I will consider increasing it further  2.  Lab work to include folate, MMA, RPR, TSH, B1, B12 and vitamin E  3.  Patient was encouraged to use hearing aids  4.  They are scheduled to see oncology next week and afterwards will decide if she needs neuropsychological testing.  Similarly they are holding off ENT evaluation of the sphenoid lesion.  5.  Follow-up in 3 months    Thank you again for this referral, please feel free to contact me if you have any questions.    Emanuel Mann MD  [facility]     REASON FOR CONSULTATION  Neurocognitive Disorder        HISTORY OF PRESENT ILLNESS     We have been requested by Dr. Woody to evaluate Bibiana Mcrae who is a 79 year old  female for cognitive decline    Patient is 79-year-old female with history of HTN, SNHL, anxiety, B-cell lymphoma, hypercalcemia who was referred for evaluation of cognitive decline.  Patient is accompanied by 2 daughters who reports that over the last couple of years there has been a steady decline in her short-term memory.  She also started having visual hallucinations and also noticed gradual decline in her ability to walk to a point that she uses a walker.  She lives alone and her daughters check on her regularly.  She is still managing her own finances.  There is no history of wandering off home or any auditory hallucinations.  She does report some bowel incontinence but denies bladder incontinence.  Daughters feel that in addition to memory decline there has been a change in her personality and she is more irritable which is unlike her.     PROBLEM LIST   Patient Active Problem List   Diagnosis     Essential hypertension with goal blood pressure less than 130/80     Anxiety     Osteoporosis, senile     Bilateral sensorineural hearing loss     Impaired fasting glucose     Diverticular disease of large intestine     Gastroesophageal reflux disease with esophagitis without hemorrhage     Bronchiectasis without complication (H)     Chronic cough     Incontinence of feces     Irregular bowel habits     Status post dilation of esophageal narrowing     Metal foreign body in eye region     MRI contraindicated due to metal in eye         PAST MEDICAL & SURGICAL HISTORY     Past Medical History:   Patient  has a past medical history of Anxiety, Breast cyst, and History of cholecystectomy (08/24/2023).    Surgical History:  She  has a past surgical history that includes TOTAL ABDOM HYSTERECTOMY; REMOVAL OF OVARY(S); Hysterectomy (1985); Oophorectomy (Bilateral, 1985); and  Breast Cyst Aspiration (Left).     SOCIAL HISTORY     Reviewed, and she  reports that she has never smoked. She has never used smokeless tobacco. She reports that she does not drink alcohol and does not use drugs.     FAMILY HISTORY     Reviewed, and family history includes Breast Cancer (age of onset: 52.00) in her mother; Cancer (age of onset: 71.00) in her maternal grandfather; Prostate Cancer in her maternal grandfather.     ALLERGIES     Allergies   Allergen Reactions     Prochlorperazine Edisylate [Prochlorperazine] Other (See Comments)     Extrapyramidal side effects         REVIEW OF SYSTEMS     A 12 point review of system was performed and was negative except as outlined in the history of present illness.     HOME MEDICATIONS     Current Outpatient Rx   Medication Sig Dispense Refill     acarbose (PRECOSE) 25 MG tablet [ACARBOSE (PRECOSE) 25 MG TABLET] TAKE 1 TABLET BY MOUTH 3  TIMES A DAY WITH MEALS (Patient taking differently: Take 25 mg by mouth 3 times daily (with meals)) 270 tablet 1     acetaminophen (TYLENOL) 325 MG tablet Take 325-650 mg by mouth every 6 hours as needed for mild pain       amLODIPine (NORVASC) 5 MG tablet Take 1 tablet (5 mg) by mouth daily for 30 days 30 tablet 0     Biotin 5 MG TABS Take 5,000 mcg by mouth daily       busPIRone (BUSPAR) 7.5 MG tablet TAKE 1 TABLET BY MOUTH TWICE  DAILY 180 tablet 3     celecoxib (CELEBREX) 200 MG capsule TAKE 1 CAPSULE BY MOUTH  DAILY 90 capsule 3     ciprofloxacin-dexAMETHasone (CIPRODEX) 0.3-0.1 % otic suspension Place 4 drops into both ears 2 times daily       denosumab (PROLIA) 60 MG/ML SOSY injection Inject 60 mg Subcutaneous every 6 months       fluocinolone acetonide (DERMA SMOOTHE/FS BODY) 0.01 % external oil Apply topically 2 times daily 5 drops to both ears twice daily       multivitamin with minerals (THERA-M) 9 mg iron-400 mcg Tab tablet [MULTIVITAMIN WITH MINERALS (THERA-M) 9 MG IRON-400 MCG TAB TABLET] Take 1 tablet by mouth daily.    "    nortriptyline (PAMELOR) 50 MG capsule TAKE 1 CAPSULE BY MOUTH AT  BEDTIME 90 capsule 2     omeprazole (PRILOSEC) 20 MG capsule [OMEPRAZOLE (PRILOSEC) 20 MG CAPSULE] Take 20 mg by mouth daily before breakfast.       Probiotic Product (SOLUBLE FIBER/PROBIOTICS PO)        QUEtiapine (SEROQUEL) 25 MG tablet Take 1 tablet (25 mg) by mouth 2 times daily as needed (agitation) 20 tablet 0     rivastigmine (EXELON) 4.6 MG/24HR 24 hr patch Place 1 patch onto the skin daily 30 patch 11     UNABLE TO FIND Take 2 Tablespoonful by mouth every morning MEDICATION NAME: biofiber 8 oz with water           PHYSICAL EXAM     Vital signs  /70 (BP Location: Right arm, Patient Position: Sitting)   Pulse 109   Ht 1.448 m (4' 9\")   Wt 39.5 kg (87 lb)   LMP  (LMP Unknown)   BMI 18.83 kg/m      Weight:   87 lbs 0 oz  [place] Cognitive Assessment:    Jacob Cognitive Assessment (MOCA)  Visuospatial/Executive : 1  Naming: 3  Attention - Digits: 1  Attention - Letters: 1  Attention - Subtraction: 3  Language - Repeat: 2  Language - Fluency : 0  Abstraction: 2  Delayed Recall: 3  Orientation: 6  Education: 1  MOCA Score: 23  Administered by: : Candi Smith CMA     [place] Cognitive Assessment Score:  MOCA Score: 23/30.    Elderly female who is alert and oriented vital signs are reviewed and documented in electronic medical record.  Neck supple.  Neurologically speech normal cranial nerves II through XII are intact except she is hard of hearing.  She has decreased mass with normal tone strength 5/5 reflexes 1+ she walks with a walker.     PERTINENT DIAGNOSTIC STUDIES     Following studies were reviewed:     CT BRAIN 4/10/2024  1.  No acute intracranial hemorrhage, extra-axial fluid collection, or mass effect.  2.  Brain atrophy and presumed chronic small vessel ischemic changes, as described.  3.  Unchanged nonspecific lobulated lesion nearly completely opacifying the left sphenoid sinus.    CT CERVICAL SPINE 4/10/2024  1.  " No acute cervical spine fracture.  2.  Multilevel degenerative changes (moderate/advanced), as described.    CT THORACIC SPINE 4/10/2024  1.  No acute thoracic spine fracture identified.  2.  Degenerative changes, as described.    CT LUMBAR SPINE 4/10/2024  1.  Transitional anatomy at the lumbosacral junction, as described.  2.  No acute lumbar spine fracture is identified.  3.  Grade I anterolisthesis of L3 on L4 and grade II anterolisthesis of L4 on L5.   4.  Multilevel degenerative changes, as described.  5.  Indeterminate findings in the partially visualized abdomen and pelvis, as described. Recommend follow-up CT abdomen and pelvis with contrast for further evaluation.    CT ABDOMEN PELVIS 4/11/2024  1.  Multilobulated soft tissue masses in the pancreatic/peripancreatic region. Primary considerations include pancreatic ductal adenocarcinoma, gastrointestinal stromal tumor (arising from the adjacent stomach), or malignant periceliac lymph nodes.   Suggest tissue sampling.  2.  Severely dilated left ureter with morphology suggesting duplicated collecting system. 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     PERTINENT LABS  Following labs were reviewed:  Office Visit on 04/16/2024   Component Date Value Ref Range Status     Calcium Ionized Whole Blood 04/16/2024 5.1  4.4 - 5.2 mg/dL Final     Sodium 04/16/2024 141  135 - 145 mmol/L Final     Potassium 04/16/2024 4.8  3.4 - 5.3 mmol/L Final     Chloride 04/16/2024 105  98 - 107 mmol/L Final     Carbon Dioxide (CO2) 04/16/2024 27  22 - 29 mmol/L Final     Anion Gap 04/16/2024 9  7 - 15 mmol/L Final     Urea Nitrogen 04/16/2024 19.0  8.0 - 23.0 mg/dL Final     Creatinine 04/16/2024 0.72  0.51 - 0.95 mg/dL Final     GFR Estimate 04/16/2024 85  >60 mL/min/1.73m2 Final     Calcium 04/16/2024 9.9  8.8 - 10.2 mg/dL Final     Glucose 04/16/2024 115 (H)  70 - 99 mg/dL Final     WBC Count 04/16/2024 10.4  4.0 -  11.0 10e3/uL Final     RBC Count 04/16/2024 3.73 (L)  3.80 - 5.20 10e6/uL Final     Hemoglobin 04/16/2024 11.5 (L)  11.7 - 15.7 g/dL Final     Hematocrit 04/16/2024 35.7  35.0 - 47.0 % Final     MCV 04/16/2024 96  78 - 100 fL Final     MCH 04/16/2024 30.8  26.5 - 33.0 pg Final     MCHC 04/16/2024 32.2  31.5 - 36.5 g/dL Final     RDW 04/16/2024 14.5  10.0 - 15.0 % Final     Platelet Count 04/16/2024 261  150 - 450 10e3/uL Final   Hospital Outpatient Visit on 04/15/2024   Component Date Value Ref Range Status     Case Report 04/15/2024    Final                    Value:Surgical Pathology Report                         Case: [id number]                                  Authorizing Provider:  Loco Acosta MD        Collected:           04/15/2024 08:50 AM          Ordering Location:     [facility]      Received:            04/15/2024 09:59 AM                                 [facility] CT                                                           Pathologist:           Waldo Turner MD                                                        Specimen:    Pancreas, Pancreatic mass                                                                   Final Diagnosis 04/15/2024    Final                    Value:This result contains rich text formatting which cannot be displayed here.     Comment 04/15/2024    Final                    Value:This result contains rich text formatting which cannot be displayed here.     Clinical Information 04/15/2024    Final                    Value:This result contains rich text formatting which cannot be displayed here.     Gross Description 04/15/2024    Final                    Value:This result contains rich text formatting which cannot be displayed here.     Microscopic Description 04/15/2024    Final                    Value:This result contains rich text formatting which cannot be displayed here.     Special Stains 04/15/2024    Final                    Value:This  result contains rich text formatting which cannot be displayed here.     Disclaimer 04/15/2024    Final                    Value:This result contains rich text formatting which cannot be displayed here.     MCRS 04/15/2024 Yes (A)  N/A Final     Performing Labs 04/15/2024    Final                    Value:This result contains rich text formatting which cannot be displayed here.   Admission on 04/10/2024, Discharged on 04/12/2024   Component Date Value Ref Range Status     Color Urine 04/10/2024 Light Yellow  Colorless, Straw, Light Yellow, Yellow Final     Appearance Urine 04/10/2024 Turbid (A)  Clear Final     Glucose Urine 04/10/2024 Negative  Negative mg/dL Final     Bilirubin Urine 04/10/2024 Negative  Negative Final     Ketones Urine 04/10/2024 Negative  Negative mg/dL Final     Specific Gravity Urine 04/10/2024 1.009  1.001 - 1.030 Final     Blood Urine 04/10/2024 Negative  Negative Final     pH Urine 04/10/2024 7.5 (H)  5.0 - 7.0 Final     Protein Albumin Urine 04/10/2024 Negative  Negative mg/dL Final     Urobilinogen Urine 04/10/2024 <2.0  <2.0 mg/dL Final     Nitrite Urine 04/10/2024 Negative  Negative Final     Leukocyte Esterase Urine 04/10/2024 Negative  Negative Final     Amorphous Crystals Urine 04/10/2024 Few (A)  None Seen /HPF Final     RBC Urine 04/10/2024 0  <=2 /HPF Final     WBC Urine 04/10/2024 2  <=5 /HPF Final     Squamous Epithelials Urine 04/10/2024 <1  <=1 /HPF Final     Ventricular Rate 04/10/2024 57  BPM Final     Atrial Rate 04/10/2024 57  BPM Final     MN Interval 04/10/2024 220  ms Final     QRS Duration 04/10/2024 150  ms Final     QT 04/10/2024 420  ms Final     QTc 04/10/2024 408  ms Final     P Axis 04/10/2024 40  degrees Final     R AXIS 04/10/2024 -61  degrees Final     T Axis 04/10/2024 -12  degrees Final     Interpretation ECG 04/10/2024    Final                    Value:Sinus bradycardia with 1st degree A-V block with Premature atrial complexes  Right bundle branch  block  Left anterior fascicular block  ** Bifascicular block **  Abnormal ECG  When compared with ECG of 26-FEB-2018 19:48,  Premature ventricular complexes are no longer Present  Premature atrial complexes are now Present  T wave inversion more evident in Anterior leads  Confirmed by SEE ED PROVIDER NOTE FOR, ECG INTERPRETATION ([id number]),  FADUMO RAMOS ([id number]) on 4/15/2024 4:56:29 AM       Influenza A PCR 04/10/2024 Negative  Negative Final     Influenza B PCR 04/10/2024 Negative  Negative Final     RSV PCR 04/10/2024 Negative  Negative Final     SARS CoV2 PCR 04/10/2024 Negative  Negative Final     Hold Specimen 04/10/2024 JIC   Final     Hold Specimen 04/10/2024 JIC   Final     Hold Specimen 04/10/2024 JIC   Final     Hold Specimen 04/10/2024 JIC   Final     Hold Specimen 04/10/2024 JI   Final     WBC Count 04/10/2024 8.0  4.0 - 11.0 10e3/uL Final     RBC Count 04/10/2024 3.90  3.80 - 5.20 10e6/uL Final     Hemoglobin 04/10/2024 11.8  11.7 - 15.7 g/dL Final     Hematocrit 04/10/2024 36.8  35.0 - 47.0 % Final     MCV 04/10/2024 94  78 - 100 fL Final     MCH 04/10/2024 30.3  26.5 - 33.0 pg Final     MCHC 04/10/2024 32.1  31.5 - 36.5 g/dL Final     RDW 04/10/2024 14.0  10.0 - 15.0 % Final     Platelet Count 04/10/2024 202  150 - 450 10e3/uL Final     % Neutrophils 04/10/2024 70  % Final     % Lymphocytes 04/10/2024 15  % Final     % Monocytes 04/10/2024 10  % Final     % Eosinophils 04/10/2024 4  % Final     % Basophils 04/10/2024 1  % Final     % Immature Granulocytes 04/10/2024 0  % Final     NRBCs per 100 WBC 04/10/2024 0  <1 /100 Final     Absolute Neutrophils 04/10/2024 5.6  1.6 - 8.3 10e3/uL Final     Absolute Lymphocytes 04/10/2024 1.2  0.8 - 5.3 10e3/uL Final     Absolute Monocytes 04/10/2024 0.8  0.0 - 1.3 10e3/uL Final     Absolute Eosinophils 04/10/2024 0.3  0.0 - 0.7 10e3/uL Final     Absolute Basophils 04/10/2024 0.0  0.0 - 0.2 10e3/uL Final     Absolute Immature Granulocytes 04/10/2024 0.0   <=0.4 10e3/uL Final     Absolute NRBCs 04/10/2024 0.0  10e3/uL Final     Sodium 04/10/2024 141  135 - 145 mmol/L Final     Potassium 04/10/2024 4.2  3.4 - 5.3 mmol/L Final     Chloride 04/10/2024 99  98 - 107 mmol/L Final     Carbon Dioxide (CO2) 04/10/2024 31 (H)  22 - 29 mmol/L Final     Anion Gap 04/10/2024 11  7 - 15 mmol/L Final     Urea Nitrogen 04/10/2024 18.6  8.0 - 23.0 mg/dL Final     Creatinine 04/10/2024 1.08 (H)  0.51 - 0.95 mg/dL Final     GFR Estimate 04/10/2024 52 (L)  >60 mL/min/1.73m2 Final     Calcium 04/10/2024 12.0 (H)  8.8 - 10.2 mg/dL Final     Glucose 04/10/2024 117 (H)  70 - 99 mg/dL Final     Troponin T, High Sensitivity 04/10/2024 24 (H)  <=14 ng/L Final     Troponin T, High Sensitivity 04/10/2024 19 (H)  <=14 ng/L Final     Protein Total 04/10/2024 5.9 (L)  6.4 - 8.3 g/dL Final     Albumin 04/10/2024 3.5  3.5 - 5.2 g/dL Final     Bilirubin Total 04/10/2024 0.4  <=1.2 mg/dL Final     Alkaline Phosphatase 04/10/2024 98  40 - 150 U/L Final     AST 04/10/2024 43  0 - 45 U/L Final     ALT 04/10/2024 14  0 - 50 U/L Final     Bilirubin Direct 04/10/2024 <0.20  0.00 - 0.30 mg/dL Final     Lipase 04/10/2024 16  13 - 60 U/L Final     Magnesium 04/10/2024 1.9  1.7 - 2.3 mg/dL Final     Vitamin B12 04/10/2024 1,129  232 - 1,245 pg/mL Final     TSH 04/10/2024 0.82  0.30 - 4.20 uIU/mL Final     Vitamin D, Total (25-Hydroxy) 04/10/2024 52 (H)  20 - 50 ng/mL Final     Sodium 04/11/2024 142  135 - 145 mmol/L Final     Potassium 04/11/2024 3.4  3.4 - 5.3 mmol/L Final     Chloride 04/11/2024 104  98 - 107 mmol/L Final     Carbon Dioxide (CO2) 04/11/2024 27  22 - 29 mmol/L Final     Anion Gap 04/11/2024 11  7 - 15 mmol/L Final     Urea Nitrogen 04/11/2024 16.8  8.0 - 23.0 mg/dL Final     Creatinine 04/11/2024 0.94  0.51 - 0.95 mg/dL Final     GFR Estimate 04/11/2024 62  >60 mL/min/1.73m2 Final     Calcium 04/11/2024 10.5 (H)  8.8 - 10.2 mg/dL Final     Glucose 04/11/2024 115 (H)  70 - 99 mg/dL Final     WBC  Count 04/11/2024 8.7  4.0 - 11.0 10e3/uL Final     RBC Count 04/11/2024 3.73 (L)  3.80 - 5.20 10e6/uL Final     Hemoglobin 04/11/2024 11.2 (L)  11.7 - 15.7 g/dL Final     Hematocrit 04/11/2024 34.5 (L)  35.0 - 47.0 % Final     MCV 04/11/2024 93  78 - 100 fL Final     MCH 04/11/2024 30.0  26.5 - 33.0 pg Final     MCHC 04/11/2024 32.5  31.5 - 36.5 g/dL Final     RDW 04/11/2024 14.1  10.0 - 15.0 % Final     Platelet Count 04/11/2024 186  150 - 450 10e3/uL Final     Potassium 04/11/2024 3.4  3.4 - 5.3 mmol/L Final     Magnesium 04/11/2024 1.7  1.7 - 2.3 mg/dL Final     Sodium 04/12/2024 142  135 - 145 mmol/L Final     Potassium 04/12/2024 3.4  3.4 - 5.3 mmol/L Final     Chloride 04/12/2024 104  98 - 107 mmol/L Final     Carbon Dioxide (CO2) 04/12/2024 26  22 - 29 mmol/L Final     Anion Gap 04/12/2024 12  7 - 15 mmol/L Final     Urea Nitrogen 04/12/2024 10.9  8.0 - 23.0 mg/dL Final     Creatinine 04/12/2024 0.83  0.51 - 0.95 mg/dL Final     GFR Estimate 04/12/2024 72  >60 mL/min/1.73m2 Final     Calcium 04/12/2024 10.4 (H)  8.8 - 10.2 mg/dL Final     Glucose 04/12/2024 110 (H)  70 - 99 mg/dL Final     WBC Count 04/12/2024 7.7  4.0 - 11.0 10e3/uL Final     RBC Count 04/12/2024 3.75 (L)  3.80 - 5.20 10e6/uL Final     Hemoglobin 04/12/2024 11.5 (L)  11.7 - 15.7 g/dL Final     Hematocrit 04/12/2024 34.6 (L)  35.0 - 47.0 % Final     MCV 04/12/2024 92  78 - 100 fL Final     MCH 04/12/2024 30.7  26.5 - 33.0 pg Final     MCHC 04/12/2024 33.2  31.5 - 36.5 g/dL Final     RDW 04/12/2024 14.1  10.0 - 15.0 % Final     Platelet Count 04/12/2024 180  150 - 450 10e3/uL Final     Magnesium 04/12/2024 1.8  1.7 - 2.3 mg/dL Final     Potassium 04/12/2024 3.9  3.4 - 5.3 mmol/L Final     Copper 04/12/2024 141.0  80.0 - 155.0 ug/dL Final     INR 04/12/2024 1.05  0.85 - 1.15 Final     Hold Specimen 04/12/2024 JI   Final     Hold Specimen 04/12/2024 [facility]   Final        Total time spent for face to face visit, reviewing labs/imaging studies,  counseling and coordination of care was: 1 Hour spent on the date of the encounter doing chart review, review of outside records, review of test results, interpretation of tests, patient visit, and documentation     The longitudinal plan of care for the diagnosis(es)/condition(s) as documented were addressed during this visit. Due to the added complexity in care, I will continue to support Bibiana in the subsequent management and with ongoing continuity of care.    This note was dictated using voice recognition software.  Any grammatical or context distortions are unintentional and inherent to the software.    Orders Placed This Encounter   Procedures     Folate     Methylmalonic Acid     Treponema Abs w Reflex to RPR and Titer     TSH with free T4 reflex     Vitamin B1 whole blood     Vitamin B12     Vitamin E      New Prescriptions    RIVASTIGMINE (EXELON) 4.6 MG/24HR 24 HR PATCH    Place 1 patch onto the skin daily      Modified Medications    No medications on file                Again, thank you for allowing me to participate in the care of your patient.        Sincerely,        Emanuel Mann MD

## 2024-04-19 NOTE — NURSING NOTE
Chief Complaint   Patient presents with    Neurocognitive Disorder     MOCA 23/30     Candi Smith CMA on 4/19/2024 at 11:31 AM  [facility]

## 2024-04-19 NOTE — LETTER
May 3, 2024    Alexandra Mcrae  1945    APPEAL FOR APPROVAL OF EXELON PATCH FOR ALEXANDRA MCRAE    TO WHOM IT MAY CONCERN    I am writing to you on behalf of Alexandra Mcrae, a patient under your coverage who has been diagnosed with Alzheimer's dementia and is facing significant challenges in managing her condition due to her medical history and current health status.    Alexandra Mcrae's medical history is complex. She has been diagnosed with Alzheimer's dementia, a condition that requires careful management to slow its progression and alleviate symptoms. However, her treatment journey has been fraught with difficulties. Previous attempts to manage her condition with Aricept were unsuccessful as she experienced adverse effects including nausea, vomiting, and subsequent weight loss. These side effects were intolerable for Alexandra and significantly impacted her quality of life.    Recently, Alexandra has been diagnosed with a pancreatic mass, which has further complicated her health situation. The presence of this mass has exacerbated her symptoms of nausea and vomiting, making it incredibly challenging for her to take oral medications such as Exelon tablets, which are vital for managing her Alzheimer's dementia.    Given Alexandra's history of adverse reactions to oral medications and her current difficulty in tolerating Exelon tablets due to increased nausea and vomiting, I am appealing to you to approve the use of the Exelon patch as an alternative treatment for her Alzheimer's dementia.    The Exelon patch offers several advantages over oral medications in Alexandra's case. Its transdermal delivery system bypasses the gastrointestinal tract, which may help mitigate the nausea and vomiting she experiences. By delivering the medication steadily through the skin, the patch may provide a more consistent and tolerable dosage for Alexandra, potentially reducing the likelihood of adverse effects.    I kindly request that  you review Bibiana Mcrae's case with compassion and consideration for her unique medical needs. Approving the use of the Exelon patch would represent a significant step forward in her care and could potentially improve her quality of life in the face of challenging circumstances.              Thank you for your attention to this matter. If you require any further information or documentation regarding Bibiana's case, please do not hesitate to contact me at [phone].    Sincerely,           Emanuel Mann MD  [facility] NEUROLOG[facility] [place]

## 2024-04-19 NOTE — TELEPHONE ENCOUNTER
Note: Due to record-high volumes, our turn-around time is taking longer than usual . We are currently 10 business days behind in the pools.   We are working diligently to submit all requests in a timely manner and in the order they are received. Please only flag TRUE URGENT requests as high priority to the pool at this time.   If you have questions - please send a note/message in the active PA encounter and send back to the [facility] PA pool [[phone]].    If you have more specific questions about our process please reach out to our supervisor Nadine Liz.   Thank you!   RPPA (Retail Pharmacy Prior Authorization) team

## 2024-04-19 NOTE — NURSING NOTE
JOEL COGNITIVE ASSESSMENT (MOCA)  Version 7.1 Original Version  VISUOSPATIAL/EXECUTIVE               COPY CUBE      [  0  ]                                [  0  ] DRAW CLOCK (Ten past eleven)  (3 points)    [  1  ]                    [ 0   ]               [   0 ]       Contour            Numbers     Hands POINTS               1    / 5   NAMING    [ 1  ]                                                                        [   1 ]                                             [ 1   ]  Litang Hernandez                                Camel                 3    / 3   MEMORY Read list of words, subject must repeat them. Do 2 trials, even if 1st trial is successful. Do a recall after 5 minutes  FACE VELVET Religion NATY RED No Points    1st x x x x      2nd x x x  x    ATTENTION Read list of digits (1 digit/sec) Subject has to repeat in the forward order       [  0  ]   2  1  8  5  4                                [ 1   ] 7 4 2                  1        /2   Read list of letters. The subject must tap with his hand at each letter A. No points if > 2 errors.  [  1  ] F B A C M N A A J K L B A F A K D E A A A J A M O F A A B           1   /1   Serial 7 subtraction starting at 100          [  1  ] 93         [  1  ] 86          [  1  ] 79          [ 1   ] 72         [  1  ] 65   4 or 5 correct subtractions: 3 points,  2 or 3 correct: 2 points,  1correct: 1 point,   0 correct: 0 points        3    /3   LANGUAGE Repeat: I only know that Yuri is the one to help today. [   1  ]                                      The cat always hid under the couch when dogs were in the room. [ 1  ]            2   /2   Fluency: Name maximum number of words in one minute that begin with the letter F                                                                                                                    [ 0   ] _4__ (N > 11 words)          0     /1   ABSTRACTION  Similarity between e.g. banana-orange=fruit                                                                   [  1  ] train-bicycle                      [  1  ] watch-ruler         2     /2   DELAYED  RECALL Has to recall words  WITH NO CUE FACE  [  0  ] VELVET  [  1  ] Scientology  [  0  ]  NATY  [  1  ] RED  [ 1   ] Points for UNCUED recall only       3    /5           OPTIONAL Category cue           Multiple choice cue          ORIENTATION  [ 1   ] Date     [   1 ] Month       [ 1   ] Year      [  1  ] Day      [   1 ] Place        [ 1   ] City   6 /6   TOTAL  Normal > 26/30 Add 1 point if < 12 years education    23 /30

## 2024-04-19 NOTE — TELEPHONE ENCOUNTER
Left detailed message of approved PT request orders for PT Elicia. To call back with any questions or concerns.    ROBY SadlerN, RN  [facility]

## 2024-04-20 LAB — T PALLIDUM AB SER QL: NONREACTIVE

## 2024-04-21 LAB — VIT B12 SERPL-MCNC: 941 PG/ML (ref 232–1245)

## 2024-04-22 ENCOUNTER — HOSPITAL ENCOUNTER (OUTPATIENT)
Dept: CARDIOLOGY | Facility: CLINIC | Age: 79
Discharge: HOME | End: 2024-04-22
Attending: INTERNAL MEDICINE | Admitting: INTERNAL MEDICINE
Payer: COMMERCIAL

## 2024-04-22 DIAGNOSIS — R06.02 SHORTNESS OF BREATH: ICD-10-CM

## 2024-04-22 DIAGNOSIS — I44.30 AVB (ATRIOVENTRICULAR BLOCK): ICD-10-CM

## 2024-04-22 DIAGNOSIS — R00.1 BRADYCARDIA: ICD-10-CM

## 2024-04-22 LAB
A-TOCOPHEROL VIT E SERPL-MCNC: 9.5 MG/L
BETA+GAMMA TOCOPHEROL SERPL-MCNC: 1 MG/L
LVEF ECHO: NORMAL
VIT B1 PYROPHOSHATE BLD-SCNC: 210 NMOL/L

## 2024-04-22 PROCEDURE — 93306 TTE W/DOPPLER COMPLETE: CPT | Mod: 26 | Performed by: INTERNAL MEDICINE

## 2024-04-22 PROCEDURE — 93306 TTE W/DOPPLER COMPLETE: CPT

## 2024-04-22 PROCEDURE — 81261 IGH GENE REARRANGE AMP METH: CPT | Performed by: STUDENT IN AN ORGANIZED HEALTH CARE EDUCATION/TRAINING PROGRAM

## 2024-04-22 PROCEDURE — G0452 MOLECULAR PATHOLOGY INTERPR: HCPCS | Mod: 26 | Performed by: PATHOLOGY

## 2024-04-23 ENCOUNTER — TELEPHONE (OUTPATIENT)
Dept: CARDIOLOGY | Facility: CLINIC | Age: 79
End: 2024-04-23

## 2024-04-23 ENCOUNTER — VIRTUAL VISIT (OUTPATIENT)
Dept: INTERNAL MEDICINE | Facility: CLINIC | Age: 79
End: 2024-04-23
Payer: COMMERCIAL

## 2024-04-23 DIAGNOSIS — M81.0 OSTEOPOROSIS, SENILE: Primary | ICD-10-CM

## 2024-04-23 DIAGNOSIS — C25.9 MALIGNANT NEOPLASM OF PANCREAS, UNSPECIFIED LOCATION OF MALIGNANCY (H): ICD-10-CM

## 2024-04-23 DIAGNOSIS — R00.1 BRADYCARDIA: Primary | ICD-10-CM

## 2024-04-23 LAB — METHYLMALONATE SERPL-SCNC: 0.34 UMOL/L (ref 0–0.4)

## 2024-04-23 PROCEDURE — G2211 COMPLEX E/M VISIT ADD ON: HCPCS | Mod: 95 | Performed by: INTERNAL MEDICINE

## 2024-04-23 PROCEDURE — 99214 OFFICE O/P EST MOD 30 MIN: CPT | Mod: 95 | Performed by: INTERNAL MEDICINE

## 2024-04-23 NOTE — TELEPHONE ENCOUNTER
PC to patients' daughter and review of provider response. No further questions. Arranged for testing on Monday 4/29 at 1315. Review of appt, location date and time. Daughter verbalized understanding. MAGDALENA,RN

## 2024-04-23 NOTE — TELEPHONE ENCOUNTER
==View-only below this line===  ----- Message -----  From: Ghulam Reina DO  Sent: 4/23/2024   2:53 PM CDT  To: Contreras Martinez RN    We can obtain 24 hour Holter monitor: Indication: tachycardia.

## 2024-04-23 NOTE — TELEPHONE ENCOUNTER
Health Call Center    Phone Message    May a detailed message be left on voicemail: yes     Reason for Call: Other: Patient's daughter Rosa Isela called wanting to know if a heart monitor is still needed for patient since they had an echo done yesterday. Please call patient back to further discuss.      Action Taken: Other: Cardiology    Travel Screening: Not Applicable    Thank you!  [facility]

## 2024-04-23 NOTE — TELEPHONE ENCOUNTER
Dr Marroquin please review. Any need to for a heart monitor at this time? Please review observation during echo testing. Thank you Lizzy NICHOLS  ___________________________________________________________________________________  PC with both patient and daughter. Reviewed echo results. Daughter made observation during Echo testing. Pt dozed off to sleep, HR increased becoming tachycardic with an irregular pattern/rhythm. When patient awoke HR reduced and rhythm normalized. Wondering if there is any need to complete a heart monitor to assess this concern? Also, HR during echo testing was 90 BPM. Will update provider. CMM,Rn

## 2024-04-23 NOTE — PROGRESS NOTES
Bibiana is a 79 year old who is being evaluated via a billable video visit.    How would you like to obtain your AVS? MyChart  If the video visit is dropped, the invitation should be resent by: Text to cell phone: [phone]  Will anyone else be joining your video visit? Yes: Daughter. How would they like to receive their invitation? Text to cell phone: [phone]      Assessment & Plan     Osteoporosis, senile  Patient had Prolia # 16 in Dec 2023.  Hypercalcemia  (likely from dehydration but also possible due to pancreatic mass (malignant?) was found during the last hospitalization 4/10/24 and calcium supplements were stopped.  Patient is concerned about hypocalcemia with Prolia treatment.  She is taking multivitamin with 500 mg calcium and is eating ice cream sometimes, but does not like milk. They will continue MVI and dietary calcium intake. I reviewed the labs  Component      Latest Ref Rng 4/16/2024  3:49 PM   Sodium      135 - 145 mmol/L 141    Potassium      3.4 - 5.3 mmol/L 4.8    Chloride      98 - 107 mmol/L 105    Carbon Dioxide (CO2)      22 - 29 mmol/L 27    Anion Gap      7 - 15 mmol/L 9    Urea Nitrogen      8.0 - 23.0 mg/dL 19.0    Creatinine      0.51 - 0.95 mg/dL 0.72    GFR Estimate      >60 mL/min/1.73m2 85    Calcium      8.8 - 10.2 mg/dL 9.9    Glucose      70 - 99 mg/dL 115 (H)         Malignant neoplasm of pancreas, unspecified location of malignancy (H)  She was diagnosed with pancreatic mass on 4/10/24.CT showing pancreatic and peripancreatic mass and hypercalcemia.   She will discuss with Oncologist if we can continue Prolia treatment. I will see her in July.        The longitudinal plan of care for the diagnosis(es)/condition(s) as documented were addressed during this visit. Due to the added complexity in care, I will continue to support Bibiana in the subsequent management and with ongoing continuity of care.       Adan Mccarty is a 79 year old, presenting for the following  health issues:  Fall (Fell 4/10/24 2 fall's within 24 hr. Period )      Video Start Time: 11:40 AM    Fall                     Objective           Vitals:  No vitals were obtained today due to virtual visit.    Physical Exam   GENERAL: alert and no distress  EYES: Eyes grossly normal to inspection.  No discharge or erythema, or obvious scleral/conjunctival abnormalities.  RESP: No audible wheeze, cough, or visible cyanosis.    SKIN: Visible skin clear. No significant rash, abnormal pigmentation or lesions.  NEURO: Cranial nerves grossly intact.  Mentation and speech appropriate for age.  PSYCH: Appropriate affect, tone, and pace of words          Video-Visit Details    Type of service:  Video Visit   Video End Time:11:54 AM  Originating Location (pt. Location): Home    Distant Location (provider location):  On-site  Platform used for Video Visit: [employer]ur[employer]  Signed Electronically by: Gustavo Tran MD

## 2024-04-24 ENCOUNTER — ONCOLOGY VISIT (OUTPATIENT)
Dept: ONCOLOGY | Facility: HOSPITAL | Age: 79
End: 2024-04-24
Attending: INTERNAL MEDICINE
Payer: COMMERCIAL

## 2024-04-24 ENCOUNTER — PATIENT OUTREACH (OUTPATIENT)
Dept: ONCOLOGY | Facility: HOSPITAL | Age: 79
End: 2024-04-24

## 2024-04-24 VITALS
TEMPERATURE: 97.8 F | DIASTOLIC BLOOD PRESSURE: 77 MMHG | WEIGHT: 88.6 LBS | BODY MASS INDEX: 19.12 KG/M2 | OXYGEN SATURATION: 99 % | RESPIRATION RATE: 16 BRPM | HEIGHT: 57 IN | SYSTOLIC BLOOD PRESSURE: 161 MMHG | HEART RATE: 100 BPM

## 2024-04-24 DIAGNOSIS — C83.398 DIFFUSE LARGE B-CELL LYMPHOMA OF EXTRANODAL SITE: ICD-10-CM

## 2024-04-24 DIAGNOSIS — D70.1 CHEMOTHERAPY-INDUCED NEUTROPENIA (H): ICD-10-CM

## 2024-04-24 DIAGNOSIS — T45.1X5A CHEMOTHERAPY-INDUCED NEUTROPENIA (H): ICD-10-CM

## 2024-04-24 DIAGNOSIS — C84.78 ANAPLASTIC ALK-NEGATIVE LARGE CELL LYMPHOMA OF LYMPH NODES OF MULTIPLE REGIONS (H): ICD-10-CM

## 2024-04-24 DIAGNOSIS — C83.33 DIFFUSE LARGE B-CELL LYMPHOMA OF INTRA-ABDOMINAL LYMPH NODES (H): Primary | ICD-10-CM

## 2024-04-24 LAB
ABO/RH(D): ABNORMAL
ANTIBODY SCREEN: POSITIVE
SPECIMEN EXPIRATION DATE: ABNORMAL

## 2024-04-24 PROCEDURE — G2211 COMPLEX E/M VISIT ADD ON: HCPCS | Performed by: INTERNAL MEDICINE

## 2024-04-24 PROCEDURE — 99215 OFFICE O/P EST HI 40 MIN: CPT | Performed by: INTERNAL MEDICINE

## 2024-04-24 PROCEDURE — G0463 HOSPITAL OUTPT CLINIC VISIT: HCPCS | Performed by: INTERNAL MEDICINE

## 2024-04-24 RX ORDER — ALBUTEROL SULFATE 90 UG/1
1-2 AEROSOL, METERED RESPIRATORY (INHALATION)
Status: CANCELLED
Start: 2024-05-01

## 2024-04-24 RX ORDER — LORAZEPAM 2 MG/ML
0.5 INJECTION INTRAMUSCULAR EVERY 4 HOURS PRN
Status: CANCELLED | OUTPATIENT
Start: 2024-05-01

## 2024-04-24 RX ORDER — PALONOSETRON 0.05 MG/ML
0.25 INJECTION, SOLUTION INTRAVENOUS ONCE
Status: CANCELLED
Start: 2024-05-01

## 2024-04-24 RX ORDER — HEPARIN SODIUM (PORCINE) LOCK FLUSH IV SOLN 100 UNIT/ML 100 UNIT/ML
5 SOLUTION INTRAVENOUS
Status: CANCELLED | OUTPATIENT
Start: 2024-05-01

## 2024-04-24 RX ORDER — ALLOPURINOL 300 MG/1
300 TABLET ORAL DAILY
Qty: 20 TABLET | Refills: 0 | Status: SHIPPED | OUTPATIENT
Start: 2024-04-30 | End: 2024-05-13

## 2024-04-24 RX ORDER — MEPERIDINE HYDROCHLORIDE 25 MG/ML
25 INJECTION INTRAMUSCULAR; INTRAVENOUS; SUBCUTANEOUS EVERY 30 MIN PRN
Status: CANCELLED | OUTPATIENT
Start: 2024-05-01

## 2024-04-24 RX ORDER — DIPHENHYDRAMINE HYDROCHLORIDE 50 MG/ML
50 INJECTION INTRAMUSCULAR; INTRAVENOUS
Status: CANCELLED
Start: 2024-05-01

## 2024-04-24 RX ORDER — PREDNISONE 20 MG/1
40 TABLET ORAL DAILY
Qty: 60 TABLET | Refills: 0 | Status: SHIPPED | OUTPATIENT
Start: 2024-04-30

## 2024-04-24 RX ORDER — HEPARIN SODIUM,PORCINE 10 UNIT/ML
5-20 VIAL (ML) INTRAVENOUS DAILY PRN
Status: CANCELLED | OUTPATIENT
Start: 2024-05-01

## 2024-04-24 RX ORDER — EPINEPHRINE 1 MG/ML
0.3 INJECTION, SOLUTION INTRAMUSCULAR; SUBCUTANEOUS EVERY 5 MIN PRN
Status: CANCELLED | OUTPATIENT
Start: 2024-05-01

## 2024-04-24 RX ORDER — ALBUTEROL SULFATE 0.83 MG/ML
2.5 SOLUTION RESPIRATORY (INHALATION)
Status: CANCELLED | OUTPATIENT
Start: 2024-05-01

## 2024-04-24 RX ORDER — METHYLPREDNISOLONE SODIUM SUCCINATE 125 MG/2ML
125 INJECTION, POWDER, LYOPHILIZED, FOR SOLUTION INTRAMUSCULAR; INTRAVENOUS
Status: CANCELLED
Start: 2024-05-01

## 2024-04-24 RX ORDER — DIPHENHYDRAMINE HCL 50 MG
50 CAPSULE ORAL ONCE
Status: CANCELLED
Start: 2024-05-01

## 2024-04-24 RX ORDER — ONDANSETRON 4 MG/1
4-8 TABLET, FILM COATED ORAL EVERY 6 HOURS PRN
Qty: 30 TABLET | Refills: 1 | Status: SHIPPED | OUTPATIENT
Start: 2024-04-30 | End: 2024-07-08

## 2024-04-24 RX ORDER — DOXORUBICIN HYDROCHLORIDE 2 MG/ML
50 INJECTION, SOLUTION INTRAVENOUS ONCE
Status: CANCELLED | OUTPATIENT
Start: 2024-05-01

## 2024-04-24 RX ORDER — MEPERIDINE HYDROCHLORIDE 25 MG/ML
25 INJECTION INTRAMUSCULAR; INTRAVENOUS; SUBCUTANEOUS
Status: CANCELLED
Start: 2024-05-01

## 2024-04-24 RX ORDER — ACETAMINOPHEN 325 MG/1
650 TABLET ORAL ONCE
Status: CANCELLED
Start: 2024-05-01

## 2024-04-24 ASSESSMENT — PAIN SCALES - GENERAL: PAINLEVEL: NO PAIN (0)

## 2024-04-24 NOTE — PROGRESS NOTES
[facility] Hematology and Oncology Progress Note    Patient: Bibiana Mcrae  MRN: [id number]  Date of Service: Apr 24, 2024        Assessment and Plan:    1.  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  Findings were reviewed with the patient and her family members.  We talked about the pathophysiology and natural history of this aggressive lymphoma.  Treatment typically requires multiagent chemoimmunotherapy.  I think she would be a candidate for R-miniCHOP.  I reviewed the rationale for this decision.  We talked about the schedule of this regimen.  We also reviewed some of the more common side effects which include, but are not limited to, hair loss, cytopenias, neuropathy, fatigue, loss of appetite, mucositis, the low risk of cardiomyopathy, and steroid related side effects.  I provided him with written information about large cell lymphoma and all of her chemotherapy drugs.  We will schedule a PET scan to complete staging.  An IR referral was made for Port-A-Cath placement.  We will plan on starting treatment next week.  We reviewed her prognosis and I let her know that the cure rates would be somewhere around 75 to 80%.  Will reimage with PET scans after cycle 3 and cycle 6.  Starting prednisone dose will be 40 mg daily x 5 days.  The patient and her 2 daughters had an opportunity to have all their questions answered.  Per their request, we will place a nutrition consult and social work consult.  At this point it does not appear that the patient has any needs immediately.  I reviewed with him that we have psychotherapy as well as palliative care available should they be interested in the future.  She will be seen in follow-up cycle 1 day 10    2.  Nasal polyp: Should be seeing ENT tomorrow for a biopsy.  If some sort of chronic infection is found we would recommend that this is treated concurrently with her lymphoma      Medical decision Making:  I spent 50 minutes  in the care of this patient today, which included time necessary for preparation for the visit, face to face time with the patient, communication of recommendations to the care team, and documentation time.  Today's visit was centered around a cancer diagnosis in the setting of additional medical comorbidities. Complex medical decision making was required. The risk of additional morbidity without further treatment is high.     ECOG Performance  1    Diagnosis:    1.  Diffuse large B-cell lymphoma involving intra-abdominal lymph nodes:    Treatment:    None to date    Interim History:    Bibiana returns today for a follow-up visit.  She was first seen in the hospital on April 12 in consultation.  She was admitted with a complaint of confusion and falls.  Imaging revealed a large intra-abdominal mass from which she was mostly asymptomatic.  She was discharged and had a biopsy.  She is here to review the results.  She notes that she has gained 2 pounds since discharge from the hospital.  She is not having any abdominal pain or nausea.  No acute complaints today.    Review of Systems:    As above in the history.     Review of Systems otherwise Negative for:  General: chills, fever or night sweats  Psychological: anxiety or depression  Ophthalmic: blurry vision, double vision or loss of vision, vision change  ENT: epistaxis, oral lesions, hearing changes  Hematological and Lymphatic: bleeding, bruising, jaundice, swollen lymph nodes  Endocrine: hot flashes, unexpected weight changes  Respiratory: cough, hemoptysis, orthopnea or shortness of breath/[place]  Cardiovascular: chest pain, edema, palpitations or PND  Gastrointestinal: abdominal pain, blood in stools, change in bowel habits, constipation, diarrhea or nausea/vomiting  Genito-Urinary: change in urinary stream, incontinence, frequency/urgency  Musculoskeletal: joint pain, stiffness, swelling, muscle pain  Neurological: dizziness, headaches,  "numbness/tingling  Dermatological: lumps and rash    Physical Exam:    BP (!) 161/77   Pulse 100   Temp 97.8  F (36.6  C)   Resp 16   Ht 1.448 m (4' 9\")   Wt 40.2 kg (88 lb 9.6 oz)   LMP  (LMP Unknown)   SpO2 99%   BMI 19.17 kg/m      General: patient appears stated age of 79 year old. Nontoxic and in no distress.   HEENT: Head: atraumatic, normocephalic. Sclerae anicteric.  Chest:  Normal respiratory effort  Cardiac:  No edema.   Abdomen: abdomen is non-distended  Extremities: normal tone and muscle bulk.  Skin: no lesions or rash on visible skin. Warm and dry.   CNS: alert and oriented. Grossly non-focal.   Psychiatric: normal mood and affect.     Lab Results:    Recent Results (from the past 168 hour(s))   Folate   Result Value Ref Range    Folic Acid 31.8 4.6 - 34.8 ng/mL   Methylmalonic Acid   Result Value Ref Range    Methylmalonic Acid 0.34 0.00 - 0.40 umol/L   Treponema Abs w Reflex to RPR and Titer   Result Value Ref Range    Treponema Antibody Total Nonreactive Nonreactive   TSH with free T4 reflex   Result Value Ref Range    TSH 0.97 0.30 - 4.20 uIU/mL   Vitamin B1 whole blood   Result Value Ref Range    Vitamin B1 Whole Blood Level 210 (H) 70 - 180 nmol/L   Vitamin B12   Result Value Ref Range    Vitamin B12 941 232 - 1,245 pg/mL   Vitamin E   Result Value Ref Range    Vitamin E 9.5 5.5 - 18.0 mg/L    Vitamin E Gamma 1.0 0.0 - 6.0 mg/L   Echocardiogram Complete   Result Value Ref Range    LVEF  60-65%      Imaging:    Echocardiogram Complete    Result Date: 2024  [id number]^POOJA^MAURO^[facility] [facility] [address] [place] [id number]  Name: ALEXANDRA MOSLEY MRN: [id number] : 1945 Study Date: 2024 03:13 PM Age: 79 yrs Gender: Female Patient Location: [facility] Reason For Study: Bradycardia, AVB (atrioventricular block), Shortness of breath Ordering Physician: MAURO PATTON " JUDIE Referring Physician: MAURO PATTON Performed By: Eddie Brito RDCS  BSA: 1.3 m2 Height: 57 in Weight: 90 lb HR: 90 ______________________________________________________________________________ Procedure Echocardiogram with two-dimensional, color and spectral Doppler performed. ______________________________________________________________________________ Interpretation Summary Global and regional left ventricular function is normal with an EF of 60-65%. Right ventricular function, chamber size, wall motion, and thickness are normal. Pulmonary artery systolic pressure cannot be assessed. The inferior vena cava is normal. No pericardial effusion is present. There is no prior study for direct comparison. ______________________________________________________________________________ Left Ventricle Global and regional left ventricular function is normal with an EF of 60-65%. Left ventricular size is normal. Mild concentric wall thickening consistent with left ventricular hypertrophy is present. Grade I or early diastolic dysfunction. No regional wall motion abnormalities are seen.  Right Ventricle Right ventricular function, chamber size, wall motion, and thickness are normal.  Atria The right atria appears normal. Moderate left atrial enlargement is present. The atrial septum is intact as assessed by color Doppler .  Mitral Valve The mitral valve is normal. Mild MVP. Mild mitral insufficiency is present.  Aortic Valve Severe aortic valve calcification is present. Mild aortic insufficiency is present.  Tricuspid Valve The tricuspid valve is normal. Trace to mild tricuspid insufficiency is present. Pulmonary artery systolic pressure cannot be assessed.  Pulmonic Valve The pulmonic valve is normal.  Vessels The aorta root is normal. The pulmonary artery and bifurcation cannot be assessed. The inferior vena cava is normal.  Pericardium No pericardial effusion is present.  Compared to Previous Study  There is no prior study for direct comparison. ______________________________________________________________________________ MMode/2D Measurements & Calculations  IVSd: 1.1 cm LVIDd: 4.3 cm LVIDs: 2.9 cm LVPWd: 0.98 cm FS: 34.3 % LV mass(C)d: 149.1 grams LV mass(C)dI: 116.4 grams/m2 Ao root diam: 2.9 cm asc Aorta Diam: 3.0 cm LVOT diam: 1.9 cm LVOT area: 3.0 cm2 Ao root diam index Ht(cm/m): 2.0 Ao root diam index BSA (cm/m2): 2.3 Asc Ao diam index BSA (cm/m2): 2.3 Asc Ao diam index Ht(cm/m): 2.1 LA Volume (BP): 61.5 ml  LA Volume Index (BP): 48.0 ml/m2 RWT: 0.45  Doppler Measurements & Calculations MV E max fortunato: 63.4 cm/sec MV A max fortunato: 87.5 cm/sec MV E/A: 0.72 MV dec slope: 315.9 cm/sec2 MV dec time: 0.20 sec AI P1/2t: 362.9 msec E/E' avg: 10.6 Lateral E/e': 10.2 Medial E/e': 11.0  ______________________________________________________________________________ Report approved by: Victor M Rizo 04/22/2024 04:13 PM       CT Pancreas Percutaneous Biopsy    Result Date: 4/15/2024  EXAM: 1. PERCUTANEOUS BIOPSY ABDOMINAL MASS 2. CT GUIDANCE 3. CONSCIOUS SEDATION LOCATION: [facility] DATE: 4/15/2024 INDICATION: Abdominal mass. TECHNIQUE: Dose reduction techniques were used. PROCEDURE: Informed consent obtained. Site marked. Prior images reviewed. Required items made available. Patient identity confirmed verbally and with arm band. Patient reevaluated immediately before administering sedation. Universal protocol was followed. Time out performed. The site was prepped and draped in sterile fashion. 10 mL of 1% lidocaine was infused into the local soft tissues. Using standard technique and under direct CT guidance, a 20-gauge biopsy device was used to obtain five core biopsies. Tissue was submitted to Pathology and was adequate by preliminary review by a pathologist. The patient tolerated the procedure well. No immediate complications. SEDATION: Versed 0.25 mg. Fentanyl 25 mcg. The procedure was  performed with administration intravenous conscious sedation with appropriate preoperative, intraoperative, and postoperative evaluation. 21 minutes of supervised face to face conscious sedation time was provided by a radiology nurse under my direct supervision.     IMPRESSION: 1.  Successful CT-guided biopsy left abdominal mass. Reference CPT Codes: 14864, 37069, 42370    CT Abdomen Pelvis w Contrast    Result Date: 4/11/2024  EXAM: CT ABDOMEN PELVIS W CONTRAST LOCATION: [facility] DATE: 4/11/2024 INDICATION: evaluate peripancreatic soft tissue mass seen on CT lumbar spine COMPARISON: CT spine 4/10/2024 TECHNIQUE: CT scan of the abdomen and pelvis was performed following injection of IV contrast. Multiplanar reformats were obtained. Dose reduction techniques were used. CONTRAST: isovue 370 42ml FINDINGS: LOWER CHEST: Partially imaged coronary artery calcifications. HEPATOBILIARY: Cholecystectomy. PANCREAS: The parenchyma is atrophic and there is mild main duct dilation up to 0.5 cm in the head. In the distal body and tail, there are multilobulated soft tissue masses measuring 4.9 x 4.4 cm (3/51) and 6.7 x 5.2 cm (3/66). SPLEEN: Normal. ADRENAL GLANDS: Normal. KIDNEYS/BLADDER: Severely dilated left ureter with probable ureterocele and abnormal inferior insertion. Right kidney is normal. BOWEL: The pancreatic/peripancreatic soft tissue masses have broad contact with the lesser curvature of the stomach near a partial gastrectomy resection margin. The distal stomach is narrowed and compressed, but there is no upstream dilation. Colonic diverticulosis, but no acute inflammation or obstruction. LYMPH NODES: Normal. VASCULATURE: Normal. PELVIC ORGANS: Normal. MUSCULOSKELETAL: Multilevel degenerative changes with transitional lumbosacral anatomy. Anterolisthesis of L3 on L4 and L4 on L5.     IMPRESSION: 1.  Multilobulated soft tissue masses in the pancreatic/peripancreatic region. Primary  considerations include pancreatic ductal adenocarcinoma, gastrointestinal stromal tumor (arising from the adjacent stomach), or malignant periceliac lymph nodes. Suggest tissue sampling. 2.  Severely dilated left ureter with morphology suggesting duplicated collecting system. 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    CT Cervical Spine w/o Contrast    Result Date: 4/10/2024  EXAM: CT CERVICAL SPINE W/O CONTRAST LOCATION: [facility] DATE: 4/10/2024 INDICATION: Fall, possible trauma. COMPARISON: CT cervical spine 08/18/2019. TECHNIQUE: Routine CT Cervical Spine without IV contrast. Multiplanar reformats. Dose reduction techniques were used. FINDINGS: VERTEBRA: No acute cervical spine fracture identified. 3 mm anterolisthesis of C3 on C4 and minimal anterolisthesis of C7 on T1, T1 on T2 and T2 on T3, similar to the prior exam. There is mild to moderate sigmoid curvature of the visualized cervicothoracic spine with dextroconvex curvature of the mid cervical spine and levoconvex curvature of the upper thoracic spine. CANAL/FORAMINA: There is moderate to severe or severe disc space narrowing at C3-C4, C4-C5, C5-C6 and C6-C7. Scattered marginal endplate and uncovertebral spurs. Advanced degenerative arthropathy of the right lateral atlantoaxial joint with associated remodeling. Developmentally bifid posterior arch of C1. Mild rotation of C1 on C2, which may be positional, although is nonspecific. Multilevel degenerative facet disease, most advanced on the left at C3-C4. Multilevel disc osteophyte complexes. Prominent ligamentum flavum thickening calcification at the C7-T1 level. Mild to moderate multilevel spinal canal stenosis. No definite high-grade/severe spinal canal narrowing identified, accounting for technique. Overall, findings appear similar. Varying degrees of multilevel degenerative neural foraminal stenosis also appear  relatively unchanged compared to the prior exam. PARASPINAL: Bilateral carotid bifurcation atherosclerotic calcifications. The visualized paravertebral soft tissues otherwise appear unremarkable.     IMPRESSION: 1.  No acute cervical spine fracture. 2.  Multilevel degenerative changes (moderate/advanced), as described.    Head CT w/o contrast    Result Date: 4/10/2024  EXAM: CT HEAD WITHOUT CONTRAST LOCATION: [facility] DATE: 04/10/2024 INDICATION: Fall, possible trauma. COMPARISON: None. TECHNIQUE: Routine CT Head without IV contrast. Multiplanar reformats. Dose reduction techniques were used. FINDINGS: INTRACRANIAL CONTENTS: No intracranial hemorrhage, extra-axial collection, or mass effect.  No CT evidence of acute infarct. Moderate-to-severe presumed chronic small vessel ischemic changes. Mild generalized volume loss. No hydrocephalus. Scattered intracranial atherosclerotic calcifications. VISUALIZED ORBITS/SINUSES/MASTOIDS: Prior bilateral cataract surgery. Visualized portions of the orbits are otherwise unremarkable. Lobulated soft tissue lesion with small internal calcification nearly completely opacifying the left sphenoid sinus. This appears similar to the prior cervical spine CT. This is nonspecific, but may represent a prominent retention cyst or polyp, although a mycetoma is also possible. Otherwise, the visualized paranasal sinuses are free of significant disease. No middle ear or mastoid effusion. BONES/SOFT TISSUES: No acute abnormality.     IMPRESSION: 1.  No acute intracranial hemorrhage, extra-axial fluid collection, or mass effect. 2.  Brain atrophy and presumed chronic small vessel ischemic changes, as described. 3.  Unchanged nonspecific lobulated lesion nearly completely opacifying the left sphenoid sinus.     CT Lumbar Spine w/o Contrast    Result Date: 4/10/2024  EXAM: CT LUMBAR SPINE W/O CONTRAST LOCATION: [facility] DATE: 4/10/2024  INDICATION: Fall, trauma. COMPARISON: None. TECHNIQUE: Routine CT Lumbar Spine without IV contrast. Multiplanar reformats. Dose reduction techniques were used. FINDINGS: VERTEBRA: Transitional thoracolumbar and lumbosacral junction anatomy. Correlation is made with the thoracic spine CT of same day. There appear to be tiny ribs at T12 bilaterally. I will presume that there are 5 lumbar vertebral bodies including a transitional partially sacralized L5 vertebra. There is marginal ankylosis across the developmentally diminutive L5-S1 disc space. No definite acute lumbar spine fracture identified. Grade I anterolisthesis of L3 on L4 measuring 6 mm. Grade II anterolisthesis of L4 on L5 measuring approximately 10 mm. Mildly exaggerated lumbar lordosis. Minimal levoconvex curvature. CANAL/FORAMINA: Severe disc space narrowing at L4-L5. Moderate disc space narrowing at L3-L4. Multilevel degenerative facet disease, severe at L3-L4 and L4-L5 bilaterally. Multilevel disc bulges. Scattered ligamentum flavum thickening and calcification. There appears to be at least moderate central spinal canal stenosis at L3-L4. There also may be up to moderate central spinal canal stenosis at L4-L5. There is up to moderate neural foraminal stenosis on the left at L3-L4. Mild to moderate bordering on moderate neural foraminal stenosis elsewhere in the lower lumbar spine. PARASPINAL: Partially visualized postsurgical changes in the upper abdomen. There appears to be abnormal soft tissue thickening in the region of the pancreas along the course of the splenic artery and the left upper abdomen, incompletely evaluated and of  uncertain etiology. There is a large cystic-appearing lesion in the left abdomen measuring up to 87 mm in axial plane (series 5 image 75) incompletely evaluated. Scattered atherosclerotic calcifications of the aortoiliac arteries.     IMPRESSION: 1.  Transitional anatomy at the lumbosacral junction, as described. 2.  No acute  lumbar spine fracture is identified. 3.  Grade I anterolisthesis of L3 on L4 and grade II anterolisthesis of L4 on L5. 4.  Multilevel degenerative changes, as described. 5.  Indeterminate findings in the partially visualized abdomen and pelvis, as described. Recommend follow-up CT abdomen and pelvis with contrast for further evaluation.    CT Thoracic Spine w/o Contrast    Result Date: 4/10/2024  EXAM: CT THORACIC SPINE W/O CONTRAST LOCATION: [facility] DATE: 4/10/2024 INDICATION: Fall, tenderness. COMPARISON: None. TECHNIQUE: Routine CT Thoracic Spine without IV contrast. Multiplanar reformats. Dose reduction techniques were used. FINDINGS: VERTEBRA: No acute thoracic spine fracture identified. There appears to be some degree of diffuse osseous demineralization, which somewhat limits evaluation for fracture. Minimal anterolisthesis of T2 on T3. Mild levoconvex curvature of the upper thoracic spine and mild broad dextroconvex curvature of the mid to lower thoracic spine. A few areas of lucency within the thoracic vertebral bodies are noted, which could be due to osseous demineralization, although other lesions such as an intraosseous  hemangioma cannot be completely excluded. No obvious destructive/aggressive appearing osseous lesions. CANAL/FORAMINA: Multilevel degenerative changes are present. No definite high-grade central spinal canal stenosis. There is a central disc herniation at T8-T9 that contributes to at least mild spinal canal stenosis. Moderate/moderate to severe right T1-T2 neural foraminal stenosis. Relatively lesser degrees of mild and moderate neural foraminal stenosis seen elsewhere. PARASPINAL: Atherosclerotic calcifications of the aorta. Mitral annular calcification. Partially visualized surgical material in the lower mediastinal region/upper abdomen. Cholecystectomy clips in the right upper quadrant abdomen partially visualized.     IMPRESSION: 1.  No acute thoracic spine  fracture identified. 2.  Degenerative changes, as described.        Signed by: Inocencio Florian MD

## 2024-04-24 NOTE — PROGRESS NOTES
"Oncology Rooming Note    April 24, 2024 10:23 AM   Bibiana Mcrae is a 79 year old female who presents for:    Chief Complaint   Patient presents with    Oncology Clinic Visit     New b cell lymphoma      Initial Vitals: BP (!) 161/77   Pulse 100   Temp 97.8  F (36.6  C)   Resp 16   Ht 1.448 m (4' 9\")   Wt 40.2 kg (88 lb 9.6 oz)   LMP  (LMP Unknown)   SpO2 99%   BMI 19.17 kg/m   Estimated body mass index is 19.17 kg/m  as calculated from the following:    Height as of this encounter: 1.448 m (4' 9\").    Weight as of this encounter: 40.2 kg (88 lb 9.6 oz). Body surface area is 1.27 meters squared.  No Pain (0) Comment: Data Unavailable   No LMP recorded (lmp unknown). Patient has had a hysterectomy.  Allergies reviewed: Yes  Medications reviewed: Yes    Medications: Medication refills not needed today.  Pharmacy name entered into [facility]:    [id number] [address] AT [place] [address]  [facility] - [place], [place] - [address] [place] [address]  [facility] - [address]    Frailty Screening:   Is the patient here for a new oncology consult visit in cancer care? 2. No      Clinical concerns: New B cell Lymphoma       Sandra Aguilera LPN             "

## 2024-04-24 NOTE — PROGRESS NOTES
[facility]: Cancer Care                                                                                          Spoke with daughter to let her know that per Dr. Florian, patient is to start taking allopurinol once a day for 20 days to help flush her kidneys after starting treatment. Patient's daughter verbalized understanding.     Signature:  Sophie Cespedes RN

## 2024-04-24 NOTE — LETTER
"    4/24/2024         RE: Bibiana Mcrae  [address] [facility] [address]        Dear Colleague,    Thank you for referring your patient, Bibiana Mcrae, to the [facility]. Please see a copy of my visit note below.    Oncology Rooming Note    April 24, 2024 10:23 AM   Bibiana Mcrae is a 79 year old female who presents for:    Chief Complaint   Patient presents with     Oncology Clinic Visit     New b cell lymphoma      Initial Vitals: BP (!) 161/77   Pulse 100   Temp 97.8  F (36.6  C)   Resp 16   Ht 1.448 m (4' 9\")   Wt 40.2 kg (88 lb 9.6 oz)   LMP  (LMP Unknown)   SpO2 99%   BMI 19.17 kg/m   Estimated body mass index is 19.17 kg/m  as calculated from the following:    Height as of this encounter: 1.448 m (4' 9\").    Weight as of this encounter: 40.2 kg (88 lb 9.6 oz). Body surface area is 1.27 meters squared.  No Pain (0) Comment: Data Unavailable   No LMP recorded (lmp unknown). Patient has had a hysterectomy.  Allergies reviewed: Yes  Medications reviewed: Yes    Medications: Medication refills not needed today.  Pharmacy name entered into [employer]:    [id number] [place], [place] - [address] AT [place] [address]  [facility] - [address]  [facility] - [address]    Frailty Screening:   Is the patient here for a new oncology consult visit in cancer care? 2. No      Clinical concerns: New B cell Lymphoma       Sandra Aguilera LPN               [facility] Hematology and Oncology Progress Note    Patient: Bibiana Mcrae  MRN: [id number]  Date of Service: Apr 24, 2024        Assessment and Plan:    1.  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  Findings were reviewed with the patient and her family members.  We talked about the pathophysiology and natural history of this aggressive lymphoma.  " Treatment typically requires multiagent chemoimmunotherapy.  I think she would be a candidate for R-miniCHOP.  I reviewed the rationale for this decision.  We talked about the schedule of this regimen.  We also reviewed some of the more common side effects which include, but are not limited to, hair loss, cytopenias, neuropathy, fatigue, loss of appetite, mucositis, the low risk of cardiomyopathy, and steroid related side effects.  I provided him with written information about large cell lymphoma and all of her chemotherapy drugs.  We will schedule a PET scan to complete staging.  An IR referral was made for Port-A-Cath placement.  We will plan on starting treatment next week.  We reviewed her prognosis and I let her know that the cure rates would be somewhere around 75 to 80%.  Will reimage with PET scans after cycle 3 and cycle 6.  Starting prednisone dose will be 40 mg daily x 5 days.  The patient and her 2 daughters had an opportunity to have all their questions answered.  Per their request, we will place a nutrition consult and social work consult.  At this point it does not appear that the patient has any needs immediately.  I reviewed with him that we have psychotherapy as well as palliative care available should they be interested in the future.  She will be seen in follow-up cycle 1 day 10    2.  Nasal polyp: Should be seeing ENT tomorrow for a biopsy.  If some sort of chronic infection is found we would recommend that this is treated concurrently with her lymphoma      Medical decision Making:  I spent 50 minutes in the care of this patient today, which included time necessary for preparation for the visit, face to face time with the patient, communication of recommendations to the care team, and documentation time.  Today's visit was centered around a cancer diagnosis in the setting of additional medical comorbidities. Complex medical decision making was required. The risk of additional morbidity without  "further treatment is high.     ECOG Performance  1    Diagnosis:    1.  Diffuse large B-cell lymphoma involving intra-abdominal lymph nodes:    Treatment:    None to date    Interim History:    Bibiana returns today for a follow-up visit.  She was first seen in the hospital on April 12 in consultation.  She was admitted with a complaint of confusion and falls.  Imaging revealed a large intra-abdominal mass from which she was mostly asymptomatic.  She was discharged and had a biopsy.  She is here to review the results.  She notes that she has gained 2 pounds since discharge from the hospital.  She is not having any abdominal pain or nausea.  No acute complaints today.    Review of Systems:    As above in the history.     Review of Systems otherwise Negative for:  General: chills, fever or night sweats  Psychological: anxiety or depression  Ophthalmic: blurry vision, double vision or loss of vision, vision change  ENT: epistaxis, oral lesions, hearing changes  Hematological and Lymphatic: bleeding, bruising, jaundice, swollen lymph nodes  Endocrine: hot flashes, unexpected weight changes  Respiratory: cough, hemoptysis, orthopnea or shortness of breath/[place]  Cardiovascular: chest pain, edema, palpitations or PND  Gastrointestinal: abdominal pain, blood in stools, change in bowel habits, constipation, diarrhea or nausea/vomiting  Genito-Urinary: change in urinary stream, incontinence, frequency/urgency  Musculoskeletal: joint pain, stiffness, swelling, muscle pain  Neurological: dizziness, headaches, numbness/tingling  Dermatological: lumps and rash    Physical Exam:    BP (!) 161/77   Pulse 100   Temp 97.8  F (36.6  C)   Resp 16   Ht 1.448 m (4' 9\")   Wt 40.2 kg (88 lb 9.6 oz)   LMP  (LMP Unknown)   SpO2 99%   BMI 19.17 kg/m      General: patient appears stated age of 79 year old. Nontoxic and in no distress.   HEENT: Head: atraumatic, normocephalic. Sclerae anicteric.  Chest:  Normal respiratory " effort  Cardiac:  No edema.   Abdomen: abdomen is non-distended  Extremities: normal tone and muscle bulk.  Skin: no lesions or rash on visible skin. Warm and dry.   CNS: alert and oriented. Grossly non-focal.   Psychiatric: normal mood and affect.     Lab Results:    Recent Results (from the past 168 hour(s))   Folate   Result Value Ref Range    Folic Acid 31.8 4.6 - 34.8 ng/mL   Methylmalonic Acid   Result Value Ref Range    Methylmalonic Acid 0.34 0.00 - 0.40 umol/L   Treponema Abs w Reflex to RPR and Titer   Result Value Ref Range    Treponema Antibody Total Nonreactive Nonreactive   TSH with free T4 reflex   Result Value Ref Range    TSH 0.97 0.30 - 4.20 uIU/mL   Vitamin B1 whole blood   Result Value Ref Range    Vitamin B1 Whole Blood Level 210 (H) 70 - 180 nmol/L   Vitamin B12   Result Value Ref Range    Vitamin B12 941 232 - 1,245 pg/mL   Vitamin E   Result Value Ref Range    Vitamin E 9.5 5.5 - 18.0 mg/L    Vitamin E Gamma 1.0 0.0 - 6.0 mg/L   Echocardiogram Complete   Result Value Ref Range    LVEF  60-65%      Imaging:    Echocardiogram Complete    Result Date: 2024  [id number]^POOJA^MAURO^JUDIE  [facility] [address] [place] [id number]  Name: ALEXANDRA MOSLEY MRN: [id number] : 1945 Study Date: 2024 03:13 PM Age: 79 yrs Gender: Female Patient Location: [facility] Reason For Study: Bradycardia, AVB (atrioventricular block), Shortness of breath Ordering Physician: MAURO PATTON Referring Physician: MAURO PATTON Performed By: Eddie Brito RDCS  BSA: 1.3 m2 Height: 57 in Weight: 90 lb HR: 90 ______________________________________________________________________________ Procedure Echocardiogram with two-dimensional, color and spectral Doppler performed. ______________________________________________________________________________ Interpretation Summary Global and  regional left ventricular function is normal with an EF of 60-65%. Right ventricular function, chamber size, wall motion, and thickness are normal. Pulmonary artery systolic pressure cannot be assessed. The inferior vena cava is normal. No pericardial effusion is present. There is no prior study for direct comparison. ______________________________________________________________________________ Left Ventricle Global and regional left ventricular function is normal with an EF of 60-65%. Left ventricular size is normal. Mild concentric wall thickening consistent with left ventricular hypertrophy is present. Grade I or early diastolic dysfunction. No regional wall motion abnormalities are seen.  Right Ventricle Right ventricular function, chamber size, wall motion, and thickness are normal.  Atria The right atria appears normal. Moderate left atrial enlargement is present. The atrial septum is intact as assessed by color Doppler .  Mitral Valve The mitral valve is normal. Mild MVP. Mild mitral insufficiency is present.  Aortic Valve Severe aortic valve calcification is present. Mild aortic insufficiency is present.  Tricuspid Valve The tricuspid valve is normal. Trace to mild tricuspid insufficiency is present. Pulmonary artery systolic pressure cannot be assessed.  Pulmonic Valve The pulmonic valve is normal.  Vessels The aorta root is normal. The pulmonary artery and bifurcation cannot be assessed. The inferior vena cava is normal.  Pericardium No pericardial effusion is present.  Compared to Previous Study There is no prior study for direct comparison. ______________________________________________________________________________ MMode/2D Measurements & Calculations  IVSd: 1.1 cm LVIDd: 4.3 cm LVIDs: 2.9 cm LVPWd: 0.98 cm FS: 34.3 % LV mass(C)d: 149.1 grams LV mass(C)dI: 116.4 grams/m2 Ao root diam: 2.9 cm asc Aorta Diam: 3.0 cm LVOT diam: 1.9 cm LVOT area: 3.0 cm2 Ao root diam index Ht(cm/m): 2.0 Ao root diam  index BSA (cm/m2): 2.3 Asc Ao diam index BSA (cm/m2): 2.3 Asc Ao diam index Ht(cm/m): 2.1 LA Volume (BP): 61.5 ml  LA Volume Index (BP): 48.0 ml/m2 RWT: 0.45  Doppler Measurements & Calculations MV E max fortunato: 63.4 cm/sec MV A max fortunato: 87.5 cm/sec MV E/A: 0.72 MV dec slope: 315.9 cm/sec2 MV dec time: 0.20 sec AI P1/2t: 362.9 msec E/E' avg: 10.6 Lateral E/e': 10.2 Medial E/e': 11.0  ______________________________________________________________________________ Report approved by: Victor M Rizo 04/22/2024 04:13 PM       CT Pancreas Percutaneous Biopsy    Result Date: 4/15/2024  EXAM: 1. PERCUTANEOUS BIOPSY ABDOMINAL MASS 2. CT GUIDANCE 3. CONSCIOUS SEDATION LOCATION: [facility] DATE: 4/15/2024 INDICATION: Abdominal mass. TECHNIQUE: Dose reduction techniques were used. PROCEDURE: Informed consent obtained. Site marked. Prior images reviewed. Required items made available. Patient identity confirmed verbally and with arm band. Patient reevaluated immediately before administering sedation. Universal protocol was followed. Time out performed. The site was prepped and draped in sterile fashion. 10 mL of 1% lidocaine was infused into the local soft tissues. Using standard technique and under direct CT guidance, a 20-gauge biopsy device was used to obtain five core biopsies. Tissue was submitted to Pathology and was adequate by preliminary review by a pathologist. The patient tolerated the procedure well. No immediate complications. SEDATION: Versed 0.25 mg. Fentanyl 25 mcg. The procedure was performed with administration intravenous conscious sedation with appropriate preoperative, intraoperative, and postoperative evaluation. 21 minutes of supervised face to face conscious sedation time was provided by a radiology nurse under my direct supervision.     IMPRESSION: 1.  Successful CT-guided biopsy left abdominal mass. Reference CPT Codes: 07821, 12239, 03589    CT Abdomen Pelvis w Contrast    Result  Date: 4/11/2024  EXAM: CT ABDOMEN PELVIS W CONTRAST LOCATION: [facility] DATE: 4/11/2024 INDICATION: evaluate peripancreatic soft tissue mass seen on CT lumbar spine COMPARISON: CT spine 4/10/2024 TECHNIQUE: CT scan of the abdomen and pelvis was performed following injection of IV contrast. Multiplanar reformats were obtained. Dose reduction techniques were used. CONTRAST: isovue 370 42ml FINDINGS: LOWER CHEST: Partially imaged coronary artery calcifications. HEPATOBILIARY: Cholecystectomy. PANCREAS: The parenchyma is atrophic and there is mild main duct dilation up to 0.5 cm in the head. In the distal body and tail, there are multilobulated soft tissue masses measuring 4.9 x 4.4 cm (3/51) and 6.7 x 5.2 cm (3/66). SPLEEN: Normal. ADRENAL GLANDS: Normal. KIDNEYS/BLADDER: Severely dilated left ureter with probable ureterocele and abnormal inferior insertion. Right kidney is normal. BOWEL: The pancreatic/peripancreatic soft tissue masses have broad contact with the lesser curvature of the stomach near a partial gastrectomy resection margin. The distal stomach is narrowed and compressed, but there is no upstream dilation. Colonic diverticulosis, but no acute inflammation or obstruction. LYMPH NODES: Normal. VASCULATURE: Normal. PELVIC ORGANS: Normal. MUSCULOSKELETAL: Multilevel degenerative changes with transitional lumbosacral anatomy. Anterolisthesis of L3 on L4 and L4 on L5.     IMPRESSION: 1.  Multilobulated soft tissue masses in the pancreatic/peripancreatic region. Primary considerations include pancreatic ductal adenocarcinoma, gastrointestinal stromal tumor (arising from the adjacent stomach), or malignant periceliac lymph nodes. Suggest tissue sampling. 2.  Severely dilated left ureter with morphology suggesting duplicated collecting system. 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    CT Cervical Spine w/o  Contrast    Result Date: 4/10/2024  EXAM: CT CERVICAL SPINE W/O CONTRAST LOCATION: [facility] DATE: 4/10/2024 INDICATION: Fall, possible trauma. COMPARISON: CT cervical spine 08/18/2019. TECHNIQUE: Routine CT Cervical Spine without IV contrast. Multiplanar reformats. Dose reduction techniques were used. FINDINGS: VERTEBRA: No acute cervical spine fracture identified. 3 mm anterolisthesis of C3 on C4 and minimal anterolisthesis of C7 on T1, T1 on T2 and T2 on T3, similar to the prior exam. There is mild to moderate sigmoid curvature of the visualized cervicothoracic spine with dextroconvex curvature of the mid cervical spine and levoconvex curvature of the upper thoracic spine. CANAL/FORAMINA: There is moderate to severe or severe disc space narrowing at C3-C4, C4-C5, C5-C6 and C6-C7. Scattered marginal endplate and uncovertebral spurs. Advanced degenerative arthropathy of the right lateral atlantoaxial joint with associated remodeling. Developmentally bifid posterior arch of C1. Mild rotation of C1 on C2, which may be positional, although is nonspecific. Multilevel degenerative facet disease, most advanced on the left at C3-C4. Multilevel disc osteophyte complexes. Prominent ligamentum flavum thickening calcification at the C7-T1 level. Mild to moderate multilevel spinal canal stenosis. No definite high-grade/severe spinal canal narrowing identified, accounting for technique. Overall, findings appear similar. Varying degrees of multilevel degenerative neural foraminal stenosis also appear relatively unchanged compared to the prior exam. PARASPINAL: Bilateral carotid bifurcation atherosclerotic calcifications. The visualized paravertebral soft tissues otherwise appear unremarkable.     IMPRESSION: 1.  No acute cervical spine fracture. 2.  Multilevel degenerative changes (moderate/advanced), as described.    Head CT w/o contrast    Result Date: 4/10/2024  EXAM: CT HEAD WITHOUT CONTRAST  LOCATION: [facility] DATE: 04/10/2024 INDICATION: Fall, possible trauma. COMPARISON: None. TECHNIQUE: Routine CT Head without IV contrast. Multiplanar reformats. Dose reduction techniques were used. FINDINGS: INTRACRANIAL CONTENTS: No intracranial hemorrhage, extra-axial collection, or mass effect.  No CT evidence of acute infarct. Moderate-to-severe presumed chronic small vessel ischemic changes. Mild generalized volume loss. No hydrocephalus. Scattered intracranial atherosclerotic calcifications. VISUALIZED ORBITS/SINUSES/MASTOIDS: Prior bilateral cataract surgery. Visualized portions of the orbits are otherwise unremarkable. Lobulated soft tissue lesion with small internal calcification nearly completely opacifying the left sphenoid sinus. This appears similar to the prior cervical spine CT. This is nonspecific, but may represent a prominent retention cyst or polyp, although a mycetoma is also possible. Otherwise, the visualized paranasal sinuses are free of significant disease. No middle ear or mastoid effusion. BONES/SOFT TISSUES: No acute abnormality.     IMPRESSION: 1.  No acute intracranial hemorrhage, extra-axial fluid collection, or mass effect. 2.  Brain atrophy and presumed chronic small vessel ischemic changes, as described. 3.  Unchanged nonspecific lobulated lesion nearly completely opacifying the left sphenoid sinus.     CT Lumbar Spine w/o Contrast    Result Date: 4/10/2024  EXAM: CT LUMBAR SPINE W/O CONTRAST LOCATION: [facility] DATE: 4/10/2024 INDICATION: Fall, trauma. COMPARISON: None. TECHNIQUE: Routine CT Lumbar Spine without IV contrast. Multiplanar reformats. Dose reduction techniques were used. FINDINGS: VERTEBRA: Transitional thoracolumbar and lumbosacral junction anatomy. Correlation is made with the thoracic spine CT of same day. There appear to be tiny ribs at T12 bilaterally. I will presume that there are 5 lumbar vertebral bodies  including a transitional partially sacralized L5 vertebra. There is marginal ankylosis across the developmentally diminutive L5-S1 disc space. No definite acute lumbar spine fracture identified. Grade I anterolisthesis of L3 on L4 measuring 6 mm. Grade II anterolisthesis of L4 on L5 measuring approximately 10 mm. Mildly exaggerated lumbar lordosis. Minimal levoconvex curvature. CANAL/FORAMINA: Severe disc space narrowing at L4-L5. Moderate disc space narrowing at L3-L4. Multilevel degenerative facet disease, severe at L3-L4 and L4-L5 bilaterally. Multilevel disc bulges. Scattered ligamentum flavum thickening and calcification. There appears to be at least moderate central spinal canal stenosis at L3-L4. There also may be up to moderate central spinal canal stenosis at L4-L5. There is up to moderate neural foraminal stenosis on the left at L3-L4. Mild to moderate bordering on moderate neural foraminal stenosis elsewhere in the lower lumbar spine. PARASPINAL: Partially visualized postsurgical changes in the upper abdomen. There appears to be abnormal soft tissue thickening in the region of the pancreas along the course of the splenic artery and the left upper abdomen, incompletely evaluated and of  uncertain etiology. There is a large cystic-appearing lesion in the left abdomen measuring up to 87 mm in axial plane (series 5 image 75) incompletely evaluated. Scattered atherosclerotic calcifications of the aortoiliac arteries.     IMPRESSION: 1.  Transitional anatomy at the lumbosacral junction, as described. 2.  No acute lumbar spine fracture is identified. 3.  Grade I anterolisthesis of L3 on L4 and grade II anterolisthesis of L4 on L5. 4.  Multilevel degenerative changes, as described. 5.  Indeterminate findings in the partially visualized abdomen and pelvis, as described. Recommend follow-up CT abdomen and pelvis with contrast for further evaluation.    CT Thoracic Spine w/o Contrast    Result Date: 4/10/2024  EXAM:  CT THORACIC SPINE W/O CONTRAST LOCATION: [facility] DATE: 4/10/2024 INDICATION: Fall, tenderness. COMPARISON: None. TECHNIQUE: Routine CT Thoracic Spine without IV contrast. Multiplanar reformats. Dose reduction techniques were used. FINDINGS: VERTEBRA: No acute thoracic spine fracture identified. There appears to be some degree of diffuse osseous demineralization, which somewhat limits evaluation for fracture. Minimal anterolisthesis of T2 on T3. Mild levoconvex curvature of the upper thoracic spine and mild broad dextroconvex curvature of the mid to lower thoracic spine. A few areas of lucency within the thoracic vertebral bodies are noted, which could be due to osseous demineralization, although other lesions such as an intraosseous  hemangioma cannot be completely excluded. No obvious destructive/aggressive appearing osseous lesions. CANAL/FORAMINA: Multilevel degenerative changes are present. No definite high-grade central spinal canal stenosis. There is a central disc herniation at T8-T9 that contributes to at least mild spinal canal stenosis. Moderate/moderate to severe right T1-T2 neural foraminal stenosis. Relatively lesser degrees of mild and moderate neural foraminal stenosis seen elsewhere. PARASPINAL: Atherosclerotic calcifications of the aorta. Mitral annular calcification. Partially visualized surgical material in the lower mediastinal region/upper abdomen. Cholecystectomy clips in the right upper quadrant abdomen partially visualized.     IMPRESSION: 1.  No acute thoracic spine fracture identified. 2.  Degenerative changes, as described.        Signed by: Inocencio Florian MD      Again, thank you for allowing me to participate in the care of your patient.        Sincerely,        Inocencio Florian MD

## 2024-04-25 ENCOUNTER — OFFICE VISIT (OUTPATIENT)
Dept: FAMILY MEDICINE | Facility: CLINIC | Age: 79
End: 2024-04-25
Payer: COMMERCIAL

## 2024-04-25 ENCOUNTER — TELEPHONE (OUTPATIENT)
Dept: INTERNAL MEDICINE | Facility: CLINIC | Age: 79
End: 2024-04-25

## 2024-04-25 ENCOUNTER — TRANSFERRED RECORDS (OUTPATIENT)
Dept: HEALTH INFORMATION MANAGEMENT | Facility: CLINIC | Age: 79
End: 2024-04-25

## 2024-04-25 VITALS
HEIGHT: 57 IN | HEART RATE: 115 BPM | RESPIRATION RATE: 17 BRPM | DIASTOLIC BLOOD PRESSURE: 70 MMHG | SYSTOLIC BLOOD PRESSURE: 138 MMHG | BODY MASS INDEX: 19.07 KG/M2 | OXYGEN SATURATION: 99 % | WEIGHT: 88.4 LBS | TEMPERATURE: 98.3 F

## 2024-04-25 DIAGNOSIS — H90.3 BILATERAL SENSORINEURAL HEARING LOSS: ICD-10-CM

## 2024-04-25 DIAGNOSIS — R41.3 MEMORY LOSS: ICD-10-CM

## 2024-04-25 DIAGNOSIS — C83.30 DIFFUSE LARGE B-CELL LYMPHOMA, UNSPECIFIED BODY REGION (H): ICD-10-CM

## 2024-04-25 DIAGNOSIS — F41.9 ANXIETY: ICD-10-CM

## 2024-04-25 DIAGNOSIS — G89.29 CHRONIC MIDLINE LOW BACK PAIN WITHOUT SCIATICA: ICD-10-CM

## 2024-04-25 DIAGNOSIS — C83.33 DIFFUSE LARGE B-CELL LYMPHOMA OF INTRA-ABDOMINAL LYMPH NODES (H): ICD-10-CM

## 2024-04-25 DIAGNOSIS — R73.01 IMPAIRED FASTING GLUCOSE: ICD-10-CM

## 2024-04-25 DIAGNOSIS — T45.1X5A CHEMOTHERAPY-INDUCED NEUTROPENIA (H): ICD-10-CM

## 2024-04-25 DIAGNOSIS — K21.00 GASTROESOPHAGEAL REFLUX DISEASE WITH ESOPHAGITIS WITHOUT HEMORRHAGE: ICD-10-CM

## 2024-04-25 DIAGNOSIS — R00.2 PALPITATIONS: ICD-10-CM

## 2024-04-25 DIAGNOSIS — K86.89 PANCREATIC MASS: ICD-10-CM

## 2024-04-25 DIAGNOSIS — M81.0 OSTEOPOROSIS, SENILE: ICD-10-CM

## 2024-04-25 DIAGNOSIS — I10 PRIMARY HYPERTENSION: ICD-10-CM

## 2024-04-25 DIAGNOSIS — Z00.00 MEDICARE ANNUAL WELLNESS VISIT, SUBSEQUENT: Primary | ICD-10-CM

## 2024-04-25 DIAGNOSIS — J47.9 BRONCHIECTASIS WITHOUT COMPLICATION (H): ICD-10-CM

## 2024-04-25 DIAGNOSIS — Z23 ENCOUNTER FOR IMMUNIZATION: ICD-10-CM

## 2024-04-25 DIAGNOSIS — M54.50 CHRONIC MIDLINE LOW BACK PAIN WITHOUT SCIATICA: ICD-10-CM

## 2024-04-25 DIAGNOSIS — D64.9 NORMOCHROMIC NORMOCYTIC ANEMIA: ICD-10-CM

## 2024-04-25 DIAGNOSIS — D70.1 CHEMOTHERAPY-INDUCED NEUTROPENIA (H): ICD-10-CM

## 2024-04-25 LAB
BASOPHILS # BLD AUTO: 0 10E3/UL (ref 0–0.2)
BASOPHILS NFR BLD AUTO: 1 %
EOSINOPHIL # BLD AUTO: 0.3 10E3/UL (ref 0–0.7)
EOSINOPHIL NFR BLD AUTO: 4 %
ERYTHROCYTE [DISTWIDTH] IN BLOOD BY AUTOMATED COUNT: 14.3 % (ref 10–15)
HBA1C MFR BLD: 5.9 % (ref 0–5.6)
HCT VFR BLD AUTO: 36 % (ref 35–47)
HGB BLD-MCNC: 11.6 G/DL (ref 11.7–15.7)
IMM GRANULOCYTES # BLD: 0 10E3/UL
IMM GRANULOCYTES NFR BLD: 0 %
LYMPHOCYTES # BLD AUTO: 1 10E3/UL (ref 0.8–5.3)
LYMPHOCYTES NFR BLD AUTO: 12 %
MCH RBC QN AUTO: 30.6 PG (ref 26.5–33)
MCHC RBC AUTO-ENTMCNC: 32.2 G/DL (ref 31.5–36.5)
MCV RBC AUTO: 95 FL (ref 78–100)
MONOCYTES # BLD AUTO: 0.7 10E3/UL (ref 0–1.3)
MONOCYTES NFR BLD AUTO: 8 %
NEUTROPHILS # BLD AUTO: 6.2 10E3/UL (ref 1.6–8.3)
NEUTROPHILS NFR BLD AUTO: 75 %
PLATELET # BLD AUTO: 323 10E3/UL (ref 150–450)
RBC # BLD AUTO: 3.79 10E6/UL (ref 3.8–5.2)
WBC # BLD AUTO: 8.2 10E3/UL (ref 4–11)

## 2024-04-25 PROCEDURE — 83036 HEMOGLOBIN GLYCOSYLATED A1C: CPT | Performed by: FAMILY MEDICINE

## 2024-04-25 PROCEDURE — 86870 RBC ANTIBODY IDENTIFICATION: CPT | Performed by: FAMILY MEDICINE

## 2024-04-25 PROCEDURE — 86905 BLOOD TYPING RBC ANTIGENS: CPT | Performed by: FAMILY MEDICINE

## 2024-04-25 PROCEDURE — G0439 PPPS, SUBSEQ VISIT: HCPCS | Performed by: FAMILY MEDICINE

## 2024-04-25 PROCEDURE — 91320 SARSCV2 VAC 30MCG TRS-SUC IM: CPT | Performed by: FAMILY MEDICINE

## 2024-04-25 PROCEDURE — 80053 COMPREHEN METABOLIC PANEL: CPT | Performed by: FAMILY MEDICINE

## 2024-04-25 PROCEDURE — 86901 BLOOD TYPING SEROLOGIC RH(D): CPT | Performed by: FAMILY MEDICINE

## 2024-04-25 PROCEDURE — 90480 ADMN SARSCOV2 VAC 1/ONLY CMP: CPT | Performed by: FAMILY MEDICINE

## 2024-04-25 PROCEDURE — 99214 OFFICE O/P EST MOD 30 MIN: CPT | Mod: 25 | Performed by: FAMILY MEDICINE

## 2024-04-25 PROCEDURE — 83735 ASSAY OF MAGNESIUM: CPT | Performed by: FAMILY MEDICINE

## 2024-04-25 PROCEDURE — 36415 COLL VENOUS BLD VENIPUNCTURE: CPT | Performed by: FAMILY MEDICINE

## 2024-04-25 PROCEDURE — 86850 RBC ANTIBODY SCREEN: CPT | Performed by: FAMILY MEDICINE

## 2024-04-25 PROCEDURE — 85025 COMPLETE CBC W/AUTO DIFF WBC: CPT | Performed by: FAMILY MEDICINE

## 2024-04-25 PROCEDURE — 86900 BLOOD TYPING SEROLOGIC ABO: CPT | Performed by: FAMILY MEDICINE

## 2024-04-25 RX ORDER — CELECOXIB 200 MG/1
CAPSULE ORAL
Qty: 90 CAPSULE | Refills: 3 | Status: SHIPPED | OUTPATIENT
Start: 2024-04-25

## 2024-04-25 RX ORDER — AMLODIPINE BESYLATE 5 MG/1
5 TABLET ORAL DAILY
Qty: 90 TABLET | Refills: 3 | Status: SHIPPED | OUTPATIENT
Start: 2024-04-25

## 2024-04-25 SDOH — HEALTH STABILITY: PHYSICAL HEALTH: ON AVERAGE, HOW MANY DAYS PER WEEK DO YOU ENGAGE IN MODERATE TO STRENUOUS EXERCISE (LIKE A BRISK WALK)?: 0 DAYS

## 2024-04-25 ASSESSMENT — PAIN SCALES - GENERAL: PAINLEVEL: NO PAIN (0)

## 2024-04-25 ASSESSMENT — SOCIAL DETERMINANTS OF HEALTH (SDOH): HOW OFTEN DO YOU GET TOGETHER WITH FRIENDS OR RELATIVES?: ONCE A WEEK

## 2024-04-25 NOTE — COMMUNITY RESOURCES LIST (ENGLISH)
April 25, 2024           YOUR PERSONALIZED LIST OF SERVICES & PROGRAMS           & RECREATION    Sports      Fitness - Coaching & Training Services  [address] [place] [address] (Distance: 1.6 miles)  Phone: [phone]  Website: [email or web address]  Language: English      of [place] - Sports clubs and recreational activities  [address] [place] [address] (Distance: 2.6 miles)  Language: English  Fee: Free, Self pay  Accessibility: Ada accessible      [facility] - Adult Enrichment  Phone: [phone]  Website: [email or web address]  Language: English  Hours: Mon 7:30 AM - 4:00 PM Tue 7:30 AM - 4:00 PM Wed 7:30 AM - 4:00 PM Thu 7:30 AM - 4:00 PM Fri 7:30 AM - 4:00 PM    Classes/Groups      Fitness - Coaching & Training Services  [address] [place] [address] (Distance: 1.6 miles)  Phone: [phone]  Website: [email or web address]  Language: English      [facility] - Personal training  [address] [place] [address] (Distance: 24.7 miles)  Phone: [phone]  Website: https://VIRxSYS/  Language: English, Upper sorbian  Fee: Sliding scale, Self pay, Insurance      [facility] - Adult [facility]  Phone: [phone]  Website: [email or web address]  Language: English  Hours: Mon 7:30 AM - 4:00 PM Tue 7:30 AM - 4:00 PM Wed 7:30 AM - 4:00 PM Thu 7:30 AM - 4:00 PM Fri 7:30 AM - 4:00 PM               IMPORTANT NUMBERS & WEBSITES        Emergency Services  911  .   United Way  211 http://211unitedway.org  .   Poison Control  (340) 490-7106 http://[place]poison.org http://w[place]poison.org  .     Suicide and Crisis Lifeline  988 http://988LifePoint Hospitalsline.org  .   Childhelp National Child Abuse Hotline  465-666-7432 http://Childhelphotline.org   .   National Sexual Assault Hotline  (853) 781-8569 ([place])  http://Rainn.org   .     National Runaway Safeline  (429) 413-3269 ([place]) http://Avalanche BiotechrunaAG&P.org  .   Pregnancy & Postpartum Support  Call/text 140-111-2758  [place]: http://ppsupport[place].org  [place]: http://psichapters.com/wi  .   Substance Abuse National Helpline ([facility])  133-723-HELP (3640) http://Findtreatment.gov   .                DISCLAIMER: These resources have been generated via the uberall Platform. uberall does not endorse any service providers mentioned in this resource list. uberall does not guarantee that the services mentioned in this resource list will be available to you or will improve your health or wellness.    [facility]

## 2024-04-25 NOTE — PROGRESS NOTES
Preventive Care Visit  [facility]  Zack Woody MD, Family Medicine  Apr 25, 2024        Medicare annual wellness visit, subsequent  Annual Wellness Visit completed.  Risk questionaire reviewed in detail.  Appropriate preventive services were discussed with this patient, including applicable screening as appropriate for fall prevention, nutrition, physical activity, tobacco-use cessation, weight loss, and cognition.  Annual Wellness Visits recommended to continue.     Diffuse large B-cell lymphoma, unspecified body region (H)  Working with Dr. Florian regarding recent diagnosis of diffuse large B-cell lymphoma as noted below.  - ABO/Rh type and screen  - Comprehensive metabolic panel  - CBC with platelets    Pancreatic mass  Noted history of pancreatic mass with subsequent diagnosis of diffuse large B-cell lymphoma as above.  Follows with Dr. Florian regarding treatment options.    Primary hypertension  Hypertension appears stable with refill provided today on amlodipine 5 mg daily  - amLODIPine (NORVASC) 5 MG tablet  Dispense: 90 tablet; Refill: 3    Chronic midline low back pain without sciatica  History of chronic back pain.  Celebrex 200 mg daily utilized with benefits described.  - celecoxib (CELEBREX) 200 MG capsule  Dispense: 90 capsule; Refill: 3    Bronchiectasis without complication (H)  History of bronchiectasis without complication.  Asymptomatic currently.    Encounter for immunization  Updated COVID booster provided today.  - COVID-19 12+ (2023-24) (PFIZER)    Memory loss  Working with Dr. Mann.  Follow-up as noted.    Impaired fasting glucose  Update A1c today.  Nonfasting.  - Hemoglobin A1c  - Comprehensive metabolic panel    Bilateral sensorineural hearing loss  Bilateral hearing aid utilization.    Osteoporosis, senile  Prolia continuing.    Anxiety  Buspirone 7.5 mg twice daily.    Gastroesophageal reflux disease with esophagitis without hemorrhage  Omeprazole 20 mg daily.   No breakthrough symptoms.    Normochromic normocytic anemia  Striae mild normochromic normocytic anemia present with prior hemoglobin 11.5 and .  - CBC with platelets    Palpitations  Potation's reviewed.  Follows with Dr. Reina cardiology and is completing Holter monitor for further assessment.           Adan Mccarty is a 79 year old, presenting for the following:  Medicare Visit (Pt is not fasting)        4/25/2024    11:03 AM   Additional Questions   Roomed by          Health Care Directive  Patient does not have a Health Care Directive or Living Will: Patient states has Advance Directive and will bring in a copy to clinic.    HPI    Patient seen today for annual wellness visit.  In general doing okay however has been through evaluation of potential pancreatic mass with biopsies showing diffuse large B-cell lymphoma and will require subsequent chemotherapy as noted.  Follows with Dr. Florian office in this regard.  Also following with Dr. Reina cardiologist with palpitations noted.  Holter monitor being completed.  Dr. Mann follows for memory loss concerns as well.  Known history of hypertension.  Amlodipine 5 mg daily.  Impaired fasting glucose with prior A1c at 6.1% July 13, 2023.  Has lost weight associated with current concerns with underlying lymphoma.  Family describes her cancer is stage II and treatable without metastatic disease currently noted.  Continues management of chronic low back pain.  Respiratory status stable without recent exacerbation.  Comprehensive review of systems as above otherwise all negative.      Reviewed note from 4/24/24 from Dr. Florian:  Intra-abdominal mass: Biopsy showed CD20 positive diffuse large B-cell lymphoma.  Findings were reviewed with the patient and her family members.  We talked about the pathophysiology and natural history of this aggressive lymphoma.  Treatment typically requires multiagent chemoimmunotherapy.  I think she would be a candidate  for R-miniCHOP.  I reviewed the rationale for this decision.  We talked about the schedule of this regimen.  We also reviewed some of the more common side effects which include, but are not limited to, hair loss, cytopenias, neuropathy, fatigue, loss of appetite, mucositis, the low risk of cardiomyopathy, and steroid related side effects.  I provided him with written information about large cell lymphoma and all of her chemotherapy drugs.  We will schedule a PET scan to complete staging.  An IR referral was made for Port-A-Cath placement.  We will plan on starting treatment next week.  We reviewed her prognosis and I let her know that the cure rates would be somewhere around 75 to 80%.  Will reimage with PET scans after cycle 3 and cycle 6.  Starting prednisone dose will be 40 mg daily x 5 days.  The patient and her 2 daughters had an opportunity to have all their questions answered.  Per their request, we will place a nutrition consult and social work consult.  At this point it does not appear that the patient has any needs immediately.  I reviewed with him that we have psychotherapy as well as palliative care available should they be interested in the future.  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   No siblings   Surgery: emergency RLQ incarcerated strangulated femoral hernia repair 08; [facility]-BS[facility]  due to endometriosis; ulcer surgery ; [id number] hernia repair (Dr. JULY Wen); lap choly 12 with Dr. Hayder Montgomery; left eye cataract 21 and right eye cataract 4/15/21               2024   General Health   How would you rate your overall physical health? Good   Feel stress (tense, anxious, or unable to  sleep) Rather much   (!) STRESS CONCERN      4/25/2024   Nutrition   Diet: Regular (no restrictions)         4/25/2024   Exercise   Days per week of moderate/strenous exercise 0 days   (!) EXERCISE CONCERN      4/25/2024   Social Factors   Frequency of gathering with friends or relatives Once a week   Worry food won't last until get money to buy more No   Food not last or not have enough money for food? No   Do you have housing?  Yes   Are you worried about losing your housing? No   Lack of transportation? No   Unable to get utilities (heat,electricity)? No         4/25/2024   Fall Risk   Fallen 2 or more times in the past year? Yes   Trouble with walking or balance? Yes           4/25/2024   Activities of Daily Living- Home Safety   Needs help with the following daily activites Transportation    Shopping    Housework    Bathing    Laundry    Medication administration   Safety concerns in the home None of the above         4/25/2024   Dental   Dentist two times every year? Yes         4/25/2024   Hearing Screening   Hearing concerns? (!) I NEED TO ASK PEOPLE TO SPEAK UP OR REPEAT THEMSELVES.    (!) IT'S HARD TO FOLLOW A CONVERSATION IN A NOISY RESTAURANT OR CROWDED ROOM.    (!) TROUBLE UNDERSTANDING SOFT OR WHISPERED SPEECH.    (!) TROUBLE UNDERSTANDING SPEECH ON THE TELEPHONE         4/25/2024   Driving Risk Screening   Patient/family members have concerns about driving No         4/25/2024   General Alertness/Fatigue Screening   Have you been more tired than usual lately? (!) YES         4/25/2024   Urinary Incontinence Screening   Bothered by leaking urine in past 6 months No         4/25/2024   TB Screening   Were you born outside of the [place]? Yes         Today's PHQ-2 Score:       4/25/2024    11:05 AM   PHQ-2 ( 1999 Pfizer)   Q1: Little interest or pleasure in doing things 1   Q2: Feeling down, depressed or hopeless 1   PHQ-2 Score 2   Q1: Little interest or pleasure in doing things Several days   Q2: Feeling  down, depressed or hopeless Several days   PHQ-2 Score 2           2024   Substance Use   Alcohol more than 3/day or more than 7/wk Not Applicable   Do you have a current opioid prescription? No   How severe/bad is pain from 1 to 10? 0/10 (No Pain)   Do you use any other substances recreationally? No     Social History     Tobacco Use    Smoking status: Never     Passive exposure: Never    Smokeless tobacco: Never   Vaping Use    Vaping status: Never Used   Substance Use Topics    Alcohol use: No    Drug use: Never           2023   LAST FHS-7 RESULTS   1st degree relative breast or ovarian cancer Yes   Any relative bilateral breast cancer No   Any male have breast cancer No   Any ONE woman have BOTH breast AND ovarian cancer No   Any woman with breast cancer before 50yrs No   2 or more relatives with breast AND/OR ovarian cancer No   2 or more relatives with breast AND/OR bowel cancer No        Mammogram Screening - After age 74- determine frequency with patient based on health status, life expectancy and patient goals    ASCVD Risk   The 10-year ASCVD risk score (Moshe SANCHEZ, et al., 2019) is: 37.2%    Values used to calculate the score:      Age: 79 years      Sex: Female      Is Non- : No      Diabetic: No      Tobacco smoker: No      Systolic Blood Pressure: 138 mmHg      Is BP treated: Yes      HDL Cholesterol: 80 mg/dL      Total Cholesterol: 189 mg/dL    Fracture Risk Assessment Tool  Link to Frax Calculator  Use the information below to complete the Frax calculator  : 1945  Sex: female  Weight (kg): 40.1 kg (actual weight)  Height (cm): 144.8 cm  Previous Fragility Fracture:  No  History of parent with fractured hip:  No  Current Smoking:  No  Patient has been on glucocorticoids for more than 3 months (5mg/day or more): No  Rheumatoid Arthritis on Problem List:  No  Secondary Osteoporosis on Problem List:  No  Consumes 3 or more units of alcohol per day:  No  Femoral Neck BMD (g/cm2)            Reviewed and updated as needed this visit by Provider                    Past Medical History:   Diagnosis Date    Anxiety     Breast cyst     While ago    History of cholecystectomy 2023     Past Surgical History:   Procedure Laterality Date    BREAST CYST ASPIRATION Left     While ago    HYSTERECTOMY  1985    OOPHORECTOMY Bilateral     ZZC REMOVAL OF OVARY(S)      Description: Oophorectomy;  Recorded: 2009;    ZZC TOTAL ABDOM HYSTERECTOMY      Description: Total Abdominal Hysterectomy;  Recorded: 2009;     OB History    Para Term  AB Living   2 2 2 0 0 0   SAB IAB Ectopic Multiple Live Births   0 0 0 0 0      # Outcome Date GA Lbr Antelmo/2nd Weight Sex Type Anes PTL Lv   2 Term            1 Term              Lab work is in process  Labs reviewed in EPIC  BP Readings from Last 3 Encounters:   24 138/70   24 (!) 161/77   24 117/70    Wt Readings from Last 3 Encounters:   24 40.1 kg (88 lb 6.4 oz)   24 40.2 kg (88 lb 9.6 oz)   24 39.5 kg (87 lb)                  Patient Active Problem List   Diagnosis    Essential hypertension with goal blood pressure less than 130/80    Anxiety    Osteoporosis, senile    Bilateral sensorineural hearing loss    Impaired fasting glucose    Diverticular disease of large intestine    Gastroesophageal reflux disease with esophagitis without hemorrhage    Bronchiectasis without complication (H)    Chronic cough    Incontinence of feces    Irregular bowel habits    Status post dilation of esophageal narrowing    Metal foreign body in eye region    MRI contraindicated due to metal in eye    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)    Chemotherapy-induced neutropenia (H24)     Past Surgical History:   Procedure Laterality Date    BREAST CYST ASPIRATION Left     While ago    HYSTERECTOMY      OOPHORECTOMY Bilateral     ZZC REMOVAL OF OVARY(S)       Description: Oophorectomy;  Recorded: 12/17/2009;    [facility] TOTAL ABDOM HYSTERECTOMY      Description: Total Abdominal Hysterectomy;  Recorded: 12/17/2009;       Social History     Tobacco Use    Smoking status: Never     Passive exposure: Never    Smokeless tobacco: Never   Substance Use Topics    Alcohol use: No     Family History   Problem Relation Age of Onset    Breast Cancer Mother 52.00    Cancer Maternal Grandfather 71.00        prostate    Prostate Cancer Maternal Grandfather          Current Outpatient Medications   Medication Sig Dispense Refill    acetaminophen (TYLENOL) 325 MG tablet Take 325-650 mg by mouth every 6 hours as needed for mild pain      [START ON 4/30/2024] allopurinol (ZYLOPRIM) 300 MG tablet Take 1 tablet (300 mg) by mouth daily for 20 days 20 tablet 0    amLODIPine (NORVASC) 5 MG tablet Take 1 tablet (5 mg) by mouth daily 90 tablet 3    Biotin 5 MG TABS Take 5,000 mcg by mouth daily      busPIRone (BUSPAR) 7.5 MG tablet TAKE 1 TABLET BY MOUTH TWICE  DAILY 180 tablet 3    celecoxib (CELEBREX) 200 MG capsule TAKE 1 CAPSULE BY MOUTH  DAILY 90 capsule 3    denosumab (PROLIA) 60 MG/ML SOSY injection Inject 60 mg Subcutaneous every 6 months      multivitamin with minerals (THERA-M) 9 mg iron-400 mcg Tab tablet [MULTIVITAMIN WITH MINERALS (THERA-M) 9 MG IRON-400 MCG TAB TABLET] Take 1 tablet by mouth daily.      nortriptyline (PAMELOR) 50 MG capsule TAKE 1 CAPSULE BY MOUTH AT  BEDTIME 90 capsule 2    omeprazole (PRILOSEC) 20 MG capsule [OMEPRAZOLE (PRILOSEC) 20 MG CAPSULE] Take 20 mg by mouth daily before breakfast.      [START ON 4/30/2024] ondansetron (ZOFRAN) 4 MG tablet Take 1-2 tablets (4-8 mg) by mouth every 6 hours as needed for nausea (vomiting) 30 tablet 1    [START ON 4/30/2024] predniSONE (DELTASONE) 20 MG tablet Take 2 tablets (40 mg) by mouth daily Take on Days 1 through 5 of chemotherapy. Take first dose in AM prior to chemotherapy. 60 tablet 0    Probiotic Product (SOLUBLE  FIBER/PROBIOTICS PO)       UNABLE TO FIND Take 2 Tablespoonful by mouth every morning MEDICATION NAME: biofiber 8 oz with water      fluocinolone acetonide (DERMA SMOOTHE/FS BODY) 0.01 % external oil Apply topically 2 times daily 5 drops to both ears twice daily (Patient not taking: Reported on 4/25/2024)       Allergies   Allergen Reactions    Prochlorperazine Edisylate [Prochlorperazine] Other (See Comments)     Extrapyramidal side effects     Current providers sharing in care for this patient include:  Patient Care Team:  Zakc Woody MD as PCP - General (Family Medicine)  Zack Woody MD as Assigned PCP  Gustavo Tran MD as Physician (Internal Medicine)  Emanuel Mann MD as MD (Neurology)  Inocencio Florian MD (Internal Medicine)  Ghulam Reina DO as Physician (Cardiovascular Disease)  Sophie Cespedes, RN as Specialty Care Coordinator (Hematology & Oncology)  Ghulam Reina DO as Assigned Heart and Vascular Provider  Emanuel Mann MD as Assigned Neuroscience Provider    The following health maintenance items are reviewed in Epic and correct as of today:  Health Maintenance   Topic Date Due    COVID-19 Vaccine (7 - 2023-24 season) 12/04/2023    MEDICARE ANNUAL WELLNESS VISIT  12/29/2023    ANNUAL REVIEW OF HM ORDERS  04/25/2025    FALL RISK ASSESSMENT  04/25/2025    GLUCOSE  04/16/2027    LIPID  12/29/2027    ADVANCE CARE PLANNING  12/29/2027    DTAP/TDAP/TD IMMUNIZATION (6 - Td or Tdap) 11/23/2028    DEXA  06/21/2038    HEPATITIS C SCREENING  Completed    PHQ-2 (once per calendar year)  Completed    INFLUENZA VACCINE  Completed    Pneumococcal Vaccine: 65+ Years  Completed    ZOSTER IMMUNIZATION  Completed    RSV VACCINE (Pregnancy & 60+)  Completed    IPV IMMUNIZATION  Aged Out    HPV IMMUNIZATION  Aged Out    MENINGITIS IMMUNIZATION  Aged Out    RSV MONOCLONAL ANTIBODY  Aged Out    COLORECTAL CANCER SCREENING  Discontinued         Review of Systems  Constitutional, HEENT,  "cardiovascular, pulmonary, GI, , musculoskeletal, neuro, skin, endocrine and psych systems are negative, except as otherwise noted.     Objective    Exam  /70   Pulse 115   Temp 98.3  F (36.8  C)   Resp 17   Ht 1.448 m (4' 9\")   Wt 40.1 kg (88 lb 6.4 oz)   LMP  (LMP Unknown)   SpO2 99%   BMI 19.13 kg/m     Estimated body mass index is 19.13 kg/m  as calculated from the following:    Height as of this encounter: 1.448 m (4' 9\").    Weight as of this encounter: 40.1 kg (88 lb 6.4 oz).    Physical Exam  GENERAL: alert and no distress.  Quiet affect.  Cooperative.  EYES: Eyes grossly normal to inspection, PERRL and conjunctivae and sclerae normal  HENT: ear canals and TM's normal, nose and mouth without ulcers or lesions  NECK: no adenopathy, no asymmetry, masses, or scars  RESP: lungs clear to auscultation - no rales, rhonchi or wheezes  CV: Elevated heart rate and irregular rhythm, normal S1 S2, no S3 or S4, no murmur, click or rub, no peripheral edema  ABDOMEN: soft, nontender, no hepatosplenomegaly, no masses and bowel sounds normal  MS: no gross musculoskeletal defects noted, no edema  SKIN: no suspicious lesions or rashes  NEURO: Normal strength and tone, mentation intact and speech normal  PSYCH: mentation appears okay with baseline cognitive impairment otherwise affect normal/bright        4/25/2024   Mini Cog   Clock Draw Score 0 Abnormal   3 Item Recall 2 objects recalled   Mini Cog Total Score 2              Signed Electronically by: Zack Woody MD    "

## 2024-04-25 NOTE — TELEPHONE ENCOUNTER
General Call    Contacts         Type Contact Phone/Fax    04/25/2024 01:17 PM CDT Phone (Incoming) Rosa Isela Mcrae (Emergency Contact) [phone] (M)          Reason for Call:  Perilla Shot    What are your questions or concerns:  Daughter is calling that Dr. Woody ok perilla shot in July is ok. Notes are in the chart from Dr. Woody.     Date of last appointment with provider: 4/23/24    Could we send this information to you in [employer] or would you prefer to receive a phone call?:   Patient would prefer a phone call   Okay to leave a detailed message?: Yes at Other phone number:  [phone]

## 2024-04-26 LAB
ALBUMIN SERPL BCG-MCNC: 4 G/DL (ref 3.5–5.2)
ALP SERPL-CCNC: 113 U/L (ref 40–150)
ALT SERPL W P-5'-P-CCNC: 21 U/L (ref 0–50)
ANION GAP SERPL CALCULATED.3IONS-SCNC: 11 MMOL/L (ref 7–15)
ANTIBODY ID: NORMAL
AST SERPL W P-5'-P-CCNC: 30 U/L (ref 0–45)
BILIRUB SERPL-MCNC: 0.2 MG/DL
BUN SERPL-MCNC: 17.8 MG/DL (ref 8–23)
CALCIUM SERPL-MCNC: 9.7 MG/DL (ref 8.8–10.2)
CHLORIDE SERPL-SCNC: 103 MMOL/L (ref 98–107)
CREAT SERPL-MCNC: 0.72 MG/DL (ref 0.51–0.95)
DEPRECATED HCO3 PLAS-SCNC: 27 MMOL/L (ref 22–29)
E AG RBC QL: NEGATIVE
EGFRCR SERPLBLD CKD-EPI 2021: 85 ML/MIN/1.73M2
GLUCOSE SERPL-MCNC: 121 MG/DL (ref 70–99)
MAGNESIUM SERPL-MCNC: 2.2 MG/DL (ref 1.7–2.3)
POTASSIUM SERPL-SCNC: 4.4 MMOL/L (ref 3.4–5.3)
PROT SERPL-MCNC: 6.5 G/DL (ref 6.4–8.3)
SODIUM SERPL-SCNC: 141 MMOL/L (ref 135–145)
SPECIMEN EXPIRATION DATE: NORMAL
SPECIMEN EXPIRATION DATE: NORMAL

## 2024-04-26 NOTE — TELEPHONE ENCOUNTER
"From my last note:      \"Malignant neoplasm of pancreas, unspecified location of malignancy (H)  She was diagnosed with pancreatic mass on 4/10/24.CT showing pancreatic and peripancreatic mass and hypercalcemia.   She will discuss with Oncologist if we can continue Prolia treatment. I will see her in July.\"    Please call the daughter and explain the plan.  "

## 2024-04-26 NOTE — TELEPHONE ENCOUNTER
Outgoing call to Daughter, Attempt #1. LVMTCB.     Anyone can relay that- Dr. Tran will see patient in July, wanted patient to talk with oncology first regarding Prolia. If oncology is okay- they should come to July appointment.

## 2024-04-26 NOTE — TELEPHONE ENCOUNTER
Please advise. It looks like they just did a video visit with you on 4/23/24- is she still ok to have prolia/follow up in July?

## 2024-04-27 RX ORDER — DOXORUBICIN HYDROCHLORIDE 2 MG/ML
25 INJECTION, SOLUTION INTRAVENOUS ONCE
Status: CANCELLED | OUTPATIENT
Start: 2024-05-01

## 2024-04-29 ENCOUNTER — HOSPITAL ENCOUNTER (OUTPATIENT)
Dept: CARDIOLOGY | Facility: CLINIC | Age: 79
Discharge: HOME | End: 2024-04-29
Attending: INTERNAL MEDICINE | Admitting: INTERNAL MEDICINE
Payer: COMMERCIAL

## 2024-04-29 ENCOUNTER — TELEPHONE (OUTPATIENT)
Dept: INTERVENTIONAL RADIOLOGY/VASCULAR | Facility: CLINIC | Age: 79
End: 2024-04-29
Payer: COMMERCIAL

## 2024-04-29 DIAGNOSIS — R00.1 BRADYCARDIA: ICD-10-CM

## 2024-04-29 PROCEDURE — 93225 XTRNL ECG REC<48 HRS REC: CPT

## 2024-04-29 NOTE — TELEPHONE ENCOUNTER
Called and spoke with Rosa Isela, pt's daughter. Stated that oncology has given her the green light to receive Prolia in July. She also wanted to FYI the team that she is starting chemo on Friday and doing 6 doses, ending around end of August. No further questions.     Alley ARITA RN

## 2024-04-30 NOTE — PROGRESS NOTES
"  Interventional Radiology - Pre-Procedure Note:  [facility]  05/01/24    Procedure Requested: port placement  Requested by: Dr. Florian    History and Physical Reviewed: H&P documented within 30 days (by Dr. Florian on 4/24/24).  I have personally reviewed the patient's medical history and have updated the medical record as necessary.    Brief HPI: Bibiana Mcrae is a 79 year old female with recently found abdominal mass and subsequent biopsy revealing diffuse large B-cell lymphoma. Oncology following who recommends multi-agent chemoimmunotherapy.    Patient presents today for port placement to facilitate chemotherapy treatments.      NPO: midnight  ANTICOAGULANTS: none   ANTIBIOTICS: Ancef 2g ordered for procedure    ALLERGIES  Allergies   Allergen Reactions    Blood Transfusion Related (Informational Only) Other (See Comments)     Patient has a history of a Clinically Significant antibody against RBC antigens. A delay in compatible RBCs may occur. 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    Prochlorperazine Edisylate [Prochlorperazine] Other (See Comments)     Extrapyramidal side effects         LABS:  INR   Date Value Ref Range Status   04/12/2024 1.05 0.85 - 1.15 Final      Hemoglobin   Date Value Ref Range Status   04/25/2024 11.6 (L) 11.7 - 15.7 g/dL Final     Platelet Count   Date Value Ref Range Status   04/25/2024 323 150 - 450 10e3/uL Final     Creatinine   Date Value Ref Range Status   04/25/2024 0.72 0.51 - 0.95 mg/dL Final     Potassium   Date Value Ref Range Status   04/25/2024 4.4 3.4 - 5.3 mmol/L Final   11/09/2021 4.8 3.5 - 5.0 mmol/L Final         EXAM:  Vital signs:  Temp: 97.9  F (36.6  C) Temp src: Oral BP: (!) 169/79 Pulse: 94   Resp: 16 SpO2: 100 % O2 Device: None (Room air)        Estimated body mass index is 19.13 kg/m  as calculated from the following:    Height as of 4/25/24: 1.448 m (4' 9\").    Weight as of 4/25/24: 40.1 kg (88 lb 6.4 " oz).    General: Frail. Stable. In no acute distress.    Neuro: Alert and oriented x 3. No focal deficits.  Psych: Appropriate mood and affect. Linear/coherent thought process.   Resp: Normal respirations. Lungs clear to auscultation bilaterally.  Cardio: S1S2, regular rate and rhythm, without murmur, clicks or rubs   Skin: Warm and dry. Without excoriations, ecchymosis, erythema, lesions or open sores on upper chest/neck.      Pre-Sedation Assessment:  Mallampati Airway Classification:  I - Faucial pillars, soft palate, and uvula are visible  Previous reaction to anesthesia/sedation:  No  Sedation plan based on assessment: Moderate (conscious) sedation  ASA Classification: Class 3 - SEVERE SYSTEMIC DISEASE, DEFINITE FUNCTIONAL LIMITATIONS.   Code Status: Full Code intra procedure, per discussion with patient.       ASSESSMENT/PLAN:   Procedure discussion with patient and physical exam yielded no contraindications to laterality for port placement. Anticipate right sided port placement.     Port placement with sedation, laterality per proceduralist discretion    Procedural education reviewed with patient/family in detail including, but not limited to risks, benefits and alternatives with understanding verbalized by patient/family.    Total time spent on the date of the encounter: 40 minutes.      Note comprised by:   Interventional Radiology    Exam and procedure discussion completed by:  Agustina Gloria CNP  Interventional Radiology

## 2024-05-01 ENCOUNTER — TELEPHONE (OUTPATIENT)
Dept: ONCOLOGY | Facility: HOSPITAL | Age: 79
End: 2024-05-01
Payer: COMMERCIAL

## 2024-05-01 ENCOUNTER — HOSPITAL ENCOUNTER (OUTPATIENT)
Dept: INTERVENTIONAL RADIOLOGY/VASCULAR | Facility: HOSPITAL | Age: 79
Discharge: HOME | End: 2024-05-01
Attending: INTERNAL MEDICINE | Admitting: RADIOLOGY
Payer: COMMERCIAL

## 2024-05-01 VITALS
SYSTOLIC BLOOD PRESSURE: 137 MMHG | OXYGEN SATURATION: 98 % | DIASTOLIC BLOOD PRESSURE: 65 MMHG | TEMPERATURE: 97.6 F | HEART RATE: 99 BPM | RESPIRATION RATE: 22 BRPM

## 2024-05-01 DIAGNOSIS — C83.398 DIFFUSE LARGE B-CELL LYMPHOMA OF EXTRANODAL SITE: ICD-10-CM

## 2024-05-01 DIAGNOSIS — Z95.828 PORT-A-CATH IN PLACE: Primary | ICD-10-CM

## 2024-05-01 PROCEDURE — 250N000009 HC RX 250: Performed by: RADIOLOGY

## 2024-05-01 PROCEDURE — 250N000011 HC RX IP 250 OP 636: Performed by: NURSE PRACTITIONER

## 2024-05-01 PROCEDURE — 36561 INSERT TUNNELED CV CATH: CPT

## 2024-05-01 PROCEDURE — 250N000011 HC RX IP 250 OP 636: Performed by: RADIOLOGY

## 2024-05-01 PROCEDURE — C1788 PORT, INDWELLING, IMP: HCPCS

## 2024-05-01 PROCEDURE — 99152 MOD SED SAME PHYS/QHP 5/>YRS: CPT

## 2024-05-01 PROCEDURE — 250N000009 HC RX 250: Performed by: NURSE PRACTITIONER

## 2024-05-01 RX ORDER — ONDANSETRON 2 MG/ML
4 INJECTION INTRAMUSCULAR; INTRAVENOUS EVERY 6 HOURS PRN
Status: DISCONTINUED | OUTPATIENT
Start: 2024-05-01 | End: 2024-05-02 | Stop reason: HOSPADM

## 2024-05-01 RX ORDER — NALOXONE HYDROCHLORIDE 0.4 MG/ML
0.2 INJECTION, SOLUTION INTRAMUSCULAR; INTRAVENOUS; SUBCUTANEOUS
Status: DISCONTINUED | OUTPATIENT
Start: 2024-05-01 | End: 2024-05-02 | Stop reason: HOSPADM

## 2024-05-01 RX ORDER — CEFAZOLIN SODIUM/WATER 2 G/20 ML
2 SYRINGE (ML) INTRAVENOUS
Status: COMPLETED | OUTPATIENT
Start: 2024-05-01 | End: 2024-05-01

## 2024-05-01 RX ORDER — DONEPEZIL HYDROCHLORIDE 5 MG/1
5 TABLET, ORALLY DISINTEGRATING ORAL DAILY
COMMUNITY

## 2024-05-01 RX ORDER — HEPARIN SODIUM (PORCINE) LOCK FLUSH IV SOLN 100 UNIT/ML 100 UNIT/ML
5-10 SOLUTION INTRAVENOUS
Status: CANCELLED | OUTPATIENT
Start: 2024-05-01

## 2024-05-01 RX ORDER — HEPARIN SODIUM,PORCINE 10 UNIT/ML
5-10 VIAL (ML) INTRAVENOUS
Status: CANCELLED | OUTPATIENT
Start: 2024-05-01

## 2024-05-01 RX ORDER — LIDOCAINE/PRILOCAINE 2.5 %-2.5%
CREAM (GRAM) TOPICAL PRN
Qty: 30 G | Refills: 2 | Status: SHIPPED | OUTPATIENT
Start: 2024-05-01

## 2024-05-01 RX ORDER — ONDANSETRON 2 MG/ML
4 INJECTION INTRAMUSCULAR; INTRAVENOUS
Status: COMPLETED | OUTPATIENT
Start: 2024-05-01 | End: 2024-05-01

## 2024-05-01 RX ORDER — HEPARIN SODIUM,PORCINE 10 UNIT/ML
5-10 VIAL (ML) INTRAVENOUS EVERY 24 HOURS
Status: CANCELLED | OUTPATIENT
Start: 2024-05-01

## 2024-05-01 RX ORDER — HEPARIN SODIUM (PORCINE) LOCK FLUSH IV SOLN 100 UNIT/ML 100 UNIT/ML
500 SOLUTION INTRAVENOUS ONCE
Status: COMPLETED | OUTPATIENT
Start: 2024-05-01 | End: 2024-05-01

## 2024-05-01 RX ORDER — LIDOCAINE HYDROCHLORIDE AND EPINEPHRINE 10; 10 MG/ML; UG/ML
INJECTION, SOLUTION INFILTRATION; PERINEURAL PRN
Status: COMPLETED | OUTPATIENT
Start: 2024-05-01 | End: 2024-05-01

## 2024-05-01 RX ORDER — LIDOCAINE 40 MG/G
CREAM TOPICAL
Status: DISCONTINUED | OUTPATIENT
Start: 2024-05-01 | End: 2024-05-02 | Stop reason: HOSPADM

## 2024-05-01 RX ORDER — FENTANYL CITRATE 50 UG/ML
25-50 INJECTION, SOLUTION INTRAMUSCULAR; INTRAVENOUS EVERY 5 MIN PRN
Status: DISCONTINUED | OUTPATIENT
Start: 2024-05-01 | End: 2024-05-02 | Stop reason: HOSPADM

## 2024-05-01 RX ORDER — FLUMAZENIL 0.1 MG/ML
0.2 INJECTION, SOLUTION INTRAVENOUS
Status: DISCONTINUED | OUTPATIENT
Start: 2024-05-01 | End: 2024-05-02 | Stop reason: HOSPADM

## 2024-05-01 RX ORDER — NALOXONE HYDROCHLORIDE 0.4 MG/ML
0.4 INJECTION, SOLUTION INTRAMUSCULAR; INTRAVENOUS; SUBCUTANEOUS
Status: DISCONTINUED | OUTPATIENT
Start: 2024-05-01 | End: 2024-05-02 | Stop reason: HOSPADM

## 2024-05-01 RX ORDER — SODIUM CHLORIDE 9 MG/ML
INJECTION, SOLUTION INTRAVENOUS CONTINUOUS
Status: DISCONTINUED | OUTPATIENT
Start: 2024-05-01 | End: 2024-05-02 | Stop reason: HOSPADM

## 2024-05-01 RX ADMIN — LIDOCAINE HYDROCHLORIDE AND EPINEPHRINE 10 ML: 10; 10 INJECTION, SOLUTION INFILTRATION; PERINEURAL at 15:16

## 2024-05-01 RX ADMIN — ONDANSETRON 4 MG: 2 INJECTION INTRAMUSCULAR; INTRAVENOUS at 14:55

## 2024-05-01 RX ADMIN — MIDAZOLAM HYDROCHLORIDE 1 MG: 1 INJECTION, SOLUTION INTRAMUSCULAR; INTRAVENOUS at 14:57

## 2024-05-01 RX ADMIN — Medication 500 UNITS: at 15:14

## 2024-05-01 RX ADMIN — LIDOCAINE HYDROCHLORIDE 10 ML: 10 INJECTION, SOLUTION INFILTRATION; PERINEURAL at 15:16

## 2024-05-01 RX ADMIN — FENTANYL CITRATE 50 MCG: 50 INJECTION, SOLUTION INTRAMUSCULAR; INTRAVENOUS at 15:01

## 2024-05-01 RX ADMIN — Medication 2 G: at 14:55

## 2024-05-01 NOTE — PRE-PROCEDURE
GENERAL PRE-PROCEDURE:   Procedure:  Port placement  Date/Time:  5/1/2024 2:48 PM    Written consent obtained?: Yes    Risks and benefits: Risks, benefits and alternatives were discussed    Consent given by:  Patient  Patient states understanding of procedure being performed: Yes    Patient's understanding of procedure matches consent: Yes    Procedure consent matches procedure scheduled: Yes    Expected level of sedation:  Moderate  Appropriately NPO:  Yes  ASA Class:  3  Mallampati  :  Grade 1- soft palate, uvula, tonsillar pillars, and posterior pharyngeal wall visible  Lungs:  Lungs clear with good breath sounds bilaterally  Heart:  Normal heart sounds and rate  History & Physical reviewed:  History and physical reviewed and no updates needed  Statement of review:  I have reviewed the lab findings, diagnostic data, medications, and the plan for sedation

## 2024-05-01 NOTE — PROCEDURES
Interventional Radiology Post-Procedure Note     Patient name:  Bibiana Mcrae  MRN:  [id number]   Date:  5/1/2024     Procedure(s): Right chest port placement    Attending:  Christian    Sedation:  Moderate sedation    Pre/Post Procedure Diagnosis: Lymphoma    Drains/Lines:  Bard slim PowerPort    Specimen(s):  None    Estimated Blood Loss:  Minimal    Complications:  None     Findings:  Uneventful procedure, please see dictation in PACS or under the Imaging tab in EPIC for detailed procedure note.    Plan:  Port ready for immediate use. Patient may discharge in 1 hour if criteria met.      Jerardo Gonzales MD  Vascular & Interventional Radiology  [facility]  5/1/2024  3:25 PM

## 2024-05-01 NOTE — DISCHARGE INSTRUCTIONS
Port Placement Procedure Discharge Instructions:  You had a port placed. A port is a small medical device that is placed under the skin and is connected to a vein with a catheter (thin, flexible tube). Ports can be used to administer IV medications (including chemotherapy), fluids or blood products or for blood lab draws. Please follow the below instructions after your procedure:    Care Instructions:  - If you received sedation for your procedure, do not drive or operate heavy machinery for the rest of the day.  - You may shower beginning tomorrow (post procedure day #1). Do not scrub site until well healed; pat dry gently with a towel.  - You likely have skin adhesive over your port site. Skin adhesive works like a bandage to keep the site covered and protected. Do not use antibiotic ointment or creams/lotions over adhesive as it can break it down. The skin adhesive will peel off on its own (typically in 5-14 days).  - Avoid submerging the port site under water (ex: tub baths, Jacuzzis, lakes, hot tubs and pools) for 10 days or until your site is well healed.  - You may have some discomfort, minimal swelling, redness and/or bruising at your port site/procedure site. You may take over the counter pain medication for discomfort (follow the package directions) or apply an ice pack wrapped in a towel over the site (rotating 20 minutes with ice pack on and 20 minutes with ice pack off) for comfort as needed. It can take several days for these to resolve.  - Avoid heavy lifting (greater than 10 pounds) and strenuous activities for 2 days following your procedure.   - If you experience significant bleeding at site, apply pressure with hands above the clavicle bone, sit upright and seek immediate medical assistance.  - Ports need to be flushed approximately every 4-6 weeks, if not being used more frequently. Follow up with the provider who ordered your port placement for further instructions for this.    Seek medical  evaluation or contact Blelo DAMON RN Line at [phone] if you experience the following:  - Uncontrolled bleeding from port site  - Fever (greater than 101 F (38.3C))  - Purulent (yellow/green/foul smelling) drainage from port insertion site  - Increasing pain at port site  - Increasing redness at port site

## 2024-05-01 NOTE — IR NOTE
Patient Name: Bibiana Mcrae  Medical Record Number: [id number]  Today's Date: 5/1/2024    Procedure: Port  Proceduralist:     Sedation medications administered: 1 mg midazolam and 50 mcg fentanyl   Sedation time: 20 minutes

## 2024-05-02 ENCOUNTER — HOSPITAL ENCOUNTER (OUTPATIENT)
Dept: PET IMAGING | Facility: HOSPITAL | Age: 79
Discharge: HOME | End: 2024-05-02
Attending: INTERNAL MEDICINE | Admitting: INTERNAL MEDICINE
Payer: COMMERCIAL

## 2024-05-02 ENCOUNTER — PATIENT OUTREACH (OUTPATIENT)
Dept: ONCOLOGY | Facility: HOSPITAL | Age: 79
End: 2024-05-02

## 2024-05-02 DIAGNOSIS — C83.398 DIFFUSE LARGE B-CELL LYMPHOMA OF EXTRANODAL SITE: ICD-10-CM

## 2024-05-02 LAB — GLUCOSE BLDC GLUCOMTR-MCNC: 107 MG/DL (ref 70–99)

## 2024-05-02 PROCEDURE — 343N000001 HC RX 343: Performed by: INTERNAL MEDICINE

## 2024-05-02 PROCEDURE — 82962 GLUCOSE BLOOD TEST: CPT

## 2024-05-02 PROCEDURE — 93227 XTRNL ECG REC<48 HR R&I: CPT | Performed by: INTERNAL MEDICINE

## 2024-05-02 PROCEDURE — 78816 PET IMAGE W/CT FULL BODY: CPT | Mod: PI

## 2024-05-02 PROCEDURE — A9552 F18 FDG: HCPCS | Performed by: INTERNAL MEDICINE

## 2024-05-02 RX ORDER — FLUDEOXYGLUCOSE F 18 200 MCI/ML
9-18 INJECTION, SOLUTION INTRAVENOUS ONCE
Status: COMPLETED | OUTPATIENT
Start: 2024-05-02 | End: 2024-05-02

## 2024-05-02 RX ADMIN — FLUDEOXYGLUCOSE F 18 10.03 MILLICURIE: 200 INJECTION, SOLUTION INTRAVENOUS at 07:11

## 2024-05-02 NOTE — TELEPHONE ENCOUNTER
PA Initiation    Medication: RIVASTIGMINE 4.6 MG/24HR TD PT24  Insurance Company: [employer] Part D - Phone [phone] Fax [phone]  Pharmacy Filling the Rx: [id number] [place] [address] AT [place] [address]  Filling Pharmacy Phone: [phone]  Filling Pharmacy Fax: [phone]  Start Date: 5/1/2024

## 2024-05-02 NOTE — PROGRESS NOTES
[facility]: Cancer Care Initial Note                                    Discussion with Patient:                                                      RN Cancer Care Coordinator called patient's daughter with patient on speaker to do chemo teach. Chemotherapy D1C1 is tomorrow. Went over in detail the diagnosis and treatment plan. Answered all questions as best I could.      Antiemetics: discussed in detail. Pt is allergic to compazine. They have Zofran.  Medication understanding/side effect: went through chemo teaching protocol  Port concerns: described how it is used on day of chemo      Assessment:                                                      Initial  Current living arrangement:: I live in a private home  Type of residence:: Private home - stairs  Informal Support system:: Children;Family  Bed or wheelchair confined:: No  Transportation means:: Regular car  Medication adherence problem (GOAL):: No  Knowledgeable about how to use meds:: Yes  Medication side effects suspected:: No  Patient Reported Pain?: No    Daughter Rosa sIela describes that patient lives alone in home. She has home care  nurse and aid to assist. Rosa Isela lives 1.5 miles away. Other daughter, Mary Jo, lives in area too.     When asked how she yobani with this big challenge in her life, pt states that she has had 10 surgeries, and she is strong, having been through many difficulties in her life. She states she has survived all this, and plans to continue to be strong.     Plan of Care Education Review:   Assessment completed with:: Patient    Plan of Care Education   Side effect education:: Nausea/Vomiting;Mouth sores;Diarrhea/Constipation;Lab value monitoring (anemia, neutropenia, thrombocytopenia);Hair loss;Fatigue;Immune-mediated effects;Infection;Neuropathy;Skin changes;Urinary  Supportive services education provided for:: Nutrition;Social work;Oncology behavioral health;Cancer rehab;Home care;Palliative care  Safety/self care at home  reviewed with patient:: Yes  Coping - concerns/fears reviewed with patient:: Yes  Plan of Care:: RK follow-up appointment;Lab appointment;Imaging;MD follow-up appointment;Treatment schedule  When to call provider:: Bleeding;Increased shortness of breath;New/worsening pain;Temperature >100.4F;Shaking chills;Uncontrolled diarrhea/constipation;Uncontrolled nausea/vomiting  Reasons for deferring treatment reviewed with patient:: Yes    Evaluation of Learning  Readiness:: Eager;Acceptance  Method:: Explanation  Response:: Verbalizes understanding         Intervention/Education provided during outreach:                                                       As above.   Offered to bring New Pt Folder over to infusion tomorrow when they are here.     Patient to follow up as scheduled at next appt  Patient to call/CFEnginet message with updates  Confirmed patient has clinic and triage numbers    Rosa Isela is interested in a back up medication for Zofran in case it does not work, and keeping in mind that pt is allergic to Compazine. She is also interested in getting a prescription for Magic Mouthwash. Writer indicated that we do not know if these will be needed at this point, but they can ask while at infusion tomorrow to see if Dr. Florian has a recommendation for this.     She states that they had indicated at some point that they would like to speak with the nutritionist but had not heard from anyone yet. Writer assured them I would pass this on to nutritionistLoli.     Rosa Isela is interested in any resources or support there might be for the patient. Writer will route to KELLY Telles, to reach out next week.    Signature:  Adelaida Gavin RN

## 2024-05-03 ENCOUNTER — NURSE TRIAGE (OUTPATIENT)
Dept: NURSING | Facility: CLINIC | Age: 79
End: 2024-05-03

## 2024-05-03 ENCOUNTER — INFUSION THERAPY VISIT (OUTPATIENT)
Dept: INFUSION THERAPY | Facility: HOSPITAL | Age: 79
End: 2024-05-03
Attending: INTERNAL MEDICINE
Payer: COMMERCIAL

## 2024-05-03 VITALS
BODY MASS INDEX: 19.74 KG/M2 | RESPIRATION RATE: 16 BRPM | OXYGEN SATURATION: 99 % | WEIGHT: 91.2 LBS | HEART RATE: 101 BPM | TEMPERATURE: 97.9 F | SYSTOLIC BLOOD PRESSURE: 123 MMHG | DIASTOLIC BLOOD PRESSURE: 58 MMHG

## 2024-05-03 DIAGNOSIS — T45.1X5A CHEMOTHERAPY-INDUCED NEUTROPENIA (H): ICD-10-CM

## 2024-05-03 DIAGNOSIS — D70.1 CHEMOTHERAPY-INDUCED NEUTROPENIA (H): ICD-10-CM

## 2024-05-03 DIAGNOSIS — C83.33 DIFFUSE LARGE B-CELL LYMPHOMA OF INTRA-ABDOMINAL LYMPH NODES (H): Primary | ICD-10-CM

## 2024-05-03 LAB
ALBUMIN SERPL BCG-MCNC: 3.9 G/DL (ref 3.5–5.2)
ALP SERPL-CCNC: 134 U/L (ref 40–150)
ALT SERPL W P-5'-P-CCNC: 26 U/L (ref 0–50)
ANION GAP SERPL CALCULATED.3IONS-SCNC: 12 MMOL/L (ref 7–15)
AST SERPL W P-5'-P-CCNC: 35 U/L (ref 0–45)
BASOPHILS # BLD AUTO: 0 10E3/UL (ref 0–0.2)
BASOPHILS NFR BLD AUTO: 1 %
BILIRUB SERPL-MCNC: 0.2 MG/DL
BUN SERPL-MCNC: 27.5 MG/DL (ref 8–23)
CALCIUM SERPL-MCNC: 9.9 MG/DL (ref 8.8–10.2)
CHLORIDE SERPL-SCNC: 103 MMOL/L (ref 98–107)
CREAT SERPL-MCNC: 0.8 MG/DL (ref 0.51–0.95)
DEPRECATED HCO3 PLAS-SCNC: 25 MMOL/L (ref 22–29)
EGFRCR SERPLBLD CKD-EPI 2021: 75 ML/MIN/1.73M2
EOSINOPHIL # BLD AUTO: 0 10E3/UL (ref 0–0.7)
EOSINOPHIL NFR BLD AUTO: 1 %
ERYTHROCYTE [DISTWIDTH] IN BLOOD BY AUTOMATED COUNT: 14.6 % (ref 10–15)
GLUCOSE SERPL-MCNC: 193 MG/DL (ref 70–99)
HCT VFR BLD AUTO: 33.6 % (ref 35–47)
HGB BLD-MCNC: 10.8 G/DL (ref 11.7–15.7)
IMM GRANULOCYTES # BLD: 0 10E3/UL
IMM GRANULOCYTES NFR BLD: 0 %
LYMPHOCYTES # BLD AUTO: 0.9 10E3/UL (ref 0.8–5.3)
LYMPHOCYTES NFR BLD AUTO: 11 %
MCH RBC QN AUTO: 30.2 PG (ref 26.5–33)
MCHC RBC AUTO-ENTMCNC: 32.1 G/DL (ref 31.5–36.5)
MCV RBC AUTO: 94 FL (ref 78–100)
MONOCYTES # BLD AUTO: 0.8 10E3/UL (ref 0–1.3)
MONOCYTES NFR BLD AUTO: 9 %
NEUTROPHILS # BLD AUTO: 6.7 10E3/UL (ref 1.6–8.3)
NEUTROPHILS NFR BLD AUTO: 78 %
NRBC # BLD AUTO: 0 10E3/UL
NRBC BLD AUTO-RTO: 0 /100
PLATELET # BLD AUTO: 254 10E3/UL (ref 150–450)
POTASSIUM SERPL-SCNC: 4.1 MMOL/L (ref 3.4–5.3)
PROT SERPL-MCNC: 6.4 G/DL (ref 6.4–8.3)
RBC # BLD AUTO: 3.58 10E6/UL (ref 3.8–5.2)
SODIUM SERPL-SCNC: 140 MMOL/L (ref 135–145)
WBC # BLD AUTO: 8.4 10E3/UL (ref 4–11)

## 2024-05-03 PROCEDURE — 96367 TX/PROPH/DG ADDL SEQ IV INF: CPT

## 2024-05-03 PROCEDURE — 96375 TX/PRO/DX INJ NEW DRUG ADDON: CPT

## 2024-05-03 PROCEDURE — 96415 CHEMO IV INFUSION ADDL HR: CPT

## 2024-05-03 PROCEDURE — 36591 DRAW BLOOD OFF VENOUS DEVICE: CPT | Performed by: INTERNAL MEDICINE

## 2024-05-03 PROCEDURE — 250N000011 HC RX IP 250 OP 636: Performed by: INTERNAL MEDICINE

## 2024-05-03 PROCEDURE — 96413 CHEMO IV INFUSION 1 HR: CPT

## 2024-05-03 PROCEDURE — 87340 HEPATITIS B SURFACE AG IA: CPT | Performed by: INTERNAL MEDICINE

## 2024-05-03 PROCEDURE — 85049 AUTOMATED PLATELET COUNT: CPT | Performed by: INTERNAL MEDICINE

## 2024-05-03 PROCEDURE — 86704 HEP B CORE ANTIBODY TOTAL: CPT | Performed by: INTERNAL MEDICINE

## 2024-05-03 PROCEDURE — 96372 THER/PROPH/DIAG INJ SC/IM: CPT | Performed by: INTERNAL MEDICINE

## 2024-05-03 PROCEDURE — 96417 CHEMO IV INFUS EACH ADDL SEQ: CPT

## 2024-05-03 PROCEDURE — 86706 HEP B SURFACE ANTIBODY: CPT | Performed by: INTERNAL MEDICINE

## 2024-05-03 PROCEDURE — 250N000013 HC RX MED GY IP 250 OP 250 PS 637: Performed by: INTERNAL MEDICINE

## 2024-05-03 PROCEDURE — 258N000003 HC RX IP 258 OP 636: Performed by: INTERNAL MEDICINE

## 2024-05-03 PROCEDURE — 80053 COMPREHEN METABOLIC PANEL: CPT | Performed by: INTERNAL MEDICINE

## 2024-05-03 PROCEDURE — 96411 CHEMO IV PUSH ADDL DRUG: CPT

## 2024-05-03 PROCEDURE — 96377 APPLICATON ON-BODY INJECTOR: CPT | Mod: XS

## 2024-05-03 RX ORDER — METHYLPREDNISOLONE SODIUM SUCCINATE 125 MG/2ML
125 INJECTION, POWDER, LYOPHILIZED, FOR SOLUTION INTRAMUSCULAR; INTRAVENOUS
Status: DISCONTINUED | OUTPATIENT
Start: 2024-05-03 | End: 2024-05-03 | Stop reason: HOSPADM

## 2024-05-03 RX ORDER — PALONOSETRON 0.05 MG/ML
0.25 INJECTION, SOLUTION INTRAVENOUS ONCE
Status: COMPLETED | OUTPATIENT
Start: 2024-05-03 | End: 2024-05-03

## 2024-05-03 RX ORDER — MEPERIDINE HYDROCHLORIDE 25 MG/ML
25 INJECTION INTRAMUSCULAR; INTRAVENOUS; SUBCUTANEOUS EVERY 30 MIN PRN
Status: DISCONTINUED | OUTPATIENT
Start: 2024-05-03 | End: 2024-05-03 | Stop reason: HOSPADM

## 2024-05-03 RX ORDER — ALBUTEROL SULFATE 90 UG/1
1-2 AEROSOL, METERED RESPIRATORY (INHALATION)
Status: DISCONTINUED | OUTPATIENT
Start: 2024-05-03 | End: 2024-05-03 | Stop reason: HOSPADM

## 2024-05-03 RX ORDER — LORAZEPAM 2 MG/ML
0.5 INJECTION INTRAMUSCULAR EVERY 4 HOURS PRN
Status: DISCONTINUED | OUTPATIENT
Start: 2024-05-03 | End: 2024-05-03 | Stop reason: HOSPADM

## 2024-05-03 RX ORDER — DOXORUBICIN HYDROCHLORIDE 2 MG/ML
25 INJECTION, SOLUTION INTRAVENOUS ONCE
Status: COMPLETED | OUTPATIENT
Start: 2024-05-03 | End: 2024-05-03

## 2024-05-03 RX ORDER — EPINEPHRINE 1 MG/ML
0.3 INJECTION, SOLUTION INTRAMUSCULAR; SUBCUTANEOUS EVERY 5 MIN PRN
Status: DISCONTINUED | OUTPATIENT
Start: 2024-05-03 | End: 2024-05-03 | Stop reason: HOSPADM

## 2024-05-03 RX ORDER — DIPHENHYDRAMINE HYDROCHLORIDE 50 MG/ML
50 INJECTION INTRAMUSCULAR; INTRAVENOUS
Status: DISCONTINUED | OUTPATIENT
Start: 2024-05-03 | End: 2024-05-03 | Stop reason: HOSPADM

## 2024-05-03 RX ORDER — ALBUTEROL SULFATE 0.83 MG/ML
2.5 SOLUTION RESPIRATORY (INHALATION)
Status: DISCONTINUED | OUTPATIENT
Start: 2024-05-03 | End: 2024-05-03 | Stop reason: HOSPADM

## 2024-05-03 RX ORDER — ACETAMINOPHEN 325 MG/1
650 TABLET ORAL ONCE
Status: COMPLETED | OUTPATIENT
Start: 2024-05-03 | End: 2024-05-03

## 2024-05-03 RX ORDER — MEPERIDINE HYDROCHLORIDE 25 MG/ML
25 INJECTION INTRAMUSCULAR; INTRAVENOUS; SUBCUTANEOUS
Status: DISCONTINUED | OUTPATIENT
Start: 2024-05-03 | End: 2024-05-03 | Stop reason: HOSPADM

## 2024-05-03 RX ORDER — HEPARIN SODIUM (PORCINE) LOCK FLUSH IV SOLN 100 UNIT/ML 100 UNIT/ML
5 SOLUTION INTRAVENOUS
Status: DISCONTINUED | OUTPATIENT
Start: 2024-05-03 | End: 2024-05-03 | Stop reason: HOSPADM

## 2024-05-03 RX ADMIN — PALONOSETRON 0.25 MG: 0.05 INJECTION, SOLUTION INTRAVENOUS at 09:22

## 2024-05-03 RX ADMIN — PEGFILGRASTIM 6 MG: KIT SUBCUTANEOUS at 14:59

## 2024-05-03 RX ADMIN — SODIUM CHLORIDE 250 ML: 9 INJECTION, SOLUTION INTRAVENOUS at 09:22

## 2024-05-03 RX ADMIN — RITUXIMAB-ABBS 500 MG: 10 INJECTION, SOLUTION INTRAVENOUS at 11:45

## 2024-05-03 RX ADMIN — ACETAMINOPHEN 650 MG: 325 TABLET ORAL at 11:44

## 2024-05-03 RX ADMIN — DIPHENHYDRAMINE HYDROCHLORIDE 50 MG: 50 INJECTION, SOLUTION INTRAMUSCULAR; INTRAVENOUS at 10:19

## 2024-05-03 RX ADMIN — Medication 5 ML: at 15:06

## 2024-05-03 RX ADMIN — DOXORUBICIN HYDROCHLORIDE 30 MG: 2 INJECTION, SOLUTION INTRAVENOUS at 10:37

## 2024-05-03 RX ADMIN — VINCRISTINE SULFATE 1 MG: 1 INJECTION, SOLUTION INTRAVENOUS at 10:46

## 2024-05-03 RX ADMIN — CYCLOPHOSPHAMIDE 500 MG: 500 INJECTION, POWDER, FOR SOLUTION INTRAVENOUS; ORAL at 11:08

## 2024-05-03 RX ADMIN — FOSAPREPITANT 150 MG: 150 INJECTION, POWDER, LYOPHILIZED, FOR SOLUTION INTRAVENOUS at 09:54

## 2024-05-03 NOTE — TELEPHONE ENCOUNTER
PRIOR AUTHORIZATION DENIED    Medication: RIVASTIGMINE 4.6 MG/24HR TD PT24    Insurance Company: [employer] Part D - Phone [phone] Fax [phone]    Denial Date: 5/1/2024    Denial Reason(s): Patient needs to try and fail rivastigmine capsule.     Appeal Information:

## 2024-05-03 NOTE — TELEPHONE ENCOUNTER
Rivastigmine patch denied- see reason        How would you like to proceed?    Candi Smith, CMA on 5/3/2024 at 12:54 PM  [facility]

## 2024-05-03 NOTE — PROGRESS NOTES
Infusion Nursing Note:  Bibiana Mcrae presents today for C1D1.    Patient seen by provider today: No   present during visit today: Not Applicable.    Note: pt arrived with her daughter Rosa Isela to Outpatient infusion for C1D1 of Chemotherapy, POC reviewed with pt and her daughter and she wishes to proceed,  first dose medication education done and printed handouts given to pt daughter,  SAFIA Son brought down chemo folder for pt.  Pt daughter already picked up home medication and she started her steroids,  spoke with SAFIA Turcios who will have Dr. Florian follow up regarding PET scan results at pt request, infusions administered as ordered without incidence,  Neulasta On Pro placed at 1500.  Pt was de accessed and flushed per protocol at completion of treatment, a 2x2 was placed with gauzed over port access. Pt to lobby with walker and her daughter Denita.      Intravenous Access:  Implanted Port.    Treatment Conditions:  Lab Results   Component Value Date    HGB 10.8 (L) 05/03/2024    WBC 8.4 05/03/2024    ANEUTAUTO 6.7 05/03/2024     05/03/2024        Lab Results   Component Value Date     05/03/2024    POTASSIUM 4.1 05/03/2024    MAG 2.2 04/25/2024    CR 0.80 05/03/2024    JUSTIN 9.9 05/03/2024    BILITOTAL 0.2 05/03/2024    ALBUMIN 3.9 05/03/2024    ALT 26 05/03/2024    AST 35 05/03/2024       Results reviewed, labs MET treatment parameters, ok to proceed with treatment.      Post Infusion Assessment:  Patient tolerated infusion without incident.  Blood return noted pre and post infusion.  No evidence of extravasations.  Access discontinued per protocol.       Discharge Plan:   Discharge instructions reviewed with: Patient.  Patient and/or family verbalized understanding of discharge instructions and all questions answered.  Copy of AVS reviewed with patient and/or family and printed out AVS given to pt daughter.    Patient discharged in stable condition accompanied by: daughter.  Departure Mode:  ambulatory with walker.      Charo Evans RN

## 2024-05-04 LAB
HBV CORE AB SERPL QL IA: NONREACTIVE
HBV SURFACE AB SERPL IA-ACNC: <3.5 M[IU]/ML
HBV SURFACE AB SERPL IA-ACNC: NONREACTIVE M[IU]/ML
HBV SURFACE AG SERPL QL IA: NONREACTIVE

## 2024-05-04 NOTE — TELEPHONE ENCOUNTER
Caller:   Rosa Isela, daughter    Situation:   Initial Chemo treatment today  Says patient's abdomen is distended and hard  No BM today; she had BM yesterday - regular  Rosa Isela wants to know what her mom (patient) should do      Background:  lymphoma    [place] cancer    Assessment:  Unable to triage since patient is not w/ caller  She says she left a message for oncology earlier.  On-call Dr. Rebolledo      Recommendation:  Disposition: transferred caller to answering service to page for her.        Raheem Rosenberg RN, BSN  Triage Nurse Advisor      Reason for Disposition   [1] Caller requests to speak ONLY to PCP AND [2] URGENT question    Protocols used: PCP Call - No Triage-A-

## 2024-05-06 ENCOUNTER — PATIENT OUTREACH (OUTPATIENT)
Dept: CARE COORDINATION | Facility: CLINIC | Age: 79
End: 2024-05-06
Payer: COMMERCIAL

## 2024-05-06 ENCOUNTER — PATIENT OUTREACH (OUTPATIENT)
Dept: ONCOLOGY | Facility: HOSPITAL | Age: 79
End: 2024-05-06
Payer: COMMERCIAL

## 2024-05-06 DIAGNOSIS — Z71.89 COUNSELING AND COORDINATION OF CARE: Primary | ICD-10-CM

## 2024-05-06 LAB — INTERPRETATION: NORMAL

## 2024-05-06 NOTE — TELEPHONE ENCOUNTER
"Social Work - Intervention  [facility]  Data/Intervention:    Patient Name: Bibiana Mcrae Goes By: Bibiana    /Age: 1945 (79 year old)     Visit Type: telephone  Referral Source: Adelaida Gavin RN  Reason for Referral: Resources/new diagnosis    Collaborated With:    -Bibiana, pt  -Rosa Isela pt's dtr     Intervention/Education/Resources Provided:  Bibiana is a 79 year old with a diagnosis of diffuse large B cell lymphoma. SW called Bibiana and her dtr Rosa Isela. Introduced SW role and support available. They have a lot of support in place for transportation, meals and PT/OT. Bibiana is also seeing a therapist. She has a strong prabhjot and \"has a lot of prayers coming in.\" They do not have any concerns re finances/insurance. They were glad to know that support is available if needs arise.      Assessment/Plan:  Provided patient/family with contact information and availability.    ANDREW Roman, [facility]  Adult Oncology Clinics  [place] (M,W), [place] (T) & [place] (Th)  *I am off Friday  Office: [phone]   "

## 2024-05-06 NOTE — PROGRESS NOTES
[facility]: Cancer Care                                                                                          Called patient's daughter Rosa Isela to discuss the PET scan results and to go over the patient's Graematter message. Answered any questions the patient's daughter had to the best of my ability. Patient's daughter confirmed chemo regimen schedule. I stated that Dr. Florian would like for the patient to get imaging after cycle 3 and before cycle 4, so I stated that I will have the schedulers reach out to the patient's daughter to get that scheduled. Patient's daughter asked what stage the patient is in. I stated I would ask Dr. Florian. Patient's daughter verbalized understanding.     Signature:  Sophie Cespedes RN

## 2024-05-06 NOTE — TELEPHONE ENCOUNTER
Medication Appeal Initiation    Medication: RIVASTIGMINE 4.6 MG/24HR TD PT24  Appeal Start Date:  5/6/2024  Insurance Company: [employer] Part D - Phone [phone] Fax [phone]   Insurance Phone: [phone]  Insurance Fax: [phone]   Comments:

## 2024-05-07 ENCOUNTER — TELEPHONE (OUTPATIENT)
Dept: FAMILY MEDICINE | Facility: CLINIC | Age: 79
End: 2024-05-07
Payer: COMMERCIAL

## 2024-05-07 DIAGNOSIS — C83.33 DIFFUSE LARGE B-CELL LYMPHOMA OF INTRA-ABDOMINAL LYMPH NODES (H): Primary | ICD-10-CM

## 2024-05-07 NOTE — TELEPHONE ENCOUNTER
Home Care is calling regarding an established patient with [facility].       Requesting orders from: Zack Woody  Provider is following patient: Yes  Is this a 60-day recertification request?  No    Orders Requested    Occupational Therapy  Request for initial certification (first set of orders)   Frequency:  one visit every other week for two visits.   For ADLs, home safety and upper body strengthening.      Information was gathered and will be sent to provider for review.  RN will contact Home Care with information after provider review.  Confirmed ok to leave a detailed message with call back.  Contact information confirmed and updated as needed.    Rustam Alegria RN

## 2024-05-08 ENCOUNTER — TELEPHONE (OUTPATIENT)
Dept: ONCOLOGY | Facility: HOSPITAL | Age: 79
End: 2024-05-08
Payer: COMMERCIAL

## 2024-05-08 NOTE — TELEPHONE ENCOUNTER
Unable to call to check status as appeal dept for this plan doesn't have a phone number.  Refaxed in via right fax asking for an update.

## 2024-05-08 NOTE — TELEPHONE ENCOUNTER
"I received a phone call today from Bibiana's daughter, Rosa Isela.  She is calling because her mother has not had a \"good\" bowel movement since chemotherapy.  She was able to have a couple small stools today.  She does not feel uncomfortable, nor does she have belly pain.  She is able to pass gas.  She was instructed by pharmacist to have a dose of MiraLAX yesterday.  She is wondering if she can have another dose today.  We talked about using MiraLAX daily until her bowels become more regular.  She will call back tomorrow if she does not have a bowel movement.  We talked about potentially needing to use milk of magnesia if this is unsuccessful.  She has no other questions or concerns at this time.    Priya Barrett RN on 5/8/2024 at 3:28 PM    "

## 2024-05-08 NOTE — TELEPHONE ENCOUNTER
Left detailed message for OT Donna of approved requested orders. To call back with any questions or concerns.    ORBY SadlerN, RN  [facility]

## 2024-05-09 ENCOUNTER — TELEPHONE (OUTPATIENT)
Dept: ONCOLOGY | Facility: HOSPITAL | Age: 79
End: 2024-05-09
Payer: COMMERCIAL

## 2024-05-09 RX ORDER — RIVASTIGMINE 4.6 MG/24H
1 PATCH, EXTENDED RELEASE TRANSDERMAL DAILY
Qty: 90 PATCH | Refills: 3 | Status: SHIPPED | OUTPATIENT
Start: 2024-05-09 | End: 2024-07-16

## 2024-05-09 NOTE — TELEPHONE ENCOUNTER
Rivastigmine patch appeal APPROVED  Pharmacy needs new rx  Medication T'd for review and signature  Candi Smith CMA on 5/9/2024 at 2:03 PM  [facility]

## 2024-05-09 NOTE — TELEPHONE ENCOUNTER
MEDICATION APPEAL APPROVED    Medication: RIVASTIGMINE 4.6 MG/24HR TD PT24  Authorization Effective Date: 5/9/2024  Authorization Expiration Date: 12/31/2024  Approved Dose/Quantity:   Reference #:     Appeal Insurance Company: [employer] Part D - Phone [phone] Fax [phone]   Expected CoPay: $       CoPay Card Available:    Financial Assistance Needed:   Filling Pharmacy: [facility] [address] [place] [address] AT [place] [address]  Patient Notified: **Instructed pharmacy to notify patient when script is ready to /ship.**  Comments:       Received call from plan stating case is approved, received approval info verbally.

## 2024-05-09 NOTE — TELEPHONE ENCOUNTER
"I called Bibiana today to check-in to see if she had a bowel movement.  She states that yesterday she took MiraLAX and Colace and she was able to have \"more stool out.\"  She states she is able to have small bowel movements but she has not had her normal \"large\" bowel movement that she is used to prior to starting chemotherapy.  Today she will take MiraLAX and Colace again.  We also talked about trying some warm liquids to help stimulate the bowel.  She appreciated the phone call to check in today.  I encouraged her to call back at anytime with any questions or concerns.    Priya Barrett RN on 5/9/2024 at 9:33 AM    "

## 2024-05-09 NOTE — TELEPHONE ENCOUNTER
Called and reached out to Bibiana per RN request. Spoke with Bibiana and her daughter. Bibiana's daughter reports that her mom's main issue has been constipation. She is taking Miralax and stool softener to help. Her appetite has been good and her weight is up 1 lb. She has oral nutrition supplements on hand and has been drinking these. She is also drinking water well. Encouraged Bibiana to continue to eat well. Encouraged ongoing intake of high fiber foods to help with constipation. Provided my direct phone number and encouraged her to reach out with any questions as she goes through treatment.    Loli Mas, MS, RD, LD  [phone]

## 2024-05-11 DIAGNOSIS — D70.1 CHEMOTHERAPY-INDUCED NEUTROPENIA (H): ICD-10-CM

## 2024-05-11 DIAGNOSIS — C83.33 DIFFUSE LARGE B-CELL LYMPHOMA OF INTRA-ABDOMINAL LYMPH NODES (H): ICD-10-CM

## 2024-05-11 DIAGNOSIS — T45.1X5A CHEMOTHERAPY-INDUCED NEUTROPENIA (H): ICD-10-CM

## 2024-05-13 ENCOUNTER — LAB (OUTPATIENT)
Dept: INFUSION THERAPY | Facility: HOSPITAL | Age: 79
End: 2024-05-13
Attending: INTERNAL MEDICINE
Payer: COMMERCIAL

## 2024-05-13 ENCOUNTER — ONCOLOGY VISIT (OUTPATIENT)
Dept: ONCOLOGY | Facility: HOSPITAL | Age: 79
End: 2024-05-13
Attending: INTERNAL MEDICINE
Payer: COMMERCIAL

## 2024-05-13 VITALS
RESPIRATION RATE: 16 BRPM | DIASTOLIC BLOOD PRESSURE: 77 MMHG | OXYGEN SATURATION: 97 % | HEIGHT: 57 IN | HEART RATE: 103 BPM | SYSTOLIC BLOOD PRESSURE: 140 MMHG | TEMPERATURE: 98 F | BODY MASS INDEX: 19.55 KG/M2 | WEIGHT: 90.6 LBS

## 2024-05-13 DIAGNOSIS — T45.1X5A CHEMOTHERAPY-INDUCED NEUTROPENIA (H): ICD-10-CM

## 2024-05-13 DIAGNOSIS — C83.33 DIFFUSE LARGE B-CELL LYMPHOMA OF INTRA-ABDOMINAL LYMPH NODES (H): Primary | ICD-10-CM

## 2024-05-13 DIAGNOSIS — D70.1 CHEMOTHERAPY-INDUCED NEUTROPENIA (H): ICD-10-CM

## 2024-05-13 LAB
ALBUMIN SERPL BCG-MCNC: 3.8 G/DL (ref 3.5–5.2)
ALP SERPL-CCNC: 225 U/L (ref 40–150)
ALT SERPL W P-5'-P-CCNC: 60 U/L (ref 0–50)
ANION GAP SERPL CALCULATED.3IONS-SCNC: 9 MMOL/L (ref 7–15)
AST SERPL W P-5'-P-CCNC: 41 U/L (ref 0–45)
BASOPHILS # BLD AUTO: ABNORMAL 10*3/UL
BASOPHILS # BLD MANUAL: 0.2 10E3/UL (ref 0–0.2)
BASOPHILS NFR BLD AUTO: ABNORMAL %
BASOPHILS NFR BLD MANUAL: 1 %
BILIRUB SERPL-MCNC: 0.2 MG/DL
BUN SERPL-MCNC: 13.4 MG/DL (ref 8–23)
CALCIUM SERPL-MCNC: 9.4 MG/DL (ref 8.8–10.2)
CHLORIDE SERPL-SCNC: 105 MMOL/L (ref 98–107)
CREAT SERPL-MCNC: 0.72 MG/DL (ref 0.51–0.95)
DEPRECATED HCO3 PLAS-SCNC: 26 MMOL/L (ref 22–29)
EGFRCR SERPLBLD CKD-EPI 2021: 85 ML/MIN/1.73M2
EOSINOPHIL # BLD AUTO: ABNORMAL 10*3/UL
EOSINOPHIL # BLD MANUAL: 0.4 10E3/UL (ref 0–0.7)
EOSINOPHIL NFR BLD AUTO: ABNORMAL %
EOSINOPHIL NFR BLD MANUAL: 2 %
ERYTHROCYTE [DISTWIDTH] IN BLOOD BY AUTOMATED COUNT: 15.3 % (ref 10–15)
GLUCOSE SERPL-MCNC: 151 MG/DL (ref 70–99)
HCT VFR BLD AUTO: 35 % (ref 35–47)
HGB BLD-MCNC: 10.9 G/DL (ref 11.7–15.7)
IMM GRANULOCYTES # BLD: ABNORMAL 10*3/UL
IMM GRANULOCYTES NFR BLD: ABNORMAL %
LYMPHOCYTES # BLD AUTO: ABNORMAL 10*3/UL
LYMPHOCYTES # BLD MANUAL: 1.9 10E3/UL (ref 0.8–5.3)
LYMPHOCYTES NFR BLD AUTO: ABNORMAL %
LYMPHOCYTES NFR BLD MANUAL: 9 %
MAGNESIUM SERPL-MCNC: 2.1 MG/DL (ref 1.7–2.3)
MCH RBC QN AUTO: 29.6 PG (ref 26.5–33)
MCHC RBC AUTO-ENTMCNC: 31.1 G/DL (ref 31.5–36.5)
MCV RBC AUTO: 95 FL (ref 78–100)
METAMYELOCYTES # BLD MANUAL: 1.9 10E3/UL
METAMYELOCYTES NFR BLD MANUAL: 9 %
MONOCYTES # BLD AUTO: ABNORMAL 10*3/UL
MONOCYTES # BLD MANUAL: 1.3 10E3/UL (ref 0–1.3)
MONOCYTES NFR BLD AUTO: ABNORMAL %
MONOCYTES NFR BLD MANUAL: 6 %
MYELOCYTES # BLD MANUAL: 0.8 10E3/UL
MYELOCYTES NFR BLD MANUAL: 4 %
NEUTROPHILS # BLD AUTO: ABNORMAL 10*3/UL
NEUTROPHILS # BLD MANUAL: 14.2 10E3/UL (ref 1.6–8.3)
NEUTROPHILS NFR BLD AUTO: ABNORMAL %
NEUTROPHILS NFR BLD MANUAL: 68 %
NRBC # BLD AUTO: 0.1 10E3/UL
NRBC # BLD AUTO: 0.2 10E3/UL
NRBC BLD AUTO-RTO: 1 /100
NRBC BLD MANUAL-RTO: 1 %
PATH REV: ABNORMAL
PLAT MORPH BLD: ABNORMAL
PLATELET # BLD AUTO: 224 10E3/UL (ref 150–450)
POTASSIUM SERPL-SCNC: 4.2 MMOL/L (ref 3.4–5.3)
PROMYELOCYTES # BLD MANUAL: 0.2 10E3/UL
PROMYELOCYTES NFR BLD MANUAL: 1 %
PROT SERPL-MCNC: 6.4 G/DL (ref 6.4–8.3)
RBC # BLD AUTO: 3.68 10E6/UL (ref 3.8–5.2)
RBC MORPH BLD: ABNORMAL
SODIUM SERPL-SCNC: 140 MMOL/L (ref 135–145)
TOXIC GRANULES BLD QL SMEAR: PRESENT
WBC # BLD AUTO: 20.9 10E3/UL (ref 4–11)

## 2024-05-13 PROCEDURE — 85027 COMPLETE CBC AUTOMATED: CPT | Performed by: INTERNAL MEDICINE

## 2024-05-13 PROCEDURE — 36591 DRAW BLOOD OFF VENOUS DEVICE: CPT | Performed by: INTERNAL MEDICINE

## 2024-05-13 PROCEDURE — 250N000011 HC RX IP 250 OP 636: Performed by: INTERNAL MEDICINE

## 2024-05-13 PROCEDURE — 83735 ASSAY OF MAGNESIUM: CPT | Performed by: INTERNAL MEDICINE

## 2024-05-13 PROCEDURE — 85007 BL SMEAR W/DIFF WBC COUNT: CPT | Performed by: INTERNAL MEDICINE

## 2024-05-13 PROCEDURE — 99214 OFFICE O/P EST MOD 30 MIN: CPT | Performed by: NURSE PRACTITIONER

## 2024-05-13 PROCEDURE — G2211 COMPLEX E/M VISIT ADD ON: HCPCS | Performed by: NURSE PRACTITIONER

## 2024-05-13 PROCEDURE — G0463 HOSPITAL OUTPT CLINIC VISIT: HCPCS | Performed by: NURSE PRACTITIONER

## 2024-05-13 PROCEDURE — 80053 COMPREHEN METABOLIC PANEL: CPT | Performed by: INTERNAL MEDICINE

## 2024-05-13 RX ORDER — HEPARIN SODIUM (PORCINE) LOCK FLUSH IV SOLN 100 UNIT/ML 100 UNIT/ML
5 SOLUTION INTRAVENOUS
Status: DISCONTINUED | OUTPATIENT
Start: 2024-05-13 | End: 2024-05-13 | Stop reason: HOSPADM

## 2024-05-13 RX ORDER — ALLOPURINOL 300 MG/1
TABLET ORAL
Qty: 20 TABLET | Refills: 0 | Status: SHIPPED | OUTPATIENT
Start: 2024-05-13 | End: 2024-06-13

## 2024-05-13 RX ORDER — HEPARIN SODIUM (PORCINE) LOCK FLUSH IV SOLN 100 UNIT/ML 100 UNIT/ML
SOLUTION INTRAVENOUS
Status: COMPLETED
Start: 2024-05-13 | End: 2024-05-13

## 2024-05-13 RX ORDER — HEPARIN SODIUM (PORCINE) LOCK FLUSH IV SOLN 100 UNIT/ML 100 UNIT/ML
5 SOLUTION INTRAVENOUS
Status: CANCELLED | OUTPATIENT
Start: 2024-05-13

## 2024-05-13 RX ORDER — SENNOSIDES 8.6 MG
2 TABLET ORAL DAILY PRN
COMMUNITY

## 2024-05-13 RX ORDER — POLYETHYLENE GLYCOL 3350 17 G/17G
1 POWDER, FOR SOLUTION ORAL DAILY
COMMUNITY

## 2024-05-13 RX ADMIN — Medication 5 ML: at 13:55

## 2024-05-13 RX ADMIN — HEPARIN SODIUM (PORCINE) LOCK FLUSH IV SOLN 100 UNIT/ML 5 ML: 100 SOLUTION at 13:55

## 2024-05-13 ASSESSMENT — PAIN SCALES - GENERAL: PAINLEVEL: MILD PAIN (3)

## 2024-05-13 NOTE — LETTER
"    5/13/2024         RE: Bibiana Mcrae  [address] [facility] [address]        Dear Colleague,    Thank you for referring your patient, Bibiana Mcrae, to the [facility] [place]. Please see a copy of my visit note below.    Oncology Rooming Note    May 13, 2024 2:06 PM   Bibiana Mcrae is a 79 year old female who presents for:    Chief Complaint   Patient presents with     Oncology Clinic Visit     Return visit for mid cycle check. Diffuse large B-cell lymphoma of intra-abdominal lymph nodes     Initial Vitals: BP (!) 140/77 (BP Location: Left arm, Patient Position: Sitting, Cuff Size: Adult Regular)   Pulse 103   Temp 98  F (36.7  C) (Oral)   Resp 16   Ht 1.448 m (4' 9\")   Wt 41.1 kg (90 lb 9.6 oz)   LMP  (LMP Unknown)   SpO2 97%   BMI 19.61 kg/m   Estimated body mass index is 19.61 kg/m  as calculated from the following:    Height as of this encounter: 1.448 m (4' 9\").    Weight as of this encounter: 41.1 kg (90 lb 9.6 oz). Body surface area is 1.29 meters squared.  Mild Pain (3) Comment: Data Unavailable   No LMP recorded (lmp unknown). Patient has had a hysterectomy.  Allergies reviewed: Yes  Medications reviewed: Yes    Medications: Medication refills not needed today.  Pharmacy name entered into Psychiatric:    [id number] [place], [place] - [address] AT [place] [address]  [facility] - [place], [place] - [address] [place] [address]  [facility] - [address]    Frailty Screening:   Is the patient here for a new oncology consult visit in cancer care? 2. No      Clinical concerns: constipation and gas.  Alissa was notified.      Sheree Vasquez, [facility] Progress Note    Patient: Bibiana Mcrae  MRN: [id number]  Date of Service: May 13, 2024          Reason for Visit    Chief Complaint   Patient presents with     Oncology " Clinic Visit     Return visit for mid cycle check. Diffuse large B-cell lymphoma of intra-abdominal lymph nodes       Assessment and Plan    Cancer Staging   No matching staging information was found for the patient.    1.  Diffuse large B-cell lymphoma: Patient has started on treatment with R mini CHOP.  She had her first cycle last week.  Overall it went pretty well and had expected side effects.  The goal is to do 3 cycles and then we will do a PET scan.  It shows improvement we will then complete a total of 6 cycles.  Patient understands the plan.  She will return in 2 weeks for her second cycle.    2.  Constipation and bloating: Likely from the chemotherapy.  We discussed ways to increase her bowel movements including adding in senna S as well as daily MiraLAX.  Encouraged her to stay hydrated and ambulate.  For the gas I encouraged her to take simethicone.  Avoid foods that produce gas like raw vegetables, beans.    3. Elevated liver function tests: likely from chemotherapy. These are mild. We will continue to monitor and make adjustments as necessary.     4. Leukocytosis: likely from neulasta. No signs of infection. they will monitor temp and signs of infection and call us of they occur. Will continue to monitor labs at subsequent visits.       ECOG Performance    1 - Can't do physically strenuous work, but fully ambyulatory and can do light sedentary work    Distress Screening (within last 30 days)    1. How concerned are you about your ability to eat? : 0  2. How concerned are you about unintended weight loss or your current weight? : 0  3. How concerned are you about feeling depressed or very sad? : 0  4. How concerned are you about feeling anxious or very scared? : 0  5. Do you struggle with the loss of meaning and real in your life? : Not at all  6. How concerned are you about work and home life issues that may be affected by your cancer? : 0  7. How concerned are you about knowing what resources are  available to help you? : 0  8. Do you currently have what you would describe as Catholic or spiritual struggles?: Not at all       Pain  Pain Score: Mild Pain (3)  Pain Loc: Abdomen    Problem List    Patient Active Problem List   Diagnosis     Essential hypertension with goal blood pressure less than 130/80     Anxiety     Osteoporosis, senile     Bilateral sensorineural hearing loss     Impaired fasting glucose     Diverticular disease of large intestine     Gastroesophageal reflux disease with esophagitis without hemorrhage     Bronchiectasis without complication (H)     Chronic cough     Incontinence of feces     Irregular bowel habits     Status post dilation of esophageal narrowing     Metal foreign body in eye region     MRI contraindicated due to metal in eye     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 Florian    Diagnosis: Diffuse large B-cell lymphoma.  This was found incidentally in April 2024.  She had a fall and came to the ER and they did a CT scan.  This showed an abdominal mass.  -4/15/24: Biopsy done of abdominal mass that was labeled as pancreatic mass.  It shows a CD20 positive large cell lymphoma.  -5/2/24: Hypermetabolic lymphadenopathy above and below the diaphragm, consistent with biopsy-proven lymphoma. Radiotracer avid mixed lytic and sclerotic observations in the left ischium and right distal femur metaphysis are nonspecific but raise possibility of marrow involvement    Treatment:  -5/3/2024: Patient started R mini CHOP    Interim history:  Is here today for toxicity check.  Overall she states that she is doing pretty well.  Both her and her daughter say that she is doing better than expected.  She did have some fatigue after the chemotherapy.  She had some constipation for about 5 to 7  "days.  She also states that she has some bloating and some gas but overall no nausea or vomiting.  No fevers or infectious complaints.  Denies any breathing issues.  She does live alone and her daughter is asking for resources for her at home.      Review of Systems    Pertinent items are noted in HPI.    Past History    Past Medical History:   Diagnosis Date     Anxiety      Breast cyst     While ago     History of cholecystectomy 08/24/2023       PHYSICAL EXAM  BP (!) 140/77 (BP Location: Left arm, Patient Position: Sitting, Cuff Size: Adult Regular)   Pulse 103   Temp 98  F (36.7  C) (Oral)   Resp 16   Ht 1.448 m (4' 9\")   Wt 41.1 kg (90 lb 9.6 oz)   LMP  (LMP Unknown)   SpO2 97%   BMI 19.61 kg/m      GENERAL: no acute distress. Cooperative in conversation.  With daughter.  Using walker  RESP: Regular respiratory rate. No expiratory wheezes   NEURO: non focal. Alert and oriented x3.   PSYCH: within normal limits. No depression or anxiety.  SKIN: exposed skin is dry intact.     Lab Results    Recent Results (from the past 168 hour(s))   Magnesium   Result Value Ref Range    Magnesium 2.1 1.7 - 2.3 mg/dL   Comprehensive metabolic panel   Result Value Ref Range    Sodium 140 135 - 145 mmol/L    Potassium 4.2 3.4 - 5.3 mmol/L    Carbon Dioxide (CO2) 26 22 - 29 mmol/L    Anion Gap 9 7 - 15 mmol/L    Urea Nitrogen 13.4 8.0 - 23.0 mg/dL    Creatinine 0.72 0.51 - 0.95 mg/dL    GFR Estimate 85 >60 mL/min/1.73m2    Calcium 9.4 8.8 - 10.2 mg/dL    Chloride 105 98 - 107 mmol/L    Glucose 151 (H) 70 - 99 mg/dL    Alkaline Phosphatase 225 (H) 40 - 150 U/L    AST 41 0 - 45 U/L    ALT 60 (H) 0 - 50 U/L    Protein Total 6.4 6.4 - 8.3 g/dL    Albumin 3.8 3.5 - 5.2 g/dL    Bilirubin Total 0.2 <=1.2 mg/dL   CBC with platelets and differential   Result Value Ref Range    WBC Count 20.9 (H) 4.0 - 11.0 10e3/uL    RBC Count 3.68 (L) 3.80 - 5.20 10e6/uL    Hemoglobin 10.9 (L) 11.7 - 15.7 g/dL    Hematocrit 35.0 35.0 - 47.0 %    " MCV 95 78 - 100 fL    MCH 29.6 26.5 - 33.0 pg    MCHC 31.1 (L) 31.5 - 36.5 g/dL    RDW 15.3 (H) 10.0 - 15.0 %    Platelet Count 224 150 - 450 10e3/uL    % Neutrophils      % Lymphocytes      % Monocytes      % Eosinophils      % Basophils      % Immature Granulocytes      NRBCs per 100 WBC 1 (H) <1 /100    Absolute Neutrophils      Absolute Lymphocytes      Absolute Monocytes      Absolute Eosinophils      Absolute Basophils      Absolute Immature Granulocytes      Absolute NRBCs 0.1 10e3/uL   Manual Differential   Result Value Ref Range    % Neutrophils 68 %    % Lymphocytes 9 %    % Monocytes 6 %    % Eosinophils 2 %    % Basophils 1 %    % Metamyelocytes 9 %    % Myelocytes 4 %    % Promyelocytes 1 %    NRBCs per 100 WBC 1 (H) <=0 %    Absolute Neutrophils 14.2 (H) 1.6 - 8.3 10e3/uL    Absolute Lymphocytes 1.9 0.8 - 5.3 10e3/uL    Absolute Monocytes 1.3 0.0 - 1.3 10e3/uL    Absolute Eosinophils 0.4 0.0 - 0.7 10e3/uL    Absolute Basophils 0.2 0.0 - 0.2 10e3/uL    Absolute Metamyelocytes 1.9 (H) <=0.0 10e3/uL    Absolute Myelocytes 0.8 (H) <=0.0 10e3/uL    Absolute Promyelocytes 0.2 (H) <=0.0 10e3/uL    Absolute NRBCs 0.2 (H) <=0.0 10e3/uL    RBC Morphology Confirmed RBC Indices     Platelet Assessment  Automated Count Confirmed. Platelet morphology is normal.     Automated Count Confirmed. Platelet morphology is normal.    Toxic Neutrophils Present (A) None Seen    Pathologist Review Comments (Blood)       Slide reviewed by Dr. Irby 5/13/24 1515. Agree with limited promyelocyte presence, 1-2 per 100 cells.       Imaging    PET Oncology Whole Body    Result Date: 5/2/2024  EXAM: PET ONCOLOGY WHOLE BODY LOCATION: [facility] DATE: 5/2/2024 INDICATION: Subsequent treatment planning and restaging for diffuse large B cell lymphoma, intra-abdominal lymph nodes. Restaging prior to chemotherapy. COMPARISON: April 11, 2024. CONTRAST: None. TECHNIQUE: Serum glucose level 107 mg/dL. One hour  post intravenous administration of 10.03 mCi F-18 FDG, PET imaging was performed from the skull vertex to feet, utilizing attenuation correction with concurrent axial CT and PET/CT image fusion. Dose reduction techniques were used. PET/CT FINDINGS: Bulky multi-conglomerate darrel mass centered in the left upper abdomen, resulting in mass effect on the lesser curvature of the stomach, measuring up to 9.0 x 6.8 x 9.6 cm (SUV max 18.1). Hypermetabolic precaval lymph node (SUV max 6.0).  Additional enlarged 14 x 9 mm mesenteric lymph node to the anterior abdominal wall (SUV max 11.9) as well as a left para-aortic lymph node along the dilated left ureter (SUV max 6.6). Hypermetabolic 8 mm node medial to the right psoas (SUV max 6.1). There is a 5 mm hypermetabolic left internal mammary lymph node (SUV max 5.1). There is an FDG avid mixed lytic and sclerotic lesion in the left ischium (SUV max 6.6) as well as a similar-appearing area in the right femur distal metaphysis (SUV max 3.9). CT FINDINGS: Moderate intracranial senescent changes. Paranasal sinus mucosal thickening with near opacification of the left sphenoid sinus. Right chest port catheter tip in the mid to lower SVC. Severe coronary artery calcifications/stents. Surgical clips around the GE junction. Cholecystectomy. 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 Colonic diverticulosis. Mild to moderate aortobiiliac calcific atherosclerosis. Hysterectomy. Multilevel degenerative disease in the spine. Advanced degenerative disease in the hips. Median sternotomy wires. Lower extremities unremarkable.     IMPRESSION: 1.  Hypermetabolic lymphadenopathy above and below the diaphragm, consistent with biopsy-proven lymphoma. 2.  Radiotracer avid mixed lytic and sclerotic observations in the left ischium and right distal femur metaphysis are nonspecific but raise possibility of marrow  involvement.    Adult Holter Monitor 24 hour    Result Date: 5/2/2024  Holter monitoring (duration 24hrs). Predominant underlying rhythm was sinus rhythm, 83 to 127bpm, average 98bpm. No nonsustained or sustained tachyarrhythmias. No atrial fibrillation. There were no pauses of greater than 3 seconds. Rare supraventricular ectopic beats (4%). Frequent mostly monomorphic premature ventricular contractions (10%). Symptom triggers - none. Electronically signed by Jose R Tovar MD 5/2/2024  4:53 PM Confirmed by JOSE R TOVAR MD LOC:[facility] ([id number]) on 5/2/2024 5:02:06 PM    IR Chest Port Placement > 5 Yrs of Age    Result Date: 5/1/2024  [facility] LOCATION: [facility] DATE: 5/1/2024 PROCEDURE: IMPLANTABLE VENOUS CHEST PORT PLACEMENT INTERVENTIONAL RADIOLOGIST: Jerardo Gonzales MD INDICATION: 79-year-old female with diffuse large B-cell lymphoma presenting for port placement to initiate systemic therapies. CONSENT: The risks, benefits and alternatives of the above procedures were discussed with the patient in detail. All questions were answered. Informed consent was given to proceed. MODERATE SEDATION: Versed 1 mg IV; Fentanyl 50 mcg IV. During the time out and immediately prior to the administration of medications, the patient was reassessed for adequacy to receive conscious sedation.  Under physician supervision, Versed and fentanyl were administered for moderate sedation. Pulse oximetry, heart rate and blood pressure were continuously monitored by an independent trained observer. The physician spent 20 minutes of face-to-face sedation time with the patient. CONTRAST: None. ANTIBIOTICS: Ancef 2 g IV. ADDITIONAL MEDICATIONS: None. FLUOROSCOPIC TIME: 0.5 minutes. RADIATION DOSE: Air Kerma: 2 mGy. COMPLICATIONS: No immediate complications. STERILE BARRIER TECHNIQUE: Maximum sterile barrier technique was used. Cutaneous antisepsis was performed at the operative site with application of  2% chlorhexidine and large sterile drape. Prior to the procedure, the  and assistant performed hand hygiene and wore hat, mask, sterile gown, and sterile gloves during the entire procedure. PROCEDURE:  Using real-time ultrasound guidance and 1% lidocaine for local anesthesia, the right internal jugular vein was accessed with a micropuncture system. A permanent image was stored. The microwire was used to measure intravascular catheter length. The wire was exchanged for a 0.035 inch guidewire via the transitional sheath, and positioned in the IVC. Using 1% lidocaine with epinephrine for local anesthesia, an infraclavicular subcutaneous pocket was created with sharp and blunt dissection, and irrigated with sterile saline. The port was placed within the subcutaneous pocket. The catheter was tunneled  from the pocket to the neck and then trimmed to length. A peel-away sheath was placed over the guidewire, the catheter was advanced through the sheath, and the sheath was removed. The port was tested and locked with heparin. Completion fluoroscopic image of the chest was obtained. The port pocket incision was closed in layers with 3-0 interrupted deep dermal sutures, 4-0 running subcuticular suture, and skin glue. The dermatotomy site was closed with skin glue. FINDINGS: Ultrasound imaging demonstrates an anechoic, compressible, and patent right internal jugular vein. Fluoroscopic spot film at completion of study show that port tip lies along the superior cavoatrial junction. The port aspirates and flushes easily.     IMPRESSION:  Successful implantable venous chest port placement. This port is power injectable and ready for immediate use.    Echocardiogram Complete    Result Date: 2024  [id number]^[email or web address]^MAURO^JUDIE  [facility] [address] [place] [id number]  Name: ALEXANDRA MOSLEY MRN: [id number] :  1945 Study Date: 04/22/2024 03:13 PM Age: 79 yrs Gender: Female Patient Location: [facility] Reason For Study: Bradycardia, AVB (atrioventricular block), Shortness of breath Ordering Physician: MAURO PATTON Referring Physician: MAURO PATTON Performed By: Eddie Brito JOSIAH  BSA: 1.3 m2 Height: 57 in Weight: 90 lb HR: 90 ______________________________________________________________________________ Procedure Echocardiogram with two-dimensional, color and spectral Doppler performed. ______________________________________________________________________________ Interpretation Summary Global and regional left ventricular function is normal with an EF of 60-65%. Right ventricular function, chamber size, wall motion, and thickness are normal. Pulmonary artery systolic pressure cannot be assessed. The inferior vena cava is normal. No pericardial effusion is present. There is no prior study for direct comparison. ______________________________________________________________________________ Left Ventricle Global and regional left ventricular function is normal with an EF of 60-65%. Left ventricular size is normal. Mild concentric wall thickening consistent with left ventricular hypertrophy is present. Grade I or early diastolic dysfunction. No regional wall motion abnormalities are seen.  Right Ventricle Right ventricular function, chamber size, wall motion, and thickness are normal.  Atria The right atria appears normal. Moderate left atrial enlargement is present. The atrial septum is intact as assessed by color Doppler .  Mitral Valve The mitral valve is normal. Mild MVP. Mild mitral insufficiency is present.  Aortic Valve Severe aortic valve calcification is present. Mild aortic insufficiency is present.  Tricuspid Valve The tricuspid valve is normal. Trace to mild tricuspid insufficiency is present. Pulmonary artery systolic pressure cannot be assessed.  Pulmonic Valve The pulmonic valve is  normal.  Vessels The aorta root is normal. The pulmonary artery and bifurcation cannot be assessed. The inferior vena cava is normal.  Pericardium No pericardial effusion is present.  Compared to Previous Study There is no prior study for direct comparison. ______________________________________________________________________________ MMode/2D Measurements & Calculations  IVSd: 1.1 cm LVIDd: 4.3 cm LVIDs: 2.9 cm LVPWd: 0.98 cm FS: 34.3 % LV mass(C)d: 149.1 grams LV mass(C)dI: 116.4 grams/m2 Ao root diam: 2.9 cm asc Aorta Diam: 3.0 cm LVOT diam: 1.9 cm LVOT area: 3.0 cm2 Ao root diam index Ht(cm/m): 2.0 Ao root diam index BSA (cm/m2): 2.3 Asc Ao diam index BSA (cm/m2): 2.3 Asc Ao diam index Ht(cm/m): 2.1 LA Volume (BP): 61.5 ml  LA Volume Index (BP): 48.0 ml/m2 RWT: 0.45  Doppler Measurements & Calculations MV E max fortunato: 63.4 cm/sec MV A max fortunato: 87.5 cm/sec MV E/A: 0.72 MV dec slope: 315.9 cm/sec2 MV dec time: 0.20 sec AI P1/2t: 362.9 msec E/E' avg: 10.6 Lateral E/e': 10.2 Medial E/e': 11.0  ______________________________________________________________________________ Report approved by: Victor M Rizo 04/22/2024 04:13 PM       CT Pancreas Percutaneous Biopsy    Result Date: 4/15/2024  EXAM: 1. PERCUTANEOUS BIOPSY ABDOMINAL MASS 2. CT GUIDANCE 3. CONSCIOUS SEDATION LOCATION: [facility] DATE: 4/15/2024 INDICATION: Abdominal mass. TECHNIQUE: Dose reduction techniques were used. PROCEDURE: Informed consent obtained. Site marked. Prior images reviewed. Required items made available. Patient identity confirmed verbally and with arm band. Patient reevaluated immediately before administering sedation. Universal protocol was followed. Time out performed. The site was prepped and draped in sterile fashion. 10 mL of 1% lidocaine was infused into the local soft tissues. Using standard technique and under direct CT guidance, a 20-gauge biopsy device was used to obtain five core biopsies. Tissue was  submitted to Pathology and was adequate by preliminary review by a pathologist. The patient tolerated the procedure well. No immediate complications. SEDATION: Versed 0.25 mg. Fentanyl 25 mcg. The procedure was performed with administration intravenous conscious sedation with appropriate preoperative, intraoperative, and postoperative evaluation. 21 minutes of supervised face to face conscious sedation time was provided by a radiology nurse under my direct supervision.     IMPRESSION: 1.  Successful CT-guided biopsy left abdominal mass. Reference CPT Codes: 87616, 27691, 84093     The longitudinal plan of care for the diagnosis(es)/condition(s) as documented were addressed during this visit. Due to the added complexity in care, I will continue to support Bibiana in the subsequent management and with ongoing continuity of care.    Signed by: CHRISTOPHER Wu CNP      Again, thank you for allowing me to participate in the care of your patient.        Sincerely,        CHRISTOPHER Wu CNP

## 2024-05-13 NOTE — PROGRESS NOTES
[facility] Hematology and Oncology Progress Note    Patient: Bibiana Mcrae  MRN: [id number]  Date of Service: May 13, 2024          Reason for Visit    Chief Complaint   Patient presents with    Oncology Clinic Visit     Return visit for mid cycle check. Diffuse large B-cell lymphoma of intra-abdominal lymph nodes       Assessment and Plan    Cancer Staging   No matching staging information was found for the patient.    1.  Diffuse large B-cell lymphoma: Patient has started on treatment with R mini CHOP.  She had her first cycle last week.  Overall it went pretty well and had expected side effects.  The goal is to do 3 cycles and then we will do a PET scan.  It shows improvement we will then complete a total of 6 cycles.  Patient understands the plan.  She will return in 2 weeks for her second cycle.    2.  Constipation and bloating: Likely from the chemotherapy.  We discussed ways to increase her bowel movements including adding in senna S as well as daily MiraLAX.  Encouraged her to stay hydrated and ambulate.  For the gas I encouraged her to take simethicone.  Avoid foods that produce gas like raw vegetables, beans.    3. Elevated liver function tests: likely from chemotherapy. These are mild. We will continue to monitor and make adjustments as necessary.     4. Leukocytosis: likely from neulasta. No signs of infection. they will monitor temp and signs of infection and call us of they occur. Will continue to monitor labs at subsequent visits.       ECOG Performance    1 - Can't do physically strenuous work, but fully ambyulatory and can do light sedentary work    Distress Screening (within last 30 days)    1. How concerned are you about your ability to eat? : 0  2. How concerned are you about unintended weight loss or your current weight? : 0  3. How concerned are you about feeling depressed or very sad? : 0  4. How concerned are you about feeling anxious or very scared? : 0  5. Do you struggle with the  loss of meaning and real in your life? : Not at all  6. How concerned are you about work and home life issues that may be affected by your cancer? : 0  7. How concerned are you about knowing what resources are available to help you? : 0  8. Do you currently have what you would describe as Confucianist or spiritual struggles?: Not at all       Pain  Pain Score: Mild Pain (3)  Pain Loc: Abdomen    Problem List    Patient Active Problem List   Diagnosis    Essential hypertension with goal blood pressure less than 130/80    Anxiety    Osteoporosis, senile    Bilateral sensorineural hearing loss    Impaired fasting glucose    Diverticular disease of large intestine    Gastroesophageal reflux disease with esophagitis without hemorrhage    Bronchiectasis without complication (H)    Chronic cough    Incontinence of feces    Irregular bowel habits    Status post dilation of esophageal narrowing    Metal foreign body in eye region    MRI contraindicated due to metal in eye    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 Florian    Diagnosis: Diffuse large B-cell lymphoma.  This was found incidentally in April 2024.  She had a fall and came to the ER and they did a CT scan.  This showed an abdominal mass.  -4/15/24: Biopsy done of abdominal mass that was labeled as pancreatic mass.  It shows a CD20 positive large cell lymphoma.  -5/2/24: Hypermetabolic lymphadenopathy above and below the diaphragm, consistent with biopsy-proven lymphoma. Radiotracer avid mixed lytic and sclerotic observations in the left ischium and right distal femur metaphysis are nonspecific but raise possibility of marrow involvement    Treatment:  -5/3/2024: Patient started R mini CHOP    Interim history:  Is here today for toxicity check.  Overall she states that  "she is doing pretty well.  Both her and her daughter say that she is doing better than expected.  She did have some fatigue after the chemotherapy.  She had some constipation for about 5 to 7 days.  She also states that she has some bloating and some gas but overall no nausea or vomiting.  No fevers or infectious complaints.  Denies any breathing issues.  She does live alone and her daughter is asking for resources for her at home.      Review of Systems    Pertinent items are noted in HPI.    Past History    Past Medical History:   Diagnosis Date    Anxiety     Breast cyst     While ago    History of cholecystectomy 08/24/2023       PHYSICAL EXAM  BP (!) 140/77 (BP Location: Left arm, Patient Position: Sitting, Cuff Size: Adult Regular)   Pulse 103   Temp 98  F (36.7  C) (Oral)   Resp 16   Ht 1.448 m (4' 9\")   Wt 41.1 kg (90 lb 9.6 oz)   LMP  (LMP Unknown)   SpO2 97%   BMI 19.61 kg/m      GENERAL: no acute distress. Cooperative in conversation.  With daughter.  Using walker  RESP: Regular respiratory rate. No expiratory wheezes   NEURO: non focal. Alert and oriented x3.   PSYCH: within normal limits. No depression or anxiety.  SKIN: exposed skin is dry intact.     Lab Results    Recent Results (from the past 168 hour(s))   Magnesium   Result Value Ref Range    Magnesium 2.1 1.7 - 2.3 mg/dL   Comprehensive metabolic panel   Result Value Ref Range    Sodium 140 135 - 145 mmol/L    Potassium 4.2 3.4 - 5.3 mmol/L    Carbon Dioxide (CO2) 26 22 - 29 mmol/L    Anion Gap 9 7 - 15 mmol/L    Urea Nitrogen 13.4 8.0 - 23.0 mg/dL    Creatinine 0.72 0.51 - 0.95 mg/dL    GFR Estimate 85 >60 mL/min/1.73m2    Calcium 9.4 8.8 - 10.2 mg/dL    Chloride 105 98 - 107 mmol/L    Glucose 151 (H) 70 - 99 mg/dL    Alkaline Phosphatase 225 (H) 40 - 150 U/L    AST 41 0 - 45 U/L    ALT 60 (H) 0 - 50 U/L    Protein Total 6.4 6.4 - 8.3 g/dL    Albumin 3.8 3.5 - 5.2 g/dL    Bilirubin Total 0.2 <=1.2 mg/dL   CBC with platelets and " differential   Result Value Ref Range    WBC Count 20.9 (H) 4.0 - 11.0 10e3/uL    RBC Count 3.68 (L) 3.80 - 5.20 10e6/uL    Hemoglobin 10.9 (L) 11.7 - 15.7 g/dL    Hematocrit 35.0 35.0 - 47.0 %    MCV 95 78 - 100 fL    MCH 29.6 26.5 - 33.0 pg    MCHC 31.1 (L) 31.5 - 36.5 g/dL    RDW 15.3 (H) 10.0 - 15.0 %    Platelet Count 224 150 - 450 10e3/uL    % Neutrophils      % Lymphocytes      % Monocytes      % Eosinophils      % Basophils      % Immature Granulocytes      NRBCs per 100 WBC 1 (H) <1 /100    Absolute Neutrophils      Absolute Lymphocytes      Absolute Monocytes      Absolute Eosinophils      Absolute Basophils      Absolute Immature Granulocytes      Absolute NRBCs 0.1 10e3/uL   Manual Differential   Result Value Ref Range    % Neutrophils 68 %    % Lymphocytes 9 %    % Monocytes 6 %    % Eosinophils 2 %    % Basophils 1 %    % Metamyelocytes 9 %    % Myelocytes 4 %    % Promyelocytes 1 %    NRBCs per 100 WBC 1 (H) <=0 %    Absolute Neutrophils 14.2 (H) 1.6 - 8.3 10e3/uL    Absolute Lymphocytes 1.9 0.8 - 5.3 10e3/uL    Absolute Monocytes 1.3 0.0 - 1.3 10e3/uL    Absolute Eosinophils 0.4 0.0 - 0.7 10e3/uL    Absolute Basophils 0.2 0.0 - 0.2 10e3/uL    Absolute Metamyelocytes 1.9 (H) <=0.0 10e3/uL    Absolute Myelocytes 0.8 (H) <=0.0 10e3/uL    Absolute Promyelocytes 0.2 (H) <=0.0 10e3/uL    Absolute NRBCs 0.2 (H) <=0.0 10e3/uL    RBC Morphology Confirmed RBC Indices     Platelet Assessment  Automated Count Confirmed. Platelet morphology is normal.     Automated Count Confirmed. Platelet morphology is normal.    Toxic Neutrophils Present (A) None Seen    Pathologist Review Comments (Blood)       Slide reviewed by Dr. Irby 5/13/24 [id number]. Agree with limited promyelocyte presence, 1-2 per 100 cells.       Imaging    PET Oncology Whole Body    Result Date: 5/2/2024  EXAM: PET ONCOLOGY WHOLE BODY LOCATION: [facility] DATE: 5/2/2024 INDICATION: Subsequent treatment planning and  restaging for diffuse large B cell lymphoma, intra-abdominal lymph nodes. Restaging prior to chemotherapy. COMPARISON: April 11, 2024. CONTRAST: None. TECHNIQUE: Serum glucose level 107 mg/dL. One hour post intravenous administration of 10.03 mCi F-18 FDG, PET imaging was performed from the skull vertex to feet, utilizing attenuation correction with concurrent axial CT and PET/CT image fusion. Dose reduction techniques were used. PET/CT FINDINGS: Bulky multi-conglomerate darrel mass centered in the left upper abdomen, resulting in mass effect on the lesser curvature of the stomach, measuring up to 9.0 x 6.8 x 9.6 cm (SUV max 18.1). Hypermetabolic precaval lymph node (SUV max 6.0).  Additional enlarged 14 x 9 mm mesenteric lymph node to the anterior abdominal wall (SUV max 11.9) as well as a left para-aortic lymph node along the dilated left ureter (SUV max 6.6). Hypermetabolic 8 mm node medial to the right psoas (SUV max 6.1). There is a 5 mm hypermetabolic left internal mammary lymph node (SUV max 5.1). There is an FDG avid mixed lytic and sclerotic lesion in the left ischium (SUV max 6.6) as well as a similar-appearing area in the right femur distal metaphysis (SUV max 3.9). CT FINDINGS: Moderate intracranial senescent changes. Paranasal sinus mucosal thickening with near opacification of the left sphenoid sinus. Right chest port catheter tip in the mid to lower SVC. Severe coronary artery calcifications/stents. Surgical clips around the GE junction. Cholecystectomy. 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 Colonic diverticulosis. Mild to moderate aortobiiliac calcific atherosclerosis. Hysterectomy. Multilevel degenerative disease in the spine. Advanced degenerative disease in the hips. Median sternotomy wires. Lower extremities unremarkable.     IMPRESSION: 1.  Hypermetabolic lymphadenopathy above and below the diaphragm, consistent  with biopsy-proven lymphoma. 2.  Radiotracer avid mixed lytic and sclerotic observations in the left ischium and right distal femur metaphysis are nonspecific but raise possibility of marrow involvement.    Adult Holter Monitor 24 hour    Result Date: 5/2/2024  Holter monitoring (duration 24hrs). Predominant underlying rhythm was sinus rhythm, 83 to 127bpm, average 98bpm. No nonsustained or sustained tachyarrhythmias. No atrial fibrillation. There were no pauses of greater than 3 seconds. Rare supraventricular ectopic beats (4%). Frequent mostly monomorphic premature ventricular contractions (10%). Symptom triggers - none. Electronically signed by Jose R Tovar MD 5/2/2024  4:53 PM Confirmed by JOSE R TOVAR MD LOC:JN ([id number]) on 5/2/2024 5:02:06 PM    IR Chest Port Placement > 5 Yrs of Age    Result Date: 5/1/2024  [place] RADIOLOGY LOCATION: [facility] DATE: 5/1/2024 PROCEDURE: IMPLANTABLE VENOUS CHEST PORT PLACEMENT INTERVENTIONAL RADIOLOGIST: Jerardo Gonzales MD INDICATION: 79-year-old female with diffuse large B-cell lymphoma presenting for port placement to initiate systemic therapies. CONSENT: The risks, benefits and alternatives of the above procedures were discussed with the patient in detail. All questions were answered. Informed consent was given to proceed. MODERATE SEDATION: Versed 1 mg IV; Fentanyl 50 mcg IV. During the time out and immediately prior to the administration of medications, the patient was reassessed for adequacy to receive conscious sedation.  Under physician supervision, Versed and fentanyl were administered for moderate sedation. Pulse oximetry, heart rate and blood pressure were continuously monitored by an independent trained observer. The physician spent 20 minutes of face-to-face sedation time with the patient. CONTRAST: None. ANTIBIOTICS: Ancef 2 g IV. ADDITIONAL MEDICATIONS: None. FLUOROSCOPIC TIME: 0.5 minutes. RADIATION DOSE: Air Kerma: 2 mGy.  COMPLICATIONS: No immediate complications. STERILE BARRIER TECHNIQUE: Maximum sterile barrier technique was used. Cutaneous antisepsis was performed at the operative site with application of 2% chlorhexidine and large sterile drape. Prior to the procedure, the  and assistant performed hand hygiene and wore hat, mask, sterile gown, and sterile gloves during the entire procedure. PROCEDURE:  Using real-time ultrasound guidance and 1% lidocaine for local anesthesia, the right internal jugular vein was accessed with a micropuncture system. A permanent image was stored. The microwire was used to measure intravascular catheter length. The wire was exchanged for a 0.035 inch guidewire via the transitional sheath, and positioned in the IVC. Using 1% lidocaine with epinephrine for local anesthesia, an infraclavicular subcutaneous pocket was created with sharp and blunt dissection, and irrigated with sterile saline. The port was placed within the subcutaneous pocket. The catheter was tunneled  from the pocket to the neck and then trimmed to length. A peel-away sheath was placed over the guidewire, the catheter was advanced through the sheath, and the sheath was removed. The port was tested and locked with heparin. Completion fluoroscopic image of the chest was obtained. The port pocket incision was closed in layers with 3-0 interrupted deep dermal sutures, 4-0 running subcuticular suture, and skin glue. The dermatotomy site was closed with skin glue. FINDINGS: Ultrasound imaging demonstrates an anechoic, compressible, and patent right internal jugular vein. Fluoroscopic spot film at completion of study show that port tip lies along the superior cavoatrial junction. The port aspirates and flushes easily.     IMPRESSION:  Successful implantable venous chest port placement. This port is power injectable and ready for immediate use.    Echocardiogram Complete    Result Date: 4/22/2024  [id number]  [id number]^POOJA^MAURO^JUDIE  [facility],[place] [facility] [address] [place] [address]  Name: ALEXANDRA MOSLEY MRN: [id number] : 1945 Study Date: 2024 03:13 PM Age: 79 yrs Gender: Female Patient Location: [facility] Reason For Study: Bradycardia, AVB (atrioventricular block), Shortness of breath Ordering Physician: MAURO PATTON Referring Physician: MAURO PATTON Performed By: Eddie Brito RDCS  BSA: 1.3 m2 Height: 57 in Weight: 90 lb HR: 90 ______________________________________________________________________________ Procedure Echocardiogram with two-dimensional, color and spectral Doppler performed. ______________________________________________________________________________ Interpretation Summary Global and regional left ventricular function is normal with an EF of 60-65%. Right ventricular function, chamber size, wall motion, and thickness are normal. Pulmonary artery systolic pressure cannot be assessed. The inferior vena cava is normal. No pericardial effusion is present. There is no prior study for direct comparison. ______________________________________________________________________________ Left Ventricle Global and regional left ventricular function is normal with an EF of 60-65%. Left ventricular size is normal. Mild concentric wall thickening consistent with left ventricular hypertrophy is present. Grade I or early diastolic dysfunction. No regional wall motion abnormalities are seen.  Right Ventricle Right ventricular function, chamber size, wall motion, and thickness are normal.  Atria The right atria appears normal. Moderate left atrial enlargement is present. The atrial septum is intact as assessed by color Doppler .  Mitral Valve The mitral valve is normal. Mild MVP. Mild mitral insufficiency is present.  Aortic Valve Severe aortic valve calcification is present. Mild aortic insufficiency is present.   Tricuspid Valve The tricuspid valve is normal. Trace to mild tricuspid insufficiency is present. Pulmonary artery systolic pressure cannot be assessed.  Pulmonic Valve The pulmonic valve is normal.  Vessels The aorta root is normal. The pulmonary artery and bifurcation cannot be assessed. The inferior vena cava is normal.  Pericardium No pericardial effusion is present.  Compared to Previous Study There is no prior study for direct comparison. ______________________________________________________________________________ MMode/2D Measurements & Calculations  IVSd: 1.1 cm LVIDd: 4.3 cm LVIDs: 2.9 cm LVPWd: 0.98 cm FS: 34.3 % LV mass(C)d: 149.1 grams LV mass(C)dI: 116.4 grams/m2 Ao root diam: 2.9 cm asc Aorta Diam: 3.0 cm LVOT diam: 1.9 cm LVOT area: 3.0 cm2 Ao root diam index Ht(cm/m): 2.0 Ao root diam index BSA (cm/m2): 2.3 Asc Ao diam index BSA (cm/m2): 2.3 Asc Ao diam index Ht(cm/m): 2.1 LA Volume (BP): 61.5 ml  LA Volume Index (BP): 48.0 ml/m2 RWT: 0.45  Doppler Measurements & Calculations MV E max fortunato: 63.4 cm/sec MV A max fortunato: 87.5 cm/sec MV E/A: 0.72 MV dec slope: 315.9 cm/sec2 MV dec time: 0.20 sec AI P1/2t: 362.9 msec E/E' avg: 10.6 Lateral E/e': 10.2 Medial E/e': 11.0  ______________________________________________________________________________ Report approved by: Victor M Rizo 04/22/2024 04:13 PM       CT Pancreas Percutaneous Biopsy    Result Date: 4/15/2024  EXAM: 1. PERCUTANEOUS BIOPSY ABDOMINAL MASS 2. CT GUIDANCE 3. CONSCIOUS SEDATION LOCATION: [facility] DATE: 4/15/2024 INDICATION: Abdominal mass. TECHNIQUE: Dose reduction techniques were used. PROCEDURE: Informed consent obtained. Site marked. Prior images reviewed. Required items made available. Patient identity confirmed verbally and with arm band. Patient reevaluated immediately before administering sedation. Universal protocol was followed. Time out performed. The site was prepped and draped in sterile fashion. 10 mL of  1% lidocaine was infused into the local soft tissues. Using standard technique and under direct CT guidance, a 20-gauge biopsy device was used to obtain five core biopsies. Tissue was submitted to Pathology and was adequate by preliminary review by a pathologist. The patient tolerated the procedure well. No immediate complications. SEDATION: Versed 0.25 mg. Fentanyl 25 mcg. The procedure was performed with administration intravenous conscious sedation with appropriate preoperative, intraoperative, and postoperative evaluation. 21 minutes of supervised face to face conscious sedation time was provided by a radiology nurse under my direct supervision.     IMPRESSION: 1.  Successful CT-guided biopsy left abdominal mass. Reference CPT Codes: 35164, 12351, 00410     The longitudinal plan of care for the diagnosis(es)/condition(s) as documented were addressed during this visit. Due to the added complexity in care, I will continue to support Bibiana in the subsequent management and with ongoing continuity of care.    Signed by: CHRISTOPHER Wu CNP

## 2024-05-13 NOTE — PROGRESS NOTES
"Oncology Rooming Note    May 13, 2024 2:06 PM   Bibiana Mcrae is a 79 year old female who presents for:    Chief Complaint   Patient presents with    Oncology Clinic Visit     Return visit for mid cycle check. Diffuse large B-cell lymphoma of intra-abdominal lymph nodes     Initial Vitals: BP (!) 140/77 (BP Location: Left arm, Patient Position: Sitting, Cuff Size: Adult Regular)   Pulse 103   Temp 98  F (36.7  C) (Oral)   Resp 16   Ht 1.448 m (4' 9\")   Wt 41.1 kg (90 lb 9.6 oz)   LMP  (LMP Unknown)   SpO2 97%   BMI 19.61 kg/m   Estimated body mass index is 19.61 kg/m  as calculated from the following:    Height as of this encounter: 1.448 m (4' 9\").    Weight as of this encounter: 41.1 kg (90 lb 9.6 oz). Body surface area is 1.29 meters squared.  Mild Pain (3) Comment: Data Unavailable   No LMP recorded (lmp unknown). Patient has had a hysterectomy.  Allergies reviewed: Yes  Medications reviewed: Yes    Medications: Medication refills not needed today.  Pharmacy name entered into [facility]:    [id number] [address] AT [place] [address]  [facility] - [place], [place] - [address] [place] [address]  [facility] - [place], [place] - [address] [place] [address]    Frailty Screening:   Is the patient here for a new oncology consult visit in cancer care? 2. No      Clinical concerns: constipation and gas.  Alissa was notified.      Sheree Vasquez CMA            "

## 2024-05-15 ENCOUNTER — TELEPHONE (OUTPATIENT)
Dept: ONCOLOGY | Facility: HOSPITAL | Age: 79
End: 2024-05-15
Payer: COMMERCIAL

## 2024-05-15 NOTE — TELEPHONE ENCOUNTER
I received a phone call today from Bibiana's daughter, Rosa Isela.  She is calling because she picked up Bibiana's allopurinol from the pharmacy.  She is wondering how long she will need to be on this medication.  I discussed this with Dr. Florian today.  He would like her to take allopurinol for 2 more weeks and then stop.  I called Rosa Isela back with this information.  She verbalized understanding and agrees with this plan.    Priya Barrett RN on 5/15/2024 at 2:51 PM

## 2024-05-16 ENCOUNTER — PATIENT OUTREACH (OUTPATIENT)
Dept: CARE COORDINATION | Facility: CLINIC | Age: 79
End: 2024-05-16
Payer: COMMERCIAL

## 2024-05-16 NOTE — TELEPHONE ENCOUNTER
Social Work - Follow-Up  [facility]    Data/Intervention:    Patient Name: Bibiana Mcrae Goes By: Bibiana LOPEZB/Age: 1945 (79 year old)    Reason for Follow-Up:  Resources    Intervention/Education/Resources Provided:  SW reached out to pt's dtr Rosa Isela barboza resource needs. She was wondering if there is a resource that provides light housekeeping for pt's. Gave her information about [employer] and offered to make referral. She will talk with her mom and let SW know if referral is needed. No other needs.     Assessment/Plan:  Previously provided patient/family with writer's contact information and availability.    ANDREW Roman, [facility]  Adult Oncology Clinics  [place] (M,W), [place] (T) & [place] ()  *I am off Friday  Office: [phone]

## 2024-05-23 ENCOUNTER — ONCOLOGY VISIT (OUTPATIENT)
Dept: ONCOLOGY | Facility: HOSPITAL | Age: 79
End: 2024-05-23
Attending: INTERNAL MEDICINE
Payer: COMMERCIAL

## 2024-05-23 ENCOUNTER — LAB (OUTPATIENT)
Dept: INFUSION THERAPY | Facility: HOSPITAL | Age: 79
End: 2024-05-23
Attending: PSYCHIATRY & NEUROLOGY
Payer: COMMERCIAL

## 2024-05-23 ENCOUNTER — TELEPHONE (OUTPATIENT)
Dept: ONCOLOGY | Facility: HOSPITAL | Age: 79
End: 2024-05-23

## 2024-05-23 VITALS
DIASTOLIC BLOOD PRESSURE: 86 MMHG | BODY MASS INDEX: 19.7 KG/M2 | SYSTOLIC BLOOD PRESSURE: 163 MMHG | HEIGHT: 57 IN | WEIGHT: 91.3 LBS | HEART RATE: 116 BPM | OXYGEN SATURATION: 98 % | TEMPERATURE: 98.3 F | RESPIRATION RATE: 16 BRPM

## 2024-05-23 DIAGNOSIS — C83.33 DIFFUSE LARGE B-CELL LYMPHOMA OF INTRA-ABDOMINAL LYMPH NODES (H): ICD-10-CM

## 2024-05-23 DIAGNOSIS — D70.1 CHEMOTHERAPY-INDUCED NEUTROPENIA (H): Primary | ICD-10-CM

## 2024-05-23 DIAGNOSIS — T45.1X5A CHEMOTHERAPY-INDUCED NEUTROPENIA (H): Primary | ICD-10-CM

## 2024-05-23 DIAGNOSIS — C83.33 DIFFUSE LARGE B-CELL LYMPHOMA OF INTRA-ABDOMINAL LYMPH NODES (H): Primary | ICD-10-CM

## 2024-05-23 DIAGNOSIS — T45.1X5A ALOPECIA DUE TO CYTOTOXIC DRUG: ICD-10-CM

## 2024-05-23 DIAGNOSIS — L65.8 ALOPECIA DUE TO CYTOTOXIC DRUG: ICD-10-CM

## 2024-05-23 DIAGNOSIS — D70.1 CHEMOTHERAPY-INDUCED NEUTROPENIA (H): ICD-10-CM

## 2024-05-23 DIAGNOSIS — T45.1X5A CHEMOTHERAPY-INDUCED NEUTROPENIA (H): ICD-10-CM

## 2024-05-23 LAB
ALBUMIN SERPL BCG-MCNC: 3.9 G/DL (ref 3.5–5.2)
ALP SERPL-CCNC: 151 U/L (ref 40–150)
ALT SERPL W P-5'-P-CCNC: 26 U/L (ref 0–50)
ANION GAP SERPL CALCULATED.3IONS-SCNC: 13 MMOL/L (ref 7–15)
AST SERPL W P-5'-P-CCNC: 28 U/L (ref 0–45)
BASOPHILS # BLD AUTO: 0.1 10E3/UL (ref 0–0.2)
BASOPHILS NFR BLD AUTO: 0 %
BILIRUB SERPL-MCNC: 0.2 MG/DL
BUN SERPL-MCNC: 18.5 MG/DL (ref 8–23)
CALCIUM SERPL-MCNC: 8.9 MG/DL (ref 8.8–10.2)
CHLORIDE SERPL-SCNC: 103 MMOL/L (ref 98–107)
CREAT SERPL-MCNC: 0.65 MG/DL (ref 0.51–0.95)
DEPRECATED HCO3 PLAS-SCNC: 24 MMOL/L (ref 22–29)
EGFRCR SERPLBLD CKD-EPI 2021: 89 ML/MIN/1.73M2
EOSINOPHIL # BLD AUTO: 0 10E3/UL (ref 0–0.7)
EOSINOPHIL NFR BLD AUTO: 0 %
ERYTHROCYTE [DISTWIDTH] IN BLOOD BY AUTOMATED COUNT: 15.8 % (ref 10–15)
GLUCOSE SERPL-MCNC: 247 MG/DL (ref 70–99)
HCT VFR BLD AUTO: 34.9 % (ref 35–47)
HGB BLD-MCNC: 11 G/DL (ref 11.7–15.7)
IMM GRANULOCYTES # BLD: 0.2 10E3/UL
IMM GRANULOCYTES NFR BLD: 2 %
LYMPHOCYTES # BLD AUTO: 1.1 10E3/UL (ref 0.8–5.3)
LYMPHOCYTES NFR BLD AUTO: 9 %
MCH RBC QN AUTO: 29.7 PG (ref 26.5–33)
MCHC RBC AUTO-ENTMCNC: 31.5 G/DL (ref 31.5–36.5)
MCV RBC AUTO: 94 FL (ref 78–100)
MONOCYTES # BLD AUTO: 0.5 10E3/UL (ref 0–1.3)
MONOCYTES NFR BLD AUTO: 4 %
NEUTROPHILS # BLD AUTO: 10.2 10E3/UL (ref 1.6–8.3)
NEUTROPHILS NFR BLD AUTO: 85 %
NRBC # BLD AUTO: 0 10E3/UL
NRBC BLD AUTO-RTO: 0 /100
PLATELET # BLD AUTO: 335 10E3/UL (ref 150–450)
POTASSIUM SERPL-SCNC: 4 MMOL/L (ref 3.4–5.3)
PROT SERPL-MCNC: 6.3 G/DL (ref 6.4–8.3)
RBC # BLD AUTO: 3.7 10E6/UL (ref 3.8–5.2)
SODIUM SERPL-SCNC: 140 MMOL/L (ref 135–145)
WBC # BLD AUTO: 12.1 10E3/UL (ref 4–11)

## 2024-05-23 PROCEDURE — G2211 COMPLEX E/M VISIT ADD ON: HCPCS | Performed by: NURSE PRACTITIONER

## 2024-05-23 PROCEDURE — 96417 CHEMO IV INFUS EACH ADDL SEQ: CPT

## 2024-05-23 PROCEDURE — 96413 CHEMO IV INFUSION 1 HR: CPT

## 2024-05-23 PROCEDURE — 96372 THER/PROPH/DIAG INJ SC/IM: CPT | Performed by: NURSE PRACTITIONER

## 2024-05-23 PROCEDURE — 96375 TX/PRO/DX INJ NEW DRUG ADDON: CPT

## 2024-05-23 PROCEDURE — 96377 APPLICATON ON-BODY INJECTOR: CPT | Mod: XS | Performed by: NURSE PRACTITIONER

## 2024-05-23 PROCEDURE — 250N000013 HC RX MED GY IP 250 OP 250 PS 637: Performed by: NURSE PRACTITIONER

## 2024-05-23 PROCEDURE — 80053 COMPREHEN METABOLIC PANEL: CPT | Performed by: NURSE PRACTITIONER

## 2024-05-23 PROCEDURE — 85025 COMPLETE CBC W/AUTO DIFF WBC: CPT | Performed by: NURSE PRACTITIONER

## 2024-05-23 PROCEDURE — G0463 HOSPITAL OUTPT CLINIC VISIT: HCPCS | Mod: 25 | Performed by: NURSE PRACTITIONER

## 2024-05-23 PROCEDURE — 96411 CHEMO IV PUSH ADDL DRUG: CPT

## 2024-05-23 PROCEDURE — 258N000003 HC RX IP 258 OP 636: Performed by: NURSE PRACTITIONER

## 2024-05-23 PROCEDURE — 99214 OFFICE O/P EST MOD 30 MIN: CPT | Performed by: NURSE PRACTITIONER

## 2024-05-23 PROCEDURE — 96415 CHEMO IV INFUSION ADDL HR: CPT

## 2024-05-23 PROCEDURE — 96367 TX/PROPH/DG ADDL SEQ IV INF: CPT

## 2024-05-23 PROCEDURE — 250N000011 HC RX IP 250 OP 636: Performed by: NURSE PRACTITIONER

## 2024-05-23 PROCEDURE — 36591 DRAW BLOOD OFF VENOUS DEVICE: CPT | Performed by: NURSE PRACTITIONER

## 2024-05-23 RX ORDER — PALONOSETRON 0.05 MG/ML
0.25 INJECTION, SOLUTION INTRAVENOUS ONCE
Status: CANCELLED
Start: 2024-05-23

## 2024-05-23 RX ORDER — DOXORUBICIN HYDROCHLORIDE 2 MG/ML
25 INJECTION, SOLUTION INTRAVENOUS ONCE
Status: CANCELLED | OUTPATIENT
Start: 2024-06-12

## 2024-05-23 RX ORDER — DIPHENHYDRAMINE HYDROCHLORIDE 50 MG/ML
50 INJECTION INTRAMUSCULAR; INTRAVENOUS
Status: DISCONTINUED | OUTPATIENT
Start: 2024-05-23 | End: 2024-05-23 | Stop reason: HOSPADM

## 2024-05-23 RX ORDER — ALBUTEROL SULFATE 0.83 MG/ML
2.5 SOLUTION RESPIRATORY (INHALATION)
Status: DISCONTINUED | OUTPATIENT
Start: 2024-05-23 | End: 2024-05-23 | Stop reason: HOSPADM

## 2024-05-23 RX ORDER — EPINEPHRINE 1 MG/ML
0.3 INJECTION, SOLUTION INTRAMUSCULAR; SUBCUTANEOUS EVERY 5 MIN PRN
Status: CANCELLED | OUTPATIENT
Start: 2024-05-23

## 2024-05-23 RX ORDER — MEPERIDINE HYDROCHLORIDE 25 MG/ML
25 INJECTION INTRAMUSCULAR; INTRAVENOUS; SUBCUTANEOUS EVERY 30 MIN PRN
Status: DISCONTINUED | OUTPATIENT
Start: 2024-05-23 | End: 2024-05-23 | Stop reason: HOSPADM

## 2024-05-23 RX ORDER — DOXORUBICIN HYDROCHLORIDE 2 MG/ML
25 INJECTION, SOLUTION INTRAVENOUS ONCE
Status: CANCELLED | OUTPATIENT
Start: 2024-05-23

## 2024-05-23 RX ORDER — MEPERIDINE HYDROCHLORIDE 25 MG/ML
25 INJECTION INTRAMUSCULAR; INTRAVENOUS; SUBCUTANEOUS
Status: CANCELLED
Start: 2024-06-12

## 2024-05-23 RX ORDER — HEPARIN SODIUM (PORCINE) LOCK FLUSH IV SOLN 100 UNIT/ML 100 UNIT/ML
5 SOLUTION INTRAVENOUS
Status: CANCELLED | OUTPATIENT
Start: 2024-06-12

## 2024-05-23 RX ORDER — PALONOSETRON 0.05 MG/ML
0.25 INJECTION, SOLUTION INTRAVENOUS ONCE
Status: CANCELLED
Start: 2024-06-12

## 2024-05-23 RX ORDER — HEPARIN SODIUM,PORCINE 10 UNIT/ML
5-20 VIAL (ML) INTRAVENOUS DAILY PRN
Status: CANCELLED | OUTPATIENT
Start: 2024-05-23

## 2024-05-23 RX ORDER — HEPARIN SODIUM (PORCINE) LOCK FLUSH IV SOLN 100 UNIT/ML 100 UNIT/ML
5 SOLUTION INTRAVENOUS
Status: DISCONTINUED | OUTPATIENT
Start: 2024-05-23 | End: 2024-05-23 | Stop reason: HOSPADM

## 2024-05-23 RX ORDER — MEPERIDINE HYDROCHLORIDE 25 MG/ML
25 INJECTION INTRAMUSCULAR; INTRAVENOUS; SUBCUTANEOUS EVERY 30 MIN PRN
Status: CANCELLED | OUTPATIENT
Start: 2024-05-23

## 2024-05-23 RX ORDER — MEPERIDINE HYDROCHLORIDE 25 MG/ML
25 INJECTION INTRAMUSCULAR; INTRAVENOUS; SUBCUTANEOUS EVERY 30 MIN PRN
Status: CANCELLED | OUTPATIENT
Start: 2024-06-12

## 2024-05-23 RX ORDER — ALBUTEROL SULFATE 0.83 MG/ML
2.5 SOLUTION RESPIRATORY (INHALATION)
Status: CANCELLED | OUTPATIENT
Start: 2024-06-12

## 2024-05-23 RX ORDER — ACETAMINOPHEN 325 MG/1
650 TABLET ORAL ONCE
Status: CANCELLED
Start: 2024-06-12

## 2024-05-23 RX ORDER — DIPHENHYDRAMINE HYDROCHLORIDE 50 MG/ML
50 INJECTION INTRAMUSCULAR; INTRAVENOUS
Status: CANCELLED
Start: 2024-06-12

## 2024-05-23 RX ORDER — ALBUTEROL SULFATE 90 UG/1
1-2 AEROSOL, METERED RESPIRATORY (INHALATION)
Status: CANCELLED
Start: 2024-06-12

## 2024-05-23 RX ORDER — MEPERIDINE HYDROCHLORIDE 25 MG/ML
25 INJECTION INTRAMUSCULAR; INTRAVENOUS; SUBCUTANEOUS
Status: CANCELLED
Start: 2024-05-23

## 2024-05-23 RX ORDER — HEPARIN SODIUM,PORCINE 10 UNIT/ML
5-20 VIAL (ML) INTRAVENOUS DAILY PRN
Status: CANCELLED | OUTPATIENT
Start: 2024-06-12

## 2024-05-23 RX ORDER — DIPHENHYDRAMINE HCL 50 MG
50 CAPSULE ORAL ONCE
Status: CANCELLED
Start: 2024-06-12

## 2024-05-23 RX ORDER — LORAZEPAM 2 MG/ML
0.5 INJECTION INTRAMUSCULAR EVERY 4 HOURS PRN
Status: CANCELLED | OUTPATIENT
Start: 2024-05-23

## 2024-05-23 RX ORDER — LORAZEPAM 2 MG/ML
0.5 INJECTION INTRAMUSCULAR EVERY 4 HOURS PRN
Status: CANCELLED | OUTPATIENT
Start: 2024-06-12

## 2024-05-23 RX ORDER — EPINEPHRINE 1 MG/ML
0.3 INJECTION, SOLUTION INTRAMUSCULAR; SUBCUTANEOUS EVERY 5 MIN PRN
Status: CANCELLED | OUTPATIENT
Start: 2024-06-12

## 2024-05-23 RX ORDER — ALBUTEROL SULFATE 0.83 MG/ML
2.5 SOLUTION RESPIRATORY (INHALATION)
Status: CANCELLED | OUTPATIENT
Start: 2024-05-23

## 2024-05-23 RX ORDER — HEPARIN SODIUM (PORCINE) LOCK FLUSH IV SOLN 100 UNIT/ML 100 UNIT/ML
5 SOLUTION INTRAVENOUS
Status: CANCELLED | OUTPATIENT
Start: 2024-05-23

## 2024-05-23 RX ORDER — METHYLPREDNISOLONE SODIUM SUCCINATE 125 MG/2ML
125 INJECTION, POWDER, LYOPHILIZED, FOR SOLUTION INTRAMUSCULAR; INTRAVENOUS
Status: CANCELLED
Start: 2024-06-12

## 2024-05-23 RX ORDER — ACETAMINOPHEN 325 MG/1
650 TABLET ORAL ONCE
Status: CANCELLED
Start: 2024-05-23

## 2024-05-23 RX ORDER — DIPHENHYDRAMINE HYDROCHLORIDE 50 MG/ML
50 INJECTION INTRAMUSCULAR; INTRAVENOUS
Status: CANCELLED
Start: 2024-05-23

## 2024-05-23 RX ORDER — EPINEPHRINE 1 MG/ML
0.3 INJECTION, SOLUTION INTRAMUSCULAR; SUBCUTANEOUS EVERY 5 MIN PRN
Status: DISCONTINUED | OUTPATIENT
Start: 2024-05-23 | End: 2024-05-23 | Stop reason: HOSPADM

## 2024-05-23 RX ORDER — DOXORUBICIN HYDROCHLORIDE 2 MG/ML
25 INJECTION, SOLUTION INTRAVENOUS ONCE
Status: COMPLETED | OUTPATIENT
Start: 2024-05-23 | End: 2024-05-23

## 2024-05-23 RX ORDER — PALONOSETRON 0.05 MG/ML
0.25 INJECTION, SOLUTION INTRAVENOUS ONCE
Status: COMPLETED | OUTPATIENT
Start: 2024-05-23 | End: 2024-05-23

## 2024-05-23 RX ORDER — ALBUTEROL SULFATE 90 UG/1
1-2 AEROSOL, METERED RESPIRATORY (INHALATION)
Status: CANCELLED
Start: 2024-05-23

## 2024-05-23 RX ORDER — METHYLPREDNISOLONE SODIUM SUCCINATE 125 MG/2ML
125 INJECTION, POWDER, LYOPHILIZED, FOR SOLUTION INTRAMUSCULAR; INTRAVENOUS
Status: CANCELLED
Start: 2024-05-23

## 2024-05-23 RX ORDER — METHYLPREDNISOLONE SODIUM SUCCINATE 125 MG/2ML
125 INJECTION, POWDER, LYOPHILIZED, FOR SOLUTION INTRAMUSCULAR; INTRAVENOUS
Status: DISCONTINUED | OUTPATIENT
Start: 2024-05-23 | End: 2024-05-23 | Stop reason: HOSPADM

## 2024-05-23 RX ORDER — DIPHENHYDRAMINE HCL 50 MG
50 CAPSULE ORAL ONCE
Status: COMPLETED | OUTPATIENT
Start: 2024-05-23 | End: 2024-05-23

## 2024-05-23 RX ORDER — DIPHENHYDRAMINE HCL 50 MG
50 CAPSULE ORAL ONCE
Status: CANCELLED
Start: 2024-05-23

## 2024-05-23 RX ORDER — ACETAMINOPHEN 325 MG/1
650 TABLET ORAL ONCE
Status: COMPLETED | OUTPATIENT
Start: 2024-05-23 | End: 2024-05-23

## 2024-05-23 RX ORDER — ALBUTEROL SULFATE 90 UG/1
1-2 AEROSOL, METERED RESPIRATORY (INHALATION)
Status: DISCONTINUED | OUTPATIENT
Start: 2024-05-23 | End: 2024-05-23 | Stop reason: HOSPADM

## 2024-05-23 RX ADMIN — DIPHENHYDRAMINE HYDROCHLORIDE 50 MG: 50 CAPSULE ORAL at 10:35

## 2024-05-23 RX ADMIN — ACETAMINOPHEN 650 MG: 325 TABLET ORAL at 10:35

## 2024-05-23 RX ADMIN — SODIUM CHLORIDE 250 ML: 9 INJECTION, SOLUTION INTRAVENOUS at 09:28

## 2024-05-23 RX ADMIN — PEGFILGRASTIM 6 MG: KIT SUBCUTANEOUS at 13:05

## 2024-05-23 RX ADMIN — DOXORUBICIN HYDROCHLORIDE 30 MG: 2 INJECTION, SOLUTION INTRAVENOUS at 10:13

## 2024-05-23 RX ADMIN — VINCRISTINE SULFATE 1 MG: 1 INJECTION, SOLUTION INTRAVENOUS at 10:24

## 2024-05-23 RX ADMIN — PALONOSETRON 0.25 MG: 0.05 INJECTION, SOLUTION INTRAVENOUS at 09:29

## 2024-05-23 RX ADMIN — Medication 5 ML: at 13:02

## 2024-05-23 RX ADMIN — CYCLOPHOSPHAMIDE 500 MG: 500 INJECTION, POWDER, FOR SOLUTION INTRAVENOUS; ORAL at 10:37

## 2024-05-23 RX ADMIN — RITUXIMAB-ABBS 500 MG: 10 INJECTION, SOLUTION INTRAVENOUS at 11:14

## 2024-05-23 RX ADMIN — FOSAPREPITANT 150 MG: 150 INJECTION, POWDER, LYOPHILIZED, FOR SOLUTION INTRAVENOUS at 09:32

## 2024-05-23 ASSESSMENT — PAIN SCALES - GENERAL: PAINLEVEL: NO PAIN (0)

## 2024-05-23 NOTE — LETTER
5/23/2024         RE: Bibiana Mcrae  [address] [facility] [address] [facility] [address]        Dear Colleague,    Thank you for referring your patient, Bibiana Mcrae, to the [facility] [place]. Please see a copy of my visit note below.    [facility] Hematology and Oncology Progress Note    Patient: Bibiana Mcrae  MRN: [id number]  Date of Service: May 23, 2024          Reason for Visit    Chief Complaint   Patient presents with     Oncology Clinic Visit     Return visit with lab and infusion. Diffuse large B-cell lymphoma of intra-abdominal lymph nodes.       Assessment and Plan     Cancer Staging   No matching staging information was found for the patient.    1.  Diffuse large B-cell lymphoma: Patient has started on treatment with R mini CHOP.  She had her first cycle and it went pretty well and had expected side effects. Pt will get cycle 2 today and return in 3 weeks for cycle 3.  Patient's labs are adequate for treatment today.  The goal is to do 3 cycles and then we will do a PET scan.  It shows improvement we will then complete a total of 6 cycles.     2.  Insomnia: new issue. Could be from treatment and/or anxiety with new diagnosis. Encourage her to increase melatonin. If not effective try OTC tylenol pm (monitor for symptoms). We also discussed good sleep hygiene.     3. Elevated liver function tests: likely from chemotherapy. These are mild. We will continue to monitor and make adjustments as necessary.     4. Leukocytosis: likely from neulasta and getting better from 2 weeks ago. No signs of infection. they will monitor temp and signs of infection and call us of they occur. Will continue to monitor labs at subsequent visits.       ECOG Performance    1 - Can't do physically strenuous work, but fully ambyulatory and can do light sedentary work    Distress Screening (within last 30 days)    1. How concerned are you about your ability to eat? : 0  2. How concerned are you about  unintended weight loss or your current weight? : 0  3. How concerned are you about feeling depressed or very sad? : 0  4. How concerned are you about feeling anxious or very scared? : 0  5. Do you struggle with the loss of meaning and real in your life? : Not at all  6. How concerned are you about work and home life issues that may be affected by your cancer? : 0  7. How concerned are you about knowing what resources are available to help you? : 0  8. Do you currently have what you would describe as Restorationist or spiritual struggles?: Not at all       Pain  Pain Score: No Pain (0)    Problem List    Patient Active Problem List   Diagnosis     Essential hypertension with goal blood pressure less than 130/80     Anxiety     Osteoporosis, senile     Bilateral sensorineural hearing loss     Impaired fasting glucose     Diverticular disease of large intestine     Gastroesophageal reflux disease with esophagitis without hemorrhage     Bronchiectasis without complication (H)     Chronic cough     Incontinence of feces     Irregular bowel habits     Status post dilation of esophageal narrowing     Metal foreign body in eye region     MRI contraindicated due to metal in eye     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 Florian    Diagnosis: Diffuse large B-cell lymphoma.  This was found incidentally in April 2024.  She had a fall and came to the ER and they did a CT scan.  This showed an abdominal mass.  -4/15/24: Biopsy done of abdominal mass that was labeled as pancreatic mass.  It shows a CD20 positive large cell lymphoma.  -5/2/24: Hypermetabolic lymphadenopathy above and below the diaphragm, consistent with biopsy-proven lymphoma. Radiotracer avid mixed lytic and sclerotic observations in the left ischium and right distal  "femur metaphysis are nonspecific but raise possibility of marrow involvement    Treatment:  -5/3/2024: Patient started R mini CHOP. Today is cycle 2.     Interim history:  Patient is here today for her second cycle.  Overall she says she has been doing pretty well the last 10 days.  The only new issue she has that she has been having some sleep issues.  She says she can fall asleep but she wakes up in the middle of the night and cannot fall back asleep.  This is somewhat new for her.  She does take melatonin.  She feels like it might be a little bit anxiety related.  She denies any new bone or back pain.  Denies any constitutional symptoms.  Denies any infectious complaints      Review of Systems    Pertinent items are noted in HPI.    Past History    Past Medical History:   Diagnosis Date     Anxiety      Breast cyst     While ago     History of cholecystectomy 08/24/2023       PHYSICAL EXAM  BP (!) 163/86 (BP Location: Left arm, Patient Position: Sitting, Cuff Size: Adult Small)   Pulse 116   Temp 98.3  F (36.8  C) (Oral)   Resp 16   Ht 1.448 m (4' 9\")   Wt 41.4 kg (91 lb 4.8 oz)   LMP  (LMP Unknown)   SpO2 98%   BMI 19.76 kg/m      GENERAL: no acute distress. Cooperative in conversation.  With daughter.  Using walker  RESP: Regular respiratory rate. No expiratory wheezes   NEURO: non focal. Alert and oriented x3.   PSYCH: within normal limits. No depression or anxiety.  SKIN: exposed skin is dry intact.     Lab Results    Recent Results (from the past 168 hour(s))   Comprehensive metabolic panel   Result Value Ref Range    Sodium 140 135 - 145 mmol/L    Potassium 4.0 3.4 - 5.3 mmol/L    Carbon Dioxide (CO2) 24 22 - 29 mmol/L    Anion Gap 13 7 - 15 mmol/L    Urea Nitrogen 18.5 8.0 - 23.0 mg/dL    Creatinine 0.65 0.51 - 0.95 mg/dL    GFR Estimate 89 >60 mL/min/1.73m2    Calcium 8.9 8.8 - 10.2 mg/dL    Chloride 103 98 - 107 mmol/L    Glucose 247 (H) 70 - 99 mg/dL    Alkaline Phosphatase 151 (H) 40 - 150 U/L "    AST 28 0 - 45 U/L    ALT 26 0 - 50 U/L    Protein Total 6.3 (L) 6.4 - 8.3 g/dL    Albumin 3.9 3.5 - 5.2 g/dL    Bilirubin Total 0.2 <=1.2 mg/dL   CBC with platelets and differential   Result Value Ref Range    WBC Count 12.1 (H) 4.0 - 11.0 10e3/uL    RBC Count 3.70 (L) 3.80 - 5.20 10e6/uL    Hemoglobin 11.0 (L) 11.7 - 15.7 g/dL    Hematocrit 34.9 (L) 35.0 - 47.0 %    MCV 94 78 - 100 fL    MCH 29.7 26.5 - 33.0 pg    MCHC 31.5 31.5 - 36.5 g/dL    RDW 15.8 (H) 10.0 - 15.0 %    Platelet Count 335 150 - 450 10e3/uL    % Neutrophils 85 %    % Lymphocytes 9 %    % Monocytes 4 %    % Eosinophils 0 %    % Basophils 0 %    % Immature Granulocytes 2 %    NRBCs per 100 WBC 0 <1 /100    Absolute Neutrophils 10.2 (H) 1.6 - 8.3 10e3/uL    Absolute Lymphocytes 1.1 0.8 - 5.3 10e3/uL    Absolute Monocytes 0.5 0.0 - 1.3 10e3/uL    Absolute Eosinophils 0.0 0.0 - 0.7 10e3/uL    Absolute Basophils 0.1 0.0 - 0.2 10e3/uL    Absolute Immature Granulocytes 0.2 <=0.4 10e3/uL    Absolute NRBCs 0.0 10e3/uL         Imaging    PET Oncology Whole Body    Result Date: 5/2/2024  EXAM: PET ONCOLOGY WHOLE BODY LOCATION: [facility] DATE: 5/2/2024 INDICATION: Subsequent treatment planning and restaging for diffuse large B cell lymphoma, intra-abdominal lymph nodes. Restaging prior to chemotherapy. COMPARISON: April 11, 2024. CONTRAST: None. TECHNIQUE: Serum glucose level 107 mg/dL. One hour post intravenous administration of 10.03 mCi F-18 FDG, PET imaging was performed from the skull vertex to feet, utilizing attenuation correction with concurrent axial CT and PET/CT image fusion. Dose reduction techniques were used. PET/CT FINDINGS: Bulky multi-conglomerate darrel mass centered in the left upper abdomen, resulting in mass effect on the lesser curvature of the stomach, measuring up to 9.0 x 6.8 x 9.6 cm (SUV max 18.1). Hypermetabolic precaval lymph node (SUV max 6.0).  Additional enlarged 14 x 9 mm mesenteric lymph node to  the anterior abdominal wall (SUV max 11.9) as well as a left para-aortic lymph node along the dilated left ureter (SUV max 6.6). Hypermetabolic 8 mm node medial to the right psoas (SUV max 6.1). There is a 5 mm hypermetabolic left internal mammary lymph node (SUV max 5.1). There is an FDG avid mixed lytic and sclerotic lesion in the left ischium (SUV max 6.6) as well as a similar-appearing area in the right femur distal metaphysis (SUV max 3.9). CT FINDINGS: Moderate intracranial senescent changes. Paranasal sinus mucosal thickening with near opacification of the left sphenoid sinus. Right chest port catheter tip in the mid to lower SVC. Severe coronary artery calcifications/stents. Surgical clips around the GE junction. Cholecystectomy. 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 Colonic diverticulosis. Mild to moderate aortobiiliac calcific atherosclerosis. Hysterectomy. Multilevel degenerative disease in the spine. Advanced degenerative disease in the hips. Median sternotomy wires. Lower extremities unremarkable.     IMPRESSION: 1.  Hypermetabolic lymphadenopathy above and below the diaphragm, consistent with biopsy-proven lymphoma. 2.  Radiotracer avid mixed lytic and sclerotic observations in the left ischium and right distal femur metaphysis are nonspecific but raise possibility of marrow involvement.    Adult Holter Monitor 24 hour    Result Date: 5/2/2024  Holter monitoring (duration 24hrs). Predominant underlying rhythm was sinus rhythm, 83 to 127bpm, average 98bpm. No nonsustained or sustained tachyarrhythmias. No atrial fibrillation. There were no pauses of greater than 3 seconds. Rare supraventricular ectopic beats (4%). Frequent mostly monomorphic premature ventricular contractions (10%). Symptom triggers - none. Electronically signed by Jose R Tovar MD 5/2/2024  4:53 PM Confirmed by JOSE R TOVAR MD LOC:JN ([id number])  on 5/2/2024 5:02:06 PM    IR Chest Port Placement > 5 Yrs of Age    Result Date: 5/1/2024  [facility] LOCATION: [facility] DATE: 5/1/2024 PROCEDURE: IMPLANTABLE VENOUS CHEST PORT PLACEMENT INTERVENTIONAL RADIOLOGIST: Jerardo Gonzales MD INDICATION: 79-year-old female with diffuse large B-cell lymphoma presenting for port placement to initiate systemic therapies. CONSENT: The risks, benefits and alternatives of the above procedures were discussed with the patient in detail. All questions were answered. Informed consent was given to proceed. MODERATE SEDATION: Versed 1 mg IV; Fentanyl 50 mcg IV. During the time out and immediately prior to the administration of medications, the patient was reassessed for adequacy to receive conscious sedation.  Under physician supervision, Versed and fentanyl were administered for moderate sedation. Pulse oximetry, heart rate and blood pressure were continuously monitored by an independent trained observer. The physician spent 20 minutes of face-to-face sedation time with the patient. CONTRAST: None. ANTIBIOTICS: Ancef 2 g IV. ADDITIONAL MEDICATIONS: None. FLUOROSCOPIC TIME: 0.5 minutes. RADIATION DOSE: Air Kerma: 2 mGy. COMPLICATIONS: No immediate complications. STERILE BARRIER TECHNIQUE: Maximum sterile barrier technique was used. Cutaneous antisepsis was performed at the operative site with application of 2% chlorhexidine and large sterile drape. Prior to the procedure, the  and assistant performed hand hygiene and wore hat, mask, sterile gown, and sterile gloves during the entire procedure. PROCEDURE:  Using real-time ultrasound guidance and 1% lidocaine for local anesthesia, the right internal jugular vein was accessed with a micropuncture system. A permanent image was stored. The microwire was used to measure intravascular catheter length. The wire was exchanged for a 0.035 inch guidewire via the transitional sheath, and positioned in the  "IVC. Using 1% lidocaine with epinephrine for local anesthesia, an infraclavicular subcutaneous pocket was created with sharp and blunt dissection, and irrigated with sterile saline. The port was placed within the subcutaneous pocket. The catheter was tunneled  from the pocket to the neck and then trimmed to length. A peel-away sheath was placed over the guidewire, the catheter was advanced through the sheath, and the sheath was removed. The port was tested and locked with heparin. Completion fluoroscopic image of the chest was obtained. The port pocket incision was closed in layers with 3-0 interrupted deep dermal sutures, 4-0 running subcuticular suture, and skin glue. The dermatotomy site was closed with skin glue. FINDINGS: Ultrasound imaging demonstrates an anechoic, compressible, and patent right internal jugular vein. Fluoroscopic spot film at completion of study show that port tip lies along the superior cavoatrial junction. The port aspirates and flushes easily.     IMPRESSION:  Successful implantable venous chest port placement. This port is power injectable and ready for immediate use.     The longitudinal plan of care for the diagnosis(es)/condition(s) as documented were addressed during this visit. Due to the added complexity in care, I will continue to support Bibiana in the subsequent management and with ongoing continuity of care.    Signed by: CHRISTOPHER Wu CNP    Oncology Rooming Note    May 23, 2024 8:40 AM   Bibiana Mcrae is a 79 year old female who presents for:    Chief Complaint   Patient presents with     Oncology Clinic Visit     Return visit with lab and infusion. Diffuse large B-cell lymphoma of intra-abdominal lymph nodes.     Initial Vitals: BP (!) 163/86 (BP Location: Left arm, Patient Position: Sitting, Cuff Size: Adult Small)   Pulse 116   Temp 98.3  F (36.8  C) (Oral)   Resp 16   Ht 1.448 m (4' 9\")   Wt 41.4 kg (91 lb 4.8 oz)   LMP  (LMP Unknown)   SpO2 98%   BMI " "19.76 kg/m   Estimated body mass index is 19.76 kg/m  as calculated from the following:    Height as of this encounter: 1.448 m (4' 9\").    Weight as of this encounter: 41.4 kg (91 lb 4.8 oz). Body surface area is 1.29 meters squared.  No Pain (0) Comment: Data Unavailable   No LMP recorded (lmp unknown). Patient has had a hysterectomy.  Allergies reviewed: Yes  Medications reviewed: Yes    Medications: Medication refills not needed today.  Pharmacy name entered into Smart Checkout:    [id number] [place], [place] - [address] AT [place] [address]  [facility] - [place], [place] - [address] [place] [address]  [facility] - [address]    Frailty Screening:   Is the patient here for a new oncology consult visit in cancer care? 2. No      Clinical concerns: none       Sheree Vasquez CMA                Again, thank you for allowing me to participate in the care of your patient.        Sincerely,        CHRISTOPHER Wu CNP  "

## 2024-05-23 NOTE — PROGRESS NOTES
"Oncology Rooming Note    May 23, 2024 8:40 AM   Bibiana Mcrae is a 79 year old female who presents for:    Chief Complaint   Patient presents with    Oncology Clinic Visit     Return visit with lab and infusion. Diffuse large B-cell lymphoma of intra-abdominal lymph nodes.     Initial Vitals: BP (!) 163/86 (BP Location: Left arm, Patient Position: Sitting, Cuff Size: Adult Small)   Pulse 116   Temp 98.3  F (36.8  C) (Oral)   Resp 16   Ht 1.448 m (4' 9\")   Wt 41.4 kg (91 lb 4.8 oz)   LMP  (LMP Unknown)   SpO2 98%   BMI 19.76 kg/m   Estimated body mass index is 19.76 kg/m  as calculated from the following:    Height as of this encounter: 1.448 m (4' 9\").    Weight as of this encounter: 41.4 kg (91 lb 4.8 oz). Body surface area is 1.29 meters squared.  No Pain (0) Comment: Data Unavailable   No LMP recorded (lmp unknown). Patient has had a hysterectomy.  Allergies reviewed: Yes  Medications reviewed: Yes    Medications: Medication refills not needed today.  Pharmacy name entered into KnowledgeVision:    [id number] [address] AT [place] [address]  [facility] - [place], [place] - [address] [place] [address]  [facility] - [place], [place] - [address] [place] [address]    Frailty Screening:   Is the patient here for a new oncology consult visit in cancer care? 2. No      Clinical concerns: none       Sheree Vasquez CMA              "

## 2024-05-23 NOTE — PROGRESS NOTES
Infusion Nursing Note:  Bibiana Mcrae presents today for D1C2 of treatment.    Patient seen by provider today: Yes, scheduled appointment with DEANNA Maxwell.   present during visit today: Not Applicable.    Note: Bibiana comes to infusion in stable condition and seated in the front room. Port already accessed from a lab draw today prior to appointment with the CNP. Port flushed, excellent blood return noted. Discussed today's treatment. All questions answered. States tolerated the first cycle of treatment well with the exception of constipation. Now has a good plan in place in regards to this. Noted blood sugar is elevated today. Per DEANNA Maxwell- Will just monitor for now. Suspect the increased blood sugar is from the Prednisone. The pre medications and treatment were administered as ordered. Neulasta On Pro attached to her left lower abdomen. Informed Bibiana to keep on until 5:15 p.m. tomorrow. Voiced understanding. Left infusion in stable condition with her daughter. Will return in three weeks. Per Alissa, no mid cycle lab check needed.     Intravenous Access:  Labs drawn without difficulty.  Implanted Port.    Treatment Conditions:  Lab Results   Component Value Date    HGB 11.0 (L) 05/23/2024    WBC 12.1 (H) 05/23/2024    ANEU 14.2 (H) 05/13/2024    ANEUTAUTO 10.2 (H) 05/23/2024     05/23/2024     Lab Results   Component Value Date     05/23/2024    POTASSIUM 4.0 05/23/2024    MAG 2.1 05/13/2024    CR 0.65 05/23/2024    JUSTIN 8.9 05/23/2024    BILITOTAL 0.2 05/23/2024    ALBUMIN 3.9 05/23/2024    ALT 26 05/23/2024    AST 28 05/23/2024     Results reviewed, labs MET treatment parameters, ok to proceed with treatment.    Post Infusion Assessment:  Patient tolerated infusion without incident.  Blood return noted pre and post infusion.  Site patent and intact, free from redness, edema or discomfort.  No evidence of extravasations.  Access discontinued per protocol.     Discharge Plan:    Discharge instructions reviewed with: Patient.  Patient and/or family verbalized understanding of discharge instructions and all questions answered.  AVS to patient via [employer].  Patient will return on 6/13 for next appointment.   Patient discharged in stable condition accompanied by: daughter.  Departure Mode: Ambulatory.    Eve Maynard RN

## 2024-05-23 NOTE — PROGRESS NOTES
[facility] Hematology and Oncology Progress Note    Patient: Bibiana Mcrae  MRN: [id number]  Date of Service: May 23, 2024          Reason for Visit    Chief Complaint   Patient presents with    Oncology Clinic Visit     Return visit with lab and infusion. Diffuse large B-cell lymphoma of intra-abdominal lymph nodes.       Assessment and Plan     Cancer Staging   No matching staging information was found for the patient.    1.  Diffuse large B-cell lymphoma: Patient has started on treatment with R mini CHOP.  She had her first cycle and it went pretty well and had expected side effects. Pt will get cycle 2 today and return in 3 weeks for cycle 3.  Patient's labs are adequate for treatment today.  The goal is to do 3 cycles and then we will do a PET scan.  It shows improvement we will then complete a total of 6 cycles.     2.  Insomnia: new issue. Could be from treatment and/or anxiety with new diagnosis. Encourage her to increase melatonin. If not effective try OTC tylenol pm (monitor for symptoms). We also discussed good sleep hygiene.     3. Elevated liver function tests: likely from chemotherapy. These are mild. We will continue to monitor and make adjustments as necessary.     4. Leukocytosis: likely from neulasta and getting better from 2 weeks ago. No signs of infection. they will monitor temp and signs of infection and call us of they occur. Will continue to monitor labs at subsequent visits.       ECOG Performance    1 - Can't do physically strenuous work, but fully ambyulatory and can do light sedentary work    Distress Screening (within last 30 days)    1. How concerned are you about your ability to eat? : 0  2. How concerned are you about unintended weight loss or your current weight? : 0  3. How concerned are you about feeling depressed or very sad? : 0  4. How concerned are you about feeling anxious or very scared? : 0  5. Do you struggle with the loss of meaning and real in your life? : Not at  all  6. How concerned are you about work and home life issues that may be affected by your cancer? : 0  7. How concerned are you about knowing what resources are available to help you? : 0  8. Do you currently have what you would describe as Baptism or spiritual struggles?: Not at all       Pain  Pain Score: No Pain (0)    Problem List    Patient Active Problem List   Diagnosis    Essential hypertension with goal blood pressure less than 130/80    Anxiety    Osteoporosis, senile    Bilateral sensorineural hearing loss    Impaired fasting glucose    Diverticular disease of large intestine    Gastroesophageal reflux disease with esophagitis without hemorrhage    Bronchiectasis without complication (H)    Chronic cough    Incontinence of feces    Irregular bowel habits    Status post dilation of esophageal narrowing    Metal foreign body in eye region    MRI contraindicated due to metal in eye    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 Florian    Diagnosis: Diffuse large B-cell lymphoma.  This was found incidentally in April 2024.  She had a fall and came to the ER and they did a CT scan.  This showed an abdominal mass.  -4/15/24: Biopsy done of abdominal mass that was labeled as pancreatic mass.  It shows a CD20 positive large cell lymphoma.  -5/2/24: Hypermetabolic lymphadenopathy above and below the diaphragm, consistent with biopsy-proven lymphoma. Radiotracer avid mixed lytic and sclerotic observations in the left ischium and right distal femur metaphysis are nonspecific but raise possibility of marrow involvement    Treatment:  -5/3/2024: Patient started R mini CHOP. Today is cycle 2.     Interim history:  Patient is here today for her second cycle.  Overall she says she has been doing pretty well the last 10  "days.  The only new issue she has that she has been having some sleep issues.  She says she can fall asleep but she wakes up in the middle of the night and cannot fall back asleep.  This is somewhat new for her.  She does take melatonin.  She feels like it might be a little bit anxiety related.  She denies any new bone or back pain.  Denies any constitutional symptoms.  Denies any infectious complaints      Review of Systems    Pertinent items are noted in HPI.    Past History    Past Medical History:   Diagnosis Date    Anxiety     Breast cyst     While ago    History of cholecystectomy 08/24/2023       PHYSICAL EXAM  BP (!) 163/86 (BP Location: Left arm, Patient Position: Sitting, Cuff Size: Adult Small)   Pulse 116   Temp 98.3  F (36.8  C) (Oral)   Resp 16   Ht 1.448 m (4' 9\")   Wt 41.4 kg (91 lb 4.8 oz)   LMP  (LMP Unknown)   SpO2 98%   BMI 19.76 kg/m      GENERAL: no acute distress. Cooperative in conversation.  With daughter.  Using walker  RESP: Regular respiratory rate. No expiratory wheezes   NEURO: non focal. Alert and oriented x3.   PSYCH: within normal limits. No depression or anxiety.  SKIN: exposed skin is dry intact.     Lab Results    Recent Results (from the past 168 hour(s))   Comprehensive metabolic panel   Result Value Ref Range    Sodium 140 135 - 145 mmol/L    Potassium 4.0 3.4 - 5.3 mmol/L    Carbon Dioxide (CO2) 24 22 - 29 mmol/L    Anion Gap 13 7 - 15 mmol/L    Urea Nitrogen 18.5 8.0 - 23.0 mg/dL    Creatinine 0.65 0.51 - 0.95 mg/dL    GFR Estimate 89 >60 mL/min/1.73m2    Calcium 8.9 8.8 - 10.2 mg/dL    Chloride 103 98 - 107 mmol/L    Glucose 247 (H) 70 - 99 mg/dL    Alkaline Phosphatase 151 (H) 40 - 150 U/L    AST 28 0 - 45 U/L    ALT 26 0 - 50 U/L    Protein Total 6.3 (L) 6.4 - 8.3 g/dL    Albumin 3.9 3.5 - 5.2 g/dL    Bilirubin Total 0.2 <=1.2 mg/dL   CBC with platelets and differential   Result Value Ref Range    WBC Count 12.1 (H) 4.0 - 11.0 10e3/uL    RBC Count 3.70 (L) 3.80 - " 5.20 10e6/uL    Hemoglobin 11.0 (L) 11.7 - 15.7 g/dL    Hematocrit 34.9 (L) 35.0 - 47.0 %    MCV 94 78 - 100 fL    MCH 29.7 26.5 - 33.0 pg    MCHC 31.5 31.5 - 36.5 g/dL    RDW 15.8 (H) 10.0 - 15.0 %    Platelet Count 335 150 - 450 10e3/uL    % Neutrophils 85 %    % Lymphocytes 9 %    % Monocytes 4 %    % Eosinophils 0 %    % Basophils 0 %    % Immature Granulocytes 2 %    NRBCs per 100 WBC 0 <1 /100    Absolute Neutrophils 10.2 (H) 1.6 - 8.3 10e3/uL    Absolute Lymphocytes 1.1 0.8 - 5.3 10e3/uL    Absolute Monocytes 0.5 0.0 - 1.3 10e3/uL    Absolute Eosinophils 0.0 0.0 - 0.7 10e3/uL    Absolute Basophils 0.1 0.0 - 0.2 10e3/uL    Absolute Immature Granulocytes 0.2 <=0.4 10e3/uL    Absolute NRBCs 0.0 10e3/uL         Imaging    PET Oncology Whole Body    Result Date: 5/2/2024  EXAM: PET ONCOLOGY WHOLE BODY LOCATION: [facility] DATE: 5/2/2024 INDICATION: Subsequent treatment planning and restaging for diffuse large B cell lymphoma, intra-abdominal lymph nodes. Restaging prior to chemotherapy. COMPARISON: April 11, 2024. CONTRAST: None. TECHNIQUE: Serum glucose level 107 mg/dL. One hour post intravenous administration of 10.03 mCi F-18 FDG, PET imaging was performed from the skull vertex to feet, utilizing attenuation correction with concurrent axial CT and PET/CT image fusion. Dose reduction techniques were used. PET/CT FINDINGS: Bulky multi-conglomerate darrel mass centered in the left upper abdomen, resulting in mass effect on the lesser curvature of the stomach, measuring up to 9.0 x 6.8 x 9.6 cm (SUV max 18.1). Hypermetabolic precaval lymph node (SUV max 6.0).  Additional enlarged 14 x 9 mm mesenteric lymph node to the anterior abdominal wall (SUV max 11.9) as well as a left para-aortic lymph node along the dilated left ureter (SUV max 6.6). Hypermetabolic 8 mm node medial to the right psoas (SUV max 6.1). There is a 5 mm hypermetabolic left internal mammary lymph node (SUV max 5.1). There is  an FDG avid mixed lytic and sclerotic lesion in the left ischium (SUV max 6.6) as well as a similar-appearing area in the right femur distal metaphysis (SUV max 3.9). CT FINDINGS: Moderate intracranial senescent changes. Paranasal sinus mucosal thickening with near opacification of the left sphenoid sinus. Right chest port catheter tip in the mid to lower SVC. Severe coronary artery calcifications/stents. Surgical clips around the GE junction. Cholecystectomy. 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 Colonic diverticulosis. Mild to moderate aortobiiliac calcific atherosclerosis. Hysterectomy. Multilevel degenerative disease in the spine. Advanced degenerative disease in the hips. Median sternotomy wires. Lower extremities unremarkable.     IMPRESSION: 1.  Hypermetabolic lymphadenopathy above and below the diaphragm, consistent with biopsy-proven lymphoma. 2.  Radiotracer avid mixed lytic and sclerotic observations in the left ischium and right distal femur metaphysis are nonspecific but raise possibility of marrow involvement.    Adult Holter Monitor 24 hour    Result Date: 5/2/2024  Holter monitoring (duration 24hrs). Predominant underlying rhythm was sinus rhythm, 83 to 127bpm, average 98bpm. No nonsustained or sustained tachyarrhythmias. No atrial fibrillation. There were no pauses of greater than 3 seconds. Rare supraventricular ectopic beats (4%). Frequent mostly monomorphic premature ventricular contractions (10%). Symptom triggers - none. Electronically signed by Jose R Tovar MD 5/2/2024  4:53 PM Confirmed by JOSE R TOVAR MD LOC:JN ([id number]) on 5/2/2024 5:02:06 PM    IR Chest Port Placement > 5 Yrs of Age    Result Date: 5/1/2024  [facility] DATE: 5/1/2024 PROCEDURE: IMPLANTABLE VENOUS CHEST PORT PLACEMENT INTERVENTIONAL RADIOLOGIST: Jerardo Gonzales MD INDICATION:  79-year-old female with diffuse large B-cell lymphoma presenting for port placement to initiate systemic therapies. CONSENT: The risks, benefits and alternatives of the above procedures were discussed with the patient in detail. All questions were answered. Informed consent was given to proceed. MODERATE SEDATION: Versed 1 mg IV; Fentanyl 50 mcg IV. During the time out and immediately prior to the administration of medications, the patient was reassessed for adequacy to receive conscious sedation.  Under physician supervision, Versed and fentanyl were administered for moderate sedation. Pulse oximetry, heart rate and blood pressure were continuously monitored by an independent trained observer. The physician spent 20 minutes of face-to-face sedation time with the patient. CONTRAST: None. ANTIBIOTICS: Ancef 2 g IV. ADDITIONAL MEDICATIONS: None. FLUOROSCOPIC TIME: 0.5 minutes. RADIATION DOSE: Air Kerma: 2 mGy. COMPLICATIONS: No immediate complications. STERILE BARRIER TECHNIQUE: Maximum sterile barrier technique was used. Cutaneous antisepsis was performed at the operative site with application of 2% chlorhexidine and large sterile drape. Prior to the procedure, the  and assistant performed hand hygiene and wore hat, mask, sterile gown, and sterile gloves during the entire procedure. PROCEDURE:  Using real-time ultrasound guidance and 1% lidocaine for local anesthesia, the right internal jugular vein was accessed with a micropuncture system. A permanent image was stored. The microwire was used to measure intravascular catheter length. The wire was exchanged for a 0.035 inch guidewire via the transitional sheath, and positioned in the IVC. Using 1% lidocaine with epinephrine for local anesthesia, an infraclavicular subcutaneous pocket was created with sharp and blunt dissection, and irrigated with sterile saline. The port was placed within the subcutaneous pocket. The catheter was tunneled  from the pocket to  the neck and then trimmed to length. A peel-away sheath was placed over the guidewire, the catheter was advanced through the sheath, and the sheath was removed. The port was tested and locked with heparin. Completion fluoroscopic image of the chest was obtained. The port pocket incision was closed in layers with 3-0 interrupted deep dermal sutures, 4-0 running subcuticular suture, and skin glue. The dermatotomy site was closed with skin glue. FINDINGS: Ultrasound imaging demonstrates an anechoic, compressible, and patent right internal jugular vein. Fluoroscopic spot film at completion of study show that port tip lies along the superior cavoatrial junction. The port aspirates and flushes easily.     IMPRESSION:  Successful implantable venous chest port placement. This port is power injectable and ready for immediate use.     The longitudinal plan of care for the diagnosis(es)/condition(s) as documented were addressed during this visit. Due to the added complexity in care, I will continue to support Bibiana in the subsequent management and with ongoing continuity of care.    Signed by: CHRISTOPHER Wu CNP

## 2024-05-23 NOTE — TELEPHONE ENCOUNTER
I received a phone call today from Bibiana's daughter, Rosa Isela.  She is calling to request an order for a prescription for a wig to see if it can be covered by insurance.  She would like to pick this up before they leave the clinic today.  Order was placed and Rosa Isela is aware.    Priya Barrett, RN on 5/23/2024 at 12:10 PM

## 2024-05-28 ENCOUNTER — TELEPHONE (OUTPATIENT)
Dept: ONCOLOGY | Facility: HOSPITAL | Age: 79
End: 2024-05-28
Payer: COMMERCIAL

## 2024-05-28 NOTE — TELEPHONE ENCOUNTER
I received a voice message today from Bibiana's daughter, Rosa Isela.  She is calling because her mom has an appointment on Friday for toenail clipping.  She is wondering if it is okay for her to keep this appointment since she is on chemotherapy.  I reviewed this with Alissa Yan CNP and she states it is okay for her to keep the appointment.  I called Rosa Isela back with this information.  She has no other questions at this time.    Priya Barrett RN on 5/28/2024 at 12:37 PM

## 2024-05-31 DIAGNOSIS — F41.9 ANXIETY: ICD-10-CM

## 2024-06-03 RX ORDER — BUSPIRONE HYDROCHLORIDE 7.5 MG/1
TABLET ORAL
Qty: 180 TABLET | Refills: 2 | Status: SHIPPED | OUTPATIENT
Start: 2024-06-03

## 2024-06-05 ENCOUNTER — TELEPHONE (OUTPATIENT)
Dept: ONCOLOGY | Facility: HOSPITAL | Age: 79
End: 2024-06-05
Payer: COMMERCIAL

## 2024-06-05 NOTE — TELEPHONE ENCOUNTER
"I received a phone call today from Bibiana's daughter, Rosa Isela.  She is calling to say that her mom's hair has been falling out in \"clumps.\"  She is wondering what other patients to do with their hair when it is falling out.  We talked about options such as getting a short haircut or going to her beautician and having her hair shaved.  Rosa Isela agrees to talk with her mom about these options.  They are in the process of getting her a wig and they are hoping that she will feel up to trying different styles of wigs on later this week.  Rosa Isela has no other questions at this time.    Priya Barrett RN on 6/5/2024 at 10:20 AM    "

## 2024-06-11 ENCOUNTER — TELEPHONE (OUTPATIENT)
Dept: FAMILY MEDICINE | Facility: CLINIC | Age: 79
End: 2024-06-11
Payer: COMMERCIAL

## 2024-06-11 NOTE — TELEPHONE ENCOUNTER
Reason for Call:  Home Health Care    Carry with [facility] called regarding (reason for call): Verbal Orders     Orders are needed for this patient. PT Skilled Nursing    PT: re evaluate       Skilled Nursin every other week for 9 weeks     Pt Provider: Dr. Woody    Phone Number Homecare Nurse can be reached at: [phone]    Can we leave a detailed message on this number? YES    Phone number patient can be reached at: Other phone number:  [phone]    Best Time: any  [facility] is asking for a call back today 2024 by the end of the day. Please advise and call Carry back please and thank you.    Call taken on 2024 at 4:38 PM by Myranda Serrano CNA

## 2024-06-12 NOTE — TELEPHONE ENCOUNTER
Home Care is calling regarding an established patient with [facility].       Requesting orders from: Zack Woody  Provider is following patient: Yes  Is this a 60-day recertification request?  No    Orders Requested    Physical Therapy  Request for initial certification (first set of orders)   Frequency:  once every other week for 3 visits      Information was gathered and will be sent to provider for review.  RN will contact Home Care with information after provider review.  Confirmed ok to leave a detailed message with call back.  Contact information confirmed and updated as needed.    Elicia PHILLIP from [facility]    Rustam Alegria RN

## 2024-06-12 NOTE — TELEPHONE ENCOUNTER
Called Carry and left VM on secured line with approval of verbal orders.     Rustam Alegria RN  [facility]

## 2024-06-13 ENCOUNTER — ONCOLOGY VISIT (OUTPATIENT)
Dept: ONCOLOGY | Facility: HOSPITAL | Age: 79
End: 2024-06-13
Attending: INTERNAL MEDICINE
Payer: COMMERCIAL

## 2024-06-13 ENCOUNTER — LAB (OUTPATIENT)
Dept: INFUSION THERAPY | Facility: HOSPITAL | Age: 79
End: 2024-06-13
Attending: INTERNAL MEDICINE
Payer: COMMERCIAL

## 2024-06-13 VITALS
DIASTOLIC BLOOD PRESSURE: 75 MMHG | HEIGHT: 57 IN | OXYGEN SATURATION: 97 % | BODY MASS INDEX: 19.91 KG/M2 | TEMPERATURE: 98.5 F | SYSTOLIC BLOOD PRESSURE: 136 MMHG | WEIGHT: 92.3 LBS | RESPIRATION RATE: 16 BRPM | HEART RATE: 100 BPM

## 2024-06-13 DIAGNOSIS — D70.1 CHEMOTHERAPY-INDUCED NEUTROPENIA (H): Primary | ICD-10-CM

## 2024-06-13 DIAGNOSIS — T45.1X5A CHEMOTHERAPY-INDUCED NEUTROPENIA (H): Primary | ICD-10-CM

## 2024-06-13 DIAGNOSIS — C83.33 DIFFUSE LARGE B-CELL LYMPHOMA OF INTRA-ABDOMINAL LYMPH NODES (H): ICD-10-CM

## 2024-06-13 LAB
ALBUMIN SERPL BCG-MCNC: 3.9 G/DL (ref 3.5–5.2)
ALP SERPL-CCNC: 118 U/L (ref 40–150)
ALT SERPL W P-5'-P-CCNC: 34 U/L (ref 0–50)
ANION GAP SERPL CALCULATED.3IONS-SCNC: 12 MMOL/L (ref 7–15)
AST SERPL W P-5'-P-CCNC: 36 U/L (ref 0–45)
BASOPHILS # BLD AUTO: 0.1 10E3/UL (ref 0–0.2)
BASOPHILS NFR BLD AUTO: 1 %
BILIRUB SERPL-MCNC: 0.4 MG/DL
BUN SERPL-MCNC: 21.9 MG/DL (ref 8–23)
CALCIUM SERPL-MCNC: 9 MG/DL (ref 8.8–10.2)
CHLORIDE SERPL-SCNC: 103 MMOL/L (ref 98–107)
CREAT SERPL-MCNC: 0.9 MG/DL (ref 0.51–0.95)
DEPRECATED HCO3 PLAS-SCNC: 24 MMOL/L (ref 22–29)
EGFRCR SERPLBLD CKD-EPI 2021: 65 ML/MIN/1.73M2
EOSINOPHIL # BLD AUTO: 0 10E3/UL (ref 0–0.7)
EOSINOPHIL NFR BLD AUTO: 1 %
ERYTHROCYTE [DISTWIDTH] IN BLOOD BY AUTOMATED COUNT: 17.1 % (ref 10–15)
GLUCOSE SERPL-MCNC: 268 MG/DL (ref 70–99)
HCT VFR BLD AUTO: 32.7 % (ref 35–47)
HGB BLD-MCNC: 10.6 G/DL (ref 11.7–15.7)
IMM GRANULOCYTES # BLD: 0.1 10E3/UL
IMM GRANULOCYTES NFR BLD: 1 %
LYMPHOCYTES # BLD AUTO: 0.9 10E3/UL (ref 0.8–5.3)
LYMPHOCYTES NFR BLD AUTO: 11 %
MCH RBC QN AUTO: 30.1 PG (ref 26.5–33)
MCHC RBC AUTO-ENTMCNC: 32.4 G/DL (ref 31.5–36.5)
MCV RBC AUTO: 93 FL (ref 78–100)
MONOCYTES # BLD AUTO: 0.5 10E3/UL (ref 0–1.3)
MONOCYTES NFR BLD AUTO: 5 %
NEUTROPHILS # BLD AUTO: 6.9 10E3/UL (ref 1.6–8.3)
NEUTROPHILS NFR BLD AUTO: 82 %
NRBC # BLD AUTO: 0 10E3/UL
NRBC BLD AUTO-RTO: 0 /100
PLATELET # BLD AUTO: 257 10E3/UL (ref 150–450)
POTASSIUM SERPL-SCNC: 4.1 MMOL/L (ref 3.4–5.3)
PROT SERPL-MCNC: 6.2 G/DL (ref 6.4–8.3)
RBC # BLD AUTO: 3.52 10E6/UL (ref 3.8–5.2)
SODIUM SERPL-SCNC: 139 MMOL/L (ref 135–145)
WBC # BLD AUTO: 8.5 10E3/UL (ref 4–11)

## 2024-06-13 PROCEDURE — 258N000003 HC RX IP 258 OP 636: Mod: JZ | Performed by: NURSE PRACTITIONER

## 2024-06-13 PROCEDURE — 96367 TX/PROPH/DG ADDL SEQ IV INF: CPT

## 2024-06-13 PROCEDURE — 96377 APPLICATON ON-BODY INJECTOR: CPT | Mod: XS | Performed by: NURSE PRACTITIONER

## 2024-06-13 PROCEDURE — 250N000013 HC RX MED GY IP 250 OP 250 PS 637: Performed by: NURSE PRACTITIONER

## 2024-06-13 PROCEDURE — 250N000011 HC RX IP 250 OP 636: Mod: JZ | Performed by: NURSE PRACTITIONER

## 2024-06-13 PROCEDURE — G2211 COMPLEX E/M VISIT ADD ON: HCPCS | Performed by: NURSE PRACTITIONER

## 2024-06-13 PROCEDURE — 96417 CHEMO IV INFUS EACH ADDL SEQ: CPT

## 2024-06-13 PROCEDURE — 85025 COMPLETE CBC W/AUTO DIFF WBC: CPT | Performed by: NURSE PRACTITIONER

## 2024-06-13 PROCEDURE — 96415 CHEMO IV INFUSION ADDL HR: CPT

## 2024-06-13 PROCEDURE — 80053 COMPREHEN METABOLIC PANEL: CPT | Performed by: NURSE PRACTITIONER

## 2024-06-13 PROCEDURE — 96375 TX/PRO/DX INJ NEW DRUG ADDON: CPT

## 2024-06-13 PROCEDURE — 96413 CHEMO IV INFUSION 1 HR: CPT

## 2024-06-13 PROCEDURE — G0463 HOSPITAL OUTPT CLINIC VISIT: HCPCS | Performed by: NURSE PRACTITIONER

## 2024-06-13 PROCEDURE — 36591 DRAW BLOOD OFF VENOUS DEVICE: CPT | Performed by: NURSE PRACTITIONER

## 2024-06-13 PROCEDURE — 96411 CHEMO IV PUSH ADDL DRUG: CPT

## 2024-06-13 PROCEDURE — 96372 THER/PROPH/DIAG INJ SC/IM: CPT | Performed by: NURSE PRACTITIONER

## 2024-06-13 PROCEDURE — 99214 OFFICE O/P EST MOD 30 MIN: CPT | Performed by: NURSE PRACTITIONER

## 2024-06-13 RX ORDER — ALBUTEROL SULFATE 0.83 MG/ML
2.5 SOLUTION RESPIRATORY (INHALATION)
Status: DISCONTINUED | OUTPATIENT
Start: 2024-06-13 | End: 2024-06-13 | Stop reason: HOSPADM

## 2024-06-13 RX ORDER — ALBUTEROL SULFATE 90 UG/1
1-2 AEROSOL, METERED RESPIRATORY (INHALATION)
Status: DISCONTINUED | OUTPATIENT
Start: 2024-06-13 | End: 2024-06-13 | Stop reason: HOSPADM

## 2024-06-13 RX ORDER — DIPHENHYDRAMINE HCL 50 MG
50 CAPSULE ORAL ONCE
Status: COMPLETED | OUTPATIENT
Start: 2024-06-13 | End: 2024-06-13

## 2024-06-13 RX ORDER — DOXORUBICIN HYDROCHLORIDE 2 MG/ML
25 INJECTION, SOLUTION INTRAVENOUS ONCE
Status: COMPLETED | OUTPATIENT
Start: 2024-06-13 | End: 2024-06-13

## 2024-06-13 RX ORDER — METHYLPREDNISOLONE SODIUM SUCCINATE 125 MG/2ML
125 INJECTION, POWDER, LYOPHILIZED, FOR SOLUTION INTRAMUSCULAR; INTRAVENOUS
Status: DISCONTINUED | OUTPATIENT
Start: 2024-06-13 | End: 2024-06-13 | Stop reason: HOSPADM

## 2024-06-13 RX ORDER — DIPHENHYDRAMINE HYDROCHLORIDE 50 MG/ML
50 INJECTION INTRAMUSCULAR; INTRAVENOUS
Status: DISCONTINUED | OUTPATIENT
Start: 2024-06-13 | End: 2024-06-13 | Stop reason: HOSPADM

## 2024-06-13 RX ORDER — MEPERIDINE HYDROCHLORIDE 25 MG/ML
25 INJECTION INTRAMUSCULAR; INTRAVENOUS; SUBCUTANEOUS EVERY 30 MIN PRN
Status: DISCONTINUED | OUTPATIENT
Start: 2024-06-13 | End: 2024-06-13 | Stop reason: HOSPADM

## 2024-06-13 RX ORDER — HEPARIN SODIUM (PORCINE) LOCK FLUSH IV SOLN 100 UNIT/ML 100 UNIT/ML
5 SOLUTION INTRAVENOUS
Status: DISCONTINUED | OUTPATIENT
Start: 2024-06-13 | End: 2024-06-13 | Stop reason: HOSPADM

## 2024-06-13 RX ORDER — ACARBOSE 25 MG/1
25 TABLET ORAL
COMMUNITY
Start: 2024-05-30

## 2024-06-13 RX ORDER — EPINEPHRINE 1 MG/ML
0.3 INJECTION, SOLUTION INTRAMUSCULAR; SUBCUTANEOUS EVERY 5 MIN PRN
Status: DISCONTINUED | OUTPATIENT
Start: 2024-06-13 | End: 2024-06-13 | Stop reason: HOSPADM

## 2024-06-13 RX ORDER — ACETAMINOPHEN 325 MG/1
650 TABLET ORAL ONCE
Status: COMPLETED | OUTPATIENT
Start: 2024-06-13 | End: 2024-06-13

## 2024-06-13 RX ORDER — PALONOSETRON 0.05 MG/ML
0.25 INJECTION, SOLUTION INTRAVENOUS ONCE
Status: COMPLETED | OUTPATIENT
Start: 2024-06-13 | End: 2024-06-13

## 2024-06-13 RX ADMIN — FAMOTIDINE 20 MG: 10 INJECTION, SOLUTION INTRAVENOUS at 09:21

## 2024-06-13 RX ADMIN — SODIUM CHLORIDE 250 ML: 9 INJECTION, SOLUTION INTRAVENOUS at 09:17

## 2024-06-13 RX ADMIN — FOSAPREPITANT 150 MG: 150 INJECTION, POWDER, LYOPHILIZED, FOR SOLUTION INTRAVENOUS at 09:31

## 2024-06-13 RX ADMIN — VINCRISTINE SULFATE 1 MG: 1 INJECTION, SOLUTION INTRAVENOUS at 10:22

## 2024-06-13 RX ADMIN — PALONOSETRON 0.25 MG: 0.05 INJECTION, SOLUTION INTRAVENOUS at 09:18

## 2024-06-13 RX ADMIN — DIPHENHYDRAMINE HYDROCHLORIDE 50 MG: 50 CAPSULE ORAL at 10:37

## 2024-06-13 RX ADMIN — CYCLOPHOSPHAMIDE 500 MG: 500 INJECTION, POWDER, FOR SOLUTION INTRAVENOUS; ORAL at 10:41

## 2024-06-13 RX ADMIN — ACETAMINOPHEN 650 MG: 325 TABLET ORAL at 10:37

## 2024-06-13 RX ADMIN — DOXORUBICIN HYDROCHLORIDE 30 MG: 2 INJECTION, SOLUTION INTRAVENOUS at 10:10

## 2024-06-13 RX ADMIN — RITUXIMAB-ABBS 500 MG: 10 INJECTION, SOLUTION INTRAVENOUS at 11:21

## 2024-06-13 RX ADMIN — PEGFILGRASTIM 6 MG: KIT SUBCUTANEOUS at 11:41

## 2024-06-13 RX ADMIN — Medication 5 ML: at 12:57

## 2024-06-13 ASSESSMENT — PAIN SCALES - GENERAL: PAINLEVEL: NO PAIN (0)

## 2024-06-13 NOTE — PROGRESS NOTES
[facility] Hematology and Oncology Progress Note    Patient: Bibiana Mcrae  MRN: [id number]  Date of Service: Jun 13, 2024          Reason for Visit    Chief Complaint   Patient presents with    Oncology Clinic Visit     Return visit with lab and infusion. Chemotherapy-induced neutropenia.       Assessment and Plan     Cancer Staging   No matching staging information was found for the patient.    1.  Diffuse large B-cell lymphoma: Patient has started on treatment with R mini CHOP.  She had her first cycle and it went pretty well and had expected side effects. Pt will get cycle 3 today and return in 3 weeks for cycle 4.  Patient's labs are adequate for treatment today.  The goal is to do 3 cycles and then we will do a PET scan.  It shows improvement we will then complete a total of 6 cycles.     2.  Worsening fatigue: likely chemo induced and is cumulative. Encourage good hydration, healthy diet with good protein. Also encourage daily exercise and to be as active as possible.     3. Mild nausea: likely from chemo. Will add in pepcid to premeds. Continue to use ondansetron prn, which helped last cycle       ECOG Performance    1 - Can't do physically strenuous work, but fully ambyulatory and can do light sedentary work    Distress Screening (within last 30 days)    1. How concerned are you about your ability to eat? : 0  2. How concerned are you about unintended weight loss or your current weight? : 0  3. How concerned are you about feeling depressed or very sad? : 0  4. How concerned are you about feeling anxious or very scared? : 0  5. Do you struggle with the loss of meaning and real in your life? : Not at all  6. How concerned are you about work and home life issues that may be affected by your cancer? : 0  7. How concerned are you about knowing what resources are available to help you? : 0  8. Do you currently have what you would describe as Sikhism or spiritual struggles?: Not at all       Pain  Pain  Score: No Pain (0)    Problem List    Patient Active Problem List   Diagnosis    Essential hypertension with goal blood pressure less than 130/80    Anxiety    Osteoporosis, senile    Bilateral sensorineural hearing loss    Impaired fasting glucose    Diverticular disease of large intestine    Gastroesophageal reflux disease with esophagitis without hemorrhage    Bronchiectasis without complication (H)    Chronic cough    Incontinence of feces    Irregular bowel habits    Status post dilation of esophageal narrowing    Metal foreign body in eye region    MRI contraindicated due to metal in eye    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 Florian    Diagnosis: Diffuse large B-cell lymphoma.  This was found incidentally in April 2024.  She had a fall and came to the ER and they did a CT scan.  This showed an abdominal mass.  -4/15/24: Biopsy done of abdominal mass that was labeled as pancreatic mass.  It shows a CD20 positive large cell lymphoma.  -5/2/24: Hypermetabolic lymphadenopathy above and below the diaphragm, consistent with biopsy-proven lymphoma. Radiotracer avid mixed lytic and sclerotic observations in the left ischium and right distal femur metaphysis are nonspecific but raise possibility of marrow involvement    Treatment:  -5/3/2024: Patient started R mini CHOP. Today is cycle 3    Interim history:  Patient is here today to continue chemo. Pt states the second cycle was harder than the first. She had a couple of days with nausea. No vomiting and zofran worked well. Only other issue is worsening fatigue. No infectious complaints.         Review of Systems    Pertinent items are noted in HPI.    Past History    Past Medical History:   Diagnosis Date    Anxiety     Breast cyst     While ago    History of  "cholecystectomy 08/24/2023       PHYSICAL EXAM  /75 (BP Location: Left arm, Patient Position: Sitting, Cuff Size: Adult Small)   Pulse 100   Temp 98.5  F (36.9  C) (Oral)   Resp 16   Ht 1.448 m (4' 9\")   Wt 41.9 kg (92 lb 4.8 oz)   LMP  (LMP Unknown)   SpO2 97%   BMI 19.97 kg/m      GENERAL: no acute distress. Cooperative in conversation.  With daughter.  Using walker  RESP: Regular respiratory rate. No expiratory wheezes   NEURO: non focal. Alert and oriented x3.   PSYCH: within normal limits. No depression or anxiety.  SKIN: exposed skin is dry intact.     Lab Results    Recent Results (from the past 168 hour(s))   Comprehensive metabolic panel   Result Value Ref Range    Sodium 139 135 - 145 mmol/L    Potassium 4.1 3.4 - 5.3 mmol/L    Carbon Dioxide (CO2) 24 22 - 29 mmol/L    Anion Gap 12 7 - 15 mmol/L    Urea Nitrogen 21.9 8.0 - 23.0 mg/dL    Creatinine 0.90 0.51 - 0.95 mg/dL    GFR Estimate 65 >60 mL/min/1.73m2    Calcium 9.0 8.8 - 10.2 mg/dL    Chloride 103 98 - 107 mmol/L    Glucose 268 (H) 70 - 99 mg/dL    Alkaline Phosphatase 118 40 - 150 U/L    AST 36 0 - 45 U/L    ALT 34 0 - 50 U/L    Protein Total 6.2 (L) 6.4 - 8.3 g/dL    Albumin 3.9 3.5 - 5.2 g/dL    Bilirubin Total 0.4 <=1.2 mg/dL   CBC with platelets and differential   Result Value Ref Range    WBC Count 8.5 4.0 - 11.0 10e3/uL    RBC Count 3.52 (L) 3.80 - 5.20 10e6/uL    Hemoglobin 10.6 (L) 11.7 - 15.7 g/dL    Hematocrit 32.7 (L) 35.0 - 47.0 %    MCV 93 78 - 100 fL    MCH 30.1 26.5 - 33.0 pg    MCHC 32.4 31.5 - 36.5 g/dL    RDW 17.1 (H) 10.0 - 15.0 %    Platelet Count 257 150 - 450 10e3/uL    % Neutrophils 82 %    % Lymphocytes 11 %    % Monocytes 5 %    % Eosinophils 1 %    % Basophils 1 %    % Immature Granulocytes 1 %    NRBCs per 100 WBC 0 <1 /100    Absolute Neutrophils 6.9 1.6 - 8.3 10e3/uL    Absolute Lymphocytes 0.9 0.8 - 5.3 10e3/uL    Absolute Monocytes 0.5 0.0 - 1.3 10e3/uL    Absolute Eosinophils 0.0 0.0 - 0.7 10e3/uL    " Absolute Basophils 0.1 0.0 - 0.2 10e3/uL    Absolute Immature Granulocytes 0.1 <=0.4 10e3/uL    Absolute NRBCs 0.0 10e3/uL           Imaging    No results found.    The longitudinal plan of care for the diagnosis(es)/condition(s) as documented were addressed during this visit. Due to the added complexity in care, I will continue to support Bibiana in the subsequent management and with ongoing continuity of care.    Signed by: CHRISTOPHER Wu CNP

## 2024-06-13 NOTE — PROGRESS NOTES
Pt arrived ambulatory to clinic for Cycle # 3 Day # 1 of her chemotherapy regimen.  Port was accessed using aseptic technique without difficulties with excellent blood return.  Pt saw Alissa today, and she reviewed pt's labs.  Pt's labs met parameters for treatment.  Administered premedications, chemotherapy, Rituxan, and Neulasta patch per MD order.   Pt tolerated infusion well, no s/s of hypersensitivity reaction.  Pt's Neulasta patch was placed on RUQ of ABD at 1145, instructed pt to removed patch 28 hours later.  Pt was given AVS and infomational packet on Neulasta patch.  Pt's port was flushed with NS and Heparin then de-accessed using 2x2 and papertape.  Instructed pt and daughter to call clinic with any further questions or concerns.  They verbalized understanding of plan of care and return to clinic.  Pt and daughter left clinic ambulatory.

## 2024-06-13 NOTE — LETTER
6/13/2024      Bibiana Mcrae  [address]  [place] [facility] [address]      Dear Colleague,    Thank you for referring your patient, Bibiana Mcrae, to the [facility] [place]. Please see a copy of my visit note below.    [facility] Hematology and Oncology Progress Note    Patient: Bibiana Mcrae  MRN: [id number]  Date of Service: Jun 13, 2024          Reason for Visit    Chief Complaint   Patient presents with     Oncology Clinic Visit     Return visit with lab and infusion. Chemotherapy-induced neutropenia.       Assessment and Plan     Cancer Staging   No matching staging information was found for the patient.    1.  Diffuse large B-cell lymphoma: Patient has started on treatment with R mini CHOP.  She had her first cycle and it went pretty well and had expected side effects. Pt will get cycle 3 today and return in 3 weeks for cycle 4.  Patient's labs are adequate for treatment today.  The goal is to do 3 cycles and then we will do a PET scan.  It shows improvement we will then complete a total of 6 cycles.     2.  Worsening fatigue: likely chemo induced and is cumulative. Encourage good hydration, healthy diet with good protein. Also encourage daily exercise and to be as active as possible.     3. Mild nausea: likely from chemo. Will add in pepcid to premeds. Continue to use ondansetron prn, which helped last cycle       ECOG Performance    1 - Can't do physically strenuous work, but fully ambyulatory and can do light sedentary work    Distress Screening (within last 30 days)    1. How concerned are you about your ability to eat? : 0  2. How concerned are you about unintended weight loss or your current weight? : 0  3. How concerned are you about feeling depressed or very sad? : 0  4. How concerned are you about feeling anxious or very scared? : 0  5. Do you struggle with the loss of meaning and real in your life? : Not at all  6. How concerned are you about work and home life  issues that may be affected by your cancer? : 0  7. How concerned are you about knowing what resources are available to help you? : 0  8. Do you currently have what you would describe as Uatsdin or spiritual struggles?: Not at all       Pain  Pain Score: No Pain (0)    Problem List    Patient Active Problem List   Diagnosis     Essential hypertension with goal blood pressure less than 130/80     Anxiety     Osteoporosis, senile     Bilateral sensorineural hearing loss     Impaired fasting glucose     Diverticular disease of large intestine     Gastroesophageal reflux disease with esophagitis without hemorrhage     Bronchiectasis without complication (H)     Chronic cough     Incontinence of feces     Irregular bowel habits     Status post dilation of esophageal narrowing     Metal foreign body in eye region     MRI contraindicated due to metal in eye     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 Florian    Diagnosis: Diffuse large B-cell lymphoma.  This was found incidentally in April 2024.  She had a fall and came to the ER and they did a CT scan.  This showed an abdominal mass.  -4/15/24: Biopsy done of abdominal mass that was labeled as pancreatic mass.  It shows a CD20 positive large cell lymphoma.  -5/2/24: Hypermetabolic lymphadenopathy above and below the diaphragm, consistent with biopsy-proven lymphoma. Radiotracer avid mixed lytic and sclerotic observations in the left ischium and right distal femur metaphysis are nonspecific but raise possibility of marrow involvement    Treatment:  -5/3/2024: Patient started R mini CHOP. Today is cycle 3    Interim history:  Patient is here today to continue chemo. Pt states the second cycle was harder than the first. She had a couple of days with nausea. No vomiting and  "zofran worked well. Only other issue is worsening fatigue. No infectious complaints.         Review of Systems    Pertinent items are noted in HPI.    Past History    Past Medical History:   Diagnosis Date     Anxiety      Breast cyst     While ago     History of cholecystectomy 08/24/2023       PHYSICAL EXAM  /75 (BP Location: Left arm, Patient Position: Sitting, Cuff Size: Adult Small)   Pulse 100   Temp 98.5  F (36.9  C) (Oral)   Resp 16   Ht 1.448 m (4' 9\")   Wt 41.9 kg (92 lb 4.8 oz)   LMP  (LMP Unknown)   SpO2 97%   BMI 19.97 kg/m      GENERAL: no acute distress. Cooperative in conversation.  With daughter.  Using walker  RESP: Regular respiratory rate. No expiratory wheezes   NEURO: non focal. Alert and oriented x3.   PSYCH: within normal limits. No depression or anxiety.  SKIN: exposed skin is dry intact.     Lab Results    Recent Results (from the past 168 hour(s))   Comprehensive metabolic panel   Result Value Ref Range    Sodium 139 135 - 145 mmol/L    Potassium 4.1 3.4 - 5.3 mmol/L    Carbon Dioxide (CO2) 24 22 - 29 mmol/L    Anion Gap 12 7 - 15 mmol/L    Urea Nitrogen 21.9 8.0 - 23.0 mg/dL    Creatinine 0.90 0.51 - 0.95 mg/dL    GFR Estimate 65 >60 mL/min/1.73m2    Calcium 9.0 8.8 - 10.2 mg/dL    Chloride 103 98 - 107 mmol/L    Glucose 268 (H) 70 - 99 mg/dL    Alkaline Phosphatase 118 40 - 150 U/L    AST 36 0 - 45 U/L    ALT 34 0 - 50 U/L    Protein Total 6.2 (L) 6.4 - 8.3 g/dL    Albumin 3.9 3.5 - 5.2 g/dL    Bilirubin Total 0.4 <=1.2 mg/dL   CBC with platelets and differential   Result Value Ref Range    WBC Count 8.5 4.0 - 11.0 10e3/uL    RBC Count 3.52 (L) 3.80 - 5.20 10e6/uL    Hemoglobin 10.6 (L) 11.7 - 15.7 g/dL    Hematocrit 32.7 (L) 35.0 - 47.0 %    MCV 93 78 - 100 fL    MCH 30.1 26.5 - 33.0 pg    MCHC 32.4 31.5 - 36.5 g/dL    RDW 17.1 (H) 10.0 - 15.0 %    Platelet Count 257 150 - 450 10e3/uL    % Neutrophils 82 %    % Lymphocytes 11 %    % Monocytes 5 %    % Eosinophils 1 %    % " "Basophils 1 %    % Immature Granulocytes 1 %    NRBCs per 100 WBC 0 <1 /100    Absolute Neutrophils 6.9 1.6 - 8.3 10e3/uL    Absolute Lymphocytes 0.9 0.8 - 5.3 10e3/uL    Absolute Monocytes 0.5 0.0 - 1.3 10e3/uL    Absolute Eosinophils 0.0 0.0 - 0.7 10e3/uL    Absolute Basophils 0.1 0.0 - 0.2 10e3/uL    Absolute Immature Granulocytes 0.1 <=0.4 10e3/uL    Absolute NRBCs 0.0 10e3/uL           Imaging    No results found.    The longitudinal plan of care for the diagnosis(es)/condition(s) as documented were addressed during this visit. Due to the added complexity in care, I will continue to support Bibiana in the subsequent management and with ongoing continuity of care.    Signed by: CHRISTOPHER Wu CNP    Oncology Rooming Note    June 13, 2024 8:30 AM   Bibiana Mcrae is a 79 year old female who presents for:    Chief Complaint   Patient presents with     Oncology Clinic Visit     Return visit with lab and infusion. Chemotherapy-induced neutropenia.     Initial Vitals: /75 (BP Location: Left arm, Patient Position: Sitting, Cuff Size: Adult Small)   Pulse 100   Temp 98.5  F (36.9  C) (Oral)   Resp 16   Ht 1.448 m (4' 9\")   Wt 41.9 kg (92 lb 4.8 oz)   LMP  (LMP Unknown)   SpO2 97%   BMI 19.97 kg/m   Estimated body mass index is 19.97 kg/m  as calculated from the following:    Height as of this encounter: 1.448 m (4' 9\").    Weight as of this encounter: 41.9 kg (92 lb 4.8 oz). Body surface area is 1.3 meters squared.  No Pain (0) Comment: Data Unavailable   No LMP recorded (lmp unknown). Patient has had a hysterectomy.  Allergies reviewed: Yes  Medications reviewed: Yes    Medications: Medication refills not needed today.  Pharmacy name entered into [facility]:    [id number] - [place], [place] - [address] AT [place] [address]  [facility] - [place], [place] - [address]  [facility] - [place], [place] - [facility]    Frailty Screening:   Is the patient here for a new oncology consult visit in cancer care? 2. No      Clinical concerns: none       Sheree Vasquez CMA                Again, thank you for allowing me to participate in the care of your patient.        Sincerely,        CHRISTOPHER Wu CNP

## 2024-06-13 NOTE — PROGRESS NOTES
"Oncology Rooming Note    June 13, 2024 8:30 AM   Bibiana Mcrae is a 79 year old female who presents for:    Chief Complaint   Patient presents with    Oncology Clinic Visit     Return visit with lab and infusion. Chemotherapy-induced neutropenia.     Initial Vitals: /75 (BP Location: Left arm, Patient Position: Sitting, Cuff Size: Adult Small)   Pulse 100   Temp 98.5  F (36.9  C) (Oral)   Resp 16   Ht 1.448 m (4' 9\")   Wt 41.9 kg (92 lb 4.8 oz)   LMP  (LMP Unknown)   SpO2 97%   BMI 19.97 kg/m   Estimated body mass index is 19.97 kg/m  as calculated from the following:    Height as of this encounter: 1.448 m (4' 9\").    Weight as of this encounter: 41.9 kg (92 lb 4.8 oz). Body surface area is 1.3 meters squared.  No Pain (0) Comment: Data Unavailable   No LMP recorded (lmp unknown). Patient has had a hysterectomy.  Allergies reviewed: Yes  Medications reviewed: Yes    Medications: Medication refills not needed today.  Pharmacy name entered into [facility]:    [id number] [place], [place] - [address] AT [place] [address]  [facility] - [place], [place] - [address] [place] [address]  [facility] - [place], [place] - [address] [place] [address]    Frailty Screening:   Is the patient here for a new oncology consult visit in cancer care? 2. No      Clinical concerns: none       Sheree Vasquez CMA              "

## 2024-06-13 NOTE — TELEPHONE ENCOUNTER
Provider gave the okay for orders as requested.  This message was left on the secure voice mail line that was provided.    DALLIN Colón RN

## 2024-06-14 ENCOUNTER — MEDICAL CORRESPONDENCE (OUTPATIENT)
Dept: HEALTH INFORMATION MANAGEMENT | Facility: CLINIC | Age: 79
End: 2024-06-14

## 2024-06-19 DIAGNOSIS — Z92.29 PERSONAL HISTORY OF OTHER DRUG THERAPY: ICD-10-CM

## 2024-06-19 DIAGNOSIS — M81.0 OSTEOPOROSIS, SENILE: Primary | ICD-10-CM

## 2024-07-01 ENCOUNTER — HOSPITAL ENCOUNTER (OUTPATIENT)
Dept: PET IMAGING | Facility: HOSPITAL | Age: 79
Discharge: HOME | End: 2024-07-01
Attending: INTERNAL MEDICINE | Admitting: INTERNAL MEDICINE
Payer: COMMERCIAL

## 2024-07-01 DIAGNOSIS — C83.33 DIFFUSE LARGE B-CELL LYMPHOMA OF INTRA-ABDOMINAL LYMPH NODES (H): ICD-10-CM

## 2024-07-01 LAB — GLUCOSE BLDC GLUCOMTR-MCNC: 88 MG/DL (ref 70–99)

## 2024-07-01 PROCEDURE — A9552 F18 FDG: HCPCS | Performed by: INTERNAL MEDICINE

## 2024-07-01 PROCEDURE — 82962 GLUCOSE BLOOD TEST: CPT

## 2024-07-01 PROCEDURE — 78816 PET IMAGE W/CT FULL BODY: CPT | Mod: PS

## 2024-07-01 PROCEDURE — 343N000001 HC RX 343: Performed by: INTERNAL MEDICINE

## 2024-07-01 PROCEDURE — 250N000011 HC RX IP 250 OP 636: Performed by: RADIOLOGY

## 2024-07-01 RX ORDER — FLUDEOXYGLUCOSE F 18 200 MCI/ML
7-15 INJECTION, SOLUTION INTRAVENOUS ONCE
Status: COMPLETED | OUTPATIENT
Start: 2024-07-01 | End: 2024-07-01

## 2024-07-01 RX ORDER — HEPARIN SODIUM (PORCINE) LOCK FLUSH IV SOLN 100 UNIT/ML 100 UNIT/ML
500 SOLUTION INTRAVENOUS ONCE
Status: COMPLETED | OUTPATIENT
Start: 2024-07-01 | End: 2024-07-01

## 2024-07-01 RX ADMIN — Medication 500 UNITS: at 08:48

## 2024-07-01 RX ADMIN — FLUDEOXYGLUCOSE F 18 9.99 MILLICURIE: 200 INJECTION, SOLUTION INTRAVENOUS at 08:42

## 2024-07-02 ENCOUNTER — OFFICE VISIT (OUTPATIENT)
Dept: INTERNAL MEDICINE | Facility: CLINIC | Age: 79
End: 2024-07-02
Payer: COMMERCIAL

## 2024-07-02 VITALS
HEART RATE: 92 BPM | BODY MASS INDEX: 19.72 KG/M2 | HEIGHT: 57 IN | RESPIRATION RATE: 22 BRPM | WEIGHT: 91.4 LBS | OXYGEN SATURATION: 100 % | SYSTOLIC BLOOD PRESSURE: 118 MMHG | DIASTOLIC BLOOD PRESSURE: 68 MMHG | TEMPERATURE: 97.9 F

## 2024-07-02 DIAGNOSIS — C83.33 DIFFUSE LARGE B-CELL LYMPHOMA OF INTRA-ABDOMINAL LYMPH NODES (H): ICD-10-CM

## 2024-07-02 DIAGNOSIS — K21.00 GASTROESOPHAGEAL REFLUX DISEASE WITH ESOPHAGITIS WITHOUT HEMORRHAGE: ICD-10-CM

## 2024-07-02 DIAGNOSIS — M81.0 OSTEOPOROSIS, SENILE: Primary | ICD-10-CM

## 2024-07-02 DIAGNOSIS — Z92.29 PERSONAL HISTORY OF OTHER DRUG THERAPY: ICD-10-CM

## 2024-07-02 PROCEDURE — 99214 OFFICE O/P EST MOD 30 MIN: CPT | Mod: 25 | Performed by: INTERNAL MEDICINE

## 2024-07-02 PROCEDURE — 96372 THER/PROPH/DIAG INJ SC/IM: CPT | Mod: LT | Performed by: INTERNAL MEDICINE

## 2024-07-02 NOTE — PATIENT INSTRUCTIONS
Prolia 17th today.  Prolia 18th in 6 months with me.    DXA in 6/2025 .   Phone number to schedule [phone].    Daily calcium need is 9919-4579 mg a day from the diet and supplements.  Calcium citrate is easier to digest.  Vitamin D 2000 IU daily recommended.    Risk of rebound vertebral fractures is higher when Prolia suddenly stopped or dose was missed.      Prolia and Covid vaccine should be  for at least a week.

## 2024-07-03 NOTE — PROGRESS NOTES
(M81.0) Osteoporosis, senile  (primary encounter diagnosis)  Comment: Patient had Prolia # 16 in Dec 2023.She reports no fragility fracture   Last DXA from 6/2023 was reviewed.  -Lumbar Spine: L1-L3: BMD: 0.857 g/cm2. T-score: -2.6. Z-score: -0.1.  -RIGHT Hip Total: BMD: 0.878 g/cm2. T-score: -1.0. Z-score: 1.4.  -RIGHT Hip Femoral neck: BMD: 0.904 g/cm2. T-score: -1.0. Z-score: 1.5.  -LEFT Hip Total: BMD: 0.959 g/cm2. T-score: -0.4. Z-score: 2.0.  -LEFT Hip Femoral neck: BMD: 0.917 g/cm2. T-score: -0.9. Z-score: 1.6.    Component      Latest Ref Rng 6/13/2024  8:12 AM   Sodium      135 - 145 mmol/L 139    Potassium      3.4 - 5.3 mmol/L 4.1    Carbon Dioxide (CO2)      22 - 29 mmol/L 24    Anion Gap      7 - 15 mmol/L 12    Urea Nitrogen      8.0 - 23.0 mg/dL 21.9    Creatinine      0.51 - 0.95 mg/dL 0.90    GFR Estimate      >60 mL/min/1.73m2 65    Calcium      8.8 - 10.2 mg/dL 9.0    Chloride      98 - 107 mmol/L 103    Glucose      70 - 99 mg/dL 268 (H)    Alkaline Phosphatase      40 - 150 U/L 118    AST      0 - 45 U/L 36    ALT      0 - 50 U/L 34    Protein Total      6.4 - 8.3 g/dL 6.2 (L)    Albumin      3.5 - 5.2 g/dL 3.9    Bilirubin Total      <=1.2 mg/dL 0.4      Plan: DX Bone Density            (Z92.29) Personal history of other drug therapy  Comment: She has been on Alendronate now for 4 years.       (C83.33) 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)  Comment: On chemotherapy since May.The goal is to do 3 cycles and then do a PET scan. If it shows improvement then complete a total of 6 cycles.     The patient and her daughter discussed continuing Prolia with Oncology and it was approved.    (K21.00) Gastroesophageal reflux disease with esophagitis without hemorrhage  Comment: stable on omeprazole      The longitudinal plan of care for the diagnosis(es)/condition(s) as documented were addressed during this visit. Due to the added complexity in care, I will continue to support  "Bibiana in the subsequent management and with ongoing continuity of care.  Patient was educated on safety of Prolia utilizing Patient Counseling Chart for Healthcare Providers, as outlined by the Prolia REMS progam.     Return in about 6 months (around 1/2/2025) for Follow up, Prolia, osteoporosis.    Patient Instructions   Prolia 17th today.  Prolia 18th in 6 months with me.    DXA in 6/2025 .   Phone number to schedule [phone].    Daily calcium need is 9939-6938 mg a day from the diet and supplements.  Calcium citrate is easier to digest.  Vitamin D 2000 IU daily recommended.    Risk of rebound vertebral fractures is higher when Prolia suddenly stopped or dose was missed.      Prolia and Covid vaccine should be  for at least a week.           /68 (BP Location: Right arm, Patient Position: Sitting, Cuff Size: Adult Regular)   Pulse 92   Temp 97.9  F (36.6  C) (Oral)   Resp 22   Ht 1.448 m (4' 9\")   Wt 41.5 kg (91 lb 6.4 oz)   LMP  (LMP Unknown)   SpO2 100%   Breastfeeding No   BMI 19.78 kg/m        Did you experience any problems with previous Prolia injection? no  Any medication change in the last 6 months? no  Did you take prednisone or other immunosupressant drugs in the last 6 months   (chemo, transplant, rheum, dermatology conditions)? no  Did you have any serious infection in the last 6 months?no  Any recent hospitalizations?no  Do you plan any dental work in the next 2-3 months?no  How much calcium do you take daily from the diet and supplements?1200 mg  How much vit D do you take daily? 2000 IU  Last DXA? 6/2023,  Reviewed and discussed      Patient is here today for the Prolia injection. Patient tolerated previous injections well.   We discussed calcium and vit D daily needs today.       We discussed high risk of rebound vertebral fractures when Prolia suddenly stopped.    Next Prolia injection will be in 6 months.           This note has been dictated using voice recognition " software. Any grammatical or context distortions are unintentional and inherent to the software      Patient Active Problem List   Diagnosis    Essential hypertension with goal blood pressure less than 130/80    Anxiety    Osteoporosis, senile    Bilateral sensorineural hearing loss    Impaired fasting glucose    Diverticular disease of large intestine    Gastroesophageal reflux disease with esophagitis without hemorrhage    Bronchiectasis without complication (H)    Chronic cough    Incontinence of feces    Irregular bowel habits    Status post dilation of esophageal narrowing    Metal foreign body in eye region    MRI contraindicated due to metal in eye    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)    Chemotherapy-induced neutropenia (H24)       Current Outpatient Medications   Medication Sig Dispense Refill    acarbose (PRECOSE) 25 MG tablet Take 25 mg by mouth 3 times daily (with meals)      acetaminophen (TYLENOL) 325 MG tablet Take 325-650 mg by mouth every 6 hours as needed for mild pain      amLODIPine (NORVASC) 5 MG tablet Take 1 tablet (5 mg) by mouth daily 90 tablet 3    Biotin 5 MG TABS Take 5,000 mcg by mouth daily      busPIRone (BUSPAR) 7.5 MG tablet TAKE 1 TABLET BY MOUTH TWICE  DAILY 180 tablet 2    celecoxib (CELEBREX) 200 MG capsule TAKE 1 CAPSULE BY MOUTH  DAILY 90 capsule 3    denosumab (PROLIA) 60 MG/ML SOSY injection Inject 60 mg Subcutaneous every 6 months      donepezil (ARICEPT) 5 MG ODT Take 5 mg by mouth daily      fluocinolone acetonide (DERMA SMOOTHE/FS BODY) 0.01 % external oil Apply topically 2 times daily 5 drops to both ears twice daily      lidocaine-prilocaine (EMLA) 2.5-2.5 % external cream Apply topically as needed for moderate pain 30 g 2    multivitamin with minerals (THERA-M) 9 mg iron-400 mcg Tab tablet [MULTIVITAMIN WITH MINERALS (THERA-M) 9 MG IRON-400 MCG TAB TABLET] Take 1 tablet by mouth daily.      nortriptyline (PAMELOR) 50 MG capsule TAKE 1  CAPSULE BY MOUTH AT  BEDTIME 90 capsule 2    omeprazole (PRILOSEC) 20 MG capsule [OMEPRAZOLE (PRILOSEC) 20 MG CAPSULE] Take 20 mg by mouth daily before breakfast.      ondansetron (ZOFRAN) 4 MG tablet Take 1-2 tablets (4-8 mg) by mouth every 6 hours as needed for nausea (vomiting) 30 tablet 1    polyethylene glycol (MIRALAX) 17 g packet Take 1 packet by mouth daily      predniSONE (DELTASONE) 20 MG tablet Take 2 tablets (40 mg) by mouth daily Take on Days 1 through 5 of chemotherapy. Take first dose in AM prior to chemotherapy. 60 tablet 0    Probiotic Product (SOLUBLE FIBER/PROBIOTICS PO)       rivastigmine (EXELON) 4.6 MG/24HR 24 hr patch Place 1 patch onto the skin daily for 360 days 90 patch 3    sennosides (SENOKOT) 8.6 MG tablet Take 2 tablets by mouth daily as needed for constipation      UNABLE TO FIND Take 2 Tablespoonful by mouth every morning MEDICATION NAME: biofiber 8 oz with water       Current Facility-Administered Medications   Medication Dose Route Frequency Provider Last Rate Last Admin    denosumab (PROLIA) injection 60 mg  60 mg Subcutaneous Q6 Months    60 mg at 07/02/24 1611

## 2024-07-05 ENCOUNTER — INFUSION THERAPY VISIT (OUTPATIENT)
Dept: INFUSION THERAPY | Facility: HOSPITAL | Age: 79
End: 2024-07-05
Attending: INTERNAL MEDICINE
Payer: COMMERCIAL

## 2024-07-05 ENCOUNTER — ONCOLOGY VISIT (OUTPATIENT)
Dept: ONCOLOGY | Facility: HOSPITAL | Age: 79
End: 2024-07-05
Attending: INTERNAL MEDICINE
Payer: COMMERCIAL

## 2024-07-05 VITALS
WEIGHT: 92 LBS | BODY MASS INDEX: 19.85 KG/M2 | DIASTOLIC BLOOD PRESSURE: 77 MMHG | SYSTOLIC BLOOD PRESSURE: 144 MMHG | HEIGHT: 57 IN | HEART RATE: 107 BPM | OXYGEN SATURATION: 98 % | RESPIRATION RATE: 16 BRPM | TEMPERATURE: 97.9 F

## 2024-07-05 DIAGNOSIS — C83.33 DIFFUSE LARGE B-CELL LYMPHOMA OF INTRA-ABDOMINAL LYMPH NODES (H): Primary | ICD-10-CM

## 2024-07-05 DIAGNOSIS — D70.1 CHEMOTHERAPY-INDUCED NEUTROPENIA (H): ICD-10-CM

## 2024-07-05 DIAGNOSIS — T45.1X5A CHEMOTHERAPY-INDUCED NEUTROPENIA (H): ICD-10-CM

## 2024-07-05 DIAGNOSIS — D70.1 CHEMOTHERAPY-INDUCED NEUTROPENIA (H): Primary | ICD-10-CM

## 2024-07-05 DIAGNOSIS — C83.33 DIFFUSE LARGE B-CELL LYMPHOMA OF INTRA-ABDOMINAL LYMPH NODES (H): ICD-10-CM

## 2024-07-05 DIAGNOSIS — T45.1X5A CHEMOTHERAPY-INDUCED NEUTROPENIA (H): Primary | ICD-10-CM

## 2024-07-05 LAB
ALBUMIN SERPL BCG-MCNC: 4 G/DL (ref 3.5–5.2)
ALP SERPL-CCNC: 128 U/L (ref 40–150)
ALT SERPL W P-5'-P-CCNC: 26 U/L (ref 0–50)
ANION GAP SERPL CALCULATED.3IONS-SCNC: 12 MMOL/L (ref 7–15)
AST SERPL W P-5'-P-CCNC: 21 U/L (ref 0–45)
BASOPHILS # BLD AUTO: 0 10E3/UL (ref 0–0.2)
BASOPHILS NFR BLD AUTO: 0 %
BILIRUB SERPL-MCNC: 0.3 MG/DL
BUN SERPL-MCNC: 17.3 MG/DL (ref 8–23)
CALCIUM SERPL-MCNC: 8.6 MG/DL (ref 8.8–10.2)
CHLORIDE SERPL-SCNC: 105 MMOL/L (ref 98–107)
CREAT SERPL-MCNC: 0.67 MG/DL (ref 0.51–0.95)
DEPRECATED HCO3 PLAS-SCNC: 23 MMOL/L (ref 22–29)
EGFRCR SERPLBLD CKD-EPI 2021: 88 ML/MIN/1.73M2
EOSINOPHIL # BLD AUTO: 0.1 10E3/UL (ref 0–0.7)
EOSINOPHIL NFR BLD AUTO: 1 %
ERYTHROCYTE [DISTWIDTH] IN BLOOD BY AUTOMATED COUNT: 17.4 % (ref 10–15)
GLUCOSE SERPL-MCNC: 186 MG/DL (ref 70–99)
HCT VFR BLD AUTO: 32.9 % (ref 35–47)
HGB BLD-MCNC: 10.6 G/DL (ref 11.7–15.7)
IMM GRANULOCYTES # BLD: 0 10E3/UL
IMM GRANULOCYTES NFR BLD: 0 %
LYMPHOCYTES # BLD AUTO: 1.1 10E3/UL (ref 0.8–5.3)
LYMPHOCYTES NFR BLD AUTO: 11 %
MCH RBC QN AUTO: 29.3 PG (ref 26.5–33)
MCHC RBC AUTO-ENTMCNC: 32.2 G/DL (ref 31.5–36.5)
MCV RBC AUTO: 91 FL (ref 78–100)
MONOCYTES # BLD AUTO: 0.7 10E3/UL (ref 0–1.3)
MONOCYTES NFR BLD AUTO: 7 %
NEUTROPHILS # BLD AUTO: 8.6 10E3/UL (ref 1.6–8.3)
NEUTROPHILS NFR BLD AUTO: 81 %
NRBC # BLD AUTO: 0 10E3/UL
NRBC BLD AUTO-RTO: 0 /100
PLATELET # BLD AUTO: 300 10E3/UL (ref 150–450)
POTASSIUM SERPL-SCNC: 4.1 MMOL/L (ref 3.4–5.3)
PROT SERPL-MCNC: 6.1 G/DL (ref 6.4–8.3)
RBC # BLD AUTO: 3.62 10E6/UL (ref 3.8–5.2)
SODIUM SERPL-SCNC: 140 MMOL/L (ref 135–145)
WBC # BLD AUTO: 10.6 10E3/UL (ref 4–11)

## 2024-07-05 PROCEDURE — 96375 TX/PRO/DX INJ NEW DRUG ADDON: CPT

## 2024-07-05 PROCEDURE — G2211 COMPLEX E/M VISIT ADD ON: HCPCS | Performed by: INTERNAL MEDICINE

## 2024-07-05 PROCEDURE — 250N000013 HC RX MED GY IP 250 OP 250 PS 637: Performed by: INTERNAL MEDICINE

## 2024-07-05 PROCEDURE — 96415 CHEMO IV INFUSION ADDL HR: CPT

## 2024-07-05 PROCEDURE — 80053 COMPREHEN METABOLIC PANEL: CPT | Performed by: NURSE PRACTITIONER

## 2024-07-05 PROCEDURE — 96367 TX/PROPH/DG ADDL SEQ IV INF: CPT

## 2024-07-05 PROCEDURE — 96417 CHEMO IV INFUS EACH ADDL SEQ: CPT

## 2024-07-05 PROCEDURE — 96413 CHEMO IV INFUSION 1 HR: CPT

## 2024-07-05 PROCEDURE — 96377 APPLICATON ON-BODY INJECTOR: CPT | Mod: XS | Performed by: INTERNAL MEDICINE

## 2024-07-05 PROCEDURE — 96372 THER/PROPH/DIAG INJ SC/IM: CPT | Performed by: INTERNAL MEDICINE

## 2024-07-05 PROCEDURE — 85025 COMPLETE CBC W/AUTO DIFF WBC: CPT | Performed by: NURSE PRACTITIONER

## 2024-07-05 PROCEDURE — 258N000003 HC RX IP 258 OP 636: Performed by: INTERNAL MEDICINE

## 2024-07-05 PROCEDURE — 36591 DRAW BLOOD OFF VENOUS DEVICE: CPT | Performed by: NURSE PRACTITIONER

## 2024-07-05 PROCEDURE — 99214 OFFICE O/P EST MOD 30 MIN: CPT | Performed by: INTERNAL MEDICINE

## 2024-07-05 PROCEDURE — 96411 CHEMO IV PUSH ADDL DRUG: CPT

## 2024-07-05 PROCEDURE — G0463 HOSPITAL OUTPT CLINIC VISIT: HCPCS | Mod: 25 | Performed by: INTERNAL MEDICINE

## 2024-07-05 PROCEDURE — 250N000011 HC RX IP 250 OP 636: Mod: JW | Performed by: INTERNAL MEDICINE

## 2024-07-05 RX ORDER — METHYLPREDNISOLONE SODIUM SUCCINATE 125 MG/2ML
125 INJECTION, POWDER, LYOPHILIZED, FOR SOLUTION INTRAMUSCULAR; INTRAVENOUS
Status: DISCONTINUED | OUTPATIENT
Start: 2024-07-05 | End: 2024-07-05 | Stop reason: HOSPADM

## 2024-07-05 RX ORDER — LORAZEPAM 2 MG/ML
0.5 INJECTION INTRAMUSCULAR EVERY 4 HOURS PRN
Status: CANCELLED | OUTPATIENT
Start: 2024-07-05

## 2024-07-05 RX ORDER — ALBUTEROL SULFATE 90 UG/1
1-2 AEROSOL, METERED RESPIRATORY (INHALATION)
Status: DISCONTINUED | OUTPATIENT
Start: 2024-07-05 | End: 2024-07-05 | Stop reason: HOSPADM

## 2024-07-05 RX ORDER — ACETAMINOPHEN 325 MG/1
650 TABLET ORAL ONCE
Status: CANCELLED
Start: 2024-07-05

## 2024-07-05 RX ORDER — PALONOSETRON 0.05 MG/ML
0.25 INJECTION, SOLUTION INTRAVENOUS ONCE
Status: COMPLETED | OUTPATIENT
Start: 2024-07-05 | End: 2024-07-05

## 2024-07-05 RX ORDER — EPINEPHRINE 1 MG/ML
0.3 INJECTION, SOLUTION INTRAMUSCULAR; SUBCUTANEOUS EVERY 5 MIN PRN
Status: DISCONTINUED | OUTPATIENT
Start: 2024-07-05 | End: 2024-07-05 | Stop reason: HOSPADM

## 2024-07-05 RX ORDER — PALONOSETRON 0.05 MG/ML
0.25 INJECTION, SOLUTION INTRAVENOUS ONCE
Status: CANCELLED
Start: 2024-07-05

## 2024-07-05 RX ORDER — DOXORUBICIN HYDROCHLORIDE 2 MG/ML
25 INJECTION, SOLUTION INTRAVENOUS ONCE
Status: CANCELLED | OUTPATIENT
Start: 2024-07-05

## 2024-07-05 RX ORDER — ALBUTEROL SULFATE 0.83 MG/ML
2.5 SOLUTION RESPIRATORY (INHALATION)
Status: CANCELLED | OUTPATIENT
Start: 2024-07-05

## 2024-07-05 RX ORDER — METHYLPREDNISOLONE SODIUM SUCCINATE 125 MG/2ML
125 INJECTION, POWDER, LYOPHILIZED, FOR SOLUTION INTRAMUSCULAR; INTRAVENOUS
Status: CANCELLED
Start: 2024-07-05

## 2024-07-05 RX ORDER — HEPARIN SODIUM (PORCINE) LOCK FLUSH IV SOLN 100 UNIT/ML 100 UNIT/ML
5 SOLUTION INTRAVENOUS
Status: CANCELLED | OUTPATIENT
Start: 2024-07-05

## 2024-07-05 RX ORDER — MEPERIDINE HYDROCHLORIDE 25 MG/ML
25 INJECTION INTRAMUSCULAR; INTRAVENOUS; SUBCUTANEOUS
Status: CANCELLED
Start: 2024-07-05

## 2024-07-05 RX ORDER — ALBUTEROL SULFATE 0.83 MG/ML
2.5 SOLUTION RESPIRATORY (INHALATION)
Status: DISCONTINUED | OUTPATIENT
Start: 2024-07-05 | End: 2024-07-05 | Stop reason: HOSPADM

## 2024-07-05 RX ORDER — ALLOPURINOL 300 MG/1
300 TABLET ORAL DAILY
COMMUNITY
Start: 2024-05-13 | End: 2024-07-16

## 2024-07-05 RX ORDER — DIPHENHYDRAMINE HYDROCHLORIDE 50 MG/ML
50 INJECTION INTRAMUSCULAR; INTRAVENOUS
Status: DISCONTINUED | OUTPATIENT
Start: 2024-07-05 | End: 2024-07-05 | Stop reason: HOSPADM

## 2024-07-05 RX ORDER — DIPHENHYDRAMINE HCL 50 MG
50 CAPSULE ORAL ONCE
Status: COMPLETED | OUTPATIENT
Start: 2024-07-05 | End: 2024-07-05

## 2024-07-05 RX ORDER — EPINEPHRINE 1 MG/ML
0.3 INJECTION, SOLUTION INTRAMUSCULAR; SUBCUTANEOUS EVERY 5 MIN PRN
Status: CANCELLED | OUTPATIENT
Start: 2024-07-05

## 2024-07-05 RX ORDER — DIPHENHYDRAMINE HYDROCHLORIDE 50 MG/ML
50 INJECTION INTRAMUSCULAR; INTRAVENOUS
Status: CANCELLED
Start: 2024-07-05

## 2024-07-05 RX ORDER — MEPERIDINE HYDROCHLORIDE 25 MG/ML
25 INJECTION INTRAMUSCULAR; INTRAVENOUS; SUBCUTANEOUS EVERY 30 MIN PRN
Status: DISCONTINUED | OUTPATIENT
Start: 2024-07-05 | End: 2024-07-05 | Stop reason: HOSPADM

## 2024-07-05 RX ORDER — MEPERIDINE HYDROCHLORIDE 25 MG/ML
25 INJECTION INTRAMUSCULAR; INTRAVENOUS; SUBCUTANEOUS EVERY 30 MIN PRN
Status: CANCELLED | OUTPATIENT
Start: 2024-07-05

## 2024-07-05 RX ORDER — DOXORUBICIN HYDROCHLORIDE 2 MG/ML
25 INJECTION, SOLUTION INTRAVENOUS ONCE
Status: COMPLETED | OUTPATIENT
Start: 2024-07-05 | End: 2024-07-05

## 2024-07-05 RX ORDER — HEPARIN SODIUM (PORCINE) LOCK FLUSH IV SOLN 100 UNIT/ML 100 UNIT/ML
5 SOLUTION INTRAVENOUS
Status: DISCONTINUED | OUTPATIENT
Start: 2024-07-05 | End: 2024-07-05 | Stop reason: HOSPADM

## 2024-07-05 RX ORDER — DIPHENHYDRAMINE HCL 50 MG
50 CAPSULE ORAL ONCE
Status: CANCELLED
Start: 2024-07-05

## 2024-07-05 RX ORDER — ACETAMINOPHEN 325 MG/1
650 TABLET ORAL ONCE
Status: COMPLETED | OUTPATIENT
Start: 2024-07-05 | End: 2024-07-05

## 2024-07-05 RX ORDER — ALBUTEROL SULFATE 90 UG/1
1-2 AEROSOL, METERED RESPIRATORY (INHALATION)
Status: CANCELLED
Start: 2024-07-05

## 2024-07-05 RX ORDER — HEPARIN SODIUM,PORCINE 10 UNIT/ML
5-20 VIAL (ML) INTRAVENOUS DAILY PRN
Status: CANCELLED | OUTPATIENT
Start: 2024-07-05

## 2024-07-05 RX ADMIN — PEGFILGRASTIM 6 MG: KIT SUBCUTANEOUS at 11:28

## 2024-07-05 RX ADMIN — DOXORUBICIN HYDROCHLORIDE 30 MG: 2 INJECTION, SOLUTION INTRAVENOUS at 10:23

## 2024-07-05 RX ADMIN — DIPHENHYDRAMINE HYDROCHLORIDE 50 MG: 50 CAPSULE ORAL at 10:41

## 2024-07-05 RX ADMIN — SODIUM CHLORIDE 250 ML: 9 INJECTION, SOLUTION INTRAVENOUS at 09:45

## 2024-07-05 RX ADMIN — PALONOSETRON 0.25 MG: 0.05 INJECTION, SOLUTION INTRAVENOUS at 09:51

## 2024-07-05 RX ADMIN — Medication 5 ML: at 12:57

## 2024-07-05 RX ADMIN — VINCRISTINE SULFATE 1 MG: 1 INJECTION, SOLUTION INTRAVENOUS at 10:28

## 2024-07-05 RX ADMIN — RITUXIMAB-ABBS 500 MG: 10 INJECTION, SOLUTION INTRAVENOUS at 11:22

## 2024-07-05 RX ADMIN — FAMOTIDINE 20 MG: 10 INJECTION, SOLUTION INTRAVENOUS at 09:52

## 2024-07-05 RX ADMIN — FOSAPREPITANT 150 MG: 150 INJECTION, POWDER, LYOPHILIZED, FOR SOLUTION INTRAVENOUS at 10:00

## 2024-07-05 RX ADMIN — ACETAMINOPHEN 650 MG: 325 TABLET ORAL at 10:41

## 2024-07-05 RX ADMIN — CYCLOPHOSPHAMIDE 500 MG: 500 INJECTION, POWDER, FOR SOLUTION INTRAVENOUS; ORAL at 10:43

## 2024-07-05 ASSESSMENT — PAIN SCALES - GENERAL: PAINLEVEL: NO PAIN (0)

## 2024-07-05 NOTE — PROGRESS NOTES
[facility] Hematology and Oncology Progress Note    Patient: Bibiana Mcrae  MRN: [id number]  Date of Service: Jul 5, 2024          Reason for Visit    Chief Complaint   Patient presents with    Oncology Clinic Visit     Labs and infusion       Assessment and Plan     Cancer Staging   No matching staging information was found for the patient.    1.  Diffuse large B-cell lymphoma:   Diagnosed April 2024 after having a fall and the ED finding an abdominal mass. It was biopsy positive for lymphoma. Patient has started on treatment with R mini CHOP.  She is tolerating well with some fatigue, nausea, and constipation, well managed. PET scan 7/1/24 after 3 cycles showed complete response. I reviewed labs today. Hgb 10.6, , and ANC 8.6. CMP otherwise normal with mildly low calcium of 8.6. Will proceed with cycle 4 today with another PET after completion of 6 cycles. Will have visit in 3 weeks with cycle 5.     2.  Worsening fatigue:   likely chemo induced and is cumulative. Encourage good hydration, healthy diet with good protein. Also encourage daily exercise and to be as active as possible.     3. Mild nausea:   likely from chemo. Pepcid was added to premeds last cycle. Continue to use ondansetron prn.      ECOG Performance    1 - Can't do physically strenuous work, but fully ambyulatory and can do light sedentary work    Distress Screening (within last 30 days)    1. How concerned are you about your ability to eat? : 0  2. How concerned are you about unintended weight loss or your current weight? : 0  3. How concerned are you about feeling depressed or very sad? : 0  4. How concerned are you about feeling anxious or very scared? : 0  5. Do you struggle with the loss of meaning and real in your life? : Not at all  6. How concerned are you about work and home life issues that may be affected by your cancer? : 0  7. How concerned are you about knowing what resources are available to help you? : 0  8. Do you  currently have what you would describe as Sabianism or spiritual struggles?: Not at all       Pain  Pain Score: No Pain (0)    Problem List    Patient Active Problem List   Diagnosis    Essential hypertension with goal blood pressure less than 130/80    Anxiety    Osteoporosis, senile    Bilateral sensorineural hearing loss    Impaired fasting glucose    Diverticular disease of large intestine    Gastroesophageal reflux disease with esophagitis without hemorrhage    Bronchiectasis without complication (H)    Chronic cough    Incontinence of feces    Irregular bowel habits    Status post dilation of esophageal narrowing    Metal foreign body in eye region    MRI contraindicated due to metal in eye    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 Florian    Diagnosis: Diffuse large B-cell lymphoma.  This was found incidentally in April 2024.  She had a fall and came to the ER and they did a CT scan.  This showed an abdominal mass.  -4/15/24: Biopsy done of abdominal mass that was labeled as pancreatic mass.  It shows a CD20 positive large cell lymphoma.  -5/2/24: Hypermetabolic lymphadenopathy above and below the diaphragm, consistent with biopsy-proven lymphoma. Radiotracer avid mixed lytic and sclerotic observations in the left ischium and right distal femur metaphysis are nonspecific but raise possibility of marrow involvement    Treatment:  -5/3/2024: Patient started R mini CHOP. PET after 3 cycles showed CR. Today is cycle 4    Interim history:  Patient is here with her two daughters for continued treatment. Notes fatigue seems to be worsening and lasts 5-6 days after the infusion. She continues to have mild nausea that responds well to Zofran. She gets constipation after the first couple of days but is managing well with  "Miralax. Denies diarrhea, vomiting, new headaches, vision changes, mouth sores, breathing changes, or new bone pain. She has gotten mild lower extremity edema, slightly more in right than left, but denies redness or pain. She is using compression stockings which has been helping. She is taking the prednisone as directed. No recent illnesses or infection.     Review of Systems    Pertinent items are noted in HPI.    Past History    Past Medical History:   Diagnosis Date    Anxiety     Breast cyst     While ago    History of cholecystectomy 08/24/2023       PHYSICAL EXAM  BP (!) 144/77 (BP Location: Left arm, Patient Position: Sitting, Cuff Size: Adult Small)   Pulse 107   Temp 97.9  F (36.6  C) (Tympanic)   Resp 16   Ht 1.448 m (4' 9\")   Wt 41.7 kg (92 lb)   LMP  (LMP Unknown)   SpO2 98%   BMI 19.91 kg/m      GENERAL: no acute distress. Cooperative in conversation.  With daughters.  Using walker  RESP: Regular respiratory rate. No expiratory wheezes. Lungs clear bilaterally  CARDIAC: regular rate and rhythm.   NEURO: non focal. Alert and oriented x3.   PSYCH: within normal limits. No depression or anxiety.  SKIN: exposed skin is dry intact.   EXTREMITIES: Mild lower extremity edema. R>L. Homans sign negative bilaterally. Compression stockings on.     Lab Results    Recent Results (from the past 168 hour(s))   Glucose by meter   Result Value Ref Range    GLUCOSE BY METER POCT 88 70 - 99 mg/dL   Comprehensive metabolic panel   Result Value Ref Range    Sodium 140 135 - 145 mmol/L    Potassium 4.1 3.4 - 5.3 mmol/L    Carbon Dioxide (CO2) 23 22 - 29 mmol/L    Anion Gap 12 7 - 15 mmol/L    Urea Nitrogen 17.3 8.0 - 23.0 mg/dL    Creatinine 0.67 0.51 - 0.95 mg/dL    GFR Estimate 88 >60 mL/min/1.73m2    Calcium 8.6 (L) 8.8 - 10.2 mg/dL    Chloride 105 98 - 107 mmol/L    Glucose 186 (H) 70 - 99 mg/dL    Alkaline Phosphatase 128 40 - 150 U/L    AST 21 0 - 45 U/L    ALT 26 0 - 50 U/L    Protein Total 6.1 (L) 6.4 - 8.3 " g/dL    Albumin 4.0 3.5 - 5.2 g/dL    Bilirubin Total 0.3 <=1.2 mg/dL   CBC with platelets and differential   Result Value Ref Range    WBC Count 10.6 4.0 - 11.0 10e3/uL    RBC Count 3.62 (L) 3.80 - 5.20 10e6/uL    Hemoglobin 10.6 (L) 11.7 - 15.7 g/dL    Hematocrit 32.9 (L) 35.0 - 47.0 %    MCV 91 78 - 100 fL    MCH 29.3 26.5 - 33.0 pg    MCHC 32.2 31.5 - 36.5 g/dL    RDW 17.4 (H) 10.0 - 15.0 %    Platelet Count 300 150 - 450 10e3/uL    % Neutrophils 81 %    % Lymphocytes 11 %    % Monocytes 7 %    % Eosinophils 1 %    % Basophils 0 %    % Immature Granulocytes 0 %    NRBCs per 100 WBC 0 <1 /100    Absolute Neutrophils 8.6 (H) 1.6 - 8.3 10e3/uL    Absolute Lymphocytes 1.1 0.8 - 5.3 10e3/uL    Absolute Monocytes 0.7 0.0 - 1.3 10e3/uL    Absolute Eosinophils 0.1 0.0 - 0.7 10e3/uL    Absolute Basophils 0.0 0.0 - 0.2 10e3/uL    Absolute Immature Granulocytes 0.0 <=0.4 10e3/uL    Absolute NRBCs 0.0 10e3/uL           Imaging    PET Oncology Whole Body    Result Date: 7/1/2024  EXAM: PET ONCOLOGY WHOLE BODY LOCATION: [facility] DATE: 7/1/2024 INDICATION: Subsequent treatment planning and restaging for diffuse lung zone lymphoma, intra-abdominal lymph nodes. Currently receiving chemotherapy (R-CHOP). Monitor treatment COMPARISON: FDG PET/CT dated 5/20/2024 CONTRAST: None TECHNIQUE: Serum glucose level 88 mg/dL. One hour post intravenous administration of 10.0 mCi F-18 FDG, PET imaging was performed from the skull vertex to feet, utilizing attenuation correction with concurrent axial CT and PET/CT image fusion. Dose reduction techniques were used. PET/CT FINDINGS: Isolated residual ill-defined mesenteric mass measuring approximately 2.4 x 1.2 cm (Max SUV 6.1, previously measured approximately 6.5 x 6.1 cm with a max SUV of 18.1), which remains greater than liver background (Max SUV 2.8) suggesting  marked interval response to therapy (Deauville 4). Diffuse FDG uptake within the axial and appendicular  skeleton in a pattern typical of marrow stimulation. CT FINDINGS: Mild to moderate senescent intracranial changes. Moderate paranasal sinus mucosal thickening. Postoperative changes of bilateral lenses. Right chest port with tip terminating in the superior cavoatrial junction. Moderate coronary artery calcium. Upper abdominal surgical clips. Cholecystectomy. Left renal cyst. Sigmoid diverticulosis. Hysterectomy. Multilevel degenerative changes of the spine including grade 1 anterolisthesis of L5 over S1 due to chronic bilateral pars reticularis defects. The lower extremities are unremarkable.     IMPRESSION: Isolated residual ill-defined mesenteric mass, which remains greater than liver background suggesting marked interval response to therapy (Deauville 4).      The longitudinal plan of care for the diagnosis(es)/condition(s) as documented were addressed during this visit. Due to the added complexity in care, I will continue to support Bibiana in the subsequent management and with ongoing continuity of care.    Signed by: Cristal Arrington PA-C

## 2024-07-05 NOTE — PROGRESS NOTES
Infusion Nursing Note:  Bibiana Mcrae presents today for cycle 4 day 1 treatment with Chop-R.    Patient seen by provider today: Yes: Dr Chiqui MARTINS/ Cristal MULLER   present during visit today: Not Applicable.    Note: Bibiana was educated on her plan of care and each medication given was reviewed prior to administration.  She received treatment as ordered.  Neulasta on pro reviewed and then placed on her left lower abdomen  .      Intravenous Access:  Implanted Port.    Treatment Conditions:  Lab Results   Component Value Date    HGB 10.6 (L) 07/05/2024    WBC 10.6 07/05/2024    ANEU 14.2 (H) 05/13/2024    ANEUTAUTO 8.6 (H) 07/05/2024     07/05/2024        Lab Results   Component Value Date     07/05/2024    POTASSIUM 4.1 07/05/2024    MAG 2.1 05/13/2024    CR 0.67 07/05/2024    JUSTIN 8.6 (L) 07/05/2024    BILITOTAL 0.3 07/05/2024    ALBUMIN 4.0 07/05/2024    ALT 26 07/05/2024    AST 21 07/05/2024       Results reviewed, labs MET treatment parameters, ok to proceed with treatment.      Post Infusion Assessment:  Patient tolerated infusion without incident.  Blood return noted pre and post infusion.  Site patent and intact, free from redness, edema or discomfort.  No evidence of extravasations.  Access discontinued per protocol.       Discharge Plan:   Patient discharged in stable condition accompanied by: daughters.  Departure Mode: Ambulatory.      Smiley Hopkins RN

## 2024-07-05 NOTE — PROGRESS NOTES
"Oncology Rooming Note    July 5, 2024 8:42 AM   Bibiana Mcrae is a 79 year old female who presents for:    Chief Complaint   Patient presents with    Oncology Clinic Visit     Labs and infusion     Initial Vitals: BP (!) 144/77 (BP Location: Left arm, Patient Position: Sitting, Cuff Size: Adult Small)   Pulse 107   Temp 97.9  F (36.6  C) (Tympanic)   Resp 16   Ht 1.448 m (4' 9\")   Wt 41.7 kg (92 lb)   LMP  (LMP Unknown)   SpO2 98%   BMI 19.91 kg/m   Estimated body mass index is 19.91 kg/m  as calculated from the following:    Height as of this encounter: 1.448 m (4' 9\").    Weight as of this encounter: 41.7 kg (92 lb). Body surface area is 1.3 meters squared.  No Pain (0) Comment: Data Unavailable   No LMP recorded (lmp unknown). Patient has had a hysterectomy.  Allergies reviewed: Yes  Medications reviewed: Yes    Medications: MEDICATION REFILLS NEEDED TODAY. Provider was notified.  Pharmacy name entered into [employer]:    [id number] [place], [place] - [address] [place] [address] AT [place] [address]  [facility] [place]  [address] [place] [address]  [facility] - [address]    Frailty Screening:   Is the patient here for a new oncology consult visit in cancer care? 2. No      Clinical concerns:     RAS OLMBARDO CMA              "

## 2024-07-05 NOTE — LETTER
7/5/2024      Bibiana Mcrae  [address] [address]      Dear Colleague,    Thank you for referring your patient, Bibiana Mcrae, to the [facility] [place]. Please see a copy of my visit note below.    [facility] Hematology and Oncology Progress Note    Patient: Bibiana Mcrae  MRN: [id number]  Date of Service: Jul 5, 2024          Reason for Visit    Chief Complaint   Patient presents with     Oncology Clinic Visit     Labs and infusion       Assessment and Plan     Cancer Staging   No matching staging information was found for the patient.    1.  Diffuse large B-cell lymphoma:   Diagnosed April 2024 after having a fall and the ED finding an abdominal mass. It was biopsy positive for lymphoma. Patient has started on treatment with R mini CHOP.  She is tolerating well with some fatigue, nausea, and constipation, well managed. PET scan 7/1/24 after 3 cycles showed complete response. I reviewed labs today. Hgb 10.6, , and ANC 8.6. CMP otherwise normal with mildly low calcium of 8.6. Will proceed with cycle 4 today with another PET after completion of 6 cycles. Will have visit in 3 weeks with cycle 5.     2.  Worsening fatigue:   likely chemo induced and is cumulative. Encourage good hydration, healthy diet with good protein. Also encourage daily exercise and to be as active as possible.     3. Mild nausea:   likely from chemo. Pepcid was added to premeds last cycle. Continue to use ondansetron prn.      ECOG Performance    1 - Can't do physically strenuous work, but fully ambyulatory and can do light sedentary work    Distress Screening (within last 30 days)    1. How concerned are you about your ability to eat? : 0  2. How concerned are you about unintended weight loss or your current weight? : 0  3. How concerned are you about feeling depressed or very sad? : 0  4. How concerned are you about feeling anxious or very scared? : 0  5. Do you struggle with the  loss of meaning and real in your life? : Not at all  6. How concerned are you about work and home life issues that may be affected by your cancer? : 0  7. How concerned are you about knowing what resources are available to help you? : 0  8. Do you currently have what you would describe as Caodaism or spiritual struggles?: Not at all       Pain  Pain Score: No Pain (0)    Problem List    Patient Active Problem List   Diagnosis     Essential hypertension with goal blood pressure less than 130/80     Anxiety     Osteoporosis, senile     Bilateral sensorineural hearing loss     Impaired fasting glucose     Diverticular disease of large intestine     Gastroesophageal reflux disease with esophagitis without hemorrhage     Bronchiectasis without complication (H)     Chronic cough     Incontinence of feces     Irregular bowel habits     Status post dilation of esophageal narrowing     Metal foreign body in eye region     MRI contraindicated due to metal in eye     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 Florian    Diagnosis: Diffuse large B-cell lymphoma.  This was found incidentally in April 2024.  She had a fall and came to the ER and they did a CT scan.  This showed an abdominal mass.  -4/15/24: Biopsy done of abdominal mass that was labeled as pancreatic mass.  It shows a CD20 positive large cell lymphoma.  -5/2/24: Hypermetabolic lymphadenopathy above and below the diaphragm, consistent with biopsy-proven lymphoma. Radiotracer avid mixed lytic and sclerotic observations in the left ischium and right distal femur metaphysis are nonspecific but raise possibility of marrow involvement    Treatment:  -5/3/2024: Patient started R mini CHOP. PET after 3 cycles showed CR. Today is cycle 4    Interim history:  Patient is here  "with her two daughters for continued treatment. Notes fatigue seems to be worsening and lasts 5-6 days after the infusion. She continues to have mild nausea that responds well to Zofran. She gets constipation after the first couple of days but is managing well with Miralax. Denies diarrhea, vomiting, new headaches, vision changes, mouth sores, breathing changes, or new bone pain. She has gotten mild lower extremity edema, slightly more in right than left, but denies redness or pain. She is using compression stockings which has been helping. She is taking the prednisone as directed. No recent illnesses or infection.     Review of Systems    Pertinent items are noted in HPI.    Past History    Past Medical History:   Diagnosis Date     Anxiety      Breast cyst     While ago     History of cholecystectomy 08/24/2023       PHYSICAL EXAM  BP (!) 144/77 (BP Location: Left arm, Patient Position: Sitting, Cuff Size: Adult Small)   Pulse 107   Temp 97.9  F (36.6  C) (Tympanic)   Resp 16   Ht 1.448 m (4' 9\")   Wt 41.7 kg (92 lb)   LMP  (LMP Unknown)   SpO2 98%   BMI 19.91 kg/m      GENERAL: no acute distress. Cooperative in conversation.  With daughters.  Using walker  RESP: Regular respiratory rate. No expiratory wheezes. Lungs clear bilaterally  CARDIAC: regular rate and rhythm.   NEURO: non focal. Alert and oriented x3.   PSYCH: within normal limits. No depression or anxiety.  SKIN: exposed skin is dry intact.   EXTREMITIES: Mild lower extremity edema. R>L. Homans sign negative bilaterally. Compression stockings on.     Lab Results    Recent Results (from the past 168 hour(s))   Glucose by meter   Result Value Ref Range    GLUCOSE BY METER POCT 88 70 - 99 mg/dL   Comprehensive metabolic panel   Result Value Ref Range    Sodium 140 135 - 145 mmol/L    Potassium 4.1 3.4 - 5.3 mmol/L    Carbon Dioxide (CO2) 23 22 - 29 mmol/L    Anion Gap 12 7 - 15 mmol/L    Urea Nitrogen 17.3 8.0 - 23.0 mg/dL    Creatinine 0.67 0.51 - " 0.95 mg/dL    GFR Estimate 88 >60 mL/min/1.73m2    Calcium 8.6 (L) 8.8 - 10.2 mg/dL    Chloride 105 98 - 107 mmol/L    Glucose 186 (H) 70 - 99 mg/dL    Alkaline Phosphatase 128 40 - 150 U/L    AST 21 0 - 45 U/L    ALT 26 0 - 50 U/L    Protein Total 6.1 (L) 6.4 - 8.3 g/dL    Albumin 4.0 3.5 - 5.2 g/dL    Bilirubin Total 0.3 <=1.2 mg/dL   CBC with platelets and differential   Result Value Ref Range    WBC Count 10.6 4.0 - 11.0 10e3/uL    RBC Count 3.62 (L) 3.80 - 5.20 10e6/uL    Hemoglobin 10.6 (L) 11.7 - 15.7 g/dL    Hematocrit 32.9 (L) 35.0 - 47.0 %    MCV 91 78 - 100 fL    MCH 29.3 26.5 - 33.0 pg    MCHC 32.2 31.5 - 36.5 g/dL    RDW 17.4 (H) 10.0 - 15.0 %    Platelet Count 300 150 - 450 10e3/uL    % Neutrophils 81 %    % Lymphocytes 11 %    % Monocytes 7 %    % Eosinophils 1 %    % Basophils 0 %    % Immature Granulocytes 0 %    NRBCs per 100 WBC 0 <1 /100    Absolute Neutrophils 8.6 (H) 1.6 - 8.3 10e3/uL    Absolute Lymphocytes 1.1 0.8 - 5.3 10e3/uL    Absolute Monocytes 0.7 0.0 - 1.3 10e3/uL    Absolute Eosinophils 0.1 0.0 - 0.7 10e3/uL    Absolute Basophils 0.0 0.0 - 0.2 10e3/uL    Absolute Immature Granulocytes 0.0 <=0.4 10e3/uL    Absolute NRBCs 0.0 10e3/uL           Imaging    PET Oncology Whole Body    Result Date: 7/1/2024  EXAM: PET ONCOLOGY WHOLE BODY LOCATION: [facility] DATE: 7/1/2024 INDICATION: Subsequent treatment planning and restaging for diffuse lung zone lymphoma, intra-abdominal lymph nodes. Currently receiving chemotherapy (R-CHOP). Monitor treatment COMPARISON: FDG PET/CT dated 5/20/2024 CONTRAST: None TECHNIQUE: Serum glucose level 88 mg/dL. One hour post intravenous administration of 10.0 mCi F-18 FDG, PET imaging was performed from the skull vertex to feet, utilizing attenuation correction with concurrent axial CT and PET/CT image fusion. Dose reduction techniques were used. PET/CT FINDINGS: Isolated residual ill-defined mesenteric mass measuring approximately 2.4 x  1.2 cm (Max SUV 6.1, previously measured approximately 6.5 x 6.1 cm with a max SUV of 18.1), which remains greater than liver background (Max SUV 2.8) suggesting  marked interval response to therapy (Deauville 4). Diffuse FDG uptake within the axial and appendicular skeleton in a pattern typical of marrow stimulation. CT FINDINGS: Mild to moderate senescent intracranial changes. Moderate paranasal sinus mucosal thickening. Postoperative changes of bilateral lenses. Right chest port with tip terminating in the superior cavoatrial junction. Moderate coronary artery calcium. Upper abdominal surgical clips. Cholecystectomy. Left renal cyst. Sigmoid diverticulosis. Hysterectomy. Multilevel degenerative changes of the spine including grade 1 anterolisthesis of L5 over S1 due to chronic bilateral pars reticularis defects. The lower extremities are unremarkable.     IMPRESSION: Isolated residual ill-defined mesenteric mass, which remains greater than liver background suggesting marked interval response to therapy (Deauville 4).      The longitudinal plan of care for the diagnosis(es)/condition(s) as documented were addressed during this visit. Due to the added complexity in care, I will continue to support Bibiana in the subsequent management and with ongoing continuity of care.    Signed by: Cristal Arrington PA-C    Attestation signed by Inocencio Florian MD at 7/11/2024  4:42 PM:      Physician Attestation    I saw and evaluated Bibiana Mcrae as part of a shared APRN/PA visit.     I personally reviewed the vital signs, labs, and imaging.    I personally provided a substantive portion of care for this patient and I approve the care plan as written by the RK.  I was involved with Medical Decision Making including: Please see A&P for additional details of medical decision making.    Inocencio Florian MD  Date of Service (when I saw the patient): 0705/24      I personally reviewed the patient's PET scan and shared the images  "with Bibiana and her family and interpreted the images for them.  I also personally reviewed her CBC today showing a hemoglobin of 10.6.      Medical decision Making:  I spent 35 minutes in the care of this patient today, which included time necessary for preparation for the visit, face to face time with the patient, communication of recommendations to the care team, and documentation time.  Today's visit was centered around a cancer diagnosis in the setting of additional medical comorbidities. Complex medical decision making was required. The risk of additional morbidity without further treatment is high.         Oncology Rooming Note    July 5, 2024 8:42 AM   Bibiana Mcrae is a 79 year old female who presents for:    Chief Complaint   Patient presents with     Oncology Clinic Visit     Labs and infusion     Initial Vitals: BP (!) 144/77 (BP Location: Left arm, Patient Position: Sitting, Cuff Size: Adult Small)   Pulse 107   Temp 97.9  F (36.6  C) (Tympanic)   Resp 16   Ht 1.448 m (4' 9\")   Wt 41.7 kg (92 lb)   LMP  (LMP Unknown)   SpO2 98%   BMI 19.91 kg/m   Estimated body mass index is 19.91 kg/m  as calculated from the following:    Height as of this encounter: 1.448 m (4' 9\").    Weight as of this encounter: 41.7 kg (92 lb). Body surface area is 1.3 meters squared.  No Pain (0) Comment: Data Unavailable   No LMP recorded (lmp unknown). Patient has had a hysterectomy.  Allergies reviewed: Yes  Medications reviewed: Yes    Medications: MEDICATION REFILLS NEEDED TODAY. Provider was notified.  Pharmacy name entered into [email or web address]:    [id number] [place], [place] - [address] AT [place] [address]  [facility] - [place], [place] - [address] [facility]  [facility] - [address]    Frailty Screening:   Is the patient here for a new oncology consult visit in cancer care? 2. No      Clinical concerns:     RAS LOMBARDO, " CMA                Again, thank you for allowing me to participate in the care of your patient.        Sincerely,        Inocencio Florian MD

## 2024-07-07 DIAGNOSIS — D70.1 CHEMOTHERAPY-INDUCED NEUTROPENIA (H): ICD-10-CM

## 2024-07-07 DIAGNOSIS — C83.33 DIFFUSE LARGE B-CELL LYMPHOMA OF INTRA-ABDOMINAL LYMPH NODES (H): ICD-10-CM

## 2024-07-07 DIAGNOSIS — T45.1X5A CHEMOTHERAPY-INDUCED NEUTROPENIA (H): ICD-10-CM

## 2024-07-08 RX ORDER — ONDANSETRON 4 MG/1
TABLET, FILM COATED ORAL
Qty: 30 TABLET | Refills: 1 | Status: SHIPPED | OUTPATIENT
Start: 2024-07-08

## 2024-07-12 ENCOUNTER — TELEPHONE (OUTPATIENT)
Dept: ONCOLOGY | Facility: HOSPITAL | Age: 79
End: 2024-07-12
Payer: COMMERCIAL

## 2024-07-12 NOTE — TELEPHONE ENCOUNTER
Pt's daughter called to see why pt has to come in 2 days in a row. I explained most likely due to staffing. In basket message sent to schedulers to see if there is another option. Rosa Isela aware of plan.

## 2024-07-13 PROBLEM — F02.80 MAJOR NEUROCOGNITIVE DISORDER DUE TO ALZHEIMER'S DISEASE (H): Status: ACTIVE | Noted: 2024-07-13

## 2024-07-13 PROBLEM — R05.3 CHRONIC COUGH: Status: RESOLVED | Noted: 2021-05-19 | Resolved: 2024-07-13

## 2024-07-13 PROBLEM — G30.9 MAJOR NEUROCOGNITIVE DISORDER DUE TO ALZHEIMER'S DISEASE (H): Status: ACTIVE | Noted: 2024-07-13

## 2024-07-13 NOTE — PROGRESS NOTES
NEUROLOGY FOLLOW UP VISIT  NOTE       [facility] NEUROLOGY [place]  [address] [place] [address]  Tel: [phone]  Fax: [phone]  [email or web address]     Bibiana Mcrae,  1945, MRN [id number]  PCP: Zack Woody  Date: 2024      ASSESSMENT & PLAN     Visit Diagnosis  Major neurocognitive disorder due to Alzheimer's disease (H)     Major neurocognitive disorder due to Alzheimer's dementia  79-year-old female with HTN, SNHL, anxiety, large B-cell lymphoma who is here for follow-up of major neurocognitive disorder due to Alzheimer's dementia.  She was initially started on Aricept but could not tolerate and was switched to Exelon patch that has stabilized her condition and she has not experienced any symptoms.  Workup in the past included normal lab work and CT brain that showed age-related changes.  I have recommended:    1.  Increase Exelon patch to 9.5 mg per 24-hour  2.  She recently had a hepatic panel checked that was normal  3.  Patient was told to remain socially active do mental exercises  4.  Follow-up in 1 year    Thank you again for this referral, please feel free to contact me if you have any questions.    Emanuel Mann MD  [facility] [place]     HISTORY OF PRESENT ILLNESS     Patient is a 79-year-old female with history of HTN, SNHL, anxiety, large B-cell lymphoma last seen on 2024 for major neurocognitive disorder due to Alzheimer's dementia who returns for follow-up.  She was also diagnosed with pancreatic mass and the family wanted to complete oncology evaluation before deciding on long-term treatment.  She had CT of the brain that showed atrophy and chronic small vessel ischemic disease.  Additionally there was a nonspecific lobulated lesion nearly completely opacifying the left sphenoid sinus.  Family was holding off any neurological and ENT evaluation pending her evaluation by oncology.  She was started on chemotherapy but  complained of some fatigue nausea.  PET scan after 3 cycles showed complete response.    For her cognitive issues she was started on Aricept but could not tolerate and during her last visit was switched to Exelon patch.  She also had lab work for common causes of cognitive decline that included normal vitamin E, B12, TSH, RPR, MMA, folate and a borderline elevated vitamin B1.  Family reports that she was able to tolerate Exelon better and had no side effects.  She also saw ENT and they recommended holding off on any intervention unless lesion in the left sphenoid sinus causes any problems     PROBLEM LIST   Patient Active Problem List   Diagnosis    Essential hypertension with goal blood pressure less than 130/80    Anxiety    Osteoporosis, senile    Bilateral sensorineural hearing loss    Impaired fasting glucose    Diverticular disease of large intestine    Gastroesophageal reflux disease with esophagitis without hemorrhage    Bronchiectasis without complication (H)    Incontinence of feces    Irregular bowel habits    Status post dilation of esophageal narrowing    Metal foreign body in eye region    MRI contraindicated due to metal in eye    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)    Chemotherapy-induced neutropenia (H24)    Major neurocognitive disorder due to Alzheimer's disease (H)         PAST MEDICAL & SURGICAL HISTORY     Past Medical History:   Patient  has a past medical history of Anxiety, Breast cyst, and History of cholecystectomy (08/24/2023).    Surgical History:  She  has a past surgical history that includes TOTAL ABDOM HYSTERECTOMY; REMOVAL OF OVARY(S); Hysterectomy (1985); Oophorectomy (Bilateral, 1985); Breast Cyst Aspiration (Left); and IR Chest Port Placement > 5 Yrs of Age (5/1/2024).     SOCIAL HISTORY     Reviewed, and she  reports that she has never smoked. She has never been exposed to tobacco smoke. She has never used smokeless tobacco. She reports that she does  not drink alcohol and does not use drugs.     FAMILY HISTORY     Reviewed, and family history includes Breast Cancer (age of onset: 52.00) in her mother; Cancer (age of onset: 71.00) in her maternal grandfather; Prostate Cancer in her maternal grandfather.     ALLERGIES     Allergies   Allergen Reactions    Blood Transfusion Related (Informational Only) Other (See Comments)     Patient has a history of a Clinically Significant antibody against RBC antigens. A delay in compatible RBCs may occur. 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    Prochlorperazine Edisylate [Prochlorperazine] Other (See Comments)     Extrapyramidal side effects         REVIEW OF SYSTEMS     A 12 point review of system was performed and was negative except as outlined in the history of present illness.     HOME MEDICATIONS     Current Outpatient Rx   Medication Sig Dispense Refill    acarbose (PRECOSE) 25 MG tablet Take 25 mg by mouth 3 times daily (with meals)      acetaminophen (TYLENOL) 325 MG tablet Take 325-650 mg by mouth every 6 hours as needed for mild pain      amLODIPine (NORVASC) 5 MG tablet Take 1 tablet (5 mg) by mouth daily 90 tablet 3    Biotin 5 MG TABS Take 5,000 mcg by mouth daily      busPIRone (BUSPAR) 7.5 MG tablet TAKE 1 TABLET BY MOUTH TWICE  DAILY 180 tablet 2    celecoxib (CELEBREX) 200 MG capsule TAKE 1 CAPSULE BY MOUTH  DAILY 90 capsule 3    denosumab (PROLIA) 60 MG/ML SOSY injection Inject 60 mg Subcutaneous every 6 months      donepezil (ARICEPT) 5 MG ODT Take 5 mg by mouth daily      lidocaine-prilocaine (EMLA) 2.5-2.5 % external cream Apply topically as needed for moderate pain 30 g 2    Melatonin 10 MG TABS tablet Take 15 mg by mouth nightly as needed for sleep      multivitamin with minerals (THERA-M) 9 mg iron-400 mcg Tab tablet [MULTIVITAMIN WITH MINERALS (THERA-M) 9 MG IRON-400 MCG TAB TABLET] Take 1 tablet by mouth daily.      nortriptyline (PAMELOR) 50 MG capsule TAKE 1 CAPSULE BY MOUTH AT   "BEDTIME 90 capsule 2    omeprazole (PRILOSEC) 20 MG capsule [OMEPRAZOLE (PRILOSEC) 20 MG CAPSULE] Take 20 mg by mouth daily before breakfast.      ondansetron (ZOFRAN) 4 MG tablet TAKE 1 TO 2 TABLETS(4 TO 8 MG) BY MOUTH EVERY 6 HOURS AS NEEDED FOR NAUSEA OR VOMITING 30 tablet 1    polyethylene glycol (MIRALAX) 17 g packet Take 1 packet by mouth daily      predniSONE (DELTASONE) 20 MG tablet Take 2 tablets (40 mg) by mouth daily Take on Days 1 through 5 of chemotherapy. Take first dose in AM prior to chemotherapy. 60 tablet 0    Probiotic Product (SOLUBLE FIBER/PROBIOTICS PO) 2 tablespoons in 8 oz of liquid daily      rivastigmine (EXELON) 4.6 MG/24HR 24 hr patch Place 1 patch onto the skin daily for 360 days 90 patch 3    rivastigmine (EXELON) 9.5 MG/24HR 24 hr patch Place 1 patch onto the skin daily 90 patch 3    sennosides (SENOKOT) 8.6 MG tablet Take 2 tablets by mouth daily as needed for constipation      UNABLE TO FIND Take 2 Tablespoonful by mouth every morning MEDICATION NAME: biofiber 8 oz with water           PHYSICAL EXAM     Vital signs  /65 (BP Location: Left arm, Patient Position: Sitting)   Pulse 98   Ht 1.448 m (4' 9\")   Wt 41.7 kg (92 lb)   LMP  (LMP Unknown)   BMI 19.91 kg/m      Weight:   92 lbs 0 oz    Elderly female who is alert and oriented vital signs are reviewed and documented in electronic medical record. Neck supple. Neurologically speech normal cranial nerves II through XII are intact except she is hard of hearing. She has decreased mass with normal tone strength 5/5 reflexes 1+ she walks with a walker.      PERTINENT DIAGNOSTIC STUDIES     Following studies were reviewed:     CT BRAIN 4/10/2024  1.  No acute intracranial hemorrhage, extra-axial fluid collection, or mass effect.  2.  Brain atrophy and presumed chronic small vessel ischemic changes, as described.  3.  Unchanged nonspecific lobulated lesion nearly completely opacifying the left sphenoid sinus.     CT CERVICAL " SPINE 4/10/2024  1.  No acute cervical spine fracture.  2.  Multilevel degenerative changes (moderate/advanced), as described.     CT THORACIC SPINE 4/10/2024  1.  No acute thoracic spine fracture identified.  2.  Degenerative changes, as described.     CT LUMBAR SPINE 4/10/2024  1.  Transitional anatomy at the lumbosacral junction, as described.  2.  No acute lumbar spine fracture is identified.  3.  Grade I anterolisthesis of L3 on L4 and grade II anterolisthesis of L4 on L5.   4.  Multilevel degenerative changes, as described.  5.  Indeterminate findings in the partially visualized abdomen and pelvis, as described. Recommend follow-up CT abdomen and pelvis with contrast for further evaluation.     CT ABDOMEN PELVIS 4/11/2024  1.  Multilobulated soft tissue masses in the pancreatic/peripancreatic region. Primary considerations include pancreatic ductal adenocarcinoma, gastrointestinal stromal tumor (arising from the adjacent stomach), or malignant periceliac lymph nodes.   Suggest tissue sampling.  2.  Severely dilated left ureter with morphology suggesting duplicated collecting system. 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     PERTINENT LABS  Following labs were reviewed:  Lab on 07/05/2024   Component Date Value Ref Range Status    Sodium 07/05/2024 140  135 - 145 mmol/L Final    Potassium 07/05/2024 4.1  3.4 - 5.3 mmol/L Final    Carbon Dioxide (CO2) 07/05/2024 23  22 - 29 mmol/L Final    Anion Gap 07/05/2024 12  7 - 15 mmol/L Final    Urea Nitrogen 07/05/2024 17.3  8.0 - 23.0 mg/dL Final    Creatinine 07/05/2024 0.67  0.51 - 0.95 mg/dL Final    GFR Estimate 07/05/2024 88  >60 mL/min/1.73m2 Final    Calcium 07/05/2024 8.6 (L)  8.8 - 10.2 mg/dL Final    Chloride 07/05/2024 105  98 - 107 mmol/L Final    Glucose 07/05/2024 186 (H)  70 - 99 mg/dL Final    Alkaline Phosphatase 07/05/2024 128  40 - 150 U/L Final    AST 07/05/2024 21  0 - 45 U/L Final     ALT 07/05/2024 26  0 - 50 U/L Final    Protein Total 07/05/2024 6.1 (L)  6.4 - 8.3 g/dL Final    Albumin 07/05/2024 4.0  3.5 - 5.2 g/dL Final    Bilirubin Total 07/05/2024 0.3  <=1.2 mg/dL Final    WBC Count 07/05/2024 10.6  4.0 - 11.0 10e3/uL Final    RBC Count 07/05/2024 3.62 (L)  3.80 - 5.20 10e6/uL Final    Hemoglobin 07/05/2024 10.6 (L)  11.7 - 15.7 g/dL Final    Hematocrit 07/05/2024 32.9 (L)  35.0 - 47.0 % Final    MCV 07/05/2024 91  78 - 100 fL Final    MCH 07/05/2024 29.3  26.5 - 33.0 pg Final    MCHC 07/05/2024 32.2  31.5 - 36.5 g/dL Final    RDW 07/05/2024 17.4 (H)  10.0 - 15.0 % Final    Platelet Count 07/05/2024 300  150 - 450 10e3/uL Final    % Neutrophils 07/05/2024 81  % Final    % Lymphocytes 07/05/2024 11  % Final    % Monocytes 07/05/2024 7  % Final    % Eosinophils 07/05/2024 1  % Final    % Basophils 07/05/2024 0  % Final    % Immature Granulocytes 07/05/2024 0  % Final    NRBCs per 100 WBC 07/05/2024 0  <1 /100 Final    Absolute Neutrophils 07/05/2024 8.6 (H)  1.6 - 8.3 10e3/uL Final    Absolute Lymphocytes 07/05/2024 1.1  0.8 - 5.3 10e3/uL Final    Absolute Monocytes 07/05/2024 0.7  0.0 - 1.3 10e3/uL Final    Absolute Eosinophils 07/05/2024 0.1  0.0 - 0.7 10e3/uL Final    Absolute Basophils 07/05/2024 0.0  0.0 - 0.2 10e3/uL Final    Absolute Immature Granulocytes 07/05/2024 0.0  <=0.4 10e3/uL Final    Absolute NRBCs 07/05/2024 0.0  10e3/uL Final   Hospital Outpatient Visit on 07/01/2024   Component Date Value Ref Range Status    GLUCOSE BY METER POCT 07/01/2024 88  70 - 99 mg/dL Final   Lab on 06/13/2024   Component Date Value Ref Range Status    Sodium 06/13/2024 139  135 - 145 mmol/L Final    Potassium 06/13/2024 4.1  3.4 - 5.3 mmol/L Final    Carbon Dioxide (CO2) 06/13/2024 24  22 - 29 mmol/L Final    Anion Gap 06/13/2024 12  7 - 15 mmol/L Final    Urea Nitrogen 06/13/2024 21.9  8.0 - 23.0 mg/dL Final    Creatinine 06/13/2024 0.90  0.51 - 0.95 mg/dL Final    GFR Estimate 06/13/2024 65   >60 mL/min/1.73m2 Final    Calcium 06/13/2024 9.0  8.8 - 10.2 mg/dL Final    Chloride 06/13/2024 103  98 - 107 mmol/L Final    Glucose 06/13/2024 268 (H)  70 - 99 mg/dL Final    Alkaline Phosphatase 06/13/2024 118  40 - 150 U/L Final    AST 06/13/2024 36  0 - 45 U/L Final    ALT 06/13/2024 34  0 - 50 U/L Final    Protein Total 06/13/2024 6.2 (L)  6.4 - 8.3 g/dL Final    Albumin 06/13/2024 3.9  3.5 - 5.2 g/dL Final    Bilirubin Total 06/13/2024 0.4  <=1.2 mg/dL Final    WBC Count 06/13/2024 8.5  4.0 - 11.0 10e3/uL Final    RBC Count 06/13/2024 3.52 (L)  3.80 - 5.20 10e6/uL Final    Hemoglobin 06/13/2024 10.6 (L)  11.7 - 15.7 g/dL Final    Hematocrit 06/13/2024 32.7 (L)  35.0 - 47.0 % Final    MCV 06/13/2024 93  78 - 100 fL Final    MCH 06/13/2024 30.1  26.5 - 33.0 pg Final    MCHC 06/13/2024 32.4  31.5 - 36.5 g/dL Final    RDW 06/13/2024 17.1 (H)  10.0 - 15.0 % Final    Platelet Count 06/13/2024 257  150 - 450 10e3/uL Final    % Neutrophils 06/13/2024 82  % Final    % Lymphocytes 06/13/2024 11  % Final    % Monocytes 06/13/2024 5  % Final    % Eosinophils 06/13/2024 1  % Final    % Basophils 06/13/2024 1  % Final    % Immature Granulocytes 06/13/2024 1  % Final    NRBCs per 100 WBC 06/13/2024 0  <1 /100 Final    Absolute Neutrophils 06/13/2024 6.9  1.6 - 8.3 10e3/uL Final    Absolute Lymphocytes 06/13/2024 0.9  0.8 - 5.3 10e3/uL Final    Absolute Monocytes 06/13/2024 0.5  0.0 - 1.3 10e3/uL Final    Absolute Eosinophils 06/13/2024 0.0  0.0 - 0.7 10e3/uL Final    Absolute Basophils 06/13/2024 0.1  0.0 - 0.2 10e3/uL Final    Absolute Immature Granulocytes 06/13/2024 0.1  <=0.4 10e3/uL Final    Absolute NRBCs 06/13/2024 0.0  10e3/uL Final   Lab on 05/23/2024   Component Date Value Ref Range Status    Sodium 05/23/2024 140  135 - 145 mmol/L Final    Potassium 05/23/2024 4.0  3.4 - 5.3 mmol/L Final    Carbon Dioxide (CO2) 05/23/2024 24  22 - 29 mmol/L Final    Anion Gap 05/23/2024 13  7 - 15 mmol/L Final    Urea Nitrogen  05/23/2024 18.5  8.0 - 23.0 mg/dL Final    Creatinine 05/23/2024 0.65  0.51 - 0.95 mg/dL Final    GFR Estimate 05/23/2024 89  >60 mL/min/1.73m2 Final    Calcium 05/23/2024 8.9  8.8 - 10.2 mg/dL Final    Chloride 05/23/2024 103  98 - 107 mmol/L Final    Glucose 05/23/2024 247 (H)  70 - 99 mg/dL Final    Alkaline Phosphatase 05/23/2024 151 (H)  40 - 150 U/L Final    AST 05/23/2024 28  0 - 45 U/L Final    ALT 05/23/2024 26  0 - 50 U/L Final    Protein Total 05/23/2024 6.3 (L)  6.4 - 8.3 g/dL Final    Albumin 05/23/2024 3.9  3.5 - 5.2 g/dL Final    Bilirubin Total 05/23/2024 0.2  <=1.2 mg/dL Final    WBC Count 05/23/2024 12.1 (H)  4.0 - 11.0 10e3/uL Final    RBC Count 05/23/2024 3.70 (L)  3.80 - 5.20 10e6/uL Final    Hemoglobin 05/23/2024 11.0 (L)  11.7 - 15.7 g/dL Final    Hematocrit 05/23/2024 34.9 (L)  35.0 - 47.0 % Final    MCV 05/23/2024 94  78 - 100 fL Final    MCH 05/23/2024 29.7  26.5 - 33.0 pg Final    MCHC 05/23/2024 31.5  31.5 - 36.5 g/dL Final    RDW 05/23/2024 15.8 (H)  10.0 - 15.0 % Final    Platelet Count 05/23/2024 335  150 - 450 10e3/uL Final    % Neutrophils 05/23/2024 85  % Final    % Lymphocytes 05/23/2024 9  % Final    % Monocytes 05/23/2024 4  % Final    % Eosinophils 05/23/2024 0  % Final    % Basophils 05/23/2024 0  % Final    % Immature Granulocytes 05/23/2024 2  % Final    NRBCs per 100 WBC 05/23/2024 0  <1 /100 Final    Absolute Neutrophils 05/23/2024 10.2 (H)  1.6 - 8.3 10e3/uL Final    Absolute Lymphocytes 05/23/2024 1.1  0.8 - 5.3 10e3/uL Final    Absolute Monocytes 05/23/2024 0.5  0.0 - 1.3 10e3/uL Final    Absolute Eosinophils 05/23/2024 0.0  0.0 - 0.7 10e3/uL Final    Absolute Basophils 05/23/2024 0.1  0.0 - 0.2 10e3/uL Final    Absolute Immature Granulocytes 05/23/2024 0.2  <=0.4 10e3/uL Final    Absolute NRBCs 05/23/2024 0.0  10e3/uL Final   Lab on 05/13/2024   Component Date Value Ref Range Status    Magnesium 05/13/2024 2.1  1.7 - 2.3 mg/dL Final    Sodium 05/13/2024 140  135 - 145  mmol/L Final    Potassium 05/13/2024 4.2  3.4 - 5.3 mmol/L Final    Carbon Dioxide (CO2) 05/13/2024 26  22 - 29 mmol/L Final    Anion Gap 05/13/2024 9  7 - 15 mmol/L Final    Urea Nitrogen 05/13/2024 13.4  8.0 - 23.0 mg/dL Final    Creatinine 05/13/2024 0.72  0.51 - 0.95 mg/dL Final    GFR Estimate 05/13/2024 85  >60 mL/min/1.73m2 Final    Calcium 05/13/2024 9.4  8.8 - 10.2 mg/dL Final    Chloride 05/13/2024 105  98 - 107 mmol/L Final    Glucose 05/13/2024 151 (H)  70 - 99 mg/dL Final    Alkaline Phosphatase 05/13/2024 225 (H)  40 - 150 U/L Final    AST 05/13/2024 41  0 - 45 U/L Final    ALT 05/13/2024 60 (H)  0 - 50 U/L Final    Protein Total 05/13/2024 6.4  6.4 - 8.3 g/dL Final    Albumin 05/13/2024 3.8  3.5 - 5.2 g/dL Final    Bilirubin Total 05/13/2024 0.2  <=1.2 mg/dL Final    WBC Count 05/13/2024 20.9 (H)  4.0 - 11.0 10e3/uL Final    RBC Count 05/13/2024 3.68 (L)  3.80 - 5.20 10e6/uL Final    Hemoglobin 05/13/2024 10.9 (L)  11.7 - 15.7 g/dL Final    Hematocrit 05/13/2024 35.0  35.0 - 47.0 % Final    MCV 05/13/2024 95  78 - 100 fL Final    MCH 05/13/2024 29.6  26.5 - 33.0 pg Final    MCHC 05/13/2024 31.1 (L)  31.5 - 36.5 g/dL Final    RDW 05/13/2024 15.3 (H)  10.0 - 15.0 % Final    Platelet Count 05/13/2024 224  150 - 450 10e3/uL Final    NRBCs per 100 WBC 05/13/2024 1 (H)  <1 /100 Final    Absolute NRBCs 05/13/2024 0.1  10e3/uL Final    % Neutrophils 05/13/2024 68  % Final    % Lymphocytes 05/13/2024 9  % Final    % Monocytes 05/13/2024 6  % Final    % Eosinophils 05/13/2024 2  % Final    % Basophils 05/13/2024 1  % Final    % Metamyelocytes 05/13/2024 9  % Final    % Myelocytes 05/13/2024 4  % Final    % Promyelocytes 05/13/2024 1  % Final    NRBCs per 100 WBC 05/13/2024 1 (H)  <=0 % Final    Absolute Neutrophils 05/13/2024 14.2 (H)  1.6 - 8.3 10e3/uL Final    Absolute Lymphocytes 05/13/2024 1.9  0.8 - 5.3 10e3/uL Final    Absolute Monocytes 05/13/2024 1.3  0.0 - 1.3 10e3/uL Final    Absolute Eosinophils  05/13/2024 0.4  0.0 - 0.7 10e3/uL Final    Absolute Basophils 05/13/2024 0.2  0.0 - 0.2 10e3/uL Final    Absolute Metamyelocytes 05/13/2024 1.9 (H)  <=0.0 10e3/uL Final    Absolute Myelocytes 05/13/2024 0.8 (H)  <=0.0 10e3/uL Final    Absolute Promyelocytes 05/13/2024 0.2 (H)  <=0.0 10e3/uL Final    Absolute NRBCs 05/13/2024 0.2 (H)  <=0.0 10e3/uL Final    RBC Morphology 05/13/2024 Confirmed RBC Indices   Final    Platelet Assessment 05/13/2024 Automated Count Confirmed. Platelet morphology is normal.  Automated Count Confirmed. Platelet morphology is normal. Final    Toxic Neutrophils 05/13/2024 Present (A)  None Seen Final    Pathologist Review Comments (Blood) 05/13/2024 Slide reviewed by Dr. Irby 5/13/24 0074. Agree with limited promyelocyte presence, 1-2 per 100 cells.   Final   Infusion Therapy Visit on 05/03/2024   Component Date Value Ref Range Status    Sodium 05/03/2024 140  135 - 145 mmol/L Final    Potassium 05/03/2024 4.1  3.4 - 5.3 mmol/L Final    Carbon Dioxide (CO2) 05/03/2024 25  22 - 29 mmol/L Final    Anion Gap 05/03/2024 12  7 - 15 mmol/L Final    Urea Nitrogen 05/03/2024 27.5 (H)  8.0 - 23.0 mg/dL Final    Creatinine 05/03/2024 0.80  0.51 - 0.95 mg/dL Final    GFR Estimate 05/03/2024 75  >60 mL/min/1.73m2 Final    Calcium 05/03/2024 9.9  8.8 - 10.2 mg/dL Final    Chloride 05/03/2024 103  98 - 107 mmol/L Final    Glucose 05/03/2024 193 (H)  70 - 99 mg/dL Final    Alkaline Phosphatase 05/03/2024 134  40 - 150 U/L Final    AST 05/03/2024 35  0 - 45 U/L Final    ALT 05/03/2024 26  0 - 50 U/L Final    Protein Total 05/03/2024 6.4  6.4 - 8.3 g/dL Final    Albumin 05/03/2024 3.9  3.5 - 5.2 g/dL Final    Bilirubin Total 05/03/2024 0.2  <=1.2 mg/dL Final    Hepatitis B Core Antibody Total 05/03/2024 Nonreactive  Nonreactive Final    Hepatitis B Surface Antigen 05/03/2024 Nonreactive  Nonreactive Final    Hepatitis B Surface Antibody 05/03/2024 Nonreactive   Final    Hepatitis B Surface Antibody  Instr* 05/03/2024 <3.50  <8.5 m[IU]/mL Final    WBC Count 05/03/2024 8.4  4.0 - 11.0 10e3/uL Final    RBC Count 05/03/2024 3.58 (L)  3.80 - 5.20 10e6/uL Final    Hemoglobin 05/03/2024 10.8 (L)  11.7 - 15.7 g/dL Final    Hematocrit 05/03/2024 33.6 (L)  35.0 - 47.0 % Final    MCV 05/03/2024 94  78 - 100 fL Final    MCH 05/03/2024 30.2  26.5 - 33.0 pg Final    MCHC 05/03/2024 32.1  31.5 - 36.5 g/dL Final    RDW 05/03/2024 14.6  10.0 - 15.0 % Final    Platelet Count 05/03/2024 254  150 - 450 10e3/uL Final    % Neutrophils 05/03/2024 78  % Final    % Lymphocytes 05/03/2024 11  % Final    % Monocytes 05/03/2024 9  % Final    % Eosinophils 05/03/2024 1  % Final    % Basophils 05/03/2024 1  % Final    % Immature Granulocytes 05/03/2024 0  % Final    NRBCs per 100 WBC 05/03/2024 0  <1 /100 Final    Absolute Neutrophils 05/03/2024 6.7  1.6 - 8.3 10e3/uL Final    Absolute Lymphocytes 05/03/2024 0.9  0.8 - 5.3 10e3/uL Final    Absolute Monocytes 05/03/2024 0.8  0.0 - 1.3 10e3/uL Final    Absolute Eosinophils 05/03/2024 0.0  0.0 - 0.7 10e3/uL Final    Absolute Basophils 05/03/2024 0.0  0.0 - 0.2 10e3/uL Final    Absolute Immature Granulocytes 05/03/2024 0.0  <=0.4 10e3/uL Final    Absolute NRBCs 05/03/2024 0.0  10e3/uL Final   Hospital Outpatient Visit on 05/02/2024   Component Date Value Ref Range Status    GLUCOSE BY METER POCT 05/02/2024 107 (H)  70 - 99 mg/dL Final   Office Visit on 04/25/2024   Component Date Value Ref Range Status    Magnesium 04/25/2024 2.2  1.7 - 2.3 mg/dL Final    Sodium 04/25/2024 141  135 - 145 mmol/L Final    Potassium 04/25/2024 4.4  3.4 - 5.3 mmol/L Final    Carbon Dioxide (CO2) 04/25/2024 27  22 - 29 mmol/L Final    Anion Gap 04/25/2024 11  7 - 15 mmol/L Final    Urea Nitrogen 04/25/2024 17.8  8.0 - 23.0 mg/dL Final    Creatinine 04/25/2024 0.72  0.51 - 0.95 mg/dL Final    GFR Estimate 04/25/2024 85  >60 mL/min/1.73m2 Final    Calcium 04/25/2024 9.7  8.8 - 10.2 mg/dL Final    Chloride 04/25/2024  103  98 - 107 mmol/L Final    Glucose 04/25/2024 121 (H)  70 - 99 mg/dL Final    Alkaline Phosphatase 04/25/2024 113  40 - 150 U/L Final    AST 04/25/2024 30  0 - 45 U/L Final    ALT 04/25/2024 21  0 - 50 U/L Final    Protein Total 04/25/2024 6.5  6.4 - 8.3 g/dL Final    Albumin 04/25/2024 4.0  3.5 - 5.2 g/dL Final    Bilirubin Total 04/25/2024 0.2  <=1.2 mg/dL Final    Hemoglobin A1C 04/25/2024 5.9 (H)  0.0 - 5.6 % Final    WBC Count 04/25/2024 8.2  4.0 - 11.0 10e3/uL Final    RBC Count 04/25/2024 3.79 (L)  3.80 - 5.20 10e6/uL Final    Hemoglobin 04/25/2024 11.6 (L)  11.7 - 15.7 g/dL Final    Hematocrit 04/25/2024 36.0  35.0 - 47.0 % Final    MCV 04/25/2024 95  78 - 100 fL Final    MCH 04/25/2024 30.6  26.5 - 33.0 pg Final    MCHC 04/25/2024 32.2  31.5 - 36.5 g/dL Final    RDW 04/25/2024 14.3  10.0 - 15.0 % Final    Platelet Count 04/25/2024 323  150 - 450 10e3/uL Final    % Neutrophils 04/25/2024 75  % Final    % Lymphocytes 04/25/2024 12  % Final    % Monocytes 04/25/2024 8  % Final    % Eosinophils 04/25/2024 4  % Final    % Basophils 04/25/2024 1  % Final    % Immature Granulocytes 04/25/2024 0  % Final    Absolute Neutrophils 04/25/2024 6.2  1.6 - 8.3 10e3/uL Final    Absolute Lymphocytes 04/25/2024 1.0  0.8 - 5.3 10e3/uL Final    Absolute Monocytes 04/25/2024 0.7  0.0 - 1.3 10e3/uL Final    Absolute Eosinophils 04/25/2024 0.3  0.0 - 0.7 10e3/uL Final    Absolute Basophils 04/25/2024 0.0  0.0 - 0.2 10e3/uL Final    Absolute Immature Granulocytes 04/25/2024 0.0  <=0.4 10e3/uL Final    ABO/RH(D) 04/25/2024 O POS   Final    Antibody Screen 04/25/2024 Positive (A)  Negative Final    SPECIMEN EXPIRATION DATE 04/25/2024 [id number]   Final    E Antigen Type 04/25/2024 Negative   Final    SPECIMEN EXPIRATION DATE 04/25/2024 [id number]   Final    Antibody Identification 04/25/2024 Anti-E   Final    SPECIMEN EXPIRATION DATE 04/25/2024 [id number]   Final   Hospital Outpatient Visit on 04/22/2024   Component  Date Value Ref Range Status    LVEF  04/22/2024 60-65%   Final   Lab on 04/19/2024   Component Date Value Ref Range Status    Folic Acid 04/19/2024 31.8  4.6 - 34.8 ng/mL Final    Methylmalonic Acid 04/19/2024 0.34  0.00 - 0.40 umol/L Final    Treponema Antibody Total 04/19/2024 Nonreactive  Nonreactive Final    TSH 04/19/2024 0.97  0.30 - 4.20 uIU/mL Final    Vitamin B1 Whole Blood Level 04/19/2024 210 (H)  70 - 180 nmol/L Final    Vitamin B12 04/19/2024 941  232 - 1,245 pg/mL Final    Vitamin E 04/19/2024 9.5  5.5 - 18.0 mg/L Final    Vitamin E Gamma 04/19/2024 1.0  0.0 - 6.0 mg/L Final   There maybe more visits with results that are not included.         Total time spent for face to face visit, reviewing labs/imaging studies, counseling and coordination of care was: 30 Minutes spent on the date of the encounter doing chart review, review of outside records, review of test results, interpretation of tests, patient visit, documentation, and discussion with family     The longitudinal plan of care for the diagnosis(es)/condition(s) as documented were addressed during this visit. Due to the added complexity in care, I will continue to support Bibiana in the subsequent management and with ongoing continuity of care.    This note was dictated using voice recognition software.  Any grammatical or context distortions are unintentional and inherent to the software.    No orders of the defined types were placed in this encounter.     New Prescriptions    RIVASTIGMINE (EXELON) 9.5 MG/24HR 24 HR PATCH    Place 1 patch onto the skin daily     Modified Medications    No medications on file

## 2024-07-15 LAB
PATH REPORT.ADDENDUM SPEC: ABNORMAL
PATH REPORT.COMMENTS IMP SPEC: ABNORMAL
PATH REPORT.COMMENTS IMP SPEC: YES
PATH REPORT.FINAL DX SPEC: ABNORMAL
PATH REPORT.GROSS SPEC: ABNORMAL
PATH REPORT.MICROSCOPIC SPEC OTHER STN: ABNORMAL
PATH REPORT.MICROSCOPIC SPEC OTHER STN: ABNORMAL
PATH REPORT.RELEVANT HX SPEC: ABNORMAL
PHOTO IMAGE: ABNORMAL

## 2024-07-16 ENCOUNTER — OFFICE VISIT (OUTPATIENT)
Dept: NEUROLOGY | Facility: CLINIC | Age: 79
End: 2024-07-16
Payer: COMMERCIAL

## 2024-07-16 VITALS
WEIGHT: 92 LBS | SYSTOLIC BLOOD PRESSURE: 120 MMHG | BODY MASS INDEX: 19.85 KG/M2 | HEIGHT: 57 IN | DIASTOLIC BLOOD PRESSURE: 65 MMHG | HEART RATE: 98 BPM

## 2024-07-16 DIAGNOSIS — G30.9 MAJOR NEUROCOGNITIVE DISORDER DUE TO ALZHEIMER'S DISEASE (H): Primary | ICD-10-CM

## 2024-07-16 DIAGNOSIS — F02.80 MAJOR NEUROCOGNITIVE DISORDER DUE TO ALZHEIMER'S DISEASE (H): Primary | ICD-10-CM

## 2024-07-16 PROCEDURE — 99214 OFFICE O/P EST MOD 30 MIN: CPT | Performed by: PSYCHIATRY & NEUROLOGY

## 2024-07-16 PROCEDURE — G2211 COMPLEX E/M VISIT ADD ON: HCPCS | Performed by: PSYCHIATRY & NEUROLOGY

## 2024-07-16 RX ORDER — PHENOL 1.4 %
15 AEROSOL, SPRAY (ML) MUCOUS MEMBRANE
COMMUNITY

## 2024-07-16 RX ORDER — RIVASTIGMINE 9.5 MG/24H
1 PATCH, EXTENDED RELEASE TRANSDERMAL DAILY
Qty: 90 PATCH | Refills: 3 | Status: SHIPPED | OUTPATIENT
Start: 2024-07-16

## 2024-07-16 NOTE — NURSING NOTE
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 No new concerns      Candi Smith CMA on 7/16/2024 at 12:06 PM  [facility]

## 2024-07-16 NOTE — LETTER
2024      Bibiana Mcrae  [address]  [place] [address]      Dear Colleague,    Thank you for referring your patient, Bibiana Mcrae, to the [facility] [place]. Please see a copy of my visit note below.    NEUROLOGY FOLLOW UP VISIT  NOTE       [facility] [place]  [address] [place] [address]  Tel: [phone]  Fax: [phone]  [email or web address]     Bibiana Mcrae,  1945, MRN [id number]  PCP: Zack Woody  Date: 2024      ASSESSMENT & PLAN     Visit Diagnosis  Major neurocognitive disorder due to Alzheimer's disease (H)     Major neurocognitive disorder due to Alzheimer's dementia  79-year-old female with HTN, SNHL, anxiety, large B-cell lymphoma who is here for follow-up of major neurocognitive disorder due to Alzheimer's dementia.  She was initially started on Aricept but could not tolerate and was switched to Exelon patch that has stabilized her condition and she has not experienced any symptoms.  Workup in the past included normal lab work and CT brain that showed age-related changes.  I have recommended:    1.  Increase Exelon patch to 9.5 mg per 24-hour  2.  She recently had a hepatic panel checked that was normal  3.  Patient was told to remain socially active do mental exercises  4.  Follow-up in 1 year    Thank you again for this referral, please feel free to contact me if you have any questions.    Emanuel Mann MD  [facility]     HISTORY OF PRESENT ILLNESS     Patient is a 79-year-old female with history of HTN, SNHL, anxiety, large B-cell lymphoma last seen on 2024 for major neurocognitive disorder due to Alzheimer's dementia who returns for follow-up.  She was also diagnosed with pancreatic mass and the family wanted to complete oncology evaluation before deciding on long-term treatment.  She had CT of the brain that showed atrophy and chronic small vessel ischemic  disease.  Additionally there was a nonspecific lobulated lesion nearly completely opacifying the left sphenoid sinus.  Family was holding off any neurological and ENT evaluation pending her evaluation by oncology.  She was started on chemotherapy but complained of some fatigue nausea.  PET scan after 3 cycles showed complete response.    For her cognitive issues she was started on Aricept but could not tolerate and during her last visit was switched to Exelon patch.  She also had lab work for common causes of cognitive decline that included normal vitamin E, B12, TSH, RPR, MMA, folate and a borderline elevated vitamin B1.  Family reports that she was able to tolerate Exelon better and had no side effects.  She also saw ENT and they recommended holding off on any intervention unless lesion in the left sphenoid sinus causes any problems     PROBLEM LIST   Patient Active Problem List   Diagnosis     Essential hypertension with goal blood pressure less than 130/80     Anxiety     Osteoporosis, senile     Bilateral sensorineural hearing loss     Impaired fasting glucose     Diverticular disease of large intestine     Gastroesophageal reflux disease with esophagitis without hemorrhage     Bronchiectasis without complication (H)     Incontinence of feces     Irregular bowel habits     Status post dilation of esophageal narrowing     Metal foreign body in eye region     MRI contraindicated due to metal in eye     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)     Chemotherapy-induced neutropenia (H24)     Major neurocognitive disorder due to Alzheimer's disease (H)         PAST MEDICAL & SURGICAL HISTORY     Past Medical History:   Patient  has a past medical history of Anxiety, Breast cyst, and History of cholecystectomy (08/24/2023).    Surgical History:  She  has a past surgical history that includes TOTAL ABDOM HYSTERECTOMY; REMOVAL OF OVARY(S); Hysterectomy (1985); Oophorectomy (Bilateral, 1985);  Breast Cyst Aspiration (Left); and IR Chest Port Placement > 5 Yrs of Age (5/1/2024).     SOCIAL HISTORY     Reviewed, and she  reports that she has never smoked. She has never been exposed to tobacco smoke. She has never used smokeless tobacco. She reports that she does not drink alcohol and does not use drugs.     FAMILY HISTORY     Reviewed, and family history includes Breast Cancer (age of onset: 52.00) in her mother; Cancer (age of onset: 71.00) in her maternal grandfather; Prostate Cancer in her maternal grandfather.     ALLERGIES     Allergies   Allergen Reactions     Blood Transfusion Related (Informational Only) Other (See Comments)     Patient has a history of a Clinically Significant antibody against RBC antigens. A delay in compatible RBCs may occur. 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     Prochlorperazine Edisylate [Prochlorperazine] Other (See Comments)     Extrapyramidal side effects         REVIEW OF SYSTEMS     A 12 point review of system was performed and was negative except as outlined in the history of present illness.     HOME MEDICATIONS     Current Outpatient Rx   Medication Sig Dispense Refill     acarbose (PRECOSE) 25 MG tablet Take 25 mg by mouth 3 times daily (with meals)       acetaminophen (TYLENOL) 325 MG tablet Take 325-650 mg by mouth every 6 hours as needed for mild pain       amLODIPine (NORVASC) 5 MG tablet Take 1 tablet (5 mg) by mouth daily 90 tablet 3     Biotin 5 MG TABS Take 5,000 mcg by mouth daily       busPIRone (BUSPAR) 7.5 MG tablet TAKE 1 TABLET BY MOUTH TWICE  DAILY 180 tablet 2     celecoxib (CELEBREX) 200 MG capsule TAKE 1 CAPSULE BY MOUTH  DAILY 90 capsule 3     denosumab (PROLIA) 60 MG/ML SOSY injection Inject 60 mg Subcutaneous every 6 months       donepezil (ARICEPT) 5 MG ODT Take 5 mg by mouth daily       lidocaine-prilocaine (EMLA) 2.5-2.5 % external cream Apply topically as needed for moderate pain 30 g 2     Melatonin 10 MG TABS tablet Take 15  "mg by mouth nightly as needed for sleep       multivitamin with minerals (THERA-M) 9 mg iron-400 mcg Tab tablet [MULTIVITAMIN WITH MINERALS (THERA-M) 9 MG IRON-400 MCG TAB TABLET] Take 1 tablet by mouth daily.       nortriptyline (PAMELOR) 50 MG capsule TAKE 1 CAPSULE BY MOUTH AT  BEDTIME 90 capsule 2     omeprazole (PRILOSEC) 20 MG capsule [OMEPRAZOLE (PRILOSEC) 20 MG CAPSULE] Take 20 mg by mouth daily before breakfast.       ondansetron (ZOFRAN) 4 MG tablet TAKE 1 TO 2 TABLETS(4 TO 8 MG) BY MOUTH EVERY 6 HOURS AS NEEDED FOR NAUSEA OR VOMITING 30 tablet 1     polyethylene glycol (MIRALAX) 17 g packet Take 1 packet by mouth daily       predniSONE (DELTASONE) 20 MG tablet Take 2 tablets (40 mg) by mouth daily Take on Days 1 through 5 of chemotherapy. Take first dose in AM prior to chemotherapy. 60 tablet 0     Probiotic Product (SOLUBLE FIBER/PROBIOTICS PO) 2 tablespoons in 8 oz of liquid daily       rivastigmine (EXELON) 4.6 MG/24HR 24 hr patch Place 1 patch onto the skin daily for 360 days 90 patch 3     rivastigmine (EXELON) 9.5 MG/24HR 24 hr patch Place 1 patch onto the skin daily 90 patch 3     sennosides (SENOKOT) 8.6 MG tablet Take 2 tablets by mouth daily as needed for constipation       UNABLE TO FIND Take 2 Tablespoonful by mouth every morning MEDICATION NAME: biofiber 8 oz with water           PHYSICAL EXAM     Vital signs  /65 (BP Location: Left arm, Patient Position: Sitting)   Pulse 98   Ht 1.448 m (4' 9\")   Wt 41.7 kg (92 lb)   LMP  (LMP Unknown)   BMI 19.91 kg/m      Weight:   92 lbs 0 oz    Elderly female who is alert and oriented vital signs are reviewed and documented in electronic medical record. Neck supple. Neurologically speech normal cranial nerves II through XII are intact except she is hard of hearing. She has decreased mass with normal tone strength 5/5 reflexes 1+ she walks with a walker.      PERTINENT DIAGNOSTIC STUDIES     Following studies were reviewed:     CT BRAIN " 4/10/2024  1.  No acute intracranial hemorrhage, extra-axial fluid collection, or mass effect.  2.  Brain atrophy and presumed chronic small vessel ischemic changes, as described.  3.  Unchanged nonspecific lobulated lesion nearly completely opacifying the left sphenoid sinus.     CT CERVICAL SPINE 4/10/2024  1.  No acute cervical spine fracture.  2.  Multilevel degenerative changes (moderate/advanced), as described.     CT THORACIC SPINE 4/10/2024  1.  No acute thoracic spine fracture identified.  2.  Degenerative changes, as described.     CT LUMBAR SPINE 4/10/2024  1.  Transitional anatomy at the lumbosacral junction, as described.  2.  No acute lumbar spine fracture is identified.  3.  Grade I anterolisthesis of L3 on L4 and grade II anterolisthesis of L4 on L5.   4.  Multilevel degenerative changes, as described.  5.  Indeterminate findings in the partially visualized abdomen and pelvis, as described. Recommend follow-up CT abdomen and pelvis with contrast for further evaluation.     CT ABDOMEN PELVIS 4/11/2024  1.  Multilobulated soft tissue masses in the pancreatic/peripancreatic region. Primary considerations include pancreatic ductal adenocarcinoma, gastrointestinal stromal tumor (arising from the adjacent stomach), or malignant periceliac lymph nodes.   Suggest tissue sampling.  2.  Severely dilated left ureter with morphology suggesting duplicated collecting system. 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     PERTINENT LABS  Following labs were reviewed:  Lab on 07/05/2024   Component Date Value Ref Range Status     Sodium 07/05/2024 140  135 - 145 mmol/L Final     Potassium 07/05/2024 4.1  3.4 - 5.3 mmol/L Final     Carbon Dioxide (CO2) 07/05/2024 23  22 - 29 mmol/L Final     Anion Gap 07/05/2024 12  7 - 15 mmol/L Final     Urea Nitrogen 07/05/2024 17.3  8.0 - 23.0 mg/dL Final     Creatinine 07/05/2024 0.67  0.51 - 0.95 mg/dL Final     GFR  Estimate 07/05/2024 88  >60 mL/min/1.73m2 Final     Calcium 07/05/2024 8.6 (L)  8.8 - 10.2 mg/dL Final     Chloride 07/05/2024 105  98 - 107 mmol/L Final     Glucose 07/05/2024 186 (H)  70 - 99 mg/dL Final     Alkaline Phosphatase 07/05/2024 128  40 - 150 U/L Final     AST 07/05/2024 21  0 - 45 U/L Final     ALT 07/05/2024 26  0 - 50 U/L Final     Protein Total 07/05/2024 6.1 (L)  6.4 - 8.3 g/dL Final     Albumin 07/05/2024 4.0  3.5 - 5.2 g/dL Final     Bilirubin Total 07/05/2024 0.3  <=1.2 mg/dL Final     WBC Count 07/05/2024 10.6  4.0 - 11.0 10e3/uL Final     RBC Count 07/05/2024 3.62 (L)  3.80 - 5.20 10e6/uL Final     Hemoglobin 07/05/2024 10.6 (L)  11.7 - 15.7 g/dL Final     Hematocrit 07/05/2024 32.9 (L)  35.0 - 47.0 % Final     MCV 07/05/2024 91  78 - 100 fL Final     MCH 07/05/2024 29.3  26.5 - 33.0 pg Final     MCHC 07/05/2024 32.2  31.5 - 36.5 g/dL Final     RDW 07/05/2024 17.4 (H)  10.0 - 15.0 % Final     Platelet Count 07/05/2024 300  150 - 450 10e3/uL Final     % Neutrophils 07/05/2024 81  % Final     % Lymphocytes 07/05/2024 11  % Final     % Monocytes 07/05/2024 7  % Final     % Eosinophils 07/05/2024 1  % Final     % Basophils 07/05/2024 0  % Final     % Immature Granulocytes 07/05/2024 0  % Final     NRBCs per 100 WBC 07/05/2024 0  <1 /100 Final     Absolute Neutrophils 07/05/2024 8.6 (H)  1.6 - 8.3 10e3/uL Final     Absolute Lymphocytes 07/05/2024 1.1  0.8 - 5.3 10e3/uL Final     Absolute Monocytes 07/05/2024 0.7  0.0 - 1.3 10e3/uL Final     Absolute Eosinophils 07/05/2024 0.1  0.0 - 0.7 10e3/uL Final     Absolute Basophils 07/05/2024 0.0  0.0 - 0.2 10e3/uL Final     Absolute Immature Granulocytes 07/05/2024 0.0  <=0.4 10e3/uL Final     Absolute NRBCs 07/05/2024 0.0  10e3/uL Final   Hospital Outpatient Visit on 07/01/2024   Component Date Value Ref Range Status     GLUCOSE BY METER POCT 07/01/2024 88  70 - 99 mg/dL Final   Lab on 06/13/2024   Component Date Value Ref Range Status     Sodium  06/13/2024 139  135 - 145 mmol/L Final     Potassium 06/13/2024 4.1  3.4 - 5.3 mmol/L Final     Carbon Dioxide (CO2) 06/13/2024 24  22 - 29 mmol/L Final     Anion Gap 06/13/2024 12  7 - 15 mmol/L Final     Urea Nitrogen 06/13/2024 21.9  8.0 - 23.0 mg/dL Final     Creatinine 06/13/2024 0.90  0.51 - 0.95 mg/dL Final     GFR Estimate 06/13/2024 65  >60 mL/min/1.73m2 Final     Calcium 06/13/2024 9.0  8.8 - 10.2 mg/dL Final     Chloride 06/13/2024 103  98 - 107 mmol/L Final     Glucose 06/13/2024 268 (H)  70 - 99 mg/dL Final     Alkaline Phosphatase 06/13/2024 118  40 - 150 U/L Final     AST 06/13/2024 36  0 - 45 U/L Final     ALT 06/13/2024 34  0 - 50 U/L Final     Protein Total 06/13/2024 6.2 (L)  6.4 - 8.3 g/dL Final     Albumin 06/13/2024 3.9  3.5 - 5.2 g/dL Final     Bilirubin Total 06/13/2024 0.4  <=1.2 mg/dL Final     WBC Count 06/13/2024 8.5  4.0 - 11.0 10e3/uL Final     RBC Count 06/13/2024 3.52 (L)  3.80 - 5.20 10e6/uL Final     Hemoglobin 06/13/2024 10.6 (L)  11.7 - 15.7 g/dL Final     Hematocrit 06/13/2024 32.7 (L)  35.0 - 47.0 % Final     MCV 06/13/2024 93  78 - 100 fL Final     MCH 06/13/2024 30.1  26.5 - 33.0 pg Final     MCHC 06/13/2024 32.4  31.5 - 36.5 g/dL Final     RDW 06/13/2024 17.1 (H)  10.0 - 15.0 % Final     Platelet Count 06/13/2024 257  150 - 450 10e3/uL Final     % Neutrophils 06/13/2024 82  % Final     % Lymphocytes 06/13/2024 11  % Final     % Monocytes 06/13/2024 5  % Final     % Eosinophils 06/13/2024 1  % Final     % Basophils 06/13/2024 1  % Final     % Immature Granulocytes 06/13/2024 1  % Final     NRBCs per 100 WBC 06/13/2024 0  <1 /100 Final     Absolute Neutrophils 06/13/2024 6.9  1.6 - 8.3 10e3/uL Final     Absolute Lymphocytes 06/13/2024 0.9  0.8 - 5.3 10e3/uL Final     Absolute Monocytes 06/13/2024 0.5  0.0 - 1.3 10e3/uL Final     Absolute Eosinophils 06/13/2024 0.0  0.0 - 0.7 10e3/uL Final     Absolute Basophils 06/13/2024 0.1  0.0 - 0.2 10e3/uL Final     Absolute Immature  Granulocytes 06/13/2024 0.1  <=0.4 10e3/uL Final     Absolute NRBCs 06/13/2024 0.0  10e3/uL Final   Lab on 05/23/2024   Component Date Value Ref Range Status     Sodium 05/23/2024 140  135 - 145 mmol/L Final     Potassium 05/23/2024 4.0  3.4 - 5.3 mmol/L Final     Carbon Dioxide (CO2) 05/23/2024 24  22 - 29 mmol/L Final     Anion Gap 05/23/2024 13  7 - 15 mmol/L Final     Urea Nitrogen 05/23/2024 18.5  8.0 - 23.0 mg/dL Final     Creatinine 05/23/2024 0.65  0.51 - 0.95 mg/dL Final     GFR Estimate 05/23/2024 89  >60 mL/min/1.73m2 Final     Calcium 05/23/2024 8.9  8.8 - 10.2 mg/dL Final     Chloride 05/23/2024 103  98 - 107 mmol/L Final     Glucose 05/23/2024 247 (H)  70 - 99 mg/dL Final     Alkaline Phosphatase 05/23/2024 151 (H)  40 - 150 U/L Final     AST 05/23/2024 28  0 - 45 U/L Final     ALT 05/23/2024 26  0 - 50 U/L Final     Protein Total 05/23/2024 6.3 (L)  6.4 - 8.3 g/dL Final     Albumin 05/23/2024 3.9  3.5 - 5.2 g/dL Final     Bilirubin Total 05/23/2024 0.2  <=1.2 mg/dL Final     WBC Count 05/23/2024 12.1 (H)  4.0 - 11.0 10e3/uL Final     RBC Count 05/23/2024 3.70 (L)  3.80 - 5.20 10e6/uL Final     Hemoglobin 05/23/2024 11.0 (L)  11.7 - 15.7 g/dL Final     Hematocrit 05/23/2024 34.9 (L)  35.0 - 47.0 % Final     MCV 05/23/2024 94  78 - 100 fL Final     MCH 05/23/2024 29.7  26.5 - 33.0 pg Final     MCHC 05/23/2024 31.5  31.5 - 36.5 g/dL Final     RDW 05/23/2024 15.8 (H)  10.0 - 15.0 % Final     Platelet Count 05/23/2024 335  150 - 450 10e3/uL Final     % Neutrophils 05/23/2024 85  % Final     % Lymphocytes 05/23/2024 9  % Final     % Monocytes 05/23/2024 4  % Final     % Eosinophils 05/23/2024 0  % Final     % Basophils 05/23/2024 0  % Final     % Immature Granulocytes 05/23/2024 2  % Final     NRBCs per 100 WBC 05/23/2024 0  <1 /100 Final     Absolute Neutrophils 05/23/2024 10.2 (H)  1.6 - 8.3 10e3/uL Final     Absolute Lymphocytes 05/23/2024 1.1  0.8 - 5.3 10e3/uL Final     Absolute Monocytes 05/23/2024  0.5  0.0 - 1.3 10e3/uL Final     Absolute Eosinophils 05/23/2024 0.0  0.0 - 0.7 10e3/uL Final     Absolute Basophils 05/23/2024 0.1  0.0 - 0.2 10e3/uL Final     Absolute Immature Granulocytes 05/23/2024 0.2  <=0.4 10e3/uL Final     Absolute NRBCs 05/23/2024 0.0  10e3/uL Final   Lab on 05/13/2024   Component Date Value Ref Range Status     Magnesium 05/13/2024 2.1  1.7 - 2.3 mg/dL Final     Sodium 05/13/2024 140  135 - 145 mmol/L Final     Potassium 05/13/2024 4.2  3.4 - 5.3 mmol/L Final     Carbon Dioxide (CO2) 05/13/2024 26  22 - 29 mmol/L Final     Anion Gap 05/13/2024 9  7 - 15 mmol/L Final     Urea Nitrogen 05/13/2024 13.4  8.0 - 23.0 mg/dL Final     Creatinine 05/13/2024 0.72  0.51 - 0.95 mg/dL Final     GFR Estimate 05/13/2024 85  >60 mL/min/1.73m2 Final     Calcium 05/13/2024 9.4  8.8 - 10.2 mg/dL Final     Chloride 05/13/2024 105  98 - 107 mmol/L Final     Glucose 05/13/2024 151 (H)  70 - 99 mg/dL Final     Alkaline Phosphatase 05/13/2024 225 (H)  40 - 150 U/L Final     AST 05/13/2024 41  0 - 45 U/L Final     ALT 05/13/2024 60 (H)  0 - 50 U/L Final     Protein Total 05/13/2024 6.4  6.4 - 8.3 g/dL Final     Albumin 05/13/2024 3.8  3.5 - 5.2 g/dL Final     Bilirubin Total 05/13/2024 0.2  <=1.2 mg/dL Final     WBC Count 05/13/2024 20.9 (H)  4.0 - 11.0 10e3/uL Final     RBC Count 05/13/2024 3.68 (L)  3.80 - 5.20 10e6/uL Final     Hemoglobin 05/13/2024 10.9 (L)  11.7 - 15.7 g/dL Final     Hematocrit 05/13/2024 35.0  35.0 - 47.0 % Final     MCV 05/13/2024 95  78 - 100 fL Final     MCH 05/13/2024 29.6  26.5 - 33.0 pg Final     MCHC 05/13/2024 31.1 (L)  31.5 - 36.5 g/dL Final     RDW 05/13/2024 15.3 (H)  10.0 - 15.0 % Final     Platelet Count 05/13/2024 224  150 - 450 10e3/uL Final     NRBCs per 100 WBC 05/13/2024 1 (H)  <1 /100 Final     Absolute NRBCs 05/13/2024 0.1  10e3/uL Final     % Neutrophils 05/13/2024 68  % Final     % Lymphocytes 05/13/2024 9  % Final     % Monocytes 05/13/2024 6  % Final     %  Eosinophils 05/13/2024 2  % Final     % Basophils 05/13/2024 1  % Final     % Metamyelocytes 05/13/2024 9  % Final     % Myelocytes 05/13/2024 4  % Final     % Promyelocytes 05/13/2024 1  % Final     NRBCs per 100 WBC 05/13/2024 1 (H)  <=0 % Final     Absolute Neutrophils 05/13/2024 14.2 (H)  1.6 - 8.3 10e3/uL Final     Absolute Lymphocytes 05/13/2024 1.9  0.8 - 5.3 10e3/uL Final     Absolute Monocytes 05/13/2024 1.3  0.0 - 1.3 10e3/uL Final     Absolute Eosinophils 05/13/2024 0.4  0.0 - 0.7 10e3/uL Final     Absolute Basophils 05/13/2024 0.2  0.0 - 0.2 10e3/uL Final     Absolute Metamyelocytes 05/13/2024 1.9 (H)  <=0.0 10e3/uL Final     Absolute Myelocytes 05/13/2024 0.8 (H)  <=0.0 10e3/uL Final     Absolute Promyelocytes 05/13/2024 0.2 (H)  <=0.0 10e3/uL Final     Absolute NRBCs 05/13/2024 0.2 (H)  <=0.0 10e3/uL Final     RBC Morphology 05/13/2024 Confirmed RBC Indices   Final     Platelet Assessment 05/13/2024 Automated Count Confirmed. Platelet morphology is normal.  Automated Count Confirmed. Platelet morphology is normal. Final     Toxic Neutrophils 05/13/2024 Present (A)  None Seen Final     Pathologist Review Comments (Blood) 05/13/2024 Slide reviewed by Dr. Irby 5/13/24 1515. Agree with limited promyelocyte presence, 1-2 per 100 cells.   Final   Infusion Therapy Visit on 05/03/2024   Component Date Value Ref Range Status     Sodium 05/03/2024 140  135 - 145 mmol/L Final     Potassium 05/03/2024 4.1  3.4 - 5.3 mmol/L Final     Carbon Dioxide (CO2) 05/03/2024 25  22 - 29 mmol/L Final     Anion Gap 05/03/2024 12  7 - 15 mmol/L Final     Urea Nitrogen 05/03/2024 27.5 (H)  8.0 - 23.0 mg/dL Final     Creatinine 05/03/2024 0.80  0.51 - 0.95 mg/dL Final     GFR Estimate 05/03/2024 75  >60 mL/min/1.73m2 Final     Calcium 05/03/2024 9.9  8.8 - 10.2 mg/dL Final     Chloride 05/03/2024 103  98 - 107 mmol/L Final     Glucose 05/03/2024 193 (H)  70 - 99 mg/dL Final     Alkaline Phosphatase 05/03/2024 134  40 - 150  U/L Final     AST 05/03/2024 35  0 - 45 U/L Final     ALT 05/03/2024 26  0 - 50 U/L Final     Protein Total 05/03/2024 6.4  6.4 - 8.3 g/dL Final     Albumin 05/03/2024 3.9  3.5 - 5.2 g/dL Final     Bilirubin Total 05/03/2024 0.2  <=1.2 mg/dL Final     Hepatitis B Core Antibody Total 05/03/2024 Nonreactive  Nonreactive Final     Hepatitis B Surface Antigen 05/03/2024 Nonreactive  Nonreactive Final     Hepatitis B Surface Antibody 05/03/2024 Nonreactive   Final     Hepatitis B Surface Antibody Instr* 05/03/2024 <3.50  <8.5 m[IU]/mL Final     WBC Count 05/03/2024 8.4  4.0 - 11.0 10e3/uL Final     RBC Count 05/03/2024 3.58 (L)  3.80 - 5.20 10e6/uL Final     Hemoglobin 05/03/2024 10.8 (L)  11.7 - 15.7 g/dL Final     Hematocrit 05/03/2024 33.6 (L)  35.0 - 47.0 % Final     MCV 05/03/2024 94  78 - 100 fL Final     MCH 05/03/2024 30.2  26.5 - 33.0 pg Final     MCHC 05/03/2024 32.1  31.5 - 36.5 g/dL Final     RDW 05/03/2024 14.6  10.0 - 15.0 % Final     Platelet Count 05/03/2024 254  150 - 450 10e3/uL Final     % Neutrophils 05/03/2024 78  % Final     % Lymphocytes 05/03/2024 11  % Final     % Monocytes 05/03/2024 9  % Final     % Eosinophils 05/03/2024 1  % Final     % Basophils 05/03/2024 1  % Final     % Immature Granulocytes 05/03/2024 0  % Final     NRBCs per 100 WBC 05/03/2024 0  <1 /100 Final     Absolute Neutrophils 05/03/2024 6.7  1.6 - 8.3 10e3/uL Final     Absolute Lymphocytes 05/03/2024 0.9  0.8 - 5.3 10e3/uL Final     Absolute Monocytes 05/03/2024 0.8  0.0 - 1.3 10e3/uL Final     Absolute Eosinophils 05/03/2024 0.0  0.0 - 0.7 10e3/uL Final     Absolute Basophils 05/03/2024 0.0  0.0 - 0.2 10e3/uL Final     Absolute Immature Granulocytes 05/03/2024 0.0  <=0.4 10e3/uL Final     Absolute NRBCs 05/03/2024 0.0  10e3/uL Final   Hospital Outpatient Visit on 05/02/2024   Component Date Value Ref Range Status     GLUCOSE BY METER POCT 05/02/2024 107 (H)  70 - 99 mg/dL Final   Office Visit on 04/25/2024   Component Date  Value Ref Range Status     Magnesium 04/25/2024 2.2  1.7 - 2.3 mg/dL Final     Sodium 04/25/2024 141  135 - 145 mmol/L Final     Potassium 04/25/2024 4.4  3.4 - 5.3 mmol/L Final     Carbon Dioxide (CO2) 04/25/2024 27  22 - 29 mmol/L Final     Anion Gap 04/25/2024 11  7 - 15 mmol/L Final     Urea Nitrogen 04/25/2024 17.8  8.0 - 23.0 mg/dL Final     Creatinine 04/25/2024 0.72  0.51 - 0.95 mg/dL Final     GFR Estimate 04/25/2024 85  >60 mL/min/1.73m2 Final     Calcium 04/25/2024 9.7  8.8 - 10.2 mg/dL Final     Chloride 04/25/2024 103  98 - 107 mmol/L Final     Glucose 04/25/2024 121 (H)  70 - 99 mg/dL Final     Alkaline Phosphatase 04/25/2024 113  40 - 150 U/L Final     AST 04/25/2024 30  0 - 45 U/L Final     ALT 04/25/2024 21  0 - 50 U/L Final     Protein Total 04/25/2024 6.5  6.4 - 8.3 g/dL Final     Albumin 04/25/2024 4.0  3.5 - 5.2 g/dL Final     Bilirubin Total 04/25/2024 0.2  <=1.2 mg/dL Final     Hemoglobin A1C 04/25/2024 5.9 (H)  0.0 - 5.6 % Final     WBC Count 04/25/2024 8.2  4.0 - 11.0 10e3/uL Final     RBC Count 04/25/2024 3.79 (L)  3.80 - 5.20 10e6/uL Final     Hemoglobin 04/25/2024 11.6 (L)  11.7 - 15.7 g/dL Final     Hematocrit 04/25/2024 36.0  35.0 - 47.0 % Final     MCV 04/25/2024 95  78 - 100 fL Final     MCH 04/25/2024 30.6  26.5 - 33.0 pg Final     MCHC 04/25/2024 32.2  31.5 - 36.5 g/dL Final     RDW 04/25/2024 14.3  10.0 - 15.0 % Final     Platelet Count 04/25/2024 323  150 - 450 10e3/uL Final     % Neutrophils 04/25/2024 75  % Final     % Lymphocytes 04/25/2024 12  % Final     % Monocytes 04/25/2024 8  % Final     % Eosinophils 04/25/2024 4  % Final     % Basophils 04/25/2024 1  % Final     % Immature Granulocytes 04/25/2024 0  % Final     Absolute Neutrophils 04/25/2024 6.2  1.6 - 8.3 10e3/uL Final     Absolute Lymphocytes 04/25/2024 1.0  0.8 - 5.3 10e3/uL Final     Absolute Monocytes 04/25/2024 0.7  0.0 - 1.3 10e3/uL Final     Absolute Eosinophils 04/25/2024 0.3  0.0 - 0.7 10e3/uL Final      Absolute Basophils 04/25/2024 0.0  0.0 - 0.2 10e3/uL Final     Absolute Immature Granulocytes 04/25/2024 0.0  <=0.4 10e3/uL Final     ABO/RH(D) 04/25/2024 O POS   Final     Antibody Screen 04/25/2024 Positive (A)  Negative Final     SPECIMEN EXPIRATION DATE 04/25/2024 [id number]   Final     E Antigen Type 04/25/2024 Negative   Final     SPECIMEN EXPIRATION DATE 04/25/2024 [id number]   Final     Antibody Identification 04/25/2024 Anti-E   Final     SPECIMEN EXPIRATION DATE 04/25/2024 [id number]   Final   Hospital Outpatient Visit on 04/22/2024   Component Date Value Ref Range Status     LVEF  04/22/2024 60-65%   Final   Lab on 04/19/2024   Component Date Value Ref Range Status     Folic Acid 04/19/2024 31.8  4.6 - 34.8 ng/mL Final     Methylmalonic Acid 04/19/2024 0.34  0.00 - 0.40 umol/L Final     Treponema Antibody Total 04/19/2024 Nonreactive  Nonreactive Final     TSH 04/19/2024 0.97  0.30 - 4.20 uIU/mL Final     Vitamin B1 Whole Blood Level 04/19/2024 210 (H)  70 - 180 nmol/L Final     Vitamin B12 04/19/2024 941  232 - 1,245 pg/mL Final     Vitamin E 04/19/2024 9.5  5.5 - 18.0 mg/L Final     Vitamin E Gamma 04/19/2024 1.0  0.0 - 6.0 mg/L Final   There maybe more visits with results that are not included.         Total time spent for face to face visit, reviewing labs/imaging studies, counseling and coordination of care was: 30 Minutes spent on the date of the encounter doing chart review, review of outside records, review of test results, interpretation of tests, patient visit, documentation, and discussion with family     The longitudinal plan of care for the diagnosis(es)/condition(s) as documented were addressed during this visit. Due to the added complexity in care, I will continue to support Bibiana in the subsequent management and with ongoing continuity of care.    This note was dictated using voice recognition software.  Any grammatical or context distortions are unintentional and inherent to  the software.    No orders of the defined types were placed in this encounter.     New Prescriptions    RIVASTIGMINE (EXELON) 9.5 MG/24HR 24 HR PATCH    Place 1 patch onto the skin daily     Modified Medications    No medications on file                 Again, thank you for allowing me to participate in the care of your patient.        Sincerely,        Emanuel Mann MD

## 2024-07-25 ENCOUNTER — INFUSION THERAPY VISIT (OUTPATIENT)
Dept: INFUSION THERAPY | Facility: HOSPITAL | Age: 79
End: 2024-07-25
Attending: INTERNAL MEDICINE
Payer: COMMERCIAL

## 2024-07-25 ENCOUNTER — ONCOLOGY VISIT (OUTPATIENT)
Dept: ONCOLOGY | Facility: HOSPITAL | Age: 79
End: 2024-07-25
Attending: INTERNAL MEDICINE
Payer: COMMERCIAL

## 2024-07-25 VITALS
WEIGHT: 90.9 LBS | OXYGEN SATURATION: 97 % | TEMPERATURE: 97.7 F | SYSTOLIC BLOOD PRESSURE: 144 MMHG | HEART RATE: 97 BPM | RESPIRATION RATE: 16 BRPM | BODY MASS INDEX: 19.61 KG/M2 | HEIGHT: 57 IN | DIASTOLIC BLOOD PRESSURE: 70 MMHG

## 2024-07-25 DIAGNOSIS — C83.33 DIFFUSE LARGE B-CELL LYMPHOMA OF INTRA-ABDOMINAL LYMPH NODES (H): ICD-10-CM

## 2024-07-25 DIAGNOSIS — T45.1X5A CHEMOTHERAPY-INDUCED NEUTROPENIA (H): Primary | ICD-10-CM

## 2024-07-25 DIAGNOSIS — D70.1 CHEMOTHERAPY-INDUCED NEUTROPENIA (H): ICD-10-CM

## 2024-07-25 DIAGNOSIS — D70.1 CHEMOTHERAPY-INDUCED NEUTROPENIA (H): Primary | ICD-10-CM

## 2024-07-25 DIAGNOSIS — T45.1X5A CHEMOTHERAPY-INDUCED NEUTROPENIA (H): ICD-10-CM

## 2024-07-25 DIAGNOSIS — C83.33 DIFFUSE LARGE B-CELL LYMPHOMA OF INTRA-ABDOMINAL LYMPH NODES (H): Primary | ICD-10-CM

## 2024-07-25 LAB
ALBUMIN SERPL BCG-MCNC: 4.2 G/DL (ref 3.5–5.2)
ALP SERPL-CCNC: 137 U/L (ref 40–150)
ALT SERPL W P-5'-P-CCNC: 44 U/L (ref 0–50)
ANION GAP SERPL CALCULATED.3IONS-SCNC: 11 MMOL/L (ref 7–15)
AST SERPL W P-5'-P-CCNC: 34 U/L (ref 0–45)
BASOPHILS # BLD AUTO: 0.1 10E3/UL (ref 0–0.2)
BASOPHILS NFR BLD AUTO: 1 %
BILIRUB SERPL-MCNC: 0.3 MG/DL
BUN SERPL-MCNC: 22.7 MG/DL (ref 8–23)
CALCIUM SERPL-MCNC: 9.2 MG/DL (ref 8.8–10.4)
CHLORIDE SERPL-SCNC: 108 MMOL/L (ref 98–107)
CREAT SERPL-MCNC: 1.11 MG/DL (ref 0.51–0.95)
EGFRCR SERPLBLD CKD-EPI 2021: 50 ML/MIN/1.73M2
EOSINOPHIL # BLD AUTO: 0.1 10E3/UL (ref 0–0.7)
EOSINOPHIL NFR BLD AUTO: 1 %
ERYTHROCYTE [DISTWIDTH] IN BLOOD BY AUTOMATED COUNT: 18.2 % (ref 10–15)
GLUCOSE SERPL-MCNC: 193 MG/DL (ref 70–99)
HCO3 SERPL-SCNC: 23 MMOL/L (ref 22–29)
HCT VFR BLD AUTO: 34.5 % (ref 35–47)
HGB BLD-MCNC: 10.9 G/DL (ref 11.7–15.7)
IMM GRANULOCYTES # BLD: 0.1 10E3/UL
IMM GRANULOCYTES NFR BLD: 1 %
LYMPHOCYTES # BLD AUTO: 1.6 10E3/UL (ref 0.8–5.3)
LYMPHOCYTES NFR BLD AUTO: 17 %
MCH RBC QN AUTO: 28.6 PG (ref 26.5–33)
MCHC RBC AUTO-ENTMCNC: 31.6 G/DL (ref 31.5–36.5)
MCV RBC AUTO: 91 FL (ref 78–100)
MONOCYTES # BLD AUTO: 0.6 10E3/UL (ref 0–1.3)
MONOCYTES NFR BLD AUTO: 7 %
NEUTROPHILS # BLD AUTO: 6.6 10E3/UL (ref 1.6–8.3)
NEUTROPHILS NFR BLD AUTO: 73 %
NRBC # BLD AUTO: 0 10E3/UL
NRBC BLD AUTO-RTO: 0 /100
PLATELET # BLD AUTO: 294 10E3/UL (ref 150–450)
POTASSIUM SERPL-SCNC: 4.3 MMOL/L (ref 3.4–5.3)
PROT SERPL-MCNC: 6.3 G/DL (ref 6.4–8.3)
RBC # BLD AUTO: 3.81 10E6/UL (ref 3.8–5.2)
SODIUM SERPL-SCNC: 142 MMOL/L (ref 135–145)
WBC # BLD AUTO: 8.9 10E3/UL (ref 4–11)

## 2024-07-25 PROCEDURE — 96413 CHEMO IV INFUSION 1 HR: CPT

## 2024-07-25 PROCEDURE — 250N000013 HC RX MED GY IP 250 OP 250 PS 637: Performed by: INTERNAL MEDICINE

## 2024-07-25 PROCEDURE — 36591 DRAW BLOOD OFF VENOUS DEVICE: CPT | Performed by: INTERNAL MEDICINE

## 2024-07-25 PROCEDURE — 258N000003 HC RX IP 258 OP 636: Performed by: INTERNAL MEDICINE

## 2024-07-25 PROCEDURE — 96377 APPLICATON ON-BODY INJECTOR: CPT | Mod: XS | Performed by: INTERNAL MEDICINE

## 2024-07-25 PROCEDURE — 96411 CHEMO IV PUSH ADDL DRUG: CPT

## 2024-07-25 PROCEDURE — 85025 COMPLETE CBC W/AUTO DIFF WBC: CPT | Performed by: INTERNAL MEDICINE

## 2024-07-25 PROCEDURE — 96367 TX/PROPH/DG ADDL SEQ IV INF: CPT

## 2024-07-25 PROCEDURE — 84075 ASSAY ALKALINE PHOSPHATASE: CPT | Performed by: INTERNAL MEDICINE

## 2024-07-25 PROCEDURE — 96417 CHEMO IV INFUS EACH ADDL SEQ: CPT

## 2024-07-25 PROCEDURE — 99214 OFFICE O/P EST MOD 30 MIN: CPT | Performed by: INTERNAL MEDICINE

## 2024-07-25 PROCEDURE — 96372 THER/PROPH/DIAG INJ SC/IM: CPT | Performed by: INTERNAL MEDICINE

## 2024-07-25 PROCEDURE — 96375 TX/PRO/DX INJ NEW DRUG ADDON: CPT

## 2024-07-25 PROCEDURE — G0463 HOSPITAL OUTPT CLINIC VISIT: HCPCS | Mod: 25 | Performed by: INTERNAL MEDICINE

## 2024-07-25 PROCEDURE — 250N000011 HC RX IP 250 OP 636: Performed by: INTERNAL MEDICINE

## 2024-07-25 RX ORDER — DOXORUBICIN HYDROCHLORIDE 2 MG/ML
25 INJECTION, SOLUTION INTRAVENOUS ONCE
Status: COMPLETED | OUTPATIENT
Start: 2024-07-25 | End: 2024-07-25

## 2024-07-25 RX ORDER — DIPHENHYDRAMINE HYDROCHLORIDE 50 MG/ML
50 INJECTION INTRAMUSCULAR; INTRAVENOUS
Status: CANCELLED
Start: 2024-07-26

## 2024-07-25 RX ORDER — EPINEPHRINE 1 MG/ML
0.3 INJECTION, SOLUTION INTRAMUSCULAR; SUBCUTANEOUS EVERY 5 MIN PRN
Status: CANCELLED | OUTPATIENT
Start: 2024-07-26

## 2024-07-25 RX ORDER — ACETAMINOPHEN 325 MG/1
650 TABLET ORAL ONCE
Status: COMPLETED | OUTPATIENT
Start: 2024-07-25 | End: 2024-07-25

## 2024-07-25 RX ORDER — HEPARIN SODIUM (PORCINE) LOCK FLUSH IV SOLN 100 UNIT/ML 100 UNIT/ML
5 SOLUTION INTRAVENOUS
Status: CANCELLED | OUTPATIENT
Start: 2024-07-26

## 2024-07-25 RX ORDER — HEPARIN SODIUM,PORCINE 10 UNIT/ML
5-20 VIAL (ML) INTRAVENOUS DAILY PRN
Status: CANCELLED | OUTPATIENT
Start: 2024-07-26

## 2024-07-25 RX ORDER — PALONOSETRON 0.05 MG/ML
0.25 INJECTION, SOLUTION INTRAVENOUS ONCE
Status: COMPLETED | OUTPATIENT
Start: 2024-07-25 | End: 2024-07-25

## 2024-07-25 RX ORDER — ACETAMINOPHEN 325 MG/1
650 TABLET ORAL ONCE
Status: CANCELLED
Start: 2024-07-26

## 2024-07-25 RX ORDER — DIPHENHYDRAMINE HCL 50 MG
50 CAPSULE ORAL ONCE
Status: CANCELLED
Start: 2024-07-26

## 2024-07-25 RX ORDER — METHYLPREDNISOLONE SODIUM SUCCINATE 125 MG/2ML
125 INJECTION, POWDER, LYOPHILIZED, FOR SOLUTION INTRAMUSCULAR; INTRAVENOUS
Status: CANCELLED
Start: 2024-07-26

## 2024-07-25 RX ORDER — MEPERIDINE HYDROCHLORIDE 25 MG/ML
25 INJECTION INTRAMUSCULAR; INTRAVENOUS; SUBCUTANEOUS
Status: CANCELLED
Start: 2024-07-26

## 2024-07-25 RX ORDER — MEPERIDINE HYDROCHLORIDE 25 MG/ML
25 INJECTION INTRAMUSCULAR; INTRAVENOUS; SUBCUTANEOUS EVERY 30 MIN PRN
Status: CANCELLED | OUTPATIENT
Start: 2024-07-26

## 2024-07-25 RX ORDER — ALBUTEROL SULFATE 0.83 MG/ML
2.5 SOLUTION RESPIRATORY (INHALATION)
Status: CANCELLED | OUTPATIENT
Start: 2024-07-26

## 2024-07-25 RX ORDER — ALBUTEROL SULFATE 90 UG/1
1-2 AEROSOL, METERED RESPIRATORY (INHALATION)
Status: CANCELLED
Start: 2024-07-26

## 2024-07-25 RX ORDER — DIPHENHYDRAMINE HCL 50 MG
50 CAPSULE ORAL ONCE
Status: COMPLETED | OUTPATIENT
Start: 2024-07-25 | End: 2024-07-25

## 2024-07-25 RX ORDER — LORAZEPAM 2 MG/ML
0.5 INJECTION INTRAMUSCULAR EVERY 4 HOURS PRN
Status: CANCELLED | OUTPATIENT
Start: 2024-07-26

## 2024-07-25 RX ORDER — DOXORUBICIN HYDROCHLORIDE 2 MG/ML
25 INJECTION, SOLUTION INTRAVENOUS ONCE
Status: CANCELLED | OUTPATIENT
Start: 2024-07-26

## 2024-07-25 RX ORDER — PALONOSETRON 0.05 MG/ML
0.25 INJECTION, SOLUTION INTRAVENOUS ONCE
Status: CANCELLED
Start: 2024-07-26

## 2024-07-25 RX ORDER — HEPARIN SODIUM (PORCINE) LOCK FLUSH IV SOLN 100 UNIT/ML 100 UNIT/ML
5 SOLUTION INTRAVENOUS
Status: DISCONTINUED | OUTPATIENT
Start: 2024-07-25 | End: 2024-07-25 | Stop reason: HOSPADM

## 2024-07-25 RX ORDER — SIMETHICONE 125 MG
125 TABLET,CHEWABLE ORAL 2 TIMES DAILY
COMMUNITY

## 2024-07-25 RX ADMIN — FAMOTIDINE 20 MG: 10 INJECTION, SOLUTION INTRAVENOUS at 08:59

## 2024-07-25 RX ADMIN — DOXORUBICIN HYDROCHLORIDE 30 MG: 2 INJECTION, SOLUTION INTRAVENOUS at 09:42

## 2024-07-25 RX ADMIN — ACETAMINOPHEN 650 MG: 325 TABLET ORAL at 10:16

## 2024-07-25 RX ADMIN — Medication 5 ML: at 12:28

## 2024-07-25 RX ADMIN — SODIUM CHLORIDE 250 ML: 9 INJECTION, SOLUTION INTRAVENOUS at 08:59

## 2024-07-25 RX ADMIN — RITUXIMAB-ABBS 500 MG: 10 INJECTION, SOLUTION INTRAVENOUS at 10:58

## 2024-07-25 RX ADMIN — CYCLOPHOSPHAMIDE 500 MG: 500 INJECTION, POWDER, FOR SOLUTION INTRAVENOUS; ORAL at 10:17

## 2024-07-25 RX ADMIN — FOSAPREPITANT 150 MG: 150 INJECTION, POWDER, LYOPHILIZED, FOR SOLUTION INTRAVENOUS at 09:16

## 2024-07-25 RX ADMIN — DIPHENHYDRAMINE HYDROCHLORIDE 50 MG: 50 CAPSULE ORAL at 10:16

## 2024-07-25 RX ADMIN — PALONOSETRON 0.25 MG: 0.05 INJECTION, SOLUTION INTRAVENOUS at 08:59

## 2024-07-25 RX ADMIN — VINCRISTINE SULFATE 1 MG: 1 INJECTION, SOLUTION INTRAVENOUS at 09:50

## 2024-07-25 RX ADMIN — PEGFILGRASTIM 6 MG: KIT SUBCUTANEOUS at 10:49

## 2024-07-25 ASSESSMENT — PAIN SCALES - GENERAL: PAINLEVEL: NO PAIN (0)

## 2024-07-25 NOTE — LETTER
"7/25/2024      Bibiana Mcrae  [address] [facility] [address]      Dear Colleague,    Thank you for referring your patient, Bibiana Mcrae, to the [facility]CER [facility]. Please see a copy of my visit note below.    Oncology Rooming Note    July 25, 2024 8:13 AM   Bibiana Mcrae is a 79 year old female who presents for:    Chief Complaint   Patient presents with     Oncology Clinic Visit     Return visit with labs, infusion related to Diffuse large B-cell lymphoma of intra-abdominal lymph nodes.     Initial Vitals: BP (!) 144/70 (BP Location: Left arm, Patient Position: Sitting, Cuff Size: Adult Small)   Pulse 97   Temp 97.7  F (36.5  C) (Tympanic)   Resp 16   Ht 1.448 m (4' 9\")   Wt 41.2 kg (90 lb 14.4 oz)   LMP  (LMP Unknown)   SpO2 97%   BMI 19.67 kg/m   Estimated body mass index is 19.67 kg/m  as calculated from the following:    Height as of this encounter: 1.448 m (4' 9\").    Weight as of this encounter: 41.2 kg (90 lb 14.4 oz). Body surface area is 1.29 meters squared.  No Pain (0) Comment: Data Unavailable   No LMP recorded (lmp unknown). Patient has had a hysterectomy.  Allergies reviewed: Yes  Medications reviewed: Yes    Medications: Medication refills not needed today.  Pharmacy name entered into [facility]:    [id number] [place], [place] - [address] [place] [address] AT [place] [address]  [facility] - [place], [place] - [address] [place] [address]  [facility] - [address]    Frailty Screening:   Is the patient here for a new oncology consult visit in cancer care? 2. No      Clinical concerns: No concerns. However, Bibiana reports a tip of a scalpel in her right eye which prohibits her from having an MRI.   Dr. Friedell was notified.      Reina Dawkins, [facility] Hematology and Oncology Follow-up Note    Patient: Bibiana Mcrae  MRN: [id number]  Date of Service: Jul 25, " "2024       Reason for Visit    Day 1 cycle 5  of R-CHOP for diffuse large cell lymphoma    Encounter Diagnoses Assessment and Plan:    Problem List Items Addressed This Visit       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)    Chemotherapy-induced neutropenia (H24) - Primary   Patient returns for follow-up.  She is tolerating chemotherapy with little difficulty.  Continued with cycle 5 today.  Revisit in 3 weeks for cycle 6.      ______________________________________________________________________________    Staging History  Cancer Staging   No matching staging information was found for the patient.    ECOG Performance    History of Present Illness  Disease history per Dr. Florian         Diagnosis: Diffuse large B-cell lymphoma.  This was found incidentally in April 2024.  She had a fall and came to the ER and they did a CT scan.  This showed an abdominal mass.  -4/15/24: Biopsy done of abdominal mass that was labeled as pancreatic mass.  It shows a CD20 positive large cell lymphoma.  -5/2/24: Hypermetabolic lymphadenopathy above and below the diaphragm, consistent with biopsy-proven lymphoma. Radiotracer avid mixed lytic and sclerotic observations in the left ischium and right distal femur metaphysis are nonspecific but raise possibility of marrow involvement     Treatment:  -5/3/2024: Patient started R mini CHOP. PET after 3 cycles showed CR. Today is cycle 4     Interim history:  Review of systems.  Pertinent Findings are included in the History of Present Illness    Physical Exam    BP (!) 144/70 (BP Location: Left arm, Patient Position: Sitting, Cuff Size: Adult Small)   Pulse 97   Temp 97.7  F (36.5  C) (Tympanic)   Resp 16   Ht 1.448 m (4' 9\")   Wt 41.2 kg (90 lb 14.4 oz)   LMP  (LMP Unknown)   SpO2 97%   BMI 19.67 kg/m       GENERAL APPEARANCE: Healthy appearing woman in no apparent distress.  HEAD: Atraumatic; normocephalic; without lesions.  EYES: Conjunctiva, corneas and " eyelids normal; pupils equal, round, reactive to light; No Icterus.  MOUTH/OROPHARYNX: Oral mucosa intact  NECK: Supple with no nodes.  LUNGS:  Clear to auscultation and percussion with no extra sounds.  HEART: Regular rhythm and rate; S1 and S2 normal; no murmurs noted.  ABDOMEN: Soft; no masses or tenderness, no hepatosplenomegaly.  NEUROLOGIC: Alert and oriented.  No obvious focal findings.  EXTREMITIES: No cyanosis, or edema.  SKIN: No abnormal bruising or bleeding. No suspicious lesions noted on exposed skin.  PSYCHIATRIC: Mental status normal; no apparent psychiatric issues    Medications:    Current Outpatient Medications   Medication Sig Dispense Refill     acarbose (PRECOSE) 25 MG tablet Take 25 mg by mouth 3 times daily (with meals)       acetaminophen (TYLENOL) 325 MG tablet Take 325-650 mg by mouth every 6 hours as needed for mild pain       amLODIPine (NORVASC) 5 MG tablet Take 1 tablet (5 mg) by mouth daily 90 tablet 3     Biotin 5 MG TABS Take 5,000 mcg by mouth daily       busPIRone (BUSPAR) 7.5 MG tablet TAKE 1 TABLET BY MOUTH TWICE  DAILY 180 tablet 2     celecoxib (CELEBREX) 200 MG capsule TAKE 1 CAPSULE BY MOUTH  DAILY 90 capsule 3     denosumab (PROLIA) 60 MG/ML SOSY injection Inject 60 mg Subcutaneous every 6 months       donepezil (ARICEPT) 5 MG ODT Take 5 mg by mouth daily       lidocaine-prilocaine (EMLA) 2.5-2.5 % external cream Apply topically as needed for moderate pain 30 g 2     Melatonin 10 MG TABS tablet Take 15 mg by mouth nightly as needed for sleep       multivitamin with minerals (THERA-M) 9 mg iron-400 mcg Tab tablet [MULTIVITAMIN WITH MINERALS (THERA-M) 9 MG IRON-400 MCG TAB TABLET] Take 1 tablet by mouth daily.       nortriptyline (PAMELOR) 50 MG capsule TAKE 1 CAPSULE BY MOUTH AT  BEDTIME 90 capsule 2     omeprazole (PRILOSEC) 20 MG capsule [OMEPRAZOLE (PRILOSEC) 20 MG CAPSULE] Take 20 mg by mouth daily before breakfast.       ondansetron (ZOFRAN) 4 MG tablet TAKE 1 TO 2  TABLETS(4 TO 8 MG) BY MOUTH EVERY 6 HOURS AS NEEDED FOR NAUSEA OR VOMITING 30 tablet 1     polyethylene glycol (MIRALAX) 17 g packet Take 1 packet by mouth daily       predniSONE (DELTASONE) 20 MG tablet Take 2 tablets (40 mg) by mouth daily Take on Days 1 through 5 of chemotherapy. Take first dose in AM prior to chemotherapy. 60 tablet 0     Probiotic Product (SOLUBLE FIBER/PROBIOTICS PO) 2 tablespoons in 8 oz of liquid daily       rivastigmine (EXELON) 9.5 MG/24HR 24 hr patch Place 1 patch onto the skin daily 90 patch 3     sennosides (SENOKOT) 8.6 MG tablet Take 2 tablets by mouth daily as needed for constipation       simethicone (MYLICON) 125 MG chewable tablet Take 125 mg by mouth 2 times daily       UNABLE TO FIND Take 2 Tablespoonful by mouth every morning MEDICATION NAME: biofiber 8 oz with water       Current Facility-Administered Medications   Medication Dose Route Frequency Provider Last Rate Last Admin     denosumab (PROLIA) injection 60 mg  60 mg Subcutaneous Q6 Months    60 mg at 07/02/24 1611           Past History    Past Medical History:   Diagnosis Date     Anxiety      Breast cyst     While ago     History of cholecystectomy 08/24/2023     Past Surgical History:   Procedure Laterality Date     BREAST CYST ASPIRATION Left     While ago     HYSTERECTOMY  1985     IR CHEST PORT PLACEMENT > 5 YRS OF AGE  5/1/2024     OOPHORECTOMY Bilateral 1985     [facility] REMOVAL OF OVARY(S)      Description: Oophorectomy;  Recorded: 12/17/2009;     ZZC TOTAL ABDOM HYSTERECTOMY      Description: Total Abdominal Hysterectomy;  Recorded: 12/17/2009;     Allergies   Allergen Reactions     Blood Transfusion Related (Informational Only) Other (See Comments)     Patient has a history of a Clinically Significant antibody against RBC antigens. A delay in compatible RBCs may occur. 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     Prochlorperazine Edisylate [Prochlorperazine] Other (See Comments)     Extrapyramidal side  effects     Family History   Problem Relation Age of Onset     Breast Cancer Mother 52.00     Cancer Maternal Grandfather 71.00        prostate     Prostate Cancer Maternal Grandfather      Social History     Socioeconomic History     Marital status:      Spouse name: None     Number of children: None     Years of education: None     Highest education level: None   Tobacco Use     Smoking status: Never     Passive exposure: Never     Smokeless tobacco: Never   Vaping Use     Vaping status: Never Used   Substance and Sexual Activity     Alcohol use: No     Drug use: Never     Social Determinants of Health     Financial Resource Strain: Low Risk  (4/25/2024)    Financial Resource Strain      Within the past 12 months, have you or your family members you live with been unable to get utilities (heat, electricity) when it was really needed?: No   Food Insecurity: Low Risk  (4/25/2024)    Food Insecurity      Within the past 12 months, did you worry that your food would run out before you got money to buy more?: No      Within the past 12 months, did the food you bought just not last and you didn t have money to get more?: No   Transportation Needs: Low Risk  (4/25/2024)    Transportation Needs      Within the past 12 months, has lack of transportation kept you from medical appointments, getting your medicines, non-medical meetings or appointments, work, or from getting things that you need?: No   Physical Activity: Unknown (4/25/2024)    Exercise Vital Sign      Days of Exercise per Week: 0 days   Stress: Stress Concern Present (4/25/2024)    Citizen of [place] [employer] Occupational [employer] - Occupational Stress Questionnaire      Feeling of Stress : Rather much   Social Connections: Unknown (4/25/2024)    Social Connection and Isolation Panel [NHANES]      Frequency of Social Gatherings with Friends and Family: Once a week   Interpersonal Safety: Low Risk  (4/25/2024)    Interpersonal Safety      Do you feel physically  and emotionally safe where you currently live?: Yes      Within the past 12 months, have you been hit, slapped, kicked or otherwise physically hurt by someone?: No      Within the past 12 months, have you been humiliated or emotionally abused in other ways by your partner or ex-partner?: No   Housing Stability: Low Risk  (4/25/2024)    Housing Stability      Do you have housing? : Yes      Are you worried about losing your housing?: No           Lab Results    Recent Results (from the past 240 hour(s))   Comprehensive metabolic panel    Collection Time: 07/25/24  7:57 AM   Result Value Ref Range    Sodium 142 135 - 145 mmol/L    Potassium 4.3 3.4 - 5.3 mmol/L    Carbon Dioxide (CO2) 23 22 - 29 mmol/L    Anion Gap 11 7 - 15 mmol/L    Urea Nitrogen 22.7 8.0 - 23.0 mg/dL    Creatinine 1.11 (H) 0.51 - 0.95 mg/dL    GFR Estimate 50 (L) >60 mL/min/1.73m2    Calcium 9.2 8.8 - 10.4 mg/dL    Chloride 108 (H) 98 - 107 mmol/L    Glucose 193 (H) 70 - 99 mg/dL    Alkaline Phosphatase 137 40 - 150 U/L    AST 34 0 - 45 U/L    ALT 44 0 - 50 U/L    Protein Total 6.3 (L) 6.4 - 8.3 g/dL    Albumin 4.2 3.5 - 5.2 g/dL    Bilirubin Total 0.3 <=1.2 mg/dL   CBC with platelets and differential    Collection Time: 07/25/24  7:57 AM   Result Value Ref Range    WBC Count 8.9 4.0 - 11.0 10e3/uL    RBC Count 3.81 3.80 - 5.20 10e6/uL    Hemoglobin 10.9 (L) 11.7 - 15.7 g/dL    Hematocrit 34.5 (L) 35.0 - 47.0 %    MCV 91 78 - 100 fL    MCH 28.6 26.5 - 33.0 pg    MCHC 31.6 31.5 - 36.5 g/dL    RDW 18.2 (H) 10.0 - 15.0 %    Platelet Count 294 150 - 450 10e3/uL    % Neutrophils 73 %    % Lymphocytes 17 %    % Monocytes 7 %    % Eosinophils 1 %    % Basophils 1 %    % Immature Granulocytes 1 %    NRBCs per 100 WBC 0 <1 /100    Absolute Neutrophils 6.6 1.6 - 8.3 10e3/uL    Absolute Lymphocytes 1.6 0.8 - 5.3 10e3/uL    Absolute Monocytes 0.6 0.0 - 1.3 10e3/uL    Absolute Eosinophils 0.1 0.0 - 0.7 10e3/uL    Absolute Basophils 0.1 0.0 - 0.2 10e3/uL     Absolute Immature Granulocytes 0.1 <=0.4 10e3/uL    Absolute NRBCs 0.0 10e3/uL       Imaging Results    PET Oncology Whole Body    Result Date: 7/1/2024  EXAM: PET ONCOLOGY WHOLE BODY LOCATION: [facility] DATE: 7/1/2024 INDICATION: Subsequent treatment planning and restaging for diffuse lung zone lymphoma, intra-abdominal lymph nodes. Currently receiving chemotherapy (R-CHOP). Monitor treatment COMPARISON: FDG PET/CT dated 5/20/2024 CONTRAST: None TECHNIQUE: Serum glucose level 88 mg/dL. One hour post intravenous administration of 10.0 mCi F-18 FDG, PET imaging was performed from the skull vertex to feet, utilizing attenuation correction with concurrent axial CT and PET/CT image fusion. Dose reduction techniques were used. PET/CT FINDINGS: Isolated residual ill-defined mesenteric mass measuring approximately 2.4 x 1.2 cm (Max SUV 6.1, previously measured approximately 6.5 x 6.1 cm with a max SUV of 18.1), which remains greater than liver background (Max SUV 2.8) suggesting  marked interval response to therapy (Deauville 4). Diffuse FDG uptake within the axial and appendicular skeleton in a pattern typical of marrow stimulation. CT FINDINGS: Mild to moderate senescent intracranial changes. Moderate paranasal sinus mucosal thickening. Postoperative changes of bilateral lenses. Right chest port with tip terminating in the superior cavoatrial junction. Moderate coronary artery calcium. Upper abdominal surgical clips. Cholecystectomy. Left renal cyst. Sigmoid diverticulosis. Hysterectomy. Multilevel degenerative changes of the spine including grade 1 anterolisthesis of L5 over S1 due to chronic bilateral pars reticularis defects. The lower extremities are unremarkable.     IMPRESSION: Isolated residual ill-defined mesenteric mass, which remains greater than liver background suggesting marked interval response to therapy (Deauville 4).        I spent 28 minutes on the patient's visit today.  This  included preparation for the visit, face-to-face time with the patient and documentation following the visit.  It did not include teaching or procedure time.    Signed by: Peter E. Friedell, MD          Again, thank you for allowing me to participate in the care of your patient.        Sincerely,        Peter E. Friedell, MD

## 2024-07-25 NOTE — PROGRESS NOTES
"Oncology Rooming Note    July 25, 2024 8:13 AM   Bibiana Mcrae is a 79 year old female who presents for:    Chief Complaint   Patient presents with    Oncology Clinic Visit     Return visit with labs, infusion related to Diffuse large B-cell lymphoma of intra-abdominal lymph nodes.     Initial Vitals: BP (!) 144/70 (BP Location: Left arm, Patient Position: Sitting, Cuff Size: Adult Small)   Pulse 97   Temp 97.7  F (36.5  C) (Tympanic)   Resp 16   Ht 1.448 m (4' 9\")   Wt 41.2 kg (90 lb 14.4 oz)   LMP  (LMP Unknown)   SpO2 97%   BMI 19.67 kg/m   Estimated body mass index is 19.67 kg/m  as calculated from the following:    Height as of this encounter: 1.448 m (4' 9\").    Weight as of this encounter: 41.2 kg (90 lb 14.4 oz). Body surface area is 1.29 meters squared.  No Pain (0) Comment: Data Unavailable   No LMP recorded (lmp unknown). Patient has had a hysterectomy.  Allergies reviewed: Yes  Medications reviewed: Yes    Medications: Medication refills not needed today.  Pharmacy name entered into [employer]:    [facility] #[id number] [address] AT [place] [address]  [facility] - [place], [place] - [address] [place] [address]  [facility] - [address]    Frailty Screening:   Is the patient here for a new oncology consult visit in cancer care? 2. No      Clinical concerns: No concerns. However, Bibiana reports a tip of a scalpel in her right eye which prohibits her from having an MRI.   Dr. Friedell was notified.      Reina Dawkins, [facility]            "

## 2024-07-25 NOTE — PROGRESS NOTES
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  She is tolerating chemotherapy with little difficulty.  Continued with cycle 5 today.  Revisit in 3 weeks for cycle 6.      ______________________________________________________________________________    Staging History  Cancer Staging   No matching staging information was found for the patient.    ECOG Performance    History of Present Illness  Disease history per Dr. Florian         Diagnosis: Diffuse large B-cell lymphoma.  This was found incidentally in April 2024.  She had a fall and came to the ER and they did a CT scan.  This showed an abdominal mass.  -4/15/24: Biopsy done of abdominal mass that was labeled as pancreatic mass.  It shows a CD20 positive large cell lymphoma.  -5/2/24: Hypermetabolic lymphadenopathy above and below the diaphragm, consistent with biopsy-proven lymphoma. Radiotracer avid mixed lytic and sclerotic observations in the left ischium and right distal femur metaphysis are nonspecific but raise possibility of marrow involvement     Treatment:  -5/3/2024: Patient started R mini CHOP. PET after 3 cycles showed CR. Today is cycle 4     Interim history:  Review of systems.  Pertinent Findings are included in the History of Present Illness    Physical Exam    BP (!) 144/70 (BP Location: Left arm, Patient Position: Sitting, Cuff Size: Adult Small)   Pulse 97   Temp 97.7  F (36.5  C) (Tympanic)   Resp 16   Ht 1.448 m (4' 9\")   Wt 41.2 kg (90 lb 14.4 oz)   LMP  (LMP Unknown)   SpO2 97%   BMI 19.67 kg/m       GENERAL APPEARANCE: " Healthy appearing woman in no apparent distress.  HEAD: Atraumatic; normocephalic; without lesions.  EYES: Conjunctiva, corneas and eyelids normal; pupils equal, round, reactive to light; No Icterus.  MOUTH/OROPHARYNX: Oral mucosa intact  NECK: Supple with no nodes.  LUNGS:  Clear to auscultation and percussion with no extra sounds.  HEART: Regular rhythm and rate; S1 and S2 normal; no murmurs noted.  ABDOMEN: Soft; no masses or tenderness, no hepatosplenomegaly.  NEUROLOGIC: Alert and oriented.  No obvious focal findings.  EXTREMITIES: No cyanosis, or edema.  SKIN: No abnormal bruising or bleeding. No suspicious lesions noted on exposed skin.  PSYCHIATRIC: Mental status normal; no apparent psychiatric issues    Medications:    Current Outpatient Medications   Medication Sig Dispense Refill    acarbose (PRECOSE) 25 MG tablet Take 25 mg by mouth 3 times daily (with meals)      acetaminophen (TYLENOL) 325 MG tablet Take 325-650 mg by mouth every 6 hours as needed for mild pain      amLODIPine (NORVASC) 5 MG tablet Take 1 tablet (5 mg) by mouth daily 90 tablet 3    Biotin 5 MG TABS Take 5,000 mcg by mouth daily      busPIRone (BUSPAR) 7.5 MG tablet TAKE 1 TABLET BY MOUTH TWICE  DAILY 180 tablet 2    celecoxib (CELEBREX) 200 MG capsule TAKE 1 CAPSULE BY MOUTH  DAILY 90 capsule 3    denosumab (PROLIA) 60 MG/ML SOSY injection Inject 60 mg Subcutaneous every 6 months      donepezil (ARICEPT) 5 MG ODT Take 5 mg by mouth daily      lidocaine-prilocaine (EMLA) 2.5-2.5 % external cream Apply topically as needed for moderate pain 30 g 2    Melatonin 10 MG TABS tablet Take 15 mg by mouth nightly as needed for sleep      multivitamin with minerals (THERA-M) 9 mg iron-400 mcg Tab tablet [MULTIVITAMIN WITH MINERALS (THERA-M) 9 MG IRON-400 MCG TAB TABLET] Take 1 tablet by mouth daily.      nortriptyline (PAMELOR) 50 MG capsule TAKE 1 CAPSULE BY MOUTH AT  BEDTIME 90 capsule 2    omeprazole (PRILOSEC) 20 MG capsule [OMEPRAZOLE  (PRILOSEC) 20 MG CAPSULE] Take 20 mg by mouth daily before breakfast.      ondansetron (ZOFRAN) 4 MG tablet TAKE 1 TO 2 TABLETS(4 TO 8 MG) BY MOUTH EVERY 6 HOURS AS NEEDED FOR NAUSEA OR VOMITING 30 tablet 1    polyethylene glycol (MIRALAX) 17 g packet Take 1 packet by mouth daily      predniSONE (DELTASONE) 20 MG tablet Take 2 tablets (40 mg) by mouth daily Take on Days 1 through 5 of chemotherapy. Take first dose in AM prior to chemotherapy. 60 tablet 0    Probiotic Product (SOLUBLE FIBER/PROBIOTICS PO) 2 tablespoons in 8 oz of liquid daily      rivastigmine (EXELON) 9.5 MG/24HR 24 hr patch Place 1 patch onto the skin daily 90 patch 3    sennosides (SENOKOT) 8.6 MG tablet Take 2 tablets by mouth daily as needed for constipation      simethicone (MYLICON) 125 MG chewable tablet Take 125 mg by mouth 2 times daily      UNABLE TO FIND Take 2 Tablespoonful by mouth every morning MEDICATION NAME: biofiber 8 oz with water       Current Facility-Administered Medications   Medication Dose Route Frequency Provider Last Rate Last Admin    denosumab (PROLIA) injection 60 mg  60 mg Subcutaneous Q6 Months    60 mg at 07/02/24 1611           Past History    Past Medical History:   Diagnosis Date    Anxiety     Breast cyst     While ago    History of cholecystectomy 08/24/2023     Past Surgical History:   Procedure Laterality Date    BREAST CYST ASPIRATION Left     While ago    HYSTERECTOMY  1985    IR CHEST PORT PLACEMENT > 5 YRS OF AGE  5/1/2024    OOPHORECTOMY Bilateral 1985    [facility] REMOVAL OF OVARY(S)      Description: Oophorectomy;  Recorded: 12/17/2009;    [facility] TOTAL ABDOM HYSTERECTOMY      Description: Total Abdominal Hysterectomy;  Recorded: 12/17/2009;     Allergies   Allergen Reactions    Blood Transfusion Related (Informational Only) Other (See Comments)     Patient has a history of a Clinically Significant antibody against RBC antigens. A delay in compatible RBCs may occur. 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    Prochlorperazine Edisylate [Prochlorperazine] Other (See Comments)     Extrapyramidal side effects     Family History   Problem Relation Age of Onset    Breast Cancer Mother 52.00    Cancer Maternal Grandfather 71.00        prostate    Prostate Cancer Maternal Grandfather      Social History     Socioeconomic History    Marital status:      Spouse name: None    Number of children: None    Years of education: None    Highest education level: None   Tobacco Use    Smoking status: Never     Passive exposure: Never    Smokeless tobacco: Never   Vaping Use    Vaping status: Never Used   Substance and Sexual Activity    Alcohol use: No    Drug use: Never     Social Determinants of Health     Financial Resource Strain: Low Risk  (4/25/2024)    Financial Resource Strain     Within the past 12 months, have you or your family members you live with been unable to get utilities (heat, electricity) when it was really needed?: No   Food Insecurity: Low Risk  (4/25/2024)    Food Insecurity     Within the past 12 months, did you worry that your food would run out before you got money to buy more?: No     Within the past 12 months, did the food you bought just not last and you didn t have money to get more?: No   Transportation Needs: Low Risk  (4/25/2024)    Transportation Needs     Within the past 12 months, has lack of transportation kept you from medical appointments, getting your medicines, non-medical meetings or appointments, work, or from getting things that you need?: No   Physical Activity: Unknown (4/25/2024)    Exercise Vital Sign     Days of Exercise per Week: 0 days   Stress: Stress Concern Present (4/25/2024)    [place] [employer] - Occupational Stress Questionnaire     Feeling of Stress : Rather much   Social Connections: Unknown (4/25/2024)    Social Connection and Isolation Panel [NHANES]     Frequency of Social Gatherings with Friends and Family: Once a week    Interpersonal Safety: Low Risk  (4/25/2024)    Interpersonal Safety     Do you feel physically and emotionally safe where you currently live?: Yes     Within the past 12 months, have you been hit, slapped, kicked or otherwise physically hurt by someone?: No     Within the past 12 months, have you been humiliated or emotionally abused in other ways by your partner or ex-partner?: No   Housing Stability: Low Risk  (4/25/2024)    Housing Stability     Do you have housing? : Yes     Are you worried about losing your housing?: No           Lab Results    Recent Results (from the past 240 hour(s))   Comprehensive metabolic panel    Collection Time: 07/25/24  7:57 AM   Result Value Ref Range    Sodium 142 135 - 145 mmol/L    Potassium 4.3 3.4 - 5.3 mmol/L    Carbon Dioxide (CO2) 23 22 - 29 mmol/L    Anion Gap 11 7 - 15 mmol/L    Urea Nitrogen 22.7 8.0 - 23.0 mg/dL    Creatinine 1.11 (H) 0.51 - 0.95 mg/dL    GFR Estimate 50 (L) >60 mL/min/1.73m2    Calcium 9.2 8.8 - 10.4 mg/dL    Chloride 108 (H) 98 - 107 mmol/L    Glucose 193 (H) 70 - 99 mg/dL    Alkaline Phosphatase 137 40 - 150 U/L    AST 34 0 - 45 U/L    ALT 44 0 - 50 U/L    Protein Total 6.3 (L) 6.4 - 8.3 g/dL    Albumin 4.2 3.5 - 5.2 g/dL    Bilirubin Total 0.3 <=1.2 mg/dL   CBC with platelets and differential    Collection Time: 07/25/24  7:57 AM   Result Value Ref Range    WBC Count 8.9 4.0 - 11.0 10e3/uL    RBC Count 3.81 3.80 - 5.20 10e6/uL    Hemoglobin 10.9 (L) 11.7 - 15.7 g/dL    Hematocrit 34.5 (L) 35.0 - 47.0 %    MCV 91 78 - 100 fL    MCH 28.6 26.5 - 33.0 pg    MCHC 31.6 31.5 - 36.5 g/dL    RDW 18.2 (H) 10.0 - 15.0 %    Platelet Count 294 150 - 450 10e3/uL    % Neutrophils 73 %    % Lymphocytes 17 %    % Monocytes 7 %    % Eosinophils 1 %    % Basophils 1 %    % Immature Granulocytes 1 %    NRBCs per 100 WBC 0 <1 /100    Absolute Neutrophils 6.6 1.6 - 8.3 10e3/uL    Absolute Lymphocytes 1.6 0.8 - 5.3 10e3/uL    Absolute Monocytes 0.6 0.0 - 1.3 10e3/uL     Absolute Eosinophils 0.1 0.0 - 0.7 10e3/uL    Absolute Basophils 0.1 0.0 - 0.2 10e3/uL    Absolute Immature Granulocytes 0.1 <=0.4 10e3/uL    Absolute NRBCs 0.0 10e3/uL       Imaging Results    PET Oncology Whole Body    Result Date: 7/1/2024  EXAM: PET ONCOLOGY WHOLE BODY LOCATION: [facility] DATE: 7/1/2024 INDICATION: Subsequent treatment planning and restaging for diffuse lung zone lymphoma, intra-abdominal lymph nodes. Currently receiving chemotherapy (R-CHOP). Monitor treatment COMPARISON: FDG PET/CT dated 5/20/2024 CONTRAST: None TECHNIQUE: Serum glucose level 88 mg/dL. One hour post intravenous administration of 10.0 mCi F-18 FDG, PET imaging was performed from the skull vertex to feet, utilizing attenuation correction with concurrent axial CT and PET/CT image fusion. Dose reduction techniques were used. PET/CT FINDINGS: Isolated residual ill-defined mesenteric mass measuring approximately 2.4 x 1.2 cm (Max SUV 6.1, previously measured approximately 6.5 x 6.1 cm with a max SUV of 18.1), which remains greater than liver background (Max SUV 2.8) suggesting  marked interval response to therapy (Deauville 4). Diffuse FDG uptake within the axial and appendicular skeleton in a pattern typical of marrow stimulation. CT FINDINGS: Mild to moderate senescent intracranial changes. Moderate paranasal sinus mucosal thickening. Postoperative changes of bilateral lenses. Right chest port with tip terminating in the superior cavoatrial junction. Moderate coronary artery calcium. Upper abdominal surgical clips. Cholecystectomy. Left renal cyst. Sigmoid diverticulosis. Hysterectomy. Multilevel degenerative changes of the spine including grade 1 anterolisthesis of L5 over S1 due to chronic bilateral pars reticularis defects. The lower extremities are unremarkable.     IMPRESSION: Isolated residual ill-defined mesenteric mass, which remains greater than liver background suggesting marked interval  response to therapy (Jessyuvchrissy 4).        I spent 28 minutes on the patient's visit today.  This included preparation for the visit, face-to-face time with the patient and documentation following the visit.  It did not include teaching or procedure time.    Signed by: Peter E. Friedell, MD

## 2024-07-25 NOTE — PROGRESS NOTES
Infusion Nursing Note:  Bibiana Mcrae presents today for cycle 5 day 1 CHOP-R.    Patient seen by provider today: Yes: Dr. Friedell   present during visit today: Not Applicable.    Note: Bibiana arrived ambulatory and in stable condition. Port accessed using sterile technique, good blood return noted, and labs collected. Patient was premedicated and treatment administered per orders. Port de-accessed upon completion and site covered with gauze and secured with tape. Neulasta OnPro applied to left arm at 1100 and patient was instructed when to removed tomorrow. Will return on 8/16 for next appointment. .      Intravenous Access:  Labs drawn without difficulty.  Implanted Port.    Treatment Conditions:  Lab Results   Component Value Date    HGB 10.9 (L) 07/25/2024    WBC 8.9 07/25/2024    ANEU 14.2 (H) 05/13/2024    ANEUTAUTO 6.6 07/25/2024     07/25/2024        Lab Results   Component Value Date     07/25/2024    POTASSIUM 4.3 07/25/2024    MAG 2.1 05/13/2024    CR 1.11 (H) 07/25/2024    JUSTIN 9.2 07/25/2024    BILITOTAL 0.3 07/25/2024    ALBUMIN 4.2 07/25/2024    ALT 44 07/25/2024    AST 34 07/25/2024       Results reviewed, labs MET treatment parameters, ok to proceed with treatment.      Post Infusion Assessment:  Patient tolerated infusion without incident.  Blood return noted pre and post infusion.  Site patent and intact, free from redness, edema or discomfort.  No evidence of extravasations.  Access discontinued per protocol.       Discharge Plan:   Patient and/or family verbalized understanding of discharge instructions and all questions answered.  AVS to patient via [employer].  Patient will return 8/16 for next appointment.   Patient discharged in stable condition accompanied by: daughter.  Departure Mode: Ambulatory.      Valeria Miller RN

## 2024-07-26 ENCOUNTER — PATIENT OUTREACH (OUTPATIENT)
Dept: ONCOLOGY | Facility: HOSPITAL | Age: 79
End: 2024-07-26
Payer: COMMERCIAL

## 2024-07-26 NOTE — PROGRESS NOTES
[facility]: Cancer Care                                                                                            Situation: Patient chart reviewed by care coordinator.      Plan/Recommendations: Patient saw Dr. Friedell on 7/25.  Follow up:  Revisit in 3 weeks with labs and visit. Chemotherapy on 7/25 and final cycle in 3 weeks.       Signature:  Sophie Cespedes RN

## 2024-08-16 ENCOUNTER — VIRTUAL VISIT (OUTPATIENT)
Dept: ONCOLOGY | Facility: CLINIC | Age: 79
End: 2024-08-16
Attending: INTERNAL MEDICINE
Payer: COMMERCIAL

## 2024-08-16 ENCOUNTER — LAB (OUTPATIENT)
Dept: INFUSION THERAPY | Facility: HOSPITAL | Age: 79
End: 2024-08-16
Attending: INTERNAL MEDICINE
Payer: COMMERCIAL

## 2024-08-16 VITALS
DIASTOLIC BLOOD PRESSURE: 70 MMHG | BODY MASS INDEX: 19.57 KG/M2 | HEIGHT: 57 IN | RESPIRATION RATE: 16 BRPM | OXYGEN SATURATION: 97 % | SYSTOLIC BLOOD PRESSURE: 142 MMHG | TEMPERATURE: 98.1 F | HEART RATE: 98 BPM | WEIGHT: 90.7 LBS

## 2024-08-16 DIAGNOSIS — C83.33 DIFFUSE LARGE B-CELL LYMPHOMA OF INTRA-ABDOMINAL LYMPH NODES (H): ICD-10-CM

## 2024-08-16 DIAGNOSIS — T45.1X5A CHEMOTHERAPY-INDUCED NEUTROPENIA (H): ICD-10-CM

## 2024-08-16 DIAGNOSIS — C83.33 DIFFUSE LARGE B-CELL LYMPHOMA OF INTRA-ABDOMINAL LYMPH NODES (H): Primary | ICD-10-CM

## 2024-08-16 DIAGNOSIS — T45.1X5A CHEMOTHERAPY-INDUCED NEUTROPENIA (H): Primary | ICD-10-CM

## 2024-08-16 DIAGNOSIS — D70.1 CHEMOTHERAPY-INDUCED NEUTROPENIA (H): ICD-10-CM

## 2024-08-16 DIAGNOSIS — D70.1 CHEMOTHERAPY-INDUCED NEUTROPENIA (H): Primary | ICD-10-CM

## 2024-08-16 LAB
ALBUMIN SERPL BCG-MCNC: 4.1 G/DL (ref 3.5–5.2)
ALP SERPL-CCNC: 130 U/L (ref 40–150)
ALT SERPL W P-5'-P-CCNC: 32 U/L (ref 0–50)
ANION GAP SERPL CALCULATED.3IONS-SCNC: 14 MMOL/L (ref 7–15)
AST SERPL W P-5'-P-CCNC: 29 U/L (ref 0–45)
BASOPHILS # BLD AUTO: 0 10E3/UL (ref 0–0.2)
BASOPHILS NFR BLD AUTO: 0 %
BILIRUB SERPL-MCNC: 0.2 MG/DL
BUN SERPL-MCNC: 21.5 MG/DL (ref 8–23)
CALCIUM SERPL-MCNC: 9 MG/DL (ref 8.8–10.4)
CHLORIDE SERPL-SCNC: 104 MMOL/L (ref 98–107)
CREAT SERPL-MCNC: 0.78 MG/DL (ref 0.51–0.95)
EGFRCR SERPLBLD CKD-EPI 2021: 77 ML/MIN/1.73M2
EOSINOPHIL # BLD AUTO: 0.1 10E3/UL (ref 0–0.7)
EOSINOPHIL NFR BLD AUTO: 1 %
ERYTHROCYTE [DISTWIDTH] IN BLOOD BY AUTOMATED COUNT: 18.5 % (ref 10–15)
GLUCOSE SERPL-MCNC: 251 MG/DL (ref 70–99)
HCO3 SERPL-SCNC: 23 MMOL/L (ref 22–29)
HCT VFR BLD AUTO: 34.4 % (ref 35–47)
HGB BLD-MCNC: 11 G/DL (ref 11.7–15.7)
IMM GRANULOCYTES # BLD: 0.1 10E3/UL
IMM GRANULOCYTES NFR BLD: 1 %
LYMPHOCYTES # BLD AUTO: 1.2 10E3/UL (ref 0.8–5.3)
LYMPHOCYTES NFR BLD AUTO: 15 %
MCH RBC QN AUTO: 28.7 PG (ref 26.5–33)
MCHC RBC AUTO-ENTMCNC: 32 G/DL (ref 31.5–36.5)
MCV RBC AUTO: 90 FL (ref 78–100)
MONOCYTES # BLD AUTO: 0.5 10E3/UL (ref 0–1.3)
MONOCYTES NFR BLD AUTO: 6 %
NEUTROPHILS # BLD AUTO: 6.5 10E3/UL (ref 1.6–8.3)
NEUTROPHILS NFR BLD AUTO: 77 %
NRBC # BLD AUTO: 0 10E3/UL
NRBC BLD AUTO-RTO: 0 /100
PLATELET # BLD AUTO: 294 10E3/UL (ref 150–450)
POTASSIUM SERPL-SCNC: 4.3 MMOL/L (ref 3.4–5.3)
PROT SERPL-MCNC: 6.2 G/DL (ref 6.4–8.3)
RBC # BLD AUTO: 3.83 10E6/UL (ref 3.8–5.2)
SODIUM SERPL-SCNC: 141 MMOL/L (ref 135–145)
WBC # BLD AUTO: 8.4 10E3/UL (ref 4–11)

## 2024-08-16 PROCEDURE — 96413 CHEMO IV INFUSION 1 HR: CPT

## 2024-08-16 PROCEDURE — 96411 CHEMO IV PUSH ADDL DRUG: CPT

## 2024-08-16 PROCEDURE — 99214 OFFICE O/P EST MOD 30 MIN: CPT | Mod: 95 | Performed by: INTERNAL MEDICINE

## 2024-08-16 PROCEDURE — 250N000013 HC RX MED GY IP 250 OP 250 PS 637: Performed by: INTERNAL MEDICINE

## 2024-08-16 PROCEDURE — 96375 TX/PRO/DX INJ NEW DRUG ADDON: CPT

## 2024-08-16 PROCEDURE — 250N000011 HC RX IP 250 OP 636: Performed by: INTERNAL MEDICINE

## 2024-08-16 PROCEDURE — 36591 DRAW BLOOD OFF VENOUS DEVICE: CPT | Performed by: INTERNAL MEDICINE

## 2024-08-16 PROCEDURE — 96367 TX/PROPH/DG ADDL SEQ IV INF: CPT

## 2024-08-16 PROCEDURE — 96372 THER/PROPH/DIAG INJ SC/IM: CPT | Performed by: INTERNAL MEDICINE

## 2024-08-16 PROCEDURE — 82040 ASSAY OF SERUM ALBUMIN: CPT | Performed by: INTERNAL MEDICINE

## 2024-08-16 PROCEDURE — 85048 AUTOMATED LEUKOCYTE COUNT: CPT | Performed by: INTERNAL MEDICINE

## 2024-08-16 PROCEDURE — 258N000003 HC RX IP 258 OP 636: Performed by: INTERNAL MEDICINE

## 2024-08-16 PROCEDURE — 96417 CHEMO IV INFUS EACH ADDL SEQ: CPT

## 2024-08-16 PROCEDURE — 96377 APPLICATON ON-BODY INJECTOR: CPT | Mod: XS | Performed by: INTERNAL MEDICINE

## 2024-08-16 PROCEDURE — 96415 CHEMO IV INFUSION ADDL HR: CPT

## 2024-08-16 RX ORDER — ALBUTEROL SULFATE 0.83 MG/ML
2.5 SOLUTION RESPIRATORY (INHALATION)
Status: CANCELLED | OUTPATIENT
Start: 2024-08-16

## 2024-08-16 RX ORDER — MEPERIDINE HYDROCHLORIDE 25 MG/ML
25 INJECTION INTRAMUSCULAR; INTRAVENOUS; SUBCUTANEOUS EVERY 30 MIN PRN
Status: DISCONTINUED | OUTPATIENT
Start: 2024-08-16 | End: 2024-08-16 | Stop reason: HOSPADM

## 2024-08-16 RX ORDER — ALBUTEROL SULFATE 90 UG/1
1-2 AEROSOL, METERED RESPIRATORY (INHALATION)
Status: CANCELLED
Start: 2024-08-16

## 2024-08-16 RX ORDER — PALONOSETRON 0.05 MG/ML
0.25 INJECTION, SOLUTION INTRAVENOUS ONCE
Status: CANCELLED
Start: 2024-08-16

## 2024-08-16 RX ORDER — LORAZEPAM 2 MG/ML
0.5 INJECTION INTRAMUSCULAR EVERY 4 HOURS PRN
Status: CANCELLED | OUTPATIENT
Start: 2024-08-16

## 2024-08-16 RX ORDER — ALBUTEROL SULFATE 90 UG/1
1-2 AEROSOL, METERED RESPIRATORY (INHALATION)
Status: DISCONTINUED | OUTPATIENT
Start: 2024-08-16 | End: 2024-08-16 | Stop reason: HOSPADM

## 2024-08-16 RX ORDER — METHYLPREDNISOLONE SODIUM SUCCINATE 125 MG/2ML
125 INJECTION, POWDER, LYOPHILIZED, FOR SOLUTION INTRAMUSCULAR; INTRAVENOUS
Status: DISCONTINUED | OUTPATIENT
Start: 2024-08-16 | End: 2024-08-16 | Stop reason: HOSPADM

## 2024-08-16 RX ORDER — METHYLPREDNISOLONE SODIUM SUCCINATE 125 MG/2ML
125 INJECTION, POWDER, LYOPHILIZED, FOR SOLUTION INTRAMUSCULAR; INTRAVENOUS
Status: CANCELLED
Start: 2024-08-16

## 2024-08-16 RX ORDER — HEPARIN SODIUM (PORCINE) LOCK FLUSH IV SOLN 100 UNIT/ML 100 UNIT/ML
5 SOLUTION INTRAVENOUS
Status: CANCELLED | OUTPATIENT
Start: 2024-08-16

## 2024-08-16 RX ORDER — MEPERIDINE HYDROCHLORIDE 25 MG/ML
25 INJECTION INTRAMUSCULAR; INTRAVENOUS; SUBCUTANEOUS EVERY 30 MIN PRN
Status: CANCELLED | OUTPATIENT
Start: 2024-08-16

## 2024-08-16 RX ORDER — DIPHENHYDRAMINE HCL 25 MG
50 CAPSULE ORAL ONCE
Status: CANCELLED
Start: 2024-08-16

## 2024-08-16 RX ORDER — PALONOSETRON 0.05 MG/ML
0.25 INJECTION, SOLUTION INTRAVENOUS ONCE
Status: COMPLETED | OUTPATIENT
Start: 2024-08-16 | End: 2024-08-16

## 2024-08-16 RX ORDER — EPINEPHRINE 1 MG/ML
0.3 INJECTION, SOLUTION, CONCENTRATE INTRAVENOUS EVERY 5 MIN PRN
Status: CANCELLED | OUTPATIENT
Start: 2024-08-16

## 2024-08-16 RX ORDER — DOXORUBICIN HYDROCHLORIDE 2 MG/ML
25 INJECTION, SOLUTION INTRAVENOUS ONCE
Status: COMPLETED | OUTPATIENT
Start: 2024-08-16 | End: 2024-08-16

## 2024-08-16 RX ORDER — DOXORUBICIN HYDROCHLORIDE 2 MG/ML
25 INJECTION, SOLUTION INTRAVENOUS ONCE
Status: CANCELLED | OUTPATIENT
Start: 2024-08-16

## 2024-08-16 RX ORDER — DIPHENHYDRAMINE HCL 50 MG
50 CAPSULE ORAL ONCE
Status: COMPLETED | OUTPATIENT
Start: 2024-08-16 | End: 2024-08-16

## 2024-08-16 RX ORDER — DIPHENHYDRAMINE HYDROCHLORIDE 50 MG/ML
50 INJECTION INTRAMUSCULAR; INTRAVENOUS
Status: DISCONTINUED | OUTPATIENT
Start: 2024-08-16 | End: 2024-08-16 | Stop reason: HOSPADM

## 2024-08-16 RX ORDER — HEPARIN SODIUM,PORCINE 10 UNIT/ML
5-20 VIAL (ML) INTRAVENOUS DAILY PRN
Status: CANCELLED | OUTPATIENT
Start: 2024-08-16

## 2024-08-16 RX ORDER — DIPHENHYDRAMINE HYDROCHLORIDE 50 MG/ML
50 INJECTION INTRAMUSCULAR; INTRAVENOUS
Status: CANCELLED
Start: 2024-08-16

## 2024-08-16 RX ORDER — HEPARIN SODIUM (PORCINE) LOCK FLUSH IV SOLN 100 UNIT/ML 100 UNIT/ML
5 SOLUTION INTRAVENOUS
Status: DISCONTINUED | OUTPATIENT
Start: 2024-08-16 | End: 2024-08-16 | Stop reason: HOSPADM

## 2024-08-16 RX ORDER — ACETAMINOPHEN 325 MG/1
650 TABLET ORAL ONCE
Status: CANCELLED
Start: 2024-08-16

## 2024-08-16 RX ORDER — ALBUTEROL SULFATE 0.83 MG/ML
2.5 SOLUTION RESPIRATORY (INHALATION)
Status: DISCONTINUED | OUTPATIENT
Start: 2024-08-16 | End: 2024-08-16 | Stop reason: HOSPADM

## 2024-08-16 RX ORDER — ACETAMINOPHEN 325 MG/1
650 TABLET ORAL ONCE
Status: COMPLETED | OUTPATIENT
Start: 2024-08-16 | End: 2024-08-16

## 2024-08-16 RX ORDER — EPINEPHRINE 1 MG/ML
0.3 INJECTION, SOLUTION INTRAMUSCULAR; SUBCUTANEOUS EVERY 5 MIN PRN
Status: DISCONTINUED | OUTPATIENT
Start: 2024-08-16 | End: 2024-08-16 | Stop reason: HOSPADM

## 2024-08-16 RX ORDER — MEPERIDINE HYDROCHLORIDE 25 MG/ML
25 INJECTION INTRAMUSCULAR; INTRAVENOUS; SUBCUTANEOUS
Status: CANCELLED
Start: 2024-08-16

## 2024-08-16 RX ADMIN — ACETAMINOPHEN 650 MG: 325 TABLET ORAL at 10:56

## 2024-08-16 RX ADMIN — Medication 5 ML: at 14:08

## 2024-08-16 RX ADMIN — PALONOSETRON 0.25 MG: 0.05 INJECTION, SOLUTION INTRAVENOUS at 10:10

## 2024-08-16 RX ADMIN — FAMOTIDINE 20 MG: 10 INJECTION, SOLUTION INTRAVENOUS at 10:17

## 2024-08-16 RX ADMIN — CYCLOPHOSPHAMIDE 500 MG: 500 INJECTION, POWDER, FOR SOLUTION INTRAVENOUS; ORAL at 11:58

## 2024-08-16 RX ADMIN — DIPHENHYDRAMINE HYDROCHLORIDE 50 MG: 50 CAPSULE ORAL at 10:56

## 2024-08-16 RX ADMIN — FAMOTIDINE 20 MG: 10 INJECTION, SOLUTION INTRAVENOUS at 10:12

## 2024-08-16 RX ADMIN — VINCRISTINE SULFATE 1 MG: 1 INJECTION, SOLUTION INTRAVENOUS at 11:31

## 2024-08-16 RX ADMIN — PEGFILGRASTIM 6 MG: KIT SUBCUTANEOUS at 14:03

## 2024-08-16 RX ADMIN — FOSAPREPITANT 150 MG: 150 INJECTION, POWDER, LYOPHILIZED, FOR SOLUTION INTRAVENOUS at 10:47

## 2024-08-16 RX ADMIN — DOXORUBICIN HYDROCHLORIDE 30 MG: 2 INJECTION, SOLUTION INTRAVENOUS at 11:19

## 2024-08-16 RX ADMIN — SODIUM CHLORIDE 250 ML: 9 INJECTION, SOLUTION INTRAVENOUS at 10:07

## 2024-08-16 RX ADMIN — RITUXIMAB-ABBS 500 MG: 10 INJECTION, SOLUTION INTRAVENOUS at 12:28

## 2024-08-16 ASSESSMENT — PAIN SCALES - GENERAL: PAINLEVEL: NO PAIN (0)

## 2024-08-16 NOTE — LETTER
"8/16/2024      Bibiana Mcrae  [address] [facility] [address]      Dear Colleague,    Thank you for referring your patient, Bibiana Mcrae, to the [facility] [place]. Please see a copy of my visit note below.    Oncology Rooming Note    August 16, 2024 9:03 AM   Bibiana Mcrae is a 79 year old female who presents for:    Chief Complaint   Patient presents with     Oncology Clinic Visit     Virtual return labs and infusion     Initial Vitals: BP (!) 142/70 (BP Location: Left arm, Patient Position: Sitting, Cuff Size: Adult Small)   Pulse 98   Temp 98.1  F (36.7  C) (Tympanic)   Resp 16   Ht 1.448 m (4' 9\")   Wt 41.1 kg (90 lb 11.2 oz)   LMP  (LMP Unknown)   SpO2 97%   BMI 19.63 kg/m   Estimated body mass index is 19.63 kg/m  as calculated from the following:    Height as of this encounter: 1.448 m (4' 9\").    Weight as of this encounter: 41.1 kg (90 lb 11.2 oz). Body surface area is 1.29 meters squared.  No Pain (0) Comment: Data Unavailable   No LMP recorded (lmp unknown). Patient has had a hysterectomy.  Allergies reviewed: Yes  Medications reviewed: Yes    Medications: Medication refills not needed today.  Pharmacy name entered into [facility]:    [id number] [place], [place] - [address] AT [place] [address]  [facility] ([facility]) - [address]  [facility] - [address]    Frailty Screening:   Is the patient here for a new oncology consult visit in cancer care? 2. No      Clinical concerns: Bibiana would like clarification about remission status and regarding \"activity showing in abdomen\".   Dr. Friedell was notified.      Reina Dawkins, [facility] Hematology and Oncology Follow-up Note    Patient: Bibiana Mcrae  MRN: [id number]  Date of Service: Aug 16, 2024           Virtual Visit Details    Type of service:  video visit  Phone call " duration: 43 minutes   Originating Location (pt. 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  She is tolerating chemotherapy with little difficulty.  She will receive cycle 6 today.  Posttreatment PET scan is ordered for about 4 weeks.  Revisit with lab afterwards.      ______________________________________________________________________________    Staging History   Cancer Staging   No matching staging information was found for the patient.    ECOG Performance    History of Present Illness  Disease history per Dr. Florian         Diagnosis: Diffuse large B-cell lymphoma.  This was found incidentally in April 2024.  She had a fall and came to the ER and they did a CT scan.  This showed an abdominal mass.  -4/15/24: Biopsy done of abdominal mass that was labeled as pancreatic mass.  It shows a CD20 positive large cell lymphoma.  -5/2/24: Hypermetabolic lymphadenopathy above and below the diaphragm, consistent with biopsy-proven lymphoma. Radiotracer avid mixed lytic and sclerotic observations in the left ischium and right distal femur metaphysis are nonspecific but raise possibility of marrow involvement     Treatment:  -5/3/2024: Patient started R mini CHOP. PET after 3 cycles showed [place] 4. Today is cycle 6         Interim history:    She feels fairly well.  She is maintaining her weight.  Her appetite and digestion are normal.  She does not note fever, chills or sweats.  She has no cough, chest pain or shortness of breath.  She does note  "constipation with treatment.  She has no neuropathy or mouth sores.    Review of systems.  Pertinent Findings are included in the History of Present Illness    Physical Exam    BP (!) 142/70 (BP Location: Left arm, Patient Position: Sitting, Cuff Size: Adult Small)   Pulse 98   Temp 98.1  F (36.7  C) (Tympanic)   Resp 16   Ht 1.448 m (4' 9\")   Wt 41.1 kg (90 lb 11.2 oz)   LMP  (LMP Unknown)   SpO2 97%   BMI 19.63 kg/m       Not examined video visit    Medications:    Current Outpatient Medications   Medication Sig Dispense Refill     acarbose (PRECOSE) 25 MG tablet Take 25 mg by mouth 3 times daily (with meals)       acetaminophen (TYLENOL) 325 MG tablet Take 325-650 mg by mouth every 6 hours as needed for mild pain       amLODIPine (NORVASC) 5 MG tablet Take 1 tablet (5 mg) by mouth daily 90 tablet 3     Biotin 5 MG TABS Take 5,000 mcg by mouth daily       busPIRone (BUSPAR) 7.5 MG tablet TAKE 1 TABLET BY MOUTH TWICE  DAILY 180 tablet 2     celecoxib (CELEBREX) 200 MG capsule TAKE 1 CAPSULE BY MOUTH  DAILY 90 capsule 3     denosumab (PROLIA) 60 MG/ML SOSY injection Inject 60 mg Subcutaneous every 6 months       donepezil (ARICEPT) 5 MG ODT Take 5 mg by mouth daily       lidocaine-prilocaine (EMLA) 2.5-2.5 % external cream Apply topically as needed for moderate pain 30 g 2     Melatonin 10 MG TABS tablet Take 15 mg by mouth nightly as needed for sleep       multivitamin with minerals (THERA-M) 9 mg iron-400 mcg Tab tablet [MULTIVITAMIN WITH MINERALS (THERA-M) 9 MG IRON-400 MCG TAB TABLET] Take 1 tablet by mouth daily.       nortriptyline (PAMELOR) 50 MG capsule TAKE 1 CAPSULE BY MOUTH AT  BEDTIME 90 capsule 2     omeprazole (PRILOSEC) 20 MG capsule [OMEPRAZOLE (PRILOSEC) 20 MG CAPSULE] Take 20 mg by mouth daily before breakfast.       ondansetron (ZOFRAN) 4 MG tablet TAKE 1 TO 2 TABLETS(4 TO 8 MG) BY MOUTH EVERY 6 HOURS AS NEEDED FOR NAUSEA OR VOMITING 30 tablet 1     polyethylene glycol (MIRALAX) 17 g " packet Take 1 packet by mouth daily       predniSONE (DELTASONE) 20 MG tablet Take 2 tablets (40 mg) by mouth daily Take on Days 1 through 5 of chemotherapy. Take first dose in AM prior to chemotherapy. 60 tablet 0     Probiotic Product (SOLUBLE FIBER/PROBIOTICS PO) 2 tablespoons in 8 oz of liquid daily       rivastigmine (EXELON) 9.5 MG/24HR 24 hr patch Place 1 patch onto the skin daily 90 patch 3     sennosides (SENOKOT) 8.6 MG tablet Take 2 tablets by mouth daily as needed for constipation       simethicone (MYLICON) 125 MG chewable tablet Take 125 mg by mouth 2 times daily       UNABLE TO FIND Take 2 Tablespoonful by mouth every morning MEDICATION NAME: biofiber 8 oz with water       Current Facility-Administered Medications   Medication Dose Route Frequency Provider Last Rate Last Admin     denosumab (PROLIA) injection 60 mg  60 mg Subcutaneous Q6 Months    60 mg at 07/02/24 1611     Facility-Administered Medications Ordered in Other Visits   Medication Dose Route Frequency Provider Last Rate Last Admin     acetaminophen (TYLENOL) tablet 650 mg  650 mg Oral Once Friedell, Peter E, MD         albuterol (PROVENTIL HFA/VENTOLIN HFA) inhaler  1-2 puff Inhalation Once PRN Friedell, Peter E, MD         albuterol (PROVENTIL) neb solution 2.5 mg  2.5 mg Nebulization Once PRN Friedell, Peter E, MD         cycloPHOSphamide (CYTOXAN) 500 mg in sodium chloride 0.9 % 300 mL infusion  500 mg Intravenous Once Friedell, Peter E, MD         diphenhydrAMINE (BENADRYL) capsule 50 mg  50 mg Oral Once Friedell, Peter E, MD         diphenhydrAMINE (BENADRYL) injection 50 mg  50 mg Intravenous Once PRN Friedell, Peter E, MD         DOXOrubicin (ADRIAMYCIN) injection 30 mg  25 mg/m2 (Treatment Plan Recorded) Intravenous Once Friedell, Peter E, MD         EPINEPHrine (Anaphylaxis) (ADRENALIN) injection (vial) 0.3 mg  0.3 mg Intramuscular Q5 Min PRN Friedell, Peter E, MD         famotidine (PEPCID) injection 20 mg  20 mg Intravenous  Once PRN Friedell, Peter E, MD   20 mg at 08/16/24 1012     fosaprepitant (EMEND) 150 mg in sodium chloride 0.9 % 275 mL intermittent infusion  150 mg Intravenous Once Friedell, Peter E, MD         heparin lock flush 100 unit/mL injection 5 mL  5 mL Intracatheter Once PRN Friedell, Peter E, MD         meperidine (DEMEROL) injection 25 mg  25 mg Intravenous Q30 Min PRN Friedell, Peter E, MD         methylPREDNISolone Na Suc (solu-MEDROL) injection 125 mg  125 mg Intravenous Once PRN Friedell, Peter E, MD         pegfilgrastim (NEULASTA Onpro Kit) On-body injector 6 mg  6 mg Subcutaneous Once Friedell, Peter E, MD         riTUXimab-abbs (TRUXIMA) 500 mg in sodium chloride 0.9 % 500 mL infusion  375 mg/m2 (Treatment Plan Recorded) Intravenous Once Friedell, Peter E, MD         sodium chloride (PF) 0.9% PF flush 3-20 mL  3-20 mL Intracatheter q1 min prn Friedell, Peter E, MD         sodium chloride 0.9% BOLUS 500 mL  500 mL Intravenous Once PRN Friedell, Peter E, MD         vinCRIStine (ONCOVIN) 1 mg in sodium chloride 0.9 % 28.5 mL infusion  1 mg Intravenous Once Friedell, Peter E, MD               Past History    Past Medical History:   Diagnosis Date     Anxiety      Breast cyst     While ago     History of cholecystectomy 08/24/2023     Past Surgical History:   Procedure Laterality Date     BREAST CYST ASPIRATION Left     While ago     HYSTERECTOMY  1985     IR CHEST PORT PLACEMENT > 5 YRS OF AGE  5/1/2024     OOPHORECTOMY Bilateral 1985     [facility] REMOVAL OF OVARY(S)      Description: Oophorectomy;  Recorded: 12/17/2009;     [facility] TOTAL ABDOM HYSTERECTOMY      Description: Total Abdominal Hysterectomy;  Recorded: 12/17/2009;     Allergies   Allergen Reactions     Blood Transfusion Related (Informational Only) Other (See Comments)     Patient has a history of a Clinically Significant antibody against RBC antigens. A delay in compatible RBCs may occur. 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      Prochlorperazine Edisylate [Prochlorperazine] Other (See Comments)     Extrapyramidal side effects     Family History   Problem Relation Age of Onset     Breast Cancer Mother 52.00     Cancer Maternal Grandfather 71.00        prostate     Prostate Cancer Maternal Grandfather      Social History     Socioeconomic History     Marital status:      Spouse name: None     Number of children: None     Years of education: None     Highest education level: None   Tobacco Use     Smoking status: Never     Passive exposure: Never     Smokeless tobacco: Never   Vaping Use     Vaping status: Never Used   Substance and Sexual Activity     Alcohol use: No     Drug use: Never     Social Determinants of Health     Financial Resource Strain: Low Risk  (4/25/2024)    Financial Resource Strain      Within the past 12 months, have you or your family members you live with been unable to get utilities (heat, electricity) when it was really needed?: No   Food Insecurity: Low Risk  (4/25/2024)    Food Insecurity      Within the past 12 months, did you worry that your food would run out before you got money to buy more?: No      Within the past 12 months, did the food you bought just not last and you didn t have money to get more?: No   Transportation Needs: Low Risk  (4/25/2024)    Transportation Needs      Within the past 12 months, has lack of transportation kept you from medical appointments, getting your medicines, non-medical meetings or appointments, work, or from getting things that you need?: No   Physical Activity: Unknown (4/25/2024)    Exercise Vital Sign      Days of Exercise per Week: 0 days   Stress: Stress Concern Present (4/25/2024)    [employer] [place] [employer] - Occupational Stress Questionnaire      Feeling of Stress : Rather much   Social Connections: Unknown (4/25/2024)    Social Connection and Isolation Panel [NHANES]      Frequency of Social Gatherings with Friends and Family: Once a week    Interpersonal Safety: Low Risk  (4/25/2024)    Interpersonal Safety      Do you feel physically and emotionally safe where you currently live?: Yes      Within the past 12 months, have you been hit, slapped, kicked or otherwise physically hurt by someone?: No      Within the past 12 months, have you been humiliated or emotionally abused in other ways by your partner or ex-partner?: No   Housing Stability: Low Risk  (4/25/2024)    Housing Stability      Do you have housing? : Yes      Are you worried about losing your housing?: No           Lab Results    Recent Results (from the past 240 hour(s))   Comprehensive metabolic panel    Collection Time: 08/16/24  8:06 AM   Result Value Ref Range    Sodium 141 135 - 145 mmol/L    Potassium 4.3 3.4 - 5.3 mmol/L    Carbon Dioxide (CO2) 23 22 - 29 mmol/L    Anion Gap 14 7 - 15 mmol/L    Urea Nitrogen 21.5 8.0 - 23.0 mg/dL    Creatinine 0.78 0.51 - 0.95 mg/dL    GFR Estimate 77 >60 mL/min/1.73m2    Calcium 9.0 8.8 - 10.4 mg/dL    Chloride 104 98 - 107 mmol/L    Glucose 251 (H) 70 - 99 mg/dL    Alkaline Phosphatase 130 40 - 150 U/L    AST 29 0 - 45 U/L    ALT 32 0 - 50 U/L    Protein Total 6.2 (L) 6.4 - 8.3 g/dL    Albumin 4.1 3.5 - 5.2 g/dL    Bilirubin Total 0.2 <=1.2 mg/dL   CBC with platelets and differential    Collection Time: 08/16/24  8:06 AM   Result Value Ref Range    WBC Count 8.4 4.0 - 11.0 10e3/uL    RBC Count 3.83 3.80 - 5.20 10e6/uL    Hemoglobin 11.0 (L) 11.7 - 15.7 g/dL    Hematocrit 34.4 (L) 35.0 - 47.0 %    MCV 90 78 - 100 fL    MCH 28.7 26.5 - 33.0 pg    MCHC 32.0 31.5 - 36.5 g/dL    RDW 18.5 (H) 10.0 - 15.0 %    Platelet Count 294 150 - 450 10e3/uL    % Neutrophils 77 %    % Lymphocytes 15 %    % Monocytes 6 %    % Eosinophils 1 %    % Basophils 0 %    % Immature Granulocytes 1 %    NRBCs per 100 WBC 0 <1 /100    Absolute Neutrophils 6.5 1.6 - 8.3 10e3/uL    Absolute Lymphocytes 1.2 0.8 - 5.3 10e3/uL    Absolute Monocytes 0.5 0.0 - 1.3 10e3/uL    Absolute  Eosinophils 0.1 0.0 - 0.7 10e3/uL    Absolute Basophils 0.0 0.0 - 0.2 10e3/uL    Absolute Immature Granulocytes 0.1 <=0.4 10e3/uL    Absolute NRBCs 0.0 10e3/uL       Imaging Results    No results found.      I spent 35 minutes on the patient's visit today.  This included preparation for the visit, face-to-face time with the patient and documentation following the visit.  It did not include teaching or procedure time.    Signed by: Peter E. Friedell, MD          Again, thank you for allowing me to participate in the care of your patient.        Sincerely,        Peter E. Friedell, MD

## 2024-08-16 NOTE — LETTER
"8/16/2024      Bibiana Mcrae  [address] [facility] [address]      Dear Colleague,    Thank you for referring your patient, Bibiana Mcrae, to the [facility]. Please see a copy of my visit note below.    Oncology Rooming Note    August 16, 2024 9:03 AM   Bibiana Mcrae is a 79 year old female who presents for:    Chief Complaint   Patient presents with     Oncology Clinic Visit     Virtual return labs and infusion     Initial Vitals: BP (!) 142/70 (BP Location: Left arm, Patient Position: Sitting, Cuff Size: Adult Small)   Pulse 98   Temp 98.1  F (36.7  C) (Tympanic)   Resp 16   Ht 1.448 m (4' 9\")   Wt 41.1 kg (90 lb 11.2 oz)   LMP  (LMP Unknown)   SpO2 97%   BMI 19.63 kg/m   Estimated body mass index is 19.63 kg/m  as calculated from the following:    Height as of this encounter: 1.448 m (4' 9\").    Weight as of this encounter: 41.1 kg (90 lb 11.2 oz). Body surface area is 1.29 meters squared.  No Pain (0) Comment: Data Unavailable   No LMP recorded (lmp unknown). Patient has had a hysterectomy.  Allergies reviewed: Yes  Medications reviewed: Yes    Medications: Medication refills not needed today.  Pharmacy name entered into [facility]:    [id number] [place], [place] - [address] AT [place] [address]  [facility] ([facility] - [address]  [facility] - [address]    Frailty Screening:   Is the patient here for a new oncology consult visit in cancer care? 2. No      Clinical concerns: Bibiana would like clarification about remission status and regarding \"activity showing in abdomen\".   Dr. Friedell was notified.      Reina Dawkins, [facility] Hematology and Oncology Follow-up Note    Patient: Bibiana Mcrae  MRN: [id number]  Date of Service: Aug 16, 2024           Virtual Visit Details    Type of service:  video visit  Phone call " duration: 43 minutes   Originating Location (pt. 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  She is tolerating chemotherapy with little difficulty.  She will receive cycle 6 today.  Posttreatment PET scan is ordered for about 4 weeks.  Revisit with lab afterwards.      ______________________________________________________________________________    Staging History   Cancer Staging   No matching staging information was found for the patient.    ECOG Performance    History of Present Illness  Disease history per Dr. Florian         Diagnosis: Diffuse large B-cell lymphoma.  This was found incidentally in April 2024.  She had a fall and came to the ER and they did a CT scan.  This showed an abdominal mass.  -4/15/24: Biopsy done of abdominal mass that was labeled as pancreatic mass.  It shows a CD20 positive large cell lymphoma.  -5/2/24: Hypermetabolic lymphadenopathy above and below the diaphragm, consistent with biopsy-proven lymphoma. Radiotracer avid mixed lytic and sclerotic observations in the left ischium and right distal femur metaphysis are nonspecific but raise possibility of marrow involvement     Treatment:  -5/3/2024: Patient started R mini CHOP. PET after 3 cycles showed [place] 4. Today is cycle 6         Interim history:    She feels fairly well.  She is maintaining her weight.  Her appetite and digestion are normal.  She does not note fever, chills or sweats.  She has no cough, chest pain or shortness of breath.  She does note  "constipation with treatment.  She has no neuropathy or mouth sores.    Review of systems.  Pertinent Findings are included in the History of Present Illness    Physical Exam    BP (!) 142/70 (BP Location: Left arm, Patient Position: Sitting, Cuff Size: Adult Small)   Pulse 98   Temp 98.1  F (36.7  C) (Tympanic)   Resp 16   Ht 1.448 m (4' 9\")   Wt 41.1 kg (90 lb 11.2 oz)   LMP  (LMP Unknown)   SpO2 97%   BMI 19.63 kg/m       Not examined video visit    Medications:    Current Outpatient Medications   Medication Sig Dispense Refill     acarbose (PRECOSE) 25 MG tablet Take 25 mg by mouth 3 times daily (with meals)       acetaminophen (TYLENOL) 325 MG tablet Take 325-650 mg by mouth every 6 hours as needed for mild pain       amLODIPine (NORVASC) 5 MG tablet Take 1 tablet (5 mg) by mouth daily 90 tablet 3     Biotin 5 MG TABS Take 5,000 mcg by mouth daily       busPIRone (BUSPAR) 7.5 MG tablet TAKE 1 TABLET BY MOUTH TWICE  DAILY 180 tablet 2     celecoxib (CELEBREX) 200 MG capsule TAKE 1 CAPSULE BY MOUTH  DAILY 90 capsule 3     denosumab (PROLIA) 60 MG/ML SOSY injection Inject 60 mg Subcutaneous every 6 months       donepezil (ARICEPT) 5 MG ODT Take 5 mg by mouth daily       lidocaine-prilocaine (EMLA) 2.5-2.5 % external cream Apply topically as needed for moderate pain 30 g 2     Melatonin 10 MG TABS tablet Take 15 mg by mouth nightly as needed for sleep       multivitamin with minerals (THERA-M) 9 mg iron-400 mcg Tab tablet [MULTIVITAMIN WITH MINERALS (THERA-M) 9 MG IRON-400 MCG TAB TABLET] Take 1 tablet by mouth daily.       nortriptyline (PAMELOR) 50 MG capsule TAKE 1 CAPSULE BY MOUTH AT  BEDTIME 90 capsule 2     omeprazole (PRILOSEC) 20 MG capsule [OMEPRAZOLE (PRILOSEC) 20 MG CAPSULE] Take 20 mg by mouth daily before breakfast.       ondansetron (ZOFRAN) 4 MG tablet TAKE 1 TO 2 TABLETS(4 TO 8 MG) BY MOUTH EVERY 6 HOURS AS NEEDED FOR NAUSEA OR VOMITING 30 tablet 1     polyethylene glycol (MIRALAX) 17 g " packet Take 1 packet by mouth daily       predniSONE (DELTASONE) 20 MG tablet Take 2 tablets (40 mg) by mouth daily Take on Days 1 through 5 of chemotherapy. Take first dose in AM prior to chemotherapy. 60 tablet 0     Probiotic Product (SOLUBLE FIBER/PROBIOTICS PO) 2 tablespoons in 8 oz of liquid daily       rivastigmine (EXELON) 9.5 MG/24HR 24 hr patch Place 1 patch onto the skin daily 90 patch 3     sennosides (SENOKOT) 8.6 MG tablet Take 2 tablets by mouth daily as needed for constipation       simethicone (MYLICON) 125 MG chewable tablet Take 125 mg by mouth 2 times daily       UNABLE TO FIND Take 2 Tablespoonful by mouth every morning MEDICATION NAME: biofiber 8 oz with water       Current Facility-Administered Medications   Medication Dose Route Frequency Provider Last Rate Last Admin     denosumab (PROLIA) injection 60 mg  60 mg Subcutaneous Q6 Months    60 mg at 07/02/24 1611     Facility-Administered Medications Ordered in Other Visits   Medication Dose Route Frequency Provider Last Rate Last Admin     acetaminophen (TYLENOL) tablet 650 mg  650 mg Oral Once Friedell, Peter E, MD         albuterol (PROVENTIL HFA/VENTOLIN HFA) inhaler  1-2 puff Inhalation Once PRN Friedell, Peter E, MD         albuterol (PROVENTIL) neb solution 2.5 mg  2.5 mg Nebulization Once PRN Friedell, Peter E, MD         cycloPHOSphamide (CYTOXAN) 500 mg in sodium chloride 0.9 % 300 mL infusion  500 mg Intravenous Once Friedell, Peter E, MD         diphenhydrAMINE (BENADRYL) capsule 50 mg  50 mg Oral Once Friedell, Peter E, MD         diphenhydrAMINE (BENADRYL) injection 50 mg  50 mg Intravenous Once PRN Friedell, Peter E, MD         DOXOrubicin (ADRIAMYCIN) injection 30 mg  25 mg/m2 (Treatment Plan Recorded) Intravenous Once Friedell, Peter E, MD         EPINEPHrine (Anaphylaxis) (ADRENALIN) injection (vial) 0.3 mg  0.3 mg Intramuscular Q5 Min PRN Friedell, Peter E, MD         famotidine (PEPCID) injection 20 mg  20 mg Intravenous  Once PRN Friedell, Peter E, MD   20 mg at 08/16/24 1012     fosaprepitant (EMEND) 150 mg in sodium chloride 0.9 % 275 mL intermittent infusion  150 mg Intravenous Once Friedell, Peter E, MD         heparin lock flush 100 unit/mL injection 5 mL  5 mL Intracatheter Once PRN Friedell, Peter E, MD         meperidine (DEMEROL) injection 25 mg  25 mg Intravenous Q30 Min PRN Friedell, Peter E, MD         methylPREDNISolone Na Suc (solu-MEDROL) injection 125 mg  125 mg Intravenous Once PRN Friedell, Peter E, MD         pegfilgrastim (NEULASTA Onpro Kit) On-body injector 6 mg  6 mg Subcutaneous Once Friedell, Peter E, MD         riTUXimab-abbs (TRUXIMA) 500 mg in sodium chloride 0.9 % 500 mL infusion  375 mg/m2 (Treatment Plan Recorded) Intravenous Once Friedell, Peter E, MD         sodium chloride (PF) 0.9% PF flush 3-20 mL  3-20 mL Intracatheter q1 min prn Friedell, Peter E, MD         sodium chloride 0.9% BOLUS 500 mL  500 mL Intravenous Once PRN Friedell, Peter E, MD         vinCRIStine (ONCOVIN) 1 mg in sodium chloride 0.9 % 28.5 mL infusion  1 mg Intravenous Once Friedell, Peter E, MD               Past History    Past Medical History:   Diagnosis Date     Anxiety      Breast cyst     While ago     History of cholecystectomy 08/24/2023     Past Surgical History:   Procedure Laterality Date     BREAST CYST ASPIRATION Left     While ago     HYSTERECTOMY  1985     IR CHEST PORT PLACEMENT > 5 YRS OF AGE  5/1/2024     OOPHORECTOMY Bilateral 1985     [facility] REMOVAL OF OVARY(S)      Description: Oophorectomy;  Recorded: 12/17/2009;     [facility] TOTAL ABDOM HYSTERECTOMY      Description: Total Abdominal Hysterectomy;  Recorded: 12/17/2009;     Allergies   Allergen Reactions     Blood Transfusion Related (Informational Only) Other (See Comments)     Patient has a history of a Clinically Significant antibody against RBC antigens. A delay in compatible RBCs may occur. 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      Prochlorperazine Edisylate [Prochlorperazine] Other (See Comments)     Extrapyramidal side effects     Family History   Problem Relation Age of Onset     Breast Cancer Mother 52.00     Cancer Maternal Grandfather 71.00        prostate     Prostate Cancer Maternal Grandfather      Social History     Socioeconomic History     Marital status:      Spouse name: None     Number of children: None     Years of education: None     Highest education level: None   Tobacco Use     Smoking status: Never     Passive exposure: Never     Smokeless tobacco: Never   Vaping Use     Vaping status: Never Used   Substance and Sexual Activity     Alcohol use: No     Drug use: Never     Social Determinants of Health     Financial Resource Strain: Low Risk  (4/25/2024)    Financial Resource Strain      Within the past 12 months, have you or your family members you live with been unable to get utilities (heat, electricity) when it was really needed?: No   Food Insecurity: Low Risk  (4/25/2024)    Food Insecurity      Within the past 12 months, did you worry that your food would run out before you got money to buy more?: No      Within the past 12 months, did the food you bought just not last and you didn t have money to get more?: No   Transportation Needs: Low Risk  (4/25/2024)    Transportation Needs      Within the past 12 months, has lack of transportation kept you from medical appointments, getting your medicines, non-medical meetings or appointments, work, or from getting things that you need?: No   Physical Activity: Unknown (4/25/2024)    Exercise Vital Sign      Days of Exercise per Week: 0 days   Stress: Stress Concern Present (4/25/2024)    [employer] - Occupational Stress Questionnaire      Feeling of Stress : Rather much   Social Connections: Unknown (4/25/2024)    Social Connection and Isolation Panel [NHANES]      Frequency of Social Gatherings with Friends and Family: Once a week    Interpersonal Safety: Low Risk  (4/25/2024)    Interpersonal Safety      Do you feel physically and emotionally safe where you currently live?: Yes      Within the past 12 months, have you been hit, slapped, kicked or otherwise physically hurt by someone?: No      Within the past 12 months, have you been humiliated or emotionally abused in other ways by your partner or ex-partner?: No   Housing Stability: Low Risk  (4/25/2024)    Housing Stability      Do you have housing? : Yes      Are you worried about losing your housing?: No           Lab Results    Recent Results (from the past 240 hour(s))   Comprehensive metabolic panel    Collection Time: 08/16/24  8:06 AM   Result Value Ref Range    Sodium 141 135 - 145 mmol/L    Potassium 4.3 3.4 - 5.3 mmol/L    Carbon Dioxide (CO2) 23 22 - 29 mmol/L    Anion Gap 14 7 - 15 mmol/L    Urea Nitrogen 21.5 8.0 - 23.0 mg/dL    Creatinine 0.78 0.51 - 0.95 mg/dL    GFR Estimate 77 >60 mL/min/1.73m2    Calcium 9.0 8.8 - 10.4 mg/dL    Chloride 104 98 - 107 mmol/L    Glucose 251 (H) 70 - 99 mg/dL    Alkaline Phosphatase 130 40 - 150 U/L    AST 29 0 - 45 U/L    ALT 32 0 - 50 U/L    Protein Total 6.2 (L) 6.4 - 8.3 g/dL    Albumin 4.1 3.5 - 5.2 g/dL    Bilirubin Total 0.2 <=1.2 mg/dL   CBC with platelets and differential    Collection Time: 08/16/24  8:06 AM   Result Value Ref Range    WBC Count 8.4 4.0 - 11.0 10e3/uL    RBC Count 3.83 3.80 - 5.20 10e6/uL    Hemoglobin 11.0 (L) 11.7 - 15.7 g/dL    Hematocrit 34.4 (L) 35.0 - 47.0 %    MCV 90 78 - 100 fL    MCH 28.7 26.5 - 33.0 pg    MCHC 32.0 31.5 - 36.5 g/dL    RDW 18.5 (H) 10.0 - 15.0 %    Platelet Count 294 150 - 450 10e3/uL    % Neutrophils 77 %    % Lymphocytes 15 %    % Monocytes 6 %    % Eosinophils 1 %    % Basophils 0 %    % Immature Granulocytes 1 %    NRBCs per 100 WBC 0 <1 /100    Absolute Neutrophils 6.5 1.6 - 8.3 10e3/uL    Absolute Lymphocytes 1.2 0.8 - 5.3 10e3/uL    Absolute Monocytes 0.5 0.0 - 1.3 10e3/uL    Absolute  Eosinophils 0.1 0.0 - 0.7 10e3/uL    Absolute Basophils 0.0 0.0 - 0.2 10e3/uL    Absolute Immature Granulocytes 0.1 <=0.4 10e3/uL    Absolute NRBCs 0.0 10e3/uL       Imaging Results    No results found.      I spent 35 minutes on the patient's visit today.  This included preparation for the visit, face-to-face time with the patient and documentation following the visit.  It did not include teaching or procedure time.    Signed by: Peter E. Friedell, MD          Again, thank you for allowing me to participate in the care of your patient.        Sincerely,        Peter E. Friedell, MD

## 2024-08-16 NOTE — PROGRESS NOTES
Infusion Nursing Note:  Bibiana cMrae presents today for C6D1.    Patient seen by provider today: Yes: Dr.Friedell   present during visit today: Not Applicable.    Note:  Bibiana arrived ambulatory and in stable condition, Port accessed using sterile technique, good blood return noted, and labs collected. Pt to infusion after having a virtual appointment. Patient was premedicated and treatment administered per orders. Port de-accessed upon completion and site covered with gauze and secured with tape. Neulasta OnPro applied to left arm at 1403 and patient was instructed when to removed tomorrow.       Intravenous Access:  Implanted Port.    Treatment Conditions:  Lab Results   Component Value Date    HGB 11.0 (L) 08/16/2024    WBC 8.4 08/16/2024    ANEU 14.2 (H) 05/13/2024    ANEUTAUTO 6.5 08/16/2024     08/16/2024        Lab Results   Component Value Date     08/16/2024    POTASSIUM 4.3 08/16/2024    MAG 2.1 05/13/2024    CR 0.78 08/16/2024    JUSTIN 9.0 08/16/2024    BILITOTAL 0.2 08/16/2024    ALBUMIN 4.1 08/16/2024    ALT 32 08/16/2024    AST 29 08/16/2024       Results reviewed, labs MET treatment parameters, ok to proceed with treatment.      Post Infusion Assessment:  Patient tolerated infusions without incident.  Blood return noted pre and post infusion.  No evidence of extravasations.  Access discontinued per protocol.       Discharge Plan:   Discharge instructions reviewed with: Patient.  Patient and/or family verbalized understanding of discharge instructions and all questions answered.  Patient discharged in stable condition accompanied by: self and daughter.  Departure Mode: Ambulatory.      Charo Evans RN

## 2024-08-16 NOTE — PROGRESS NOTES
[facility] Hematology and Oncology Follow-up Note    Patient: Bibiana Mcrae  MRN: [id number]  Date of Service: Aug 16, 2024           Virtual Visit Details    Type of service:  video visit  Phone call duration: 43 minutes   Originating Location (pt. 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  She is tolerating chemotherapy with little difficulty.  She will receive cycle 6 today.  Posttreatment PET scan is ordered for about 4 weeks.  Revisit with lab afterwards.      ______________________________________________________________________________    Staging History   Cancer Staging   No matching staging information was found for the patient.    ECOG Performance    History of Present Illness  Disease history per Dr. Florian         Diagnosis: Diffuse large B-cell lymphoma.  This was found incidentally in April 2024.  She had a fall and came to the ER and they did a CT scan.  This showed an abdominal mass.  -4/15/24: Biopsy done of abdominal mass that was labeled as pancreatic mass.  It shows a CD20 positive large cell lymphoma.  -5/2/24: Hypermetabolic lymphadenopathy above and below the diaphragm, consistent with biopsy-proven lymphoma. Radiotracer avid mixed lytic and sclerotic observations in the left ischium and right distal femur metaphysis are nonspecific but raise possibility of marrow involvement     Treatment:  -5/3/2024: Patient started R mini CHOP. PET after 3 cycles showed [place] 4. Today is cycle 6         Interim history:    She  "feels fairly well.  She is maintaining her weight.  Her appetite and digestion are normal.  She does not note fever, chills or sweats.  She has no cough, chest pain or shortness of breath.  She does note constipation with treatment.  She has no neuropathy or mouth sores.    Review of systems.  Pertinent Findings are included in the History of Present Illness    Physical Exam    BP (!) 142/70 (BP Location: Left arm, Patient Position: Sitting, Cuff Size: Adult Small)   Pulse 98   Temp 98.1  F (36.7  C) (Tympanic)   Resp 16   Ht 1.448 m (4' 9\")   Wt 41.1 kg (90 lb 11.2 oz)   LMP  (LMP Unknown)   SpO2 97%   BMI 19.63 kg/m       Not examined video visit    Medications:    Current Outpatient Medications   Medication Sig Dispense Refill    acarbose (PRECOSE) 25 MG tablet Take 25 mg by mouth 3 times daily (with meals)      acetaminophen (TYLENOL) 325 MG tablet Take 325-650 mg by mouth every 6 hours as needed for mild pain      amLODIPine (NORVASC) 5 MG tablet Take 1 tablet (5 mg) by mouth daily 90 tablet 3    Biotin 5 MG TABS Take 5,000 mcg by mouth daily      busPIRone (BUSPAR) 7.5 MG tablet TAKE 1 TABLET BY MOUTH TWICE  DAILY 180 tablet 2    celecoxib (CELEBREX) 200 MG capsule TAKE 1 CAPSULE BY MOUTH  DAILY 90 capsule 3    denosumab (PROLIA) 60 MG/ML SOSY injection Inject 60 mg Subcutaneous every 6 months      donepezil (ARICEPT) 5 MG ODT Take 5 mg by mouth daily      lidocaine-prilocaine (EMLA) 2.5-2.5 % external cream Apply topically as needed for moderate pain 30 g 2    Melatonin 10 MG TABS tablet Take 15 mg by mouth nightly as needed for sleep      multivitamin with minerals (THERA-M) 9 mg iron-400 mcg Tab tablet [MULTIVITAMIN WITH MINERALS (THERA-M) 9 MG IRON-400 MCG TAB TABLET] Take 1 tablet by mouth daily.      nortriptyline (PAMELOR) 50 MG capsule TAKE 1 CAPSULE BY MOUTH AT  BEDTIME 90 capsule 2    omeprazole (PRILOSEC) 20 MG capsule [OMEPRAZOLE (PRILOSEC) 20 MG CAPSULE] Take 20 mg by mouth daily before " breakfast.      ondansetron (ZOFRAN) 4 MG tablet TAKE 1 TO 2 TABLETS(4 TO 8 MG) BY MOUTH EVERY 6 HOURS AS NEEDED FOR NAUSEA OR VOMITING 30 tablet 1    polyethylene glycol (MIRALAX) 17 g packet Take 1 packet by mouth daily      predniSONE (DELTASONE) 20 MG tablet Take 2 tablets (40 mg) by mouth daily Take on Days 1 through 5 of chemotherapy. Take first dose in AM prior to chemotherapy. 60 tablet 0    Probiotic Product (SOLUBLE FIBER/PROBIOTICS PO) 2 tablespoons in 8 oz of liquid daily      rivastigmine (EXELON) 9.5 MG/24HR 24 hr patch Place 1 patch onto the skin daily 90 patch 3    sennosides (SENOKOT) 8.6 MG tablet Take 2 tablets by mouth daily as needed for constipation      simethicone (MYLICON) 125 MG chewable tablet Take 125 mg by mouth 2 times daily      UNABLE TO FIND Take 2 Tablespoonful by mouth every morning MEDICATION NAME: biofiber 8 oz with water       Current Facility-Administered Medications   Medication Dose Route Frequency Provider Last Rate Last Admin    denosumab (PROLIA) injection 60 mg  60 mg Subcutaneous Q6 Months    60 mg at 07/02/24 1611     Facility-Administered Medications Ordered in Other Visits   Medication Dose Route Frequency Provider Last Rate Last Admin    acetaminophen (TYLENOL) tablet 650 mg  650 mg Oral Once Friedell, Peter E, MD        albuterol (PROVENTIL HFA/VENTOLIN HFA) inhaler  1-2 puff Inhalation Once PRN Friedell, Peter E, MD        albuterol (PROVENTIL) neb solution 2.5 mg  2.5 mg Nebulization Once PRN Friedell, Peter E, MD        cycloPHOSphamide (CYTOXAN) 500 mg in sodium chloride 0.9 % 300 mL infusion  500 mg Intravenous Once Friedell, Peter E, MD        diphenhydrAMINE (BENADRYL) capsule 50 mg  50 mg Oral Once Friedell, Peter E, MD        diphenhydrAMINE (BENADRYL) injection 50 mg  50 mg Intravenous Once PRN Friedell, Peter E, MD        DOXOrubicin (ADRIAMYCIN) injection 30 mg  25 mg/m2 (Treatment Plan Recorded) Intravenous Once Friedell, Peter E, MD        EPINEPHrine  (Anaphylaxis) (ADRENALIN) injection (vial) 0.3 mg  0.3 mg Intramuscular Q5 Min PRN Friedell, Peter E, MD        famotidine (PEPCID) injection 20 mg  20 mg Intravenous Once PRN Friedell, Peter E, MD   20 mg at 08/16/24 1012    fosaprepitant (EMEND) 150 mg in sodium chloride 0.9 % 275 mL intermittent infusion  150 mg Intravenous Once Friedell, Peter E, MD        heparin lock flush 100 unit/mL injection 5 mL  5 mL Intracatheter Once PRN Friedell, Peter E, MD        meperidine (DEMEROL) injection 25 mg  25 mg Intravenous Q30 Min PRN Friedell, Peter E, MD        methylPREDNISolone Na Suc (solu-MEDROL) injection 125 mg  125 mg Intravenous Once PRN Friedell, Peter E, MD        pegfilgrastim (NEULASTA Onpro Kit) On-body injector 6 mg  6 mg Subcutaneous Once Friedell, Peter E, MD        riTUXimab-abbs (TRUXIMA) 500 mg in sodium chloride 0.9 % 500 mL infusion  375 mg/m2 (Treatment Plan Recorded) Intravenous Once Friedell, Peter E, MD        sodium chloride (PF) 0.9% PF flush 3-20 mL  3-20 mL Intracatheter q1 min prn Friedell, Peter E, MD        sodium chloride 0.9% BOLUS 500 mL  500 mL Intravenous Once PRN Friedell, Peter E, MD        vinCRIStine (ONCOVIN) 1 mg in sodium chloride 0.9 % 28.5 mL infusion  1 mg Intravenous Once Friedell, Peter E, MD               Past History    Past Medical History:   Diagnosis Date    Anxiety     Breast cyst     While ago    History of cholecystectomy 08/24/2023     Past Surgical History:   Procedure Laterality Date    BREAST CYST ASPIRATION Left     While ago    HYSTERECTOMY  1985    IR CHEST PORT PLACEMENT > 5 YRS OF AGE  5/1/2024    OOPHORECTOMY Bilateral 1985    [facility] REMOVAL OF OVARY(S)      Description: Oophorectomy;  Recorded: 12/17/2009;    [facility] TOTAL ABDOM HYSTERECTOMY      Description: Total Abdominal Hysterectomy;  Recorded: 12/17/2009;     Allergies   Allergen Reactions    Blood Transfusion Related (Informational Only) Other (See Comments)     Patient has a history of a Clinically  Significant antibody against RBC antigens. A delay in compatible RBCs may occur. 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    Prochlorperazine Edisylate [Prochlorperazine] Other (See Comments)     Extrapyramidal side effects     Family History   Problem Relation Age of Onset    Breast Cancer Mother 52.00    Cancer Maternal Grandfather 71.00        prostate    Prostate Cancer Maternal Grandfather      Social History     Socioeconomic History    Marital status:      Spouse name: None    Number of children: None    Years of education: None    Highest education level: None   Tobacco Use    Smoking status: Never     Passive exposure: Never    Smokeless tobacco: Never   Vaping Use    Vaping status: Never Used   Substance and Sexual Activity    Alcohol use: No    Drug use: Never     Social Determinants of Health     Financial Resource Strain: Low Risk  (4/25/2024)    Financial Resource Strain     Within the past 12 months, have you or your family members you live with been unable to get utilities (heat, electricity) when it was really needed?: No   Food Insecurity: Low Risk  (4/25/2024)    Food Insecurity     Within the past 12 months, did you worry that your food would run out before you got money to buy more?: No     Within the past 12 months, did the food you bought just not last and you didn t have money to get more?: No   Transportation Needs: Low Risk  (4/25/2024)    Transportation Needs     Within the past 12 months, has lack of transportation kept you from medical appointments, getting your medicines, non-medical meetings or appointments, work, or from getting things that you need?: No   Physical Activity: Unknown (4/25/2024)    Exercise Vital Sign     Days of Exercise per Week: 0 days   Stress: Stress Concern Present (4/25/2024)    [employer] - Occupational Stress Questionnaire     Feeling of Stress : Rather much   Social Connections: Unknown (4/25/2024)    Social  Connection and Isolation Panel [NHANES]     Frequency of Social Gatherings with Friends and Family: Once a week   Interpersonal Safety: Low Risk  (4/25/2024)    Interpersonal Safety     Do you feel physically and emotionally safe where you currently live?: Yes     Within the past 12 months, have you been hit, slapped, kicked or otherwise physically hurt by someone?: No     Within the past 12 months, have you been humiliated or emotionally abused in other ways by your partner or ex-partner?: No   Housing Stability: Low Risk  (4/25/2024)    Housing Stability     Do you have housing? : Yes     Are you worried about losing your housing?: No           Lab Results    Recent Results (from the past 240 hour(s))   Comprehensive metabolic panel    Collection Time: 08/16/24  8:06 AM   Result Value Ref Range    Sodium 141 135 - 145 mmol/L    Potassium 4.3 3.4 - 5.3 mmol/L    Carbon Dioxide (CO2) 23 22 - 29 mmol/L    Anion Gap 14 7 - 15 mmol/L    Urea Nitrogen 21.5 8.0 - 23.0 mg/dL    Creatinine 0.78 0.51 - 0.95 mg/dL    GFR Estimate 77 >60 mL/min/1.73m2    Calcium 9.0 8.8 - 10.4 mg/dL    Chloride 104 98 - 107 mmol/L    Glucose 251 (H) 70 - 99 mg/dL    Alkaline Phosphatase 130 40 - 150 U/L    AST 29 0 - 45 U/L    ALT 32 0 - 50 U/L    Protein Total 6.2 (L) 6.4 - 8.3 g/dL    Albumin 4.1 3.5 - 5.2 g/dL    Bilirubin Total 0.2 <=1.2 mg/dL   CBC with platelets and differential    Collection Time: 08/16/24  8:06 AM   Result Value Ref Range    WBC Count 8.4 4.0 - 11.0 10e3/uL    RBC Count 3.83 3.80 - 5.20 10e6/uL    Hemoglobin 11.0 (L) 11.7 - 15.7 g/dL    Hematocrit 34.4 (L) 35.0 - 47.0 %    MCV 90 78 - 100 fL    MCH 28.7 26.5 - 33.0 pg    MCHC 32.0 31.5 - 36.5 g/dL    RDW 18.5 (H) 10.0 - 15.0 %    Platelet Count 294 150 - 450 10e3/uL    % Neutrophils 77 %    % Lymphocytes 15 %    % Monocytes 6 %    % Eosinophils 1 %    % Basophils 0 %    % Immature Granulocytes 1 %    NRBCs per 100 WBC 0 <1 /100    Absolute Neutrophils 6.5 1.6 - 8.3  10e3/uL    Absolute Lymphocytes 1.2 0.8 - 5.3 10e3/uL    Absolute Monocytes 0.5 0.0 - 1.3 10e3/uL    Absolute Eosinophils 0.1 0.0 - 0.7 10e3/uL    Absolute Basophils 0.0 0.0 - 0.2 10e3/uL    Absolute Immature Granulocytes 0.1 <=0.4 10e3/uL    Absolute NRBCs 0.0 10e3/uL       Imaging Results    No results found.      I spent 35 minutes on the patient's visit today.  This included preparation for the visit, face-to-face time with the patient and documentation following the visit.  It did not include teaching or procedure time.    Signed by: Peter E. Friedell, MD

## 2024-08-16 NOTE — PROGRESS NOTES
"Oncology Rooming Note    August 16, 2024 9:03 AM   Bibiana Mcrae is a 79 year old female who presents for:    Chief Complaint   Patient presents with    Oncology Clinic Visit     Virtual return labs and infusion     Initial Vitals: BP (!) 142/70 (BP Location: Left arm, Patient Position: Sitting, Cuff Size: Adult Small)   Pulse 98   Temp 98.1  F (36.7  C) (Tympanic)   Resp 16   Ht 1.448 m (4' 9\")   Wt 41.1 kg (90 lb 11.2 oz)   LMP  (LMP Unknown)   SpO2 97%   BMI 19.63 kg/m   Estimated body mass index is 19.63 kg/m  as calculated from the following:    Height as of this encounter: 1.448 m (4' 9\").    Weight as of this encounter: 41.1 kg (90 lb 11.2 oz). Body surface area is 1.29 meters squared.  No Pain (0) Comment: Data Unavailable   No LMP recorded (lmp unknown). Patient has had a hysterectomy.  Allergies reviewed: Yes  Medications reviewed: Yes    Medications: Medication refills not needed today.  Pharmacy name entered into [employer]:    [id number] [place], [place] - [address] [place] [address] AT [place] [address]  [facility] - [place], [place] - [address] [place] [address]  [facility] - [address]    Frailty Screening:   Is the patient here for a new oncology consult visit in cancer care? 2. No      Clinical concerns: Bibiana would like clarification about remission status and regarding \"activity showing in abdomen\".   Dr. Friedell was notified.      Reina Dawkins [facility]            "

## 2024-08-27 ENCOUNTER — TELEPHONE (OUTPATIENT)
Dept: ONCOLOGY | Facility: HOSPITAL | Age: 79
End: 2024-08-27
Payer: COMMERCIAL

## 2024-08-27 NOTE — TELEPHONE ENCOUNTER
I received a phone call today from Bibiana's daughter, Rosa Isela.  She is calling to see if it is okay for her mom to get a COVID and flu vaccine.  I reviewed this with Dr. Friedell today who says yes it is okay but he would recommend that she wait until late October to get the vaccines.  In the meantime, he would like her to be careful in crowds and wear a mask.  I called Rosa Isela back with this information.  She verbalized understanding and will discuss this with her mom.  She has no other questions at this time.    Priya Barrett RN on 8/27/2024 at 3:34 PM

## 2024-09-09 ENCOUNTER — HOSPITAL ENCOUNTER (OUTPATIENT)
Dept: PET IMAGING | Facility: HOSPITAL | Age: 79
Discharge: HOME | End: 2024-09-09
Attending: INTERNAL MEDICINE | Admitting: INTERNAL MEDICINE
Payer: COMMERCIAL

## 2024-09-09 DIAGNOSIS — C83.33 DIFFUSE LARGE B-CELL LYMPHOMA OF INTRA-ABDOMINAL LYMPH NODES (H): ICD-10-CM

## 2024-09-09 LAB — GLUCOSE BLDC GLUCOMTR-MCNC: 84 MG/DL (ref 70–99)

## 2024-09-09 PROCEDURE — 343N000001 HC RX 343: Performed by: INTERNAL MEDICINE

## 2024-09-09 PROCEDURE — 78816 PET IMAGE W/CT FULL BODY: CPT | Mod: PS

## 2024-09-09 PROCEDURE — 82962 GLUCOSE BLOOD TEST: CPT

## 2024-09-09 PROCEDURE — A9552 F18 FDG: HCPCS | Performed by: INTERNAL MEDICINE

## 2024-09-09 RX ORDER — FLUDEOXYGLUCOSE F 18 200 MCI/ML
8-18 INJECTION, SOLUTION INTRAVENOUS ONCE
Status: COMPLETED | OUTPATIENT
Start: 2024-09-09 | End: 2024-09-09

## 2024-09-09 RX ADMIN — FLUDEOXYGLUCOSE F 18 10.21 MILLICURIE: 200 INJECTION, SOLUTION INTRAVENOUS at 08:39

## 2024-09-10 ENCOUNTER — HOSPITAL ENCOUNTER (OUTPATIENT)
Dept: ULTRASOUND IMAGING | Facility: HOSPITAL | Age: 79
Discharge: HOME | End: 2024-09-10
Attending: NURSE PRACTITIONER
Payer: COMMERCIAL

## 2024-09-10 ENCOUNTER — TELEPHONE (OUTPATIENT)
Dept: ONCOLOGY | Facility: HOSPITAL | Age: 79
End: 2024-09-10
Payer: COMMERCIAL

## 2024-09-10 DIAGNOSIS — I87.8 PROMINENT VEIN: ICD-10-CM

## 2024-09-10 DIAGNOSIS — C83.33 DIFFUSE LARGE B-CELL LYMPHOMA OF INTRA-ABDOMINAL LYMPH NODES (H): ICD-10-CM

## 2024-09-10 DIAGNOSIS — R60.0 LOCALIZED EDEMA: ICD-10-CM

## 2024-09-10 DIAGNOSIS — Z95.828 PORT-A-CATH IN PLACE: ICD-10-CM

## 2024-09-10 DIAGNOSIS — C83.33 DIFFUSE LARGE B-CELL LYMPHOMA OF INTRA-ABDOMINAL LYMPH NODES (H): Primary | ICD-10-CM

## 2024-09-10 PROCEDURE — 93970 EXTREMITY STUDY: CPT

## 2024-09-10 NOTE — TELEPHONE ENCOUNTER
"I received a voice message today from Bibiana's daughter, Rosa Isela. She is calling to report that she is concerned about her mom's port.  She states the vessels on both sides of her Mom's neck seem more prominent and large.  She states the vessel near her port seems \"bruised.\"  She states her mom has no pain in this area.  She is going to be sending some pictures via [employer]. She is concerned about a blood clot.    Alissa Yan CNP reviewed the pictures today. She is going to order an ultrasound. She also states that Bibiana will be coming to the clinic on Thursday so the nurses can try to access and flush the port at that time.     I called Rosa Isela back with this information. She agrees with this plan. She is aware that she will get a call from our schedulers to get the ultrasound scheduled.     Priya Barrett RN on 9/10/2024 at 2:59 PM    "

## 2024-09-11 ENCOUNTER — TELEPHONE (OUTPATIENT)
Dept: ONCOLOGY | Facility: HOSPITAL | Age: 79
End: 2024-09-11
Payer: COMMERCIAL

## 2024-09-11 NOTE — TELEPHONE ENCOUNTER
I called Rosa Isela at the request of Alissa Yan CNP to let her know that Bibiana's ultrasound came back normal.  Rosa Isela states they were able to see this result in [facility].  She is happy to know that her port is okay.  She has no other questions at this time.    Priya Barrett RN on 9/11/2024 at 8:03 AM

## 2024-09-12 ENCOUNTER — ONCOLOGY VISIT (OUTPATIENT)
Dept: ONCOLOGY | Facility: HOSPITAL | Age: 79
End: 2024-09-12
Payer: COMMERCIAL

## 2024-09-12 ENCOUNTER — PATIENT OUTREACH (OUTPATIENT)
Dept: ONCOLOGY | Facility: HOSPITAL | Age: 79
End: 2024-09-12

## 2024-09-12 ENCOUNTER — TELEPHONE (OUTPATIENT)
Dept: ONCOLOGY | Facility: CLINIC | Age: 79
End: 2024-09-12

## 2024-09-12 ENCOUNTER — LAB (OUTPATIENT)
Dept: INFUSION THERAPY | Facility: HOSPITAL | Age: 79
End: 2024-09-12
Payer: COMMERCIAL

## 2024-09-12 VITALS
RESPIRATION RATE: 16 BRPM | BODY MASS INDEX: 19.18 KG/M2 | HEIGHT: 57 IN | SYSTOLIC BLOOD PRESSURE: 147 MMHG | DIASTOLIC BLOOD PRESSURE: 70 MMHG | OXYGEN SATURATION: 97 % | TEMPERATURE: 98 F | WEIGHT: 88.9 LBS | HEART RATE: 88 BPM

## 2024-09-12 DIAGNOSIS — C83.33 DIFFUSE LARGE B-CELL LYMPHOMA OF INTRA-ABDOMINAL LYMPH NODES (H): Primary | ICD-10-CM

## 2024-09-12 LAB
ALBUMIN SERPL BCG-MCNC: 3.8 G/DL (ref 3.5–5.2)
ALP SERPL-CCNC: 103 U/L (ref 40–150)
ALT SERPL W P-5'-P-CCNC: 25 U/L (ref 0–50)
ANION GAP SERPL CALCULATED.3IONS-SCNC: 11 MMOL/L (ref 7–15)
AST SERPL W P-5'-P-CCNC: 29 U/L (ref 0–45)
BASOPHILS # BLD AUTO: 0.1 10E3/UL (ref 0–0.2)
BASOPHILS NFR BLD AUTO: 2 %
BILIRUB SERPL-MCNC: 0.4 MG/DL
BUN SERPL-MCNC: 15.5 MG/DL (ref 8–23)
CALCIUM SERPL-MCNC: 8.5 MG/DL (ref 8.8–10.4)
CHLORIDE SERPL-SCNC: 104 MMOL/L (ref 98–107)
CREAT SERPL-MCNC: 1.05 MG/DL (ref 0.51–0.95)
EGFRCR SERPLBLD CKD-EPI 2021: 54 ML/MIN/1.73M2
EOSINOPHIL # BLD AUTO: 0.9 10E3/UL (ref 0–0.7)
EOSINOPHIL NFR BLD AUTO: 16 %
ERYTHROCYTE [DISTWIDTH] IN BLOOD BY AUTOMATED COUNT: 18.9 % (ref 10–15)
GLUCOSE SERPL-MCNC: 118 MG/DL (ref 70–99)
HCO3 SERPL-SCNC: 25 MMOL/L (ref 22–29)
HCT VFR BLD AUTO: 35.2 % (ref 35–47)
HGB BLD-MCNC: 11.1 G/DL (ref 11.7–15.7)
IMM GRANULOCYTES # BLD: 0 10E3/UL
IMM GRANULOCYTES NFR BLD: 0 %
LDH SERPL L TO P-CCNC: 164 U/L (ref 0–250)
LYMPHOCYTES # BLD AUTO: 1 10E3/UL (ref 0.8–5.3)
LYMPHOCYTES NFR BLD AUTO: 17 %
MCH RBC QN AUTO: 28.4 PG (ref 26.5–33)
MCHC RBC AUTO-ENTMCNC: 31.5 G/DL (ref 31.5–36.5)
MCV RBC AUTO: 90 FL (ref 78–100)
MONOCYTES # BLD AUTO: 0.8 10E3/UL (ref 0–1.3)
MONOCYTES NFR BLD AUTO: 13 %
NEUTROPHILS # BLD AUTO: 3 10E3/UL (ref 1.6–8.3)
NEUTROPHILS NFR BLD AUTO: 53 %
NRBC # BLD AUTO: 0 10E3/UL
NRBC BLD AUTO-RTO: 0 /100
PLATELET # BLD AUTO: 259 10E3/UL (ref 150–450)
POTASSIUM SERPL-SCNC: 4.1 MMOL/L (ref 3.4–5.3)
PROT SERPL-MCNC: 5.9 G/DL (ref 6.4–8.3)
RBC # BLD AUTO: 3.91 10E6/UL (ref 3.8–5.2)
SODIUM SERPL-SCNC: 140 MMOL/L (ref 135–145)
WBC # BLD AUTO: 5.8 10E3/UL (ref 4–11)

## 2024-09-12 PROCEDURE — G0463 HOSPITAL OUTPT CLINIC VISIT: HCPCS | Performed by: INTERNAL MEDICINE

## 2024-09-12 PROCEDURE — 36591 DRAW BLOOD OFF VENOUS DEVICE: CPT

## 2024-09-12 PROCEDURE — 80053 COMPREHEN METABOLIC PANEL: CPT

## 2024-09-12 PROCEDURE — 250N000011 HC RX IP 250 OP 636

## 2024-09-12 PROCEDURE — 99214 OFFICE O/P EST MOD 30 MIN: CPT | Performed by: INTERNAL MEDICINE

## 2024-09-12 PROCEDURE — 83615 LACTATE (LD) (LDH) ENZYME: CPT

## 2024-09-12 PROCEDURE — 85041 AUTOMATED RBC COUNT: CPT

## 2024-09-12 RX ORDER — HEPARIN SODIUM (PORCINE) LOCK FLUSH IV SOLN 100 UNIT/ML 100 UNIT/ML
SOLUTION INTRAVENOUS
Status: COMPLETED
Start: 2024-09-12 | End: 2024-09-12

## 2024-09-12 RX ORDER — ONDANSETRON 8 MG/1
8 TABLET, FILM COATED ORAL EVERY 8 HOURS PRN
Qty: 90 TABLET | Refills: 3 | Status: SHIPPED | OUTPATIENT
Start: 2024-09-12

## 2024-09-12 RX ORDER — HEPARIN SODIUM (PORCINE) LOCK FLUSH IV SOLN 100 UNIT/ML 100 UNIT/ML
5 SOLUTION INTRAVENOUS
OUTPATIENT
Start: 2024-09-12

## 2024-09-12 RX ORDER — HEPARIN SODIUM (PORCINE) LOCK FLUSH IV SOLN 100 UNIT/ML 100 UNIT/ML
5 SOLUTION INTRAVENOUS
Status: DISCONTINUED | OUTPATIENT
Start: 2024-09-12 | End: 2024-09-12 | Stop reason: HOSPADM

## 2024-09-12 RX ADMIN — HEPARIN SODIUM (PORCINE) LOCK FLUSH IV SOLN 100 UNIT/ML 5 ML: 100 SOLUTION at 13:14

## 2024-09-12 RX ADMIN — Medication 5 ML: at 13:14

## 2024-09-12 ASSESSMENT — PAIN SCALES - GENERAL: PAINLEVEL: NO PAIN (0)

## 2024-09-12 NOTE — LETTER
"9/12/2024      Bibiana Mcrae  [address] [facility] [address]      Dear Colleague,    Thank you for referring your patient, Bibiana Mcrae, to the [facility]. Please see a copy of my visit note below.    Oncology Rooming Note    September 12, 2024 1:59 PM   Bibiana Mcrae is a 79 year old female who presents for:    Chief Complaint   Patient presents with     Oncology Clinic Visit     4 week return visit with labs, review PET of 9/9, related to Diffuse large B-cell lymphoma of intra-abdominal lymph nodes     Initial Vitals: BP (!) 147/70 (BP Location: Left arm, Patient Position: Sitting, Cuff Size: Adult Small)   Pulse 88   Temp 98  F (36.7  C) (Tympanic)   Resp 16   Ht 1.448 m (4' 9\")   Wt 40.3 kg (88 lb 14.4 oz)   LMP  (LMP Unknown)   SpO2 97%   BMI 19.24 kg/m   Estimated body mass index is 19.24 kg/m  as calculated from the following:    Height as of this encounter: 1.448 m (4' 9\").    Weight as of this encounter: 40.3 kg (88 lb 14.4 oz). Body surface area is 1.27 meters squared.  No Pain (0) Comment: Data Unavailable   No LMP recorded (lmp unknown). Patient has had a hysterectomy.  Allergies reviewed: Yes  Medications reviewed: Yes    Medications: MEDICATION REFILLS NEEDED TODAY. Provider was notified.  Pharmacy name entered into [facility]:    [facility] #[id number] [address] AT [place] [address]  [facility] - [place], [place] - [address] [place] [address]  [facility] - [address]    Frailty Screening:   Is the patient here for a new oncology consult visit in cancer care? 2. No      Clinical concerns: Bibiana had US recently to r/o blood clot. She reports increased difficulty sleeping and is wondering if there is a prescription for something other than Melatonin to help with sleep.  She also would like a refill of the Melatonin.   Dr. Friedell was " notified.      Reina Dawkins [facility] Hematology and Oncology Follow-up Note    Patient: Bibiana Mcrae  MRN: [id number]  Date of Service: Sep 12, 2024       Reason for Visit    For diffuse  large B-cell lymphoma    Encounter Diagnoses Assessment and Plan:    Problem List Items Addressed This Visit       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) - Primary    Relevant Medications    ondansetron (ZOFRAN) 8 MG tablet    Other Relevant Orders    PET Oncology (Eyes to Thighs)    CBC with Platelets & Differential    Comprehensive metabolic panel    Lactate Dehydrogenase   Patient returns for follow-up.  Patient returns for follow-up.  She is asymptomatic.  Blood count blood chemistry and LDH are within near normal limits.  PET CT scan shows almost complete treatment response.  Patient will return in 3 months with repeat surveillance PET scan blood count blood chemistry and LDH.  She will continue port flushes every 6 weeks.  She will be taking Benadryl to help with sleep and ondansetron for occasional residual nausea.      ______________________________________________________________________________    Staging History   Cancer Staging   No matching staging information was found for the patient.    ECOG Performance = 1    History of Present Illness  Disease history per Dr. Florian         Diagnosis: Diffuse large B-cell lymphoma.  This was found incidentally in April 2024.  She had a fall and came to the ER and they did a CT scan.  This showed an abdominal mass.  -4/15/24: Biopsy done of abdominal mass that was labeled as pancreatic mass.  It shows a CD20 positive large cell lymphoma.  -5/2/24: Hypermetabolic lymphadenopathy above and below the diaphragm, consistent with biopsy-proven lymphoma. Radiotracer avid mixed lytic and sclerotic observations in the left ischium and right distal femur metaphysis are nonspecific but raise possibility of marrow involvement  9/10/2024  "PET/CT shows near complete treatment response.     Treatment:  -5/3/2024: Patient started R mini CHOP. PET after 3 cycles showed CR.   8/16/2024 6th and final treatment completed     Interim history:  Review of systems.  Pertinent Findings are included in the History of Present Illness    Physical Exam    BP (!) 147/70 (BP Location: Left arm, Patient Position: Sitting, Cuff Size: Adult Small)   Pulse 88   Temp 98  F (36.7  C) (Tympanic)   Resp 16   Ht 1.448 m (4' 9\")   Wt 40.3 kg (88 lb 14.4 oz)   LMP  (LMP Unknown)   SpO2 97%   BMI 19.24 kg/m       GENERAL APPEARANCE: Healthy appearing woman in no apparent distress.  HEAD: Atraumatic; normocephalic; without lesions.  EYES: Conjunctiva, corneas and eyelids normal; pupils equal, round, reactive to light; No Icterus.  MOUTH/OROPHARYNX: Not examined, facemask  NECK: Supple with no nodes.  LUNGS:  Clear to auscultation and percussion with no extra sounds.  HEART: Regular rhythm and rate; S1 and S2 normal; no murmurs noted.  ABDOMEN: Soft; no masses or tenderness, no hepatosplenomegaly.  NEUROLOGIC: Alert and oriented.  No obvious focal findings.  EXTREMITIES: No cyanosis, or edema.  SKIN: No abnormal bruising or bleeding. No suspicious lesions noted on exposed skin.  PSYCHIATRIC: Mental status normal; no apparent psychiatric issues  No cervical axillary or inguinal adenopathy noted    Medications:    Current Outpatient Medications   Medication Sig Dispense Refill     acarbose (PRECOSE) 25 MG tablet Take 25 mg by mouth 3 times daily (with meals)       acetaminophen (TYLENOL) 325 MG tablet Take 325-650 mg by mouth every 6 hours as needed for mild pain       amLODIPine (NORVASC) 5 MG tablet Take 1 tablet (5 mg) by mouth daily 90 tablet 3     Biotin 5 MG TABS Take 5,000 mcg by mouth daily       busPIRone (BUSPAR) 7.5 MG tablet TAKE 1 TABLET BY MOUTH TWICE  DAILY 180 tablet 2     celecoxib (CELEBREX) 200 MG capsule TAKE 1 CAPSULE BY MOUTH  DAILY 90 capsule 3     " denosumab (PROLIA) 60 MG/ML SOSY injection Inject 60 mg Subcutaneous every 6 months       Melatonin 10 MG TABS tablet Take 15 mg by mouth nightly as needed for sleep       multivitamin with minerals (THERA-M) 9 mg iron-400 mcg Tab tablet [MULTIVITAMIN WITH MINERALS (THERA-M) 9 MG IRON-400 MCG TAB TABLET] Take 1 tablet by mouth daily.       nortriptyline (PAMELOR) 50 MG capsule TAKE 1 CAPSULE BY MOUTH AT  BEDTIME 90 capsule 2     omeprazole (PRILOSEC) 20 MG capsule [OMEPRAZOLE (PRILOSEC) 20 MG CAPSULE] Take 20 mg by mouth daily before breakfast.       ondansetron (ZOFRAN) 4 MG tablet TAKE 1 TO 2 TABLETS(4 TO 8 MG) BY MOUTH EVERY 6 HOURS AS NEEDED FOR NAUSEA OR VOMITING 30 tablet 1     ondansetron (ZOFRAN) 8 MG tablet Take 1 tablet (8 mg) by mouth every 8 hours as needed for nausea. 90 tablet 3     polyethylene glycol (MIRALAX) 17 g packet Take 1 packet by mouth daily       Probiotic Product (SOLUBLE FIBER/PROBIOTICS PO) 2 tablespoons in 8 oz of liquid daily       rivastigmine (EXELON) 9.5 MG/24HR 24 hr patch Place 1 patch onto the skin daily 90 patch 3     sennosides (SENOKOT) 8.6 MG tablet Take 2 tablets by mouth daily as needed for constipation       simethicone (MYLICON) 125 MG chewable tablet Take 125 mg by mouth 2 times daily       UNABLE TO FIND Take 2 Tablespoonful by mouth every morning MEDICATION NAME: biofiber 8 oz with water       donepezil (ARICEPT) 5 MG ODT Take 5 mg by mouth daily (Patient not taking: Reported on 9/12/2024)       lidocaine-prilocaine (EMLA) 2.5-2.5 % external cream Apply topically as needed for moderate pain (Patient not taking: Reported on 9/12/2024) 30 g 2     predniSONE (DELTASONE) 20 MG tablet Take 2 tablets (40 mg) by mouth daily Take on Days 1 through 5 of chemotherapy. Take first dose in AM prior to chemotherapy. (Patient not taking: Reported on 9/12/2024) 60 tablet 0     Current Facility-Administered Medications   Medication Dose Route Frequency Provider Last Rate Last Admin      denosumab (PROLIA) injection 60 mg  60 mg Subcutaneous Q6 Months    60 mg at 07/02/24 1611     Facility-Administered Medications Ordered in Other Visits   Medication Dose Route Frequency Provider Last Rate Last Admin     heparin lock flush 100 unit/mL injection 5 mL  5 mL Intracatheter Once PRN Inocencio Florian MD   5 mL at 09/12/24 1314     sodium chloride (PF) 0.9% PF flush 3-20 mL  3-20 mL Intracatheter q1 min prn Inocencio Florian MD   20 mL at 09/12/24 1314           Past History    Past Medical History:   Diagnosis Date     Anxiety      Breast cyst     While ago     History of cholecystectomy 08/24/2023     Past Surgical History:   Procedure Laterality Date     BREAST CYST ASPIRATION Left     While ago     HYSTERECTOMY  1985     IR CHEST PORT PLACEMENT > 5 YRS OF AGE  5/1/2024     OOPHORECTOMY Bilateral 1985     [facility] REMOVAL OF OVARY(S)      Description: Oophorectomy;  Recorded: 12/17/2009;     ZZC TOTAL ABDOM HYSTERECTOMY      Description: Total Abdominal Hysterectomy;  Recorded: 12/17/2009;     Allergies   Allergen Reactions     Blood Transfusion Related (Informational Only) Other (See Comments)     Patient has a history of a Clinically Significant antibody against RBC antigens. A delay in compatible RBCs may occur. 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     Prochlorperazine Edisylate [Prochlorperazine] Other (See Comments)     Extrapyramidal side effects     Family History   Problem Relation Age of Onset     Breast Cancer Mother 52.00     Cancer Maternal Grandfather 71.00        prostate     Prostate Cancer Maternal Grandfather      Social History     Socioeconomic History     Marital status:      Spouse name: None     Number of children: None     Years of education: None     Highest education level: None   Tobacco Use     Smoking status: Never     Passive exposure: Never     Smokeless tobacco: Never   Vaping Use     Vaping status: Never Used   Substance and Sexual Activity     Alcohol use:  No     Drug use: Never     Social Determinants of Health     Financial Resource Strain: Low Risk  (4/25/2024)    Financial Resource Strain      Within the past 12 months, have you or your family members you live with been unable to get utilities (heat, electricity) when it was really needed?: No   Food Insecurity: Low Risk  (4/25/2024)    Food Insecurity      Within the past 12 months, did you worry that your food would run out before you got money to buy more?: No      Within the past 12 months, did the food you bought just not last and you didn t have money to get more?: No   Transportation Needs: Low Risk  (4/25/2024)    Transportation Needs      Within the past 12 months, has lack of transportation kept you from medical appointments, getting your medicines, non-medical meetings or appointments, work, or from getting things that you need?: No   Physical Activity: Unknown (4/25/2024)    Exercise Vital Sign      Days of Exercise per Week: 0 days   Stress: Stress Concern Present (4/25/2024)    [employer] - Occupational Stress Questionnaire      Feeling of Stress : Rather much   Social Connections: Unknown (4/25/2024)    Social Connection and Isolation Panel [NHANES]      Frequency of Social Gatherings with Friends and Family: Once a week   Interpersonal Safety: Low Risk  (4/25/2024)    Interpersonal Safety      Do you feel physically and emotionally safe where you currently live?: Yes      Within the past 12 months, have you been hit, slapped, kicked or otherwise physically hurt by someone?: No      Within the past 12 months, have you been humiliated or emotionally abused in other ways by your partner or ex-partner?: No   Housing Stability: Low Risk  (4/25/2024)    Housing Stability      Do you have housing? : Yes      Are you worried about losing your housing?: No           Lab Results    Recent Results (from the past 240 hour(s))   Glucose by meter    Collection Time: 09/09/24  8:31  AM   Result Value Ref Range    GLUCOSE BY METER POCT 84 70 - 99 mg/dL   Comprehensive metabolic panel    Collection Time: 09/12/24  1:14 PM   Result Value Ref Range    Sodium 140 135 - 145 mmol/L    Potassium 4.1 3.4 - 5.3 mmol/L    Carbon Dioxide (CO2) 25 22 - 29 mmol/L    Anion Gap 11 7 - 15 mmol/L    Urea Nitrogen 15.5 8.0 - 23.0 mg/dL    Creatinine 1.05 (H) 0.51 - 0.95 mg/dL    GFR Estimate 54 (L) >60 mL/min/1.73m2    Calcium 8.5 (L) 8.8 - 10.4 mg/dL    Chloride 104 98 - 107 mmol/L    Glucose 118 (H) 70 - 99 mg/dL    Alkaline Phosphatase 103 40 - 150 U/L    AST 29 0 - 45 U/L    ALT 25 0 - 50 U/L    Protein Total 5.9 (L) 6.4 - 8.3 g/dL    Albumin 3.8 3.5 - 5.2 g/dL    Bilirubin Total 0.4 <=1.2 mg/dL   Lactate Dehydrogenase    Collection Time: 09/12/24  1:14 PM   Result Value Ref Range    Lactate Dehydrogenase 164 0 - 250 U/L   CBC with platelets and differential    Collection Time: 09/12/24  1:14 PM   Result Value Ref Range    WBC Count 5.8 4.0 - 11.0 10e3/uL    RBC Count 3.91 3.80 - 5.20 10e6/uL    Hemoglobin 11.1 (L) 11.7 - 15.7 g/dL    Hematocrit 35.2 35.0 - 47.0 %    MCV 90 78 - 100 fL    MCH 28.4 26.5 - 33.0 pg    MCHC 31.5 31.5 - 36.5 g/dL    RDW 18.9 (H) 10.0 - 15.0 %    Platelet Count 259 150 - 450 10e3/uL    % Neutrophils 53 %    % Lymphocytes 17 %    % Monocytes 13 %    % Eosinophils 16 %    % Basophils 2 %    % Immature Granulocytes 0 %    NRBCs per 100 WBC 0 <1 /100    Absolute Neutrophils 3.0 1.6 - 8.3 10e3/uL    Absolute Lymphocytes 1.0 0.8 - 5.3 10e3/uL    Absolute Monocytes 0.8 0.0 - 1.3 10e3/uL    Absolute Eosinophils 0.9 (H) 0.0 - 0.7 10e3/uL    Absolute Basophils 0.1 0.0 - 0.2 10e3/uL    Absolute Immature Granulocytes 0.0 <=0.4 10e3/uL    Absolute NRBCs 0.0 10e3/uL       Imaging Results    US Upper Extremity Venous Duplex Bilat    Result Date: 9/10/2024  EXAM: US UPPER EXTREMITY VENOUS DUPLEX BILATERAL LOCATION: [facility] DATE: 9/10/2024 INDICATION:  Diffuse large  B-cell lymphoma of intra-abdominal lymph nodes (H), Port-A-Cath in place, Prominent vein, Localized edema COMPARISON: PETCT 9/9/2024 TECHNIQUE: Venous Duplex ultrasound of both upper extremities with (when possible) and without compression, augmentation, and duplex. Color flow and spectral Doppler with waveform analysis performed. FINDINGS: Ultrasound includes evaluation of the internal jugular veins, innominate veins, subclavian veins, axillary veins, and brachial veins. The superficial cephalic and basilic veins were also evaluated where seen. RIGHT: No deep venous thrombosis. No superficial thrombophlebitis. LEFT: No deep venous thrombosis. No superficial thrombophlebitis.     IMPRESSION: 1.  No deep venous thrombosis in the bilateral upper extremities.    PET Oncology Whole Body    Result Date: 9/10/2024  EXAM: PET ONCOLOGY WHOLE BODY LOCATION: [facility] DATE: 9/9/2024 INDICATION: Lymphoma, non-Hodgkin, restaging. Diffuse large B-cell lymphoma of intra-abdominal lymph nodes. Status post systemic chemotherapy. Subsequent treatment strategy. COMPARISON: PET/CT 7/1/2024. Baseline PET/CT 5/2/2024 also reviewed. TECHNIQUE: Serum glucose level 84 mg/dL. One hour post intravenous administration of 10.2 mCi F-18 FDG, PET imaging was performed from the skull vertex to feet, utilizing attenuation correction with concurrent axial CT and PET/CT image fusion. Dose reduction techniques were used. PET/CT FINDINGS: Since 7/1/2024, residual mass involving the pancreas has continued to decrease in size now measuring 1.0 x 1.9 cm (previously 1.6 x 2.9 cm at similar level measurements) and associated FDG uptake has continued to decrease, now mild (SUVmax 3.3, previously 6.1), remains just above background liver level, [place] 4, suggesting a near complete partial favorable treatment response. No evidence of FDG avid malignancy elsewhere. No new or enlarging FDG avid adenopathy above or below the  diaphragm. Normal size liver and spleen with normal uptake. No suspicious focal skeletal uptake. CT FINDINGS: Moderate senescent intracranial changes. Opacification left sphenoid sinus. Right IJ Port-A-Cath with tip near the SVC/RA junction. Mild coronary artery calcification. Dense mitral annulus calcification. Lower thoracic surgical clips. Cholecystectomy. Left retroperitoneal cystic structure with benign appearance, unchanged. Hysterectomy. Moderate colonic diverticulosis. Mild scattered hypertrophic degenerative changes in the spine. Grade II anterolisthesis L5 on S1.     IMPRESSION: Near complete partial favorable treatment response. No new site of involvement.       I spent 30 minutes on the patient's visit today.  This included preparation for the visit, face-to-face time with the patient and documentation following the visit.  It did not include teaching or procedure time.    Signed by: Peter E. Friedell, MD          Again, thank you for allowing me to participate in the care of your patient.        Sincerely,        Peter E. Friedell, MD

## 2024-09-12 NOTE — PROGRESS NOTES
[facility]: Cancer Care                                                                                          Situation: Chart reviewed by RN Care Coordinator.    Background: Patient was seen in clinic today for follow-up regarding diffuse large B-cell lymphoma.    Assessment: She is asymptomatic.  Blood work are within near normal limits.  Scan shows almost complete treatment response.    Plan/Recommendations: Patient is to return in 3 months with scan prior.  Patient is to have her port flushed every 6 weeks.      Signature:  Lolita Keene  RN Care Coordinator  [facility]  Cancer [facility]

## 2024-09-12 NOTE — PROGRESS NOTES
"Oncology Rooming Note    September 12, 2024 1:59 PM   Bibiana Mcrae is a 79 year old female who presents for:    Chief Complaint   Patient presents with    Oncology Clinic Visit     4 week return visit with labs, review PET of 9/9, related to Diffuse large B-cell lymphoma of intra-abdominal lymph nodes     Initial Vitals: BP (!) 147/70 (BP Location: Left arm, Patient Position: Sitting, Cuff Size: Adult Small)   Pulse 88   Temp 98  F (36.7  C) (Tympanic)   Resp 16   Ht 1.448 m (4' 9\")   Wt 40.3 kg (88 lb 14.4 oz)   LMP  (LMP Unknown)   SpO2 97%   BMI 19.24 kg/m   Estimated body mass index is 19.24 kg/m  as calculated from the following:    Height as of this encounter: 1.448 m (4' 9\").    Weight as of this encounter: 40.3 kg (88 lb 14.4 oz). Body surface area is 1.27 meters squared.  No Pain (0) Comment: Data Unavailable   No LMP recorded (lmp unknown). Patient has had a hysterectomy.  Allergies reviewed: Yes  Medications reviewed: Yes    Medications: MEDICATION REFILLS NEEDED TODAY. Provider was notified.  Pharmacy name entered into [employer]:    [facility] #[id number] [address] AT [place] [address]  [facility] - [place], [place] - [address] [place] [address]  [facility] - [address]    Frailty Screening:   Is the patient here for a new oncology consult visit in cancer care? 2. No      Clinical concerns: Bibiana had US recently to r/o blood clot. She reports increased difficulty sleeping and is wondering if there is a prescription for something other than Melatonin to help with sleep.  She also would like a refill of the Melatonin.   Dr. Friedell was notified.      Reina Dawkins [facility]            "

## 2024-09-12 NOTE — PROGRESS NOTES
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  Patient returns for follow-up.  She is asymptomatic.  Blood count blood chemistry and LDH are within near normal limits.  PET CT scan shows almost complete treatment response.  Patient will return in 3 months with repeat surveillance PET scan blood count blood chemistry and LDH.  She will continue port flushes every 6 weeks.  She will be taking Benadryl to help with sleep and ondansetron for occasional residual nausea.      ______________________________________________________________________________    Staging History   Cancer Staging   No matching staging information was found for the patient.    ECOG Performance = 1    History of Present Illness  Disease history per Dr. Florian         Diagnosis: Diffuse large B-cell lymphoma.  This was found incidentally in April 2024.  She had a fall and came to the ER and they did a CT scan.  This showed an abdominal mass.  -4/15/24: Biopsy done of abdominal mass that was labeled as pancreatic mass.  It shows a CD20 positive large cell lymphoma.  -5/2/24: Hypermetabolic lymphadenopathy above and below the diaphragm, consistent with biopsy-proven lymphoma. Radiotracer avid mixed lytic and sclerotic observations in the left ischium and right distal femur metaphysis are nonspecific but raise possibility of marrow involvement  9/10/2024 PET/CT shows near complete treatment  "response.     Treatment:  -5/3/2024: Patient started R mini CHOP. PET after 3 cycles showed CR.   8/16/2024 6th and final treatment completed     Interim history:  Review of systems.  Pertinent Findings are included in the History of Present Illness    Physical Exam    BP (!) 147/70 (BP Location: Left arm, Patient Position: Sitting, Cuff Size: Adult Small)   Pulse 88   Temp 98  F (36.7  C) (Tympanic)   Resp 16   Ht 1.448 m (4' 9\")   Wt 40.3 kg (88 lb 14.4 oz)   LMP  (LMP Unknown)   SpO2 97%   BMI 19.24 kg/m       GENERAL APPEARANCE: Healthy appearing woman in no apparent distress.  HEAD: Atraumatic; normocephalic; without lesions.  EYES: Conjunctiva, corneas and eyelids normal; pupils equal, round, reactive to light; No Icterus.  MOUTH/OROPHARYNX: Not examined, facemask  NECK: Supple with no nodes.  LUNGS:  Clear to auscultation and percussion with no extra sounds.  HEART: Regular rhythm and rate; S1 and S2 normal; no murmurs noted.  ABDOMEN: Soft; no masses or tenderness, no hepatosplenomegaly.  NEUROLOGIC: Alert and oriented.  No obvious focal findings.  EXTREMITIES: No cyanosis, or edema.  SKIN: No abnormal bruising or bleeding. No suspicious lesions noted on exposed skin.  PSYCHIATRIC: Mental status normal; no apparent psychiatric issues  No cervical axillary or inguinal adenopathy noted    Medications:    Current Outpatient Medications   Medication Sig Dispense Refill    acarbose (PRECOSE) 25 MG tablet Take 25 mg by mouth 3 times daily (with meals)      acetaminophen (TYLENOL) 325 MG tablet Take 325-650 mg by mouth every 6 hours as needed for mild pain      amLODIPine (NORVASC) 5 MG tablet Take 1 tablet (5 mg) by mouth daily 90 tablet 3    Biotin 5 MG TABS Take 5,000 mcg by mouth daily      busPIRone (BUSPAR) 7.5 MG tablet TAKE 1 TABLET BY MOUTH TWICE  DAILY 180 tablet 2    celecoxib (CELEBREX) 200 MG capsule TAKE 1 CAPSULE BY MOUTH  DAILY 90 capsule 3    denosumab (PROLIA) 60 MG/ML SOSY injection " Inject 60 mg Subcutaneous every 6 months      Melatonin 10 MG TABS tablet Take 15 mg by mouth nightly as needed for sleep      multivitamin with minerals (THERA-M) 9 mg iron-400 mcg Tab tablet [MULTIVITAMIN WITH MINERALS (THERA-M) 9 MG IRON-400 MCG TAB TABLET] Take 1 tablet by mouth daily.      nortriptyline (PAMELOR) 50 MG capsule TAKE 1 CAPSULE BY MOUTH AT  BEDTIME 90 capsule 2    omeprazole (PRILOSEC) 20 MG capsule [OMEPRAZOLE (PRILOSEC) 20 MG CAPSULE] Take 20 mg by mouth daily before breakfast.      ondansetron (ZOFRAN) 4 MG tablet TAKE 1 TO 2 TABLETS(4 TO 8 MG) BY MOUTH EVERY 6 HOURS AS NEEDED FOR NAUSEA OR VOMITING 30 tablet 1    ondansetron (ZOFRAN) 8 MG tablet Take 1 tablet (8 mg) by mouth every 8 hours as needed for nausea. 90 tablet 3    polyethylene glycol (MIRALAX) 17 g packet Take 1 packet by mouth daily      Probiotic Product (SOLUBLE FIBER/PROBIOTICS PO) 2 tablespoons in 8 oz of liquid daily      rivastigmine (EXELON) 9.5 MG/24HR 24 hr patch Place 1 patch onto the skin daily 90 patch 3    sennosides (SENOKOT) 8.6 MG tablet Take 2 tablets by mouth daily as needed for constipation      simethicone (MYLICON) 125 MG chewable tablet Take 125 mg by mouth 2 times daily      UNABLE TO FIND Take 2 Tablespoonful by mouth every morning MEDICATION NAME: biofiber 8 oz with water      donepezil (ARICEPT) 5 MG ODT Take 5 mg by mouth daily (Patient not taking: Reported on 9/12/2024)      lidocaine-prilocaine (EMLA) 2.5-2.5 % external cream Apply topically as needed for moderate pain (Patient not taking: Reported on 9/12/2024) 30 g 2    predniSONE (DELTASONE) 20 MG tablet Take 2 tablets (40 mg) by mouth daily Take on Days 1 through 5 of chemotherapy. Take first dose in AM prior to chemotherapy. (Patient not taking: Reported on 9/12/2024) 60 tablet 0     Current Facility-Administered Medications   Medication Dose Route Frequency Provider Last Rate Last Admin    denosumab (PROLIA) injection 60 mg  60 mg Subcutaneous Q6  Months    60 mg at 07/02/24 1611     Facility-Administered Medications Ordered in Other Visits   Medication Dose Route Frequency Provider Last Rate Last Admin    heparin lock flush 100 unit/mL injection 5 mL  5 mL Intracatheter Once PRN Inocencio Florian MD   5 mL at 09/12/24 1314    sodium chloride (PF) 0.9% PF flush 3-20 mL  3-20 mL Intracatheter q1 min prn Inocencio Florian MD   20 mL at 09/12/24 1314           Past History    Past Medical History:   Diagnosis Date    Anxiety     Breast cyst     While ago    History of cholecystectomy 08/24/2023     Past Surgical History:   Procedure Laterality Date    BREAST CYST ASPIRATION Left     While ago    HYSTERECTOMY  1985    IR CHEST PORT PLACEMENT > 5 YRS OF AGE  5/1/2024    OOPHORECTOMY Bilateral 1985    Z REMOVAL OF OVARY(S)      Description: Oophorectomy;  Recorded: 12/17/2009;    ZZC TOTAL ABDOM HYSTERECTOMY      Description: Total Abdominal Hysterectomy;  Recorded: 12/17/2009;     Allergies   Allergen Reactions    Blood Transfusion Related (Informational Only) Other (See Comments)     Patient has a history of a Clinically Significant antibody against RBC antigens. A delay in compatible RBCs may occur. 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    Prochlorperazine Edisylate [Prochlorperazine] Other (See Comments)     Extrapyramidal side effects     Family History   Problem Relation Age of Onset    Breast Cancer Mother 52.00    Cancer Maternal Grandfather 71.00        prostate    Prostate Cancer Maternal Grandfather      Social History     Socioeconomic History    Marital status:      Spouse name: None    Number of children: None    Years of education: None    Highest education level: None   Tobacco Use    Smoking status: Never     Passive exposure: Never    Smokeless tobacco: Never   Vaping Use    Vaping status: Never Used   Substance and Sexual Activity    Alcohol use: No    Drug use: Never     Social Determinants of Health     Financial Resource  Strain: Low Risk  (4/25/2024)    Financial Resource Strain     Within the past 12 months, have you or your family members you live with been unable to get utilities (heat, electricity) when it was really needed?: No   Food Insecurity: Low Risk  (4/25/2024)    Food Insecurity     Within the past 12 months, did you worry that your food would run out before you got money to buy more?: No     Within the past 12 months, did the food you bought just not last and you didn t have money to get more?: No   Transportation Needs: Low Risk  (4/25/2024)    Transportation Needs     Within the past 12 months, has lack of transportation kept you from medical appointments, getting your medicines, non-medical meetings or appointments, work, or from getting things that you need?: No   Physical Activity: Unknown (4/25/2024)    Exercise Vital Sign     Days of Exercise per Week: 0 days   Stress: Stress Concern Present (4/25/2024)    [employer] - Occupational Stress Questionnaire     Feeling of Stress : Rather much   Social Connections: Unknown (4/25/2024)    Social Connection and Isolation Panel [NHANES]     Frequency of Social Gatherings with Friends and Family: Once a week   Interpersonal Safety: Low Risk  (4/25/2024)    Interpersonal Safety     Do you feel physically and emotionally safe where you currently live?: Yes     Within the past 12 months, have you been hit, slapped, kicked or otherwise physically hurt by someone?: No     Within the past 12 months, have you been humiliated or emotionally abused in other ways by your partner or ex-partner?: No   Housing Stability: Low Risk  (4/25/2024)    Housing Stability     Do you have housing? : Yes     Are you worried about losing your housing?: No           Lab Results    Recent Results (from the past 240 hour(s))   Glucose by meter    Collection Time: 09/09/24  8:31 AM   Result Value Ref Range    GLUCOSE BY METER POCT 84 70 - 99 mg/dL   Comprehensive  metabolic panel    Collection Time: 09/12/24  1:14 PM   Result Value Ref Range    Sodium 140 135 - 145 mmol/L    Potassium 4.1 3.4 - 5.3 mmol/L    Carbon Dioxide (CO2) 25 22 - 29 mmol/L    Anion Gap 11 7 - 15 mmol/L    Urea Nitrogen 15.5 8.0 - 23.0 mg/dL    Creatinine 1.05 (H) 0.51 - 0.95 mg/dL    GFR Estimate 54 (L) >60 mL/min/1.73m2    Calcium 8.5 (L) 8.8 - 10.4 mg/dL    Chloride 104 98 - 107 mmol/L    Glucose 118 (H) 70 - 99 mg/dL    Alkaline Phosphatase 103 40 - 150 U/L    AST 29 0 - 45 U/L    ALT 25 0 - 50 U/L    Protein Total 5.9 (L) 6.4 - 8.3 g/dL    Albumin 3.8 3.5 - 5.2 g/dL    Bilirubin Total 0.4 <=1.2 mg/dL   Lactate Dehydrogenase    Collection Time: 09/12/24  1:14 PM   Result Value Ref Range    Lactate Dehydrogenase 164 0 - 250 U/L   CBC with platelets and differential    Collection Time: 09/12/24  1:14 PM   Result Value Ref Range    WBC Count 5.8 4.0 - 11.0 10e3/uL    RBC Count 3.91 3.80 - 5.20 10e6/uL    Hemoglobin 11.1 (L) 11.7 - 15.7 g/dL    Hematocrit 35.2 35.0 - 47.0 %    MCV 90 78 - 100 fL    MCH 28.4 26.5 - 33.0 pg    MCHC 31.5 31.5 - 36.5 g/dL    RDW 18.9 (H) 10.0 - 15.0 %    Platelet Count 259 150 - 450 10e3/uL    % Neutrophils 53 %    % Lymphocytes 17 %    % Monocytes 13 %    % Eosinophils 16 %    % Basophils 2 %    % Immature Granulocytes 0 %    NRBCs per 100 WBC 0 <1 /100    Absolute Neutrophils 3.0 1.6 - 8.3 10e3/uL    Absolute Lymphocytes 1.0 0.8 - 5.3 10e3/uL    Absolute Monocytes 0.8 0.0 - 1.3 10e3/uL    Absolute Eosinophils 0.9 (H) 0.0 - 0.7 10e3/uL    Absolute Basophils 0.1 0.0 - 0.2 10e3/uL    Absolute Immature Granulocytes 0.0 <=0.4 10e3/uL    Absolute NRBCs 0.0 10e3/uL       Imaging Results    US Upper Extremity Venous Duplex Bilat    Result Date: 9/10/2024  EXAM: US UPPER EXTREMITY VENOUS DUPLEX BILATERAL LOCATION: [facility] DATE: 9/10/2024 INDICATION:  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 Color flow and spectral Doppler with waveform analysis performed. FINDINGS: Ultrasound includes evaluation of the internal jugular veins, innominate veins, subclavian veins, axillary veins, and brachial veins. The superficial cephalic and basilic veins were also evaluated where seen. RIGHT: No deep venous thrombosis. No superficial thrombophlebitis. LEFT: No deep venous thrombosis. No superficial thrombophlebitis.     IMPRESSION: 1.  No deep venous thrombosis in the bilateral upper extremities.    PET Oncology Whole Body    Result Date: 9/10/2024  EXAM: PET ONCOLOGY WHOLE BODY LOCATION: [facility] DATE: 9/9/2024 INDICATION: Lymphoma, non-Hodgkin, restaging. Diffuse large B-cell lymphoma of intra-abdominal lymph nodes. Status post systemic chemotherapy. Subsequent treatment strategy. COMPARISON: PET/CT 7/1/2024. Baseline PET/CT 5/2/2024 also reviewed. TECHNIQUE: Serum glucose level 84 mg/dL. One hour post intravenous administration of 10.2 mCi F-18 FDG, PET imaging was performed from the skull vertex to feet, utilizing attenuation correction with concurrent axial CT and PET/CT image fusion. Dose reduction techniques were used. PET/CT FINDINGS: Since 7/1/2024, residual mass involving the pancreas has continued to decrease in size now measuring 1.0 x 1.9 cm (previously 1.6 x 2.9 cm at similar level measurements) and associated FDG uptake has continued to decrease, now mild (SUVmax 3.3, previously 6.1), remains just above background liver level, Deauville 4, suggesting a near complete partial favorable treatment response. No evidence of FDG avid malignancy elsewhere. No new or enlarging FDG avid adenopathy above or below the diaphragm. Normal size liver and spleen with normal uptake. No suspicious focal skeletal uptake.  CT FINDINGS: Moderate senescent intracranial changes. Opacification left sphenoid sinus. Right IJ Port-A-Cath with tip near the SVC/RA junction. Mild coronary artery calcification. Dense mitral annulus calcification. Lower thoracic surgical clips. Cholecystectomy. Left retroperitoneal cystic structure with benign appearance, unchanged. Hysterectomy. Moderate colonic diverticulosis. Mild scattered hypertrophic degenerative changes in the spine. Grade II anterolisthesis L5 on S1.     IMPRESSION: Near complete partial favorable treatment response. No new site of involvement.       I spent 30 minutes on the patient's visit today.  This included preparation for the visit, face-to-face time with the patient and documentation following the visit.  It did not include teaching or procedure time.    Signed by: Peter E. Friedell, MD

## 2024-09-12 NOTE — TELEPHONE ENCOUNTER
Retail Pharmacy Prior Authorization Team   Phone: [phone]    PA Initiation    Medication: ONDANSETRON HCL 8 MG PO TABS  Insurance Company: [employer] Part D - Phone [phone] Fax [phone]  Pharmacy Filling the Rx: [id number] [place] [address] AT [place] [address]  Filling Pharmacy Phone: [phone]  Filling Pharmacy Fax: [phone]  Start Date: 9/12/2024

## 2024-09-13 NOTE — TELEPHONE ENCOUNTER
Prior Authorization Not Needed per Insurance    Medication: ONDANSETRON HCL 8 MG PO TABS  Insurance Company: [employer] Part D - Phone [phone] Fax [phone]  Expected CoPay: $    Pharmacy Filling the Rx: [facility] #[id number] [place] [address] AT [place] [address]  Pharmacy Notified: Yes  Patient Notified:

## 2024-09-20 ENCOUNTER — TELEPHONE (OUTPATIENT)
Dept: ONCOLOGY | Facility: HOSPITAL | Age: 79
End: 2024-09-20
Payer: COMMERCIAL

## 2024-09-20 NOTE — TELEPHONE ENCOUNTER
Pt's daughter called stating pt hasn't been sleeping. She has tried tylenol PM with no results and calls today for something else. Secure chat message sent to Dr Florian and pt's daughter aware

## 2024-09-23 ENCOUNTER — TELEPHONE (OUTPATIENT)
Dept: ONCOLOGY | Facility: HOSPITAL | Age: 79
End: 2024-09-23
Payer: COMMERCIAL

## 2024-09-23 DIAGNOSIS — C83.33 DIFFUSE LARGE B-CELL LYMPHOMA OF INTRA-ABDOMINAL LYMPH NODES (H): Primary | ICD-10-CM

## 2024-09-23 RX ORDER — TRAZODONE HYDROCHLORIDE 50 MG/1
50-100 TABLET, FILM COATED ORAL
Qty: 30 TABLET | Refills: 3 | Status: SHIPPED | OUTPATIENT
Start: 2024-09-23

## 2024-09-23 NOTE — TELEPHONE ENCOUNTER
I received a phone call today from Bibiana's daughter, Rosa Isela.  She is calling to say that she called on Friday and spoke to a nurse about getting a medication to help her mom sleep.  She is following up because she has not heard back.  I discussed this with Dr. Florian who sent a prescription for trazodone over to the pharmacy today.  I attempted to call Rosa Isela back to let her know but I was unable to reach her.  I left a detailed message on her self identified answering machine with this information and requested a call back with any other questions.    Priya Barrett RN on 9/23/2024 at 3:13 PM

## 2024-10-11 ENCOUNTER — TELEPHONE (OUTPATIENT)
Dept: FAMILY MEDICINE | Facility: CLINIC | Age: 79
End: 2024-10-11
Payer: COMMERCIAL

## 2024-10-11 ENCOUNTER — TELEPHONE (OUTPATIENT)
Dept: ONCOLOGY | Facility: HOSPITAL | Age: 79
End: 2024-10-11
Payer: COMMERCIAL

## 2024-10-11 DIAGNOSIS — G47.00 INSOMNIA, UNSPECIFIED TYPE: Primary | ICD-10-CM

## 2024-10-11 RX ORDER — ESZOPICLONE 1 MG/1
1 TABLET, FILM COATED ORAL AT BEDTIME
Qty: 30 TABLET | Refills: 0 | Status: SHIPPED | OUTPATIENT
Start: 2024-10-11 | End: 2024-10-22

## 2024-10-11 NOTE — TELEPHONE ENCOUNTER
S-(situation):   Patient and daughter calling with concerns about sleeping issues     B-(background):   Trazodone - makes patient sick  Melatonin, Tylenol PM, Benadryl, CBD - not effective     A-(assessment):   Patient only getting a few hours of sleep at night  Feels may be caused by anxiety/restlessness     R-(recommendations):   Patient and family wondering about Lunesta as an option to try   Writer did recommend a visit with PCP to discuss concerns - Patient is scheduled for virtual visit with PCP 10/22/24 but looking for recommendations in the meantime.    Karolina Medina, ROBYN, RN  [facility]

## 2024-10-11 NOTE — TELEPHONE ENCOUNTER
Writer called patient's daughter IVAN Natarajan on file, on relayed message per PCP. Patient was also in room with daughter. Patient and daughter verbalized understanding and agree with plan and she will try this medication. Writer educated that if patient is having negative side effects from medication as she did with trazodone to call clinic so a message can get sent to PCP and they verbalized understanding and agree with plan.    DALLIN Machado, RN  [facility]

## 2024-10-11 NOTE — TELEPHONE ENCOUNTER
Notify patient/daughter that I sent a prescription for generic Lunesta 1 mg at bedtime as needed for insomnia.  We will review effectiveness of medication at time of virtual visit as scheduled on 10/22/24.

## 2024-10-11 NOTE — TELEPHONE ENCOUNTER
I received a voice message today from Bibiana's daughter, Rosa Isela.  Per her message, her mom took too many trazodone last night and today she is nauseated and has the dry heaves.  She is calling to request some help with how to manage her symptoms.  I attempted to call Rosa Isela back but I was unable to reach her.  I left a message requesting a call back when she is able.    Priya Barrett RN on 10/11/2024 at 1:39 PM

## 2024-10-11 NOTE — TELEPHONE ENCOUNTER
Spoke to Lucia (on speaker phone) today.  They state that last night Bibiana took 2 trazodone and after she became very nauseated and has been dry heaving/vomiting since.  She states she has only taken trazodone a couple of times and recalls being nauseous after 1 other use.  She did take a Zofran and is feeling better now.  She is able to eat and drink a little.  She reports no other concerning symptoms at this time.  I advised that she stop trazodone.  She states sleep has been an ongoing issue for her and was a problem even before a cancer diagnosis.  In the past, she has tried Tylenol PM, Unisom, Benadryl, melatonin, CBD Gummies all of which were not helpful.  She is wondering what else she can try.  I advised that she discuss this with her primary care doctor which she is agreeable.  She will contact Dr. Woody today to discuss.  They will continue to monitor symptoms and call back if needed.    Priya Barrett RN on 10/11/2024 at 2:03 PM

## 2024-10-22 ENCOUNTER — VIRTUAL VISIT (OUTPATIENT)
Dept: FAMILY MEDICINE | Facility: CLINIC | Age: 79
End: 2024-10-22
Payer: COMMERCIAL

## 2024-10-22 DIAGNOSIS — G47.00 INSOMNIA, UNSPECIFIED TYPE: Primary | ICD-10-CM

## 2024-10-22 DIAGNOSIS — C83.30 DIFFUSE LARGE B-CELL LYMPHOMA, UNSPECIFIED BODY REGION (H): ICD-10-CM

## 2024-10-22 DIAGNOSIS — F41.9 ANXIETY: ICD-10-CM

## 2024-10-22 DIAGNOSIS — I10 ESSENTIAL HYPERTENSION WITH GOAL BLOOD PRESSURE LESS THAN 130/80: ICD-10-CM

## 2024-10-22 PROCEDURE — 99214 OFFICE O/P EST MOD 30 MIN: CPT | Mod: 95 | Performed by: FAMILY MEDICINE

## 2024-10-22 RX ORDER — ESZOPICLONE 1 MG/1
1 TABLET, FILM COATED ORAL AT BEDTIME
Qty: 90 TABLET | Refills: 1 | Status: SHIPPED | OUTPATIENT
Start: 2024-10-22

## 2024-10-22 NOTE — PROGRESS NOTES
Bibiana is a 79 year old who is being evaluated via a billable video visit.    How would you like to obtain your AVS? MyChart  If the video visit is dropped, the invitation should be resent by: Send to e-mail at: [email or web address]  Will anyone else be joining your video visit? No      Assessment & Plan     Insomnia, unspecified type  Lunesta 1 mg at bedtime as needed.  Refill provided.  90-day prescription per patient request.  - eszopiclone (LUNESTA) 1 MG tablet  Dispense: 90 tablet; Refill: 1    Diffuse large B-cell lymphoma, unspecified body region (H)  Diffuse large B-cell lymphoma.  PET scan scheduled December 2, 2024 for follow-up.  Good response on prior PET scan.    Anxiety  Buspirone 7.5 mg twice daily.    Essential hypertension with goal blood pressure less than 130/80  Amlodipine 5 mg daily continuing.    Alzheimer's dementia  Exelon 9.5 mg per 24-hour patch daily and continues to follow-up with Dr. Mann.                  Subjective   Bibiana is a 79 year old, presenting for the following health issues:  Sleep Problem and Recheck Medication        10/22/2024    11:37 AM   Additional Questions   Roomed by Sunitha   Accompanied by Daughter Helen     Video Start Time: 12:01 PM    History of Present Illness       Reason for visit:  Problem sleeping   She is taking medications regularly.       Virtual visit completed.  Concern for chronic insomnia.  Using Lunesta 1 mg at bedtime.  Benefits helping patient to fall asleep.  Typically only gets up once per night after falling asleep.  Requesting 90-day prescription with refills.  History of large B-cell lymphoma.  Continues to receive treatment.  Had good response to initial chemotherapy and does have scheduled PET scan follow-up December 2, 2024.  Buspirone 7.5 mg twice daily for anxiety.  Exelon patch 4 dementia management and follows with Dr. Mann.  Amlodipine 5 mg daily for hypertension.  Comprehensive review of systems as above otherwise all  negative.        Review of Systems  Constitutional, HEENT, cardiovascular, pulmonary, GI, , musculoskeletal, neuro, skin, endocrine and psych systems are negative, except as otherwise noted.      Objective    Vitals - Patient Reported  Weight (Patient Reported): 39.7 kg (87 lb 8 oz)  Pain Score: No Pain (0)        Physical Exam   GENERAL: alert and no distress  EYES: Eyes grossly normal to inspection.  No discharge or erythema, or obvious scleral/conjunctival abnormalities.  RESP: No audible wheeze, cough, or visible cyanosis.    SKIN: Visible skin clear. No significant rash, abnormal pigmentation or lesions.  NEURO: Cranial nerves grossly intact.  Mentation and speech appropriate for age.  PSYCH: Appropriate affect, tone, and pace of words          Video-Visit Details    Type of service:  Video Visit   Video End Time:12:16 PM  Originating Location (pt. Location): Home    Distant Location (provider location):  On-site  Platform used for Video Visit: Lurdes  Signed Electronically by: Zack Woody MD

## 2024-10-26 DIAGNOSIS — M54.50 CHRONIC MIDLINE LOW BACK PAIN WITHOUT SCIATICA: ICD-10-CM

## 2024-10-26 DIAGNOSIS — G89.29 CHRONIC MIDLINE LOW BACK PAIN WITHOUT SCIATICA: ICD-10-CM

## 2024-10-28 RX ORDER — NORTRIPTYLINE HYDROCHLORIDE 50 MG/1
CAPSULE ORAL
Qty: 90 CAPSULE | Refills: 2 | Status: SHIPPED | OUTPATIENT
Start: 2024-10-28

## 2024-10-30 ENCOUNTER — TELEPHONE (OUTPATIENT)
Dept: ONCOLOGY | Facility: HOSPITAL | Age: 79
End: 2024-10-30
Payer: COMMERCIAL

## 2024-10-30 DIAGNOSIS — C83.33 DIFFUSE LARGE B-CELL LYMPHOMA OF INTRA-ABDOMINAL LYMPH NODES (H): Primary | ICD-10-CM

## 2024-10-30 NOTE — TELEPHONE ENCOUNTER
I received a phone call today from Bibiana's daughter, Rosa Isela.  She is calling to report that her mom went to the dentist and was found to have a tooth infection.  She was started on 10 days of amoxicillin.  She is not running fevers and she feels well otherwise.  Rosa Isela will continue to monitor the symptoms, have her start the antibiotics and let us know if she runs a fever or has any other new concerning symptoms.  Rosa Isela states her mom is scheduled to come on Monday for a port flush.  She is wondering if Dr. Florian would be okay if she has a CBC checked.  Rosa Isela wants to be sure that her mom's white count and neutrophils are in a healthy range.  I let her know that I would send this question to Dr. Florian and PHUC Barrios and give her a call back.    Priya Barrett RN on 10/30/2024 at 3:27 PM

## 2024-10-30 NOTE — TELEPHONE ENCOUNTER
I called Rosa Isela to let her know that a cbc/diff was ordered to be drawn on 11/5 when her Mom is in the clinic. She was happy to hear this. She has no other questions at this time.     Priya Barrett RN on 10/30/2024 at 3:59 PM

## 2024-11-04 ENCOUNTER — INFUSION THERAPY VISIT (OUTPATIENT)
Dept: INFUSION THERAPY | Facility: HOSPITAL | Age: 79
End: 2024-11-04
Payer: COMMERCIAL

## 2024-11-04 VITALS — BODY MASS INDEX: 19.56 KG/M2 | WEIGHT: 90.4 LBS

## 2024-11-04 DIAGNOSIS — C83.33 DIFFUSE LARGE B-CELL LYMPHOMA OF INTRA-ABDOMINAL LYMPH NODES (H): Primary | ICD-10-CM

## 2024-11-04 LAB
BASOPHILS # BLD AUTO: 0 10E3/UL (ref 0–0.2)
BASOPHILS NFR BLD AUTO: 1 %
EOSINOPHIL # BLD AUTO: 0.3 10E3/UL (ref 0–0.7)
EOSINOPHIL NFR BLD AUTO: 6 %
ERYTHROCYTE [DISTWIDTH] IN BLOOD BY AUTOMATED COUNT: 15.5 % (ref 10–15)
HCT VFR BLD AUTO: 36.3 % (ref 35–47)
HGB BLD-MCNC: 11.4 G/DL (ref 11.7–15.7)
IMM GRANULOCYTES # BLD: 0 10E3/UL
IMM GRANULOCYTES NFR BLD: 1 %
LYMPHOCYTES # BLD AUTO: 1.2 10E3/UL (ref 0.8–5.3)
LYMPHOCYTES NFR BLD AUTO: 30 %
MCH RBC QN AUTO: 27.1 PG (ref 26.5–33)
MCHC RBC AUTO-ENTMCNC: 31.4 G/DL (ref 31.5–36.5)
MCV RBC AUTO: 86 FL (ref 78–100)
MONOCYTES # BLD AUTO: 0.5 10E3/UL (ref 0–1.3)
MONOCYTES NFR BLD AUTO: 12 %
NEUTROPHILS # BLD AUTO: 2.1 10E3/UL (ref 1.6–8.3)
NEUTROPHILS NFR BLD AUTO: 51 %
NRBC # BLD AUTO: 0 10E3/UL
NRBC BLD AUTO-RTO: 0 /100
PLATELET # BLD AUTO: 197 10E3/UL (ref 150–450)
RBC # BLD AUTO: 4.21 10E6/UL (ref 3.8–5.2)
WBC # BLD AUTO: 4.1 10E3/UL (ref 4–11)

## 2024-11-04 PROCEDURE — 85025 COMPLETE CBC W/AUTO DIFF WBC: CPT

## 2024-11-04 PROCEDURE — 250N000011 HC RX IP 250 OP 636: Performed by: INTERNAL MEDICINE

## 2024-11-04 PROCEDURE — 36591 DRAW BLOOD OFF VENOUS DEVICE: CPT

## 2024-11-04 RX ORDER — HEPARIN SODIUM (PORCINE) LOCK FLUSH IV SOLN 100 UNIT/ML 100 UNIT/ML
5 SOLUTION INTRAVENOUS
OUTPATIENT
Start: 2024-11-04

## 2024-11-04 RX ORDER — HEPARIN SODIUM (PORCINE) LOCK FLUSH IV SOLN 100 UNIT/ML 100 UNIT/ML
5 SOLUTION INTRAVENOUS
Status: DISCONTINUED | OUTPATIENT
Start: 2024-11-04 | End: 2024-11-04 | Stop reason: HOSPADM

## 2024-11-04 RX ADMIN — Medication 5 ML: at 14:16

## 2024-11-04 NOTE — PROGRESS NOTES
Infusion Nursing Note:  Bibiana Mcrae presents today for labs and port flush.    Patient seen by provider today: No   present during visit today: Not Applicable.    Note: Bibiana arrived ambulatory in stable condition.  Port accessed and las drawn. Port then flushed with saline and heparin prior to de-accessing.       Intravenous Access:  Labs drawn without difficulty.  Implanted Port.    Treatment Conditions:  Lab Results   Component Value Date    HGB 11.4 (L) 11/04/2024    WBC 4.1 11/04/2024    ANEU 14.2 (H) 05/13/2024    ANEUTAUTO 2.1 11/04/2024     11/04/2024        Not Applicable.      Post Infusion Assessment:  Blood return noted pre and post infusion.  Site patent and intact, free from redness, edema or discomfort.  Access discontinued per protocol.       Discharge Plan:   Patient discharged in stable condition accompanied by: daughter.  Departure Mode: Ambulatory.      Chela Akers RN

## 2024-11-06 ENCOUNTER — TELEPHONE (OUTPATIENT)
Dept: ONCOLOGY | Facility: HOSPITAL | Age: 79
End: 2024-11-06
Payer: COMMERCIAL

## 2024-11-06 NOTE — TELEPHONE ENCOUNTER
I received a phone call today from Bibiana's daughter, Rosa Isela.  She is calling to get the results of her mom's lab work from 11/4.  She was able to review them on [facility] but wanted to be sure Dr. Florian did as well.  I let her know that her labs look stable.  I told her that I do not see any concerns with these results but we will send a note to Dr. Florian and PH[facility] Barrios to review as well.  Rosa Isela states she does not need a call back unless Dr. Florian has any concerns.    Priya Barrett RN on 11/6/2024 at 9:04 AM

## 2024-11-11 ENCOUNTER — ANCILLARY PROCEDURE (OUTPATIENT)
Dept: MAMMOGRAPHY | Facility: CLINIC | Age: 79
End: 2024-11-11
Payer: COMMERCIAL

## 2024-11-11 DIAGNOSIS — Z12.31 VISIT FOR SCREENING MAMMOGRAM: ICD-10-CM

## 2024-11-11 PROCEDURE — 77063 BREAST TOMOSYNTHESIS BI: CPT

## 2024-11-12 ENCOUNTER — TELEPHONE (OUTPATIENT)
Dept: ONCOLOGY | Facility: HOSPITAL | Age: 79
End: 2024-11-12
Payer: COMMERCIAL

## 2024-11-12 NOTE — TELEPHONE ENCOUNTER
Patient's daughter Rosa Isela calls with concerns regarding patient's most recent mammogram. Rosa Isela reports that there was a concern noted so now patient is having a diagnostic mammogram with ultrasound on 11/19. Rosa Isela is wondering if PET scan should be moved up, currently it is scheduled for 12/2.    Rosa Isela is advised that message will be sent to provider to review but that most likely PET scan is fine, it is approximately 3 weeks out. RN will follow up with Rosa Isela if there are any new recommendations.    Megha Gomez RN

## 2024-11-13 NOTE — TELEPHONE ENCOUNTER
Return call placed to Rosa Isela. Per Dr. Florian current schedule is fine, PET does not need to be moved up. Rosa Isela verbalizes understanding and will keep care team updated on results from diagnostic mammogram.    Megha Gomez RN

## 2024-11-19 ENCOUNTER — ANCILLARY PROCEDURE (OUTPATIENT)
Dept: MAMMOGRAPHY | Facility: CLINIC | Age: 79
End: 2024-11-19
Attending: FAMILY MEDICINE
Payer: COMMERCIAL

## 2024-11-19 DIAGNOSIS — R92.8 ABNORMAL MAMMOGRAM: ICD-10-CM

## 2024-11-19 PROCEDURE — 77061 BREAST TOMOSYNTHESIS UNI: CPT | Mod: RT

## 2024-11-20 NOTE — TELEPHONE ENCOUNTER
I received a phone call from patient's daughter, Rosa Isela today. She is requesting a prescription for EMLA cream get sent to the pharmacy today. They are hoping to use the port tomorrow morning for the PET scan. EMLA was sent in. They have a lot of questions about chemotherapy, what to expect, what to bring, etc. Rosa Isela states they have not had any chemotherapy education and they are hoping to get that prior to chemotherapy on Friday. Thanks!    Priya Barrett RN on 5/1/2024 at 4:08 PM     Paged neurosurg to update. Waiting on call back.

## 2024-11-27 ENCOUNTER — HOSPITAL ENCOUNTER (OUTPATIENT)
Dept: PET IMAGING | Facility: HOSPITAL | Age: 79
Discharge: HOME | End: 2024-11-27
Attending: INTERNAL MEDICINE
Payer: COMMERCIAL

## 2024-11-27 DIAGNOSIS — C83.33 DIFFUSE LARGE B-CELL LYMPHOMA OF INTRA-ABDOMINAL LYMPH NODES (H): ICD-10-CM

## 2024-11-27 LAB — GLUCOSE BLDC GLUCOMTR-MCNC: 91 MG/DL (ref 70–99)

## 2024-11-27 PROCEDURE — 343N000001 HC RX 343 MED OP 636: Performed by: INTERNAL MEDICINE

## 2024-11-27 PROCEDURE — 82962 GLUCOSE BLOOD TEST: CPT

## 2024-11-27 PROCEDURE — A9552 F18 FDG: HCPCS | Performed by: INTERNAL MEDICINE

## 2024-11-27 PROCEDURE — 78816 PET IMAGE W/CT FULL BODY: CPT | Mod: PS

## 2024-11-27 RX ORDER — FLUDEOXYGLUCOSE F 18 200 MCI/ML
9-18 INJECTION, SOLUTION INTRAVENOUS ONCE
Status: COMPLETED | OUTPATIENT
Start: 2024-11-27 | End: 2024-11-27

## 2024-11-27 RX ORDER — HEPARIN SODIUM (PORCINE) LOCK FLUSH IV SOLN 100 UNIT/ML 100 UNIT/ML
500 SOLUTION INTRAVENOUS ONCE
Status: COMPLETED | OUTPATIENT
Start: 2024-11-27 | End: 2024-11-27

## 2024-11-27 RX ADMIN — FLUDEOXYGLUCOSE F 18 10.13 MILLICURIE: 200 INJECTION, SOLUTION INTRAVENOUS at 08:57

## 2024-11-27 RX ADMIN — HEPARIN SODIUM (PORCINE) LOCK FLUSH IV SOLN 100 UNIT/ML 500 UNITS: 100 SOLUTION at 08:54

## 2024-12-03 ENCOUNTER — ONCOLOGY VISIT (OUTPATIENT)
Dept: ONCOLOGY | Facility: HOSPITAL | Age: 79
End: 2024-12-03
Attending: INTERNAL MEDICINE
Payer: COMMERCIAL

## 2024-12-03 ENCOUNTER — INFUSION THERAPY VISIT (OUTPATIENT)
Dept: INFUSION THERAPY | Facility: HOSPITAL | Age: 79
End: 2024-12-03
Payer: COMMERCIAL

## 2024-12-03 VITALS
WEIGHT: 91 LBS | HEIGHT: 57 IN | DIASTOLIC BLOOD PRESSURE: 74 MMHG | SYSTOLIC BLOOD PRESSURE: 157 MMHG | BODY MASS INDEX: 19.63 KG/M2 | OXYGEN SATURATION: 99 % | HEART RATE: 85 BPM | TEMPERATURE: 97.5 F | RESPIRATION RATE: 16 BRPM

## 2024-12-03 DIAGNOSIS — C83.33 DIFFUSE LARGE B-CELL LYMPHOMA OF INTRA-ABDOMINAL LYMPH NODES (H): Primary | ICD-10-CM

## 2024-12-03 DIAGNOSIS — R19.00 INTRAABDOMINAL MASS: ICD-10-CM

## 2024-12-03 LAB
ALBUMIN SERPL BCG-MCNC: 4 G/DL (ref 3.5–5.2)
ALP SERPL-CCNC: 70 U/L (ref 40–150)
ALT SERPL W P-5'-P-CCNC: 26 U/L (ref 0–50)
ANION GAP SERPL CALCULATED.3IONS-SCNC: 10 MMOL/L (ref 7–15)
AST SERPL W P-5'-P-CCNC: 24 U/L (ref 0–45)
BASOPHILS # BLD AUTO: 0 10E3/UL (ref 0–0.2)
BASOPHILS NFR BLD AUTO: 1 %
BILIRUB SERPL-MCNC: 0.3 MG/DL
BUN SERPL-MCNC: 19.3 MG/DL (ref 8–23)
CALCIUM SERPL-MCNC: 8.6 MG/DL (ref 8.8–10.4)
CHLORIDE SERPL-SCNC: 103 MMOL/L (ref 98–107)
CREAT SERPL-MCNC: 0.83 MG/DL (ref 0.51–0.95)
EGFRCR SERPLBLD CKD-EPI 2021: 71 ML/MIN/1.73M2
EOSINOPHIL # BLD AUTO: 0.2 10E3/UL (ref 0–0.7)
EOSINOPHIL NFR BLD AUTO: 6 %
ERYTHROCYTE [DISTWIDTH] IN BLOOD BY AUTOMATED COUNT: 15.3 % (ref 10–15)
GLUCOSE SERPL-MCNC: 139 MG/DL (ref 70–99)
HCO3 SERPL-SCNC: 25 MMOL/L (ref 22–29)
HCT VFR BLD AUTO: 36 % (ref 35–47)
HGB BLD-MCNC: 11.2 G/DL (ref 11.7–15.7)
IMM GRANULOCYTES # BLD: 0 10E3/UL
IMM GRANULOCYTES NFR BLD: 0 %
LDH SERPL L TO P-CCNC: 147 U/L (ref 0–250)
LYMPHOCYTES # BLD AUTO: 1.1 10E3/UL (ref 0.8–5.3)
LYMPHOCYTES NFR BLD AUTO: 29 %
MCH RBC QN AUTO: 26.5 PG (ref 26.5–33)
MCHC RBC AUTO-ENTMCNC: 31.1 G/DL (ref 31.5–36.5)
MCV RBC AUTO: 85 FL (ref 78–100)
MONOCYTES # BLD AUTO: 0.5 10E3/UL (ref 0–1.3)
MONOCYTES NFR BLD AUTO: 12 %
NEUTROPHILS # BLD AUTO: 2.1 10E3/UL (ref 1.6–8.3)
NEUTROPHILS NFR BLD AUTO: 53 %
NRBC # BLD AUTO: 0 10E3/UL
NRBC BLD AUTO-RTO: 0 /100
PLATELET # BLD AUTO: 213 10E3/UL (ref 150–450)
POTASSIUM SERPL-SCNC: 4 MMOL/L (ref 3.4–5.3)
PROT SERPL-MCNC: 6.2 G/DL (ref 6.4–8.3)
RBC # BLD AUTO: 4.23 10E6/UL (ref 3.8–5.2)
SODIUM SERPL-SCNC: 138 MMOL/L (ref 135–145)
WBC # BLD AUTO: 3.9 10E3/UL (ref 4–11)

## 2024-12-03 PROCEDURE — 85018 HEMOGLOBIN: CPT

## 2024-12-03 PROCEDURE — G0463 HOSPITAL OUTPT CLINIC VISIT: HCPCS | Performed by: INTERNAL MEDICINE

## 2024-12-03 PROCEDURE — 83615 LACTATE (LD) (LDH) ENZYME: CPT

## 2024-12-03 PROCEDURE — 250N000011 HC RX IP 250 OP 636: Performed by: INTERNAL MEDICINE

## 2024-12-03 PROCEDURE — 36591 DRAW BLOOD OFF VENOUS DEVICE: CPT

## 2024-12-03 PROCEDURE — 85041 AUTOMATED RBC COUNT: CPT

## 2024-12-03 PROCEDURE — 80053 COMPREHEN METABOLIC PANEL: CPT

## 2024-12-03 PROCEDURE — 85004 AUTOMATED DIFF WBC COUNT: CPT

## 2024-12-03 PROCEDURE — 99213 OFFICE O/P EST LOW 20 MIN: CPT | Performed by: INTERNAL MEDICINE

## 2024-12-03 RX ORDER — HEPARIN SODIUM (PORCINE) LOCK FLUSH IV SOLN 100 UNIT/ML 100 UNIT/ML
5 SOLUTION INTRAVENOUS
Status: DISCONTINUED | OUTPATIENT
Start: 2024-12-03 | End: 2024-12-03 | Stop reason: HOSPADM

## 2024-12-03 RX ORDER — HEPARIN SODIUM (PORCINE) LOCK FLUSH IV SOLN 100 UNIT/ML 100 UNIT/ML
5 SOLUTION INTRAVENOUS
OUTPATIENT
Start: 2024-12-03

## 2024-12-03 RX ADMIN — HEPARIN SODIUM (PORCINE) LOCK FLUSH IV SOLN 100 UNIT/ML 5 ML: 100 SOLUTION at 12:15

## 2024-12-03 ASSESSMENT — PAIN SCALES - GENERAL: PAINLEVEL_OUTOF10: NO PAIN (0)

## 2024-12-03 NOTE — PROGRESS NOTES
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  She is asymptomatic.  Blood count blood chemistry and LDH are within near normal limits.  PET CT scan now shows increase FDG avidity at the site of the original lymphoma.  Repeat biopsy is planned.      Update 12/4/2024  Lesion to biopsy is anatomically unclear on PET CT scan.  CT of the abdomen with contrast is ordered.  Patient cannot have MRI secondary to metal in her eye.      ______________________________________________________________________________    Staging History   Cancer Staging   No matching staging information was found for the patient.    ECOG Performance = 1    History of Present Illness  Disease history per Dr. Florian         Diagnosis: Diffuse large B-cell lymphoma.  This was found incidentally in April 2024.  She had a fall and came to the ER and they did a CT scan.  This showed an abdominal mass.  -4/15/24: Biopsy done of abdominal mass that was labeled as pancreatic mass.  It shows a CD20 positive large cell lymphoma.  -5/2/24: Hypermetabolic lymphadenopathy above and below the diaphragm, consistent with biopsy-proven lymphoma. Radiotracer avid mixed lytic and sclerotic observations in the left ischium and right distal femur metaphysis are nonspecific but raise possibility of marrow involvement  9/10/2024 PET/CT shows near complete treatment response.  11/27/2024 Pet CT shows Increased activity at the site of the original lesion. New biopsy is planned     Treatment:  -5/3/2024: Patient started R mini CHOP. PET after 3 cycles showed CR.   8/16/2024 6th and final treatment completed     Interim  "history:  Review of systems.  Pertinent Findings are included in the History of Present Illness    Physical Exam    BP (!) 157/74 (BP Location: Right arm, Patient Position: Sitting, Cuff Size: Adult Small)   Pulse 85   Temp 97.5  F (36.4  C) (Tympanic)   Resp 16   Ht 1.448 m (4' 9\")   Wt 41.3 kg (91 lb)   LMP  (LMP Unknown)   SpO2 99%   BMI 19.69 kg/m       Not examined this visit.    Medications:    Current Outpatient Medications   Medication Sig Dispense Refill    eszopiclone (LUNESTA) 1 MG tablet Take 1 tablet (1 mg) by mouth at bedtime. 90 tablet 1    simethicone (MYLICON) 125 MG chewable tablet Take 125 mg by mouth as needed.      UNABLE TO FIND Take 2 Tablespoonful by mouth every morning MEDICATION NAME: biofiber 8 oz with water      acarbose (PRECOSE) 25 MG tablet Take 25 mg by mouth 3 times daily (with meals)      acetaminophen (TYLENOL) 325 MG tablet Take 325-650 mg by mouth every 6 hours as needed for mild pain      amLODIPine (NORVASC) 5 MG tablet Take 1 tablet (5 mg) by mouth daily 90 tablet 3    Biotin 5 MG TABS Take 5,000 mcg by mouth daily      busPIRone (BUSPAR) 7.5 MG tablet TAKE 1 TABLET BY MOUTH TWICE  DAILY 180 tablet 2    celecoxib (CELEBREX) 200 MG capsule TAKE 1 CAPSULE BY MOUTH  DAILY 90 capsule 3    denosumab (PROLIA) 60 MG/ML SOSY injection Inject 60 mg Subcutaneous every 6 months      multivitamin with minerals (THERA-M) 9 mg iron-400 mcg Tab tablet [MULTIVITAMIN WITH MINERALS (THERA-M) 9 MG IRON-400 MCG TAB TABLET] Take 1 tablet by mouth daily.      nortriptyline (PAMELOR) 50 MG capsule TAKE 1 CAPSULE BY MOUTH AT  BEDTIME 90 capsule 2    omeprazole (PRILOSEC) 20 MG capsule [OMEPRAZOLE (PRILOSEC) 20 MG CAPSULE] Take 20 mg by mouth daily before breakfast.      ondansetron (ZOFRAN) 4 MG tablet TAKE 1 TO 2 TABLETS(4 TO 8 MG) BY MOUTH EVERY 6 HOURS AS NEEDED FOR NAUSEA OR VOMITING 30 tablet 1    ondansetron (ZOFRAN) 8 MG tablet Take 1 tablet (8 mg) by mouth every 8 hours as needed for " nausea. 90 tablet 3    Probiotic Product (SOLUBLE FIBER/PROBIOTICS PO) 2 tablespoons in 8 oz of liquid daily (Patient not taking: Reported on 12/3/2024)      rivastigmine (EXELON) 9.5 MG/24HR 24 hr patch Place 1 patch onto the skin daily 90 patch 3    sennosides (SENOKOT) 8.6 MG tablet Take 2 tablets by mouth daily as needed for constipation      traZODone (DESYREL) 50 MG tablet Take 1-2 tablets ( mg) by mouth nightly as needed for sleep. 30 tablet 3     Current Facility-Administered Medications   Medication Dose Route Frequency Provider Last Rate Last Admin    denosumab (PROLIA) injection 60 mg  60 mg Subcutaneous Q6 Months    60 mg at 07/02/24 1611     Facility-Administered Medications Ordered in Other Visits   Medication Dose Route Frequency Provider Last Rate Last Admin    heparin lock flush 100 unit/mL injection 5 mL  5 mL Intracatheter Once PRN Inocencio Florian MD   5 mL at 12/03/24 1215    sodium chloride (PF) 0.9% PF flush 3-20 mL  3-20 mL Intracatheter q1 min prn Inocencio Florian MD   20 mL at 12/03/24 1215           Past History    Past Medical History:   Diagnosis Date    Anxiety     Breast cyst     While ago    History of cholecystectomy 08/24/2023     Past Surgical History:   Procedure Laterality Date    BREAST CYST ASPIRATION Left     While ago    HYSTERECTOMY  1985    IR CHEST PORT PLACEMENT > 5 YRS OF AGE  5/1/2024    OOPHORECTOMY Bilateral 1985    [facility] REMOVAL OF OVARY(S)      Description: Oophorectomy;  Recorded: 12/17/2009;    [facility] TOTAL ABDOM HYSTERECTOMY      Description: Total Abdominal Hysterectomy;  Recorded: 12/17/2009;     Allergies   Allergen Reactions    Blood Transfusion Related (Informational Only) Other (See Comments)     Patient has a history of a Clinically Significant antibody against RBC antigens. A delay in compatible RBCs may occur. 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    Prochlorperazine Edisylate [Prochlorperazine] Other (See Comments)     Extrapyramidal side  effects     Family History   Problem Relation Age of Onset    Breast Cancer Mother 52.00    Cancer Maternal Grandfather 71.00        prostate    Prostate Cancer Maternal Grandfather      Social History     Socioeconomic History    Marital status:      Spouse name: None    Number of children: None    Years of education: None    Highest education level: None   Tobacco Use    Smoking status: Never     Passive exposure: Never    Smokeless tobacco: Never   Vaping Use    Vaping status: Never Used   Substance and Sexual Activity    Alcohol use: No    Drug use: Never     Social Determinants of Health     Financial Resource Strain: Low Risk  (4/25/2024)    Financial Resource Strain     Within the past 12 months, have you or your family members you live with been unable to get utilities (heat, electricity) when it was really needed?: No   Food Insecurity: Low Risk  (4/25/2024)    Food Insecurity     Within the past 12 months, did you worry that your food would run out before you got money to buy more?: No     Within the past 12 months, did the food you bought just not last and you didn t have money to get more?: No   Transportation Needs: Low Risk  (4/25/2024)    Transportation Needs     Within the past 12 months, has lack of transportation kept you from medical appointments, getting your medicines, non-medical meetings or appointments, work, or from getting things that you need?: No   Physical Activity: Unknown (4/25/2024)    Exercise Vital Sign     Days of Exercise per Week: 0 days   Stress: Stress Concern Present (4/25/2024)    [employer] - Occupational Stress Questionnaire     Feeling of Stress : Rather much   Social Connections: Unknown (4/25/2024)    Social Connection and Isolation Panel [NHANES]     Frequency of Social Gatherings with Friends and Family: Once a week   Interpersonal Safety: Low Risk  (4/25/2024)    Interpersonal Safety     Do you feel physically and emotionally safe  where you currently live?: Yes     Within the past 12 months, have you been hit, slapped, kicked or otherwise physically hurt by someone?: No     Within the past 12 months, have you been humiliated or emotionally abused in other ways by your partner or ex-partner?: No   Housing Stability: Low Risk  (4/25/2024)    Housing Stability     Do you have housing? : Yes     Are you worried about losing your housing?: No           Lab Results    Recent Results (from the past 240 hours)   Glucose by meter    Collection Time: 11/27/24  8:41 AM   Result Value Ref Range    GLUCOSE BY METER POCT 91 70 - 99 mg/dL   Comprehensive metabolic panel    Collection Time: 12/03/24 10:50 AM   Result Value Ref Range    Sodium 138 135 - 145 mmol/L    Potassium 4.0 3.4 - 5.3 mmol/L    Carbon Dioxide (CO2) 25 22 - 29 mmol/L    Anion Gap 10 7 - 15 mmol/L    Urea Nitrogen 19.3 8.0 - 23.0 mg/dL    Creatinine 0.83 0.51 - 0.95 mg/dL    GFR Estimate 71 >60 mL/min/1.73m2    Calcium 8.6 (L) 8.8 - 10.4 mg/dL    Chloride 103 98 - 107 mmol/L    Glucose 139 (H) 70 - 99 mg/dL    Alkaline Phosphatase 70 40 - 150 U/L    AST 24 0 - 45 U/L    ALT 26 0 - 50 U/L    Protein Total 6.2 (L) 6.4 - 8.3 g/dL    Albumin 4.0 3.5 - 5.2 g/dL    Bilirubin Total 0.3 <=1.2 mg/dL   Lactate Dehydrogenase    Collection Time: 12/03/24 10:50 AM   Result Value Ref Range    Lactate Dehydrogenase 147 0 - 250 U/L   CBC with platelets and differential    Collection Time: 12/03/24 10:50 AM   Result Value Ref Range    WBC Count 3.9 (L) 4.0 - 11.0 10e3/uL    RBC Count 4.23 3.80 - 5.20 10e6/uL    Hemoglobin 11.2 (L) 11.7 - 15.7 g/dL    Hematocrit 36.0 35.0 - 47.0 %    MCV 85 78 - 100 fL    MCH 26.5 26.5 - 33.0 pg    MCHC 31.1 (L) 31.5 - 36.5 g/dL    RDW 15.3 (H) 10.0 - 15.0 %    Platelet Count 213 150 - 450 10e3/uL    % Neutrophils 53 %    % Lymphocytes 29 %    % Monocytes 12 %    % Eosinophils 6 %    % Basophils 1 %    % Immature Granulocytes 0 %    NRBCs per 100 WBC 0 <1 /100    Absolute  Neutrophils 2.1 1.6 - 8.3 10e3/uL    Absolute Lymphocytes 1.1 0.8 - 5.3 10e3/uL    Absolute Monocytes 0.5 0.0 - 1.3 10e3/uL    Absolute Eosinophils 0.2 0.0 - 0.7 10e3/uL    Absolute Basophils 0.0 0.0 - 0.2 10e3/uL    Absolute Immature Granulocytes 0.0 <=0.4 10e3/uL    Absolute NRBCs 0.0 10e3/uL       Imaging Results    PET Oncology Whole Body    Result Date: 11/27/2024  EXAM: PET ONCOLOGY WHOLE BODY LOCATION: [facility] DATE: 11/27/2024 INDICATION: Subsequent treatment planning and restaging for diffuse large B-cell lymphoma of intra-abdominal lymph nodes. Chemotherapy completed 08/16/2024. Currently on surveillance. COMPARISON: FDG PET/CT 09/09/2024. CONTRAST: None. TECHNIQUE: Serum glucose level 91 mg/dL. One hour post intravenous administration of 10.1 mCi F-18 FDG, PET imaging was performed from the skull vertex to feet, utilizing attenuation correction with concurrent axial CT and PET/CT image fusion. Dose reduction techniques were used. PET/CT FINDINGS: Increased FDG uptake within residual peripancreatic mass measuring 2.0 x 1.5 cm compared to 2.1 x 1.4 cm with SUV max of 8.6, previously 3.3. No new site of FDG avid adenopathy. No suspicious focal marrow uptake. The spleen is not enlarged and demonstrates uptake below that of hepatic parenchyma. Mild diffuse esophageal uptake is likely secondary to esophagitis. Degenerative uptake at the atlantoaxial joint. CT FINDINGS: Mild to moderate intracranial senescent changes. Right chest port with distal tip terminating at the superior cavoatrial junction. Dense mitral annulus calcification. Mild coronary artery calcifications. Stable right lower lobe groundglass nodule measuring 8 mm (series 4, image 167). Unchanged severely dilated left ureter with left ureterocele. Cholecystectomy. Colonic diverticula. Large colonic stool burden. 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 Post surgical changes of the ventral abdominal wall. Polyarticular degenerative changes including multilevel degenerative changes of the spine. Bilateral L5 pars defects with grade 2 anterolisthesis of L5 on S1.     IMPRESSION: Increased FDG uptake of the peripancreatic mass concerning for recurrent lymphoma. Simon Butler.     MA Diagnostic Right w/Will    Result Date: 11/19/2024  MA DIAGNOSTIC RIGHT W/ WILL -  11/19/2024 2:05 PM HISTORY:  Recalled from screening of 11/11/2024 regarding possible asymmetry in the lateral right breast. COMPARISON:  11/11/2024, 11/8/2023, 11/7/2022, 11/3/2021, 11/2/2020 BREAST DENSITY: The breasts are heterogeneously dense, which may obscure small masses. FINDINGS:  Diagnostic views of the right breast were obtained with tomosynthesis. With the benefit of today's additional diagnostic views the screening detected asymmetry is not redemonstrated. The pattern of glandular tissue is stable in comparison with prior mammograms and there is no evidence of malignancy.     IMPRESSION: BI-RADS CATEGORY: 1 -  Negative.  No evidence of malignancy. Results were discussed with the patient during her appointment. As long as her physical examination remains stable, annual screening mammography would be recommended. RECOMMENDED FOLLOW-UP: Routine yearly mammography beginning at age 40 or as discussed with your provider. BRUCE BOWEN MD   SYSTEM ID:  [id number]    MA Screening Bilateral w/ Will    Result Date: 11/12/2024  BILATERAL FULL FIELD DIGITAL SCREENING MAMMOGRAM WITH TOMOSYNTHESIS Performed on: 11/11/24 Compared to: 11/08/2023, 11/07/2022, 11/03/2021, 11/02/2020, 11/20/2019, and 11/16/2018 Technique:  This study was evaluated with the assistance of Computer-Aided Detection.  Breast Tomosynthesis was used in interpretation. Findings: The breasts are heterogeneously dense, which may obscure small masses. There is a possible asymmetry in the right breast on the CC view lateral  position, middle depth. The remainder of the breast tissue is unremarkable. There is no suspicious finding of the left breast.    IMPRESSION: BI-RADS CATEGORY: 0 - Incomplete - Need additional imaging evaluation. RECOMMENDED FOLLOW-UP: Additional mammographic images and possible ultrasound of the right breast. The results and recommendations of this examination will be communicated to the patient and the imaging center will attempt to contact the patient within 2-3 business days to schedule follow-up imaging. Katy Durham MD        I spent 25 minutes on the patient's visit today.  This included preparation for the visit, face-to-face time with the patient and documentation following the visit.  It did not include teaching or procedure time.    Signed by: Peter E. Friedell, MD

## 2024-12-03 NOTE — LETTER
"12/3/2024      Bibiana Mcrae  [address] [facility] [address]      Dear Colleague,    Thank you for referring your patient, Bibiana Mcrae, to the [facility] [place]. Please see a copy of my visit note below.    Oncology Rooming Note    December 3, 2024 11:35 AM   Bibiana Mcrae is a 79 year old female who presents for:    Chief Complaint   Patient presents with     Oncology Clinic Visit     Return visit with labs and prior PET review     Initial Vitals: BP (!) 157/74 (BP Location: Right arm, Patient Position: Sitting, Cuff Size: Adult Small)   Pulse 85   Temp 97.5  F (36.4  C) (Tympanic)   Resp 16   Ht 1.448 m (4' 9\")   Wt 41.3 kg (91 lb)   LMP  (LMP Unknown)   SpO2 99%   BMI 19.69 kg/m   Estimated body mass index is 19.69 kg/m  as calculated from the following:    Height as of this encounter: 1.448 m (4' 9\").    Weight as of this encounter: 41.3 kg (91 lb). Body surface area is 1.29 meters squared.  No Pain (0) Comment: Data Unavailable   No LMP recorded (lmp unknown). Patient has had a hysterectomy.  Allergies reviewed: Yes  Medications reviewed: Yes    Medications: Medication refills not needed today.  Pharmacy name entered into [facility]:    [id number] [place], [place] - [address] AT [place] [address]  [facility] - [address]  [facility] - [address]    Frailty Screening:   Is the patient here for a new oncology consult visit in cancer care? 2. 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  She is asymptomatic.  Blood count blood chemistry and LDH are within near normal limits.  PET CT scan now shows increase FDG avidity at the site of the original lymphoma.  Repeat biopsy is planned.        ______________________________________________________________________________    Staging History   Cancer Staging   No matching staging information was found for the patient.    ECOG Performance = 1    History of Present Illness  Disease history per Dr. Florian         Diagnosis: Diffuse large B-cell lymphoma.  This was found incidentally in April 2024.  She had a fall and came to the ER and they did a CT scan.  This showed an abdominal mass.  -4/15/24: Biopsy done of abdominal mass that was labeled as pancreatic mass.  It shows a CD20 positive large cell lymphoma.  -5/2/24: Hypermetabolic lymphadenopathy above and below the diaphragm, consistent with biopsy-proven lymphoma. Radiotracer avid mixed lytic and sclerotic observations in the left ischium and right distal femur metaphysis are nonspecific but raise possibility of marrow involvement  9/10/2024 PET/CT shows near complete treatment response.  11/27/2024 Pet CT shows Increased activity at the site of the original lesion. New biopsy is planned     Treatment:  -5/3/2024: Patient started R mini CHOP. PET after 3 cycles showed CR.   8/16/2024 6th and final treatment completed     Interim history:  Review of systems.  Pertinent Findings are included in the History of Present Illness    Physical Exam    BP (!) 157/74 (BP Location: Right arm, Patient Position: Sitting, Cuff Size: Adult Small)   Pulse 85   Temp 97.5  F (36.4  C) (Tympanic)   Resp 16   Ht 1.448 m (4' 9\")   Wt 41.3 kg (91 lb)   LMP  (LMP Unknown)   SpO2 99%   BMI 19.69 kg/m       Not examined this visit.    Medications:    Current Outpatient Medications   Medication Sig Dispense Refill     eszopiclone (LUNESTA) 1 MG tablet Take 1 tablet (1 " mg) by mouth at bedtime. 90 tablet 1     simethicone (MYLICON) 125 MG chewable tablet Take 125 mg by mouth as needed.       UNABLE TO FIND Take 2 Tablespoonful by mouth every morning MEDICATION NAME: biofiber 8 oz with water       acarbose (PRECOSE) 25 MG tablet Take 25 mg by mouth 3 times daily (with meals)       acetaminophen (TYLENOL) 325 MG tablet Take 325-650 mg by mouth every 6 hours as needed for mild pain       amLODIPine (NORVASC) 5 MG tablet Take 1 tablet (5 mg) by mouth daily 90 tablet 3     Biotin 5 MG TABS Take 5,000 mcg by mouth daily       busPIRone (BUSPAR) 7.5 MG tablet TAKE 1 TABLET BY MOUTH TWICE  DAILY 180 tablet 2     celecoxib (CELEBREX) 200 MG capsule TAKE 1 CAPSULE BY MOUTH  DAILY 90 capsule 3     denosumab (PROLIA) 60 MG/ML SOSY injection Inject 60 mg Subcutaneous every 6 months       multivitamin with minerals (THERA-M) 9 mg iron-400 mcg Tab tablet [MULTIVITAMIN WITH MINERALS (THERA-M) 9 MG IRON-400 MCG TAB TABLET] Take 1 tablet by mouth daily.       nortriptyline (PAMELOR) 50 MG capsule TAKE 1 CAPSULE BY MOUTH AT  BEDTIME 90 capsule 2     omeprazole (PRILOSEC) 20 MG capsule [OMEPRAZOLE (PRILOSEC) 20 MG CAPSULE] Take 20 mg by mouth daily before breakfast.       ondansetron (ZOFRAN) 4 MG tablet TAKE 1 TO 2 TABLETS(4 TO 8 MG) BY MOUTH EVERY 6 HOURS AS NEEDED FOR NAUSEA OR VOMITING 30 tablet 1     ondansetron (ZOFRAN) 8 MG tablet Take 1 tablet (8 mg) by mouth every 8 hours as needed for nausea. 90 tablet 3     Probiotic Product (SOLUBLE FIBER/PROBIOTICS PO) 2 tablespoons in 8 oz of liquid daily (Patient not taking: Reported on 12/3/2024)       rivastigmine (EXELON) 9.5 MG/24HR 24 hr patch Place 1 patch onto the skin daily 90 patch 3     sennosides (SENOKOT) 8.6 MG tablet Take 2 tablets by mouth daily as needed for constipation       traZODone (DESYREL) 50 MG tablet Take 1-2 tablets ( mg) by mouth nightly as needed for sleep. 30 tablet 3     Current Facility-Administered Medications    Medication Dose Route Frequency Provider Last Rate Last Admin     denosumab (PROLIA) injection 60 mg  60 mg Subcutaneous Q6 Months    60 mg at 07/02/24 1611     Facility-Administered Medications Ordered in Other Visits   Medication Dose Route Frequency Provider Last Rate Last Admin     heparin lock flush 100 unit/mL injection 5 mL  5 mL Intracatheter Once PRN Inocencio Florian MD   5 mL at 12/03/24 1215     sodium chloride (PF) 0.9% PF flush 3-20 mL  3-20 mL Intracatheter q1 min prn Inocencio Florian MD   20 mL at 12/03/24 1215           Past History    Past Medical History:   Diagnosis Date     Anxiety      Breast cyst     While ago     History of cholecystectomy 08/24/2023     Past Surgical History:   Procedure Laterality Date     BREAST CYST ASPIRATION Left     While ago     HYSTERECTOMY  1985     IR CHEST PORT PLACEMENT > 5 YRS OF AGE  5/1/2024     OOPHORECTOMY Bilateral 1985     [facility] REMOVAL OF OVARY(S)      Description: Oophorectomy;  Recorded: 12/17/2009;     ZZC TOTAL ABDOM HYSTERECTOMY      Description: Total Abdominal Hysterectomy;  Recorded: 12/17/2009;     Allergies   Allergen Reactions     Blood Transfusion Related (Informational Only) Other (See Comments)     Patient has a history of a Clinically Significant antibody against RBC antigens. A delay in compatible RBCs may occur. 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     Prochlorperazine Edisylate [Prochlorperazine] Other (See Comments)     Extrapyramidal side effects     Family History   Problem Relation Age of Onset     Breast Cancer Mother 52.00     Cancer Maternal Grandfather 71.00        prostate     Prostate Cancer Maternal Grandfather      Social History     Socioeconomic History     Marital status:      Spouse name: None     Number of children: None     Years of education: None     Highest education level: None   Tobacco Use     Smoking status: Never     Passive exposure: Never     Smokeless tobacco: Never   Vaping Use     Vaping  status: Never Used   Substance and Sexual Activity     Alcohol use: No     Drug use: Never     Social Determinants of Health     Financial Resource Strain: Low Risk  (4/25/2024)    Financial Resource Strain      Within the past 12 months, have you or your family members you live with been unable to get utilities (heat, electricity) when it was really needed?: No   Food Insecurity: Low Risk  (4/25/2024)    Food Insecurity      Within the past 12 months, did you worry that your food would run out before you got money to buy more?: No      Within the past 12 months, did the food you bought just not last and you didn t have money to get more?: No   Transportation Needs: Low Risk  (4/25/2024)    Transportation Needs      Within the past 12 months, has lack of transportation kept you from medical appointments, getting your medicines, non-medical meetings or appointments, work, or from getting things that you need?: No   Physical Activity: Unknown (4/25/2024)    Exercise Vital Sign      Days of Exercise per Week: 0 days   Stress: Stress Concern Present (4/25/2024)    [employer] - Occupational Stress Questionnaire      Feeling of Stress : Rather much   Social Connections: Unknown (4/25/2024)    Social Connection and Isolation Panel [NHANES]      Frequency of Social Gatherings with Friends and Family: Once a week   Interpersonal Safety: Low Risk  (4/25/2024)    Interpersonal Safety      Do you feel physically and emotionally safe where you currently live?: Yes      Within the past 12 months, have you been hit, slapped, kicked or otherwise physically hurt by someone?: No      Within the past 12 months, have you been humiliated or emotionally abused in other ways by your partner or ex-partner?: No   Housing Stability: Low Risk  (4/25/2024)    Housing Stability      Do you have housing? : Yes      Are you worried about losing your housing?: No           Lab Results    Recent Results (from the past  240 hours)   Glucose by meter    Collection Time: 11/27/24  8:41 AM   Result Value Ref Range    GLUCOSE BY METER POCT 91 70 - 99 mg/dL   Comprehensive metabolic panel    Collection Time: 12/03/24 10:50 AM   Result Value Ref Range    Sodium 138 135 - 145 mmol/L    Potassium 4.0 3.4 - 5.3 mmol/L    Carbon Dioxide (CO2) 25 22 - 29 mmol/L    Anion Gap 10 7 - 15 mmol/L    Urea Nitrogen 19.3 8.0 - 23.0 mg/dL    Creatinine 0.83 0.51 - 0.95 mg/dL    GFR Estimate 71 >60 mL/min/1.73m2    Calcium 8.6 (L) 8.8 - 10.4 mg/dL    Chloride 103 98 - 107 mmol/L    Glucose 139 (H) 70 - 99 mg/dL    Alkaline Phosphatase 70 40 - 150 U/L    AST 24 0 - 45 U/L    ALT 26 0 - 50 U/L    Protein Total 6.2 (L) 6.4 - 8.3 g/dL    Albumin 4.0 3.5 - 5.2 g/dL    Bilirubin Total 0.3 <=1.2 mg/dL   Lactate Dehydrogenase    Collection Time: 12/03/24 10:50 AM   Result Value Ref Range    Lactate Dehydrogenase 147 0 - 250 U/L   CBC with platelets and differential    Collection Time: 12/03/24 10:50 AM   Result Value Ref Range    WBC Count 3.9 (L) 4.0 - 11.0 10e3/uL    RBC Count 4.23 3.80 - 5.20 10e6/uL    Hemoglobin 11.2 (L) 11.7 - 15.7 g/dL    Hematocrit 36.0 35.0 - 47.0 %    MCV 85 78 - 100 fL    MCH 26.5 26.5 - 33.0 pg    MCHC 31.1 (L) 31.5 - 36.5 g/dL    RDW 15.3 (H) 10.0 - 15.0 %    Platelet Count 213 150 - 450 10e3/uL    % Neutrophils 53 %    % Lymphocytes 29 %    % Monocytes 12 %    % Eosinophils 6 %    % Basophils 1 %    % Immature Granulocytes 0 %    NRBCs per 100 WBC 0 <1 /100    Absolute Neutrophils 2.1 1.6 - 8.3 10e3/uL    Absolute Lymphocytes 1.1 0.8 - 5.3 10e3/uL    Absolute Monocytes 0.5 0.0 - 1.3 10e3/uL    Absolute Eosinophils 0.2 0.0 - 0.7 10e3/uL    Absolute Basophils 0.0 0.0 - 0.2 10e3/uL    Absolute Immature Granulocytes 0.0 <=0.4 10e3/uL    Absolute NRBCs 0.0 10e3/uL       Imaging Results    PET Oncology Whole Body    Result Date: 11/27/2024  EXAM: PET ONCOLOGY WHOLE BODY LOCATION: [facility] DATE: 11/27/2024  INDICATION: Subsequent treatment planning and restaging for diffuse large B-cell lymphoma of intra-abdominal lymph nodes. Chemotherapy completed 08/16/2024. Currently on surveillance. COMPARISON: FDG PET/CT 09/09/2024. CONTRAST: None. TECHNIQUE: Serum glucose level 91 mg/dL. One hour post intravenous administration of 10.1 mCi F-18 FDG, PET imaging was performed from the skull vertex to feet, utilizing attenuation correction with concurrent axial CT and PET/CT image fusion. Dose reduction techniques were used. PET/CT FINDINGS: Increased FDG uptake within residual peripancreatic mass measuring 2.0 x 1.5 cm compared to 2.1 x 1.4 cm with SUV max of 8.6, previously 3.3. No new site of FDG avid adenopathy. No suspicious focal marrow uptake. The spleen is not enlarged and demonstrates uptake below that of hepatic parenchyma. Mild diffuse esophageal uptake is likely secondary to esophagitis. Degenerative uptake at the atlantoaxial joint. CT FINDINGS: Mild to moderate intracranial senescent changes. Right chest port with distal tip terminating at the superior cavoatrial junction. Dense mitral annulus calcification. Mild coronary artery calcifications. Stable right lower lobe groundglass nodule measuring 8 mm (series 4, image 167). Unchanged severely dilated left ureter with left ureterocele. Cholecystectomy. Colonic diverticula. Large colonic stool burden. 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 Post surgical changes of the ventral abdominal wall. Polyarticular degenerative changes including multilevel degenerative changes of the spine. Bilateral L5 pars defects with grade 2 anterolisthesis of L5 on S1.     IMPRESSION: Increased FDG uptake of the peripancreatic mass concerning for recurrent lymphoma. Simon 5.     MA Diagnostic Right w/Will    Result Date: 11/19/2024  MA DIAGNOSTIC RIGHT W/ WILL -  11/19/2024 2:05 PM HISTORY:   Recalled from screening of 11/11/2024 regarding possible asymmetry in the lateral right breast. COMPARISON:  11/11/2024, 11/8/2023, 11/7/2022, 11/3/2021, 11/2/2020 BREAST DENSITY: The breasts are heterogeneously dense, which may obscure small masses. FINDINGS:  Diagnostic views of the right breast were obtained with tomosynthesis. With the benefit of today's additional diagnostic views the screening detected asymmetry is not redemonstrated. The pattern of glandular tissue is stable in comparison with prior mammograms and there is no evidence of malignancy.     IMPRESSION: BI-RADS CATEGORY: 1 -  Negative.  No evidence of malignancy. Results were discussed with the patient during her appointment. As long as her physical examination remains stable, annual screening mammography would be recommended. RECOMMENDED FOLLOW-UP: Routine yearly mammography beginning at age 40 or as discussed with your provider. BRUCE BOWEN MD   SYSTEM ID:  [id number]    MA Screening Bilateral w/ Jeramy    Result Date: 11/12/2024  BILATERAL FULL FIELD DIGITAL SCREENING MAMMOGRAM WITH TOMOSYNTHESIS Performed on: 11/11/24 Compared to: 11/08/2023, 11/07/2022, 11/03/2021, 11/02/2020, 11/20/2019, and 11/16/2018 Technique:  This study was evaluated with the assistance of Computer-Aided Detection.  Breast Tomosynthesis was used in interpretation. Findings: The breasts are heterogeneously dense, which may obscure small masses. There is a possible asymmetry in the right breast on the CC view lateral position, middle depth. The remainder of the breast tissue is unremarkable. There is no suspicious finding of the left breast.    IMPRESSION: BI-RADS CATEGORY: 0 - Incomplete - Need additional imaging evaluation. RECOMMENDED FOLLOW-UP: Additional mammographic images and possible ultrasound of the right breast. The results and recommendations of this examination will be communicated to the patient and the imaging center will attempt to contact the patient within  2-3 business days to schedule follow-up imaging. Katy Durham MD        I spent 25 minutes on the patient's visit today.  This included preparation for the visit, face-to-face time with the patient and documentation following the visit.  It did not include teaching or procedure time.    Signed by: Peter E. Friedell, MD          Again, thank you for allowing me to participate in the care of your patient.        Sincerely,        Peter E. Friedell, MD

## 2024-12-03 NOTE — PROGRESS NOTES
"Oncology Rooming Note    December 3, 2024 11:35 AM   Bibiana Mcrae is a 79 year old female who presents for:    Chief Complaint   Patient presents with    Oncology Clinic Visit     Return visit with labs and prior PET review     Initial Vitals: BP (!) 157/74 (BP Location: Right arm, Patient Position: Sitting, Cuff Size: Adult Small)   Pulse 85   Temp 97.5  F (36.4  C) (Tympanic)   Resp 16   Ht 1.448 m (4' 9\")   Wt 41.3 kg (91 lb)   LMP  (LMP Unknown)   SpO2 99%   BMI 19.69 kg/m   Estimated body mass index is 19.69 kg/m  as calculated from the following:    Height as of this encounter: 1.448 m (4' 9\").    Weight as of this encounter: 41.3 kg (91 lb). Body surface area is 1.29 meters squared.  No Pain (0) Comment: Data Unavailable   No LMP recorded (lmp unknown). Patient has had a hysterectomy.  Allergies reviewed: Yes  Medications reviewed: Yes    Medications: Medication refills not needed today.  Pharmacy name entered into [facility]:    [id number] [place], [place] - [address] AT [place] [address]  [facility] - [place], [place] - [address] [place] [address]  [facility] - [address]    Frailty Screening:   Is the patient here for a new oncology consult visit in cancer care? 2. No      Clinical concerns:       RAS LOMBARDO CMA            "

## 2024-12-04 ENCOUNTER — TELEPHONE (OUTPATIENT)
Dept: INTERVENTIONAL RADIOLOGY/VASCULAR | Facility: HOSPITAL | Age: 79
End: 2024-12-04
Payer: COMMERCIAL

## 2024-12-04 ENCOUNTER — TELEPHONE (OUTPATIENT)
Dept: ONCOLOGY | Facility: HOSPITAL | Age: 79
End: 2024-12-04
Payer: COMMERCIAL

## 2024-12-04 NOTE — TELEPHONE ENCOUNTER
Bibiana's daughter Rosa Isela called regarding IR's request for an MRI and declined referral.     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    I shared this information with Dr. Acosta, he agreed that a CT with contrast could be helpful.    Rosa Isela will discuss with the ordering provider about alternate imaging and they will resend the referral as they are able.

## 2024-12-04 NOTE — TELEPHONE ENCOUNTER
I received a phone call today from patient's daughter, Rosa Isela.  She is calling because they met with Dr. Friedell yesterday.  Rosa Isela was called and told the plan was to get an MRI as next steps.  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  She states they need to come up with a new plan.  She is hoping to get all imaging/biopsies, etc done by the end of the year as they have an insurance change coming January 1.  She would like a call back to discuss.  She can be reached at [phone].  I let him know and get this message over to Dr. Friedell and his nurse Lolita, SAFIACC.     Priya Barrett RN on 12/4/2024 at 3:39 PM

## 2024-12-05 ENCOUNTER — PATIENT OUTREACH (OUTPATIENT)
Dept: ONCOLOGY | Facility: HOSPITAL | Age: 79
End: 2024-12-05
Payer: COMMERCIAL

## 2024-12-05 DIAGNOSIS — R19.00 INTRAABDOMINAL MASS: ICD-10-CM

## 2024-12-05 DIAGNOSIS — C83.33 DIFFUSE LARGE B-CELL LYMPHOMA OF INTRA-ABDOMINAL LYMPH NODES (H): Primary | ICD-10-CM

## 2024-12-05 NOTE — PROGRESS NOTES
[facility]: Cancer Care                                                                                          Situation: Chart reviewed by RN Care Coordinator.    Background: Patient was seen this week for follow-up regarding diffuse large B-cell lymphoma.     Assessment: She is asymptomatic.  Labs are within normal limits.  PET scan shows increased FDG avidity at the site of the original lymphoma.    Plan/Recommendations:  Biopsy is to be done.      Signature:  Lolita Keene RN Care Coordinator  [facility]

## 2024-12-09 ENCOUNTER — TELEPHONE (OUTPATIENT)
Dept: ONCOLOGY | Facility: HOSPITAL | Age: 79
End: 2024-12-09
Payer: COMMERCIAL

## 2024-12-09 NOTE — TELEPHONE ENCOUNTER
I received a phone call today from Bibiana's daughter, Rosa Isela.  She is calling because she has not heard yet about getting her Mom's biopsy scheduled.  She is hoping to get a call back to discuss this more.  She can be reached at [phone].    Priya Barrett RN on 12/9/2024 at 3:06 PM

## 2024-12-10 ENCOUNTER — TRANSFERRED RECORDS (OUTPATIENT)
Dept: HEALTH INFORMATION MANAGEMENT | Facility: CLINIC | Age: 79
End: 2024-12-10
Payer: COMMERCIAL

## 2024-12-10 ENCOUNTER — TELEPHONE (OUTPATIENT)
Dept: ONCOLOGY | Facility: HOSPITAL | Age: 79
End: 2024-12-10
Payer: COMMERCIAL

## 2024-12-10 NOTE — TELEPHONE ENCOUNTER
[facility]: Cancer Care                                                                                          Recent CT scan has been reviewed by radiologist, Dr. Loco Acosta. He indicates that there is nothing to biopsy.    Dr. Friedell asks that I call patient/family and indicate this is this good news as long as patient remains asymptomatic; orders entered for 8 weeks repeat CT/PET and labs.    I reported all this to daughter, Rosa Isela. She was not in agreement with this plan. She would like to come in with her mom to further discuss this and to have a thorough review of CT scan images.     Dr. Friedell approves this request. Patient will be seen in clinic on Thurs, 12/19.       Signature:  Lolita Keene RN

## 2024-12-19 ENCOUNTER — ONCOLOGY VISIT (OUTPATIENT)
Dept: ONCOLOGY | Facility: HOSPITAL | Age: 79
End: 2024-12-19
Payer: COMMERCIAL

## 2024-12-19 VITALS
WEIGHT: 92.8 LBS | RESPIRATION RATE: 16 BRPM | DIASTOLIC BLOOD PRESSURE: 64 MMHG | OXYGEN SATURATION: 97 % | BODY MASS INDEX: 20.02 KG/M2 | TEMPERATURE: 98.6 F | HEIGHT: 57 IN | SYSTOLIC BLOOD PRESSURE: 127 MMHG | HEART RATE: 94 BPM

## 2024-12-19 DIAGNOSIS — C83.33 DIFFUSE LARGE B-CELL LYMPHOMA OF INTRA-ABDOMINAL LYMPH NODES (H): Primary | ICD-10-CM

## 2024-12-19 PROCEDURE — G0463 HOSPITAL OUTPT CLINIC VISIT: HCPCS | Performed by: INTERNAL MEDICINE

## 2024-12-19 ASSESSMENT — PAIN SCALES - GENERAL: PAINLEVEL_OUTOF10: NO PAIN (0)

## 2024-12-19 NOTE — LETTER
"12/19/2024      Bibiana Mcrae  [address] [facility] [address]      Dear Colleague,    Thank you for referring your patient, Bibiana Mcrae, to the [facility]. Please see a copy of my visit note below.    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    Weight as of this encounter: 42.1 kg (92 lb 12.8 oz). Body surface area is 1.3 meters squared.  No Pain (0) Comment: Data Unavailable   No LMP recorded (lmp unknown). Patient has had a hysterectomy.  Allergies reviewed: Yes  Medications reviewed: Yes    Medications: Medication refills not needed today.  Pharmacy name entered into [facility]:    [id number] [place], [place] - [address] AT [place] [address]  [facility] ([facility]) - [address]  [facility] - [address]    Frailty Screening:   Is the patient here for a new oncology consult visit in cancer care? 2. 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  She is asymptomatic.  Blood count blood chemistry and LDH are within near normal limits.  PET CT scan now shows increase FDG avidity at the site of the original lymphoma.  Repeat biopsy is planned.      Update 12/4/2024  Lesion to biopsy is anatomically unclear on PET CT scan.  CT of the abdomen with contrast is ordered.  Patient cannot have MRI secondary to metal in her eye.    Update 12/9/2024  Ct of the abdomen shows no lesion to biopsy.  Plan repeat pet scan with contrast ct in about 8 weeks    Update 12/19/2024  At family's request ultrasound of the abdomen is ordered to make sure there is no mass lesion to biopsy.      ______________________________________________________________________________    Staging History   Cancer Staging   No matching staging information was found for the patient.    ECOG Performance = 1    History of Present Illness  Disease history per Dr. Florian         Diagnosis: Diffuse large B-cell lymphoma.  This was found incidentally in April 2024.  She had a fall and came to the ER and they did a CT scan.  This showed an abdominal mass.  -4/15/24: Biopsy done of abdominal mass that was labeled as pancreatic mass.  It shows a CD20 positive large cell lymphoma.  -5/2/24: Hypermetabolic lymphadenopathy above and below the diaphragm, consistent with biopsy-proven lymphoma. Radiotracer avid mixed lytic and sclerotic observations in the left ischium and right distal femur metaphysis are nonspecific but raise possibility of marrow involvement  9/10/2024 PET/CT shows near complete treatment response.  11/27/2024 Pet CT shows Increased activity at the site of the original lesion. New biopsy is planned  12/5/2024 Ct scan of the abdomen shows no mass to biopsy.  Plan repeat pet with contrast ct in about 8 weeks.       Treatment:  -5/3/2024: Patient started R mini CHOP. PET after 3 cycles  "showed CR.   8/16/2024 6th and final treatment completed     Interim history:  Review of systems.  Pertinent Findings are included in the History of Present Illness    Physical Exam    /64   Pulse 94   Temp 98.6  F (37  C)   Resp 16   Ht 1.448 m (4' 9\")   Wt 42.1 kg (92 lb 12.8 oz)   LMP  (LMP Unknown)   SpO2 97%   BMI 20.08 kg/m       GENERAL APPEARANCE: Healthy appearing woman in no apparent distress.  HEAD: Atraumatic; normocephalic; without lesions.  EYES: Conjunctiva, corneas and eyelids normal; pupils equal, round, reactive to light; No Icterus.  MOUTH/OROPHARYNX: Not examined, facemask   NECK: Supple with no nodes.  LUNGS:  Clear to auscultation and percussion with no extra sounds.  HEART: Regular rhythm and rate; S1 and S2 normal; no murmurs noted.  ABDOMEN: Soft; no masses or tenderness, no hepatosplenomegaly.  NEUROLOGIC: Alert and oriented.  No obvious focal findings.  EXTREMITIES: No cyanosis, or edema.  SKIN: No abnormal bruising or bleeding. No suspicious lesions noted on exposed skin.  PSYCHIATRIC: Mental status normal; no apparent psychiatric issues  No cervical or axillary adenopathy noted    Medications:    Current Outpatient Medications   Medication Sig Dispense Refill     acarbose (PRECOSE) 25 MG tablet Take 25 mg by mouth 3 times daily (with meals)       acetaminophen (TYLENOL) 325 MG tablet Take 325-650 mg by mouth every 6 hours as needed for mild pain       amLODIPine (NORVASC) 5 MG tablet Take 1 tablet (5 mg) by mouth daily 90 tablet 3     Biotin 5 MG TABS Take 5,000 mcg by mouth daily       busPIRone (BUSPAR) 7.5 MG tablet TAKE 1 TABLET BY MOUTH TWICE  DAILY 180 tablet 2     celecoxib (CELEBREX) 200 MG capsule TAKE 1 CAPSULE BY MOUTH  DAILY 90 capsule 3     denosumab (PROLIA) 60 MG/ML SOSY injection Inject 60 mg Subcutaneous every 6 months       eszopiclone (LUNESTA) 1 MG tablet Take 1 tablet (1 mg) by mouth at bedtime. 90 tablet 1     multivitamin with minerals (THERA-M) 9 mg " iron-400 mcg Tab tablet [MULTIVITAMIN WITH MINERALS (THERA-M) 9 MG IRON-400 MCG TAB TABLET] Take 1 tablet by mouth daily.       nortriptyline (PAMELOR) 50 MG capsule TAKE 1 CAPSULE BY MOUTH AT  BEDTIME 90 capsule 2     omeprazole (PRILOSEC) 20 MG capsule [OMEPRAZOLE (PRILOSEC) 20 MG CAPSULE] Take 20 mg by mouth daily before breakfast.       ondansetron (ZOFRAN) 8 MG tablet Take 1 tablet (8 mg) by mouth every 8 hours as needed for nausea. 90 tablet 3     Probiotic Product (SOLUBLE FIBER/PROBIOTICS PO) 2 tablespoons in 8 oz of liquid daily       rivastigmine (EXELON) 9.5 MG/24HR 24 hr patch Place 1 patch onto the skin daily 90 patch 3     sennosides (SENOKOT) 8.6 MG tablet Take 2 tablets by mouth daily as needed for constipation       simethicone (MYLICON) 125 MG chewable tablet Take 125 mg by mouth as needed.       UNABLE TO FIND Take 2 Tablespoonful by mouth every morning MEDICATION NAME: biofiber 8 oz with water       ondansetron (ZOFRAN) 4 MG tablet TAKE 1 TO 2 TABLETS(4 TO 8 MG) BY MOUTH EVERY 6 HOURS AS NEEDED FOR NAUSEA OR VOMITING 30 tablet 1     traZODone (DESYREL) 50 MG tablet Take 1-2 tablets ( mg) by mouth nightly as needed for sleep. 30 tablet 3     Current Facility-Administered Medications   Medication Dose Route Frequency Provider Last Rate Last Admin     denosumab (PROLIA) injection 60 mg  60 mg Subcutaneous Q6 Months    60 mg at 07/02/24 1611           Past History    Past Medical History:   Diagnosis Date     Anxiety      Breast cyst     While ago     History of cholecystectomy 08/24/2023     Past Surgical History:   Procedure Laterality Date     BREAST CYST ASPIRATION Left     While ago     HYSTERECTOMY  1985     IR CHEST PORT PLACEMENT > 5 YRS OF AGE  5/1/2024     OOPHORECTOMY Bilateral 1985     Z REMOVAL OF OVARY(S)      Description: Oophorectomy;  Recorded: 12/17/2009;     ZZC TOTAL ABDOM HYSTERECTOMY      Description: Total Abdominal Hysterectomy;  Recorded: 12/17/2009;     Allergies    Allergen Reactions     Blood Transfusion Related (Informational Only) Other (See Comments)     Patient has a history of a Clinically Significant antibody against RBC antigens. A delay in compatible RBCs may occur. 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     Prochlorperazine Edisylate [Prochlorperazine] Other (See Comments)     Extrapyramidal side effects     Family History   Problem Relation Age of Onset     Breast Cancer Mother 52.00     Cancer Maternal Grandfather 71.00        prostate     Prostate Cancer Maternal Grandfather      Social History     Socioeconomic History     Marital status:      Spouse name: None     Number of children: None     Years of education: None     Highest education level: None   Tobacco Use     Smoking status: Never     Passive exposure: Never     Smokeless tobacco: Never   Vaping Use     Vaping status: Never Used   Substance and Sexual Activity     Alcohol use: No     Drug use: Never     Social Determinants of Health     Financial Resource Strain: Low Risk  (4/25/2024)    Financial Resource Strain      Within the past 12 months, have you or your family members you live with been unable to get utilities (heat, electricity) when it was really needed?: No   Food Insecurity: Low Risk  (4/25/2024)    Food Insecurity      Within the past 12 months, did you worry that your food would run out before you got money to buy more?: No      Within the past 12 months, did the food you bought just not last and you didn t have money to get more?: No   Transportation Needs: Low Risk  (4/25/2024)    Transportation Needs      Within the past 12 months, has lack of transportation kept you from medical appointments, getting your medicines, non-medical meetings or appointments, work, or from getting things that you need?: No   Physical Activity: Unknown (4/25/2024)    Exercise Vital Sign      Days of Exercise per Week: 0 days   Stress: Stress Concern Present (4/25/2024)    [employer]  [employer] - Occupational Stress Questionnaire      Feeling of Stress : Rather much   Social Connections: Unknown (4/25/2024)    Social Connection and Isolation Panel [NHANES]      Frequency of Social Gatherings with Friends and Family: Once a week   Interpersonal Safety: Low Risk  (4/25/2024)    Interpersonal Safety      Do you feel physically and emotionally safe where you currently live?: Yes      Within the past 12 months, have you been hit, slapped, kicked or otherwise physically hurt by someone?: No      Within the past 12 months, have you been humiliated or emotionally abused in other ways by your partner or ex-partner?: No   Housing Stability: Low Risk  (4/25/2024)    Housing Stability      Do you have housing? : Yes      Are you worried about losing your housing?: No           Lab Results    No results found for this or any previous visit (from the past 240 hours).      Imaging Results    CT Abdomen Pelvis w Contrast    Result Date: 12/6/2024  EXAM: CT ABDOMEN PELVIS W CONTRAST LOCATION: [facility] DATE: 12/5/2024 INDICATION: Area of FDG uptake residual peripancreatic mass with increased SUV the most recent PET/CT. Diffuse large B-cell lymphoma of intra-abdominal lymph nodes. Chemotherapy completed 08/16/2024 COMPARISON: PET CTs 11/27/2024, 9/9/2024 7/1/2024 and 5/2/2024. CT AP 4/11/2024 TECHNIQUE: CT scan of the abdomen and pelvis was performed following injection of IV contrast. Multiplanar reformats were obtained. Dose reduction techniques were used. CONTRAST: isovue 370 90ml FINDINGS: LOWER CHEST: Visualized lungs are clear. No pleural effusion. Heart size normal with no pericardial effusion.  Coronary artery and mitral annular calcification. HEPATOBILIARY: Liver is normal. Mild stable bile duct dilatation likely due to reservoir effect from prior cholecystectomy PANCREAS: Minimal nonenhancing low-attenuation change within peripancreatic fat surrounding the  atrophic pancreatic tail (series 3 images 50-61). No residual focal lesion within or adjacent to the pancreas. Pancreas otherwise normal. SPLEEN: Spleen size normal. ADRENAL GLANDS: Normal. KIDNEYS/BLADDER: The markedly dilated fluid density structure along the inferior margin left kidney that extends to the level of the bladder where there is a ureterocele likely represents a chronically obstructed normal variant lower pole moiety and ureter and the setting of normal variant duplication. Kidneys, ureters and bladder otherwise unremarkable. BOWEL: Partial gastrectomy. Colon is tortuous and redundant and contains a very large amount of stool. LYMPH NODES: No lymphadenopathy. VASCULATURE: Normal caliber abdominal aorta.  PELVIC ORGANS: Hysterectomy. Pelvis otherwise unremarkable. MUSCULOSKELETAL: Advanced degenerative facet arthritis lower lumbar spine with 8 mm anterolisthesis of L5 on S1 unchanged. No bone lesion or fracture. Bones are demineralized.     IMPRESSION: 1.  Minimal nonenhancing low-attenuation change within peripancreatic fat surrounding the atrophic pancreatic tail represents sequela of previously treated mass. No residual focal lesion within or adjacent to the pancreas. 2.  No lymphadenopathy. Spleen size normal. 3.  The markedly dilated fluid density structure along the inferior margin left kidney that extends to the level of the bladder where there is a ureterocele likely represents a chronically obstructed normal variant lower pole moiety and ureter and the setting of normal variant duplication. 4.  Colon is tortuous and redundant and contains a very large amount of stool.    PET Oncology Whole Body    Result Date: 11/27/2024  EXAM: PET ONCOLOGY WHOLE BODY LOCATION: [facility] DATE: 11/27/2024 INDICATION: Subsequent treatment planning and restaging for diffuse large B-cell lymphoma of intra-abdominal lymph nodes. Chemotherapy completed 08/16/2024. Currently on surveillance.  COMPARISON: FDG PET/CT 09/09/2024. CONTRAST: None. TECHNIQUE: Serum glucose level 91 mg/dL. One hour post intravenous administration of 10.1 mCi F-18 FDG, PET imaging was performed from the skull vertex to feet, utilizing attenuation correction with concurrent axial CT and PET/CT image fusion. Dose reduction techniques were used. PET/CT FINDINGS: Increased FDG uptake within residual peripancreatic mass measuring 2.0 x 1.5 cm compared to 2.1 x 1.4 cm with SUV max of 8.6, previously 3.3. No new site of FDG avid adenopathy. No suspicious focal marrow uptake. The spleen is not enlarged and demonstrates uptake below that of hepatic parenchyma. Mild diffuse esophageal uptake is likely secondary to esophagitis. Degenerative uptake at the atlantoaxial joint. CT FINDINGS: Mild to moderate intracranial senescent changes. Right chest port with distal tip terminating at the superior cavoatrial junction. Dense mitral annulus calcification. Mild coronary artery calcifications. Stable right lower lobe groundglass nodule measuring 8 mm (series 4, image 167). Unchanged severely dilated left ureter with left ureterocele. Cholecystectomy. Colonic diverticula. Large colonic stool burden. 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 Post surgical changes of the ventral abdominal wall. Polyarticular degenerative changes including multilevel degenerative changes of the spine. Bilateral L5 pars defects with grade 2 anterolisthesis of L5 on S1.     IMPRESSION: Increased FDG uptake of the peripancreatic mass concerning for recurrent lymphoma. Simon 5.        I spent 35 minutes on the patient's visit today.  This included preparation for the visit, face-to-face time with the patient and documentation following the visit.  It did not include teaching or procedure time.    Signed by: Peter E. Friedell, MD          Again, thank you for allowing me to  participate in the care of your patient.        Sincerely,        Peter E. Friedell, MD

## 2024-12-19 NOTE — PROGRESS NOTES
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  She is asymptomatic.  Blood count blood chemistry and LDH are within near normal limits.  PET CT scan now shows increase FDG avidity at the site of the original lymphoma.  Repeat biopsy is planned.      Update 12/4/2024  Lesion to biopsy is anatomically unclear on PET CT scan.  CT of the abdomen with contrast is ordered.  Patient cannot have MRI secondary to metal in her eye.    Update 12/9/2024  Ct of the abdomen shows no lesion to biopsy.  Plan repeat pet scan with contrast ct in about 8 weeks    Update 12/19/2024  At family's request ultrasound of the abdomen is ordered to make sure there is no mass lesion to biopsy.      ______________________________________________________________________________    Staging History   Cancer Staging   No matching staging information was found for the patient.    ECOG Performance = 1    History of Present Illness  Disease history per Dr. Florian         Diagnosis: Diffuse large B-cell lymphoma.  This was found incidentally in April 2024.  She had a fall and came to the ER and they did a CT scan.  This showed an abdominal mass.  -4/15/24: Biopsy done of abdominal mass that was labeled as pancreatic mass.  It shows a CD20 positive large cell lymphoma.  -5/2/24: Hypermetabolic lymphadenopathy above and below the diaphragm, consistent with biopsy-proven lymphoma. Radiotracer avid mixed lytic and sclerotic observations in the left ischium and right distal femur metaphysis are nonspecific but  "raise possibility of marrow involvement  9/10/2024 PET/CT shows near complete treatment response.  11/27/2024 Pet CT shows Increased activity at the site of the original lesion. New biopsy is planned  12/5/2024 Ct scan of the abdomen shows no mass to biopsy.  Plan repeat pet with contrast ct in about 8 weeks.       Treatment:  -5/3/2024: Patient started R mini CHOP. PET after 3 cycles showed CR.   8/16/2024 6th and final treatment completed     Interim history:  Review of systems.  Pertinent Findings are included in the History of Present Illness    Physical Exam    /64   Pulse 94   Temp 98.6  F (37  C)   Resp 16   Ht 1.448 m (4' 9\")   Wt 42.1 kg (92 lb 12.8 oz)   LMP  (LMP Unknown)   SpO2 97%   BMI 20.08 kg/m       GENERAL APPEARANCE: Healthy appearing woman in no apparent distress.  HEAD: Atraumatic; normocephalic; without lesions.  EYES: Conjunctiva, corneas and eyelids normal; pupils equal, round, reactive to light; No Icterus.  MOUTH/OROPHARYNX: Not examined, facemask   NECK: Supple with no nodes.  LUNGS:  Clear to auscultation and percussion with no extra sounds.  HEART: Regular rhythm and rate; S1 and S2 normal; no murmurs noted.  ABDOMEN: Soft; no masses or tenderness, no hepatosplenomegaly.  NEUROLOGIC: Alert and oriented.  No obvious focal findings.  EXTREMITIES: No cyanosis, or edema.  SKIN: No abnormal bruising or bleeding. No suspicious lesions noted on exposed skin.  PSYCHIATRIC: Mental status normal; no apparent psychiatric issues  No cervical or axillary adenopathy noted    Medications:    Current Outpatient Medications   Medication Sig Dispense Refill    acarbose (PRECOSE) 25 MG tablet Take 25 mg by mouth 3 times daily (with meals)      acetaminophen (TYLENOL) 325 MG tablet Take 325-650 mg by mouth every 6 hours as needed for mild pain      amLODIPine (NORVASC) 5 MG tablet Take 1 tablet (5 mg) by mouth daily 90 tablet 3    Biotin 5 MG TABS Take 5,000 mcg by mouth daily      busPIRone " (BUSPAR) 7.5 MG tablet TAKE 1 TABLET BY MOUTH TWICE  DAILY 180 tablet 2    celecoxib (CELEBREX) 200 MG capsule TAKE 1 CAPSULE BY MOUTH  DAILY 90 capsule 3    denosumab (PROLIA) 60 MG/ML SOSY injection Inject 60 mg Subcutaneous every 6 months      eszopiclone (LUNESTA) 1 MG tablet Take 1 tablet (1 mg) by mouth at bedtime. 90 tablet 1    multivitamin with minerals (THERA-M) 9 mg iron-400 mcg Tab tablet [MULTIVITAMIN WITH MINERALS (THERA-M) 9 MG IRON-400 MCG TAB TABLET] Take 1 tablet by mouth daily.      nortriptyline (PAMELOR) 50 MG capsule TAKE 1 CAPSULE BY MOUTH AT  BEDTIME 90 capsule 2    omeprazole (PRILOSEC) 20 MG capsule [OMEPRAZOLE (PRILOSEC) 20 MG CAPSULE] Take 20 mg by mouth daily before breakfast.      ondansetron (ZOFRAN) 8 MG tablet Take 1 tablet (8 mg) by mouth every 8 hours as needed for nausea. 90 tablet 3    Probiotic Product (SOLUBLE FIBER/PROBIOTICS PO) 2 tablespoons in 8 oz of liquid daily      rivastigmine (EXELON) 9.5 MG/24HR 24 hr patch Place 1 patch onto the skin daily 90 patch 3    sennosides (SENOKOT) 8.6 MG tablet Take 2 tablets by mouth daily as needed for constipation      simethicone (MYLICON) 125 MG chewable tablet Take 125 mg by mouth as needed.      UNABLE TO FIND Take 2 Tablespoonful by mouth every morning MEDICATION NAME: biofiber 8 oz with water      ondansetron (ZOFRAN) 4 MG tablet TAKE 1 TO 2 TABLETS(4 TO 8 MG) BY MOUTH EVERY 6 HOURS AS NEEDED FOR NAUSEA OR VOMITING 30 tablet 1    traZODone (DESYREL) 50 MG tablet Take 1-2 tablets ( mg) by mouth nightly as needed for sleep. 30 tablet 3     Current Facility-Administered Medications   Medication Dose Route Frequency Provider Last Rate Last Admin    denosumab (PROLIA) injection 60 mg  60 mg Subcutaneous Q6 Months    60 mg at 07/02/24 1611           Past History    Past Medical History:   Diagnosis Date    Anxiety     Breast cyst     While ago    History of cholecystectomy 08/24/2023     Past Surgical History:   Procedure  Laterality Date    BREAST CYST ASPIRATION Left     While ago    HYSTERECTOMY  1985    IR CHEST PORT PLACEMENT > 5 YRS OF AGE  5/1/2024    OOPHORECTOMY Bilateral 1985    ZZC REMOVAL OF OVARY(S)      Description: Oophorectomy;  Recorded: 12/17/2009;    ZZC TOTAL ABDOM HYSTERECTOMY      Description: Total Abdominal Hysterectomy;  Recorded: 12/17/2009;     Allergies   Allergen Reactions    Blood Transfusion Related (Informational Only) Other (See Comments)     Patient has a history of a Clinically Significant antibody against RBC antigens. A delay in compatible RBCs may occur. 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    Prochlorperazine Edisylate [Prochlorperazine] Other (See Comments)     Extrapyramidal side effects     Family History   Problem Relation Age of Onset    Breast Cancer Mother 52.00    Cancer Maternal Grandfather 71.00        prostate    Prostate Cancer Maternal Grandfather      Social History     Socioeconomic History    Marital status:      Spouse name: None    Number of children: None    Years of education: None    Highest education level: None   Tobacco Use    Smoking status: Never     Passive exposure: Never    Smokeless tobacco: Never   Vaping Use    Vaping status: Never Used   Substance and Sexual Activity    Alcohol use: No    Drug use: Never     Social Determinants of Health     Financial Resource Strain: Low Risk  (4/25/2024)    Financial Resource Strain     Within the past 12 months, have you or your family members you live with been unable to get utilities (heat, electricity) when it was really needed?: No   Food Insecurity: Low Risk  (4/25/2024)    Food Insecurity     Within the past 12 months, did you worry that your food would run out before you got money to buy more?: No     Within the past 12 months, did the food you bought just not last and you didn t have money to get more?: No   Transportation Needs: Low Risk  (4/25/2024)    Transportation Needs     Within the past 12  months, has lack of transportation kept you from medical appointments, getting your medicines, non-medical meetings or appointments, work, or from getting things that you need?: No   Physical Activity: Unknown (4/25/2024)    Exercise Vital Sign     Days of Exercise per Week: 0 days   Stress: Stress Concern Present (4/25/2024)    [employer] [place] [place] - Occupational Stress Questionnaire     Feeling of Stress : Rather much   Social Connections: Unknown (4/25/2024)    Social Connection and Isolation Panel [NHANES]     Frequency of Social Gatherings with Friends and Family: Once a week   Interpersonal Safety: Low Risk  (4/25/2024)    Interpersonal Safety     Do you feel physically and emotionally safe where you currently live?: Yes     Within the past 12 months, have you been hit, slapped, kicked or otherwise physically hurt by someone?: No     Within the past 12 months, have you been humiliated or emotionally abused in other ways by your partner or ex-partner?: No   Housing Stability: Low Risk  (4/25/2024)    Housing Stability     Do you have housing? : Yes     Are you worried about losing your housing?: No           Lab Results    No results found for this or any previous visit (from the past 240 hours).      Imaging Results    CT Abdomen Pelvis w Contrast    Result Date: 12/6/2024  EXAM: CT ABDOMEN PELVIS W CONTRAST LOCATION: [facility] DATE: 12/5/2024 INDICATION: Area of FDG uptake residual peripancreatic mass with increased SUV the most recent PET/CT. Diffuse large B-cell lymphoma of intra-abdominal lymph nodes. Chemotherapy completed 08/16/2024 COMPARISON: PET CTs 11/27/2024, 9/9/2024 7/1/2024 and 5/2/2024. CT AP 4/11/2024 TECHNIQUE: CT scan of the abdomen and pelvis was performed following injection of IV contrast. Multiplanar reformats were obtained. Dose reduction techniques were used. CONTRAST: isovue 370 90ml FINDINGS: LOWER CHEST: Visualized lungs are clear. No  pleural effusion. Heart size normal with no pericardial effusion.  Coronary artery and mitral annular calcification. HEPATOBILIARY: Liver is normal. Mild stable bile duct dilatation likely due to reservoir effect from prior cholecystectomy PANCREAS: Minimal nonenhancing low-attenuation change within peripancreatic fat surrounding the atrophic pancreatic tail (series 3 images 50-61). No residual focal lesion within or adjacent to the pancreas. Pancreas otherwise normal. SPLEEN: Spleen size normal. ADRENAL GLANDS: Normal. KIDNEYS/BLADDER: The markedly dilated fluid density structure along the inferior margin left kidney that extends to the level of the bladder where there is a ureterocele likely represents a chronically obstructed normal variant lower pole moiety and ureter and the setting of normal variant duplication. Kidneys, ureters and bladder otherwise unremarkable. BOWEL: Partial gastrectomy. Colon is tortuous and redundant and contains a very large amount of stool. LYMPH NODES: No lymphadenopathy. VASCULATURE: Normal caliber abdominal aorta.  PELVIC ORGANS: Hysterectomy. Pelvis otherwise unremarkable. MUSCULOSKELETAL: Advanced degenerative facet arthritis lower lumbar spine with 8 mm anterolisthesis of L5 on S1 unchanged. No bone lesion or fracture. Bones are demineralized.     IMPRESSION: 1.  Minimal nonenhancing low-attenuation change within peripancreatic fat surrounding the atrophic pancreatic tail represents sequela of previously treated mass. No residual focal lesion within or adjacent to the pancreas. 2.  No lymphadenopathy. Spleen size normal. 3.  The markedly dilated fluid density structure along the inferior margin left kidney that extends to the level of the bladder where there is a ureterocele likely represents a chronically obstructed normal variant lower pole moiety and ureter and the setting of normal variant duplication. 4.  Colon is tortuous and redundant and contains a very large amount of  stool.    PET Oncology Whole Body    Result Date: 11/27/2024  EXAM: PET ONCOLOGY WHOLE BODY LOCATION: [facility] DATE: 11/27/2024 INDICATION: Subsequent treatment planning and restaging for diffuse large B-cell lymphoma of intra-abdominal lymph nodes. Chemotherapy completed 08/16/2024. Currently on surveillance. COMPARISON: FDG PET/CT 09/09/2024. CONTRAST: None. TECHNIQUE: Serum glucose level 91 mg/dL. One hour post intravenous administration of 10.1 mCi F-18 FDG, PET imaging was performed from the skull vertex to feet, utilizing attenuation correction with concurrent axial CT and PET/CT image fusion. Dose reduction techniques were used. PET/CT FINDINGS: Increased FDG uptake within residual peripancreatic mass measuring 2.0 x 1.5 cm compared to 2.1 x 1.4 cm with SUV max of 8.6, previously 3.3. No new site of FDG avid adenopathy. No suspicious focal marrow uptake. The spleen is not enlarged and demonstrates uptake below that of hepatic parenchyma. Mild diffuse esophageal uptake is likely secondary to esophagitis. Degenerative uptake at the atlantoaxial joint. CT FINDINGS: Mild to moderate intracranial senescent changes. Right chest port with distal tip terminating at the superior cavoatrial junction. Dense mitral annulus calcification. Mild coronary artery calcifications. Stable right lower lobe groundglass nodule measuring 8 mm (series 4, image 167). Unchanged severely dilated left ureter with left ureterocele. Cholecystectomy. Colonic diverticula. Large colonic stool burden. 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 Post surgical changes of the ventral abdominal wall. Polyarticular degenerative changes including multilevel degenerative changes of the spine. Bilateral L5 pars defects with grade 2 anterolisthesis of L5 on S1.     IMPRESSION: Increased FDG uptake of the peripancreatic mass concerning  for recurrent lymphoma. Simon 5.        I spent 35 minutes on the patient's visit today.  This included preparation for the visit, face-to-face time with the patient and documentation following the visit.  It did not include teaching or procedure time.    Signed by: Peter E. Friedell, MD

## 2024-12-19 NOTE — PROGRESS NOTES
"Oncology Rooming Note    December 19, 2024 1:44 PM   Bibiana Mcrae is a 79 year old female who presents for:    Chief Complaint   Patient presents with    Oncology Clinic Visit     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)     Initial Vitals: /64   Pulse 94   Temp 98.6  F (37  C)   Resp 16   Ht 1.448 m (4' 9\")   Wt 42.1 kg (92 lb 12.8 oz)   LMP  (LMP Unknown)   SpO2 97%   BMI 20.08 kg/m   Estimated body mass index is 20.08 kg/m  as calculated from the following:    Height as of this encounter: 1.448 m (4' 9\").    Weight as of this encounter: 42.1 kg (92 lb 12.8 oz). Body surface area is 1.3 meters squared.  No Pain (0) Comment: Data Unavailable   No LMP recorded (lmp unknown). Patient has had a hysterectomy.  Allergies reviewed: Yes  Medications reviewed: Yes    Medications: Medication refills not needed today.  Pharmacy name entered into [employer]:    [id number] [address] AT [place] [address]  [facility] - [place], [place] - [address] [place] [address]  [facility] - [address]    Frailty Screening:   Is the patient here for a new oncology consult visit in cancer care? 2. No      Clinical concerns: None      Sandra Aguilera LPN             "

## 2024-12-20 ENCOUNTER — HOSPITAL ENCOUNTER (OUTPATIENT)
Dept: ULTRASOUND IMAGING | Facility: HOSPITAL | Age: 79
Discharge: HOME | End: 2024-12-20
Attending: INTERNAL MEDICINE | Admitting: INTERNAL MEDICINE
Payer: COMMERCIAL

## 2024-12-20 DIAGNOSIS — C83.33 DIFFUSE LARGE B-CELL LYMPHOMA OF INTRA-ABDOMINAL LYMPH NODES (H): ICD-10-CM

## 2024-12-20 PROCEDURE — 76770 US EXAM ABDO BACK WALL COMP: CPT

## 2024-12-23 ENCOUNTER — TELEPHONE (OUTPATIENT)
Dept: ONCOLOGY | Facility: HOSPITAL | Age: 79
End: 2024-12-23
Payer: COMMERCIAL

## 2024-12-23 NOTE — TELEPHONE ENCOUNTER
I received a phone call today from Bibiana's daughter, Rosa Isela.  She is calling because she saw the ultrasound results via [employer].  She is wondering if Dr. Friedell had a chance to review them and is requesting a call back to discuss next steps.  Rosa Isela can be reached at [phone].    Priya Barrett RN on 12/23/2024 at 8:41 AM

## 2024-12-24 ENCOUNTER — TELEPHONE (OUTPATIENT)
Dept: ONCOLOGY | Facility: HOSPITAL | Age: 79
End: 2024-12-24
Payer: COMMERCIAL

## 2024-12-24 DIAGNOSIS — C83.33 DIFFUSE LARGE B-CELL LYMPHOMA OF INTRA-ABDOMINAL LYMPH NODES (H): Primary | ICD-10-CM

## 2024-12-24 NOTE — TELEPHONE ENCOUNTER
I spoke to patient's daughter Rosa Isela.  I advised her that interventional radiology did not think that a percutaneous biopsy of the identified abnormality on ultrasound would be safe because of bowel anterior and blood vessels posterior.  We discussed the possibility of endoscopic ultrasound biopsy by gastroenterology, but I favor repeating the PET scan in late January to see if the lesion has progressed to a point where biopsy is feasible.  Orders for PET CT scan with contrast are entered.  Bibiana remains asymptomatic

## 2025-01-06 ENCOUNTER — OFFICE VISIT (OUTPATIENT)
Dept: FAMILY MEDICINE | Facility: CLINIC | Age: 80
End: 2025-01-06
Payer: COMMERCIAL

## 2025-01-06 VITALS
OXYGEN SATURATION: 100 % | TEMPERATURE: 97.7 F | SYSTOLIC BLOOD PRESSURE: 157 MMHG | BODY MASS INDEX: 19.76 KG/M2 | HEIGHT: 57 IN | DIASTOLIC BLOOD PRESSURE: 78 MMHG | WEIGHT: 91.6 LBS | RESPIRATION RATE: 18 BRPM | HEART RATE: 91 BPM

## 2025-01-06 DIAGNOSIS — I10 ESSENTIAL HYPERTENSION WITH GOAL BLOOD PRESSURE LESS THAN 130/80: ICD-10-CM

## 2025-01-06 DIAGNOSIS — K62.5 RECTAL BLEEDING: ICD-10-CM

## 2025-01-06 DIAGNOSIS — K62.3 RECTAL PROLAPSE: Primary | ICD-10-CM

## 2025-01-06 LAB
ERYTHROCYTE [DISTWIDTH] IN BLOOD BY AUTOMATED COUNT: 16.2 % (ref 10–15)
HCT VFR BLD AUTO: 39.7 % (ref 35–47)
HGB BLD-MCNC: 12.4 G/DL (ref 11.7–15.7)
MCH RBC QN AUTO: 25.9 PG (ref 26.5–33)
MCHC RBC AUTO-ENTMCNC: 31.2 G/DL (ref 31.5–36.5)
MCV RBC AUTO: 83 FL (ref 78–100)
PLATELET # BLD AUTO: 227 10E3/UL (ref 150–450)
RBC # BLD AUTO: 4.79 10E6/UL (ref 3.8–5.2)
WBC # BLD AUTO: 7 10E3/UL (ref 4–11)

## 2025-01-06 PROCEDURE — 85027 COMPLETE CBC AUTOMATED: CPT

## 2025-01-06 PROCEDURE — 36415 COLL VENOUS BLD VENIPUNCTURE: CPT

## 2025-01-06 PROCEDURE — 99214 OFFICE O/P EST MOD 30 MIN: CPT

## 2025-01-06 ASSESSMENT — PAIN SCALES - GENERAL: PAINLEVEL_OUTOF10: NO PAIN (0)

## 2025-01-06 NOTE — PATIENT INSTRUCTIONS
The discomfort in your rectal area, and likely the bleeding from your rectum is being caused by rectal prolapse.  This is when your anal contents come through your rectum, and can cause symptoms including pressure in the anal and rectal area, severe pain, and changes in her bowel habits.    I have been able to manually reduce this today, which means I was able to press your anal contents back to your rectum.  This may control some of the pain, but oftentimes the prolapse will occur again, especially if there is ongoing bearing down with stooling.    I am going to place a referral to pelvic floor physical therapy, as I am not sure you will be a good candidate for surgery.     Is also very important that we manage her constipation, as avoiding straining when you are stooling will be one of the largest pieces to avoid ongoing prolapse.  For constipation:   1. Consume at least 8 glasses of water per day   2. Exercise for at least 30 minutes every day. Regular exercise helps to keep your digestive system active and and healthy and may help with constipation.   3. Take advantage of opportunities - you are more like to have a bowel movement after waking and after eating. Do not postpone having a bowel movement if you feel the urge to go.   4. Increase dietary fiber. Goal of 25 grams per day for women, 35 grams per day for men. If unable to consume 25-35 grams through diet alone consider OTC supplements. Metamucil is the best choice. It requires the use of plenty of water to be effective. Can take days to weeks to take effect.   5. Prunes can be just as effective as Metamucil. 10 prunes = 6 grams of fiber.   6. Recommend taking Miralax (17 grams = 1 scoop). Take once daily, can mix with any liquid. If you experience increasing bowel movements and diarrhea, decrease to every other day or every 3rd day. Miralax is an osmotic laxative that increases the amount of water secreted by the intestines resulting in softer and easier  to pass stools. It can take 1-4 days to work. It may be taken chronically if you drink enough water throughout the day.   7. If Miralax isn't enough, recommend stimulant laxative such as Senna, Ex Lax or Dulcolax. Stimulant laxatives speed up the colonic motility of your gut helping to induce a bowel movement. Take as needed at     High Fiber Foods:  Bran cereal, 1/3 cup, 8.6 gm fiber   Cooked kidney beans   cup 7.9 gm  Cooked lentils   cup 7.8 gm  Cooked black beans   cup 7.6 gm  Canned chickpeas   cup 5.3 gm  Baked beans   cup 5.2 gm  Pear 1 5.1 gm  Soybeans   cup 5.1 gm  Quinoa   cup 5 gm  Kar seeds, 1 tbsp 5 gm  Baked sweet potato, with skin 1 medium 4.8 gm  Avocado, half small 4.5 gm  Baked potato, with skin 1 medium 4.4 gm  Cooked frozen green peas   cup 4.4 gm  Bulgur   cup 4.1 gm  Cooked frozen mixed vegetables   cup 4 gm  Raspberries   cup 4 gm  Blackberries   cup 3.8 gm  Almonds 1 oz 3.5 gm  Cooked frozen spinach   cup 3.5 gm  Vegetable or soy stewart 1 each 3.4 gm  Apple 1 medium 3.3 gm  Dried dates 5 pieces 3.3 gm    It is important to seek immediate medical attention if you are having symptoms of severe worsening abdominal pain, lack of ability to pass a bowel movement, persistent blood in your stool, chest pain, fever, shortness of breath, palpitations, or any changes in your mental status.

## 2025-01-06 NOTE — PROGRESS NOTES
Assessment & Plan     (K62.3) Rectal prolapse  (primary encounter diagnosis)  Comment: Acute.  Prolapse present and manually reduced in clinic.  No concerns for ongoing prolapse, and patient reports that pain improved with reduction.  Would encourage the use of Tylenol for pain management as needed, and continued monitoring.  Management plan is complicated by patient's past medical history, and specifically her active oncology status.  Do not think that patient is a good candidate for surgery considering her past medical history, and specifically her B-cell lymphoma that is currently in management with oncology.  Would like to begin with pelvic floor physical therapy and constipation management, and would have her follow-up in about 4 weeks if symptoms persist or do not improve to discuss next steps. Offered education on medications including appropriate dosing, possible side effects, and possible adverse effects.  Education given on return to clinic instructions as well as alarm signs that would require the need for immediate medical attention.  Patient attested to understanding.  Plan: Physical Therapy  Referral    (K62.5) Rectal bleeding  Comment: Acute and stable.  Vital signs are stable aside from elevated blood pressure largely related to discomfort.  No concerns for active bleeding, shortness of breath, lightheadedness or dizziness, or blurry or double vision.  CBC is stable without concerns for profound anemia or drastic blood loss.  Most likely to believe that minor bleeding and irritation is related to significant rectal prolapse, but would not always expect bleeding with this finding.  Would like patient to continue monitoring bleeding now that prolapse is reduced, as if continued bleeding occurs, will likely need referral to a GI specialist for ongoing monitoring.  Plan: CBC with platelets    (I10) Essential hypertension with goal blood pressure less than 130/80  Comment: Chronic and acutely  elevated.  Blood pressure is quite well-controlled at home, and historically.  Patient does take amlodipine for blood pressure management without any missed days or doses.  Patient is in a significant amount of pain today.  This is likely what is causing acute elevation in blood pressure, and quite hesitant to adjust blood pressure medication today for this reason, especially considering that pressures have been well-controlled historically.  Encourage pain management with avoidance of ibuprofen and NSAIDs, and would encourage her to monitor her blood pressure at home once pain is a bit better controlled.  Would like her to follow-up right away if pressures are persistently greater than 140/90 at that time, or if she becomes symptomatic.  Plan: Continue to monitor and follow-up per recommendation       This progress note has been dictated, with use of voice recognition software. Any grammatical, typographical, or context errors are unintentional and inherent to use of voice recognition software.     Ordering of each unique test  I spent a total of 31 minutes on the day of the visit.   Time spent by me today doing chart review, history and exam, documentation and further activities per the note    FUTURE APPOINTMENTS:       - Follow-up visit with pelvic floor PT       - Follow-up for annual visit or as needed  Patient Instructions   The discomfort in your rectal area, and likely the bleeding from your rectum is being caused by rectal prolapse.  This is when your anal contents come through your rectum, and can cause symptoms including pressure in the anal and rectal area, severe pain, and changes in her bowel habits.    I have been able to manually reduce this today, which means I was able to press your anal contents back to your rectum.  This may control some of the pain, but oftentimes the prolapse will occur again, especially if there is ongoing bearing down with stooling.    I am going to place a referral to  pelvic floor physical therapy, as I am not sure you will be a good candidate for surgery.     Is also very important that we manage her constipation, as avoiding straining when you are stooling will be one of the largest pieces to avoid ongoing prolapse.  For constipation:   1. Consume at least 8 glasses of water per day   2. Exercise for at least 30 minutes every day. Regular exercise helps to keep your digestive system active and and healthy and may help with constipation.   3. Take advantage of opportunities - you are more like to have a bowel movement after waking and after eating. Do not postpone having a bowel movement if you feel the urge to go.   4. Increase dietary fiber. Goal of 25 grams per day for women, 35 grams per day for men. If unable to consume 25-35 grams through diet alone consider OTC supplements. Metamucil is the best choice. It requires the use of plenty of water to be effective. Can take days to weeks to take effect.   5. Prunes can be just as effective as Metamucil. 10 prunes = 6 grams of fiber.   6. Recommend taking Miralax (17 grams = 1 scoop). Take once daily, can mix with any liquid. If you experience increasing bowel movements and diarrhea, decrease to every other day or every 3rd day. Miralax is an osmotic laxative that increases the amount of water secreted by the intestines resulting in softer and easier to pass stools. It can take 1-4 days to work. It may be taken chronically if you drink enough water throughout the day.   7. If Miralax isn't enough, recommend stimulant laxative such as Senna, Ex Lax or Dulcolax. Stimulant laxatives speed up the colonic motility of your gut helping to induce a bowel movement. Take as needed at     High Fiber Foods:  Bran cereal, 1/3 cup, 8.6 gm fiber   Cooked kidney beans   cup 7.9 gm  Cooked lentils   cup 7.8 gm  Cooked black beans   cup 7.6 gm  Canned chickpeas   cup 5.3 gm  Baked beans   cup 5.2 gm  Pear 1 5.1 gm  Soybeans   cup 5.1 gm  Quinoa    cup 5 gm  Kar seeds, 1 tbsp 5 gm  Baked sweet potato, with skin 1 medium 4.8 gm  Avocado, half small 4.5 gm  Baked potato, with skin 1 medium 4.4 gm  Cooked frozen green peas   cup 4.4 gm  Bulgur   cup 4.1 gm  Cooked frozen mixed vegetables   cup 4 gm  Raspberries   cup 4 gm  Blackberries   cup 3.8 gm  Almonds 1 oz 3.5 gm  Cooked frozen spinach   cup 3.5 gm  Vegetable or soy stewart 1 each 3.4 gm  Apple 1 medium 3.3 gm  Dried dates 5 pieces 3.3 gm    It is important to seek immediate medical attention if you are having symptoms of severe worsening abdominal pain, lack of ability to pass a bowel movement, persistent blood in your stool, chest pain, fever, shortness of breath, palpitations, or any changes in your mental status.      Subjective   Bibiana is a 79 year old, presenting for the following health issues:  Rectal Bleeding and Recheck Medication (Pt reports that she's here to discuss having rectal bleeding for 4 days with light colored blood with off and flow. )        1/6/2025     2:53 PM   Additional Questions   Roomed by kezia   Accompanied by alone         1/6/2025     2:53 PM   Patient Reported Additional Medications   Patient reports taking the following new medications none     History of Present Illness       Reason for visit:  Rectal bleeding   She is taking medications regularly.  Bibiana is a 79 year old female with a past medical history significant for alzheimer's, bronchiectasis, diverticular disease, GERD, fecal incontinence, irregular bowel habits, hypertension, osteoporosis, diffuse B-cell lymphoma, and anxiety who presents today with 4 days of rectal bleeding. Patient reports that she was experiencing an episode of constipation for a few days. Beginning around January 2, Bibiana was noticing severe pain in her rectal area after defecation as well as bleeding on her toilet paper.  She declined any blood in the toilet bowl and mixed in the stool, but blood was always present following passing a  "bowel movement, and was also present on the past that she wears in her underwear.  She notes that the pain was localized to her rectal area, it was quite significant even when she sat down.  She is not experiencing any abdominal pain, nausea, vomiting, or diarrhea, and is still passing a bowel movement daily at this time.  She has historically had to strain quite a bit to pass a bowel movement, and that has been very consistent with her for what feels like years.  She otherwise feels well, and declines any fever, chills, body aches, lightheadedness or dizziness, blurry or double vision, chest pain, shortness of breath, severe fatigue, or flulike symptoms.    Review of Systems  Constitutional, HEENT, cardiovascular, pulmonary, gi and gu systems are negative, except as otherwise noted.      Objective    BP (!) 157/78 (BP Location: Left arm, Patient Position: Sitting, Cuff Size: Adult Small)   Pulse 91   Temp 97.7  F (36.5  C) (Tympanic)   Resp 18   Ht 1.448 m (4' 9\")   Wt 41.5 kg (91 lb 9.6 oz)   LMP  (LMP Unknown)   SpO2 100%   Breastfeeding No   BMI 19.82 kg/m    Body mass index is 19.82 kg/m .  Physical Exam   GENERAL: alert and no distress. Frail and pale  NECK: no adenopathy, no asymmetry, masses, or scars  RESP: lungs clear to auscultation - no rales, rhonchi or wheezes  CV: regular rate and rhythm, normal S1 S2, no S3 or S4, no murmur, click or rub, no peripheral edema  ABDOMEN: soft, nontender, no hepatosplenomegaly, no masses and bowel sounds normal  RECTAL (female): Rectal prolapse approximately 3 cm outside of rectum.  Successfully reduced manually, and rectal opening without fissures, hemorrhoids, or active bleeding  MS: no gross musculoskeletal defects noted, no edema    Results for orders placed or performed in visit on 01/06/25 (from the past 24 hours)   CBC with platelets   Result Value Ref Range    WBC Count 7.0 4.0 - 11.0 10e3/uL    RBC Count 4.79 3.80 - 5.20 10e6/uL    Hemoglobin 12.4 11.7 " - 15.7 g/dL    Hematocrit 39.7 35.0 - 47.0 %    MCV 83 78 - 100 fL    MCH 25.9 (L) 26.5 - 33.0 pg    MCHC 31.2 (L) 31.5 - 36.5 g/dL    RDW 16.2 (H) 10.0 - 15.0 %    Platelet Count 227 150 - 450 10e3/uL       Seda Langston DNP FNP-C  Family Nurse Practitioner - Same Day Provider  [facility] [place]    Signed Electronically by: CHRISTOPHER Cedillo CNP

## 2025-01-07 ENCOUNTER — TELEPHONE (OUTPATIENT)
Dept: FAMILY MEDICINE | Facility: CLINIC | Age: 80
End: 2025-01-07
Payer: COMMERCIAL

## 2025-01-07 NOTE — TELEPHONE ENCOUNTER
See response from the PCP to the patient regarding rectal pain, on 1/7/25.    Writer called the patient and relayed the above message to the patient, who verbalized understanding, but declined to go to ED or UC tonight.    Denies other questions or concerns at this time.    Fallon Aguilera RN, BSN  [facility]

## 2025-01-07 NOTE — TELEPHONE ENCOUNTER
"  S-(situation): rectal pain    B-(background): rectal prolapse, bleeding, constipation. Seen by same day provider yesterday-see note.     A-(assessment): pain is more constant today than yesterday. States tylenol has been ineffective. States pain is \"maybe 10/10\"     R-(recommendations): relayed for 10/10 pain she can be seen in ED for pain control and evaluation. Relayed same day provider did not recommend any stronger pain medications at visit. She asks that a message be sent to PCP to see about recommendations/medication script.     "

## 2025-01-08 ENCOUNTER — HOSPITAL ENCOUNTER (EMERGENCY)
Facility: HOSPITAL | Age: 80
Discharge: HOME | End: 2025-01-08
Attending: EMERGENCY MEDICINE | Admitting: EMERGENCY MEDICINE
Payer: COMMERCIAL

## 2025-01-08 VITALS
DIASTOLIC BLOOD PRESSURE: 82 MMHG | BODY MASS INDEX: 20.15 KG/M2 | TEMPERATURE: 97 F | SYSTOLIC BLOOD PRESSURE: 159 MMHG | HEART RATE: 96 BPM | HEIGHT: 57 IN | WEIGHT: 93.4 LBS | OXYGEN SATURATION: 98 % | RESPIRATION RATE: 16 BRPM

## 2025-01-08 DIAGNOSIS — K62.3 RECTAL PROLAPSE: ICD-10-CM

## 2025-01-08 PROCEDURE — 99283 EMERGENCY DEPT VISIT LOW MDM: CPT

## 2025-01-08 ASSESSMENT — ACTIVITIES OF DAILY LIVING (ADL): ADLS_ACUITY_SCORE: 56

## 2025-01-08 NOTE — ED PROVIDER NOTES
EMERGENCY DEPARTMENT ENCOUNTER      NAME: Bibiana Mcrae  AGE: 79 year old female  YOB: 1945  MRN: [id number]  EVALUATION DATE & TIME: No admission date for patient encounter.    PCP: Zack Woody    ED PROVIDER: Richelle Champagne MD    Chief Complaint   Patient presents with    Rectal/perineal Pain         FINAL IMPRESSION:  1. Rectal prolapse          ED COURSE & MEDICAL DECISION MAKING:    Pertinent Labs & Imaging studies reviewed. (See chart for details)  79 year old female with history of HTN, anxiety, B-cell lymphoma with intra-abdominal metastasis who presents to the Emergency Department for evaluation of ongoing issues with rectal prolapse and bleeding.  Patient seen in clinic 2 days ago on 1/6 for same.  At that time it was noted to have a rectal prolapse and this was reduced.  On examination here she has recurrent rectal prolapse.  Reduced by myself, please see procedure note.  Patient has spotty rectal bleeding and I think that this is more from the mucosal irritation from the rectal mucosa that is prolapsed than it is anything else.  She denies any large-volume bleeding that is not anticoagulated.  Patient had lab with CBC at 1/6 with a hemoglobin that was stable and I do not think warrants any repeat laboratory workup or imaging.  She has known pelvic floor dysfunction and was previously seen and evaluated and was told that she has rectal sphincter issues, patient states that she has chronic incontinence and typically gets little warning before she has to have a bowel movement so wears a depends for this regularly.  Her rectal prolapse was reduced here.  Patient tolerated this well.  She has some discomfort but really no pain and my concern for a prolapsing intussusception is low and I do not think she warrants imaging for this.  Patient was given information on how to self reduce her prolapse at home.  Encouraged to follow-up with her PCP for pelvic floor therapy, and given referral  information for colorectal to see if there are any other surgical options for her.      ED Course as of 01/08/25 1509   Wed Jan 08, 2025   1402 I met with the patient for the initial interview and physical examination. Discussed plan for treatment and workup in the ED.         Medical Decision Making  Obtained supplemental history:Supplemental history obtained?:  Plan  Reviewed external records: External records reviewed?: Outpatient Record: 1/6/2024, [facility] impacted by chronic illness:Hypertension, B-cell lymphoma  Did you consider but not order tests?: Work up considered but not performed and documented in chart, if applicable  Did you interpret images independently?: Independent interpretation of ECG and images noted in documentation, when applicable.  Consultation discussion with other provider:Did you involve another provider (consultant, , pharmacy, etc.)?: No  Discharge. No recommendations on prescription strength medication(s). See documentation for any additional details.    MIPS: Not Applicable      At the conclusion of the encounter I discussed the results of all of the tests and the disposition. The questions were answered. The patient or family acknowledged understanding and was agreeable with the care plan.      MEDICATIONS GIVEN IN THE EMERGENCY:  Medications - No data to display    NEW PRESCRIPTIONS STARTED AT TODAY'S ER VISIT  Discharge Medication List as of 1/8/2025  2:29 PM             =================================================================    HPI    Patient information was obtained from: Patient     Use of Intrepreter: N/A        Bibiana Mcrae is a 79 year old female with pertinent medical history of bronchiectasis, diverticular disease, GERD, fecal incontinence, irregular bowel habits, hypertension, osteoporosis, diffuse B-cell lymphoma, anxiety, and Alzheimer's disease who presents to this emergency department by walk-in for evaluation of  rectal/perineal pain.     Per chart review: Patient was seen 1/6/2025 at [facility] for evaluation of rectal prolapse with rectal bleeding. Had reported 4 days of rectal bleeding at that time (onset ~1/2/2025). She was found to have a rectal prolapse which was manually reduced during her visit.       Patient reports ongoing spotting of blood per rectum despite recent reduction of a rectal prolapse during a recent clinic visit. She states that she has noticed this bleeding following bowel movements especially, usually noticing blood on toilet paper or the sanitary pads she has been wearing; no blood in toilet bowl, no blood mixed in with stool. With this prolapse, patient notes significant rectal pain. She has attempted to manually reduce this prolapse on her own at home, however, states that it helps to have someone do it for her. Patient mentions that she has difficulty with her sphincter and struggles with fecal incontinence due to this. She has not yet consulted with colorectal surgery.      Patient denies diarrhea, constipation, or any other symptoms at this time. She also denies taking any blood thinners at this time.       PAST MEDICAL HISTORY:  Past Medical History:   Diagnosis Date    Anxiety     Breast cyst     While ago    History of cholecystectomy 08/24/2023       PAST SURGICAL HISTORY:  Past Surgical History:   Procedure Laterality Date    BREAST CYST ASPIRATION Left     While ago    HYSTERECTOMY  1985    IR CHEST PORT PLACEMENT > 5 YRS OF AGE  5/1/2024    OOPHORECTOMY Bilateral 1985    [facility] REMOVAL OF OVARY(S)      Description: Oophorectomy;  Recorded: 12/17/2009;    [facility] TOTAL ABDOM HYSTERECTOMY      Description: Total Abdominal Hysterectomy;  Recorded: 12/17/2009;       CURRENT MEDICATIONS:    Prior to Admission Medications   Prescriptions Last Dose Informant Patient Reported? Taking?   Biotin 5 MG TABS   Yes No   Sig: Take 5,000 mcg by mouth daily   Probiotic Product  (SOLUBLE FIBER/PROBIOTICS PO)   Yes No   Si tablespoons in 8 oz of liquid daily   UNABLE TO FIND   Yes No   Sig: Take 2 Tablespoonful by mouth every morning MEDICATION NAME: biofiber 8 oz with water   acarbose (PRECOSE) 25 MG tablet   Yes No   Sig: Take 25 mg by mouth 3 times daily (with meals)   acetaminophen (TYLENOL) 325 MG tablet   Yes No   Sig: Take 325-650 mg by mouth every 6 hours as needed for mild pain   amLODIPine (NORVASC) 5 MG tablet   No No   Sig: Take 1 tablet (5 mg) by mouth daily   busPIRone (BUSPAR) 7.5 MG tablet   No No   Sig: TAKE 1 TABLET BY MOUTH TWICE  DAILY   celecoxib (CELEBREX) 200 MG capsule   No No   Sig: TAKE 1 CAPSULE BY MOUTH  DAILY   denosumab (PROLIA) 60 MG/ML SOSY injection   Yes No   Sig: Inject 60 mg Subcutaneous every 6 months   eszopiclone (LUNESTA) 1 MG tablet   No No   Sig: Take 1 tablet (1 mg) by mouth at bedtime.   multivitamin with minerals (THERA-M) 9 mg iron-400 mcg Tab tablet   Yes No   Sig: [MULTIVITAMIN WITH MINERALS (THERA-M) 9 MG IRON-400 MCG TAB TABLET] Take 1 tablet by mouth daily.   nortriptyline (PAMELOR) 50 MG capsule   No No   Sig: TAKE 1 CAPSULE BY MOUTH AT  BEDTIME   omeprazole (PRILOSEC) 20 MG capsule   Yes No   Sig: [OMEPRAZOLE (PRILOSEC) 20 MG CAPSULE] Take 20 mg by mouth daily before breakfast.   ondansetron (ZOFRAN) 8 MG tablet   No No   Sig: Take 1 tablet (8 mg) by mouth every 8 hours as needed for nausea.   rivastigmine (EXELON) 9.5 MG/24HR 24 hr patch   No No   Sig: Place 1 patch onto the skin daily   sennosides (SENOKOT) 8.6 MG tablet   Yes No   Sig: Take 2 tablets by mouth daily as needed for constipation   simethicone (MYLICON) 125 MG chewable tablet   Yes No   Sig: Take 125 mg by mouth as needed.      Facility-Administered Medications Last Administration Doses Remaining   denosumab (PROLIA) injection 60 mg 2024  4:11 PM 1          ALLERGIES:  Allergies   Allergen Reactions    Blood Transfusion Related (Informational Only) Other (See  "Comments)     Patient has a history of a Clinically Significant antibody against RBC antigens. A delay in compatible RBCs may occur. 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    Prochlorperazine Edisylate [Prochlorperazine] Other (See Comments)     Extrapyramidal side effects       FAMILY HISTORY:  Family History   Problem Relation Age of Onset    Breast Cancer Mother 52.00    Cancer Maternal Grandfather 71.00        prostate    Prostate Cancer Maternal Grandfather        SOCIAL HISTORY:  Social History     Tobacco Use    Smoking status: Never     Passive exposure: Never    Smokeless tobacco: Never   Vaping Use    Vaping status: Never Used   Substance Use Topics    Alcohol use: No    Drug use: Never        VITALS:  Patient Vitals for the past 24 hrs:   BP Temp Pulse Resp SpO2 Height Weight   01/08/25 1352 (!) 159/82 97  F (36.1  C) 96 16 98 % -- --   01/08/25 1349 -- -- -- -- -- 1.448 m (4' 9\") 42.4 kg (93 lb 6.4 oz)       PHYSICAL EXAM    General Appearance: Well-appearing, well-nourished, no acute distress, thin  Head:  Normocephalic  Chest:  No tenderness or deformity, no crepitus, port to right chest wall  Cardio:  Regular rate and rhythm  Pulm:  No respiratory distress  Abdomen:  Soft, non-tender, non distended,no rebound or guarding.  Extremities: Normal gait  Neuro:  Alert and oriented ×3  : Obvious rectal prolapse able to be reduced on exam     RADIOLOGY/LABS:  Reviewed all pertinent imaging. Please see official radiology report. All pertinent labs reviewed and interpreted.           PROCEDURES:  PROCEDURE: Reduction   INDICATIONS: Rectal prolapse   PROCEDURE PROVIDER: Dr Richelle Champagne   CONSENT: Risks, benefits and alternatives were discussed with and Verbal consent was obtained from Patient.   MEDICATIONS: none   REDUCTION PROCEDURE DESCRIPTION: Patient was placed in right lateral decubitus position and was lubricated left fingertips rectal prolapse was easily reduced.   COMPLICATIONS:  Patient " tolerated procedure well, without complication        The creation of this record is based on the scribe s observations of the work being performed by Richelle Champagne MD and the provider s statements to them. It was created on her behalf by Sriram Bui, a trained medical scribe. This document has been checked and approved by the attending provider.    Richelle Champagne MD  Emergency Medicine  [facility] EMERGENCY DEPARTMENT  [address] [place] [id number]  [phone]  Dept: [phone]      Richelle Champagne MD  01/08/25 5173

## 2025-01-08 NOTE — DISCHARGE INSTRUCTIONS
You may use the rectal prolapse at home.  Because of the rectal prolapse she will have some spotting rectally with bleeding, this is to be expected.  It should not however be filling the toilet bowl with blood.  Please call your PCP to make sure that the referral for outpatient pelvic floor therapy has been placed and to schedule that appointment.  I have provided you with a referral to colorectal surgery to see if they have any other options for you.  Maintain good bowel regimen as discussed to make sure that stool was soft and not constipated.

## 2025-01-08 NOTE — ED TRIAGE NOTES
Pt presents to ED with possible re-occurring of rectal prolapse as well as some blood coming from rectal area but not in the stool.

## 2025-01-12 ENCOUNTER — TELEPHONE (OUTPATIENT)
Dept: ONCOLOGY | Facility: CLINIC | Age: 80
End: 2025-01-12
Payer: COMMERCIAL

## 2025-01-12 NOTE — TELEPHONE ENCOUNTER
On Call Provider     Bibiana Mcrae is a 78 yo female with a past medical history of lymphoma who calls regarding new rectal bleeding. Patient's daughter on the phone says bleeding started Wednesday and patient does seem more confused. Bleeding predominantly when wiping on tissue paper, however history is somewhat unclear. Suspect rectal bleeding may be in setting of hemmorhoids / irritation from rectal prolapse however given new symptom and concomitant confusion, recommend urgent care or ED evaluation to obtain labs and ensure hemoglobin is stable.     They are agreeable to plan.    Lashae Mathew MD  Heme/Onc Fellow

## 2025-01-13 ENCOUNTER — TELEPHONE (OUTPATIENT)
Dept: ONCOLOGY | Facility: HOSPITAL | Age: 80
End: 2025-01-13
Payer: COMMERCIAL

## 2025-01-13 ENCOUNTER — INFUSION THERAPY VISIT (OUTPATIENT)
Dept: INFUSION THERAPY | Facility: HOSPITAL | Age: 80
End: 2025-01-13
Payer: COMMERCIAL

## 2025-01-13 ENCOUNTER — TRANSFERRED RECORDS (OUTPATIENT)
Dept: HEALTH INFORMATION MANAGEMENT | Facility: CLINIC | Age: 80
End: 2025-01-13

## 2025-01-13 DIAGNOSIS — C83.33 DIFFUSE LARGE B-CELL LYMPHOMA OF INTRA-ABDOMINAL LYMPH NODES (H): Primary | ICD-10-CM

## 2025-01-13 LAB
BASOPHILS # BLD AUTO: 0 10E3/UL (ref 0–0.2)
BASOPHILS NFR BLD AUTO: 1 %
EOSINOPHIL # BLD AUTO: 0.1 10E3/UL (ref 0–0.7)
EOSINOPHIL NFR BLD AUTO: 1 %
ERYTHROCYTE [DISTWIDTH] IN BLOOD BY AUTOMATED COUNT: 17.1 % (ref 10–15)
HCT VFR BLD AUTO: 37.5 % (ref 35–47)
HGB BLD-MCNC: 11.9 G/DL (ref 11.7–15.7)
IMM GRANULOCYTES # BLD: 0 10E3/UL
IMM GRANULOCYTES NFR BLD: 0 %
LYMPHOCYTES # BLD AUTO: 0.8 10E3/UL (ref 0.8–5.3)
LYMPHOCYTES NFR BLD AUTO: 10 %
MCH RBC QN AUTO: 26.2 PG (ref 26.5–33)
MCHC RBC AUTO-ENTMCNC: 31.7 G/DL (ref 31.5–36.5)
MCV RBC AUTO: 82 FL (ref 78–100)
MONOCYTES # BLD AUTO: 0.8 10E3/UL (ref 0–1.3)
MONOCYTES NFR BLD AUTO: 11 %
NEUTROPHILS # BLD AUTO: 5.9 10E3/UL (ref 1.6–8.3)
NEUTROPHILS NFR BLD AUTO: 77 %
NRBC # BLD AUTO: 0 10E3/UL
NRBC BLD AUTO-RTO: 0 /100
PLATELET # BLD AUTO: 285 10E3/UL (ref 150–450)
RBC # BLD AUTO: 4.55 10E6/UL (ref 3.8–5.2)
WBC # BLD AUTO: 7.7 10E3/UL (ref 4–11)

## 2025-01-13 PROCEDURE — 250N000011 HC RX IP 250 OP 636: Performed by: INTERNAL MEDICINE

## 2025-01-13 PROCEDURE — 36591 DRAW BLOOD OFF VENOUS DEVICE: CPT

## 2025-01-13 PROCEDURE — 85018 HEMOGLOBIN: CPT

## 2025-01-13 PROCEDURE — 85004 AUTOMATED DIFF WBC COUNT: CPT

## 2025-01-13 RX ORDER — HEPARIN SODIUM (PORCINE) LOCK FLUSH IV SOLN 100 UNIT/ML 100 UNIT/ML
5 SOLUTION INTRAVENOUS
OUTPATIENT
Start: 2025-01-13

## 2025-01-13 RX ORDER — HEPARIN SODIUM (PORCINE) LOCK FLUSH IV SOLN 100 UNIT/ML 100 UNIT/ML
5 SOLUTION INTRAVENOUS
Status: DISCONTINUED | OUTPATIENT
Start: 2025-01-13 | End: 2025-01-13 | Stop reason: HOSPADM

## 2025-01-13 RX ADMIN — HEPARIN 5 ML: 100 SYRINGE at 13:29

## 2025-01-13 NOTE — TELEPHONE ENCOUNTER
Patient's daughter Rosa Isela calls to follow up from phone call with on call provider yesterday 1/12/24. Patient is having rectal bleeding and daughter thought she was more confused. On call provider recommended that she be seen for evaluation in UC or ED. Rosa Isela reports that patient refused. Patient has an appointment today for a port flush, she is wondering if Dr. Friedell would be willing to order blood work to check Hgb.    Megha Gomez RN

## 2025-01-14 ENCOUNTER — OFFICE VISIT (OUTPATIENT)
Dept: INTERNAL MEDICINE | Facility: CLINIC | Age: 80
End: 2025-01-14
Payer: COMMERCIAL

## 2025-01-14 VITALS
HEART RATE: 100 BPM | SYSTOLIC BLOOD PRESSURE: 138 MMHG | TEMPERATURE: 97.4 F | RESPIRATION RATE: 16 BRPM | DIASTOLIC BLOOD PRESSURE: 80 MMHG | OXYGEN SATURATION: 98 %

## 2025-01-14 DIAGNOSIS — C83.33 DIFFUSE LARGE B-CELL LYMPHOMA OF INTRA-ABDOMINAL LYMPH NODES (H): ICD-10-CM

## 2025-01-14 DIAGNOSIS — K21.00 GASTROESOPHAGEAL REFLUX DISEASE WITH ESOPHAGITIS WITHOUT HEMORRHAGE: ICD-10-CM

## 2025-01-14 DIAGNOSIS — M81.0 OSTEOPOROSIS, SENILE: Primary | ICD-10-CM

## 2025-01-14 LAB
ALBUMIN SERPL BCG-MCNC: 4.1 G/DL (ref 3.5–5.2)
ALP SERPL-CCNC: 111 U/L (ref 40–150)
ALT SERPL W P-5'-P-CCNC: 35 U/L (ref 0–50)
ANION GAP SERPL CALCULATED.3IONS-SCNC: 12 MMOL/L (ref 7–15)
AST SERPL W P-5'-P-CCNC: 40 U/L (ref 0–45)
BILIRUB SERPL-MCNC: 0.3 MG/DL
BUN SERPL-MCNC: 13.1 MG/DL (ref 8–23)
CA-I BLD-MCNC: 4.9 MG/DL (ref 4.4–5.2)
CALCIUM SERPL-MCNC: 9.5 MG/DL (ref 8.8–10.4)
CHLORIDE SERPL-SCNC: 100 MMOL/L (ref 98–107)
CREAT SERPL-MCNC: 0.93 MG/DL (ref 0.51–0.95)
EGFRCR SERPLBLD CKD-EPI 2021: 62 ML/MIN/1.73M2
GLUCOSE SERPL-MCNC: 109 MG/DL (ref 70–99)
HCO3 SERPL-SCNC: 26 MMOL/L (ref 22–29)
POTASSIUM SERPL-SCNC: 4.4 MMOL/L (ref 3.4–5.3)
PROT SERPL-MCNC: 6.5 G/DL (ref 6.4–8.3)
SODIUM SERPL-SCNC: 138 MMOL/L (ref 135–145)
VIT D+METAB SERPL-MCNC: 47 NG/ML (ref 20–50)

## 2025-01-14 PROCEDURE — 82330 ASSAY OF CALCIUM: CPT | Performed by: INTERNAL MEDICINE

## 2025-01-14 PROCEDURE — 82306 VITAMIN D 25 HYDROXY: CPT | Performed by: INTERNAL MEDICINE

## 2025-01-14 PROCEDURE — 96372 THER/PROPH/DIAG INJ SC/IM: CPT | Performed by: INTERNAL MEDICINE

## 2025-01-14 PROCEDURE — 36415 COLL VENOUS BLD VENIPUNCTURE: CPT | Performed by: INTERNAL MEDICINE

## 2025-01-14 PROCEDURE — 99214 OFFICE O/P EST MOD 30 MIN: CPT | Mod: 25 | Performed by: INTERNAL MEDICINE

## 2025-01-14 PROCEDURE — 80053 COMPREHEN METABOLIC PANEL: CPT | Performed by: INTERNAL MEDICINE

## 2025-01-14 NOTE — PATIENT INSTRUCTIONS
Prolia 18th today. Labs today.  Prolia 19th in 6 months with me.    DXA in 6/2025.   Phone number to schedule [phone].    Daily calcium need is 5801-5437 mg a day from the diet and supplements.  Calcium citrate is easier to digest. You need to add at least one calcium pill 600 mg a day.  Vitamin D 2000 IU daily recommended.    Risk of rebound vertebral fractures is higher when Prolia suddenly stopped or dose was missed.      Prolia and Covid vaccine should be  for at least a week.    Patient education:  Patients advised to maintain good oral hygiene during treatment, because of the risk for osteonecrosis of the jaw.   The risk of osteonecrosis of the jaw with Prolia therapy is extremely low.   If a patient is late for Prolia therapy (>3 wks) a rapid rebound of bone loss can occur which is highly concerning and important to try to prevent to optimize bone health.   Therefore if an invasive dental procedure is required, primarily extraction or implant, while on Prolia therapy, the recommendation is to time the dental procedure optimally 4 months after the most recent dose of Prolia allowing 6-8 weeks for healing prior to next dose of Prolia.   This is based on the updated 2022 Recommendations from the [employer] of Oral [employer].   We also recommend discussing with dentist or oral surgeon if pretreatment prophylactic oral rinses and antibiotics would be beneficial.   Optimizing oral hygiene before any invasive dental procedure significantly lowers ONJ risk.     There is a greater risk of severe hypocalcemia following denosumab administration and patient is advised that we will have to monitor calcium, phosphorous, and magnesium levels. Risk of infection is increased with denosumab use, so patient will inform me if has more frequent infections.     Atypical femur fracture  has been reported in patients receiving denosumab. The fractures may occur anywhere along the femoral  shaft (may be bilateral) and commonly occur with minimal to no trauma to the area. Some patients experience prodromal thigh or groin pain weeks or months before the fracture occurs. Contralateral limb should be assessed if AFF occurs.     Multiple vertebral column fracture has been reported following discontinuation of therapy. Hypertension and increased cholesterol were reported with denosumab use.      Discussed 10-year data of Prolia. Discussed the importance of being on time and consistent with Prolia use to prevent rapid bone of osteoclastic activity.  Discussed that if Prolia is discontinued we will likely need to utilize an antiresorptive, such as Reclast, to help prevent rapid rebound of osteoclast activity. Often more than 1 dose is required.  Labs yearly to ensure calcium and vitamin D optimized while patient on Prolia.

## 2025-01-14 NOTE — PROGRESS NOTES
"Prolia Injection Phone Screen      Screening questions have been asked 2-3 days prior to administration visit for Prolia. If any questions are answered with \"Yes,\" this phone encounter were will routed to ordering provider for further evaluation.     1.  When was the last injection?  6-    2.  Has insurance for this injection been verified?  {Blank single:54318::\"No\",\"Yes\"}    3.  Did you experience any new onset achiness or rashes that lasted for over a month with your previous Prolia injection?   {Blank single:07457::\"Yes\",\"No\"}    4.  Do you have a fever over 101?F or a new deep cough that is unusual for you today? {Blank single:35448::\"Yes\",\"No\"}    5.  Have you started any new medications in the last 6 months that you were told could affect your immune system? These may have been prescribed by oncologist, transplant, rheumatology, or dermatology.   {Blank single:00366::\"Yes\",\"No\"}    6.  In the last 6 months have you have gastric bypass or parathyroid surgery?   {Blank single:66443::\"Yes\",\"No\"}    7.  Do you plan dental work requiring drilling into the bone such as implants/extractions or oral surgery in the next 2-3 months?   {Blank single:31071::\"Yes\",\"No\"}    8. Do you have new insurance since the last injection?    9. Have you received the Covid-19 vaccine? {Blank single:22937::\"Yes\",\"No\"}  If No - Proceed with Prolia injection  If Yes - Date of vaccination ***. Will need to wait until 2 weeks after 2nd dose of Covid-19 vaccine before administering Prolia       Patient informed if symptoms discussed above present prior to their administration appointment, they are to notify clinic immediately.     Sarah Beth Cedeño MA          Answers submitted by the patient for this visit:  General Questionnaire (Submitted on 1/6/2025)  Chief Complaint: Chronic problems general questions HPI Form  What is the reason for your visit today? : rectal bleeding  How many days per week do you miss taking your medication?: " 0  Questionnaire about: Chronic problems general questions HPI Form (Submitted on 1/6/2025)  Chief Complaint: Chronic problems general questions HPI Form

## 2025-01-14 NOTE — PROGRESS NOTES
(M81.0) Osteoporosis, senile  (primary encounter diagnosis)  Comment: She was on Alendronate  for 4 years.   On Prolia for 9 years, tolerating it well.  Last DXA from 6/2023 was reviewed.  -Lumbar Spine: L1-L3: BMD: 0.857 g/cm2. T-score: -2.6. Z-score: -0.1.  -RIGHT Hip Total: BMD: 0.878 g/cm2. T-score: -1.0. Z-score: 1.4.  -RIGHT Hip Femoral neck: BMD: 0.904 g/cm2. T-score: -1.0. Z-score: 1.5.  -LEFT Hip Total: BMD: 0.959 g/cm2. T-score: -0.4. Z-score: 2.0.  -LEFT Hip Femoral neck: BMD: 0.917 g/cm2. T-score: -0.9. Z-score: 1.6.  Component      Latest Ref Rn 12/3/2024  10:50 AM   Sodium      135 - 145 mmol/L 138    Potassium      3.4 - 5.3 mmol/L 4.0    Carbon Dioxide (CO2)      22 - 29 mmol/L 25    Anion Gap      7 - 15 mmol/L 10    Urea Nitrogen      8.0 - 23.0 mg/dL 19.3    Creatinine      0.51 - 0.95 mg/dL 0.83    GFR Estimate      >60 mL/min/1.73m2 71    Calcium      8.8 - 10.4 mg/dL 8.6 (L)    Chloride      98 - 107 mmol/L 103    Glucose      70 - 99 mg/dL 139 (H)    Alkaline Phosphatase      40 - 150 U/L 70    AST      0 - 45 U/L 24    ALT      0 - 50 U/L 26    Protein Total      6.4 - 8.3 g/dL 6.2 (L)    Albumin      3.5 - 5.2 g/dL 4.0    Bilirubin Total      <=1.2 mg/dL 0.3       She will need to add one calcium pill 600 mg a day. She will continue vit D daily.  Labs will be rechecked today.      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 The patient and her daughter discussed continuing Prolia with Oncology and it was approved.       (K21.00) Gastroesophageal reflux disease with esophagitis without hemorrhage  Comment: stable on omeprazole     The longitudinal plan of care for the diagnosis(es)/condition(s) as documented were addressed during this visit. Due to the added complexity in care, I will continue to support  Bibiana in the subsequent management and with ongoing continuity of care.    Patient was educated on safety of Prolia utilizing Patient Counseling Chart for Healthcare Providers, as outlined by the Prolia REMS progam.     Return in about 6 months (around 7/14/2025) for Follow up, Prolia.    Patient Instructions   Prolia 18th today. Labs today.  Prolia 19th in 6 months with me.    DXA in 6/2025.   Phone number to schedule [phone].    Daily calcium need is 0471-7127 mg a day from the diet and supplements.  Calcium citrate is easier to digest. You need to add at least one calcium pill 600 mg a day.  Vitamin D 2000 IU daily recommended.    Risk of rebound vertebral fractures is higher when Prolia suddenly stopped or dose was missed.      Prolia and Covid vaccine should be  for at least a week.    Patient education:  Patients advised to maintain good oral hygiene during treatment, because of the risk for osteonecrosis of the jaw.   The risk of osteonecrosis of the jaw with Prolia therapy is extremely low.   If a patient is late for Prolia therapy (>3 wks) a rapid rebound of bone loss can occur which is highly concerning and important to try to prevent to optimize bone health.   Therefore if an invasive dental procedure is required, primarily extraction or implant, while on Prolia therapy, the recommendation is to time the dental procedure optimally 4 months after the most recent dose of Prolia allowing 6-8 weeks for healing prior to next dose of Prolia.   This is based on the updated 2022 Recommendations from the [employer] of Oral [employer] Maxill[employer]geons.   We also recommend discussing with dentist or oral surgeon if pretreatment prophylactic oral rinses and antibiotics would be beneficial.   Optimizing oral hygiene before any invasive dental procedure significantly lowers ONJ risk.     There is a greater risk of severe hypocalcemia following denosumab administration and patient is advised  that we will have to monitor calcium, phosphorous, and magnesium levels. Risk of infection is increased with denosumab use, so patient will inform me if has more frequent infections.     Atypical femur fracture  has been reported in patients receiving denosumab. The fractures may occur anywhere along the femoral shaft (may be bilateral) and commonly occur with minimal to no trauma to the area. Some patients experience prodromal thigh or groin pain weeks or months before the fracture occurs. Contralateral limb should be assessed if AFF occurs.     Multiple vertebral column fracture has been reported following discontinuation of therapy. Hypertension and increased cholesterol were reported with denosumab use.      Discussed 10-year data of Prolia. Discussed the importance of being on time and consistent with Prolia use to prevent rapid bone of osteoclastic activity.  Discussed that if Prolia is discontinued we will likely need to utilize an antiresorptive, such as Reclast, to help prevent rapid rebound of osteoclast activity. Often more than 1 dose is required.  Labs yearly to ensure calcium and vitamin D optimized while patient on Prolia.            /80   Pulse 100   Temp 97.4  F (36.3  C) (Temporal)   Resp 16   LMP  (LMP Unknown)   SpO2 98%       Did you experience any problems with previous Prolia injection? no  Any medication change in the last 6 months? no  Did you take prednisone or other immunosupressant drugs in the last 6 months   (chemo, transplant, rheum, dermatology conditions)? no  Did you have any serious infection in the last 6 months?no  Any recent hospitalizations?no  Do you plan any dental work in the next 2-3 months?no  How much calcium do you take daily from the diet and supplements?1200 mg  How much vit D do you take daily? 2000 IU  Last DXA? 6/2023 Reviewed and discussed      Patient is here today for the Prolia injection. Patient tolerated previous injections well.   We discussed  calcium and vit D daily needs today.       We discussed high risk of rebound vertebral fractures when Prolia suddenly stopped.    Next Prolia injection will be in 6 months.           This note has been dictated using voice recognition software. Any grammatical or context distortions are unintentional and inherent to the software      Patient Active Problem List   Diagnosis    Essential hypertension with goal blood pressure less than 130/80    Anxiety    Osteoporosis, senile    Bilateral sensorineural hearing loss    Impaired fasting glucose    Diverticular disease of large intestine    Gastroesophageal reflux disease with esophagitis without hemorrhage    Bronchiectasis without complication (H)    Incontinence of feces    Irregular bowel habits    Status post dilation of esophageal narrowing    Metal foreign body in eye region    MRI contraindicated due to metal in eye    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)    Chemotherapy-induced neutropenia    Major neurocognitive disorder due to Alzheimer's disease (H)       Current Outpatient Medications   Medication Sig Dispense Refill    acarbose (PRECOSE) 25 MG tablet Take 25 mg by mouth 3 times daily (with meals)      acetaminophen (TYLENOL) 325 MG tablet Take 325-650 mg by mouth every 6 hours as needed for mild pain      amLODIPine (NORVASC) 5 MG tablet Take 1 tablet (5 mg) by mouth daily 90 tablet 3    Biotin 5 MG TABS Take 5,000 mcg by mouth daily      busPIRone (BUSPAR) 7.5 MG tablet TAKE 1 TABLET BY MOUTH TWICE  DAILY 180 tablet 2    celecoxib (CELEBREX) 200 MG capsule TAKE 1 CAPSULE BY MOUTH  DAILY 90 capsule 3    denosumab (PROLIA) 60 MG/ML SOSY injection Inject 60 mg Subcutaneous every 6 months      eszopiclone (LUNESTA) 1 MG tablet Take 1 tablet (1 mg) by mouth at bedtime. 90 tablet 1    multivitamin with minerals (THERA-M) 9 mg iron-400 mcg Tab tablet [MULTIVITAMIN WITH MINERALS (THERA-M) 9 MG IRON-400 MCG TAB TABLET] Take 1 tablet by  mouth daily.      nortriptyline (PAMELOR) 50 MG capsule TAKE 1 CAPSULE BY MOUTH AT  BEDTIME 90 capsule 2    omeprazole (PRILOSEC) 20 MG capsule [OMEPRAZOLE (PRILOSEC) 20 MG CAPSULE] Take 20 mg by mouth daily before breakfast.      ondansetron (ZOFRAN) 8 MG tablet Take 1 tablet (8 mg) by mouth every 8 hours as needed for nausea. 90 tablet 3    Probiotic Product (SOLUBLE FIBER/PROBIOTICS PO) 2 tablespoons in 8 oz of liquid daily      rivastigmine (EXELON) 9.5 MG/24HR 24 hr patch Place 1 patch onto the skin daily 90 patch 3    sennosides (SENOKOT) 8.6 MG tablet Take 2 tablets by mouth daily as needed for constipation      simethicone (MYLICON) 125 MG chewable tablet Take 125 mg by mouth as needed.      UNABLE TO FIND Take 2 Tablespoonful by mouth every morning MEDICATION NAME: biofiber 8 oz with water       Current Facility-Administered Medications   Medication Dose Route Frequency Provider Last Rate Last Admin    denosumab (PROLIA) injection 60 mg  60 mg Subcutaneous Q6 Months    60 mg at 07/02/24 1611

## 2025-01-15 ENCOUNTER — TELEPHONE (OUTPATIENT)
Dept: ONCOLOGY | Facility: HOSPITAL | Age: 80
End: 2025-01-15
Payer: COMMERCIAL

## 2025-01-15 ENCOUNTER — PATIENT OUTREACH (OUTPATIENT)
Dept: CARE COORDINATION | Facility: CLINIC | Age: 80
End: 2025-01-15
Payer: COMMERCIAL

## 2025-01-15 ENCOUNTER — APPOINTMENT (OUTPATIENT)
Dept: CT IMAGING | Facility: HOSPITAL | Age: 80
End: 2025-01-15
Attending: EMERGENCY MEDICINE
Payer: COMMERCIAL

## 2025-01-15 ENCOUNTER — HOSPITAL ENCOUNTER (EMERGENCY)
Facility: HOSPITAL | Age: 80
Discharge: HOME | End: 2025-01-15
Attending: EMERGENCY MEDICINE
Payer: COMMERCIAL

## 2025-01-15 VITALS
DIASTOLIC BLOOD PRESSURE: 86 MMHG | HEIGHT: 57 IN | WEIGHT: 91 LBS | BODY MASS INDEX: 19.63 KG/M2 | TEMPERATURE: 98.7 F | HEART RATE: 98 BPM | OXYGEN SATURATION: 97 % | SYSTOLIC BLOOD PRESSURE: 172 MMHG | RESPIRATION RATE: 23 BRPM

## 2025-01-15 DIAGNOSIS — R33.9 URINARY RETENTION: ICD-10-CM

## 2025-01-15 DIAGNOSIS — K59.00 CONSTIPATION, UNSPECIFIED CONSTIPATION TYPE: ICD-10-CM

## 2025-01-15 DIAGNOSIS — K62.3 RECTAL PROLAPSE: ICD-10-CM

## 2025-01-15 LAB
ALBUMIN UR-MCNC: NEGATIVE MG/DL
ANION GAP SERPL CALCULATED.3IONS-SCNC: 8 MMOL/L (ref 7–15)
APPEARANCE UR: CLEAR
BACTERIA #/AREA URNS HPF: ABNORMAL /HPF
BASOPHILS # BLD AUTO: 0 10E3/UL (ref 0–0.2)
BASOPHILS NFR BLD AUTO: 1 %
BILIRUB UR QL STRIP: NEGATIVE
BUN SERPL-MCNC: 9.4 MG/DL (ref 8–23)
CALCIUM SERPL-MCNC: 8.8 MG/DL (ref 8.8–10.4)
CHLORIDE SERPL-SCNC: 100 MMOL/L (ref 98–107)
COLOR UR AUTO: COLORLESS
CREAT SERPL-MCNC: 0.66 MG/DL (ref 0.51–0.95)
EGFRCR SERPLBLD CKD-EPI 2021: 89 ML/MIN/1.73M2
EOSINOPHIL # BLD AUTO: 0.1 10E3/UL (ref 0–0.7)
EOSINOPHIL NFR BLD AUTO: 1 %
ERYTHROCYTE [DISTWIDTH] IN BLOOD BY AUTOMATED COUNT: 17.2 % (ref 10–15)
GLUCOSE SERPL-MCNC: 117 MG/DL (ref 70–99)
GLUCOSE UR STRIP-MCNC: NEGATIVE MG/DL
HCO3 SERPL-SCNC: 26 MMOL/L (ref 22–29)
HCT VFR BLD AUTO: 36.8 % (ref 35–47)
HGB BLD-MCNC: 12 G/DL (ref 11.7–15.7)
HGB UR QL STRIP: NEGATIVE
IMM GRANULOCYTES # BLD: 0 10E3/UL
IMM GRANULOCYTES NFR BLD: 0 %
KETONES UR STRIP-MCNC: ABNORMAL MG/DL
LEUKOCYTE ESTERASE UR QL STRIP: NEGATIVE
LYMPHOCYTES # BLD AUTO: 1.1 10E3/UL (ref 0.8–5.3)
LYMPHOCYTES NFR BLD AUTO: 13 %
MCH RBC QN AUTO: 26.5 PG (ref 26.5–33)
MCHC RBC AUTO-ENTMCNC: 32.6 G/DL (ref 31.5–36.5)
MCV RBC AUTO: 81 FL (ref 78–100)
MONOCYTES # BLD AUTO: 0.8 10E3/UL (ref 0–1.3)
MONOCYTES NFR BLD AUTO: 9 %
NEUTROPHILS # BLD AUTO: 6.2 10E3/UL (ref 1.6–8.3)
NEUTROPHILS NFR BLD AUTO: 76 %
NITRATE UR QL: NEGATIVE
NRBC # BLD AUTO: 0 10E3/UL
NRBC BLD AUTO-RTO: 0 /100
PH UR STRIP: 7.5 [PH] (ref 5–7)
PLATELET # BLD AUTO: 305 10E3/UL (ref 150–450)
POTASSIUM SERPL-SCNC: 4.8 MMOL/L (ref 3.4–5.3)
RBC # BLD AUTO: 4.52 10E6/UL (ref 3.8–5.2)
RBC URINE: 0 /HPF
SODIUM SERPL-SCNC: 134 MMOL/L (ref 135–145)
SP GR UR STRIP: 1.01 (ref 1–1.03)
UROBILINOGEN UR STRIP-MCNC: <2 MG/DL
WBC # BLD AUTO: 8.2 10E3/UL (ref 4–11)
WBC URINE: <1 /HPF

## 2025-01-15 PROCEDURE — 74177 CT ABD & PELVIS W/CONTRAST: CPT

## 2025-01-15 PROCEDURE — 36415 COLL VENOUS BLD VENIPUNCTURE: CPT | Performed by: EMERGENCY MEDICINE

## 2025-01-15 PROCEDURE — 99285 EMERGENCY DEPT VISIT HI MDM: CPT | Mod: 25

## 2025-01-15 PROCEDURE — 96376 TX/PRO/DX INJ SAME DRUG ADON: CPT

## 2025-01-15 PROCEDURE — 51798 US URINE CAPACITY MEASURE: CPT

## 2025-01-15 PROCEDURE — 96375 TX/PRO/DX INJ NEW DRUG ADDON: CPT | Mod: 59

## 2025-01-15 PROCEDURE — 81001 URINALYSIS AUTO W/SCOPE: CPT | Performed by: EMERGENCY MEDICINE

## 2025-01-15 PROCEDURE — 250N000011 HC RX IP 250 OP 636: Performed by: EMERGENCY MEDICINE

## 2025-01-15 PROCEDURE — 51702 INSERT TEMP BLADDER CATH: CPT

## 2025-01-15 PROCEDURE — 93005 ELECTROCARDIOGRAM TRACING: CPT | Performed by: EMERGENCY MEDICINE

## 2025-01-15 PROCEDURE — 258N000003 HC RX IP 258 OP 636: Performed by: EMERGENCY MEDICINE

## 2025-01-15 PROCEDURE — 96374 THER/PROPH/DIAG INJ IV PUSH: CPT

## 2025-01-15 PROCEDURE — 250N000011 HC RX IP 250 OP 636: Mod: JZ | Performed by: EMERGENCY MEDICINE

## 2025-01-15 PROCEDURE — 85004 AUTOMATED DIFF WBC COUNT: CPT | Performed by: EMERGENCY MEDICINE

## 2025-01-15 PROCEDURE — 96361 HYDRATE IV INFUSION ADD-ON: CPT | Mod: 59

## 2025-01-15 PROCEDURE — 80048 BASIC METABOLIC PNL TOTAL CA: CPT | Performed by: EMERGENCY MEDICINE

## 2025-01-15 RX ORDER — HEPARIN SODIUM,PORCINE 10 UNIT/ML
5-10 VIAL (ML) INTRAVENOUS EVERY 24 HOURS
Status: DISCONTINUED | OUTPATIENT
Start: 2025-01-15 | End: 2025-01-16 | Stop reason: HOSPADM

## 2025-01-15 RX ORDER — MORPHINE SULFATE 4 MG/ML
4 INJECTION, SOLUTION INTRAMUSCULAR; INTRAVENOUS ONCE
Status: COMPLETED | OUTPATIENT
Start: 2025-01-15 | End: 2025-01-15

## 2025-01-15 RX ORDER — IOPAMIDOL 755 MG/ML
44 INJECTION, SOLUTION INTRAVASCULAR ONCE
Status: COMPLETED | OUTPATIENT
Start: 2025-01-15 | End: 2025-01-15

## 2025-01-15 RX ORDER — HEPARIN SODIUM (PORCINE) LOCK FLUSH IV SOLN 100 UNIT/ML 100 UNIT/ML
5-10 SOLUTION INTRAVENOUS
Status: DISCONTINUED | OUTPATIENT
Start: 2025-01-15 | End: 2025-01-16 | Stop reason: HOSPADM

## 2025-01-15 RX ORDER — ONDANSETRON 2 MG/ML
4 INJECTION INTRAMUSCULAR; INTRAVENOUS ONCE
Status: COMPLETED | OUTPATIENT
Start: 2025-01-15 | End: 2025-01-15

## 2025-01-15 RX ORDER — ACETAMINOPHEN AND CODEINE PHOSPHATE 300; 30 MG/1; MG/1
1 TABLET ORAL EVERY 6 HOURS PRN
Qty: 12 TABLET | Refills: 0 | Status: SHIPPED | OUTPATIENT
Start: 2025-01-15 | End: 2025-01-18

## 2025-01-15 RX ORDER — HEPARIN SODIUM,PORCINE 10 UNIT/ML
5-10 VIAL (ML) INTRAVENOUS
Status: DISCONTINUED | OUTPATIENT
Start: 2025-01-15 | End: 2025-01-16 | Stop reason: HOSPADM

## 2025-01-15 RX ADMIN — ONDANSETRON 4 MG: 2 INJECTION INTRAMUSCULAR; INTRAVENOUS at 21:21

## 2025-01-15 RX ADMIN — MORPHINE SULFATE 4 MG: 4 INJECTION, SOLUTION INTRAMUSCULAR; INTRAVENOUS at 21:23

## 2025-01-15 RX ADMIN — SODIUM CHLORIDE 1000 ML: 9 INJECTION, SOLUTION INTRAVENOUS at 16:54

## 2025-01-15 RX ADMIN — HEPARIN SODIUM (PORCINE) LOCK FLUSH IV SOLN 100 UNIT/ML 5 ML: 100 SOLUTION at 21:54

## 2025-01-15 RX ADMIN — MORPHINE SULFATE 4 MG: 4 INJECTION, SOLUTION INTRAMUSCULAR; INTRAVENOUS at 16:51

## 2025-01-15 RX ADMIN — IOPAMIDOL 44 ML: 755 INJECTION, SOLUTION INTRAVENOUS at 19:18

## 2025-01-15 ASSESSMENT — ACTIVITIES OF DAILY LIVING (ADL)
ADLS_ACUITY_SCORE: 56

## 2025-01-15 NOTE — ED PROVIDER NOTES
EMERGENCY DEPARTMENT ENCOUNTER      NAME: Bibiana Mcrae  AGE: 79 year old female  YOB: 1945  MRN: [id number]  EVALUATION DATE & TIME: No admission date for patient encounter.    PCP: Zack Woody    ED PROVIDER: Abdoulaye Mercer M.D.      Chief Complaint   Patient presents with    rectal prolapse         FINAL IMPRESSION:  1. Rectal prolapse    2. Constipation, unspecified constipation type    3. Urinary retention          ED COURSE & MEDICAL DECISION MAKIN year old female presents to the Emergency Department for evaluation of rectal prolapse.  Patient has a history of intermittent rectal prolapse for the last couple of weeks.  Seen in the ED on , these records were reviewed.  It sounds like since that time rectal prolapse has recurred repeatedly.  Patient has not had a bowel movement for the last couple of weeks.  She has a partial rectal prolapse on exam here.  This was easily reduced with table sugar and manual pressure.  She had some abdominal pain following this.  She seems a little bit distended on exam.  She underwent lab and imaging evaluation.  CT shows some constipation as well as significant urinary retention.  Upon returning from CT she did void about 900 cc however still had a bladder volume greater than 900 on repeat bladder scan and was unable to completely empty this.  We discussed a Deutsch catheter placement.  Upon completion of her initial workup case was discussed with Dr. Vu, colorectal surgery.  We discussed that there would not be an immediate recommended surgical procedure for management of this and she needs to have a comprehensive evaluation including pelvic floor testing as well as a complete bowel prep prior to any potential surgical intervention.  She was supposed to see them by the end of the month but he is going to send a message to their  so that they will contact her tomorrow and get her on the schedule for pelvic floor evaluation as soon  as the next day or two.  Into his view hospitalization might actually delay her definitive treatment.  Discussed options with the patient and family.  After our discussion, they do think that with very close interval follow-up they can continue to monitor and manage things at home.  Deutsch catheter will be left in place for trial of void after discharge.  She was given contact information for follow-up with [facility] is going to contact her tomorrow for follow-up.  If there are other concerns like severe increase in abdominal pain, tractable vomiting, fever, severe weakness, etc. we can reevaluate at any time in the emergency department the understand this.  Patient was discharged in stable condition.        At the conclusion of the encounter I discussed the results of all of the tests and the disposition. The questions were answered. The patient or family acknowledged understanding and was agreeable with the care plan.       Medical Decision Making  Obtained supplemental history:Supplemental history obtained?: Documented in chart  Reviewed external records: External records reviewed?: No  Care impacted by chronic illness:Cancer/Chemotherapy, Chronic Lung Disease, Dementia, and Hypertension  Did you consider but not order tests?: Work up considered but not performed and documented in chart, if applicable  Did you interpret images independently?: Independent interpretation of ECG and images noted in documentation, when applicable.  Consultation discussion with other provider:Did you involve another provider (consultant, MH, pharmacy, etc.)?: I discussed the care with another health care provider, see documentation for details.  Discharge. No recommendations on prescription strength medication(s). N/A.    MIPS: Not Applicable          MEDICATIONS GIVEN IN THE EMERGENCY:  Medications   sodium chloride 0.9% BOLUS 1,000 mL (0 mLs Intravenous Stopped 1/15/25 2118)   morphine (PF)  injection 4 mg (4 mg Intravenous $Given 1/15/25 1651)   morphine (PF) injection 4 mg (4 mg Intravenous $Given 1/15/25 2123)   ondansetron (ZOFRAN) injection 4 mg (4 mg Intravenous $Given 1/15/25 2121)   iopamidol (ISOVUE-370) solution 44 mL (44 mLs Intravenous $Given 1/15/25 1918)       NEW PRESCRIPTIONS STARTED AT TODAY'S ER VISIT  Discharge Medication List as of 1/15/2025 10:00 PM        START taking these medications    Details   acetaminophen-codeine (TYLENOL #3) 300-30 MG per tablet Take 1 tablet by mouth every 6 hours as needed for severe pain., Disp-12 tablet, R-0, Local Print                =================================================================    HPI    Patient information was obtained from: Patient, family.     Use of : N/A        Bibiana Mcrae is a 79 year old female with a pertinent history of lymphoma in remission, fecal incontinence, Alzheimer's, hypertension, and rectal hemorrhage/prolapse who presents to this ED via walk-in for evaluation of rectal prolapse.  Patient has had recurrent rectal prolapse initially diagnosed a couple of weeks ago and reduced in the office.  Subsequently seen in the emergency department on 1/8 with another reduction.  The prolapse has recurred and has been present for large portions at the time since leaving the hospital.  She has not had a good bowel movement in a couple of weeks.  She is getting care from her family members at home.  She has had some intermittent bleeding from the rectal mucosa.  She also reports intermittent abdominal pain and difficulty urinating.    Reviewed visit to [facility] ED on 1/8/25 for evaluation of ongoing issues with rectal prolapse and bleeding.  Patient seen in clinic on 1/6 for same.   On examination here she had recurrent rectal prolapse. Her rectal prolapse was reduced. Patient was given information on how to self reduce her prolapse at home.  Encouraged to follow-up with her PCP for pelvic floor therapy, and  given referral information for colorectal to see if there are any other surgical options for her.    REVIEW OF SYSTEMS   All systems reviewed and negative except as noted in HPI.    PAST MEDICAL HISTORY:  Past Medical History:   Diagnosis Date    Anxiety     Breast cyst     While ago    History of cholecystectomy 08/24/2023       PAST SURGICAL HISTORY:  Past Surgical History:   Procedure Laterality Date    BREAST CYST ASPIRATION Left     While ago    HYSTERECTOMY  1985    IR CHEST PORT PLACEMENT > 5 YRS OF AGE  5/1/2024    OOPHORECTOMY Bilateral 1985    [facility] REMOVAL OF OVARY(S)      Description: Oophorectomy;  Recorded: 12/17/2009;    [facility] TOTAL ABDOM HYSTERECTOMY      Description: Total Abdominal Hysterectomy;  Recorded: 12/17/2009;           CURRENT MEDICATIONS:    No current facility-administered medications for this encounter.     Current Outpatient Medications   Medication Sig Dispense Refill    acetaminophen-codeine (TYLENOL #3) 300-30 MG per tablet Take 1 tablet by mouth every 6 hours as needed for severe pain. 12 tablet 0    acarbose (PRECOSE) 25 MG tablet Take 25 mg by mouth 3 times daily (with meals)      acetaminophen (TYLENOL) 325 MG tablet Take 325-650 mg by mouth every 6 hours as needed for mild pain      amLODIPine (NORVASC) 5 MG tablet Take 1 tablet (5 mg) by mouth daily 90 tablet 3    Biotin 5 MG TABS Take 5,000 mcg by mouth daily      busPIRone (BUSPAR) 7.5 MG tablet TAKE 1 TABLET BY MOUTH TWICE  DAILY 180 tablet 2    celecoxib (CELEBREX) 200 MG capsule TAKE 1 CAPSULE BY MOUTH  DAILY 90 capsule 3    denosumab (PROLIA) 60 MG/ML SOSY injection Inject 60 mg Subcutaneous every 6 months      eszopiclone (LUNESTA) 1 MG tablet Take 1 tablet (1 mg) by mouth at bedtime. 90 tablet 1    multivitamin with minerals (THERA-M) 9 mg iron-400 mcg Tab tablet [MULTIVITAMIN WITH MINERALS (THERA-M) 9 MG IRON-400 MCG TAB TABLET] Take 1 tablet by mouth daily.      nortriptyline (PAMELOR) 50 MG capsule TAKE 1 CAPSULE BY  MOUTH AT  BEDTIME 90 capsule 2    omeprazole (PRILOSEC) 20 MG capsule [OMEPRAZOLE (PRILOSEC) 20 MG CAPSULE] Take 20 mg by mouth daily before breakfast.      ondansetron (ZOFRAN) 8 MG tablet Take 1 tablet (8 mg) by mouth every 8 hours as needed for nausea. 90 tablet 3    Probiotic Product (SOLUBLE FIBER/PROBIOTICS PO) 2 tablespoons in 8 oz of liquid daily      rivastigmine (EXELON) 9.5 MG/24HR 24 hr patch Place 1 patch onto the skin daily 90 patch 3    sennosides (SENOKOT) 8.6 MG tablet Take 2 tablets by mouth daily as needed for constipation      simethicone (MYLICON) 125 MG chewable tablet Take 125 mg by mouth as needed.      UNABLE TO FIND Take 2 Tablespoonful by mouth every morning MEDICATION NAME: biofiber 8 oz with water           ALLERGIES:  Allergies   Allergen Reactions    Blood Transfusion Related (Informational Only) Other (See Comments)     Patient has a history of a Clinically Significant antibody against RBC antigens. A delay in compatible RBCs may occur. 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    Prochlorperazine Edisylate [Prochlorperazine] Other (See Comments)     Extrapyramidal side effects       FAMILY HISTORY:  Family History   Problem Relation Age of Onset    Breast Cancer Mother 52.00    Cancer Maternal Grandfather 71.00        prostate    Prostate Cancer Maternal Grandfather        SOCIAL HISTORY:   Social History     Socioeconomic History    Marital status:    Tobacco Use    Smoking status: Never     Passive exposure: Never    Smokeless tobacco: Never   Vaping Use    Vaping status: Never Used   Substance and Sexual Activity    Alcohol use: No    Drug use: Never     Social Drivers of Health     Financial Resource Strain: Low Risk  (4/25/2024)    Financial Resource Strain     Within the past 12 months, have you or your family members you live with been unable to get utilities (heat, electricity) when it was really needed?: No   Food Insecurity: Low Risk  (4/25/2024)    Food  "Insecurity     Within the past 12 months, did you worry that your food would run out before you got money to buy more?: No     Within the past 12 months, did the food you bought just not last and you didn t have money to get more?: No   Transportation Needs: Low Risk  (4/25/2024)    Transportation Needs     Within the past 12 months, has lack of transportation kept you from medical appointments, getting your medicines, non-medical meetings or appointments, work, or from getting things that you need?: No   Physical Activity: Unknown (4/25/2024)    Exercise Vital Sign     Days of Exercise per Week: 0 days   Stress: Stress Concern Present (4/25/2024)    [place] [employer] - Occupational Stress Questionnaire     Feeling of Stress : Rather much   Social Connections: Unknown (4/25/2024)    Social Connection and Isolation Panel [NHANES]     Frequency of Social Gatherings with Friends and Family: Once a week   Interpersonal Safety: Low Risk  (1/6/2025)    Interpersonal Safety     Do you feel physically and emotionally safe where you currently live?: Yes     Within the past 12 months, have you been hit, slapped, kicked or otherwise physically hurt by someone?: No     Within the past 12 months, have you been humiliated or emotionally abused in other ways by your partner or ex-partner?: No   Housing Stability: Low Risk  (4/25/2024)    Housing Stability     Do you have housing? : Yes     Are you worried about losing your housing?: No       VITALS:  BP (!) 172/86   Pulse 98   Temp 98.7  F (37.1  C)   Resp 23   Ht 1.448 m (4' 9\")   Wt 41.3 kg (91 lb)   LMP  (LMP Unknown)   SpO2 97%   BMI 19.69 kg/m      PHYSICAL EXAM    Constitutional: Elderly female patient, laying in bed, no acute distress  HENT: Normocephalic, Atraumatic. Neck Supple.  Eyes: EOMI, Conjunctiva normal.  Respiratory: Breathing comfortably on room air. Speaks full sentences easily. Lungs clear to ascultation.  Cardiovascular: Normal " heart rate, Regular rhythm. No peripheral edema.  Abdomen: Soft, nontender  Rectal: There is prolapse of the rectal mucosa, about 10 cm in total.  Musculoskeletal: Good range of motion in all major joints. No major deformities noted.  Integument: Warm, Dry.  Neurologic: Alert & awake, Normal motor function, Normal sensory function, No focal deficits noted.   Psychiatric: Cooperative. Affect appropriate.     LAB:  All pertinent labs reviewed and interpreted.  Labs Ordered and Resulted from Time of ED Arrival to Time of ED Departure   BASIC METABOLIC PANEL - Abnormal       Result Value    Sodium 134 (*)     Potassium 4.8      Chloride 100      Carbon Dioxide (CO2) 26      Anion Gap 8      Urea Nitrogen 9.4      Creatinine 0.66      GFR Estimate 89      Calcium 8.8      Glucose 117 (*)    CBC WITH PLATELETS AND DIFFERENTIAL - Abnormal    WBC Count 8.2      RBC Count 4.52      Hemoglobin 12.0      Hematocrit 36.8      MCV 81      MCH 26.5      MCHC 32.6      RDW 17.2 (*)     Platelet Count 305      % Neutrophils 76      % Lymphocytes 13      % Monocytes 9      % Eosinophils 1      % Basophils 1      % Immature Granulocytes 0      NRBCs per 100 WBC 0      Absolute Neutrophils 6.2      Absolute Lymphocytes 1.1      Absolute Monocytes 0.8      Absolute Eosinophils 0.1      Absolute Basophils 0.0      Absolute Immature Granulocytes 0.0      Absolute NRBCs 0.0     ROUTINE UA WITH MICROSCOPIC REFLEX TO CULTURE - Abnormal    Color Urine Colorless      Appearance Urine Clear      Glucose Urine Negative      Bilirubin Urine Negative      Ketones Urine Trace (*)     Specific Gravity Urine 1.015      Blood Urine Negative      pH Urine 7.5 (*)     Protein Albumin Urine Negative      Urobilinogen Urine <2.0      Nitrite Urine Negative      Leukocyte Esterase Urine Negative      Bacteria Urine None Seen      RBC Urine 0      WBC Urine <1         RADIOLOGY:  Reviewed all pertinent imaging. Please see official radiology report.  CT  Abdomen Pelvis w Contrast   Final Result   IMPRESSION:    1.  There is mild rectal wall thickening, mucosal hyperenhancement, and surrounding perirectal edema compatible with recent prolapse and reduction. No free air, free fluid, or evidence for other complication.   2.  Markedly distended urinary bladder. Stable appearance of left hydronephrosis, hydroureter, and ureterocele.          EKG:    Performed at: 1803 01/15/25    Impression: Sinus tachycardia. Left axis deviation. Right bundle branch block.    Rate: 102 bpm  Rhythm: Sinus tachycardia  Axis: 57 -43 29  WA Interval: 200 ms  QRS Interval: 142 ms  QTc Interval: 490 ms  ST Changes: No  Comparison: When compared with ECG of 10-Apr-2024 11:57, Premature atrial complexes are no longer Present. Vent. rate has increased by 45 bpm. QT has lengthened.    I have independently reviewed and interpreted the EKG(s) documented above.    PROCEDURES:   PROCEDURE: Reduction   INDICATIONS: Recal prolapse   PROCEDURE PROVIDER: Dr Mehrdad Mercer   CONSENT: Risks, benefits and alternatives were discussed with and Verbal consent was obtained from Patient.   MEDICATIONS: None   REDUCTION PROCEDURE DESCRIPTION: Table sugar was applied to the exposed rectal mucosa.  This was repeated a few times with some decrease in edema.  Rectal mucosa was subsequently able to be reduced with gentle manual pressure.   COMPLICATIONS:  Patient tolerated procedure well, without complication          I, Clarissa Lovelace, am serving as a scribe to document services personally performed by Dr. Abdoulaye Mercer, based on my observation and the provider's statements to me. I, Abdoulaye Mercer MD attest that Clarissa Lovelace is acting in a scribe capacity, has observed my performance of the services and has documented them in accordance with my direction.    Abdoulaye Mercer M.D.  Emergency Medicine  [facility] EMERGENCY DEPARTMENT  [facility]  [address]  [phone]  Dept: [phone]       Abdoulaye Mercer MD  01/16/25 1113

## 2025-01-15 NOTE — TELEPHONE ENCOUNTER
"I received a phone call today from patient's daughter, Rosa Isela.  She is calling because her mother has been dealing with a prolapsed rectum.  She states she has been into the ER for this and saw GI at [facility].  She has been referred to colorectal surgery but this appointment is not until the end of the month.  Per Rosa Isela, her prolapse is now about 6 inches and she is in constant pain.  She is worried that she is obstructed as she has not had a bowel movement in several days.  She also has bright red blood from her rectum and is leaking urine.  Rosa Isela is planning to bring her into the emergency room today and is hoping they can expedite getting this \"fixed.\"  Per Rosa Isela, if they go in to do surgery she is hoping they can biopsy this \"mass\" that Dr. Friedell has been following.  I let Rosa Isela know that I get this message over to [facility] and Dr. Friedell.  Rosa Isela can be reached at [phone].    Priya Barrett RN on 1/15/2025 at 12:36 PM    "

## 2025-01-15 NOTE — ED TRIAGE NOTES
Patient in remission for lymphoma, 10 days ago had prolapse of rectum, went to PMD and reduced with plan to see colo rectal surgeon. One week ago, again rectal prolapse with cystocele. Came to ER and able to reduce. Was at [facility] 3 days ago, unable to reduce. Plan to see surgeon. Coming to ER for continual care, and pain

## 2025-01-16 NOTE — DISCHARGE INSTRUCTIONS
You were seen in the emergency department for rectal prolapse and abdominal pain.  Your evaluation included labs and CT imaging.  This evaluation showed some CT evidence of constipation and some mild inflammation around the rectum after we reduced it here.  You also had urinary retention, we think this is related to the prolapse and constipation causing anatomic bladder outlet problems.  You had a catheter placed here that will remain in place until urology and colorectal follow-up.  We reviewed your case with the on-call provider for [facility].  They are optimistic that they can get evaluation moving for you later this week or early next week at the absolute latest.  You should get a call from the  tomorrow to confirm a follow-up to start your pelvic floor evaluation.  In the meantime they would like you on MiraLAX 1 capful 3 times a day to start treating constipation.  We are always available to reevaluate you in the emergency department have other concerns like high fever, inability to eat, severe abdominal pain not responding to pain medications, or other immediate concern.  We would recommend continuing on Tylenol 650 mg every 6 hours for pain.  You can substitute Tylenol 3 for severe pain especially at nighttime

## 2025-01-17 ENCOUNTER — PATIENT OUTREACH (OUTPATIENT)
Dept: CARE COORDINATION | Facility: CLINIC | Age: 80
End: 2025-01-17
Payer: COMMERCIAL

## 2025-01-17 DIAGNOSIS — F02.80 MAJOR NEUROCOGNITIVE DISORDER DUE TO ALZHEIMER'S DISEASE (H): ICD-10-CM

## 2025-01-17 DIAGNOSIS — G30.9 MAJOR NEUROCOGNITIVE DISORDER DUE TO ALZHEIMER'S DISEASE (H): ICD-10-CM

## 2025-01-20 ENCOUNTER — TELEPHONE (OUTPATIENT)
Dept: ONCOLOGY | Facility: HOSPITAL | Age: 80
End: 2025-01-20

## 2025-01-20 RX ORDER — RIVASTIGMINE TARTRATE 4.5 MG/1
CAPSULE ORAL
Qty: 180 CAPSULE | Refills: 3 | Status: ON HOLD | OUTPATIENT
Start: 2025-01-20

## 2025-01-20 NOTE — PROGRESS NOTES
Connected Care Resource [place]  Community Health Worker Initial Outreach    CHW Initial Information Gathering:  Referral Source: ED Follow-Up  Preferred Hospital: [facility]  [phone]  Preferred Urgent Care: [facility], [phone]  Current living arrangement:: I live alone  Type of residence:: Private home - no stairs (3 stairs to get into the home, rambler)  Community Resources: Meals on Wheels ([employer] Whe[employer] delivers on Wednesdays)  Supplies Currently Used at Home: None  Equipment Currently Used at Home: walker, standard  Informal Support system:: Children, [place] based, Friends (Two daughters)  No PCP office visit in Past Year: No  Transportation means:: Family  CHW Additional Questions  If ED/Hospital discharge, follow-up appointment scheduled as recommended?: Other (Pt has surgery scheduled for 1/23)  Medication changes made following ED/Hospital discharge?: Yes, patient to be scheduled with CC RN/SW within approx 1 business day  MyChart active?: Yes  Patient sent Social Drivers of Health questionnaire?: Yes    Patient accepts CC: Yes. CC Assessment scheduled with CC SW on 01/27/2025 at 9:00 AM. Patient's daughter noted desire to discuss home care services and in home services since patients lives alone. Pt has scheduled surgery on 1/23 and may still be inpatient at the time of CC assessment. Call daughter Rosa Isela for assessment - consent to communicate on file.        Cesia Whiting  Community Health Worker  Connected Care Resource Center, [facility]    *Connected Care Resource Team does NOT follow patient ongoing. Referrals are identified based on internal discharge reports and the outreach is to ensure patient has an understanding of their discharge instructions.

## 2025-01-20 NOTE — TELEPHONE ENCOUNTER
Request for 90 day supply of Exelon  Medication T'd for review and signature    Sending to Dr. Welch as Dr. Mann is out of office     Candi MACHADO CMA on 1/20/2025 at 3:54 PM  [facility]

## 2025-01-20 NOTE — TELEPHONE ENCOUNTER
I received a phone call today from patient's daughter, Rosa Isela.  She is calling to let Dr. Friedell know that her mom is scheduled to have surgery on Thursday to have her prolapsed rectum fixed.  Dr. Basilio from colorectal surgery will be doing the surgery at [facility].  Rosa Isela is hoping that oncology can see her mom while she is in the hospital as she will likely be there about 5 days.  If any questions, Rosa Isela can be reached at [phone].  Of note, Bibiana is scheduled for a PET scan on 1/27 and they are hoping that she will be feeling well enough to keep this appointment.    Priya Barrett RN on 1/20/2025 at 12:34 PM

## 2025-01-21 ENCOUNTER — APPOINTMENT (OUTPATIENT)
Dept: LAB | Facility: CLINIC | Age: 80
End: 2025-01-21
Payer: COMMERCIAL

## 2025-01-21 ENCOUNTER — OFFICE VISIT (OUTPATIENT)
Dept: FAMILY MEDICINE | Facility: CLINIC | Age: 80
End: 2025-01-21
Payer: COMMERCIAL

## 2025-01-21 ENCOUNTER — TELEPHONE (OUTPATIENT)
Dept: FAMILY MEDICINE | Facility: CLINIC | Age: 80
End: 2025-01-21

## 2025-01-21 VITALS
HEART RATE: 93 BPM | TEMPERATURE: 98.6 F | RESPIRATION RATE: 14 BRPM | OXYGEN SATURATION: 100 % | WEIGHT: 87 LBS | DIASTOLIC BLOOD PRESSURE: 88 MMHG | BODY MASS INDEX: 18.26 KG/M2 | SYSTOLIC BLOOD PRESSURE: 156 MMHG | HEIGHT: 58 IN

## 2025-01-21 DIAGNOSIS — K62.3 RECTAL PROLAPSE: ICD-10-CM

## 2025-01-21 DIAGNOSIS — F02.80 MAJOR NEUROCOGNITIVE DISORDER DUE TO ALZHEIMER'S DISEASE (H): ICD-10-CM

## 2025-01-21 DIAGNOSIS — R41.3 MEMORY LOSS: ICD-10-CM

## 2025-01-21 DIAGNOSIS — G30.9 MAJOR NEUROCOGNITIVE DISORDER DUE TO ALZHEIMER'S DISEASE (H): ICD-10-CM

## 2025-01-21 DIAGNOSIS — Z01.818 PREOP GENERAL PHYSICAL EXAM: Primary | ICD-10-CM

## 2025-01-21 DIAGNOSIS — C84.78 ANAPLASTIC ALK-NEGATIVE LARGE CELL LYMPHOMA OF LYMPH NODES OF MULTIPLE REGIONS (H): ICD-10-CM

## 2025-01-21 DIAGNOSIS — H90.3 BILATERAL SENSORINEURAL HEARING LOSS: ICD-10-CM

## 2025-01-21 DIAGNOSIS — F41.9 ANXIETY: ICD-10-CM

## 2025-01-21 DIAGNOSIS — J47.9 BRONCHIECTASIS WITHOUT COMPLICATION (H): ICD-10-CM

## 2025-01-21 PROBLEM — R32 URINARY INCONTINENCE: Status: ACTIVE | Noted: 2023-08-24

## 2025-01-21 LAB
ABO + RH BLD: ABNORMAL
ANTIBODY, PREVIOUSLY IDENTIFIED: NORMAL
BLD GP AB SCN SERPL QL: POSITIVE
SPECIMEN EXP DATE BLD: ABNORMAL
SPECIMEN EXP DATE BLD: NORMAL

## 2025-01-21 PROCEDURE — 86850 RBC ANTIBODY SCREEN: CPT | Performed by: COLON & RECTAL SURGERY

## 2025-01-21 PROCEDURE — 99214 OFFICE O/P EST MOD 30 MIN: CPT | Performed by: NURSE PRACTITIONER

## 2025-01-21 PROCEDURE — 86900 BLOOD TYPING SEROLOGIC ABO: CPT | Performed by: COLON & RECTAL SURGERY

## 2025-01-21 PROCEDURE — 36415 COLL VENOUS BLD VENIPUNCTURE: CPT | Performed by: COLON & RECTAL SURGERY

## 2025-01-21 NOTE — TELEPHONE ENCOUNTER
General Call    Reason for Call: [facility] requesting that patient have a Type and Screen Lab done at patient's pre-op visit today.      What are your questions or concerns:  None    Mayela Pelaez RN

## 2025-01-21 NOTE — OR NURSING
Contacted [facility] to request patient have type and screen at her pre-op. Pt has history of antibody and requires more time if blood needed.

## 2025-01-21 NOTE — H&P (VIEW-ONLY)
Preoperative Evaluation  [facility]  [address] [address]  Phone: [phone]  Fax: [phone]  Primary Provider: Zack Woody MD  Pre-op Performing Provider: CHRISTOPHER Ceron CNP  Jan 21, 2025 1/21/2025   Surgical Information   What procedure is being done? prolase rectrum repair   Facility or Hospital where procedure/surgery will be performed: [facility]   Who is doing the procedure / surgery? dr de la fuente   Date of surgery / procedure: jan 23   Time of surgery / procedure: 400   Where do you plan to recover after surgery? at home alone     Fax number for surgical facility: Note does not need to be faxed, will be available electronically in Epic.    Assessment & Plan     The proposed surgical procedure is considered INTERMEDIATE risk.    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    Additional Medication Instructions    Patient Instructions   -Do not take aspirin and celebrex starting 7 days prior to your surgery unless specifically told otherwise.     -Do not take NSAIDs (ex: ibuprofen/Advil, naproxen/Aleve, Motrin) starting 7 days prior to your surgery.     -Do not take omega 3 fatty acid supplements (like fish oil) and any oral vitamin E supplement starting 7 days prior to your surgery.    Stop all vitamins and biotin unless specifically told otherwise.      -OK to use acetaminophen  (Tylenol) for pain control until your surgery.     The surgery team or pre-operative nurse will give you detailed information about restrictions on eating and drinking before surgery and if you need to complete a special bathing procedure prior to surgery. Typically, it is nothing to eat or drink at least 8-10 hours prior to help prevent aspiration.      Recommendation  Approval given to proceed with proposed procedure, without further diagnostic evaluation.    Adan Mccarty is a 79 year old, presenting for the following:  Pre-Op Exam          1/21/2025     2:46 PM   Additional Questions   Roomed by Matheus   Accompanied by daughter     HPI related to upcoming procedure: rectal prolapse, painful.   - urinary cath present at visit today. Family member decided with patient that they want it removed. I discussed keeping it in for pending surgery since it may be put back in, and they declined. Stated the family member would remove it herself with a 10 ml syringe. I had nurse assist family and patient after visit         1/21/2025   Pre-Op Questionnaire   Have you ever had a heart attack or stroke? No   Have you ever had surgery on your heart or blood vessels, such as a stent placement, a coronary artery bypass, or surgery on an artery in your head, neck, heart, or legs? No   Do you have chest pain with activity? No   Do you have a history of heart failure? No   Do you currently have a cold, bronchitis or symptoms of other infection? No   Do you have a cough, shortness of breath, or wheezing? No   Do you or anyone in your family have previous history of blood clots? No   Do you or does anyone in your family have a serious bleeding problem such as prolonged bleeding following surgeries or cuts? No   Have you ever had problems with anemia or been told to take iron pills? (!) YES    Have you had any abnormal blood loss such as black, tarry or bloody stools, or abnormal vaginal bleeding? No   Have you ever had a blood  transfusion? (!) YES   Have you ever had a transfusion reaction? No   Are you willing to have a blood transfusion if it is medically needed before, during, or after your surgery? Yes   Have you or any of your relatives ever had problems with anesthesia? (!) YES - can get nausea   Do you have sleep apnea, excessive snoring or daytime drowsiness? No   Do you have any artifical heart valves or other implanted medical devices like a pacemaker, defibrillator, or continuous glucose monitor? No   Do you have artificial joints? No   Are you allergic to latex? No       Status of Chronic Conditions:  See problem list for active medical problems.  Problems all longstanding and stable, except as noted/documented.  See ROS for pertinent symptoms related to these conditions.    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) 04/24/2024     Priority: Medium    Chemotherapy-induced neutropenia 04/24/2024     Priority: Medium    Metal foreign body in eye region 04/19/2024     Priority: Medium    MRI contraindicated due to metal in eye 04/19/2024     Priority: Medium    Urinary incontinence 08/24/2023     Priority: Medium    Irregular bowel habits 08/24/2023     Priority: Medium    Bronchiectasis without complication (H) 12/29/2022     Priority: Medium    Gastroesophageal reflux disease with esophagitis without hemorrhage 06/16/2022     Priority: Medium    Status post dilation of esophageal narrowing 05/19/2021     Priority: Medium    Bilateral sensorineural hearing loss 11/23/2018     Priority: Medium    Impaired fasting glucose 11/23/2018     Priority: Medium    Essential hypertension with goal blood  pressure less than 130/80      Priority: Medium     Created by Conversion  Replacement Utility updated for latest IMO load        Diverticular disease of large intestine 09/06/2016     Priority: Medium    Osteoporosis, senile 02/18/2016     Priority: Medium    Anxiety 11/06/2015     Priority: Medium      Past Medical History:   Diagnosis Date    Anxiety     Breast cyst     While ago    History of cholecystectomy 08/24/2023     Past Surgical History:   Procedure Laterality Date    BREAST CYST ASPIRATION Left     While ago    HYSTERECTOMY  1985    IR CHEST PORT PLACEMENT > 5 YRS OF AGE  5/1/2024    OOPHORECTOMY Bilateral 1985    [facility] REMOVAL OF OVARY(S)      Description: Oophorectomy;  Recorded: 12/17/2009;    ZZC TOTAL ABDOM HYSTERECTOMY      Description: Total Abdominal Hysterectomy;  Recorded: 12/17/2009;     Current Outpatient Medications   Medication Sig Dispense Refill    acarbose (PRECOSE) 25 MG tablet Take 25 mg by mouth 3 times daily (with meals)      acetaminophen (TYLENOL) 325 MG tablet Take 325-650 mg by mouth every 6 hours as needed for mild pain      amLODIPine (NORVASC) 5 MG tablet Take 1 tablet (5 mg) by mouth daily 90 tablet 3    Biotin 5 MG TABS Take 5,000 mcg by mouth daily      busPIRone (BUSPAR) 7.5 MG tablet TAKE 1 TABLET BY MOUTH TWICE  DAILY 180 tablet 2    celecoxib (CELEBREX) 200 MG capsule TAKE 1 CAPSULE BY MOUTH  DAILY 90 capsule 3    denosumab (PROLIA) 60 MG/ML SOSY injection Inject 60 mg Subcutaneous every 6 months      eszopiclone (LUNESTA) 1 MG tablet Take 1 tablet (1 mg) by mouth at bedtime. 90 tablet 1    omeprazole (PRILOSEC) 20 MG capsule [OMEPRAZOLE (PRILOSEC) 20 MG CAPSULE] Take 20 mg by mouth daily before breakfast.      ondansetron (ZOFRAN) 8 MG tablet Take 1 tablet (8 mg) by mouth every 8 hours as needed for nausea. 90 tablet 3    Probiotic Product (SOLUBLE FIBER/PROBIOTICS PO) 2 tablespoons in 8 oz of liquid daily      rivastigmine (EXELON) 4.5 MG capsule TAKE 1 CAPSULE(4.5 MG)  "BY MOUTH TWICE DAILY 180 capsule 3    sennosides (SENOKOT) 8.6 MG tablet Take 2 tablets by mouth daily as needed for constipation      simethicone (MYLICON) 125 MG chewable tablet Take 125 mg by mouth as needed.      UNABLE TO FIND Take 2 Tablespoonful by mouth every morning MEDICATION NAME: biofiber 8 oz with water      multivitamin with minerals (THERA-M) 9 mg iron-400 mcg Tab tablet [MULTIVITAMIN WITH MINERALS (THERA-M) 9 MG IRON-400 MCG TAB TABLET] Take 1 tablet by mouth daily. (Patient not taking: Reported on 1/21/2025)      nortriptyline (PAMELOR) 50 MG capsule TAKE 1 CAPSULE BY MOUTH AT  BEDTIME (Patient not taking: Reported on 1/21/2025) 90 capsule 2       Allergies   Allergen Reactions    Blood Transfusion Related (Informational Only) Other (See Comments)     Patient has a history of a Clinically Significant antibody against RBC antigens. A delay in compatible RBCs may occur. 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    Codeine      Other Reaction(s): i AM  ALLGERIC TO COMPENZINE    Prochlorperazine Edisylate [Prochlorperazine] Other (See Comments)     Extrapyramidal side effects        Social History     Tobacco Use    Smoking status: Never     Passive exposure: Never    Smokeless tobacco: Never   Substance Use Topics    Alcohol use: No     Family History   Problem Relation Age of Onset    Breast Cancer Mother 52.00    Cancer Maternal Grandfather 71.00        prostate    Prostate Cancer Maternal Grandfather      History   Drug Use Unknown             Review of Systems  Constitutional, HEENT, cardiovascular, pulmonary, gi and gu systems are negative, except as otherwise noted.    Objective    BP (!) 156/88   Pulse 93   Temp 98.6  F (37  C) (Oral)   Resp 14   Ht 1.478 m (4' 10.2\")   Wt 39.5 kg (87 lb)   LMP  (LMP Unknown)   SpO2 100%   BMI 18.06 kg/m     Estimated body mass index is 18.66 kg/m  as calculated from the following:    Height as of this encounter: 1.454 m (4' 9.25\").    Weight as " of this encounter: 39.5 kg (87 lb).  Physical Exam  GENERAL: alert and no distress  EYES: Eyes grossly normal to inspection, PERRL and conjunctivae and sclerae normal  NECK: no adenopathy, no asymmetry, masses, or scars  RESP: lungs clear to auscultation - no rales, rhonchi or wheezes  CV: regular rate and rhythm, normal S1 S2, no S3 or S4, no murmur, click or rub, no peripheral edema  ABDOMEN: soft, nontender, no hepatosplenomegaly, no masses and bowel sounds normal  MS: no gross musculoskeletal defects noted, no edema  SKIN: no suspicious lesions or rashes  NEURO: Normal strength and tone, mentation intact and speech normal  PSYCH: mentation appears normal, affect normal/bright  LYMPH: no cervical, supraclavicular, axillary, or inguinal adenopathy    Recent Labs   Lab Test 01/15/25  1600 01/14/25  1555 01/13/25  1328 05/03/24  0830 04/25/24  1214 04/16/24  1549 04/12/24  1216   HGB 12.0  --  11.9   < > 11.6*   < >  --      --  285   < > 323   < >  --    INR  --   --   --   --   --   --  1.05   * 138  --    < > 141   < >  --    POTASSIUM 4.8 4.4  --    < > 4.4   < > 3.9   CR 0.66 0.93  --    < > 0.72   < >  --    A1C  --   --   --   --  5.9*  --   --     < > = values in this interval not displayed.        Diagnostics  Recent Results (from the past week)   Adult Type and Screen    Collection Time: 01/21/25  3:56 PM   Result Value Ref Range    ABO/RH(D) O POS     Antibody Screen Positive (A) Negative    SPECIMEN EXPIRATION DATE 20250124235900    Antibody identification    Collection Time: 01/21/25  3:56 PM   Result Value Ref Range    Antibody, Previously Identified Anti-E     SPECIMEN EXPIRATION DATE [id number]      Recent Results (from the past 720 hours)   CBC with platelets    Collection Time: 01/06/25  3:39 PM   Result Value Ref Range    WBC Count 7.0 4.0 - 11.0 10e3/uL    RBC Count 4.79 3.80 - 5.20 10e6/uL    Hemoglobin 12.4 11.7 - 15.7 g/dL    Hematocrit 39.7 35.0 - 47.0 %    MCV 83 78 - 100 fL     MCH 25.9 (L) 26.5 - 33.0 pg    MCHC 31.2 (L) 31.5 - 36.5 g/dL    RDW 16.2 (H) 10.0 - 15.0 %    Platelet Count 227 150 - 450 10e3/uL   CBC with platelets and differential    Collection Time: 01/13/25  1:28 PM   Result Value Ref Range    WBC Count 7.7 4.0 - 11.0 10e3/uL    RBC Count 4.55 3.80 - 5.20 10e6/uL    Hemoglobin 11.9 11.7 - 15.7 g/dL    Hematocrit 37.5 35.0 - 47.0 %    MCV 82 78 - 100 fL    MCH 26.2 (L) 26.5 - 33.0 pg    MCHC 31.7 31.5 - 36.5 g/dL    RDW 17.1 (H) 10.0 - 15.0 %    Platelet Count 285 150 - 450 10e3/uL    % Neutrophils 77 %    % Lymphocytes 10 %    % Monocytes 11 %    % Eosinophils 1 %    % Basophils 1 %    % Immature Granulocytes 0 %    NRBCs per 100 WBC 0 <1 /100    Absolute Neutrophils 5.9 1.6 - 8.3 10e3/uL    Absolute Lymphocytes 0.8 0.8 - 5.3 10e3/uL    Absolute Monocytes 0.8 0.0 - 1.3 10e3/uL    Absolute Eosinophils 0.1 0.0 - 0.7 10e3/uL    Absolute Basophils 0.0 0.0 - 0.2 10e3/uL    Absolute Immature Granulocytes 0.0 <=0.4 10e3/uL    Absolute NRBCs 0.0 10e3/uL   Comprehensive metabolic panel    Collection Time: 01/14/25  3:55 PM   Result Value Ref Range    Sodium 138 135 - 145 mmol/L    Potassium 4.4 3.4 - 5.3 mmol/L    Carbon Dioxide (CO2) 26 22 - 29 mmol/L    Anion Gap 12 7 - 15 mmol/L    Urea Nitrogen 13.1 8.0 - 23.0 mg/dL    Creatinine 0.93 0.51 - 0.95 mg/dL    GFR Estimate 62 >60 mL/min/1.73m2    Calcium 9.5 8.8 - 10.4 mg/dL    Chloride 100 98 - 107 mmol/L    Glucose 109 (H) 70 - 99 mg/dL    Alkaline Phosphatase 111 40 - 150 U/L    AST 40 0 - 45 U/L    ALT 35 0 - 50 U/L    Protein Total 6.5 6.4 - 8.3 g/dL    Albumin 4.1 3.5 - 5.2 g/dL    Bilirubin Total 0.3 <=1.2 mg/dL   Ionized Calcium    Collection Time: 01/14/25  3:55 PM   Result Value Ref Range    Calcium Ionized Whole Blood 4.9 4.4 - 5.2 mg/dL   Vitamin D Deficiency    Collection Time: 01/14/25  3:55 PM   Result Value Ref Range    Vitamin D, Total (25-Hydroxy) 47 20 - 50 ng/mL   Basic metabolic panel    Collection Time:  01/15/25  4:00 PM   Result Value Ref Range    Sodium 134 (L) 135 - 145 mmol/L    Potassium 4.8 3.4 - 5.3 mmol/L    Chloride 100 98 - 107 mmol/L    Carbon Dioxide (CO2) 26 22 - 29 mmol/L    Anion Gap 8 7 - 15 mmol/L    Urea Nitrogen 9.4 8.0 - 23.0 mg/dL    Creatinine 0.66 0.51 - 0.95 mg/dL    GFR Estimate 89 >60 mL/min/1.73m2    Calcium 8.8 8.8 - 10.4 mg/dL    Glucose 117 (H) 70 - 99 mg/dL   CBC with platelets and differential    Collection Time: 01/15/25  4:00 PM   Result Value Ref Range    WBC Count 8.2 4.0 - 11.0 10e3/uL    RBC Count 4.52 3.80 - 5.20 10e6/uL    Hemoglobin 12.0 11.7 - 15.7 g/dL    Hematocrit 36.8 35.0 - 47.0 %    MCV 81 78 - 100 fL    MCH 26.5 26.5 - 33.0 pg    MCHC 32.6 31.5 - 36.5 g/dL    RDW 17.2 (H) 10.0 - 15.0 %    Platelet Count 305 150 - 450 10e3/uL    % Neutrophils 76 %    % Lymphocytes 13 %    % Monocytes 9 %    % Eosinophils 1 %    % Basophils 1 %    % Immature Granulocytes 0 %    NRBCs per 100 WBC 0 <1 /100    Absolute Neutrophils 6.2 1.6 - 8.3 10e3/uL    Absolute Lymphocytes 1.1 0.8 - 5.3 10e3/uL    Absolute Monocytes 0.8 0.0 - 1.3 10e3/uL    Absolute Eosinophils 0.1 0.0 - 0.7 10e3/uL    Absolute Basophils 0.0 0.0 - 0.2 10e3/uL    Absolute Immature Granulocytes 0.0 <=0.4 10e3/uL    Absolute NRBCs 0.0 10e3/uL   UA with Microscopic reflex to Culture    Collection Time: 01/15/25  9:41 PM    Specimen: Urine, Deutsch Catheter   Result Value Ref Range    Color Urine Colorless Colorless, Straw, Light Yellow, Yellow    Appearance Urine Clear Clear    Glucose Urine Negative Negative mg/dL    Bilirubin Urine Negative Negative    Ketones Urine Trace (A) Negative mg/dL    Specific Gravity Urine 1.015 1.001 - 1.030    Blood Urine Negative Negative    pH Urine 7.5 (H) 5.0 - 7.0    Protein Albumin Urine Negative Negative mg/dL    Urobilinogen Urine <2.0 <2.0 mg/dL    Nitrite Urine Negative Negative    Leukocyte Esterase Urine Negative Negative    Bacteria Urine None Seen None Seen /HPF    RBC Urine 0  <=2 /HPF    WBC Urine <1 <=5 /HPF   Adult Type and Screen    Collection Time: 01/21/25  3:56 PM   Result Value Ref Range    ABO/RH(D) O POS     Antibody Screen Positive (A) Negative    SPECIMEN EXPIRATION DATE [id number]    Antibody identification    Collection Time: 01/21/25  3:56 PM   Result Value Ref Range    Antibody, Previously Identified Anti-E     SPECIMEN EXPIRATION DATE [id number]       No EKG this visit, completed in the last 90 days.    Revised Cardiac Risk Index (RCRI)  The patient has the following serious cardiovascular risks for perioperative complications:   - No serious cardiac risks = 0 points     RCRI Interpretation: 0 points: Class I (very low risk - 0.4% complication rate)         Signed Electronically by: CHRISTOPHER Ceron CNP  A copy of this evaluation report is provided to the requesting physician.

## 2025-01-21 NOTE — PROGRESS NOTES
Preoperative Evaluation  [facility]  [address] [facility] [address]  Phone: [phone]  Fax: [phone]  Primary Provider: Zack Woody MD  Pre-op Performing Provider: CHRISTOPHER Ceron CNP  Jan 21, 2025 1/21/2025   Surgical Information   What procedure is being done? prolase rectrum repair   Facility or Hospital where procedure/surgery will be performed: [facility]   Who is doing the procedure / surgery? dr de la fuente   Date of surgery / procedure: jan 23   Time of surgery / procedure: 400   Where do you plan to recover after surgery? at home alone     Fax number for surgical facility: Note does not need to be faxed, will be available electronically in Epic.    Assessment & Plan     The proposed surgical procedure is considered INTERMEDIATE risk.    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    Additional Medication Instructions    Patient Instructions   -Do not take aspirin and celebrex starting 7 days prior to your surgery unless specifically told otherwise.     -Do not take NSAIDs (ex: ibuprofen/Advil, naproxen/Aleve, Motrin) starting 7 days prior to your surgery.     -Do not take omega 3 fatty acid supplements (like fish oil) and any oral vitamin E supplement starting 7 days prior to your surgery.    Stop all vitamins and biotin unless specifically told otherwise.      -OK to use acetaminophen  (Tylenol) for pain control until your surgery.     The surgery team or pre-operative nurse will give you detailed information about restrictions on eating and drinking before surgery and if you need to complete a special bathing procedure prior to surgery. Typically, it is nothing to eat or drink at least 8-10 hours prior to help prevent aspiration.      Recommendation  Approval given to proceed with proposed procedure, without further diagnostic evaluation.    Adan Mccarty is a 79 year old, presenting for the following:  Pre-Op Exam          1/21/2025     2:46 PM   Additional Questions   Roomed by Matheus   Accompanied by daughter     HPI related to upcoming procedure: rectal prolapse, painful.   - urinary cath present at visit today. Family member decided with patient that they want it removed. I discussed keeping it in for pending surgery since it may be put back in, and they declined. Stated the family member would remove it herself with a 10 ml syringe. I had nurse assist family and patient after visit         1/21/2025   Pre-Op Questionnaire   Have you ever had a heart attack or stroke? No   Have you ever had surgery on your heart or blood vessels, such as a stent placement, a coronary artery bypass, or surgery on an artery in your head, neck, heart, or legs? No   Do you have chest pain with activity? No   Do you have a history of heart failure? No   Do you currently have a cold, bronchitis or symptoms of other infection? No   Do you have a cough, shortness of breath, or wheezing? No   Do you or anyone in your family have previous history of blood clots? No   Do you or does anyone in your family have a serious bleeding problem such as prolonged bleeding following surgeries or cuts? No   Have you ever had problems with anemia or been told to take iron pills? (!) YES    Have you had any abnormal blood loss such as black, tarry or bloody stools, or abnormal vaginal bleeding? No   Have you ever had a blood  transfusion? (!) YES   Have you ever had a transfusion reaction? No   Are you willing to have a blood transfusion if it is medically needed before, during, or after your surgery? Yes   Have you or any of your relatives ever had problems with anesthesia? (!) YES - can get nausea   Do you have sleep apnea, excessive snoring or daytime drowsiness? No   Do you have any artifical heart valves or other implanted medical devices like a pacemaker, defibrillator, or continuous glucose monitor? No   Do you have artificial joints? No   Are you allergic to latex? No       Status of Chronic Conditions:  See problem list for active medical problems.  Problems all longstanding and stable, except as noted/documented.  See ROS for pertinent symptoms related to these conditions.    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) 04/24/2024     Priority: Medium    Chemotherapy-induced neutropenia 04/24/2024     Priority: Medium    Metal foreign body in eye region 04/19/2024     Priority: Medium    MRI contraindicated due to metal in eye 04/19/2024     Priority: Medium    Urinary incontinence 08/24/2023     Priority: Medium    Irregular bowel habits 08/24/2023     Priority: Medium    Bronchiectasis without complication (H) 12/29/2022     Priority: Medium    Gastroesophageal reflux disease with esophagitis without hemorrhage 06/16/2022     Priority: Medium    Status post dilation of esophageal narrowing 05/19/2021     Priority: Medium    Bilateral sensorineural hearing loss 11/23/2018     Priority: Medium    Impaired fasting glucose 11/23/2018     Priority: Medium    Essential hypertension with goal blood  pressure less than 130/80      Priority: Medium     Created by Conversion  Replacement Utility updated for latest IMO load        Diverticular disease of large intestine 09/06/2016     Priority: Medium    Osteoporosis, senile 02/18/2016     Priority: Medium    Anxiety 11/06/2015     Priority: Medium      Past Medical History:   Diagnosis Date    Anxiety     Breast cyst     While ago    History of cholecystectomy 08/24/2023     Past Surgical History:   Procedure Laterality Date    BREAST CYST ASPIRATION Left     While ago    HYSTERECTOMY  1985    IR CHEST PORT PLACEMENT > 5 YRS OF AGE  5/1/2024    OOPHORECTOMY Bilateral 1985    [facility] REMOVAL OF OVARY(S)      Description: Oophorectomy;  Recorded: 12/17/2009;    ZZC TOTAL ABDOM HYSTERECTOMY      Description: Total Abdominal Hysterectomy;  Recorded: 12/17/2009;     Current Outpatient Medications   Medication Sig Dispense Refill    acarbose (PRECOSE) 25 MG tablet Take 25 mg by mouth 3 times daily (with meals)      acetaminophen (TYLENOL) 325 MG tablet Take 325-650 mg by mouth every 6 hours as needed for mild pain      amLODIPine (NORVASC) 5 MG tablet Take 1 tablet (5 mg) by mouth daily 90 tablet 3    Biotin 5 MG TABS Take 5,000 mcg by mouth daily      busPIRone (BUSPAR) 7.5 MG tablet TAKE 1 TABLET BY MOUTH TWICE  DAILY 180 tablet 2    celecoxib (CELEBREX) 200 MG capsule TAKE 1 CAPSULE BY MOUTH  DAILY 90 capsule 3    denosumab (PROLIA) 60 MG/ML SOSY injection Inject 60 mg Subcutaneous every 6 months      eszopiclone (LUNESTA) 1 MG tablet Take 1 tablet (1 mg) by mouth at bedtime. 90 tablet 1    omeprazole (PRILOSEC) 20 MG capsule [OMEPRAZOLE (PRILOSEC) 20 MG CAPSULE] Take 20 mg by mouth daily before breakfast.      ondansetron (ZOFRAN) 8 MG tablet Take 1 tablet (8 mg) by mouth every 8 hours as needed for nausea. 90 tablet 3    Probiotic Product (SOLUBLE FIBER/PROBIOTICS PO) 2 tablespoons in 8 oz of liquid daily      rivastigmine (EXELON) 4.5 MG capsule TAKE 1 CAPSULE(4.5 MG)  "BY MOUTH TWICE DAILY 180 capsule 3    sennosides (SENOKOT) 8.6 MG tablet Take 2 tablets by mouth daily as needed for constipation      simethicone (MYLICON) 125 MG chewable tablet Take 125 mg by mouth as needed.      UNABLE TO FIND Take 2 Tablespoonful by mouth every morning MEDICATION NAME: biofiber 8 oz with water      multivitamin with minerals (THERA-M) 9 mg iron-400 mcg Tab tablet [MULTIVITAMIN WITH MINERALS (THERA-M) 9 MG IRON-400 MCG TAB TABLET] Take 1 tablet by mouth daily. (Patient not taking: Reported on 1/21/2025)      nortriptyline (PAMELOR) 50 MG capsule TAKE 1 CAPSULE BY MOUTH AT  BEDTIME (Patient not taking: Reported on 1/21/2025) 90 capsule 2       Allergies   Allergen Reactions    Blood Transfusion Related (Informational Only) Other (See Comments)     Patient has a history of a Clinically Significant antibody against RBC antigens. A delay in compatible RBCs may occur. 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    Codeine      Other Reaction(s): i AM  ALLGERIC TO COMPENZINE    Prochlorperazine Edisylate [Prochlorperazine] Other (See Comments)     Extrapyramidal side effects        Social History     Tobacco Use    Smoking status: Never     Passive exposure: Never    Smokeless tobacco: Never   Substance Use Topics    Alcohol use: No     Family History   Problem Relation Age of Onset    Breast Cancer Mother 52.00    Cancer Maternal Grandfather 71.00        prostate    Prostate Cancer Maternal Grandfather      History   Drug Use Unknown             Review of Systems  Constitutional, HEENT, cardiovascular, pulmonary, gi and gu systems are negative, except as otherwise noted.    Objective    BP (!) 156/88   Pulse 93   Temp 98.6  F (37  C) (Oral)   Resp 14   Ht 1.478 m (4' 10.2\")   Wt 39.5 kg (87 lb)   LMP  (LMP Unknown)   SpO2 100%   BMI 18.06 kg/m     Estimated body mass index is 18.66 kg/m  as calculated from the following:    Height as of this encounter: 1.454 m (4' 9.25\").    Weight as " of this encounter: 39.5 kg (87 lb).  Physical Exam  GENERAL: alert and no distress  EYES: Eyes grossly normal to inspection, PERRL and conjunctivae and sclerae normal  NECK: no adenopathy, no asymmetry, masses, or scars  RESP: lungs clear to auscultation - no rales, rhonchi or wheezes  CV: regular rate and rhythm, normal S1 S2, no S3 or S4, no murmur, click or rub, no peripheral edema  ABDOMEN: soft, nontender, no hepatosplenomegaly, no masses and bowel sounds normal  MS: no gross musculoskeletal defects noted, no edema  SKIN: no suspicious lesions or rashes  NEURO: Normal strength and tone, mentation intact and speech normal  PSYCH: mentation appears normal, affect normal/bright  LYMPH: no cervical, supraclavicular, axillary, or inguinal adenopathy    Recent Labs   Lab Test 01/15/25  1600 01/14/25  1555 01/13/25  1328 05/03/24  0830 04/25/24  1214 04/16/24  1549 04/12/24  1216   HGB 12.0  --  11.9   < > 11.6*   < >  --      --  285   < > 323   < >  --    INR  --   --   --   --   --   --  1.05   * 138  --    < > 141   < >  --    POTASSIUM 4.8 4.4  --    < > 4.4   < > 3.9   CR 0.66 0.93  --    < > 0.72   < >  --    A1C  --   --   --   --  5.9*  --   --     < > = values in this interval not displayed.        Diagnostics  Recent Results (from the past week)   Adult Type and Screen    Collection Time: 01/21/25  3:56 PM   Result Value Ref Range    ABO/RH(D) O POS     Antibody Screen Positive (A) Negative    SPECIMEN EXPIRATION DATE 20250124235900    Antibody identification    Collection Time: 01/21/25  3:56 PM   Result Value Ref Range    Antibody, Previously Identified Anti-E     SPECIMEN EXPIRATION DATE [id number]      Recent Results (from the past 720 hours)   CBC with platelets    Collection Time: 01/06/25  3:39 PM   Result Value Ref Range    WBC Count 7.0 4.0 - 11.0 10e3/uL    RBC Count 4.79 3.80 - 5.20 10e6/uL    Hemoglobin 12.4 11.7 - 15.7 g/dL    Hematocrit 39.7 35.0 - 47.0 %    MCV 83 78 - 100 fL     MCH 25.9 (L) 26.5 - 33.0 pg    MCHC 31.2 (L) 31.5 - 36.5 g/dL    RDW 16.2 (H) 10.0 - 15.0 %    Platelet Count 227 150 - 450 10e3/uL   CBC with platelets and differential    Collection Time: 01/13/25  1:28 PM   Result Value Ref Range    WBC Count 7.7 4.0 - 11.0 10e3/uL    RBC Count 4.55 3.80 - 5.20 10e6/uL    Hemoglobin 11.9 11.7 - 15.7 g/dL    Hematocrit 37.5 35.0 - 47.0 %    MCV 82 78 - 100 fL    MCH 26.2 (L) 26.5 - 33.0 pg    MCHC 31.7 31.5 - 36.5 g/dL    RDW 17.1 (H) 10.0 - 15.0 %    Platelet Count 285 150 - 450 10e3/uL    % Neutrophils 77 %    % Lymphocytes 10 %    % Monocytes 11 %    % Eosinophils 1 %    % Basophils 1 %    % Immature Granulocytes 0 %    NRBCs per 100 WBC 0 <1 /100    Absolute Neutrophils 5.9 1.6 - 8.3 10e3/uL    Absolute Lymphocytes 0.8 0.8 - 5.3 10e3/uL    Absolute Monocytes 0.8 0.0 - 1.3 10e3/uL    Absolute Eosinophils 0.1 0.0 - 0.7 10e3/uL    Absolute Basophils 0.0 0.0 - 0.2 10e3/uL    Absolute Immature Granulocytes 0.0 <=0.4 10e3/uL    Absolute NRBCs 0.0 10e3/uL   Comprehensive metabolic panel    Collection Time: 01/14/25  3:55 PM   Result Value Ref Range    Sodium 138 135 - 145 mmol/L    Potassium 4.4 3.4 - 5.3 mmol/L    Carbon Dioxide (CO2) 26 22 - 29 mmol/L    Anion Gap 12 7 - 15 mmol/L    Urea Nitrogen 13.1 8.0 - 23.0 mg/dL    Creatinine 0.93 0.51 - 0.95 mg/dL    GFR Estimate 62 >60 mL/min/1.73m2    Calcium 9.5 8.8 - 10.4 mg/dL    Chloride 100 98 - 107 mmol/L    Glucose 109 (H) 70 - 99 mg/dL    Alkaline Phosphatase 111 40 - 150 U/L    AST 40 0 - 45 U/L    ALT 35 0 - 50 U/L    Protein Total 6.5 6.4 - 8.3 g/dL    Albumin 4.1 3.5 - 5.2 g/dL    Bilirubin Total 0.3 <=1.2 mg/dL   Ionized Calcium    Collection Time: 01/14/25  3:55 PM   Result Value Ref Range    Calcium Ionized Whole Blood 4.9 4.4 - 5.2 mg/dL   Vitamin D Deficiency    Collection Time: 01/14/25  3:55 PM   Result Value Ref Range    Vitamin D, Total (25-Hydroxy) 47 20 - 50 ng/mL   Basic metabolic panel    Collection Time:  01/15/25  4:00 PM   Result Value Ref Range    Sodium 134 (L) 135 - 145 mmol/L    Potassium 4.8 3.4 - 5.3 mmol/L    Chloride 100 98 - 107 mmol/L    Carbon Dioxide (CO2) 26 22 - 29 mmol/L    Anion Gap 8 7 - 15 mmol/L    Urea Nitrogen 9.4 8.0 - 23.0 mg/dL    Creatinine 0.66 0.51 - 0.95 mg/dL    GFR Estimate 89 >60 mL/min/1.73m2    Calcium 8.8 8.8 - 10.4 mg/dL    Glucose 117 (H) 70 - 99 mg/dL   CBC with platelets and differential    Collection Time: 01/15/25  4:00 PM   Result Value Ref Range    WBC Count 8.2 4.0 - 11.0 10e3/uL    RBC Count 4.52 3.80 - 5.20 10e6/uL    Hemoglobin 12.0 11.7 - 15.7 g/dL    Hematocrit 36.8 35.0 - 47.0 %    MCV 81 78 - 100 fL    MCH 26.5 26.5 - 33.0 pg    MCHC 32.6 31.5 - 36.5 g/dL    RDW 17.2 (H) 10.0 - 15.0 %    Platelet Count 305 150 - 450 10e3/uL    % Neutrophils 76 %    % Lymphocytes 13 %    % Monocytes 9 %    % Eosinophils 1 %    % Basophils 1 %    % Immature Granulocytes 0 %    NRBCs per 100 WBC 0 <1 /100    Absolute Neutrophils 6.2 1.6 - 8.3 10e3/uL    Absolute Lymphocytes 1.1 0.8 - 5.3 10e3/uL    Absolute Monocytes 0.8 0.0 - 1.3 10e3/uL    Absolute Eosinophils 0.1 0.0 - 0.7 10e3/uL    Absolute Basophils 0.0 0.0 - 0.2 10e3/uL    Absolute Immature Granulocytes 0.0 <=0.4 10e3/uL    Absolute NRBCs 0.0 10e3/uL   UA with Microscopic reflex to Culture    Collection Time: 01/15/25  9:41 PM    Specimen: Urine, Deutsch Catheter   Result Value Ref Range    Color Urine Colorless Colorless, Straw, Light Yellow, Yellow    Appearance Urine Clear Clear    Glucose Urine Negative Negative mg/dL    Bilirubin Urine Negative Negative    Ketones Urine Trace (A) Negative mg/dL    Specific Gravity Urine 1.015 1.001 - 1.030    Blood Urine Negative Negative    pH Urine 7.5 (H) 5.0 - 7.0    Protein Albumin Urine Negative Negative mg/dL    Urobilinogen Urine <2.0 <2.0 mg/dL    Nitrite Urine Negative Negative    Leukocyte Esterase Urine Negative Negative    Bacteria Urine None Seen None Seen /HPF    RBC Urine 0  <=2 /HPF    WBC Urine <1 <=5 /HPF   Adult Type and Screen    Collection Time: 01/21/25  3:56 PM   Result Value Ref Range    ABO/RH(D) O POS     Antibody Screen Positive (A) Negative    SPECIMEN EXPIRATION DATE 20250124235900    Antibody identification    Collection Time: 01/21/25  3:56 PM   Result Value Ref Range    Antibody, Previously Identified Anti-E     SPECIMEN EXPIRATION DATE [id number]       No EKG this visit, completed in the last 90 days.    Revised Cardiac Risk Index (RCRI)  The patient has the following serious cardiovascular risks for perioperative complications:   - No serious cardiac risks = 0 points     RCRI Interpretation: 0 points: Class I (very low risk - 0.4% complication rate)         Signed Electronically by: CHRISTOPHER Ceron CNP  A copy of this evaluation report is provided to the requesting physician.

## 2025-01-21 NOTE — PATIENT INSTRUCTIONS
-Do not take aspirin and celebrex starting 7 days prior to your surgery unless specifically told otherwise.     -Do not take NSAIDs (ex: ibuprofen/Advil, naproxen/Aleve, Motrin) starting 7 days prior to your surgery.     -Do not take omega 3 fatty acid supplements (like fish oil) and any oral vitamin E supplement starting 7 days prior to your surgery.    Stop all vitamins and biotin unless specifically told otherwise.      -OK to use acetaminophen (Tylenol) for pain control until your surgery.     The surgery team or pre-operative nurse will give you detailed information about restrictions on eating and drinking before surgery and if you need to complete a special bathing procedure prior to surgery. Typically, it is nothing to eat or drink at least 8-10 hours prior to help prevent aspiration.

## 2025-01-22 ENCOUNTER — TELEPHONE (OUTPATIENT)
Dept: FAMILY MEDICINE | Facility: CLINIC | Age: 80
End: 2025-01-22
Payer: COMMERCIAL

## 2025-01-22 NOTE — TELEPHONE ENCOUNTER
1-22-25  Shannon hoff from [facility] Surgery prep & pt Is scheduled for surgery 1-23 & the pre-op isnt signed from 1-21.  Please sign pre-op  Krsytal

## 2025-01-23 ENCOUNTER — HOSPITAL ENCOUNTER (INPATIENT)
Facility: HOSPITAL | Age: 80
End: 2025-01-23
Attending: COLON & RECTAL SURGERY | Admitting: COLON & RECTAL SURGERY
Payer: COMMERCIAL

## 2025-01-23 ENCOUNTER — APPOINTMENT (OUTPATIENT)
Dept: RADIOLOGY | Facility: HOSPITAL | Age: 80
End: 2025-01-23
Attending: INTERNAL MEDICINE
Payer: COMMERCIAL

## 2025-01-23 ENCOUNTER — ANESTHESIA EVENT (OUTPATIENT)
Dept: SURGERY | Facility: HOSPITAL | Age: 80
End: 2025-01-23
Payer: COMMERCIAL

## 2025-01-23 ENCOUNTER — ANESTHESIA (OUTPATIENT)
Dept: SURGERY | Facility: HOSPITAL | Age: 80
End: 2025-01-23
Payer: COMMERCIAL

## 2025-01-23 VITALS
TEMPERATURE: 98.5 F | DIASTOLIC BLOOD PRESSURE: 64 MMHG | BODY MASS INDEX: 17.64 KG/M2 | SYSTOLIC BLOOD PRESSURE: 135 MMHG | RESPIRATION RATE: 20 BRPM | WEIGHT: 85 LBS | HEART RATE: 108 BPM | OXYGEN SATURATION: 100 %

## 2025-01-23 DIAGNOSIS — I10 ESSENTIAL HYPERTENSION WITH GOAL BLOOD PRESSURE LESS THAN 130/80: Primary | ICD-10-CM

## 2025-01-23 DIAGNOSIS — K62.3 RECTAL PROLAPSE: ICD-10-CM

## 2025-01-23 DIAGNOSIS — K59.00 CONSTIPATION, UNSPECIFIED CONSTIPATION TYPE: ICD-10-CM

## 2025-01-23 LAB
ABO + RH BLD: ABNORMAL
ANION GAP SERPL CALCULATED.3IONS-SCNC: 15 MMOL/L (ref 7–15)
ANTIBODY, PREVIOUSLY IDENTIFIED: NORMAL
ATRIAL RATE - MUSE: 116 BPM
BASOPHILS # BLD AUTO: 0 10E3/UL (ref 0–0.2)
BASOPHILS NFR BLD AUTO: 0 %
BLD GP AB SCN SERPL QL: POSITIVE
BLD PROD TYP BPU: NORMAL
BLD PROD TYP BPU: NORMAL
BLOOD COMPONENT TYPE: NORMAL
BLOOD COMPONENT TYPE: NORMAL
BUN SERPL-MCNC: 8.1 MG/DL (ref 8–23)
CALCIUM SERPL-MCNC: 8 MG/DL (ref 8.8–10.4)
CHLORIDE SERPL-SCNC: 99 MMOL/L (ref 98–107)
CODING SYSTEM: NORMAL
CODING SYSTEM: NORMAL
CREAT SERPL-MCNC: 0.48 MG/DL (ref 0.51–0.95)
CROSSMATCH: NORMAL
CROSSMATCH: NORMAL
DIASTOLIC BLOOD PRESSURE - MUSE: NORMAL MMHG
EGFRCR SERPLBLD CKD-EPI 2021: >90 ML/MIN/1.73M2
EOSINOPHIL # BLD AUTO: 0 10E3/UL (ref 0–0.7)
EOSINOPHIL NFR BLD AUTO: 0 %
ERYTHROCYTE [DISTWIDTH] IN BLOOD BY AUTOMATED COUNT: 17.4 % (ref 10–15)
GLUCOSE SERPL-MCNC: 134 MG/DL (ref 70–99)
HCO3 SERPL-SCNC: 21 MMOL/L (ref 22–29)
HCT VFR BLD AUTO: 36.7 % (ref 35–47)
HGB BLD-MCNC: 12.3 G/DL (ref 11.7–15.7)
HGB BLD-MCNC: 13.4 G/DL (ref 11.7–15.7)
IMM GRANULOCYTES # BLD: 0.1 10E3/UL
IMM GRANULOCYTES NFR BLD: 1 %
INTERPRETATION ECG - MUSE: NORMAL
LYMPHOCYTES # BLD AUTO: 0.5 10E3/UL (ref 0.8–5.3)
LYMPHOCYTES NFR BLD AUTO: 3 %
MCH RBC QN AUTO: 26.6 PG (ref 26.5–33)
MCHC RBC AUTO-ENTMCNC: 33.5 G/DL (ref 31.5–36.5)
MCV RBC AUTO: 79 FL (ref 78–100)
MONOCYTES # BLD AUTO: 1.2 10E3/UL (ref 0–1.3)
MONOCYTES NFR BLD AUTO: 6 %
NEUTROPHILS # BLD AUTO: 17.7 10E3/UL (ref 1.6–8.3)
NEUTROPHILS NFR BLD AUTO: 90 %
NRBC # BLD AUTO: 0 10E3/UL
NRBC BLD AUTO-RTO: 0 /100
P AXIS - MUSE: 76 DEGREES
PLATELET # BLD AUTO: 351 10E3/UL (ref 150–450)
PLATELET # BLD AUTO: 354 10E3/UL (ref 150–450)
POTASSIUM SERPL-SCNC: 3.5 MMOL/L (ref 3.4–5.3)
PR INTERVAL - MUSE: 170 MS
QRS DURATION - MUSE: 138 MS
QT - MUSE: 330 MS
QTC - MUSE: 458 MS
R AXIS - MUSE: -75 DEGREES
RBC # BLD AUTO: 4.62 10E6/UL (ref 3.8–5.2)
SODIUM SERPL-SCNC: 135 MMOL/L (ref 135–145)
SPECIMEN EXP DATE BLD: ABNORMAL
SPECIMEN EXP DATE BLD: NORMAL
SYSTOLIC BLOOD PRESSURE - MUSE: NORMAL MMHG
T AXIS - MUSE: 39 DEGREES
UNIT ABO/RH: NORMAL
UNIT ABO/RH: NORMAL
UNIT NUMBER: NORMAL
UNIT NUMBER: NORMAL
UNIT STATUS: NORMAL
UNIT STATUS: NORMAL
UNIT TYPE ISBT: 5100
UNIT TYPE ISBT: 5100
VENTRICULAR RATE- MUSE: 116 BPM
WBC # BLD AUTO: 19.6 10E3/UL (ref 4–11)

## 2025-01-23 PROCEDURE — 250N000011 HC RX IP 250 OP 636: Performed by: NURSE ANESTHETIST, CERTIFIED REGISTERED

## 2025-01-23 PROCEDURE — 99233 SBSQ HOSP IP/OBS HIGH 50: CPT | Performed by: INTERNAL MEDICINE

## 2025-01-23 PROCEDURE — 250N000011 HC RX IP 250 OP 636: Performed by: STUDENT IN AN ORGANIZED HEALTH CARE EDUCATION/TRAINING PROGRAM

## 2025-01-23 PROCEDURE — 86850 RBC ANTIBODY SCREEN: CPT

## 2025-01-23 PROCEDURE — 93005 ELECTROCARDIOGRAM TRACING: CPT | Performed by: INTERNAL MEDICINE

## 2025-01-23 PROCEDURE — 250N000011 HC RX IP 250 OP 636

## 2025-01-23 PROCEDURE — 250N000025 HC SEVOFLURANE, PER MIN: Performed by: COLON & RECTAL SURGERY

## 2025-01-23 PROCEDURE — 120N000001 HC R&B MED SURG/OB

## 2025-01-23 PROCEDURE — 250N000013 HC RX MED GY IP 250 OP 250 PS 637

## 2025-01-23 PROCEDURE — 0DTP0ZZ RESECTION OF RECTUM, OPEN APPROACH: ICD-10-PCS | Performed by: COLON & RECTAL SURGERY

## 2025-01-23 PROCEDURE — 250N000009 HC RX 250: Performed by: COLON & RECTAL SURGERY

## 2025-01-23 PROCEDURE — 85049 AUTOMATED PLATELET COUNT: CPT | Performed by: INTERNAL MEDICINE

## 2025-01-23 PROCEDURE — 250N000011 HC RX IP 250 OP 636: Performed by: COLON & RECTAL SURGERY

## 2025-01-23 PROCEDURE — 80048 BASIC METABOLIC PNL TOTAL CA: CPT | Performed by: INTERNAL MEDICINE

## 2025-01-23 PROCEDURE — 85049 AUTOMATED PLATELET COUNT: CPT | Performed by: COLON & RECTAL SURGERY

## 2025-01-23 PROCEDURE — 87086 URINE CULTURE/COLONY COUNT: CPT | Performed by: INTERNAL MEDICINE

## 2025-01-23 PROCEDURE — 999N000141 HC STATISTIC PRE-PROCEDURE NURSING ASSESSMENT: Performed by: COLON & RECTAL SURGERY

## 2025-01-23 PROCEDURE — 272N000001 HC OR GENERAL SUPPLY STERILE: Performed by: COLON & RECTAL SURGERY

## 2025-01-23 PROCEDURE — 71045 X-RAY EXAM CHEST 1 VIEW: CPT

## 2025-01-23 PROCEDURE — 36415 COLL VENOUS BLD VENIPUNCTURE: CPT | Performed by: COLON & RECTAL SURGERY

## 2025-01-23 PROCEDURE — 360N000077 HC SURGERY LEVEL 4, PER MIN: Performed by: COLON & RECTAL SURGERY

## 2025-01-23 PROCEDURE — 85025 COMPLETE CBC W/AUTO DIFF WBC: CPT

## 2025-01-23 PROCEDURE — 258N000003 HC RX IP 258 OP 636: Performed by: NURSE ANESTHETIST, CERTIFIED REGISTERED

## 2025-01-23 PROCEDURE — 82310 ASSAY OF CALCIUM: CPT | Performed by: INTERNAL MEDICINE

## 2025-01-23 PROCEDURE — 93010 ELECTROCARDIOGRAM REPORT: CPT | Performed by: STUDENT IN AN ORGANIZED HEALTH CARE EDUCATION/TRAINING PROGRAM

## 2025-01-23 PROCEDURE — 88307 TISSUE EXAM BY PATHOLOGIST: CPT | Mod: TC | Performed by: COLON & RECTAL SURGERY

## 2025-01-23 PROCEDURE — 258N000003 HC RX IP 258 OP 636: Performed by: STUDENT IN AN ORGANIZED HEALTH CARE EDUCATION/TRAINING PROGRAM

## 2025-01-23 PROCEDURE — 710N000009 HC RECOVERY PHASE 1, LEVEL 1, PER MIN: Performed by: COLON & RECTAL SURGERY

## 2025-01-23 PROCEDURE — 250N000009 HC RX 250: Performed by: NURSE ANESTHETIST, CERTIFIED REGISTERED

## 2025-01-23 PROCEDURE — 258N000003 HC RX IP 258 OP 636: Performed by: INTERNAL MEDICINE

## 2025-01-23 PROCEDURE — 81003 URINALYSIS AUTO W/O SCOPE: CPT | Performed by: INTERNAL MEDICINE

## 2025-01-23 PROCEDURE — 258N000003 HC RX IP 258 OP 636: Performed by: COLON & RECTAL SURGERY

## 2025-01-23 PROCEDURE — 36415 COLL VENOUS BLD VENIPUNCTURE: CPT

## 2025-01-23 PROCEDURE — 0DBN0ZZ EXCISION OF SIGMOID COLON, OPEN APPROACH: ICD-10-PCS | Performed by: COLON & RECTAL SURGERY

## 2025-01-23 PROCEDURE — 86922 COMPATIBILITY TEST ANTIGLOB: CPT

## 2025-01-23 PROCEDURE — 86900 BLOOD TYPING SEROLOGIC ABO: CPT

## 2025-01-23 PROCEDURE — 93005 ELECTROCARDIOGRAM TRACING: CPT

## 2025-01-23 PROCEDURE — 250N000011 HC RX IP 250 OP 636: Performed by: INTERNAL MEDICINE

## 2025-01-23 PROCEDURE — 370N000017 HC ANESTHESIA TECHNICAL FEE, PER MIN: Performed by: COLON & RECTAL SURGERY

## 2025-01-23 PROCEDURE — 85018 HEMOGLOBIN: CPT | Performed by: INTERNAL MEDICINE

## 2025-01-23 RX ORDER — ESZOPICLONE 1 MG/1
1 TABLET, FILM COATED ORAL AT BEDTIME
Status: DISCONTINUED | OUTPATIENT
Start: 2025-01-23 | End: 2025-01-29 | Stop reason: HOSPADM

## 2025-01-23 RX ORDER — FENTANYL CITRATE 50 UG/ML
25 INJECTION, SOLUTION INTRAMUSCULAR; INTRAVENOUS EVERY 5 MIN PRN
Status: DISCONTINUED | OUTPATIENT
Start: 2025-01-23 | End: 2025-01-23 | Stop reason: HOSPADM

## 2025-01-23 RX ORDER — LIDOCAINE HYDROCHLORIDE 10 MG/ML
INJECTION, SOLUTION INFILTRATION; PERINEURAL PRN
Status: DISCONTINUED | OUTPATIENT
Start: 2025-01-23 | End: 2025-01-23

## 2025-01-23 RX ORDER — ACARBOSE 25 MG/1
25 TABLET ORAL
Status: DISCONTINUED | OUTPATIENT
Start: 2025-01-24 | End: 2025-01-29 | Stop reason: HOSPADM

## 2025-01-23 RX ORDER — OXYCODONE HYDROCHLORIDE 5 MG/1
5 TABLET ORAL EVERY 4 HOURS PRN
Status: DISCONTINUED | OUTPATIENT
Start: 2025-01-23 | End: 2025-01-25

## 2025-01-23 RX ORDER — NALOXONE HYDROCHLORIDE 0.4 MG/ML
0.1 INJECTION, SOLUTION INTRAMUSCULAR; INTRAVENOUS; SUBCUTANEOUS
Status: CANCELLED | OUTPATIENT
Start: 2025-01-23

## 2025-01-23 RX ORDER — FENTANYL CITRATE 50 UG/ML
50 INJECTION, SOLUTION INTRAMUSCULAR; INTRAVENOUS EVERY 5 MIN PRN
Status: DISCONTINUED | OUTPATIENT
Start: 2025-01-23 | End: 2025-01-23 | Stop reason: HOSPADM

## 2025-01-23 RX ORDER — SODIUM CHLORIDE, SODIUM LACTATE, POTASSIUM CHLORIDE, CALCIUM CHLORIDE 600; 310; 30; 20 MG/100ML; MG/100ML; MG/100ML; MG/100ML
INJECTION, SOLUTION INTRAVENOUS CONTINUOUS
Status: DISCONTINUED | OUTPATIENT
Start: 2025-01-23 | End: 2025-01-27

## 2025-01-23 RX ORDER — OMEPRAZOLE 20 MG/1
20 CAPSULE, DELAYED RELEASE ORAL
Status: DISCONTINUED | OUTPATIENT
Start: 2025-01-24 | End: 2025-01-23

## 2025-01-23 RX ORDER — RIVASTIGMINE TARTRATE 4.5 MG/1
4.5 CAPSULE ORAL DAILY
Status: ON HOLD | COMMUNITY
End: 2025-01-29

## 2025-01-23 RX ORDER — ACETAMINOPHEN 325 MG/1
975 TABLET ORAL ONCE
Status: COMPLETED | OUTPATIENT
Start: 2025-01-23 | End: 2025-01-23

## 2025-01-23 RX ORDER — HYDROMORPHONE HCL IN WATER/PF 6 MG/30 ML
0.2 PATIENT CONTROLLED ANALGESIA SYRINGE INTRAVENOUS EVERY 4 HOURS PRN
Status: DISCONTINUED | OUTPATIENT
Start: 2025-01-23 | End: 2025-01-25

## 2025-01-23 RX ORDER — ONDANSETRON 2 MG/ML
4 INJECTION INTRAMUSCULAR; INTRAVENOUS EVERY 6 HOURS PRN
Status: DISCONTINUED | OUTPATIENT
Start: 2025-01-23 | End: 2025-01-24

## 2025-01-23 RX ORDER — BUPIVACAINE HYDROCHLORIDE AND EPINEPHRINE 2.5; 5 MG/ML; UG/ML
INJECTION, SOLUTION EPIDURAL; INFILTRATION; INTRACAUDAL; PERINEURAL PRN
Status: DISCONTINUED | OUTPATIENT
Start: 2025-01-23 | End: 2025-01-23 | Stop reason: HOSPADM

## 2025-01-23 RX ORDER — ONDANSETRON 4 MG/1
4 TABLET, ORALLY DISINTEGRATING ORAL EVERY 30 MIN PRN
Status: CANCELLED | OUTPATIENT
Start: 2025-01-23

## 2025-01-23 RX ORDER — BUSPIRONE HYDROCHLORIDE 7.5 MG/1
7.5 TABLET ORAL 2 TIMES DAILY
Status: DISCONTINUED | OUTPATIENT
Start: 2025-01-23 | End: 2025-01-29 | Stop reason: HOSPADM

## 2025-01-23 RX ORDER — ONDANSETRON 2 MG/ML
4 INJECTION INTRAMUSCULAR; INTRAVENOUS EVERY 30 MIN PRN
Status: DISCONTINUED | OUTPATIENT
Start: 2025-01-23 | End: 2025-01-23 | Stop reason: HOSPADM

## 2025-01-23 RX ORDER — CEFAZOLIN SODIUM/WATER 2 G/20 ML
2 SYRINGE (ML) INTRAVENOUS
Status: COMPLETED | OUTPATIENT
Start: 2025-01-23 | End: 2025-01-23

## 2025-01-23 RX ORDER — MAGNESIUM HYDROXIDE 1200 MG/15ML
LIQUID ORAL PRN
Status: DISCONTINUED | OUTPATIENT
Start: 2025-01-23 | End: 2025-01-23 | Stop reason: HOSPADM

## 2025-01-23 RX ORDER — IPRATROPIUM BROMIDE AND ALBUTEROL SULFATE 2.5; .5 MG/3ML; MG/3ML
3 SOLUTION RESPIRATORY (INHALATION) EVERY 4 HOURS PRN
Status: DISCONTINUED | OUTPATIENT
Start: 2025-01-23 | End: 2025-01-29 | Stop reason: HOSPADM

## 2025-01-23 RX ORDER — ONDANSETRON 2 MG/ML
4 INJECTION INTRAMUSCULAR; INTRAVENOUS EVERY 30 MIN PRN
Status: CANCELLED | OUTPATIENT
Start: 2025-01-23

## 2025-01-23 RX ORDER — HYDROMORPHONE HCL IN WATER/PF 6 MG/30 ML
0.1 PATIENT CONTROLLED ANALGESIA SYRINGE INTRAVENOUS EVERY 4 HOURS PRN
Status: DISCONTINUED | OUTPATIENT
Start: 2025-01-23 | End: 2025-01-25

## 2025-01-23 RX ORDER — METRONIDAZOLE 500 MG/100ML
500 INJECTION, SOLUTION INTRAVENOUS
Status: COMPLETED | OUTPATIENT
Start: 2025-01-23 | End: 2025-01-23

## 2025-01-23 RX ORDER — PROPOFOL 10 MG/ML
INJECTION, EMULSION INTRAVENOUS PRN
Status: DISCONTINUED | OUTPATIENT
Start: 2025-01-23 | End: 2025-01-23

## 2025-01-23 RX ORDER — NORTRIPTYLINE HYDROCHLORIDE 50 MG/1
50 CAPSULE ORAL AT BEDTIME
Status: DISCONTINUED | OUTPATIENT
Start: 2025-01-24 | End: 2025-01-29 | Stop reason: HOSPADM

## 2025-01-23 RX ORDER — HEPARIN SODIUM 5000 [USP'U]/.5ML
5000 INJECTION, SOLUTION INTRAVENOUS; SUBCUTANEOUS
Status: COMPLETED | OUTPATIENT
Start: 2025-01-23 | End: 2025-01-23

## 2025-01-23 RX ORDER — SODIUM CHLORIDE, SODIUM LACTATE, POTASSIUM CHLORIDE, CALCIUM CHLORIDE 600; 310; 30; 20 MG/100ML; MG/100ML; MG/100ML; MG/100ML
INJECTION, SOLUTION INTRAVENOUS CONTINUOUS
Status: DISCONTINUED | OUTPATIENT
Start: 2025-01-23 | End: 2025-01-23 | Stop reason: HOSPADM

## 2025-01-23 RX ORDER — ACETAMINOPHEN AND CODEINE PHOSPHATE 300; 30 MG/1; MG/1
1 TABLET ORAL EVERY 6 HOURS PRN
COMMUNITY
End: 2025-02-02

## 2025-01-23 RX ORDER — HYDROMORPHONE HCL IN WATER/PF 6 MG/30 ML
0.2 PATIENT CONTROLLED ANALGESIA SYRINGE INTRAVENOUS EVERY 5 MIN PRN
Status: DISCONTINUED | OUTPATIENT
Start: 2025-01-23 | End: 2025-01-23 | Stop reason: HOSPADM

## 2025-01-23 RX ORDER — GINSENG 100 MG
CAPSULE ORAL PRN
Status: DISCONTINUED | OUTPATIENT
Start: 2025-01-23 | End: 2025-01-23 | Stop reason: HOSPADM

## 2025-01-23 RX ORDER — AMLODIPINE BESYLATE 5 MG/1
5 TABLET ORAL DAILY
Status: DISCONTINUED | OUTPATIENT
Start: 2025-01-24 | End: 2025-01-28

## 2025-01-23 RX ORDER — CEFAZOLIN SODIUM/WATER 2 G/20 ML
2 SYRINGE (ML) INTRAVENOUS SEE ADMIN INSTRUCTIONS
Status: DISCONTINUED | OUTPATIENT
Start: 2025-01-23 | End: 2025-01-23 | Stop reason: HOSPADM

## 2025-01-23 RX ORDER — HYDRALAZINE HYDROCHLORIDE 20 MG/ML
10 INJECTION INTRAMUSCULAR; INTRAVENOUS EVERY 6 HOURS PRN
Status: DISCONTINUED | OUTPATIENT
Start: 2025-01-23 | End: 2025-01-29 | Stop reason: HOSPADM

## 2025-01-23 RX ORDER — NALOXONE HYDROCHLORIDE 0.4 MG/ML
0.2 INJECTION, SOLUTION INTRAMUSCULAR; INTRAVENOUS; SUBCUTANEOUS
Status: DISCONTINUED | OUTPATIENT
Start: 2025-01-23 | End: 2025-01-29 | Stop reason: HOSPADM

## 2025-01-23 RX ORDER — ONDANSETRON 4 MG/1
4 TABLET, ORALLY DISINTEGRATING ORAL EVERY 6 HOURS PRN
Status: DISCONTINUED | OUTPATIENT
Start: 2025-01-23 | End: 2025-01-24

## 2025-01-23 RX ORDER — OXYCODONE HYDROCHLORIDE 5 MG/1
10 TABLET ORAL
Status: CANCELLED | OUTPATIENT
Start: 2025-01-23

## 2025-01-23 RX ORDER — ONDANSETRON 4 MG/1
4 TABLET, ORALLY DISINTEGRATING ORAL EVERY 8 HOURS PRN
COMMUNITY

## 2025-01-23 RX ORDER — LIDOCAINE 40 MG/G
CREAM TOPICAL
Status: DISCONTINUED | OUTPATIENT
Start: 2025-01-23 | End: 2025-01-29 | Stop reason: HOSPADM

## 2025-01-23 RX ORDER — OXYCODONE HYDROCHLORIDE 5 MG/1
5 TABLET ORAL
Status: CANCELLED | OUTPATIENT
Start: 2025-01-23

## 2025-01-23 RX ORDER — ONDANSETRON 2 MG/ML
4 INJECTION INTRAMUSCULAR; INTRAVENOUS ONCE
Status: COMPLETED | OUTPATIENT
Start: 2025-01-23 | End: 2025-01-23

## 2025-01-23 RX ORDER — DEXAMETHASONE SODIUM PHOSPHATE 4 MG/ML
4 INJECTION, SOLUTION INTRA-ARTICULAR; INTRALESIONAL; INTRAMUSCULAR; INTRAVENOUS; SOFT TISSUE
Status: COMPLETED | OUTPATIENT
Start: 2025-01-23 | End: 2025-01-23

## 2025-01-23 RX ORDER — HEPARIN SODIUM 5000 [USP'U]/.5ML
5000 INJECTION, SOLUTION INTRAVENOUS; SUBCUTANEOUS EVERY 12 HOURS
Status: DISCONTINUED | OUTPATIENT
Start: 2025-01-24 | End: 2025-01-29 | Stop reason: HOSPADM

## 2025-01-23 RX ORDER — DEXAMETHASONE SODIUM PHOSPHATE 4 MG/ML
4 INJECTION, SOLUTION INTRA-ARTICULAR; INTRALESIONAL; INTRAMUSCULAR; INTRAVENOUS; SOFT TISSUE
Status: CANCELLED | OUTPATIENT
Start: 2025-01-23

## 2025-01-23 RX ORDER — ONDANSETRON 2 MG/ML
INJECTION INTRAMUSCULAR; INTRAVENOUS PRN
Status: DISCONTINUED | OUTPATIENT
Start: 2025-01-23 | End: 2025-01-23

## 2025-01-23 RX ORDER — NALOXONE HYDROCHLORIDE 0.4 MG/ML
0.4 INJECTION, SOLUTION INTRAMUSCULAR; INTRAVENOUS; SUBCUTANEOUS
Status: DISCONTINUED | OUTPATIENT
Start: 2025-01-23 | End: 2025-01-29 | Stop reason: HOSPADM

## 2025-01-23 RX ORDER — ACETAMINOPHEN 500 MG
1000 TABLET ORAL EVERY 8 HOURS
Status: DISCONTINUED | OUTPATIENT
Start: 2025-01-23 | End: 2025-01-25

## 2025-01-23 RX ORDER — SODIUM CHLORIDE, SODIUM LACTATE, POTASSIUM CHLORIDE, CALCIUM CHLORIDE 600; 310; 30; 20 MG/100ML; MG/100ML; MG/100ML; MG/100ML
INJECTION, SOLUTION INTRAVENOUS CONTINUOUS PRN
Status: DISCONTINUED | OUTPATIENT
Start: 2025-01-23 | End: 2025-01-23

## 2025-01-23 RX ORDER — PROCHLORPERAZINE MALEATE 5 MG/1
5 TABLET ORAL EVERY 6 HOURS PRN
Status: DISCONTINUED | OUTPATIENT
Start: 2025-01-23 | End: 2025-01-23

## 2025-01-23 RX ORDER — RIVASTIGMINE TARTRATE 1.5 MG/1
4.5 CAPSULE ORAL 2 TIMES DAILY
Status: DISCONTINUED | OUTPATIENT
Start: 2025-01-23 | End: 2025-01-29 | Stop reason: HOSPADM

## 2025-01-23 RX ORDER — LABETALOL HYDROCHLORIDE 5 MG/ML
5 INJECTION, SOLUTION INTRAVENOUS ONCE
Status: COMPLETED | OUTPATIENT
Start: 2025-01-23 | End: 2025-01-23

## 2025-01-23 RX ORDER — CEFAZOLIN SODIUM/WATER 2 G/20 ML
SYRINGE (ML) INTRAVENOUS
Status: DISPENSED
Start: 2025-01-23 | End: 2025-01-24

## 2025-01-23 RX ORDER — FENTANYL CITRATE 50 UG/ML
INJECTION, SOLUTION INTRAMUSCULAR; INTRAVENOUS PRN
Status: DISCONTINUED | OUTPATIENT
Start: 2025-01-23 | End: 2025-01-23

## 2025-01-23 RX ORDER — NALOXONE HYDROCHLORIDE 0.4 MG/ML
0.1 INJECTION, SOLUTION INTRAMUSCULAR; INTRAVENOUS; SUBCUTANEOUS
Status: DISCONTINUED | OUTPATIENT
Start: 2025-01-23 | End: 2025-01-23 | Stop reason: HOSPADM

## 2025-01-23 RX ORDER — ONDANSETRON 4 MG/1
4 TABLET, ORALLY DISINTEGRATING ORAL EVERY 30 MIN PRN
Status: DISCONTINUED | OUTPATIENT
Start: 2025-01-23 | End: 2025-01-23 | Stop reason: HOSPADM

## 2025-01-23 RX ORDER — PANTOPRAZOLE SODIUM 40 MG/1
40 TABLET, DELAYED RELEASE ORAL
Status: DISCONTINUED | OUTPATIENT
Start: 2025-01-24 | End: 2025-01-29 | Stop reason: HOSPADM

## 2025-01-23 RX ORDER — HYDROXYZINE HYDROCHLORIDE 25 MG/ML
25 INJECTION, SOLUTION INTRAMUSCULAR EVERY 6 HOURS PRN
Status: DISCONTINUED | OUTPATIENT
Start: 2025-01-23 | End: 2025-01-29 | Stop reason: HOSPADM

## 2025-01-23 RX ADMIN — PROPOFOL 100 MG: 10 INJECTION, EMULSION INTRAVENOUS at 16:19

## 2025-01-23 RX ADMIN — SODIUM CHLORIDE, POTASSIUM CHLORIDE, SODIUM LACTATE AND CALCIUM CHLORIDE: 600; 310; 30; 20 INJECTION, SOLUTION INTRAVENOUS at 19:27

## 2025-01-23 RX ADMIN — SODIUM CHLORIDE 500 ML: 9 INJECTION, SOLUTION INTRAVENOUS at 22:56

## 2025-01-23 RX ADMIN — SUGAMMADEX 100 MG: 100 INJECTION, SOLUTION INTRAVENOUS at 17:32

## 2025-01-23 RX ADMIN — ONDANSETRON 4 MG: 4 TABLET, ORALLY DISINTEGRATING ORAL at 21:46

## 2025-01-23 RX ADMIN — HYDROMORPHONE HYDROCHLORIDE 0.2 MG: 0.2 INJECTION, SOLUTION INTRAMUSCULAR; INTRAVENOUS; SUBCUTANEOUS at 18:41

## 2025-01-23 RX ADMIN — ONDANSETRON 4 MG: 2 INJECTION INTRAMUSCULAR; INTRAVENOUS at 17:32

## 2025-01-23 RX ADMIN — METRONIDAZOLE 500 MG: 500 INJECTION, SOLUTION INTRAVENOUS at 14:57

## 2025-01-23 RX ADMIN — SODIUM CHLORIDE, POTASSIUM CHLORIDE, SODIUM LACTATE AND CALCIUM CHLORIDE: 600; 310; 30; 20 INJECTION, SOLUTION INTRAVENOUS at 21:00

## 2025-01-23 RX ADMIN — ONDANSETRON 4 MG: 2 INJECTION INTRAMUSCULAR; INTRAVENOUS at 14:55

## 2025-01-23 RX ADMIN — HEPARIN SODIUM 5000 UNITS: 5000 INJECTION, SOLUTION INTRAVENOUS; SUBCUTANEOUS at 15:53

## 2025-01-23 RX ADMIN — LIDOCAINE HYDROCHLORIDE 5 ML: 10 INJECTION, SOLUTION INFILTRATION; PERINEURAL at 16:19

## 2025-01-23 RX ADMIN — HYDROMORPHONE HYDROCHLORIDE 0.2 MG: 0.2 INJECTION, SOLUTION INTRAMUSCULAR; INTRAVENOUS; SUBCUTANEOUS at 18:57

## 2025-01-23 RX ADMIN — DEXAMETHASONE SODIUM PHOSPHATE 4 MG: 4 INJECTION, SOLUTION INTRA-ARTICULAR; INTRALESIONAL; INTRAMUSCULAR; INTRAVENOUS; SOFT TISSUE at 18:29

## 2025-01-23 RX ADMIN — HYDRALAZINE HYDROCHLORIDE 10 MG: 20 INJECTION INTRAMUSCULAR; INTRAVENOUS at 21:10

## 2025-01-23 RX ADMIN — HYDROMORPHONE HYDROCHLORIDE 0.2 MG: 0.2 INJECTION, SOLUTION INTRAMUSCULAR; INTRAVENOUS; SUBCUTANEOUS at 18:12

## 2025-01-23 RX ADMIN — FENTANYL CITRATE 50 MCG: 50 INJECTION, SOLUTION INTRAMUSCULAR; INTRAVENOUS at 17:54

## 2025-01-23 RX ADMIN — ACETAMINOPHEN 975 MG: 325 TABLET ORAL at 14:55

## 2025-01-23 RX ADMIN — Medication 2 G: at 16:21

## 2025-01-23 RX ADMIN — LABETALOL HYDROCHLORIDE 5 MG: 5 INJECTION, SOLUTION INTRAVENOUS at 19:07

## 2025-01-23 RX ADMIN — FENTANYL CITRATE 25 MCG: 50 INJECTION, SOLUTION INTRAMUSCULAR; INTRAVENOUS at 16:19

## 2025-01-23 RX ADMIN — ROCURONIUM BROMIDE 40 MG: 50 INJECTION, SOLUTION INTRAVENOUS at 16:19

## 2025-01-23 RX ADMIN — SODIUM CHLORIDE, POTASSIUM CHLORIDE, SODIUM LACTATE AND CALCIUM CHLORIDE: 600; 310; 30; 20 INJECTION, SOLUTION INTRAVENOUS at 15:59

## 2025-01-23 RX ADMIN — HYDROXYZINE HYDROCHLORIDE 25 MG: 25 INJECTION, SOLUTION INTRAMUSCULAR at 22:56

## 2025-01-23 RX ADMIN — HYDROMORPHONE HYDROCHLORIDE 0.2 MG: 0.2 INJECTION, SOLUTION INTRAMUSCULAR; INTRAVENOUS; SUBCUTANEOUS at 18:22

## 2025-01-23 RX ADMIN — FENTANYL CITRATE 50 MCG: 50 INJECTION, SOLUTION INTRAMUSCULAR; INTRAVENOUS at 18:01

## 2025-01-23 RX ADMIN — SUGAMMADEX 100 MG: 100 INJECTION, SOLUTION INTRAVENOUS at 17:35

## 2025-01-23 RX ADMIN — FENTANYL CITRATE 75 MCG: 50 INJECTION, SOLUTION INTRAMUSCULAR; INTRAVENOUS at 16:44

## 2025-01-23 RX ADMIN — FENTANYL CITRATE 100 MCG: 50 INJECTION, SOLUTION INTRAMUSCULAR; INTRAVENOUS at 16:58

## 2025-01-23 RX ADMIN — PHENYLEPHRINE HYDROCHLORIDE 100 MCG: 10 INJECTION INTRAVENOUS at 16:19

## 2025-01-23 ASSESSMENT — ACTIVITIES OF DAILY LIVING (ADL)
CHANGE_IN_FUNCTIONAL_STATUS_SINCE_ONSET_OF_CURRENT_ILLNESS/INJURY: NO
DOING_ERRANDS_INDEPENDENTLY_DIFFICULTY: YES
WALKING_OR_CLIMBING_STAIRS_DIFFICULTY: YES
DRESSING/BATHING_DIFFICULTY: NO
CONCENTRATING,_REMEMBERING_OR_MAKING_DECISIONS_DIFFICULTY: YES
ADLS_ACUITY_SCORE: 50
ADLS_ACUITY_SCORE: 50
HEARING_DIFFICULTY_OR_DEAF: NO
FALL_HISTORY_WITHIN_LAST_SIX_MONTHS: NO
TOILETING_ISSUES: NO
DIFFICULTY_EATING/SWALLOWING: NO
ADLS_ACUITY_SCORE: 50
DIFFICULTY_COMMUNICATING: NO
WALKING_OR_CLIMBING_STAIRS: AMBULATION DIFFICULTY, ASSISTANCE 1 PERSON;STAIR CLIMBING DIFFICULTY, ASSISTANCE 1 PERSON;TRANSFERRING DIFFICULTY, ASSISTANCE 1 PERSON
VISION_MANAGEMENT: GLASSES FOR READING
WEAR_GLASSES_OR_BLIND: YES
ADLS_ACUITY_SCORE: 50
ADLS_ACUITY_SCORE: 44

## 2025-01-23 NOTE — ANESTHESIA PROCEDURE NOTES
Airway       Patient location during procedure: OR       Procedure Start/Stop Times: 1/23/2025 4:23 PM  Staff -        CRNA: Crow Pierson APRN CRNA       Performed By: CRNA  Consent for Airway        Urgency: elective  Indications and Patient Condition       Indications for airway management: andrea-procedural         Mask difficulty assessment: 1 - vent by mask    Final Airway Details       Final airway type: endotracheal airway       Successful airway: ETT - single  Endotracheal Airway Details        ETT size (mm): 7.0       Cuffed: yes       Successful intubation technique: direct laryngoscopy       DL Blade Type: Ewn 2       Grade View of Cords: 2       Adjucts: stylet       Position: Right       Measured from: gums/teeth       Bite block used: Soft    Post intubation assessment        Placement verified by: capnometry, equal breath sounds and chest rise        Number of attempts at approach: 1       Number of other approaches attempted: 0       Secured with: tape       Ease of procedure: easy       Dentition: Intact and Unchanged       Dental guard used and removed. Dental Guard Type: Standard White.    Medication(s) Administered   Medication Administration Time: 1/23/2025 4:23 PM

## 2025-01-23 NOTE — ANESTHESIA PREPROCEDURE EVALUATION
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)     Diverticular disease of large intestine     Gastroesophageal reflux disease     History of blood transfusion     History of cholecystectomy 2023    Hypertension     Major neurocognitive disorder due to Alzheimer's disease (H)     Memory loss     Osteoporosis     PONV (postoperative nausea and vomiting)     Rectal prolapse       Past Surgical History:   Procedure Laterality Date    BREAST CYST ASPIRATION Left     While ago    HYSTERECTOMY      IR CHEST PORT PLACEMENT > 5 YRS OF AGE  2024    OOPHORECTOMY Bilateral     Z REMOVAL OF OVARY(S)      Description: Oophorectomy;  Recorded: 2009;    ZZC TOTAL ABDOM HYSTERECTOMY      Description: Total Abdominal Hysterectomy;  Recorded: 2009;      Allergies   Allergen Reactions    Blood Transfusion Related (Informational Only) Other (See Comments)     Patient has a history of a Clinically Significant antibody against RBC antigens. A delay in compatible RBCs may occur. 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    Prochlorperazine Edisylate [Prochlorperazine] Other (See Comments)     Extrapyramidal side effects      Social History     Tobacco Use    Smoking status: Never     Passive exposure: Never    Smokeless tobacco: Never   Substance Use Topics    Alcohol use: No      Wt Readings from Last 1 Encounters:   25 39.5 kg (87 lb)        Anesthesia Evaluation            ROS/MED HX  ENT/Pulmonary:       Neurologic:     (+)   dementia,                            "  Cardiovascular:     (+)  hypertension- -   -  - -                                      METS/Exercise Tolerance:     Hematologic:     (+)       history of blood transfusion,  Known PRBC Anitbodies: Yes,       Musculoskeletal:       GI/Hepatic: Comment: PONV    (+) GERD,                   Renal/Genitourinary:       Endo:       Psychiatric/Substance Use:       Infectious Disease:       Malignancy:   (+) Malignancy, History of Lymphoma/Leukemia.    Other:            Physical Exam    Airway        Mallampati: II    Neck ROM: limited     Respiratory Devices and Support         Dental       (+) Completely normal teeth      Cardiovascular   cardiovascular exam normal          Pulmonary   pulmonary exam normal                OUTSIDE LABS:  CBC:   Lab Results   Component Value Date    WBC 8.2 01/15/2025    WBC 7.7 01/13/2025    HGB 13.4 01/23/2025    HGB 12.0 01/15/2025    HCT 36.8 01/15/2025    HCT 37.5 01/13/2025     01/15/2025     01/13/2025     BMP:   Lab Results   Component Value Date     (L) 01/15/2025     01/14/2025    POTASSIUM 4.8 01/15/2025    POTASSIUM 4.4 01/14/2025    CHLORIDE 100 01/15/2025    CHLORIDE 100 01/14/2025    CO2 26 01/15/2025    CO2 26 01/14/2025    BUN 9.4 01/15/2025    BUN 13.1 01/14/2025    CR 0.66 01/15/2025    CR 0.93 01/14/2025     (H) 01/15/2025     (H) 01/14/2025     COAGS:   Lab Results   Component Value Date    INR 1.05 04/12/2024     POC: No results found for: \"BGM\", \"HCG\", \"HCGS\"  HEPATIC:   Lab Results   Component Value Date    ALBUMIN 4.1 01/14/2025    PROTTOTAL 6.5 01/14/2025    ALT 35 01/14/2025    AST 40 01/14/2025    ALKPHOS 111 01/14/2025    BILITOTAL 0.3 01/14/2025     OTHER:   Lab Results   Component Value Date    A1C 5.9 (H) 04/25/2024    JUSTIN 8.8 01/15/2025    MAG 2.1 05/13/2024    LIPASE 16 04/10/2024    TSH 0.97 04/19/2024       Anesthesia Plan    ASA Status:  3    NPO Status:  NPO Appropriate    Anesthesia Type: General.     - Airway: " ETT   Induction: Intravenous, Propofol.   Maintenance: Inhalation.        Consents    Anesthesia Plan(s) and associated risks, benefits, and realistic alternatives discussed. Questions answered and patient/representative(s) expressed understanding.     - Discussed: Risks, Benefits and Alternatives for BOTH SEDATION and the PROCEDURE were discussed     - Discussed with:  Patient            Postoperative Care    Pain management: IV analgesics, Oral pain medications.   PONV prophylaxis: Ondansetron (or other 5HT-3), Dexamethasone or Solumedrol     Comments:               Monico Gomes MD    I have reviewed the pertinent notes and labs in the chart from the past 30 days and (re)examined the patient.  Any updates or changes from those notes are reflected in this note.    Clinically Significant Risk Factors Present on Admission                   # Hypertension: Noted on problem list     # Dementia: noted on problem list

## 2025-01-23 NOTE — PHARMACY-ADMISSION MEDICATION HISTORY
Pharmacist Admission Medication History    Admission medication history is complete. The information provided in this note is only as accurate as the sources available at the time of the update.    Information Source(s): Patient, Family member, CareEverywhere/SureScripts, and Patient's own list  via in-person    Pertinent Information: Confirmed pt taking acarbose, nortriptyline, buspirone though cannot see dispenses on Surescripts. Patient is no longer taking exelon patch. She is taking 4.5 mg capsule once per day.    Changes made to PTA medication list:  Added: None  Deleted: None  Changed: None    Allergies reviewed with patient and updates made in EHR: yes    Medication History Completed By: Nella Marina [facility] 1/23/2025 3:23 PM    PTA Med List   Medication Sig Last Dose/Taking    acarbose (PRECOSE) 25 MG tablet Take 25 mg by mouth 3 times daily (with meals) Past Week Morning    acetaminophen (TYLENOL) 325 MG tablet Take 325-650 mg by mouth every 6 hours as needed for mild pain Taking As Needed    acetaminophen-codeine (TYLENOL #3) 300-30 MG per tablet Take 1 tablet by mouth every 6 hours as needed for severe pain. 1/22/2025 Morning    amLODIPine (NORVASC) 5 MG tablet Take 1 tablet (5 mg) by mouth daily Past Week Morning    Biotin 5 MG TABS Take 5,000 mcg by mouth daily Past Month Morning    busPIRone (BUSPAR) 7.5 MG tablet TAKE 1 TABLET BY MOUTH TWICE  DAILY Past Week Morning    celecoxib (CELEBREX) 200 MG capsule TAKE 1 CAPSULE BY MOUTH  DAILY Past Month Morning    denosumab (PROLIA) 60 MG/ML SOSY injection Inject 60 mg Subcutaneous every 6 months Taking    eszopiclone (LUNESTA) 1 MG tablet Take 1 tablet (1 mg) by mouth at bedtime. Past Week Bedtime    multivitamin with minerals (THERA-M) 9 mg iron-400 mcg Tab tablet Take 1 tablet by mouth daily. Past Month Morning    nortriptyline (PAMELOR) 50 MG capsule TAKE 1 CAPSULE BY MOUTH AT  BEDTIME Past Week Bedtime    omeprazole (PRILOSEC) 20 MG capsule  [OMEPRAZOLE (PRILOSEC) 20 MG CAPSULE] Take 20 mg by mouth daily before breakfast. Past Week Morning    ondansetron (ZOFRAN ODT) 4 MG ODT tab Take 4 mg by mouth every 8 hours as needed for nausea. 1/22/2025 Morning    Probiotic Product (SOLUBLE FIBER/PROBIOTICS PO) 2 tablespoons in 8 oz of liquid daily Past Week Morning    rivastigmine (EXELON) 4.5 MG capsule Take 4.5 mg by mouth daily. Past Week Morning    sennosides (SENOKOT) 8.6 MG tablet Take 2 tablets by mouth daily as needed for constipation Taking As Needed    simethicone (MYLICON) 125 MG chewable tablet Take 125 mg by mouth as needed. Taking As Needed    UNABLE TO FIND Take 2 Tablespoonful by mouth every morning MEDICATION NAME: biofiber 8 oz with water Past Week Morning

## 2025-01-23 NOTE — INTERVAL H&P NOTE
I have reviewed the surgical (or preoperative) H&P that is linked to this encounter, and examined the patient. There are no significant changes    Clinical Conditions Present on Arrival:  Clinically Significant Risk Factors Present on Admission         # Hyponatremia: Lowest Na = 134 mmol/L in last 30 days, will monitor as appropriate

## 2025-01-23 NOTE — OP NOTE
DATE OF SERVICE: 1/23/2025      PREOPERATIVE DIAGNOSIS:  Full thickness rectal prolapse.     POSTOPERATIVE DIAGNOSIS:  Full thickness rectal prolapse.     PROCEDURE:  Perineal rectosigmoidectomy.     SURGEON:  Josee Basilio MD     ASSISTANT:  Christina Engle, [facility] colorectal fellow    ANESTHESIA:  General.     ESTIMATED BLOOD LOSS:  15mL     SPECIMENS:  Rectum and sigmoid     COMPLICATIONS:  None.     FINDINGS:  Full-thickness rectal prolapse.     INDICATIONS FOR PROCEDURE:  The patient is 79 year old female who  presented with full-thickness rectal prolapse.  She had previously been evaluated at the pelvic floor center and no prolapse was seen at that time. She went for biofeedback and after that, was treated for lymphoma and then developed rectal prolapse. The prolapse was painful and required emergency department care. She wishes to have surgery as woon as possible. Given her recent lymphoma and frailty, she was recommended a perineal approach surgery only. Risks and benefits were discussed and informed consent was obtained.     DESCRIPTION OF PROCEDURE:  The patient was taken to the operating room and placed  under general endotracheal anesthesia.  A Deutsch catheter was inserted by the  operating room nurse and then she was placed in the prone jackknife position on the  operating room table.  Her buttocks were taped apart and all pressure points were  padded.  The perineal area was prepped and draped in the usual sterile fashion.  We  performed a timeout per protocol.      We injected 0.25% Marcaine with epinephrine as a perineal block. We then proceeded with digital rectal examination.   We then used Allis clamps to get the rectal prolapse to come out.  About 10cm of  extra rectal prolapse came out.  We marked with needle tipcautery 1.5 cm from the dentate line proximally. Using the needle tip cautery, I dissected down through the tissues in a full-thickness fashion. The [place] retractor was  placed to aid with visualization.  We were able to come completely through the outer layer and find the peritoneal coat.  We  continued to dissect first with needle tip cautery and then with the LigaSure Device to completely transect the outer layer of rectum.  We entered the peritoneal cavity and reached inside to see if there was anyredundant loop of sigmoid colon that would also come out.  We identified a nice transection point without undue tension in the distal sigmoid but also not leaving too much redundancy.  We transected detention through with the cautery and placed cardinal marking stitches in the  posterior, left lateral and right lateral positions with 2-0 Vicryl Suture. The rest of the rectum was transected and an anterior marking stitch was placed.     These stay sutures were tied down. We used 2-0 Vicryl sutures to close any remaining gaps between the cardinal stitches.  All the mucosa appeared  pink and healthy.  We released the cardinal marking stitches and the lonestar retractor and the anastomosis  went back into the anus.  I performed another digital rectal exam confirming the  anastomosis was patent and there was no undue tension on the anastomosis. FIbrillar, bacitracin and fluffs were applied.  All sponge, needle and instrument counts were  correct x2 at the conclusion of the case.  The patient tolerated the procedure well  and was transferred to the recovery room in stable condition.     Josee Basilio MD, MS, FACS, FASCRS  [facility]  Office: [phone]  1/23/2025 5:40 PM

## 2025-01-23 NOTE — ANESTHESIA CARE TRANSFER NOTE
Patient: Bibiana Mcrae    Procedure: Procedure(s):  PERINEAL RECTOSIGMOIDECTOMY       Diagnosis: Rectal prolapse [K62.3]  Diagnosis Additional Information: No value filed.    Anesthesia Type:   General     Note:    Oropharynx: oropharynx clear of all foreign objects  Level of Consciousness: awake  Oxygen Supplementation: face mask  Level of Supplemental Oxygen (L/min / FiO2): 6  Independent Airway: airway patency satisfactory and stable  Dentition: dentition unchanged  Vital Signs Stable: post-procedure vital signs reviewed and stable  Report to RN Given: handoff report given  Patient transferred to: PACU    Handoff Report: Identifed the Patient, Identified the Reponsible Provider, Reviewed the pertinent medical history, Discussed the surgical course, Reviewed Intra-OP anesthesia mangement and issues during anesthesia, Set expectations for post-procedure period and Allowed opportunity for questions and acknowledgement of understanding    Vitals:  Vitals Value Taken Time   /88 01/23/25 1747   Temp 37  C (98.6  F) 01/23/25 1749   Pulse 104 01/23/25 1749   Resp 26 01/23/25 1749   SpO2 100 % 01/23/25 1749   Vitals shown include unfiled device data.    Electronically Signed By: CHRISTOPHER Whaley Jr, CRNA  January 23, 2025  5:50 PM

## 2025-01-24 ENCOUNTER — APPOINTMENT (OUTPATIENT)
Dept: OCCUPATIONAL THERAPY | Facility: HOSPITAL | Age: 80
End: 2025-01-24
Attending: COLON & RECTAL SURGERY
Payer: COMMERCIAL

## 2025-01-24 ENCOUNTER — APPOINTMENT (OUTPATIENT)
Dept: PHYSICAL THERAPY | Facility: HOSPITAL | Age: 80
End: 2025-01-24
Attending: COLON & RECTAL SURGERY
Payer: COMMERCIAL

## 2025-01-24 LAB
ALBUMIN UR-MCNC: NEGATIVE MG/DL
ANION GAP SERPL CALCULATED.3IONS-SCNC: 13 MMOL/L (ref 7–15)
APPEARANCE UR: CLEAR
ATRIAL RATE - MUSE: 116 BPM
BACTERIA UR CULT: NO GROWTH
BILIRUB UR QL STRIP: NEGATIVE
BUN SERPL-MCNC: 7.7 MG/DL (ref 8–23)
CALCIUM SERPL-MCNC: 7.6 MG/DL (ref 8.8–10.4)
CHLORIDE SERPL-SCNC: 101 MMOL/L (ref 98–107)
COLOR UR AUTO: ABNORMAL
CREAT SERPL-MCNC: 0.52 MG/DL (ref 0.51–0.95)
DIASTOLIC BLOOD PRESSURE - MUSE: NORMAL MMHG
EGFRCR SERPLBLD CKD-EPI 2021: >90 ML/MIN/1.73M2
ERYTHROCYTE [DISTWIDTH] IN BLOOD BY AUTOMATED COUNT: 17.5 % (ref 10–15)
GLUCOSE BLDC GLUCOMTR-MCNC: 111 MG/DL (ref 70–99)
GLUCOSE SERPL-MCNC: 107 MG/DL (ref 70–99)
GLUCOSE UR STRIP-MCNC: NEGATIVE MG/DL
HCO3 SERPL-SCNC: 22 MMOL/L (ref 22–29)
HCT VFR BLD AUTO: 33 % (ref 35–47)
HGB BLD-MCNC: 10.9 G/DL (ref 11.7–15.7)
HGB UR QL STRIP: NEGATIVE
INTERPRETATION ECG - MUSE: NORMAL
KETONES UR STRIP-MCNC: 80 MG/DL
LEUKOCYTE ESTERASE UR QL STRIP: ABNORMAL
MAGNESIUM SERPL-MCNC: 1.9 MG/DL (ref 1.7–2.3)
MCH RBC QN AUTO: 26.5 PG (ref 26.5–33)
MCHC RBC AUTO-ENTMCNC: 33 G/DL (ref 31.5–36.5)
MCV RBC AUTO: 80 FL (ref 78–100)
MUCOUS THREADS #/AREA URNS LPF: PRESENT /LPF
NITRATE UR QL: NEGATIVE
P AXIS - MUSE: 76 DEGREES
PH UR STRIP: 6 [PH] (ref 5–7)
PHOSPHATE SERPL-MCNC: 2.9 MG/DL (ref 2.5–4.5)
PLATELET # BLD AUTO: 326 10E3/UL (ref 150–450)
POTASSIUM SERPL-SCNC: 3.5 MMOL/L (ref 3.4–5.3)
PR INTERVAL - MUSE: 170 MS
QRS DURATION - MUSE: 138 MS
QT - MUSE: 330 MS
QTC - MUSE: 458 MS
R AXIS - MUSE: -75 DEGREES
RBC # BLD AUTO: 4.12 10E6/UL (ref 3.8–5.2)
RBC URINE: 4 /HPF
SODIUM SERPL-SCNC: 136 MMOL/L (ref 135–145)
SP GR UR STRIP: 1.01 (ref 1–1.03)
SYSTOLIC BLOOD PRESSURE - MUSE: NORMAL MMHG
T AXIS - MUSE: 39 DEGREES
UROBILINOGEN UR STRIP-MCNC: <2 MG/DL
VENTRICULAR RATE- MUSE: 116 BPM
WBC # BLD AUTO: 14.5 10E3/UL (ref 4–11)
WBC URINE: 50 /HPF

## 2025-01-24 PROCEDURE — 250N000011 HC RX IP 250 OP 636: Performed by: INTERNAL MEDICINE

## 2025-01-24 PROCEDURE — 250N000013 HC RX MED GY IP 250 OP 250 PS 637: Performed by: INTERNAL MEDICINE

## 2025-01-24 PROCEDURE — 84100 ASSAY OF PHOSPHORUS: CPT | Performed by: COLON & RECTAL SURGERY

## 2025-01-24 PROCEDURE — 84295 ASSAY OF SERUM SODIUM: CPT | Performed by: COLON & RECTAL SURGERY

## 2025-01-24 PROCEDURE — 80048 BASIC METABOLIC PNL TOTAL CA: CPT | Performed by: COLON & RECTAL SURGERY

## 2025-01-24 PROCEDURE — 258N000003 HC RX IP 258 OP 636: Performed by: COLON & RECTAL SURGERY

## 2025-01-24 PROCEDURE — 85014 HEMATOCRIT: CPT | Performed by: COLON & RECTAL SURGERY

## 2025-01-24 PROCEDURE — 83735 ASSAY OF MAGNESIUM: CPT | Performed by: COLON & RECTAL SURGERY

## 2025-01-24 PROCEDURE — 97116 GAIT TRAINING THERAPY: CPT | Mod: GP | Performed by: PHYSICAL THERAPIST

## 2025-01-24 PROCEDURE — 97166 OT EVAL MOD COMPLEX 45 MIN: CPT | Mod: GO

## 2025-01-24 PROCEDURE — 82435 ASSAY OF BLOOD CHLORIDE: CPT | Performed by: COLON & RECTAL SURGERY

## 2025-01-24 PROCEDURE — 85049 AUTOMATED PLATELET COUNT: CPT | Performed by: COLON & RECTAL SURGERY

## 2025-01-24 PROCEDURE — 93010 ELECTROCARDIOGRAM REPORT: CPT | Mod: RTG | Performed by: STUDENT IN AN ORGANIZED HEALTH CARE EDUCATION/TRAINING PROGRAM

## 2025-01-24 PROCEDURE — 250N000011 HC RX IP 250 OP 636: Performed by: COLON & RECTAL SURGERY

## 2025-01-24 PROCEDURE — 97162 PT EVAL MOD COMPLEX 30 MIN: CPT | Mod: GP | Performed by: PHYSICAL THERAPIST

## 2025-01-24 PROCEDURE — 83880 ASSAY OF NATRIURETIC PEPTIDE: CPT | Performed by: INTERNAL MEDICINE

## 2025-01-24 PROCEDURE — 97535 SELF CARE MNGMENT TRAINING: CPT | Mod: GO

## 2025-01-24 PROCEDURE — 250N000013 HC RX MED GY IP 250 OP 250 PS 637: Performed by: COLON & RECTAL SURGERY

## 2025-01-24 PROCEDURE — 82565 ASSAY OF CREATININE: CPT | Performed by: COLON & RECTAL SURGERY

## 2025-01-24 PROCEDURE — 99232 SBSQ HOSP IP/OBS MODERATE 35: CPT | Performed by: INTERNAL MEDICINE

## 2025-01-24 PROCEDURE — 120N000001 HC R&B MED SURG/OB

## 2025-01-24 RX ORDER — POLYETHYLENE GLYCOL 3350 17 G/17G
8.5 POWDER, FOR SOLUTION ORAL DAILY
Status: DISCONTINUED | OUTPATIENT
Start: 2025-01-24 | End: 2025-01-29

## 2025-01-24 RX ORDER — ONDANSETRON 2 MG/ML
4 INJECTION INTRAMUSCULAR; INTRAVENOUS EVERY 6 HOURS PRN
Status: DISCONTINUED | OUTPATIENT
Start: 2025-01-24 | End: 2025-01-29 | Stop reason: HOSPADM

## 2025-01-24 RX ORDER — ONDANSETRON 4 MG/1
4 TABLET, ORALLY DISINTEGRATING ORAL EVERY 4 HOURS PRN
Status: DISCONTINUED | OUTPATIENT
Start: 2025-01-24 | End: 2025-01-29 | Stop reason: HOSPADM

## 2025-01-24 RX ORDER — CEFTRIAXONE 1 G/1
1 INJECTION, POWDER, FOR SOLUTION INTRAMUSCULAR; INTRAVENOUS EVERY 24 HOURS
Status: DISCONTINUED | OUTPATIENT
Start: 2025-01-24 | End: 2025-01-28

## 2025-01-24 RX ADMIN — SODIUM CHLORIDE, POTASSIUM CHLORIDE, SODIUM LACTATE AND CALCIUM CHLORIDE: 600; 310; 30; 20 INJECTION, SOLUTION INTRAVENOUS at 00:44

## 2025-01-24 RX ADMIN — ONDANSETRON 4 MG: 2 INJECTION INTRAMUSCULAR; INTRAVENOUS at 14:36

## 2025-01-24 RX ADMIN — HYDROXYZINE HYDROCHLORIDE 25 MG: 25 INJECTION, SOLUTION INTRAMUSCULAR at 12:24

## 2025-01-24 RX ADMIN — RIVASTIGMINE TARTRATE 4.5 MG: 1.5 CAPSULE ORAL at 21:49

## 2025-01-24 RX ADMIN — ONDANSETRON 4 MG: 2 INJECTION INTRAMUSCULAR; INTRAVENOUS at 21:44

## 2025-01-24 RX ADMIN — PANTOPRAZOLE SODIUM 40 MG: 40 TABLET, DELAYED RELEASE ORAL at 07:05

## 2025-01-24 RX ADMIN — ACETAMINOPHEN 1000 MG: 500 TABLET, FILM COATED ORAL at 21:43

## 2025-01-24 RX ADMIN — BUSPIRONE HYDROCHLORIDE 7.5 MG: 7.5 TABLET ORAL at 09:47

## 2025-01-24 RX ADMIN — ACETAMINOPHEN 1000 MG: 500 TABLET, FILM COATED ORAL at 00:48

## 2025-01-24 RX ADMIN — ONDANSETRON 4 MG: 2 INJECTION INTRAMUSCULAR; INTRAVENOUS at 09:44

## 2025-01-24 RX ADMIN — BUSPIRONE HYDROCHLORIDE 7.5 MG: 7.5 TABLET ORAL at 21:49

## 2025-01-24 RX ADMIN — ACETAMINOPHEN 1000 MG: 500 TABLET, FILM COATED ORAL at 07:05

## 2025-01-24 RX ADMIN — ACARBOSE 25 MG: 25 TABLET ORAL at 16:42

## 2025-01-24 RX ADMIN — HEPARIN SODIUM 5000 UNITS: 5000 INJECTION, SOLUTION INTRAVENOUS; SUBCUTANEOUS at 21:44

## 2025-01-24 RX ADMIN — CEFTRIAXONE SODIUM 1 G: 1 INJECTION, POWDER, FOR SOLUTION INTRAMUSCULAR; INTRAVENOUS at 23:47

## 2025-01-24 RX ADMIN — RIVASTIGMINE TARTRATE 4.5 MG: 1.5 CAPSULE ORAL at 09:46

## 2025-01-24 RX ADMIN — HYDROMORPHONE HYDROCHLORIDE 0.2 MG: 0.2 INJECTION, SOLUTION INTRAMUSCULAR; INTRAVENOUS; SUBCUTANEOUS at 21:44

## 2025-01-24 RX ADMIN — NORTRIPTYLINE HYDROCHLORIDE 50 MG: 50 CAPSULE ORAL at 21:49

## 2025-01-24 RX ADMIN — ESZOPICLONE 1 MG: 1 TABLET, FILM COATED ORAL at 21:43

## 2025-01-24 RX ADMIN — HYDROMORPHONE HYDROCHLORIDE 0.2 MG: 0.2 INJECTION, SOLUTION INTRAMUSCULAR; INTRAVENOUS; SUBCUTANEOUS at 17:00

## 2025-01-24 RX ADMIN — HYDRALAZINE HYDROCHLORIDE 10 MG: 20 INJECTION INTRAMUSCULAR; INTRAVENOUS at 15:36

## 2025-01-24 RX ADMIN — OXYCODONE HYDROCHLORIDE 2.5 MG: 5 TABLET ORAL at 09:47

## 2025-01-24 RX ADMIN — ACARBOSE 25 MG: 25 TABLET ORAL at 09:46

## 2025-01-24 RX ADMIN — CEFTRIAXONE SODIUM 1 G: 1 INJECTION, POWDER, FOR SOLUTION INTRAMUSCULAR; INTRAVENOUS at 01:34

## 2025-01-24 RX ADMIN — AMLODIPINE BESYLATE 5 MG: 5 TABLET ORAL at 09:51

## 2025-01-24 RX ADMIN — SODIUM CHLORIDE, POTASSIUM CHLORIDE, SODIUM LACTATE AND CALCIUM CHLORIDE: 600; 310; 30; 20 INJECTION, SOLUTION INTRAVENOUS at 21:58

## 2025-01-24 ASSESSMENT — ACTIVITIES OF DAILY LIVING (ADL)
ADLS_ACUITY_SCORE: 45
ADLS_ACUITY_SCORE: 60
ADLS_ACUITY_SCORE: 45
ADLS_ACUITY_SCORE: 62
ADLS_ACUITY_SCORE: 60
ADLS_ACUITY_SCORE: 60
ADLS_ACUITY_SCORE: 62
ADLS_ACUITY_SCORE: 45
ADLS_ACUITY_SCORE: 45
ADLS_ACUITY_SCORE: 62
ADLS_ACUITY_SCORE: 60
ADLS_ACUITY_SCORE: 60
ADLS_ACUITY_SCORE: 45
ADLS_ACUITY_SCORE: 62
ADLS_ACUITY_SCORE: 45
ADLS_ACUITY_SCORE: 62
ADLS_ACUITY_SCORE: 45

## 2025-01-24 NOTE — PROGRESS NOTES
[facility]  Daily Progress Note    Subjective  Episode of hypertension and tachycardia to 108 last evening. Work-up with EKG/CXR/UA showed positive UA. Cultures pending and started on IV Rocephin.     Feeling much better this AM. Daughter at bedside. Tolerating clears, denies any nausea currently. Pain controlled. Passed a large loose incontinent BM this morning. Plans to ambulate more today. Adequate clear UOP.     Objective  Intake/Output last 24 hrs:    Intake/Output Summary (Last 24 hours) at 1/24/2025 0854  Last data filed at 1/24/2025 0648  Gross per 24 hour   Intake 645 ml   Output 1235 ml   Net -590 ml     Temp:  [97.7  F (36.5  C)-98.7  F (37.1  C)] 98.7  F (37.1  C)  Pulse:  [] 80  Resp:  [12-34] 16  BP: (134-209)/(61-96) 134/61  SpO2:  [95 %-100 %] 97 %    Physical Exam:  General: awake, alert, laying in bed, in no acute distress  Head: normocephalic, atraumatic  Respiratory: non-labored breathing  Abdomen: soft, nontender, non-distended  Skin: No rashes or lesions  Musculoskeletal: moves all four extremities equally  Psychological: alert and oriented, answers questions appropriately    Pertinent Labs  Lab Results: personally reviewed.  Lab Results   Component Value Date     01/24/2025     01/23/2025     01/15/2025    CO2 22 01/24/2025    CO2 21 01/23/2025    CO2 26 01/15/2025    CO2 26 11/09/2021    CO2 30 10/20/2020    CO2 32 10/18/2019    BUN 7.7 01/24/2025    BUN 8.1 01/23/2025    BUN 9.4 01/15/2025    BUN 13 11/09/2021    BUN 17 10/20/2020    BUN 21 10/18/2019     Lab Results   Component Value Date    WBC 14.5 01/24/2025    WBC 19.6 01/23/2025    WBC 8.2 01/15/2025    HGB 10.9 01/24/2025    HGB 12.3 01/23/2025    HGB 13.4 01/23/2025    HCT 33.0 01/24/2025    HCT 36.7 01/23/2025    HCT 36.8 01/15/2025    MCV 80 01/24/2025    MCV 79 01/23/2025    MCV 81 01/15/2025     01/24/2025     01/23/2025     01/23/2025       Assessment/Plan: This is a 79  year old female POD #1 s/p perineal rectosigmoidectomy for full thickness rectal prolapse    Cr 0.52  WBC 14.5 (19.6)  Hgb 10.9 (12.3)  UA positive, cultures pending    -OK to advance diet as tolerated   -Remove babin today, TOV following removal  -Will add 1/2 dose of Miralax daily to ensure stools are soft  -Continue IV Rocephin, cultures pending  -HSQ dvt ppx  -Monitor I&Os  -OOB/ambulate  -PT eval  -Appreciate care management assistance, will need home with home care as pt lives independently (family would like to avoid TCU)  -Likely home tomorrow if doing well    Discussed with Dr. Praful Meehan, PA-C  [facility]  [phone]..............................main

## 2025-01-24 NOTE — PROGRESS NOTES
01/24/25 0800   Appointment Info   Signing Clinician's Name / Credentials (OT) Dajames Ledesma, OTR/L   Living Environment   People in Home alone   Current Living Arrangements   (Rambler)   Home Accessibility stairs to enter home;stairs within home   Number of Stairs, Main Entrance 6   Stair Railings, Main Entrance railing on right side (ascending);railings safe and in good condition   Number of Stairs, Within Home, Primary greater than 10 stairs   Stair Railings, Within Home, Primary railings safe and in good condition;railings on both sides of stairs   Transportation Anticipated family or friend will provide   Living Environment Comments Main level living, laundry in basement. Bathroom setup: owns shower chair, tub/shower combination w/ grab bars, standard height w/ grab bars.   Self-Care   Regular Exercise Other (see comments)  (Patient reports being very active until diagnosed with cancer.)   Equipment Currently Used at Home grab bar, toilet;grab bar, tub/shower;shower chair;walker, standard   Fall history within last six months no   Activity/Exercise/Self-Care Comment Patient reports IND with ADL's at baseline. She does receive supervision/assist from daughters with transfers to/from tub/shower. No gait aid at baseline but owns FWW. Patient enjoys: reading newspaper, watching TV, surfing internet/onling shopping.   Instrumental Activities of Daily Living (IADL)   IADL Comments Meals on wheels program (1x week delivery but meals last patient an entire week); patient cooks her own simple breakfast (eggs, lainez, toast); Daughters assist with cleaning and transporting laundry; patient does wash/dry/folding; Daughters assist with setting up meds but patient takes independently; independent with finances.   General Information   Onset of Illness/Injury or Date of Surgery 01/23/25   Referring Physician Josee Basilio MD   Patient/Family Therapy Goal Statement (OT) None stated   Additional Occupational Profile  Info/Pertinent History of Current Problem Patient is a 79-year-old female with a history of HTN, anxiety, and B-cell lymphoma with intra-abdominal metastasis, who was admitted by colorectal surgery on 1/23/2025 for perineal rectosigmoidectomy.   Cognitive Status Examination   Orientation Status orientation to person, place and time   Cognitive Status Comments Will continue to monitor and assess as appropriate/indicated.   Visual Perception   Visual Impairment/Limitations WFL   Sensory   Sensory Quick Adds sensation intact   Pain Assessment   Patient Currently in Pain No   Posture   Posture not impaired   Range of Motion Comprehensive   General Range of Motion bilateral upper extremity ROM WFL   Strength Comprehensive (MMT)   Comment, General Manual Muscle Testing (MMT) Assessment Generalized weakness   Coordination   Upper Extremity Coordination No deficits were identified   Bed Mobility   Bed Mobility supine-sit   Supine-Sit Century (Bed Mobility) supervision   Comment (Bed Mobility) Supervision for increased safety   Transfers   Transfers sit-stand transfer   Sit-Stand Transfer   Sit-Stand Century (Transfers) supervision   Assistive Device (Sit-Stand Transfers) walker, front-wheeled   Sit/Stand Transfer Comments Supervision provided for increased safety   Balance   Balance Assessment sitting static balance   Sitting Balance: Static supervision   Position, Sitting Balance unsupported;sitting edge of bed   Balance Comments Supervision provided for increased safety   Activities of Daily Living   BADL Assessment/Intervention lower body dressing   Lower Body Dressing Assessment/Training   Position (Lower Body Dressing) edge of bed sitting   Comment, (Lower Body Dressing) Supervision provided for increased safety   Century Level (Lower Body Dressing) doff;don;socks   Clinical Impression   Criteria for Skilled Therapeutic Interventions Met (OT) Yes, treatment indicated   OT Diagnosis Decreased independence  with self-cares/ADL's, transfers, and mobility   OT Problem List-Impairments impacting ADL problems related to;activity tolerance impaired;balance;mobility;strength   ADL comments/analysis Dressing, bathing, transfers, mobility, home management tasks   Assessment of Occupational Performance 3-5 Performance Deficits   Planned Therapy Interventions (OT) ADL retraining;balance training;strengthening;transfer training;progressive activity/exercise   Clinical Decision Making Complexity (OT) detailed assessment/moderate complexity   Risk & Benefits of therapy have been explained care plan/treatment goals reviewed;risks/benefits reviewed;participants voiced agreement with care plan;participants included;patient;daughter   OT Total Evaluation Time   OT Eval, Moderate Complexity Minutes (43456) 9   OT Goals   Therapy Frequency (OT) 5 times/week   OT Predicted Duration/Target Date for Goal Attainment 01/31/25   OT Goals Lower Body Dressing;Toilet Transfer/Toileting;Home Management;Transfers   OT: Lower Body Dressing Supervision/stand-by assist   OT: Transfer Supervision/stand-by assist  (Transfer to/from tub/shower combination)   OT: Toilet Transfer/Toileting Supervision/stand-by assist   OT: Home Management Supervision/stand-by assist;with light demand household tasks   Interventions   Interventions Quick Adds Self-Care/Home Management   Self-Care/Home Management   Self-Care/Home Mgmt/ADL, Compensatory, Meal Prep Minutes (48709) 23   Symptoms Noted During/After Treatment (Meal Preparation/Planning Training) fatigue   Treatment Detail/Skilled Intervention Patient supine when approached, daughter present, patient agreeable to OT eval and treat. Eval completed and treat initiated. Facilitated bed mobility (supine<>sitting) by providing supervision for increased safety; however, patient is able to perform largely under her own power. From EOB, patient participated in aspects of LB dressing (socks) with supervision provided for  increased safety. Patient demonstrates ability to doff/don socks with increased time and effort. Facilitated transfers (from EOB, to/from bedside chair) by providing CGA for increased safety; however, patient is able to perform under her own power with controlled ascent/descent. Facilitated aspcects of toileting process (transfers to/from, clothing management) by providing CGA for increased safety secondary to impaired balance; however, patient is able to perform largely under her own power with use of FWW and grab bars for increased steadying and controlled ascent/descent. Facilitated aspects of UB dressing (gown) by providing setup assistance for gathering clothing and CGA for increased safety as patient stood to thread arms through gown. Session ended with patient sitting in bedside chair with call light within reach and all questions answered.   OT Discharge Planning   OT Plan Next session: LB dressing (shoes/pants); light housekeeping within room (fold blankets, pillow cases, etc), progress standing tolerance, progress trasnfers/mobility as able, simulate tub/shower transfers in future session as able   OT Discharge Recommendation (DC Rec) home with assist;home with home care occupational therapy   OT Rationale for DC Rec Patient remains below baseline; however, demonstrates to be motivated to work with therapies to regain independence. Patient's daughters are involved and assist with IADL's at baseline. Patient could likely discharge home in the future with home services.   OT Brief overview of current status SBA-CGA x1 with bed mobility, CGA x1 with ADL's, CGA x1 with transfers, CGA x1 with mobility   OT Total Distance Amb During Session (feet) 20  (within room (to toilet, to bedside recliner); no rest breaks required, no losses of balance noted, no shortness of breath noted)   Total Session Time   Timed Code Treatment Minutes 23   Total Session Time (sum of timed and untimed services) 32

## 2025-01-24 NOTE — CONSULTS
Care Management Initial Consult    General Information  Assessment completed with: Patient, Children,  (Bibiana and jenny Ladi)  Type of CM/SW Visit: Initial Assessment    Primary Care Provider verified and updated as needed: Yes   Readmission within the last 30 days:        Reason for Consult: discharge planning  Advance Care Planning: Advance Care Planning Reviewed: no concerns identified          Communication Assessment  Patient's communication style: spoken language (English or Bilingual)    Hearing Difficulty or Deaf: no   Wear Glasses or Blind: yes    Cognitive  Cognitive/Neuro/Behavioral: WDL  Level of Consciousness: alert  Arousal Level: opens eyes spontaneously  Orientation: oriented x 4  Mood/Behavior: calm  Best Language: 0 - No aphasia  Speech: hoarse, clear    Living Environment:   People in home: alone  help with children  Current living Arrangements: house      Able to return to prior arrangements: yes       Family/Social Support:  Care provided by: self, child(maryuri)  Provides care for: no one  Marital Status:   Support system: Children          Description of Support System:           Current Resources:   Patient receiving home care services:          Community Resources:    Equipment currently used at home: grab bar, toilet, grab bar, tub/shower, shower chair, walker, standard  Supplies currently used at home:      Employment/Financial:  Employment Status: retired        Financial Concerns:     Referral to Financial Worker: No       Does the patient's insurance plan have a 3 day qualifying hospital stay waiver?  Yes     Which insurance plan 3 day waiver is available? Alternative insurance waiver    Will the waiver be used for post-acute placement? Undetermined at this time    Lifestyle & Psychosocial Needs:  Social Drivers of Health     Food Insecurity: Low Risk  (1/24/2025)    Food Insecurity     Within the past 12 months, did you worry that your food would run out before you got money to buy  more?: No     Within the past 12 months, did the food you bought just not last and you didn t have money to get more?: No   Depression: Not at risk (1/6/2025)    PHQ-2     PHQ-2 Score: 0   Housing Stability: Low Risk  (1/24/2025)    Housing Stability     Do you have housing? : Yes     Are you worried about losing your housing?: No   Tobacco Use: Low Risk  (1/21/2025)    Patient History     Smoking Tobacco Use: Never     Smokeless Tobacco Use: Never     Passive Exposure: Never   Financial Resource Strain: Low Risk  (1/24/2025)    Financial Resource Strain     Within the past 12 months, have you or your family members you live with been unable to get utilities (heat, electricity) when it was really needed?: No   Alcohol Use: Not At Risk (7/27/2020)    Received from [employer], [employer]    AUDIT-C     Frequency of Alcohol Consumption: Never     Average Number of Drinks: Not on file     Frequency of Binge Drinking: Not on file   Transportation Needs: Low Risk  (1/24/2025)    Transportation Needs     Within the past 12 months, has lack of transportation kept you from medical appointments, getting your medicines, non-medical meetings or appointments, work, or from getting things that you need?: No   Physical Activity: Unknown (4/25/2024)    Exercise Vital Sign     Days of Exercise per Week: 0 days     Minutes of Exercise per Session: Not on file   Interpersonal Safety: Low Risk  (1/23/2025)    Interpersonal Safety     Do you feel physically and emotionally safe where you currently live?: Yes     Within the past 12 months, have you been hit, slapped, kicked or otherwise physically hurt by someone?: No     Within the past 12 months, have you been humiliated or emotionally abused in other ways by your partner or ex-partner?: No   Stress: Stress Concern Present (4/25/2024)    [employer] - Occupational Stress Questionnaire     Feeling of Stress : Rather much   Social Connections: Unknown  (4/25/2024)    Social Connection and Isolation Panel [NHANES]     Frequency of Communication with Friends and Family: Not on file     Frequency of Social Gatherings with Friends and Family: Once a week     Attends Buddhist Services: Not on file     Active Member of Clubs or Organizations: Not on file     Attends Club or Organization Meetings: Not on file     Marital Status: Not on file   Health Literacy: Not on file       Functional Status:  Prior to admission patient needed assistance:              Mental Health Status:          Chemical Dependency Status:                Values/Beliefs:  Spiritual, Cultural Beliefs, Buddhist Practices, Values that affect care:    Description of Beliefs that Will Affect Care: Sabianism            Discussed  Partnership in Safe Discharge Planning  document with patient/family: Yes: pt and dtr Ladi    Additional Information:    SW met with pt and dtr Ladi. They are interested in home care for pt when she discharges. SW explained process of therapy evaluations and recommendations.     Dtr reported they had home care last April and it was ok. No preferred agency, just to be covered by insurance. Explained to pt that it is a temporary in-house service. Most interested in home RN.     Sent referral to  [facility] for screening.     Watching for therapy RECs.     Next Steps: Determine which home care services are needed and send order at discharge.     DAVID Lou

## 2025-01-24 NOTE — PLAN OF CARE
Shift from 1500 to 2330-    Pt arrived from PACU at around 2010 via cart with daughters at bedside.      Problem: Surgery Nonspecified  Goal: Nausea and Vomiting Relief  Outcome: Progressing  Intervention: Prevent or Manage Nausea and Vomiting  Recent Flowsheet Documentation  Taken 1/23/2025 2010 by Katelynn De La Rosa, RN  Nausea/Vomiting Interventions:   slow deep breathing encouraged   antiemetic    Problem: Comorbidity Management  Goal: Blood Pressure in Desired Range  Outcome: Progressing  Intervention: Maintain Blood Pressure Management  Recent Flowsheet Documentation  Taken 1/23/2025 2010 by Katelynn De La Rosa, RN  Medication Review/Management: medications reviewed        Goal Outcome Evaluation:    Patient arrived to P2 and was hypertensive and nauseated.     Hospitalist consulted per orders.     Given hydralazine and BP then was WNL. Pt was tachycardic; No signs of bleeding. Dr. Jaramillo at bedside doing admission. Then STAT EKG done, PCXR, and labs done. UA sent.     500ml bolus given.     Pt given zofran then vistaril for nausea. Nauseated improved around 2330 then pt tried ice chips.     IVF infusing. Refused capnography. Continuous pulse oximeter in place.

## 2025-01-24 NOTE — PLAN OF CARE
Colorectal MD paged. Patient having dry heaves and 3 large watery yellow stools.   Zofran and vistaril given.   Spoke to them. Will continue to monitor loose stools overnight.   Call if becomes more tachycardic or dizzy.   Zofran frequency increased to q4hrs.

## 2025-01-24 NOTE — PROGRESS NOTES
"[facility]    Medicine Progress Note - Hospitalist Service    Date of Admission:  1/23/2025  Admitted by colorectal surgery for perineal rectosigmoidectomy, indication full-thickness rectal prolapse  Hospital medicine consulted for medical management of comorbidities      Assessment & Plan   Status post perineal rectosigmoidectomy,   indication full-thickness rectal prolapse  Colorectal surgery primarily managing  Discharge plans, analgesia, DVT prophylaxis per surgery    Hypertension  Elevated postsurgery, aggressively control pain  Begin IV hydralazine as needed  Resume home medications including Norvasc    Nausea /vomiting  Requesting SL Zofran  Allergy to Compazine, declines Benadryl, try Vistaril as a last resort for unremitting nausea    Mild tachycardia noted tonight  No chest pain or shortness of breath  Not currently hypoxic  Will check stat CBC, chest x-ray, placed on cardiac monitoring  Update general surgery    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  (Error. Value could not be saved.), Surgical procedure  Lines: None     Cardiac Monitoring: None  Code Status: Full Code      Clinically Significant Risk Factors Present on Admission                   # Hypertension: Noted on problem list     # Dementia: noted on problem list       # Cachexia: Estimated body mass index is 17.64 kg/m  as calculated from the following:    Height as of 1/21/25: 1.478 m (4' 10.2\").    Weight as of this encounter: " 38.6 kg (85 lb).              Social Drivers of Health    Physical Activity: Unknown (4/25/2024)    Exercise Vital Sign     Days of Exercise per Week: 0 days   Stress: Stress Concern Present (4/25/2024)    [employer] - Occupational Stress Questionnaire     Feeling of Stress : Rather much   Social Connections: Unknown (4/25/2024)    Social Connection and Isolation Panel [NHANES]     Frequency of Social Gatherings with Friends and Family: Once a week          Disposition Plan     Medically Ready for Discharge: Anticipated in 2-4 Days             Fabio Jaramillo MD  Hospitalist Service  [facility]  Securely message with [employer] (more info)  Text page via [email or web address] Paging/Directory   ______________________________________________________________________    Interval History   Patient seen with daughter at bedside, nurse also in attendance  Requesting medicine for nausea, also concerned about blood pressure elevation however this is improving with IV hydralazine  Denies any chest pain or shortness of breath    Physical Exam   Vital Signs: Temp: 98.5  F (36.9  C) Temp src: Oral BP: 139/63 Pulse: (!) 110   Resp: 20 SpO2: 100 % O2 Device: None (Room air) Oxygen Delivery: 2 LPM  Weight: 85 lbs 0 oz    General Appearance: Alert, awake  Respiratory: Clear anteriorly  Cardiovascular: S1-S2, mild ESM heard  GI: Soft and nontender nondistended  Skin: No erythema  Other: No edema bilaterally    Medical Decision Making       5 0 MINUTES SPENT BY ME on the date of service doing chart review, history, exam, documentation & further activities per the note.      Data   Imaging results reviewed over the past 24 hrs:   No results found for this or any previous visit (from the past 24 hours).

## 2025-01-24 NOTE — PLAN OF CARE
Problem: Adult Inpatient Plan of Care  Goal: Optimal Comfort and Wellbeing  Intervention: Monitor Pain and Promote Comfort  Recent Flowsheet Documentation  Taken 2025 0002 by Radha Chirinos RN  Pain Management Interventions:   emotional support   rest   medication (see MAR)     Problem: Surgery Nonspecified  Goal: Fluid and Electrolyte Balance  Intervention: Monitor and Manage Fluid and Electrolyte Balance  Recent Flowsheet Documentation  Taken 2025 010 by Radha Chirinos RN  Fluid/Electrolyte Management: intravenous fluid replacement initiated     Problem: Surgery Nonspecified  Goal: Blood Glucose Level Within Targeted Range  Intervention: Optimize Glycemic Control  Recent Flowsheet Documentation  Taken 2025 by Radha Chirinos RN  Hyperglycemia Management: blood glucose monitored     Problem: Surgery Nonspecified  Goal: Nausea and Vomiting Relief  Intervention: Prevent or Manage Nausea and Vomiting  Recent Flowsheet Documentation  Taken 2025 by Radha Chirinos RN  Nausea/Vomiting Interventions:   slow deep breathing encouraged   cool cloth applied   sips of clear liquids given   stimuli minimized     Problem: Comorbidity Management  Goal: Blood Pressure in Desired Range  Intervention: Maintain Blood Pressure Management  Recent Flowsheet Documentation  Taken 2025 by Radha Chirinos RN  Medication Review/Management: medications reviewed   Goal Outcome Evaluation:    Pt A/O, VSS on RA, pt c/o of 3/10 pain  Scheduled tylenol given, helpful  All other bedtime pills where not given because of nausea, sleep promoted and damp cloth on forehead provided  Pt felt less nauseated with sleep   Tolerating IV abx  Tolerating continuous LR at 50/hr  Tele: sinus tachy with occasional PVC's  Deutsch patent: indication S/P  POD 1 B  BP was within parameters through the night  Pt's nausea was resolved this morning  Daughter at bedside

## 2025-01-24 NOTE — PLAN OF CARE
Shift from 0700 to 1530-      Problem: Surgery Nonspecified  Goal: Blood Glucose Level Within Targeted Range  1/24/2025 1209 by Katelynn De La Rosa RN  Outcome: Progressing  1/24/2025 0021 by Katelynn De La Rosa RN  Outcome: Progressing  Intervention: Optimize Glycemic Control  Recent Flowsheet Documentation  Taken 1/24/2025 1132 by Katelynn De La Rosa RN  Hyperglycemia Management: blood glucose monitored  Taken 1/24/2025 0825 by Katelynn De La Rosa RN  Hyperglycemia Management: blood glucose monitored     Problem: Activity Intolerance  Goal: Enhanced Capacity and Energy  1/24/2025 1209 by Katelynn De La Rosa RN  Outcome: Progressing  1/24/2025 0021 by Katelynn De La Rosa RN  Outcome: Progressing  Intervention: Optimize Activity Tolerance  Recent Flowsheet Documentation  Taken 1/24/2025 1132 by Katelynn De La Rosa RN  Activity Management: activity adjusted per tolerance  Taken 1/24/2025 1030 by Katelynn De La Rosa RN  Activity Management: back to bed  Taken 1/24/2025 0945 by Katelynn De La Rosa RN  Activity Management:   ambulated outside room   up in chair  Taken 1/24/2025 0825 by Katelynn De La Rosa RN  Activity Management: activity adjusted per tolerance    Problem: Diarrhea  Goal: Effective Diarrhea Management  Outcome: Progressing  Intervention: Manage Diarrhea  Recent Flowsheet Documentation  Taken 1/24/2025 1132 by Katelynn De La Rosa RN  Fluid/Electrolyte Management: intravenous fluid replacement initiated  Medication Review/Management: medications reviewed  Taken 1/24/2025 0945 by Katelynn De La Rosa RN  Perineal Care:   absorbent brief/pad changed   diaper changed   perineum cleansed  Taken 1/24/2025 0825 by Katelynn De La Rosa RN  Fluid/Electrolyte Management: intravenous fluid replacement initiated  Medication Review/Management: medications reviewed        Goal Outcome Evaluation:    Pt continues to be on clears. Nauseated this am and afternoon today. Given zofran twice and vistaril once.     Pt on clears but mainly drinks sips. Had has only 120ml of  clears for entire day.     See note about loose stools. MD notified.    No signs of bleeding.      IVF infusing; IV abx continued.     Deutsch discontinued around noon. Incontinent of urine once.

## 2025-01-24 NOTE — PROGRESS NOTES
01/24/25 0900   Appointment Info   Signing Clinician's Name / Credentials (PT) Saba Zhou, PT, DPT   Living Environment   People in Home alone   Current Living Arrangements house   Home Accessibility stairs to enter home;stairs within home   Number of Stairs, Main Entrance 4   Stair Railings, Main Entrance railings safe and in good condition   Number of Stairs, Within Home, Primary greater than 10 stairs   Stair Railings, Within Home, Primary railings safe and in good condition   Living Environment Comments Pt has laundry in basement. Daughters live nearby and frequently assist with IADLs. Pt does not drive. Daughter reports someone will be staying with patient at discharge.   Self-Care   Equipment Currently Used at Home walker, rolling  (FWW)   Fall history within last six months no   Activity/Exercise/Self-Care Comment Pt independent with ADLs. Receives assist with IADLs as needed.   General Information   Onset of Illness/Injury or Date of Surgery 01/23/25   Referring Physician Christina Lott MD   Patient/Family Therapy Goals Statement (PT) None stated.   Pertinent History of Current Problem (include personal factors and/or comorbidities that impact the POC) POD #1 s/p perineal rectosigmoidectomy   Existing Precautions/Restrictions fall   Pain Assessment   Patient Currently in Pain No   Range of Motion (ROM)   Range of Motion ROM is WFL   Strength (Manual Muscle Testing)   Strength (Manual Muscle Testing) Deficits observed during functional mobility   Bed Mobility   Comment, (Bed Mobility) Patient seated in chair upon therapist arrival.   Transfers   Transfers sit-stand transfer   Impairments Contributing to Impaired Transfers decreased strength   Sit-Stand Transfer   Sit-Stand Dodge (Transfers) contact guard;verbal cues   Assistive Device (Sit-Stand Transfers) walker, front-wheeled   Gait/Stairs (Locomotion)   Dodge Level (Gait) contact guard;verbal cues   Assistive Device (Gait) walker,  front-wheeled   Distance in Feet (Gait) 10'   Pattern (Gait) step-to   Deviations/Abnormal Patterns (Gait) mony decreased;gait speed decreased   Comment, (Gait/Stairs) Stairs not yet attempted today. Pt demonstrating good capacity for attempting next session.   Clinical Impression   Criteria for Skilled Therapeutic Intervention Yes, treatment indicated   PT Diagnosis (PT) impaired functional mobility   Influenced by the following impairments decreased strength, impaired balance, pain   Functional limitations due to impairments transfers, ambulation, stairs   Clinical Presentation (PT Evaluation Complexity) evolving   Clinical Presentation Rationale Clinical judgment.   Clinical Decision Making (Complexity) moderate complexity   Planned Therapy Interventions (PT) balance training;bed mobility training;gait training;home exercise program;neuromuscular re-education;patient/family education;strengthening;transfer training   Risk & Benefits of therapy have been explained evaluation/treatment results reviewed;participants voiced agreement with care plan;participants included;patient   PT Total Evaluation Time   PT Eval, Moderate Complexity Minutes (87515) 10   Physical Therapy Goals   PT Frequency Daily   PT Predicted Duration/Target Date for Goal Attainment 01/31/25   PT Goals Bed Mobility;Transfers;Gait;Stairs   PT: Bed Mobility Supervision/stand-by assist;Supine to/from sit   PT: Transfers Supervision/stand-by assist;Sit to/from stand;Assistive device   PT: Gait Supervision/stand-by assist;Assistive device;Rolling walker;Greater than 200 feet   PT: Stairs Supervision/stand-by assist;Greater than 10 stairs;Rail on both sides   Interventions   Interventions Quick Adds Gait Training   Gait Training   Gait Training Minutes (83969) 10   Symptoms Noted During/After Treatment (Gait Training) none   Treatment Detail/Skilled Intervention With FWW. Pt demonstrates good pace and stability.   Distance in Feet additional 140    Dixie Level (Gait Training) contact guard   Physical Assistance Level (Gait Training) verbal cues;1 person assist   Assistive Device (Gait Training) rolling walker   Gait Analysis Deviations decreased mony;decreased step length   Impairments (Gait Analysis/Training) balance impaired;strength decreased   PT Discharge Planning   PT Plan gait with FWW vs without, stairs   PT Discharge Recommendation (DC Rec) home with assist   PT Rationale for DC Rec Patient able to complete transfers and ambulation with stand-by to contact guard assist of 1. Daughter reports family will be available to assist patient at discharge. Anticipate pt will be safe to discharge home when medically ready.   PT Brief overview of current status Sit <> stand, CGA. Ambulates 150' with FWW, CGA.   PT Total Distance Amb During Session (feet) 150   PT Equipment Needed at Discharge walker, rolling  (has available)   Physical Therapy Time and Intention   Timed Code Treatment Minutes 10   Total Session Time (sum of timed and untimed services) 20

## 2025-01-24 NOTE — PROGRESS NOTES
[facility]    Medicine Progress Note - Hospitalist Service    Date of Admission:  1/23/2025    Assessment & Plan   78 YO patient with PMH significant for Alzhemer's disease, anxiety disorder, hypertension, B-cell lymphoma, s/p chemotherapy who was admitted by CRS after a perineal rectosigmoidectomy for rectal prolapse    Full-thickness rectal prolapse  -Status post perineal rectosigmoidectomy 1/23  -Colorectal surgery primarily managing  -Discharge plans, analgesia, DVT prophylaxis per surgery     Hypertension  -Elevated postsurgery  -Begin IV hydralazine as needed  -Resume home medications including Norvasc     Nausea /vomiting  -Unclear what is causing persistent nausea. 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   -Per chart review reportedly in remission  -Follows up with Dr Florian     Major neurocognitive disorder due to Alzheimer's disease   -No agitation at bedside, monitor closely     Bronchiectasis without complication   -Not in respiratory distress currently  -No hypoxia noted, continue home meds  -Add as needed DuoNebs to current medications          Diet: Advance Diet as Tolerated: Full Liquid Diet; Low Fiber    DVT Prophylaxis: Defer to primary service  Deutsch Catheter: Not present  Lines: PRESENT      Port A Cath Single 05/01/24 Right Chest wall-Site Assessment: WDL      Cardiac Monitoring: ACTIVE order. Indication: Tachyarrhythmias, acute (48 hours)  Code Status: Full Code      Clinically Significant Risk Factors                   # Hypertension: Noted on problem list     # Dementia: noted on problem list        # Cachexia: Estimated body mass index is 17.83 kg/m  as calculated from the following:    Height as of  "this encounter: 1.473 m (4' 10\").    Weight as of this encounter: 38.7 kg (85 lb 5.1 oz)., PRESENT ON ADMISSION            Social Drivers of Health    Physical Activity: Unknown (4/25/2024)    Exercise Vital Sign     Days of Exercise per Week: 0 days   Stress: Stress Concern Present (4/25/2024)    [employer] - Occupational Stress Questionnaire     Feeling of Stress : Rather much   Social Connections: Unknown (4/25/2024)    Social Connection and Isolation Panel [NHANES]     Frequency of Social Gatherings with Friends and Family: Once a week          Disposition Plan     Medically Ready for Discharge: Anticipated in 2-4 Days             PATRICIA Cain  Hospitalist Service  [facility]  Securely message with [employer] (more info)  Text page via [employer] Paging/Directory   ______________________________________________________________________    Interval History   Patient is new to me today. Patient daughter at bedside taking notes. Patient friend stayed in the room during my visit.     Patient reported ongoing nausea but no vomiting. No abdominal pain. Passing gas. Reports having loose stools    Physical Exam   Vital Signs: Temp: 98.1  F (36.7  C) Temp src: Oral BP: (!) 168/77 Pulse: 104   Resp: 18 SpO2: 97 % O2 Device: None (Room air) Oxygen Delivery: 4 LPM  Weight: 85 lbs 5.09 oz      General: Not in obvious distress.  HEENT: NC,A T   Chest: Clear to auscultation bilaterally. A portacath, right upper chest wall  Heart: S1S2 normal, regular. No M/R/G  Abdomen: Soft. NT, ND. Bowel sounds- active.  Extremities: No legs swelling  Neuro: Awake, grossly non-focal      Medical Decision Making             Data     I have personally reviewed the following data over the past 24 hrs:    14.5 (H)  \   10.9 (L)   / 326     136 101 7.7 (L) /  107 (H)   3.5 22 0.52 \       Imaging results reviewed over the past 24 hrs:   Recent Results (from the past 24 hours)   XR Chest Port " 1 View    Narrative    EXAM: XR CHEST PORT 1 VIEW  LOCATION: [facility]  DATE: 1/23/2025    INDICATION: tachy  COMPARISON: CT abdomen pelvis 1/15/2025, PET/CT 11/27/2024      Impression    IMPRESSION: Normal heart size. Lungs are clear. No pleural effusion or pneumothorax. Right IJ port terminates in the SVC at the cavoatrial junction. Moderate free air under the right hemidiaphragm compatible with recent surgery.

## 2025-01-24 NOTE — OR NURSING
Anesthesia notified about elevated BP. Labetalol 5 mg IV was administered at 1907 per anesthesia order. Anesthesia stated that it was okay to send patient back to the floor with SBP in 170s. Will continue to monitor. This was reported to RN receiving patient on the floor.

## 2025-01-24 NOTE — ANESTHESIA POSTPROCEDURE EVALUATION
Patient: Bibiana Mcrae    Procedure: Procedure(s):  PERINEAL RECTOSIGMOIDECTOMY       Anesthesia Type:  General    Note:  Disposition: Inpatient   Postop Pain Control: Uneventful            Sign Out: Well controlled pain   PONV: No   Neuro/Psych: Uneventful            Sign Out: Acceptable/Baseline neuro status   Airway/Respiratory: Uneventful            Sign Out: Acceptable/Baseline resp. status   CV/Hemodynamics: Uneventful            Sign Out: Acceptable CV status; No obvious hypovolemia; No obvious fluid overload   Other NRE: NONE   DID A NON-ROUTINE EVENT OCCUR? No           Last vitals:  Vitals Value Taken Time   /77 01/23/25 1934   Temp 36.8  C (98.24  F) 01/23/25 1937   Pulse 91 01/23/25 1935   Resp 16 01/23/25 1930   SpO2 99 % 01/23/25 1936   Vitals shown include unfiled device data.    Electronically Signed By: Monico Gomes MD  January 23, 2025  8:05 PM

## 2025-01-24 NOTE — PLAN OF CARE
"SBP in the 180's; updated Dr. Hui. Hydralazine given and evening RN will recheck.   Pt before med given stated that she is \"hallucinating and seeing coats laying around the room.\" Updated Dr. Hui. Reported off the Nessa RN.                        "

## 2025-01-25 LAB
ATRIAL RATE - MUSE: 115 BPM
DIASTOLIC BLOOD PRESSURE - MUSE: NORMAL MMHG
GLUCOSE BLDC GLUCOMTR-MCNC: 82 MG/DL (ref 70–99)
HGB BLD-MCNC: 12 G/DL (ref 11.7–15.7)
INTERPRETATION ECG - MUSE: NORMAL
P AXIS - MUSE: 73 DEGREES
PR INTERVAL - MUSE: 168 MS
QRS DURATION - MUSE: 142 MS
QT - MUSE: 398 MS
QTC - MUSE: 550 MS
R AXIS - MUSE: -55 DEGREES
SYSTOLIC BLOOD PRESSURE - MUSE: NORMAL MMHG
T AXIS - MUSE: 44 DEGREES
VENTRICULAR RATE- MUSE: 115 BPM

## 2025-01-25 PROCEDURE — 250N000011 HC RX IP 250 OP 636: Performed by: COLON & RECTAL SURGERY

## 2025-01-25 PROCEDURE — 250N000013 HC RX MED GY IP 250 OP 250 PS 637: Performed by: INTERNAL MEDICINE

## 2025-01-25 PROCEDURE — 93010 ELECTROCARDIOGRAM REPORT: CPT | Performed by: INTERNAL MEDICINE

## 2025-01-25 PROCEDURE — 250N000013 HC RX MED GY IP 250 OP 250 PS 637: Performed by: COLON & RECTAL SURGERY

## 2025-01-25 PROCEDURE — 93005 ELECTROCARDIOGRAM TRACING: CPT

## 2025-01-25 PROCEDURE — 99232 SBSQ HOSP IP/OBS MODERATE 35: CPT | Performed by: INTERNAL MEDICINE

## 2025-01-25 PROCEDURE — 258N000003 HC RX IP 258 OP 636: Performed by: COLON & RECTAL SURGERY

## 2025-01-25 PROCEDURE — 120N000001 HC R&B MED SURG/OB

## 2025-01-25 PROCEDURE — 85018 HEMOGLOBIN: CPT | Performed by: COLON & RECTAL SURGERY

## 2025-01-25 RX ORDER — ACETAMINOPHEN AND CODEINE PHOSPHATE 300; 30 MG/1; MG/1
1-2 TABLET ORAL EVERY 6 HOURS PRN
Status: DISCONTINUED | OUTPATIENT
Start: 2025-01-25 | End: 2025-01-29 | Stop reason: HOSPADM

## 2025-01-25 RX ADMIN — ONDANSETRON 4 MG: 2 INJECTION INTRAMUSCULAR; INTRAVENOUS at 06:42

## 2025-01-25 RX ADMIN — HYDROMORPHONE HYDROCHLORIDE 0.2 MG: 0.2 INJECTION, SOLUTION INTRAMUSCULAR; INTRAVENOUS; SUBCUTANEOUS at 03:56

## 2025-01-25 RX ADMIN — ONDANSETRON 4 MG: 2 INJECTION INTRAMUSCULAR; INTRAVENOUS at 22:31

## 2025-01-25 RX ADMIN — RIVASTIGMINE TARTRATE 4.5 MG: 1.5 CAPSULE ORAL at 20:22

## 2025-01-25 RX ADMIN — POLYETHYLENE GLYCOL 3350 8.5 G: 17 POWDER, FOR SOLUTION ORAL at 08:24

## 2025-01-25 RX ADMIN — ONDANSETRON 4 MG: 2 INJECTION INTRAMUSCULAR; INTRAVENOUS at 06:41

## 2025-01-25 RX ADMIN — AMLODIPINE BESYLATE 5 MG: 5 TABLET ORAL at 08:21

## 2025-01-25 RX ADMIN — ACARBOSE 25 MG: 25 TABLET ORAL at 08:21

## 2025-01-25 RX ADMIN — ACARBOSE 25 MG: 25 TABLET ORAL at 16:25

## 2025-01-25 RX ADMIN — BUSPIRONE HYDROCHLORIDE 7.5 MG: 7.5 TABLET ORAL at 08:21

## 2025-01-25 RX ADMIN — SODIUM CHLORIDE, POTASSIUM CHLORIDE, SODIUM LACTATE AND CALCIUM CHLORIDE: 600; 310; 30; 20 INJECTION, SOLUTION INTRAVENOUS at 20:02

## 2025-01-25 RX ADMIN — ESZOPICLONE 1 MG: 1 TABLET, FILM COATED ORAL at 20:22

## 2025-01-25 RX ADMIN — ACETAMINOPHEN AND CODEINE PHOSPHATE 1 TABLET: 300; 30 TABLET ORAL at 20:22

## 2025-01-25 RX ADMIN — PANTOPRAZOLE SODIUM 40 MG: 40 TABLET, DELAYED RELEASE ORAL at 06:33

## 2025-01-25 RX ADMIN — HEPARIN SODIUM 5000 UNITS: 5000 INJECTION, SOLUTION INTRAVENOUS; SUBCUTANEOUS at 08:24

## 2025-01-25 RX ADMIN — HEPARIN SODIUM 5000 UNITS: 5000 INJECTION, SOLUTION INTRAVENOUS; SUBCUTANEOUS at 20:21

## 2025-01-25 RX ADMIN — RIVASTIGMINE TARTRATE 4.5 MG: 1.5 CAPSULE ORAL at 08:21

## 2025-01-25 RX ADMIN — BUSPIRONE HYDROCHLORIDE 7.5 MG: 7.5 TABLET ORAL at 20:22

## 2025-01-25 RX ADMIN — ACETAMINOPHEN 1000 MG: 500 TABLET, FILM COATED ORAL at 06:33

## 2025-01-25 RX ADMIN — NORTRIPTYLINE HYDROCHLORIDE 50 MG: 50 CAPSULE ORAL at 21:44

## 2025-01-25 RX ADMIN — ACETAMINOPHEN AND CODEINE PHOSPHATE 1 TABLET: 300; 30 TABLET ORAL at 13:31

## 2025-01-25 RX ADMIN — ONDANSETRON 4 MG: 2 INJECTION INTRAMUSCULAR; INTRAVENOUS at 16:25

## 2025-01-25 ASSESSMENT — ACTIVITIES OF DAILY LIVING (ADL)
ADLS_ACUITY_SCORE: 62
ADLS_ACUITY_SCORE: 62
ADLS_ACUITY_SCORE: 54
ADLS_ACUITY_SCORE: 58
ADLS_ACUITY_SCORE: 62
ADLS_ACUITY_SCORE: 62
ADLS_ACUITY_SCORE: 58
ADLS_ACUITY_SCORE: 58
ADLS_ACUITY_SCORE: 62
ADLS_ACUITY_SCORE: 58
ADLS_ACUITY_SCORE: 54
ADLS_ACUITY_SCORE: 54
ADLS_ACUITY_SCORE: 58
ADLS_ACUITY_SCORE: 54
ADLS_ACUITY_SCORE: 62
ADLS_ACUITY_SCORE: 54
ADLS_ACUITY_SCORE: 58
ADLS_ACUITY_SCORE: 54
ADLS_ACUITY_SCORE: 54
ADLS_ACUITY_SCORE: 58
ADLS_ACUITY_SCORE: 62

## 2025-01-25 NOTE — PROGRESS NOTES
[facility]    Medicine Progress Note - Hospitalist Service    Date of Admission:  1/23/2025    Assessment & Plan   80 YO patient with PMH significant for Alzhemer's disease, anxiety disorder, hypertension, B-cell lymphoma, s/p chemotherapy who was admitted by CRS after a perineal rectosigmoidectomy for rectal prolapse    Full-thickness rectal prolapse  -Status post perineal rectosigmoidectomy 1/23  -Colorectal surgery primarily managing  -Discharge plans, analgesia, DVT prophylaxis per surgery     Hypertension  -Elevated postsurgery  -Begin IV hydralazine as needed  -Resumed home medications including Norvasc     Nausea /vomiting  -Unclear what is causing persistent nausea. Cont Zofran prn  -Allergy to Compazine, declines Benadryl, try Vistaril as a last resort for unremitting nausea  -Try to avoid narcotics if possible  -Cont protonix  -Qtc noted to be 550 today. 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   -Per chart review reportedly in remission  -Follows up with Dr Florian     Major neurocognitive disorder due to Alzheimer's disease   -No agitation at bedside, monitor closely     Bronchiectasis without complication   -Not in respiratory distress currently  -No hypoxia noted, continue home meds  -Add as needed DuoNebs to current medications          Diet: Diet  Advance Diet as Tolerated: Full Liquid Diet; Low Fiber    DVT Prophylaxis: Defer to primary service  Deutsch Catheter: Not present  Lines: PRESENT      Port A Cath Single 05/01/24 Right Chest wall-Site Assessment: WDL      Cardiac Monitoring: ACTIVE order. Indication: Tachyarrhythmias, acute (48 hours)  Code Status: Full Code      Clinically Significant Risk Factors                   # Hypertension: Noted on problem list     # Dementia: noted on problem list        # Cachexia: Estimated  "body mass index is 17.83 kg/m  as calculated from the following:    Height as of this encounter: 1.473 m (4' 10\").    Weight as of this encounter: 38.7 kg (85 lb 5.1 oz)., PRESENT ON ADMISSION            Social Drivers of Health    Physical Activity: Unknown (4/25/2024)    Exercise Vital Sign     Days of Exercise per Week: 0 days   Stress: Stress Concern Present (4/25/2024)    [employer] [place] [employer] - Occupational Stress Questionnaire     Feeling of Stress : Rather much   Social Connections: Unknown (4/25/2024)    Social Connection and Isolation Panel [NHANES]     Frequency of Social Gatherings with Friends and Family: Once a week          Disposition Plan     Medically Ready for Discharge: Anticipated in 2-4 Days             PATRICIA Cain  Hospitalist Service  [facility]  Securely message with [employer] (more info)  Text page via [employer] Paging/Directory   ______________________________________________________________________    Interval History   Patient seen this morning. Daughter was at bedside. Patient was hold an emesis bag. Daughter reporting that the patient was more confused yesterday after getting the oxycodone. She thinks it was likely oxy, not vistaril causing confusion.       Physical Exam   Vital Signs: Temp: 97.9  F (36.6  C) Temp src: Oral BP: (!) 173/81 Pulse: (!) 113   Resp: 18 SpO2: 97 % O2 Device: None (Room air)    Weight: 85 lbs 5.09 oz      General: Not in obvious distress.  HEENT: NC,A T   Chest: Clear to auscultation bilaterally. A portacath, right upper chest wall  Heart: S1S2 normal, regular. No M/R/G  Abdomen: Soft. NT, ND. Bowel sounds- active.  Extremities: No legs swelling  Neuro: Awake, grossly non-focal      Medical Decision Making             Data         Imaging results reviewed over the past 24 hrs:   No results found for this or any previous visit (from the past 24 hours).    "

## 2025-01-25 NOTE — PLAN OF CARE
Problem: Nausea and Vomiting  Goal: Nausea and Vomiting Relief  Outcome: Progressing  Intervention: Prevent and Manage Nausea and Vomiting  Recent Flowsheet Documentation  Taken 1/24/2025 1700 by Nessa Tompkins RN  Fluid/Electrolyte Management: intravenous fluids adjusted  Nausea/Vomiting Interventions: slow deep breathing encouraged     Problem: Pain Acute  Goal: Optimal Pain Control and Function  Outcome: Progressing  Intervention: Prevent or Manage Pain  Recent Flowsheet Documentation  Taken 1/24/2025 1700 by Nessa Tompkins RN  Sensory Stimulation Regulation: auditory stimulation minimized  Medication Review/Management: medications reviewed   Goal Outcome Evaluation:       Pt was hallucinating,  seeing things and people in her room.  BP was rechecked for 142/66 after prn Hydralazine.  Pt c/o pain and nausea but doesn't want to take Vistaril or Oxycodone.  Not sure which one is causing the hallucination.  Gave prn Dilaudid 0.2 mg and prn iv Zofran.  Pt seems to be doing ok.  No hallucinations.  Also gave Lavender essential oil and Sea band bracelet.  Pt stated that really helped.

## 2025-01-25 NOTE — PLAN OF CARE
Goal Outcome Evaluation:    Problem: Adult Inpatient Plan of Care  Goal: Absence of Hospital-Acquired Illness or Injury  Outcome: Progressing  Intervention: Identify and Manage Fall Risk  Recent Flowsheet Documentation  Taken 1/25/2025 1200 by Brittany Roberts RN  Safety Promotion/Fall Prevention:   activity supervised   assistive device/personal items within reach   nonskid shoes/slippers when out of bed   clutter free environment maintained   patient and family education   safety round/check completed  Taken 1/25/2025 0800 by Brittany Roberts RN  Safety Promotion/Fall Prevention:   activity supervised   assistive device/personal items within reach   nonskid shoes/slippers when out of bed   clutter free environment maintained   patient and family education   safety round/check completed  Intervention: Prevent Infection  Recent Flowsheet Documentation  Taken 1/25/2025 1200 by Brittany Roberts RN  Infection Prevention: hand hygiene promoted  Taken 1/25/2025 0800 by Brittany Roberts RN  Infection Prevention: hand hygiene promoted     Problem: Nausea and Vomiting  Goal: Nausea and Vomiting Relief  Outcome: Progressing     Problem: Pain Acute  Goal: Optimal Pain Control and Function  Outcome: Progressing  Intervention: Prevent or Manage Pain  Recent Flowsheet Documentation  Taken 1/25/2025 1200 by Brittany Roberts RN  Medication Review/Management: high-risk medications identified  Taken 1/25/2025 0800 by Brittany Roberts RN  Medication Review/Management: high-risk medications identified     Pt complaint of nausea, MD notified, order to slow oral intake till patient feeling better. Pt slept and woke up feeling better. No further complaint of nausea.  PRN Tylenol-codeine given for pain; per pt helpful. Pt had no further complaint of pain.   Pt present in room throughout this shift.

## 2025-01-25 NOTE — PROGRESS NOTES
COLON & RECTAL SURGERY PROGRESS NOTE  Colon/Rectal Surgery Staff  POD#2 s/p perineal rectosigmoidectomy    SUBJECTIVE:  complains of pain with urination.  Complains of distention/nausea.  Has been persistently tachycardic for last 24h.  Having diarrhea.    VITALS:  B/P: 173/81, T: 97.9, P: 113, R: 18  Patient Vitals for the past 24 hrs:   BP Temp Temp src Pulse Resp SpO2   01/25/25 1210 (!) 173/81 97.9  F (36.6  C) Oral (!) 113 18 97 %   01/25/25 0748 133/67 97.6  F (36.4  C) Oral 102 18 95 %   01/25/25 0042 137/61 98.9  F (37.2  C) Oral (!) 116 18 95 %   01/24/25 1700 (!) 148/64 97.9  F (36.6  C) Axillary (!) 114 20 96 %   01/24/25 1648 (!) 142/66 -- -- (!) 116 20 --   01/24/25 1500 (!) 185/87 97.6  F (36.4  C) Axillary (!) 113 20 96 %       Intake/Output Summary (Last 24 hours) at 1/25/2025 1254  Last data filed at 1/25/2025 0700  Gross per 24 hour   Intake 1739 ml   Output --   Net 1739 ml       EXAM:  General Appearance:  Awake and alert, NAD  Abdomen: Soft, NT, mild distention     RECENT LABS:  Recent Labs   Lab Test 01/24/25  0723 01/23/25  2307   WBC 14.5* 19.6*   RBC 4.12 4.62   HGB 10.9* 12.3   HCT 33.0* 36.7   MCV 80 79   MCH 26.5 26.6   MCHC 33.0 33.5    354     Recent Labs   Lab Test 01/24/25  0723 01/23/25  2307 01/15/25  1600   POTASSIUM 3.5 3.5 4.8   CHLORIDE 101 99 100   BUN 7.7* 8.1 9.4     Recent Labs   Lab Test 04/12/24  1216   INR 1.05       ASSESSMENT AND PLAN: 78 y/o woman POD#2 s/p perineal rectosigmoidectomy    - low fiber diet  - persistently tachycardic -> need to work up and identify source.  Strict I/Os -> without adequate UOP it is difficult to gauge resuscitation.  Recheck HGB.  - already being treated for UTI -> no changes  - OOB amb  - encourage IS 10xQ1h -> pulmonary toilet as breathing appears coarse  - HTN -> defer to IM  - requesting Tylenol #3 for pain control -> will change    Aleksey Chris MD ....................  1/25/2025   12:54 PM  Colon and Rectal Surgery  Staff  [phone]

## 2025-01-25 NOTE — PLAN OF CARE
Problem: Activity Intolerance  Goal: Enhanced Capacity and Energy  Outcome: Progressing  Intervention: Optimize Activity Tolerance  Recent Flowsheet Documentation  Taken 1/24/2025 2336 by Elizabeth Jaramillo RN  Activity Management: activity adjusted per tolerance     Problem: Comorbidity Management  Goal: Blood Pressure in Desired Range  Outcome: Progressing  Intervention: Maintain Blood Pressure Management  Recent Flowsheet Documentation  Taken 1/24/2025 2336 by Elizabeth Jaramillo RN  Medication Review/Management: medications reviewed     Problem: Pain Acute  Goal: Optimal Pain Control and Function  Outcome: Progressing  Intervention: Develop Pain Management Plan  Recent Flowsheet Documentation  Taken 1/25/2025 0356 by Elizabeth Jaramillo RN  Pain Management Interventions: medication (see MAR)  Intervention: Prevent or Manage Pain  Recent Flowsheet Documentation  Taken 1/24/2025 2336 by Elizabeth Jaramillo RN  Sensory Stimulation Regulation: auditory stimulation minimized  Medication Review/Management: medications reviewed     Problem: Diarrhea  Goal: Effective Diarrhea Management  Outcome: Progressing  Intervention: Manage Diarrhea  Recent Flowsheet Documentation  Taken 1/24/2025 2336 by Elizabeth Jaramillo RN  Fluid/Electrolyte Management: intravenous fluids adjusted  Medication Review/Management: medications reviewed   Goal Outcome Evaluation:  Pt is Alert & oriented. Intermittently confused, VSS, denies nausea. Endorses pain rectal/butt pain, dilaudid 1ml given, effective. LR running at 50ml/hr. Daughter in room with pt, attentive to patient's needs.

## 2025-01-26 ENCOUNTER — APPOINTMENT (OUTPATIENT)
Dept: PHYSICAL THERAPY | Facility: HOSPITAL | Age: 80
End: 2025-01-26
Attending: COLON & RECTAL SURGERY
Payer: COMMERCIAL

## 2025-01-26 LAB — PLATELET # BLD AUTO: 332 10E3/UL (ref 150–450)

## 2025-01-26 PROCEDURE — 250N000013 HC RX MED GY IP 250 OP 250 PS 637: Performed by: COLON & RECTAL SURGERY

## 2025-01-26 PROCEDURE — 250N000011 HC RX IP 250 OP 636: Performed by: INTERNAL MEDICINE

## 2025-01-26 PROCEDURE — 97530 THERAPEUTIC ACTIVITIES: CPT | Mod: GP

## 2025-01-26 PROCEDURE — 250N000013 HC RX MED GY IP 250 OP 250 PS 637: Performed by: INTERNAL MEDICINE

## 2025-01-26 PROCEDURE — 99232 SBSQ HOSP IP/OBS MODERATE 35: CPT | Performed by: INTERNAL MEDICINE

## 2025-01-26 PROCEDURE — 250N000011 HC RX IP 250 OP 636: Performed by: COLON & RECTAL SURGERY

## 2025-01-26 PROCEDURE — 250N000013 HC RX MED GY IP 250 OP 250 PS 637: Performed by: SURGERY

## 2025-01-26 PROCEDURE — 120N000001 HC R&B MED SURG/OB

## 2025-01-26 PROCEDURE — 85049 AUTOMATED PLATELET COUNT: CPT | Performed by: COLON & RECTAL SURGERY

## 2025-01-26 RX ORDER — SIMETHICONE 80 MG
80 TABLET,CHEWABLE ORAL EVERY 6 HOURS PRN
Status: DISCONTINUED | OUTPATIENT
Start: 2025-01-26 | End: 2025-01-29 | Stop reason: HOSPADM

## 2025-01-26 RX ADMIN — HEPARIN SODIUM 5000 UNITS: 5000 INJECTION, SOLUTION INTRAVENOUS; SUBCUTANEOUS at 08:17

## 2025-01-26 RX ADMIN — ACETAMINOPHEN AND CODEINE PHOSPHATE 1 TABLET: 300; 30 TABLET ORAL at 16:38

## 2025-01-26 RX ADMIN — ONDANSETRON 4 MG: 2 INJECTION INTRAMUSCULAR; INTRAVENOUS at 22:00

## 2025-01-26 RX ADMIN — ONDANSETRON 4 MG: 2 INJECTION INTRAMUSCULAR; INTRAVENOUS at 06:38

## 2025-01-26 RX ADMIN — AMLODIPINE BESYLATE 5 MG: 5 TABLET ORAL at 08:17

## 2025-01-26 RX ADMIN — ACARBOSE 25 MG: 25 TABLET ORAL at 08:17

## 2025-01-26 RX ADMIN — PANTOPRAZOLE SODIUM 40 MG: 40 TABLET, DELAYED RELEASE ORAL at 06:39

## 2025-01-26 RX ADMIN — NORTRIPTYLINE HYDROCHLORIDE 50 MG: 50 CAPSULE ORAL at 22:11

## 2025-01-26 RX ADMIN — ACETAMINOPHEN AND CODEINE PHOSPHATE 1 TABLET: 300; 30 TABLET ORAL at 06:53

## 2025-01-26 RX ADMIN — HYDROXYZINE HYDROCHLORIDE 25 MG: 25 INJECTION, SOLUTION INTRAMUSCULAR at 22:46

## 2025-01-26 RX ADMIN — CEFTRIAXONE SODIUM 1 G: 1 INJECTION, POWDER, FOR SOLUTION INTRAMUSCULAR; INTRAVENOUS at 00:05

## 2025-01-26 RX ADMIN — BUSPIRONE HYDROCHLORIDE 7.5 MG: 7.5 TABLET ORAL at 20:25

## 2025-01-26 RX ADMIN — HYDROXYZINE HYDROCHLORIDE 25 MG: 25 INJECTION, SOLUTION INTRAMUSCULAR at 16:39

## 2025-01-26 RX ADMIN — ACETAMINOPHEN AND CODEINE PHOSPHATE 2 TABLET: 300; 30 TABLET ORAL at 23:46

## 2025-01-26 RX ADMIN — RIVASTIGMINE TARTRATE 4.5 MG: 1.5 CAPSULE ORAL at 08:17

## 2025-01-26 RX ADMIN — ACARBOSE 25 MG: 25 TABLET ORAL at 17:01

## 2025-01-26 RX ADMIN — RIVASTIGMINE TARTRATE 4.5 MG: 1.5 CAPSULE ORAL at 20:26

## 2025-01-26 RX ADMIN — BUSPIRONE HYDROCHLORIDE 7.5 MG: 7.5 TABLET ORAL at 08:17

## 2025-01-26 RX ADMIN — ESZOPICLONE 1 MG: 1 TABLET, FILM COATED ORAL at 20:25

## 2025-01-26 RX ADMIN — HEPARIN SODIUM 5000 UNITS: 5000 INJECTION, SOLUTION INTRAVENOUS; SUBCUTANEOUS at 20:27

## 2025-01-26 RX ADMIN — CEFTRIAXONE SODIUM 1 G: 1 INJECTION, POWDER, FOR SOLUTION INTRAMUSCULAR; INTRAVENOUS at 23:45

## 2025-01-26 RX ADMIN — SIMETHICONE 80 MG: 80 TABLET, CHEWABLE ORAL at 12:25

## 2025-01-26 RX ADMIN — ACETAMINOPHEN AND CODEINE PHOSPHATE 1 TABLET: 300; 30 TABLET ORAL at 13:40

## 2025-01-26 RX ADMIN — ONDANSETRON 4 MG: 2 INJECTION INTRAMUSCULAR; INTRAVENOUS at 13:40

## 2025-01-26 ASSESSMENT — ACTIVITIES OF DAILY LIVING (ADL)
ADLS_ACUITY_SCORE: 58
ADLS_ACUITY_SCORE: 58
ADLS_ACUITY_SCORE: 54
ADLS_ACUITY_SCORE: 58
ADLS_ACUITY_SCORE: 54
ADLS_ACUITY_SCORE: 54
ADLS_ACUITY_SCORE: 58
ADLS_ACUITY_SCORE: 54
ADLS_ACUITY_SCORE: 58
ADLS_ACUITY_SCORE: 58
ADLS_ACUITY_SCORE: 54
ADLS_ACUITY_SCORE: 58
ADLS_ACUITY_SCORE: 54
ADLS_ACUITY_SCORE: 58
ADLS_ACUITY_SCORE: 54
ADLS_ACUITY_SCORE: 58
ADLS_ACUITY_SCORE: 58
ADLS_ACUITY_SCORE: 54
ADLS_ACUITY_SCORE: 54
ADLS_ACUITY_SCORE: 58
ADLS_ACUITY_SCORE: 54

## 2025-01-26 NOTE — PLAN OF CARE
Goal Outcome Evaluation:    Problem: Adult Inpatient Plan of Care  Goal: Absence of Hospital-Acquired Illness or Injury  Outcome: Progressing  Intervention: Identify and Manage Fall Risk  Recent Flowsheet Documentation  Taken 1/26/2025 0815 by Brittany Roberts RN  Safety Promotion/Fall Prevention:   activity supervised   assistive device/personal items within reach   clutter free environment maintained   nonskid shoes/slippers when out of bed   patient and family education   safety round/check completed   supervised activity  Intervention: Prevent Infection  Recent Flowsheet Documentation  Taken 1/26/2025 0815 by Brittany Roberts RN  Infection Prevention: equipment surfaces disinfected     Problem: Adult Inpatient Plan of Care  Goal: Optimal Comfort and Wellbeing  Outcome: Progressing     Problem: Surgery Nonspecified  Goal: Nausea and Vomiting Relief  Outcome: Progressing    PRN Tylenol-codeine given for pain; per pt helpful. PRN Zofran and Simethicone given. Pt had no further complaint.

## 2025-01-26 NOTE — PLAN OF CARE
"Problem: Surgery Nonspecified  Goal: Absence of Bleeding  Outcome: Progressing  Goal: Effective Bowel Elimination  Outcome: Progressing  Goal: Fluid and Electrolyte Balance  Outcome: Progressing  Goal: Blood Glucose Level Within Targeted Range  Outcome: Progressing  Goal: Absence of Infection Signs and Symptoms  Outcome: Progressing  Goal: Anesthesia/Sedation Recovery  Outcome: Progressing  Goal: Optimal Pain Control and Function  Outcome: Progressing  Goal: Nausea and Vomiting Relief  Outcome: Progressing  Goal: Effective Urinary Elimination  Outcome: Progressing  Goal: Effective Oxygenation and Ventilation  Outcome: Progressing     Problem: Comorbidity Management  Goal: Blood Pressure in Desired Range  Outcome: Progressing   Goal Outcome Evaluation:       Patient is alert and oriented and able to make needs known.  Daughter in room overnight.  Clustered cares to allow for optimal rest.  Complained of pain and nausea when she woke up this morning.  PRN Tylenol 3 and Zofran given per MAR.  VSS on room air.    /75 (BP Location: Right arm)   Pulse 98   Temp 98  F (36.7  C) (Oral)   Resp 18   Ht 1.473 m (4' 10\")   Wt 38.7 kg (85 lb 5.1 oz)   LMP  (LMP Unknown)   SpO2 97%   BMI 17.83 kg/m      April N SAFIA Tomlinson      "

## 2025-01-26 NOTE — PROGRESS NOTES
COLON & RECTAL SURGERY PROGRESS NOTE  Colon/Rectal Surgery  POD#3 s/p perineal rectosigmoidectomy    SUBJECTIVE: Feels more or less the same with nausea and minimal appetite. Had a bowel movement last night. Not ambulating.     Patient Vitals for the past 24 hrs:   BP Temp Temp src Pulse Resp SpO2   01/26/25 1105 (!) 141/81 97.8  F (36.6  C) Oral (!) 116 18 97 %   01/26/25 0815 (!) 156/81 98  F (36.7  C) Axillary (!) 111 18 98 %   01/26/25 0130 140/75 98  F (36.7  C) Oral 98 18 97 %   01/25/25 1551 139/72 97.6  F (36.4  C) Oral (!) 111 18 98 %   01/25/25 1417 (!) 148/70 -- -- (!) 111 -- --   01/25/25 1210 (!) 173/81 97.9  F (36.6  C) Oral (!) 113 18 97 %         Intake/Output Summary (Last 24 hours) at 1/26/2025 1127  Last data filed at 1/26/2025 0654  Gross per 24 hour   Intake 1975 ml   Output 1300 ml   Net 675 ml       EXAM:  General Appearance:  Awake and alert, NAD  Abdomen: Soft, NT, mild distention  Perineum: rectosigmoidectomy incision intact, no visible gaps, some stool present externally, no bleeding     RECENT LABS:  Recent Labs   Lab Test 01/26/25  0702 01/25/25  1250 01/24/25  0723 01/23/25  2307   WBC  --   --  14.5* 19.6*   RBC  --   --  4.12 4.62   HGB  --  12.0 10.9* 12.3   HCT  --   --  33.0* 36.7   MCV  --   --  80 79   MCH  --   --  26.5 26.6   MCHC  --   --  33.0 33.5     --  326 354     Recent Labs   Lab Test 01/24/25  0723 01/23/25  2307 01/15/25  1600   POTASSIUM 3.5 3.5 4.8   CHLORIDE 101 99 100   BUN 7.7* 8.1 9.4     Recent Labs   Lab Test 04/12/24  1216   INR 1.05       ASSESSMENT AND PLAN: 78 y/o woman POD#3 s/p perineal rectosigmoidectomy    - low fiber diet  - already being treated for UTI -> no changes  - OOB amb  - encourage IS 10xQ1h -> pulmonary toilet as breathing appears coarse  - HTN -> defer to IM  - added simethicone as patient states this sometimes helps with her baseline nausea    Alexandru Saenz MD  Fellow, Colon & Rectal Surgery  [place] [facility]  [place]  01/26/2025  11:23 AM    Colorectal Surgery can be reached at [phone] at all times. Between 5pm and 7am you will be connected to the on-call physician

## 2025-01-26 NOTE — PLAN OF CARE
Goal Outcome Evaluation:    Problem: Adult Inpatient Plan of Care  Goal: Absence of Hospital-Acquired Illness or Injury  Outcome: Progressing  Intervention: Identify and Manage Fall Risk  Recent Flowsheet Documentation  Taken 1/26/2025 0815 by Brittany Roberts RN  Safety Promotion/Fall Prevention:   activity supervised   assistive device/personal items within reach   clutter free environment maintained   nonskid shoes/slippers when out of bed   patient and family education   safety round/check completed   supervised activity  Intervention: Prevent Infection  Recent Flowsheet Documentation  Taken 1/26/2025 0815 by Brittany Roberts RN  Infection Prevention: equipment surfaces disinfected     Problem: Adult Inpatient Plan of Care  Goal: Optimal Comfort and Wellbeing  Outcome: Progressing     Problem: Surgery Nonspecified  Goal: Nausea and Vomiting Relief  Outcome: Progressing    PRN Tylenol-codeine given for pain; per pt helpful. PRN Simethicone and Zofran given. Daughter present in room.   No further complaint.

## 2025-01-26 NOTE — PROGRESS NOTES
[facility]    Medicine Progress Note - Hospitalist Service    Date of Admission:  1/23/2025    Assessment & Plan   78 YO patient with PMH significant for Alzhemer's disease, anxiety disorder, hypertension, B-cell lymphoma, s/p chemotherapy who was admitted by CRS after a perineal rectosigmoidectomy for rectal prolapse    Full-thickness rectal prolapse  -Status post perineal rectosigmoidectomy 1/23  -Colorectal surgery primarily managing  -Discharge plans, analgesia, DVT prophylaxis per surgery     Hypertension  -Elevated postsurgery  -Begin IV hydralazine as needed  -Resumed home medications including Norvasc     Nausea /vomiting  -Unclear what is causing persistent nausea. Cont Zofran prn  -Allergy to Compazine, declines Benadryl, try Vistaril as a last resort for unremitting nausea  -Try to avoid narcotics if possible  -Cont protonix  -Qtc noted to be 550 today. 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   -Per chart review reportedly in remission  -Follows up with Dr Florian     Major neurocognitive disorder due to Alzheimer's disease   -No agitation at bedside, monitor closely     Bronchiectasis without complication   -Not in respiratory distress currently  -No hypoxia noted, continue home meds  -Add as needed DuoNebs to current medications          Diet: Diet  Advance Diet as Tolerated: Full Liquid Diet; Low Fiber    DVT Prophylaxis: Defer to primary service  Deutsch Catheter: Not present  Lines: PRESENT      Port A Cath Single 05/01/24 Right Chest wall-Site Assessment: WDL      Cardiac Monitoring: ACTIVE order. Indication: Tachyarrhythmias, acute (48 hours)  Code Status: Full Code      Clinically Significant Risk Factors                   # Hypertension: Noted on problem list     # Dementia: noted on problem list        # Cachexia: Estimated  "body mass index is 17.83 kg/m  as calculated from the following:    Height as of this encounter: 1.473 m (4' 10\").    Weight as of this encounter: 38.7 kg (85 lb 5.1 oz)., PRESENT ON ADMISSION            Social Drivers of Health    Physical Activity: Unknown (4/25/2024)    Exercise Vital Sign     Days of Exercise per Week: 0 days   Stress: Stress Concern Present (4/25/2024)    [employer] [place] [employer] - Occupational Stress Questionnaire     Feeling of Stress : Rather much   Social Connections: Unknown (4/25/2024)    Social Connection and Isolation Panel [NHANES]     Frequency of Social Gatherings with Friends and Family: Once a week          Disposition Plan     Medically Ready for Discharge: Anticipated in 2-4 Days             PATRICIA Cain  Hospitalist Service  [facility]  Securely message with [employer] (more info)  Text page via [facility] Paging/Directory   ______________________________________________________________________    Interval History   Patient seen this morning. Daughter stayed overnight. Night was ok per daughter. Patient was trying to eat some breakfast during my visit. Nausea was better.       Physical Exam   Vital Signs: Temp: 97.8  F (36.6  C) Temp src: Oral BP: (!) 141/81 Pulse: (!) 116   Resp: 18 SpO2: 97 % O2 Device: None (Room air)    Weight: 85 lbs 5.09 oz      General: Not in obvious distress.  HEENT: NC,A T   Chest: Clear to auscultation bilaterally. A portacath, right upper chest wall  Heart: S1S2 normal, regular. No M/R/G  Abdomen: Soft. NT, ND. Bowel sounds- active.  Extremities: No legs swelling  Neuro: Awake, grossly non-focal      Medical Decision Making             Data     I have personally reviewed the following data over the past 24 hrs:    N/A  \   N/A   / 332     N/A N/A N/A /  N/A   N/A N/A N/A \       Imaging results reviewed over the past 24 hrs:   No results found for this or any previous visit (from the past 24 hours).    "

## 2025-01-26 NOTE — PLAN OF CARE
Problem: Adult Inpatient Plan of Care  Goal: Optimal Comfort and Wellbeing  Outcome: Progressing   Goal Outcome Evaluation:    Pt requested for iv Zofran before dinner and then again at bedtime.  Gave Zofran iv 4 mg x 2 on evening shift.  Daughter at the bedside. Daughter plan to stay with pt over night. Pt was repositioned.  Stated that she's ok with pain at the moment but will need 1 Tylenol # 3 before bedtime.

## 2025-01-27 ENCOUNTER — TELEPHONE (OUTPATIENT)
Dept: ONCOLOGY | Facility: HOSPITAL | Age: 80
End: 2025-01-27

## 2025-01-27 ENCOUNTER — APPOINTMENT (OUTPATIENT)
Dept: PHYSICAL THERAPY | Facility: HOSPITAL | Age: 80
End: 2025-01-27
Attending: COLON & RECTAL SURGERY
Payer: COMMERCIAL

## 2025-01-27 ENCOUNTER — PATIENT OUTREACH (OUTPATIENT)
Dept: CARE COORDINATION | Facility: CLINIC | Age: 80
End: 2025-01-27

## 2025-01-27 LAB
ANION GAP SERPL CALCULATED.3IONS-SCNC: 8 MMOL/L (ref 7–15)
ATRIAL RATE - MUSE: 102 BPM
ATRIAL RATE - MUSE: 111 BPM
BUN SERPL-MCNC: 5.6 MG/DL (ref 8–23)
CALCIUM SERPL-MCNC: 8.2 MG/DL (ref 8.8–10.4)
CHLORIDE SERPL-SCNC: 99 MMOL/L (ref 98–107)
CREAT SERPL-MCNC: 0.46 MG/DL (ref 0.51–0.95)
DIASTOLIC BLOOD PRESSURE - MUSE: NORMAL MMHG
DIASTOLIC BLOOD PRESSURE - MUSE: NORMAL MMHG
EGFRCR SERPLBLD CKD-EPI 2021: >90 ML/MIN/1.73M2
ERYTHROCYTE [DISTWIDTH] IN BLOOD BY AUTOMATED COUNT: 17.7 % (ref 10–15)
GLUCOSE SERPL-MCNC: 112 MG/DL (ref 70–99)
HCO3 SERPL-SCNC: 30 MMOL/L (ref 22–29)
HCT VFR BLD AUTO: 33.5 % (ref 35–47)
HGB BLD-MCNC: 11.1 G/DL (ref 11.7–15.7)
INTERPRETATION ECG - MUSE: NORMAL
INTERPRETATION ECG - MUSE: NORMAL
MCH RBC QN AUTO: 26.2 PG (ref 26.5–33)
MCHC RBC AUTO-ENTMCNC: 33.1 G/DL (ref 31.5–36.5)
MCV RBC AUTO: 79 FL (ref 78–100)
NT-PROBNP SERPL-MCNC: 970 PG/ML (ref 0–1800)
P AXIS - MUSE: 57 DEGREES
P AXIS - MUSE: 69 DEGREES
PLATELET # BLD AUTO: 313 10E3/UL (ref 150–450)
POTASSIUM SERPL-SCNC: 3 MMOL/L (ref 3.4–5.3)
POTASSIUM SERPL-SCNC: 3.6 MMOL/L (ref 3.4–5.3)
PR INTERVAL - MUSE: 170 MS
PR INTERVAL - MUSE: 200 MS
QRS DURATION - MUSE: 132 MS
QRS DURATION - MUSE: 142 MS
QT - MUSE: 332 MS
QT - MUSE: 376 MS
QTC - MUSE: 451 MS
QTC - MUSE: 490 MS
R AXIS - MUSE: -43 DEGREES
R AXIS - MUSE: -64 DEGREES
RBC # BLD AUTO: 4.24 10E6/UL (ref 3.8–5.2)
SODIUM SERPL-SCNC: 137 MMOL/L (ref 135–145)
SYSTOLIC BLOOD PRESSURE - MUSE: NORMAL MMHG
SYSTOLIC BLOOD PRESSURE - MUSE: NORMAL MMHG
T AXIS - MUSE: 29 DEGREES
T AXIS - MUSE: 42 DEGREES
VENTRICULAR RATE- MUSE: 102 BPM
VENTRICULAR RATE- MUSE: 111 BPM
WBC # BLD AUTO: 12.1 10E3/UL (ref 4–11)

## 2025-01-27 PROCEDURE — 258N000003 HC RX IP 258 OP 636: Performed by: INTERNAL MEDICINE

## 2025-01-27 PROCEDURE — 250N000011 HC RX IP 250 OP 636: Performed by: COLON & RECTAL SURGERY

## 2025-01-27 PROCEDURE — 250N000013 HC RX MED GY IP 250 OP 250 PS 637: Performed by: COLON & RECTAL SURGERY

## 2025-01-27 PROCEDURE — 97530 THERAPEUTIC ACTIVITIES: CPT | Mod: GP

## 2025-01-27 PROCEDURE — 120N000001 HC R&B MED SURG/OB

## 2025-01-27 PROCEDURE — 250N000011 HC RX IP 250 OP 636: Performed by: INTERNAL MEDICINE

## 2025-01-27 PROCEDURE — 250N000013 HC RX MED GY IP 250 OP 250 PS 637: Performed by: INTERNAL MEDICINE

## 2025-01-27 PROCEDURE — 93010 ELECTROCARDIOGRAM REPORT: CPT | Performed by: INTERNAL MEDICINE

## 2025-01-27 PROCEDURE — 82565 ASSAY OF CREATININE: CPT | Performed by: COLON & RECTAL SURGERY

## 2025-01-27 PROCEDURE — 80048 BASIC METABOLIC PNL TOTAL CA: CPT | Performed by: COLON & RECTAL SURGERY

## 2025-01-27 PROCEDURE — 82374 ASSAY BLOOD CARBON DIOXIDE: CPT | Performed by: COLON & RECTAL SURGERY

## 2025-01-27 PROCEDURE — 84132 ASSAY OF SERUM POTASSIUM: CPT | Performed by: INTERNAL MEDICINE

## 2025-01-27 PROCEDURE — 93010 ELECTROCARDIOGRAM REPORT: CPT | Mod: RTG | Performed by: STUDENT IN AN ORGANIZED HEALTH CARE EDUCATION/TRAINING PROGRAM

## 2025-01-27 PROCEDURE — 97116 GAIT TRAINING THERAPY: CPT | Mod: GP

## 2025-01-27 PROCEDURE — 99233 SBSQ HOSP IP/OBS HIGH 50: CPT | Performed by: INTERNAL MEDICINE

## 2025-01-27 PROCEDURE — 93005 ELECTROCARDIOGRAM TRACING: CPT

## 2025-01-27 PROCEDURE — 85018 HEMOGLOBIN: CPT | Performed by: COLON & RECTAL SURGERY

## 2025-01-27 PROCEDURE — 85041 AUTOMATED RBC COUNT: CPT | Performed by: COLON & RECTAL SURGERY

## 2025-01-27 RX ORDER — POTASSIUM CHLORIDE 1500 MG/1
40 TABLET, EXTENDED RELEASE ORAL ONCE
Status: COMPLETED | OUTPATIENT
Start: 2025-01-27 | End: 2025-01-27

## 2025-01-27 RX ORDER — SODIUM CHLORIDE, SODIUM LACTATE, POTASSIUM CHLORIDE, CALCIUM CHLORIDE 600; 310; 30; 20 MG/100ML; MG/100ML; MG/100ML; MG/100ML
INJECTION, SOLUTION INTRAVENOUS CONTINUOUS
Status: ACTIVE | OUTPATIENT
Start: 2025-01-27 | End: 2025-01-28

## 2025-01-27 RX ADMIN — ONDANSETRON 4 MG: 2 INJECTION INTRAMUSCULAR; INTRAVENOUS at 21:15

## 2025-01-27 RX ADMIN — HYDROXYZINE HYDROCHLORIDE 25 MG: 25 INJECTION, SOLUTION INTRAMUSCULAR at 17:36

## 2025-01-27 RX ADMIN — BUSPIRONE HYDROCHLORIDE 7.5 MG: 7.5 TABLET ORAL at 09:02

## 2025-01-27 RX ADMIN — ONDANSETRON 4 MG: 2 INJECTION INTRAMUSCULAR; INTRAVENOUS at 11:33

## 2025-01-27 RX ADMIN — HYDRALAZINE HYDROCHLORIDE 10 MG: 20 INJECTION INTRAMUSCULAR; INTRAVENOUS at 00:24

## 2025-01-27 RX ADMIN — ACETAMINOPHEN AND CODEINE PHOSPHATE 1 TABLET: 300; 30 TABLET ORAL at 17:42

## 2025-01-27 RX ADMIN — METOPROLOL TARTRATE 12.5 MG: 25 TABLET, FILM COATED ORAL at 11:33

## 2025-01-27 RX ADMIN — HEPARIN SODIUM 5000 UNITS: 5000 INJECTION, SOLUTION INTRAVENOUS; SUBCUTANEOUS at 09:02

## 2025-01-27 RX ADMIN — CEFTRIAXONE SODIUM 1 G: 1 INJECTION, POWDER, FOR SOLUTION INTRAMUSCULAR; INTRAVENOUS at 23:51

## 2025-01-27 RX ADMIN — ACARBOSE 25 MG: 25 TABLET ORAL at 09:01

## 2025-01-27 RX ADMIN — ONDANSETRON 4 MG: 2 INJECTION INTRAMUSCULAR; INTRAVENOUS at 07:05

## 2025-01-27 RX ADMIN — AMLODIPINE BESYLATE 5 MG: 5 TABLET ORAL at 09:02

## 2025-01-27 RX ADMIN — ESZOPICLONE 1 MG: 1 TABLET, FILM COATED ORAL at 21:28

## 2025-01-27 RX ADMIN — SODIUM CHLORIDE, POTASSIUM CHLORIDE, SODIUM LACTATE AND CALCIUM CHLORIDE: 600; 310; 30; 20 INJECTION, SOLUTION INTRAVENOUS at 12:36

## 2025-01-27 RX ADMIN — RIVASTIGMINE TARTRATE 4.5 MG: 1.5 CAPSULE ORAL at 09:02

## 2025-01-27 RX ADMIN — ACARBOSE 25 MG: 25 TABLET ORAL at 17:13

## 2025-01-27 RX ADMIN — PANTOPRAZOLE SODIUM 40 MG: 40 TABLET, DELAYED RELEASE ORAL at 06:57

## 2025-01-27 RX ADMIN — METOPROLOL TARTRATE 12.5 MG: 25 TABLET, FILM COATED ORAL at 21:27

## 2025-01-27 RX ADMIN — HEPARIN SODIUM 5000 UNITS: 5000 INJECTION, SOLUTION INTRAVENOUS; SUBCUTANEOUS at 19:05

## 2025-01-27 RX ADMIN — ACARBOSE 25 MG: 25 TABLET ORAL at 12:53

## 2025-01-27 RX ADMIN — NORTRIPTYLINE HYDROCHLORIDE 50 MG: 50 CAPSULE ORAL at 21:28

## 2025-01-27 RX ADMIN — RIVASTIGMINE TARTRATE 4.5 MG: 1.5 CAPSULE ORAL at 19:03

## 2025-01-27 RX ADMIN — ACETAMINOPHEN AND CODEINE PHOSPHATE 2 TABLET: 300; 30 TABLET ORAL at 06:57

## 2025-01-27 RX ADMIN — BUSPIRONE HYDROCHLORIDE 7.5 MG: 7.5 TABLET ORAL at 21:28

## 2025-01-27 RX ADMIN — POLYETHYLENE GLYCOL 3350 8.5 G: 17 POWDER, FOR SOLUTION ORAL at 09:02

## 2025-01-27 RX ADMIN — POTASSIUM CHLORIDE 40 MEQ: 1500 TABLET, EXTENDED RELEASE ORAL at 12:53

## 2025-01-27 ASSESSMENT — ACTIVITIES OF DAILY LIVING (ADL)
ADLS_ACUITY_SCORE: 63
ADLS_ACUITY_SCORE: 62
ADLS_ACUITY_SCORE: 63
ADLS_ACUITY_SCORE: 62
ADLS_ACUITY_SCORE: 63
ADLS_ACUITY_SCORE: 62
ADLS_ACUITY_SCORE: 63
ADLS_ACUITY_SCORE: 63
ADLS_ACUITY_SCORE: 62
ADLS_ACUITY_SCORE: 62
ADLS_ACUITY_SCORE: 63
ADLS_ACUITY_SCORE: 62
ADLS_ACUITY_SCORE: 62
ADLS_ACUITY_SCORE: 63

## 2025-01-27 NOTE — TELEPHONE ENCOUNTER
I received a phone call today from Bibiana's daughter, Rosa Isela. She is calling because her Mom is still in the hospital and is scheduled for a PET scan today. She is calling to request a new order be placed for an inpatient PET scan. Per [facility] - the inpatient team would need to order the PET scan if they want it done while she is in the hospital. I called Rosa Isela back to let her know this. She states they will need to get a new PA for the PET if someone other than Dr. Friedell orders it. She may need to get this done once she is discharged from the hospital. Rosa Isela agrees to call and let us know once patient is discharged if the PET needs to get rescheduled.     Priya Barrett RN on 1/27/2025 at 8:20 AM

## 2025-01-27 NOTE — PROGRESS NOTES
Contact   Chart Review     Situation: Patient chart reviewed by .    Background: Scheduled with KELLY PEREZ for assessment.     Assessment: patient is in hospital.     Plan/Recommendations: Will contact after discharge from hospital.     Anastasiya Fontaine,   [facility] [address]  [phone]

## 2025-01-27 NOTE — PLAN OF CARE
Problem: Surgery Nonspecified  Goal: Nausea and Vomiting Relief  Outcome: Not Progressing  Intervention: Prevent or Manage Nausea and Vomiting  Recent Flowsheet Documentation  Taken 1/26/2025 1638 by Eileen Ma RN  Nausea/Vomiting Interventions:   antiemetic   sips of clear liquids given   slow deep breathing encouraged   aromatherapy utilized     Problem: Adult Inpatient Plan of Care  Goal: Optimal Comfort and Wellbeing  Outcome: Progressing  Intervention: Monitor Pain and Promote Comfort  Recent Flowsheet Documentation  Taken 1/26/2025 1638 by Eileen Ma RN  Pain Management Interventions: medication (see MAR)     Problem: Surgery Nonspecified  Goal: Optimal Pain Control and Function  Outcome: Progressing  Intervention: Prevent or Manage Pain  Recent Flowsheet Documentation  Taken 1/26/2025 1638 by Eileen Ma RN  Pain Management Interventions: medication (see MAR)     Problem: Comorbidity Management  Goal: Blood Pressure in Desired Range  Outcome: Progressing  Intervention: Maintain Blood Pressure Management  Recent Flowsheet Documentation  Taken 1/26/2025 1638 by Eileen Ma RN  Medication Review/Management: medications reviewed     Problem: Diarrhea  Goal: Effective Diarrhea Management  Outcome: Progressing  Intervention: Manage Diarrhea  Recent Flowsheet Documentation  Taken 1/26/2025 1638 by Eileen Ma RN  Fluid/Electrolyte Management: fluids provided  Medication Review/Management: medications reviewed   NURSING PROGRESS NOTE  Shift Summary      Date: January 26, 2025     Neuro/Musculoskeletal:  A&Ox4   Cardiac:  Tachycardic, BBB, PVC's   Respiratory:  Sating in the 90s on RA.  GI/:  Adequate urine output.  LBM: 1/26/25  Diet/Appetite:  Tolerating Full Liquid diet.  Activity:  Assist of 1   Pain:  8/10 (Tylenol #3 given x1, effective)  Skin:  No new deficits noted.   LDAs + Drips/IVF:  LR @ 50 ml/hr      Pertinent Shift Updates:  Pt c/o nausea with episode of dry heaving,  Zofran given x1, not effective. IM Vistaril given x2 for n/v, effective. Lavender cotton ball taped to gown per pt request for nausea. Pt states she feels the lavender is effective. Continue to monitor.        Eileen Ma RN  ....................................................

## 2025-01-27 NOTE — PLAN OF CARE
"Problem: Nausea and Vomiting  Goal: Nausea and Vomiting Relief  Outcome: Progressing  Intervention: Prevent and Manage Nausea and Vomiting  Recent Flowsheet Documentation  Taken 1/26/2025 2344 by Lenora Tomlinson RN  Fluid/Electrolyte Management: fluids provided     Problem: Comorbidity Management  Goal: Blood Pressure in Desired Range  Outcome: Progressing  Intervention: Maintain Blood Pressure Management  Recent Flowsheet Documentation  Taken 1/26/2025 2344 by Lenora Tomlinson, RN  Medication Review/Management: medications reviewed   Goal Outcome Evaluation:       Patient is alert and oriented and able to make needs known.  Tylenol 3 given x2 overnight for pain.  Hydralazine given once for BP, which proved effective as BP went from 184/90 to 140/65.  Zofran given at 700 for nausea.  Patient aware Vistaril is available if nausea continues.    /68 (BP Location: Right arm)   Pulse (!) 121   Temp 98  F (36.7  C) (Oral)   Resp 18   Ht 1.473 m (4' 10\")   Wt 38.7 kg (85 lb 5.1 oz)   LMP  (LMP Unknown)   SpO2 93%   BMI 17.83 kg/m      Lenora Tomlinson RN             "

## 2025-01-27 NOTE — PLAN OF CARE
Problem: Adult Inpatient Plan of Care  Goal: Absence of Hospital-Acquired Illness or Injury  Intervention: Identify and Manage Fall Risk  Recent Flowsheet Documentation  Taken 1/27/2025 0912 by Shauna Rosenberg RN  Safety Promotion/Fall Prevention:   activity supervised   assistive device/personal items within reach   clutter free environment maintained   nonskid shoes/slippers when out of bed   safety round/check completed     Problem: Adult Inpatient Plan of Care  Goal: Absence of Hospital-Acquired Illness or Injury  Intervention: Prevent Skin Injury  Recent Flowsheet Documentation  Taken 1/27/2025 0912 by Shauna Rosenberg RN  Body Position: weight shifting     Problem: Adult Inpatient Plan of Care  Goal: Optimal Comfort and Wellbeing  Intervention: Monitor Pain and Promote Comfort  Recent Flowsheet Documentation  Taken 1/27/2025 1259 by Shauna Rosenberg RN  Pain Management Interventions: declines  Taken 1/27/2025 0912 by Shauna Rosenberg RN  Pain Management Interventions: emotional support     Problem: Surgery Nonspecified  Goal: Nausea and Vomiting Relief  Outcome: Progressing   Goal Outcome Evaluation:           A/Ox4  VSS ex HTN and tachycardia. On RA.   Tele ST with BBB  C/o 9/10 pain, no PRN meds given, pt declined.   C/o nausea, PRN zofran given x1, effective  Aroma therapy oil given for nausea, earnestine and mandarin  Assist x 1 GB/W, pt walked with therapy x1 this shift and then requested to rest  Advanced by CRS to low fiber diet, encouraged PO intake  R chest port infusing LR at 100ml/hr  Purewick in place with 150 ml output  K 3.0, replaced and recheck scheduled for 1656  PCD off  PT and OT following  Family in room  Nursing to continue to monitor.

## 2025-01-27 NOTE — PROGRESS NOTES
[facility]  Daily Progress Note    Subjective  Has been going fairly slow with diet. Continues to have nausea intermittently, received Zofran which has been helping. Minimal appetite, only took in ice cream yesterday. Pain controlled with medication. Had 1 BM yesterday. Reports ambulating more Friday, but has been minimally doing since.     Increased tachycardia yesterday into last evening 120-130. Improved this AM around 113. Denies chest pain or SOB otherwise. BP last 110/60 ad afebrile.     Objective  Intake/Output last 24 hrs:    Intake/Output Summary (Last 24 hours) at 1/27/2025 0743  Last data filed at 1/27/2025 0700  Gross per 24 hour   Intake 1605 ml   Output --   Net 1605 ml     Temp:  [97.5  F (36.4  C)-98.1  F (36.7  C)] 97.7  F (36.5  C)  Pulse:  [111-130] 113  Resp:  [18] 18  BP: (103-184)/(60-90) 110/60  SpO2:  [92 %-98 %] 95 %    Physical Exam:  General: awake, alert, laying in bed, in no acute distress  Head: normocephalic, atraumatic  Respiratory: non-labored breathing  Abdomen: soft, mildly tender in lower abdomen, mildly distended  Skin: No rashes or lesions  Musculoskeletal: moves all four extremities equally  Psychological: alert and oriented, answers questions appropriately    Pertinent Labs  Lab Results: personally reviewed.  Lab Results   Component Value Date     01/24/2025     01/23/2025     01/15/2025    CO2 22 01/24/2025    CO2 21 01/23/2025    CO2 26 01/15/2025    CO2 26 11/09/2021    CO2 30 10/20/2020    CO2 32 10/18/2019    BUN 7.7 01/24/2025    BUN 8.1 01/23/2025    BUN 9.4 01/15/2025    BUN 13 11/09/2021    BUN 17 10/20/2020    BUN 21 10/18/2019     Lab Results   Component Value Date    WBC 14.5 01/24/2025    WBC 19.6 01/23/2025    WBC 8.2 01/15/2025    HGB 12.0 01/25/2025    HGB 10.9 01/24/2025    HGB 12.3 01/23/2025    HCT 33.0 01/24/2025    HCT 36.7 01/23/2025    HCT 36.8 01/15/2025    MCV 80 01/24/2025    MCV 79 01/23/2025    MCV 81 01/15/2025      01/26/2025     01/24/2025     01/23/2025       Assessment/Plan: 80 y/o woman POD#4 s/p perineal rectosigmoidectomy     -PET today around 2pm, per daughter needs to be NPO prior to scan  -OK for LFD following imaging  -OOB/ambulate  -Encourage IS 37mW1el  -1/2 cap of Miralax daily  -Hospitalist following, will touchbase about HTN/tachycardia, may need to consider cardiology if not improving    Discussed with Dr. Praful Meehan PA-C  [facility]  [phone]..............................main      ADDENDUM:  Discussed PET scan with Oncology team. This would need to be ordered by the inpatient team if we feel it is necessary to be done as an inpatient. Her Oncologist cannot order the PET to be done inpatient, and they typically do these outpatient due to insurance coverage. Verified that this can safely be postponed a few days until the patient discharges, which is fine from their perspective. No plans for the PET scan inpatient, and she can plan to reschedule this as an outpatient once she is discharged. OK for low fiber diet.  I discussed this with the patient's nurse.     Bernadine Anderson PA-C January 27, 2025  10:23 AM

## 2025-01-27 NOTE — PROGRESS NOTES
Care Management Follow Up    Length of Stay (days): 4    Expected Discharge Date: 01/28/2025    Anticipated Discharge Plan:   home with home care    Transportation: Confirmed Family/friend    PT Recommendations: home with assist, home with home care physical therapy  OT Recommendations:  home with assist, home with home care occupational therapy     Barriers to Discharge: medical stability    Prior Living Situation: house with alone    Discussed  Partnership in Safe Discharge Planning  document with patient/family: NA     Handoff Completed: Yes, [facility] PCP: Internal handoff referral completed    Patient/Spokesperson Updated: No    Additional Information:  9:09 AM   SW reviewed chart. Anticipate patient will return home with home care services arranged through [facility] (accepted for RN, PT, OT).     Next Steps: follow care progression, send home care orders.     MARY JANE Potts on 01/27/25

## 2025-01-28 ENCOUNTER — TELEPHONE (OUTPATIENT)
Dept: NEUROLOGY | Facility: CLINIC | Age: 80
End: 2025-01-28
Payer: COMMERCIAL

## 2025-01-28 ENCOUNTER — APPOINTMENT (OUTPATIENT)
Dept: PHYSICAL THERAPY | Facility: HOSPITAL | Age: 80
End: 2025-01-28
Attending: COLON & RECTAL SURGERY
Payer: COMMERCIAL

## 2025-01-28 ENCOUNTER — APPOINTMENT (OUTPATIENT)
Dept: OCCUPATIONAL THERAPY | Facility: HOSPITAL | Age: 80
End: 2025-01-28
Attending: COLON & RECTAL SURGERY
Payer: COMMERCIAL

## 2025-01-28 DIAGNOSIS — F02.80 MAJOR NEUROCOGNITIVE DISORDER DUE TO ALZHEIMER'S DISEASE (H): Primary | ICD-10-CM

## 2025-01-28 DIAGNOSIS — G30.9 MAJOR NEUROCOGNITIVE DISORDER DUE TO ALZHEIMER'S DISEASE (H): Primary | ICD-10-CM

## 2025-01-28 PROBLEM — C83.33 DIFFUSE LARGE B-CELL LYMPHOMA OF INTRA-ABDOMINAL LYMPH NODES (H): Status: ACTIVE | Noted: 2024-04-24

## 2025-01-28 PROBLEM — J47.9 BRONCHIECTASIS WITHOUT COMPLICATION (H): Status: ACTIVE | Noted: 2022-12-29

## 2025-01-28 LAB — POTASSIUM SERPL-SCNC: 3.7 MMOL/L (ref 3.4–5.3)

## 2025-01-28 PROCEDURE — 84132 ASSAY OF SERUM POTASSIUM: CPT | Performed by: COLON & RECTAL SURGERY

## 2025-01-28 PROCEDURE — 250N000013 HC RX MED GY IP 250 OP 250 PS 637: Performed by: COLON & RECTAL SURGERY

## 2025-01-28 PROCEDURE — 250N000011 HC RX IP 250 OP 636: Performed by: COLON & RECTAL SURGERY

## 2025-01-28 PROCEDURE — 97535 SELF CARE MNGMENT TRAINING: CPT | Mod: GO

## 2025-01-28 PROCEDURE — 99232 SBSQ HOSP IP/OBS MODERATE 35: CPT | Performed by: INTERNAL MEDICINE

## 2025-01-28 PROCEDURE — 97116 GAIT TRAINING THERAPY: CPT | Mod: GP

## 2025-01-28 PROCEDURE — 250N000013 HC RX MED GY IP 250 OP 250 PS 637: Performed by: INTERNAL MEDICINE

## 2025-01-28 PROCEDURE — 258N000003 HC RX IP 258 OP 636: Performed by: INTERNAL MEDICINE

## 2025-01-28 PROCEDURE — 97530 THERAPEUTIC ACTIVITIES: CPT | Mod: GP

## 2025-01-28 PROCEDURE — 250N000011 HC RX IP 250 OP 636: Performed by: INTERNAL MEDICINE

## 2025-01-28 PROCEDURE — 97530 THERAPEUTIC ACTIVITIES: CPT | Mod: GO

## 2025-01-28 PROCEDURE — 250N000009 HC RX 250: Performed by: PHYSICIAN ASSISTANT

## 2025-01-28 PROCEDURE — 120N000001 HC R&B MED SURG/OB

## 2025-01-28 PROCEDURE — 250N000013 HC RX MED GY IP 250 OP 250 PS 637: Performed by: SURGERY

## 2025-01-28 RX ORDER — LIDOCAINE HYDROCHLORIDE 20 MG/ML
JELLY TOPICAL ONCE
Status: COMPLETED | OUTPATIENT
Start: 2025-01-28 | End: 2025-01-28

## 2025-01-28 RX ORDER — AMLODIPINE BESYLATE 5 MG/1
10 TABLET ORAL DAILY
Status: DISCONTINUED | OUTPATIENT
Start: 2025-01-29 | End: 2025-01-29 | Stop reason: HOSPADM

## 2025-01-28 RX ADMIN — ESZOPICLONE 1 MG: 1 TABLET, FILM COATED ORAL at 20:52

## 2025-01-28 RX ADMIN — HEPARIN SODIUM 5000 UNITS: 5000 INJECTION, SOLUTION INTRAVENOUS; SUBCUTANEOUS at 20:52

## 2025-01-28 RX ADMIN — PANTOPRAZOLE SODIUM 40 MG: 40 TABLET, DELAYED RELEASE ORAL at 06:23

## 2025-01-28 RX ADMIN — NORTRIPTYLINE HYDROCHLORIDE 50 MG: 50 CAPSULE ORAL at 21:21

## 2025-01-28 RX ADMIN — SIMETHICONE 80 MG: 80 TABLET, CHEWABLE ORAL at 21:22

## 2025-01-28 RX ADMIN — POLYETHYLENE GLYCOL 3350 8.5 G: 17 POWDER, FOR SOLUTION ORAL at 09:04

## 2025-01-28 RX ADMIN — ACETAMINOPHEN AND CODEINE PHOSPHATE 1 TABLET: 300; 30 TABLET ORAL at 06:23

## 2025-01-28 RX ADMIN — ACARBOSE 25 MG: 25 TABLET ORAL at 17:16

## 2025-01-28 RX ADMIN — BUSPIRONE HYDROCHLORIDE 7.5 MG: 7.5 TABLET ORAL at 20:51

## 2025-01-28 RX ADMIN — METOPROLOL TARTRATE 12.5 MG: 25 TABLET, FILM COATED ORAL at 09:01

## 2025-01-28 RX ADMIN — ACARBOSE 25 MG: 25 TABLET ORAL at 09:02

## 2025-01-28 RX ADMIN — AMLODIPINE BESYLATE 5 MG: 5 TABLET ORAL at 09:01

## 2025-01-28 RX ADMIN — ACARBOSE 25 MG: 25 TABLET ORAL at 13:00

## 2025-01-28 RX ADMIN — HYDROXYZINE HYDROCHLORIDE 25 MG: 25 INJECTION, SOLUTION INTRAMUSCULAR at 21:21

## 2025-01-28 RX ADMIN — HYDROXYZINE HYDROCHLORIDE 25 MG: 25 INJECTION, SOLUTION INTRAMUSCULAR at 11:20

## 2025-01-28 RX ADMIN — HYDROXYZINE HYDROCHLORIDE 25 MG: 25 INJECTION, SOLUTION INTRAMUSCULAR at 00:28

## 2025-01-28 RX ADMIN — SODIUM CHLORIDE, POTASSIUM CHLORIDE, SODIUM LACTATE AND CALCIUM CHLORIDE: 600; 310; 30; 20 INJECTION, SOLUTION INTRAVENOUS at 10:24

## 2025-01-28 RX ADMIN — ONDANSETRON 4 MG: 2 INJECTION INTRAMUSCULAR; INTRAVENOUS at 06:22

## 2025-01-28 RX ADMIN — ACETAMINOPHEN AND CODEINE PHOSPHATE 1 TABLET: 300; 30 TABLET ORAL at 20:51

## 2025-01-28 RX ADMIN — ONDANSETRON 4 MG: 2 INJECTION INTRAMUSCULAR; INTRAVENOUS at 17:10

## 2025-01-28 RX ADMIN — METOPROLOL TARTRATE 12.5 MG: 25 TABLET, FILM COATED ORAL at 20:51

## 2025-01-28 RX ADMIN — BUSPIRONE HYDROCHLORIDE 7.5 MG: 7.5 TABLET ORAL at 09:02

## 2025-01-28 RX ADMIN — ACETAMINOPHEN AND CODEINE PHOSPHATE 1 TABLET: 300; 30 TABLET ORAL at 00:28

## 2025-01-28 RX ADMIN — HEPARIN SODIUM 5000 UNITS: 5000 INJECTION, SOLUTION INTRAVENOUS; SUBCUTANEOUS at 09:03

## 2025-01-28 RX ADMIN — RIVASTIGMINE TARTRATE 4.5 MG: 1.5 CAPSULE ORAL at 09:01

## 2025-01-28 RX ADMIN — LIDOCAINE HYDROCHLORIDE: 20 JELLY TOPICAL at 12:31

## 2025-01-28 ASSESSMENT — ACTIVITIES OF DAILY LIVING (ADL)
ADLS_ACUITY_SCORE: 58
ADLS_ACUITY_SCORE: 63
ADLS_ACUITY_SCORE: 52
ADLS_ACUITY_SCORE: 58
ADLS_ACUITY_SCORE: 52
ADLS_ACUITY_SCORE: 58
ADLS_ACUITY_SCORE: 54
ADLS_ACUITY_SCORE: 58
ADLS_ACUITY_SCORE: 59
ADLS_ACUITY_SCORE: 52
ADLS_ACUITY_SCORE: 52
ADLS_ACUITY_SCORE: 58
ADLS_ACUITY_SCORE: 63
ADLS_ACUITY_SCORE: 52
ADLS_ACUITY_SCORE: 54
ADLS_ACUITY_SCORE: 52
ADLS_ACUITY_SCORE: 58
ADLS_ACUITY_SCORE: 58
ADLS_ACUITY_SCORE: 63
ADLS_ACUITY_SCORE: 52
ADLS_ACUITY_SCORE: 59

## 2025-01-28 NOTE — TELEPHONE ENCOUNTER
Returned call and spoke with patient's daughter Rosa Isela (ctc on file) regarding medication concern.    Rosa Isela reports that Bibiana is experiencing significant nausea with Exelon. Was supposed to take medicine BID, but is only taking once a day now d/t nausea. Rosa Isela is wondering if there's another medication Bibiana can try altogether.    Will route to provider for review.    Rosana Ortiz RN, BSN  [facility]

## 2025-01-28 NOTE — PLAN OF CARE
Goal Outcome Evaluation:    Problem: Surgery Nonspecified  Goal: Effective Urinary Elimination  Outcome: Not Progressing  Bladder scan performed 6 hours after straight cath and was greater than 1000 mL. Encouraged patient to try to void on the toilet. Was able to void 450 mL but PVR bladder scan was greater than 689 mL. Patient was straight cathed at 0140 for 850 mL. Up to toilet 2 more times in the shift but not able to void and not feeling the urge to void. No incontinence noted overnight.      Problem: Adult Inpatient Plan of Care  Goal: Optimal Comfort and Wellbeing  Outcome: Progressing  Patient continues to have rectal pain. PRN Tylenol #3 effective for relief of pain.     Patient also continues to have nausea. PRN Hydroxyzine given x1 and PRN Zofran given x1. Some relief noted after PRN medications.    Andrew Smith RN

## 2025-01-28 NOTE — PLAN OF CARE
Problem: Surgery Nonspecified  Goal: Effective Urinary Elimination  Outcome: Not Progressing     Problem: Adult Inpatient Plan of Care  Goal: Optimal Comfort and Wellbeing  Outcome: Progressing  Intervention: Monitor Pain and Promote Comfort  Recent Flowsheet Documentation  Taken 1/27/2025 1629 by Eileen Ma RN  Pain Management Interventions: declines     Problem: Surgery Nonspecified  Goal: Fluid and Electrolyte Balance  Outcome: Progressing  Intervention: Monitor and Manage Fluid and Electrolyte Balance  Recent Flowsheet Documentation  Taken 1/27/2025 1629 by Eileen Ma RN  Fluid/Electrolyte Management: fluids provided  Goal: Optimal Pain Control and Function  Outcome: Progressing  Intervention: Prevent or Manage Pain  Recent Flowsheet Documentation  Taken 1/27/2025 1629 by Eileen Ma RN  Pain Management Interventions: declines  Goal: Nausea and Vomiting Relief  Outcome: Progressing   NURSING PROGRESS NOTE  Shift Summary      Date: January 27, 2025     Neuro/Musculoskeletal:  A&Ox4.   Cardiac:  Sinus Tach, BBB   Respiratory:  Sating in the 90s on RA.  GI/:  Adequate urine output.  LBM: 1/26/25  Diet/Appetite:  Tolerating Low Fiber diet.  Activity:  Assist of 1   Pain:  7/10 Tylenol #3 given x1, effective.  Skin:  No new deficits noted.   LDAs + Drips/IVF:  LR @100  Protocols/Labs:  Potassium 3.6, redraw 1/28 @0600    Pertinent Shift Updates:  Pt bladder scanned for 1383ml, straight cath for 1600 ml, colorectal notified. Zofran and Vistaril given x1 for nausea, effective.           Eileen Ma RN  ....................................................

## 2025-01-28 NOTE — PROGRESS NOTES
SPIRITUAL HEALTH SERVICES NOTE  [facility]; P2    Reason for Visit: admission screening response    Patient/Family Understanding of Illness and Goals of Care - Met with Bibiana due to admission screening response. She was able to offer a few responses to my questions, but her daughter Helen filled in what her mother could not. Bibiana was in decent spirits and said she had minimal pain at the time of my visit. She said that her appetite was better today and that is feeling hopeful overall. Bibiana noted that her mom had some issues with urinary retention yesterday and that she now has a catheter in. Helen anticipates that her mother will discharge back to home in a few days if this continues to improve. She notes that her mom has had a better appetite today, but also a little bit of nausea.     Distress and Loss - Not discussed during this visit    Strengths, Coping, and Resources - Bibiana enjoys watching football on TV.     Meaning, Beliefs, and Spirituality - Bibiana is Jain. Her daughter has notified Bibiana's [employer] of her admission. No needs noted at this time.     Plan of Care: No further plans for follow-up at this time, but will remain available for further support as patient/family needs/desires.    Nadine Wen M.Div.    Office: [phone] (for non-urgent requests)  Please Vocera or page through [facility] for time-sensitive requests

## 2025-01-28 NOTE — CONSULTS
[facility] CONSULTATION    Type of Consult: inpatient  Place of Service: [facility]   Reason for consult: Urinary Retention  Request for consult by: Dr. Basilio    History of present illness:   Bibiana Mcrae is a 79 year old female that was admitted for procedure for prolapsed rectum. Urology is being consulted for Urinary Retention. History obtained through patient and chart review.     Patient is a 79-year-old female who underwent perineal rectosigmoidectomy with Dr. Basilio on 1/23.  Urology is being consulted for urinary retention.  Patient notes new urinary retention in the setting of her prolapsed rectum starting at 1 month prior.  She did have a Deutsch catheter in place after her procedure however returned into urinary retention with bladder scans of greater than 600 cc.  Prior to this last month, she has not had any issues with urinary retention.  She has not followed close with urologist.  She denies any significant LUTS at baseline.  Her creatinine and urine output has been within normal limits throughout her hospitalization.    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)     Diverticular disease of large intestine     Gastroesophageal reflux disease     History of blood transfusion     History of cholecystectomy 08/24/2023    Hypertension     Major neurocognitive disorder due to Alzheimer's disease (H)     Memory loss     Osteoporosis     PONV (postoperative nausea and vomiting)     Rectal prolapse        Past Surgical History:  Past Surgical History:   Procedure Laterality Date    BREAST CYST ASPIRATION Left     While ago    HYSTERECTOMY  1985    IR CHEST PORT PLACEMENT > 5 YRS OF AGE  5/1/2024    OOPHORECTOMY Bilateral  1985    RECTOSIGMOIDECTOMY PERINEAL N/A 1/23/2025    Procedure: PERINEAL RECTOSIGMOIDECTOMY;  Surgeon: Josee Basilio MD;  Location: [facility] OR    [facility] REMOVAL OF OVARY(S)      Description: Oophorectomy;  Recorded: 12/17/2009;    [facility] TOTAL ABDOM HYSTERECTOMY      Description: Total Abdominal Hysterectomy;  Recorded: 12/17/2009;       Social History:  Social History     Socioeconomic History    Marital status:      Spouse name: Not on file    Number of children: Not on file    Years of education: Not on file    Highest education level: Not on file   Occupational History    Not on file   Tobacco Use    Smoking status: Never     Passive exposure: Never    Smokeless tobacco: Never   Vaping Use    Vaping status: Never Used   Substance and Sexual Activity    Alcohol use: No    Drug use: Never    Sexual activity: Not on file   Other Topics Concern    Not on file   Social History Narrative    Not on file     Social Drivers of Health     Financial Resource Strain: Low Risk  (1/24/2025)    Financial Resource Strain     Within the past 12 months, have you or your family members you live with been unable to get utilities (heat, electricity) when it was really needed?: No   Food Insecurity: Low Risk  (1/24/2025)    Food Insecurity     Within the past 12 months, did you worry that your food would run out before you got money to buy more?: No     Within the past 12 months, did the food you bought just not last and you didn t have money to get more?: No   Transportation Needs: Low Risk  (1/24/2025)    Transportation Needs     Within the past 12 months, has lack of transportation kept you from medical appointments, getting your medicines, non-medical meetings or appointments, work, or from getting things that you need?: No   Physical Activity: Unknown (4/25/2024)    Exercise Vital Sign     Days of Exercise per Week: 0 days     Minutes of Exercise per Session: Not on file   Stress: Stress Concern Present  (4/25/2024)    [employer] - Occupational Stress Questionnaire     Feeling of Stress : Rather much   Social Connections: Unknown (4/25/2024)    Social Connection and Isolation Panel [NHANES]     Frequency of Communication with Friends and Family: Not on file     Frequency of Social Gatherings with Friends and Family: Once a week     Attends Alevism Services: Not on file     Active Member of Clubs or Organizations: Not on file     Attends Club or Organization Meetings: Not on file     Marital Status: Not on file   Interpersonal Safety: Low Risk  (1/23/2025)    Interpersonal Safety     Do you feel physically and emotionally safe where you currently live?: Yes     Within the past 12 months, have you been hit, slapped, kicked or otherwise physically hurt by someone?: No     Within the past 12 months, have you been humiliated or emotionally abused in other ways by your partner or ex-partner?: No   Housing Stability: Low Risk  (1/24/2025)    Housing Stability     Do you have housing? : Yes     Are you worried about losing your housing?: No       Medications:  Current Facility-Administered Medications   Medication Dose Route Frequency Provider Last Rate Last Admin    acarbose (PRECOSE) tablet 25 mg  25 mg Oral TID w/meals Fabio Jaramillo MD   25 mg at 01/28/25 0902    acetaminophen-codeine (TYLENOL #3) 300-30 MG per tablet 1-2 tablet  1-2 tablet Oral Q6H PRN Aleksey Chris MD   1 tablet at 01/28/25 0623    amLODIPine (NORVASC) tablet 5 mg  5 mg Oral Daily Josee Basilio MD   5 mg at 01/28/25 0901    busPIRone (BUSPAR) tablet 7.5 mg  7.5 mg Oral BID Fabio Jaramillo MD   7.5 mg at 01/28/25 0902    cefTRIAXone (ROCEPHIN) 1 g vial to attach to  mL bag for ADULTS or NS 50 mL bag for PEDS  1 g Intravenous Q24H Fabio Jaramillo MD   1 g at 01/27/25 2351    eszopiclone (LUNESTA) tablet 1 mg  1 mg Oral At Bedtime Fabio Jaramillo MD   1 mg at 01/27/25 2128    heparin ANTICOAGULANT  injection 5,000 Units  5,000 Units Subcutaneous Q12H Josee Basilio MD   5,000 Units at 01/28/25 0903    hydrALAZINE (APRESOLINE) injection 10 mg  10 mg Intravenous Q6H PRN Josee Basilio MD   10 mg at 01/27/25 0024    hydrOXYzine (VISTARIL) injection 25 mg  25 mg Intramuscular Q6H PRN Fabio Jaramillo MD   25 mg at 01/28/25 1120    ipratropium - albuterol 0.5 mg/2.5 mg/3 mL (DUONEB) neb solution 3 mL  3 mL Nebulization Q4H PRN Fabio Jaramillo MD        lidocaine (LMX4) cream   Topical Q1H PRN Josee Basilio MD        lidocaine (XYLOCAINE) 2 % external gel   Urethral Once Donnie Carrion PA-C        lidocaine 1 % 0.1-1 mL  0.1-1 mL Other Q1H PRN Josee Basilio MD        menthol-zinc oxide (CALMOSEPTINE) 0.44-20.6 % ointment OINT   Topical 4x Daily PRN David Hui MBBS        metoprolol tartrate (LOPRESSOR) half-tab 12.5 mg  12.5 mg Oral BID David Hui MBBS   12.5 mg at 01/28/25 0901    naloxone (NARCAN) injection 0.2 mg  0.2 mg Intravenous Q2 Min PRN Josee Basilio MD        Or    naloxone (NARCAN) injection 0.4 mg  0.4 mg Intravenous Q2 Min PRJosee Trevino MD        Or    naloxone (NARCAN) injection 0.2 mg  0.2 mg Intramuscular Q2 Min PRN Josee Basilio MD        Or    naloxone (NARCAN) injection 0.4 mg  0.4 mg Intramuscular Q2 Min PRN Josee Basilio MD        nortriptyline (PAMELOR) capsule 50 mg  50 mg Oral At Bedtime Josee Basilio MD   50 mg at 01/27/25 2128    ondansetron (ZOFRAN ODT) ODT tab 4 mg  4 mg Oral Q4H PRN Lincoln Meehan PA-C        Or    ondansetron (ZOFRAN) injection 4 mg  4 mg Intravenous Q6H PRN Lincoln Meehan PA-C   4 mg at 01/28/25 0622    pantoprazole (PROTONIX) EC tablet 40 mg  40 mg Oral Josee Plaza MD   40 mg at 01/28/25 0623    polyethylene glycol (MIRALAX) Packet 8.5 g  8.5 g Oral Daily Lincoln Meehan PA-C   8.5 g at 01/28/25 0904    rivastigmine (EXELON) capsule 4.5  mg  4.5 mg Oral BID Josee Basilio MD   4.5 mg at 01/28/25 0901    simethicone (MYLICON) chewable tablet 80 mg  80 mg Oral Q6H PRN Alexandru Saenz MD   80 mg at 01/26/25 1225    sodium chloride (PF) 0.9% PF flush 3 mL  3 mL Intracatheter Q8H Josee Basilio MD   3 mL at 01/28/25 0622    sodium chloride (PF) 0.9% PF flush 3 mL  3 mL Intracatheter q1 min prn Josee Basilio MD           Allergies:   Allergies   Allergen Reactions    Blood Transfusion Related (Informational Only) Other (See Comments)     Patient has a history of a Clinically Significant antibody against RBC antigens. A delay in compatible RBCs may occur. 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    Prochlorperazine Edisylate [Prochlorperazine] Shortness Of Breath     Extrapyramidal side effects       Review of Systems:   A full 12 point review of systems was taken and is negative aside from what is noted above in the HPI    Physical Exam:  Temp:  [97.6  F (36.4  C)-98.5  F (36.9  C)] 98.1  F (36.7  C)  Pulse:  [] 93  Resp:  [18-20] 20  BP: (139-157)/(65-77) 157/74  SpO2:  [95 %-97 %] 97 %  General: NAD, alert, cooperative  Head: normocephalic, without abnormality / atraumatic  Abdomen: soft, non-tender, non-distended. no suprapubic fullness/tenderness. no CVA tenderness noted  Skin: no rashes or lesions  Musculoskeletal: moves  extremities equally; no calf edema or tenderness  Psychological: alert and oriented, answers questions appropriately      Labs:   Lab Results   Component Value Date    WBC 12.1 (H) 01/27/2025    HGB 11.1 (L) 01/27/2025    HCT 33.5 (L) 01/27/2025     01/27/2025    CHOL 189 12/29/2022    TRIG 80 12/29/2022    HDL 80 12/29/2022    ALT 35 01/14/2025    AST 40 01/14/2025     01/27/2025    BUN 5.6 (L) 01/27/2025    CO2 30 (H) 01/27/2025    TSH 0.97 04/19/2024    INR 1.05 04/12/2024        Lab Results   Component Value Date    NITRITE Negative 01/23/2025    BACTERIA None Seen 01/15/2025         Lab Results: personally reviewed       I have personally reviewed the imaging reports above.     Assessment / Plan : Bibiana Mcrae is being seen by [facility] for Urinary Retention    -Patient is a 79-year-old female with urinary retention.  Patient's urinary retention likely multifactorial related to prolapsed rectum, recent bowel procedure, immobilization and narcotics.    -Recommend that she continue with a Deutsch catheter at discharge and voiding trial in 2 weeks.  Our office will call to arrange this.  In the interim, urology will sign off.  Please call for questions or concerns.    Thank you for consulting [facility] regarding this patient's care. Please contact us with questions or concerns.     Donnie Carrion PA-C  [facility]   [phone]

## 2025-01-28 NOTE — PROGRESS NOTES
[facility]    Medicine Progress Note - Hospitalist Service    Date of Admission:  1/23/2025    Assessment & Plan     Bibiana Alfaro is a 80 YO female with PMH significant for Alzhemer's disease, anxiety disorder, hypertension, B-cell lymphoma s/p chemotherapy, who is admitted by CRS after a perineal rectosigmoidectomy for rectal prolapse. [facility] is consulted for medical management.    Full-thickness rectal prolapse  Status post perineal rectosigmoidectomy 1/23  - Post op care pre colorectal surgery  - Currently on low fiber diet. Continue  - Discharge plans, analgesia, DVT prophylaxis per surgery  - Encourage increase activity    Acute urinary retention:  S/p Deutsch placement 1/28.   - Urology consulted. Recommend to continue Deutsch catheter at discharge and voiding trial in 2 weeks with outpatient urology.   - Concerned UTI initially and has been on Ceftriaxone since 1/24. Urine culture has no growth. Discontinue Ceftriaxone 1/28.      Essential hypertension  Elevated postsurgery. Increase PTA amlodipine to 10 mg daily. Hydralazine prn.      Nausea /vomiting  Patient thinks Exelon may contribute to her nausea.   - Hold Exelon for now  - Antiemetics prn. Zofran prn. Will monitor for QTc. Allergy to Compazine, declines Benadryl, try Vistaril as a last resort for unremitting nausea  - Cont protonix     Mild tachycardia   No chest pain or shortness of breath  Suspect multifactorial- pain, some dehydration, deconditioning. EKG- sinus tachy with bifascicular block (not new)  - Started metoprolol 12.5mg BID 1/27. HR now controlled.      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 Hold Exelon as above.      Bronchiectasis without complication   Not in respiratory distress currently. No hypoxia noted. Continue home  "meds            Diet: Diet  Low Fiber Diet    DVT Prophylaxis: Heparin SQ  Deutsch Catheter: PRESENT, indication: ?  (Error. Value could not be saved.), Surgical procedure  Lines: PRESENT      Port A Cath Single 05/01/24 Right Chest wall-Site Assessment: WDL      Cardiac Monitoring: ACTIVE order. Indication: Tachyarrhythmias, acute (48 hours)  Code Status: Full Code      Clinically Significant Risk Factors        # Hypokalemia: Lowest K = 3 mmol/L in last 2 days, will replace as needed            # Hypertension: Noted on problem list     # Dementia: noted on problem list        # Cachexia: Estimated body mass index is 17.83 kg/m  as calculated from the following:    Height as of this encounter: 1.473 m (4' 10\").    Weight as of this encounter: 38.7 kg (85 lb 5.1 oz).             Social Drivers of Health    Physical Activity: Unknown (4/25/2024)    Exercise Vital Sign     Days of Exercise per Week: 0 days   Stress: Stress Concern Present (4/25/2024)    [place] [employer]ational [employer] - Occupational Stress Questionnaire     Feeling of Stress : Rather much   Social Connections: Unknown (4/25/2024)    Social Connection and Isolation Panel [NHANES]     Frequency of Social Gatherings with Friends and Family: Once a week          Disposition Plan     Medically Ready for Discharge: Anticipated Tomorrow             Maite Merida MD  Hospitalist Service  [facility]  Securely message with [employer] (more info)  Text page via [facility] Paging/Directory   ______________________________________________________________________    Interval History   Patient reports having nausea after she took the exelon. It was recently changed from  patch to pill. She is able to tolerate low fiber diet. She has passed gas. No bowel movement yet. She continues to have urinary retention after straight cath x 2. She had tachycardia yesterday and it has now resolved.     Physical Exam   Vital Signs: Temp: 98.1  F (36.7  C) " Temp src: Oral BP: (!) 157/74 Pulse: 93   Resp: 20 SpO2: 97 % O2 Device: None (Room air)    Weight: 85 lbs 5.09 oz    General appearance: not in acute distress  HEENT: PERRL, EOMI  Lungs: Clear breath sounds in bilateral lung fields  Cardiovascular: Regular rate and rhythm, normal S1-S2  Abdomen: Soft, non tender, no distension  Musculoskeletal: No joint swelling  Skin: No rash and no edema  Neurology: AAO ×3.  Cranial nerves II - XII normal.  Normal muscle strength in all four extremities.     Medical Decision Making       48 MINUTES SPENT BY ME on the date of service doing chart review, history, exam, documentation & further activities per the note.      Data     I have personally reviewed the following data over the past 24 hrs:    N/A  \   N/A   / N/A     N/A N/A N/A /  N/A   3.7 N/A N/A \       Imaging results reviewed over the past 24 hrs:   No results found for this or any previous visit (from the past 24 hours).

## 2025-01-28 NOTE — PLAN OF CARE
Problem: Adult Inpatient Plan of Care  Goal: Absence of Hospital-Acquired Illness or Injury  Intervention: Identify and Manage Fall Risk  Recent Flowsheet Documentation  Taken 1/28/2025 0900 by Shauna Rosenberg RN  Safety Promotion/Fall Prevention:   activity supervised   assistive device/personal items within reach   clutter free environment maintained   nonskid shoes/slippers when out of bed   safety round/check completed     Problem: Adult Inpatient Plan of Care  Goal: Optimal Comfort and Wellbeing  Intervention: Monitor Pain and Promote Comfort  Recent Flowsheet Documentation  Taken 1/28/2025 0900 by Shauna Rosenberg RN  Pain Management Interventions: emotional support     Problem: Surgery Nonspecified  Goal: Nausea and Vomiting Relief  Outcome: Progressing  Intervention: Prevent or Manage Nausea and Vomiting  Recent Flowsheet Documentation  Taken 1/28/2025 0900 by Shauna Rosenberg RN  Nausea/Vomiting Interventions:   antiemetic   aromatherapy utilized     Problem: Surgery Nonspecified  Goal: Effective Urinary Elimination  Outcome: Progressing   Goal Outcome Evaluation:         A/Ox4  VSS ex HTN. On RA.   Tele SR with BBB  Denies pain  C/o nausea, PRN vistaril given x1, effective  Aroma therapy oil given for nausea, lavender per pt request  Assist x 1 GB/W, pt walked with therapy x1 this shift and up in chair  Low fiber diet, encouraged PO intake  R chest port SL  Deutsch in place  Urology consulted, see note  K 3.7, recheck in AM  PT and OT following  Family in room  Nursing to continue to monitor.

## 2025-01-28 NOTE — TELEPHONE ENCOUNTER
[facility] Call Center    Phone Message    May a detailed message be left on voicemail: yes     Reason for Call: Medication Question or concern regarding medication   Prescription Clarification  Name of Medication: rivastigmine (EXELON) 4.5 MG capsule  Prescribing Provider: Emanuel Mann MD   Pharmacy: [id number] [address] AT [facility] & [place] [address]   What on the order needs clarification? Patient's daughter, Rosa Isela (ctc on file), calling in. States that patient was taking two tablets a day and had to cut down to one tablet a day, as it was causing nausea. Requesting a call back to discuss alternative medications that will not cause nausea. Please give Rosa Isela a call back at [phone].     Action Taken: Message routed to:  Other: [facility]     Travel Screening: Not Applicable

## 2025-01-28 NOTE — PROGRESS NOTES
[facility]  Daily Progress Note    Subjective  Able to eat solid foods yesterday, did very well with this. Intermittent nausea but seems to be controlled with Zofran. No BM yesterday but passing gas, stools have been loose previously. Difficulty with urination yesterday, required straight catheterization x2. Still having difficulty with voiding again this AM. Pain also under better control.     Tachycardia now resolved, continues to be intermittently hypertensive. Last /65.     Objective  Intake/Output last 24 hrs:    Intake/Output Summary (Last 24 hours) at 1/27/2025 0743  Last data filed at 1/27/2025 0700  Gross per 24 hour   Intake 1605 ml   Output --   Net 1605 ml     Temp:  [97.6  F (36.4  C)-98.5  F (36.9  C)] 97.6  F (36.4  C)  Pulse:  [] 93  Resp:  [18-20] 20  BP: (139-162)/(65-80) 139/65  SpO2:  [95 %-97 %] 97 %    Physical Exam:  General: awake, alert, laying in bed, in no acute distress  Head: normocephalic, atraumatic  Respiratory: non-labored breathing  Abdomen: soft, nontender and nondistended.   Perineal: Anastomosis intact, no signs of infection  Skin: No rashes or lesions  Musculoskeletal: moves all four extremities equally  Psychological: alert and oriented, answers questions appropriately    Pertinent Labs  Lab Results: personally reviewed.  Lab Results   Component Value Date     01/24/2025     01/23/2025     01/15/2025    CO2 22 01/24/2025    CO2 21 01/23/2025    CO2 26 01/15/2025    CO2 26 11/09/2021    CO2 30 10/20/2020    CO2 32 10/18/2019    BUN 7.7 01/24/2025    BUN 8.1 01/23/2025    BUN 9.4 01/15/2025    BUN 13 11/09/2021    BUN 17 10/20/2020    BUN 21 10/18/2019     Lab Results   Component Value Date    WBC 14.5 01/24/2025    WBC 19.6 01/23/2025    WBC 8.2 01/15/2025    HGB 12.0 01/25/2025    HGB 10.9 01/24/2025    HGB 12.3 01/23/2025    HCT 33.0 01/24/2025    HCT 36.7 01/23/2025    HCT 36.8 01/15/2025    MCV 80 01/24/2025    MCV 79 01/23/2025     MCV 81 01/15/2025     01/26/2025     01/24/2025     01/23/2025       Assessment/Plan: 80 y/o woman POD#5 s/p perineal rectosigmoidectomy     -Place babin catheter today given ongoing urinary retention (occurred both preoperatively and postoperatively)  -Consult urology for ongoing management of urinary retention, may need to discharge with babin in place  -LFD  -OOB/ambulate  -Encourage IS [id number]  -1/2 cap of Miralax daily  -Appreciate hospitalist assistance for HTN/tachycardia  -If continuing to do well and medically cleared, possibly plan for discharge tomorrow    Discussed with YOHANA Jack-C  [facility]  [phone]..............................main

## 2025-01-29 VITALS
DIASTOLIC BLOOD PRESSURE: 74 MMHG | WEIGHT: 85.32 LBS | OXYGEN SATURATION: 97 % | HEIGHT: 58 IN | SYSTOLIC BLOOD PRESSURE: 144 MMHG | HEART RATE: 107 BPM | BODY MASS INDEX: 17.91 KG/M2 | RESPIRATION RATE: 19 BRPM | TEMPERATURE: 97.3 F

## 2025-01-29 LAB
PLATELET # BLD AUTO: 315 10E3/UL (ref 150–450)
POTASSIUM SERPL-SCNC: 3.6 MMOL/L (ref 3.4–5.3)

## 2025-01-29 PROCEDURE — 250N000013 HC RX MED GY IP 250 OP 250 PS 637: Performed by: INTERNAL MEDICINE

## 2025-01-29 PROCEDURE — 250N000011 HC RX IP 250 OP 636: Performed by: COLON & RECTAL SURGERY

## 2025-01-29 PROCEDURE — 84132 ASSAY OF SERUM POTASSIUM: CPT | Performed by: STUDENT IN AN ORGANIZED HEALTH CARE EDUCATION/TRAINING PROGRAM

## 2025-01-29 PROCEDURE — 85049 AUTOMATED PLATELET COUNT: CPT | Performed by: COLON & RECTAL SURGERY

## 2025-01-29 PROCEDURE — 99232 SBSQ HOSP IP/OBS MODERATE 35: CPT | Performed by: INTERNAL MEDICINE

## 2025-01-29 PROCEDURE — 250N000013 HC RX MED GY IP 250 OP 250 PS 637: Performed by: COLON & RECTAL SURGERY

## 2025-01-29 RX ORDER — POLYETHYLENE GLYCOL 3350 17 G/17G
17 POWDER, FOR SOLUTION ORAL DAILY
Qty: 510 G | Refills: 1 | Status: SHIPPED | OUTPATIENT
Start: 2025-01-29

## 2025-01-29 RX ORDER — METOPROLOL TARTRATE 25 MG/1
12.5 TABLET, FILM COATED ORAL 2 TIMES DAILY
Qty: 60 TABLET | Refills: 0 | Status: SHIPPED | OUTPATIENT
Start: 2025-01-29 | End: 2025-02-02

## 2025-01-29 RX ORDER — MEMANTINE HYDROCHLORIDE 5 MG/1
TABLET ORAL
Qty: 70 TABLET | Refills: 0 | Status: SHIPPED | OUTPATIENT
Start: 2025-01-29 | End: 2025-02-28

## 2025-01-29 RX ORDER — HEPARIN SODIUM (PORCINE) LOCK FLUSH IV SOLN 100 UNIT/ML 100 UNIT/ML
5-10 SOLUTION INTRAVENOUS
Status: DISCONTINUED | OUTPATIENT
Start: 2025-01-29 | End: 2025-01-29 | Stop reason: HOSPADM

## 2025-01-29 RX ORDER — POLYETHYLENE GLYCOL 3350 17 G/17G
17 POWDER, FOR SOLUTION ORAL DAILY
Status: DISCONTINUED | OUTPATIENT
Start: 2025-01-29 | End: 2025-01-29 | Stop reason: HOSPADM

## 2025-01-29 RX ORDER — MEMANTINE HYDROCHLORIDE 10 MG/1
10 TABLET ORAL 2 TIMES DAILY
Qty: 60 TABLET | Refills: 11 | Status: SHIPPED | OUTPATIENT
Start: 2025-03-01

## 2025-01-29 RX ADMIN — HEPARIN 5 ML: 100 SYRINGE at 11:03

## 2025-01-29 RX ADMIN — HEPARIN SODIUM 5000 UNITS: 5000 INJECTION, SOLUTION INTRAVENOUS; SUBCUTANEOUS at 09:19

## 2025-01-29 RX ADMIN — AMLODIPINE BESYLATE 10 MG: 5 TABLET ORAL at 09:18

## 2025-01-29 RX ADMIN — POLYETHYLENE GLYCOL 3350 17 G: 17 POWDER, FOR SOLUTION ORAL at 09:17

## 2025-01-29 RX ADMIN — BUSPIRONE HYDROCHLORIDE 7.5 MG: 7.5 TABLET ORAL at 09:19

## 2025-01-29 RX ADMIN — METOPROLOL TARTRATE 12.5 MG: 25 TABLET, FILM COATED ORAL at 09:18

## 2025-01-29 RX ADMIN — ACARBOSE 25 MG: 25 TABLET ORAL at 09:19

## 2025-01-29 RX ADMIN — ONDANSETRON 4 MG: 2 INJECTION INTRAMUSCULAR; INTRAVENOUS at 00:43

## 2025-01-29 RX ADMIN — PANTOPRAZOLE SODIUM 40 MG: 40 TABLET, DELAYED RELEASE ORAL at 05:31

## 2025-01-29 ASSESSMENT — ACTIVITIES OF DAILY LIVING (ADL)
ADLS_ACUITY_SCORE: 52

## 2025-01-29 NOTE — TELEPHONE ENCOUNTER
Discontinue Exelon and instead start Namenda 5 mg daily ramping it up to 10 mg twice daily in 1 month.  After 1 month maintenance dose is 10 mg twice daily.  I have sent prescriptions to patient's pharmacy.

## 2025-01-29 NOTE — PROGRESS NOTES
[facility]    Medicine Progress Note - Hospitalist Service    Date of Admission:  1/23/2025    Assessment & Plan     Bibiana Alfaro is a 78 YO female with PMH significant for Alzhemer's disease, anxiety disorder, hypertension, B-cell lymphoma s/p chemotherapy, who is admitted by CRS after a perineal rectosigmoidectomy for rectal prolapse. [facility] is consulted for medical management.    Full-thickness rectal prolapse  Status post perineal rectosigmoidectomy 1/23  - Post op care pre colorectal surgery  - Currently on low fiber diet. Continue  - Discharge plans, analgesia, DVT prophylaxis per surgery  - Encourage increase activity    Acute urinary retention:  S/p Deutsch placement 1/28.   - Urology consulted. Recommend to continue Deutsch catheter at discharge and voiding trial in 2 weeks with outpatient urology.   - Concerned UTI initially and has been on Ceftriaxone since 1/24. Urine culture has no growth. Discontinued Ceftriaxone 1/28.      Essential hypertension  Elevated postsurgery. Increase PTA amlodipine to 10 mg daily. Hydralazine prn.      Nausea /vomiting  Per patient, she was switched from Exelon patch to oral recently. She thinks Exelon oral may contribute to her nausea.   - Patient's daughter communicated with patient's neurologist Dr Mann. Recommend to discontinue Exelon and start memantine.  - Antiemetics prn. Zofran prn. Will monitor for QTc. Allergy to Compazine, declines Benadryl, try Vistaril as a last resort for unremitting nausea  - Cont protonix     Mild tachycardia   No chest pain or shortness of breath  Suspect multifactorial- pain, some dehydration, deconditioning. EKG- sinus tachy with bifascicular block (not new)  - Started metoprolol 12.5mg BID 1/27. HR now controlled.      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 Discontinue Exelon and start memantine as above.     Bronchiectasis without complication   Not in respiratory distress currently. No hypoxia noted. Continue home meds            Diet: Diet  Low Fiber Diet    DVT Prophylaxis: Heparin SQ  Deutsch Catheter: PRESENT, indication: ?  (Error. Value could not be saved.), Acute retention or obstruction, Surgical procedure  Lines: PRESENT      Port A Cath Single 05/01/24 Right Chest wall-Site Assessment: WDL      Cardiac Monitoring: ACTIVE order. Indication: Tachyarrhythmias, acute (48 hours)  Code Status: Full Code      Clinically Significant Risk Factors        # Hypokalemia: Lowest K = 3 mmol/L in last 2 days, will replace as needed            # Hypertension: Noted on problem list     # Dementia: noted on problem list        # Cachexia: Estimated body mass index is 17.83 kg/m  as calculated from the following:    Height as of this encounter: 1.473 m (4' 10\").    Weight as of this encounter: 38.7 kg (85 lb 5.1 oz).             Social Drivers of Health    Physical Activity: Unknown (4/25/2024)    Exercise Vital Sign     Days of Exercise per Week: 0 days   Stress: Stress Concern Present (4/25/2024)    Citizen of [place] of Occupational Health - Occupational Stress Questionnaire     Feeling of Stress : Rather much   Social Connections: Unknown (4/25/2024)    Social Connection and Isolation Panel [NHANES]     Frequency of Social Gatherings with Friends and Family: Once a week          Disposition Plan     Medically Ready for Discharge: Plan to discharge home today.             Maite Merida MD  Hospitalist Service  [facility]  Securely message with [employer] (more info)  Text page via [employer] Paging/Directory   ______________________________________________________________________    Interval History   Patient reports that her nausea has improved. She is able to tolerate diet well. She has passed gas. But no bowel movement yet. "     Physical Exam   Vital Signs: Temp: 97.3  F (36.3  C) Temp src: Oral BP: (!) 144/74 Pulse: 107   Resp: 19 SpO2: 97 % O2 Device: None (Room air)    Weight: 85 lbs 5.09 oz    General appearance: not in acute distress  HEENT: PERRL, EOMI  Lungs: Clear breath sounds in bilateral lung fields  Cardiovascular: Regular rate and rhythm, normal S1-S2  Abdomen: Soft, non tender, no distension  Musculoskeletal: No joint swelling  Skin: No rash and no edema  Neurology: AAO ×3.  Cranial nerves II - XII normal.  Normal muscle strength in all four extremities.     Medical Decision Making       40 MINUTES SPENT BY ME on the date of service doing chart review, history, exam, documentation & further activities per the note.      Data     I have personally reviewed the following data over the past 24 hrs:    N/A  \   N/A   / 315     N/A N/A N/A /  N/A   3.6 N/A N/A \       Imaging results reviewed over the past 24 hrs:   No results found for this or any previous visit (from the past 24 hours).

## 2025-01-29 NOTE — DISCHARGE SUMMARY
Discharge Summary    Patient name: Bibiana Mcrae  YOB: 1945   Age: 79 year old  Medical Record Number: [id number]  Primary Care Physician:  Zack Woody       Admission Date: 1/23/2025.  Discharge Date: 1/29/2025  Condition at Discharge: Stable       Principal Diagnosis:  Rectal Prolapse    HISTORY OF PRESENT ILLNESS (per H&P, operative note, or clinic note): The patient is 79 year old female who presented with full-thickness rectal prolapse.  She had previously been evaluated at the pelvic floor center and no prolapse was seen at that time. She went for biofeedback and after that, was treated for lymphoma and then developed rectal prolapse. The prolapse was painful and required emergency department care. She wishes to have surgery as woon as possible.    PROCEDURES PERFORMED DURING HOSPITALIZATION: Perineal rectosigmoidectomy.     FINAL PATHOLOGY: Pending    BRIEF HOSPITAL COURSE: This 79 year old female presented for an elective perineal rectosigmoidectomy and was transferred to the surgical roe following the procedure. She developed HTN and tachycardia on POD#0, work-up showed positive UA and she was started on IV Rocephin. Diet was advanced with return of bowel function.  Pain medication was transitioned from IV to oral uneventfully. With worsening tachycardia she was started on metoprolol and vitals stabilized. She developed urinary retention around POD#4, babin was replaced and Urology was consulted. At the time of discharge, she was tolerating a regular diet and pain was controlled with oral pain medications. Babin remained in place on discharge with Urology follow-up. The patient was discharged home on POD #5 in stable condition with daily Miralax.     PHYSICAL EXAM ON DATE OF DISCHARGE:    General: awake, alert, laying in bed, in no acute distress  Head: normocephalic, atraumatic  Respiratory: non-labored breathing  Abdomen: soft, nontender and nondistended.   Perineal: Anastomosis  intact, no signs of infection  Skin: No rashes or lesions  Musculoskeletal: moves all four extremities equally  Psychological: alert and oriented, answers questions appropriately    Vital Signs in last 24 hours:   Temp:  [97.3  F (36.3  C)-98.8  F (37.1  C)] 97.3  F (36.3  C)  Pulse:  [] 107  Resp:  [12-20] 19  BP: (132-157)/(69-84) 144/74  SpO2:  [97 %-98 %] 97 %    COMPLICATIONS IN HOSPITAL: UTI, Urinary retention  IMPORTANT PENDING TEST RESULTS: Pathology    Discharge Medications/Orders:  Miralax    FOLLOW UP: She should see Zack Woody in 1 week.  Follow up with Dr. Basilio in 3-4 weeks.     Total time spent for discharge on date of discharge: 15 minutes, with over half spent in counseling and coordinating care  I saw the patient on the date of discharge    Lincoln Meehan PA-C  [facility]  [phone]    ADDENDUM:  Length of stay: 5 days  Indicate Y or N for the following:  UTI YES  C diff NO  PNA NO  SSI NO  DVT NO  PE NO  CVA NO  MI NO  Enterocutaneous fistula NO  Peripheral nerve injury NO  Abscess (not adjacent to anastomosis) NO  Leak NO            Death within 30 days NO  Reintubation NO  Reoperation NO

## 2025-01-29 NOTE — TELEPHONE ENCOUNTER
RN sent [employer] message to relay the following message from Dr. Mann:    Discontinue Exelon and instead start Namenda 5 mg daily ramping it up to 10 mg twice daily in 1 month. After 1 month maintenance dose is 10 mg twice daily. I have sent prescriptions to patient's pharmacy.     Rosana Ortiz RN, BSN  [facility]

## 2025-01-29 NOTE — PROGRESS NOTES
Occupational Therapy Discharge Summary    Reason for therapy discharge:    Discharged to home.    Progress towards therapy goal(s). See goals on Care Plan in [facility] electronic health record for goal details.  Goals partially met.  Barriers to achieving goals:   discharge from facility.    Therapy recommendation(s):    Continued therapy is recommended.  Rationale/Recommendations:  Home OT to progress self-cares skills and to optimize safe return to daily activity.    Da Ledesma, OTR

## 2025-01-29 NOTE — PLAN OF CARE
Goal Outcome Evaluation:    PRN Zofran given around 0045, relieved nausea.  Patient is alert but confused and impulsive, stated she wanted to go home and was getting out of bed.  Daughter was at bedside during the night.   Third side rail was put up for patients own safety.

## 2025-01-29 NOTE — PROGRESS NOTES
Physical Therapy Discharge Summary    Reason for therapy discharge:    Discharged to home with home care.    Progress towards therapy goal(s). See goals on Care Plan in [facility] electronic health record for goal details.  Goals partially met.  Barriers to achieving goals:   discharge from facility.    Therapy recommendation(s):    Further recommended therapy is related to documented deficits, and is necessary to maximize functional independence in order for patient to return to prior level of function.      Johanne Hou, HIWOT 1/29/2025

## 2025-01-29 NOTE — PROGRESS NOTES
Care Management Discharge Note    Discharge Date: 01/29/2025       Discharge Disposition: Home    Discharge Services: Home Care    Discharge DME:  (per treatment team)    Discharge Transportation: family or friend will provide    Private pay costs discussed: Not applicable    Does the patient's insurance plan have a 3 day qualifying hospital stay waiver?  NA    PAS Confirmation Code: NA  Patient/family educated on Medicare website which has current facility and service quality ratings: yes    Education Provided on the Discharge Plan: Yes  Persons Notified of Discharge Plans: patient   Patient/Family in Agreement with the Plan: yes    Handoff Referral Completed: Yes, JANAK PCP: Internal handoff referral completed    Additional Information:  Patient discharging home with home RN, PT, OT through [facility]. SW faxed home care orders to accepting agency. Family will transport home.     MARY JANE Potts at 9:39 AM on 01/29/25

## 2025-01-29 NOTE — PROGRESS NOTES
[facility]  Daily Progress Note    Subjective  Feeling better today. Nausea more controlled, has been receiving Zofran. Tolerating LFD without issues. Pain controlled. No BM yesterday, denies feeling distended or constipated.     Tachycardia improved, mildly hypertensive. Feel ready for discharge today. Urology saw yesterday, will plan to keep babin in place on discharge.     Objective  Intake/Output last 24 hrs:    Intake/Output Summary (Last 24 hours) at 1/27/2025 0743  Last data filed at 1/27/2025 0700  Gross per 24 hour   Intake 1605 ml   Output --   Net 1605 ml     Temp:  [97.3  F (36.3  C)-98.8  F (37.1  C)] 97.3  F (36.3  C)  Pulse:  [] 107  Resp:  [12-20] 19  BP: (132-157)/(69-84) 144/74  SpO2:  [97 %-98 %] 97 %    Physical Exam:  General: awake, alert, laying in bed, in no acute distress  Head: normocephalic, atraumatic  Respiratory: non-labored breathing  Abdomen: soft, nontender and nondistended.   Perineal: Anastomosis intact, no signs of infection  : Babin in place with clear UOP  Skin: No rashes or lesions  Musculoskeletal: moves all four extremities equally  Psychological: alert and oriented, answers questions appropriately    Pertinent Labs  Lab Results: personally reviewed.  Lab Results   Component Value Date     01/24/2025     01/23/2025     01/15/2025    CO2 22 01/24/2025    CO2 21 01/23/2025    CO2 26 01/15/2025    CO2 26 11/09/2021    CO2 30 10/20/2020    CO2 32 10/18/2019    BUN 7.7 01/24/2025    BUN 8.1 01/23/2025    BUN 9.4 01/15/2025    BUN 13 11/09/2021    BUN 17 10/20/2020    BUN 21 10/18/2019     Lab Results   Component Value Date    WBC 14.5 01/24/2025    WBC 19.6 01/23/2025    WBC 8.2 01/15/2025    HGB 12.0 01/25/2025    HGB 10.9 01/24/2025    HGB 12.3 01/23/2025    HCT 33.0 01/24/2025    HCT 36.7 01/23/2025    HCT 36.8 01/15/2025    MCV 80 01/24/2025    MCV 79 01/23/2025    MCV 81 01/15/2025     01/26/2025     01/24/2025      01/23/2025       Assessment/Plan: 80 y/o woman POD#6 s/p perineal rectosigmoidectomy     -Deutsch will remain in place on discharge, appreciate Urology recs  -LFD  -OOB/ambulate  -Encourage IS [id number]  -Increase Miralax to 1 cap daily  -OK for discharge from medical standpoint (discussed with hospitalist)  -OK for discharge today    Dr. Basilio jointly examined pt this AM    Dulcey BALDEV Meehan  [facility]  [phone]..............................main    COLORECTAL STAFF ADDENDUM  Patient seen and examined by me. Agree with excellent note by PA. Feeling ready to go home. Tolerating diet. Nausea much less. Family feels pt close to baseline.     Vitals reviewed  Aaox3, nad  Breathing nonlabored  Abd soft, NT, ND  Perianal area intact    Ap .   - ok to discharge  - appreciate hospitalist assistance  - plan as above      Josee Basilio MD, MS, FACS, FASCRS  [facility]  Office: [phone]  1/29/2025 9:09 AM

## 2025-01-29 NOTE — PLAN OF CARE
Problem: Adult Inpatient Plan of Care  Goal: Optimal Comfort and Wellbeing  Outcome: Progressing     Problem: Surgery Nonspecified  Goal: Optimal Pain Control and Function  Outcome: Progressing  Goal: Nausea and Vomiting Relief  Outcome: Progressing  Intervention: Prevent or Manage Nausea and Vomiting  Recent Flowsheet Documentation  Taken 1/28/2025 1615 by Eileen Ma RN  Nausea/Vomiting Interventions: (Declines at this time) --  Goal: Effective Urinary Elimination  Outcome: Progressing   NURSING PROGRESS NOTE  Shift Summary      Date: January 28, 2025     Neuro/Musculoskeletal:  A&Ox4.   Cardiac:  SR, BBB   Respiratory:  Sating in the 90s on RA.  GI/:  Adequate urine output. Deutsch in place. LBM: 1/26/25  Diet/Appetite:  Tolerating Low Fiber diet.  Activity:  Assist of 1   Pain:  Denies.   Skin:  No new deficits noted.   Protocols/Labs:  Potassium 3.7, redraw 1/29    Pertinent Shift Updates:  Zofran given x1 for intermittent nausea, effective. Worked with PT this afternoon. VSS on RA, continue to monitor.          Eileen Ma RN  ....................................................

## 2025-01-30 ENCOUNTER — PATIENT OUTREACH (OUTPATIENT)
Dept: CARE COORDINATION | Facility: CLINIC | Age: 80
End: 2025-01-30
Payer: COMMERCIAL

## 2025-01-30 NOTE — PROGRESS NOTES
Clinic Care Coordination Contact  Care Team Conversations  Talked to daughter. She is waiting for home care now. Didn't have much time to talk. Reviewed that mom was in hospital during scheduled assessment and what CC can offer.  She wants to take care of some of her immediate needs and will call if she needs anything else. She has started her on meals on wheels.       Plan- will leave open for a week and if no call back from daughter, will close to care coordination.   Anastasiya Fontaine,   [facility]  [phone]

## 2025-01-31 ENCOUNTER — HOSPITAL ENCOUNTER (OUTPATIENT)
Dept: PET IMAGING | Facility: HOSPITAL | Age: 80
Discharge: HOME | End: 2025-01-31
Attending: INTERNAL MEDICINE
Payer: COMMERCIAL

## 2025-01-31 DIAGNOSIS — C83.33 DIFFUSE LARGE B-CELL LYMPHOMA OF INTRA-ABDOMINAL LYMPH NODES (H): ICD-10-CM

## 2025-01-31 LAB — GLUCOSE BLDC GLUCOMTR-MCNC: 109 MG/DL (ref 70–99)

## 2025-01-31 PROCEDURE — 82962 GLUCOSE BLOOD TEST: CPT

## 2025-01-31 PROCEDURE — 343N000001 HC RX 343 MED OP 636: Performed by: INTERNAL MEDICINE

## 2025-01-31 PROCEDURE — A9552 F18 FDG: HCPCS | Performed by: INTERNAL MEDICINE

## 2025-01-31 PROCEDURE — 78816 PET IMAGE W/CT FULL BODY: CPT | Mod: PS

## 2025-01-31 PROCEDURE — 74177 CT ABD & PELVIS W/CONTRAST: CPT

## 2025-01-31 PROCEDURE — 250N000011 HC RX IP 250 OP 636: Performed by: INTERNAL MEDICINE

## 2025-01-31 PROCEDURE — 71260 CT THORAX DX C+: CPT

## 2025-01-31 RX ORDER — HEPARIN SODIUM (PORCINE) LOCK FLUSH IV SOLN 100 UNIT/ML 100 UNIT/ML
500 SOLUTION INTRAVENOUS ONCE
Status: COMPLETED | OUTPATIENT
Start: 2025-01-31 | End: 2025-01-31

## 2025-01-31 RX ORDER — FLUDEOXYGLUCOSE F 18 200 MCI/ML
7-18 INJECTION, SOLUTION INTRAVENOUS ONCE
Status: COMPLETED | OUTPATIENT
Start: 2025-01-31 | End: 2025-01-31

## 2025-01-31 RX ORDER — IOPAMIDOL 755 MG/ML
42 INJECTION, SOLUTION INTRAVASCULAR ONCE
Status: COMPLETED | OUTPATIENT
Start: 2025-01-31 | End: 2025-01-31

## 2025-01-31 RX ADMIN — HEPARIN SODIUM (PORCINE) LOCK FLUSH IV SOLN 100 UNIT/ML 500 UNITS: 100 SOLUTION at 11:46

## 2025-01-31 RX ADMIN — FLUDEOXYGLUCOSE F 18 10.21 MILLICURIE: 200 INJECTION, SOLUTION INTRAVENOUS at 10:14

## 2025-01-31 RX ADMIN — IOPAMIDOL 42 ML: 755 INJECTION, SOLUTION INTRAVENOUS at 11:28

## 2025-01-31 NOTE — BRIEF OP NOTE
[facility]    Brief Operative Note    Pre-operative diagnosis: Rectal prolapse [K62.3]  Post-operative diagnosis Same as pre-operative diagnosis    Procedure: PERINEAL RECTOSIGMOIDECTOMY, N/A - Rectum    Surgeon: Surgeons and Role:     * Josee Basilio MD - Primary     * Christina Lott MD - Fellow - Assisting  Anesthesia: General   Estimated Blood Loss: 10 mL from 1/23/2025  4:11 PM to 1/23/2025  5:44 PM      Drains: None  Specimens:   ID Type Source Tests Collected by Time Destination   1 : RECTUM AND SIGMOID Tissue Rectum SURGICAL PATHOLOGY EXAM Josee Basilio MD 1/23/2025  5:21 PM      Findings:   Full thickness rectal prolapse .  Complications: None.  Implants: * No implants in log *    POSTOPERATIVE / POSTPROCEDURE NOTE - IMMEDIATE :    Surgeon(s)/Proceduralist(s) and Assistants (if any):  Surgeon(s):  Josee Basilio MD Kalbfell, Elle, MD  Circulator: Cathleen Connell RN  Scrub Person: Romina Rosenberg    Procedure(s):  Perineal rectosigmoidectomy    Procedure(s) findings:   Full thickness rectal prolpase    Specimen(s) removed: Yes    (EBL) Estimated blood loss (ml): 15    Postoperative/Postprocedure Diagnosis: full-thickness rectal prolapse      Kelsy Woods PA-C      ADDENDUM:    PATIENT DATA  Indicate Y or N:  Home O2 No  Hemodialysis No  Transplant patient No  Cirrhosis No  Steroids in last 30 days No  Immunomodulators in last 30 days No  Anticoagulation at time of surgery No   List medication   Prior abdominal surgery Yes  Pelvic irradiation No    Albumin within 30 days if known   Hgb within 30 days if known 13.4  Cr within 30 days if known   Body mass index is 17.83 kg/m .    OR DATA  Emergent No   <24 hours No   <1 week No  Bowel Prep (Y or N) Yes  Antibiotics (Y or N) Yes  DVT prophylaxis    Heparin Yes   SCD Yes   None No  Drain No  ASA (1,2,3,4,5 if unknown) 3  OR time (min) 69  Stents No  Transfuse >/= 2U No  Anastomosis   Stapled No   Handsewn  Yes  Leak Test (pos, neg, not done)

## 2025-02-01 RX ORDER — ONDANSETRON 4 MG/1
4 TABLET, ORALLY DISINTEGRATING ORAL EVERY 6 HOURS PRN
Status: DISCONTINUED | OUTPATIENT
Start: 2025-02-01 | End: 2025-02-01 | Stop reason: HOSPADM

## 2025-02-02 ENCOUNTER — HOSPITAL ENCOUNTER (EMERGENCY)
Facility: HOSPITAL | Age: 80
Discharge: HOME | End: 2025-02-03
Attending: STUDENT IN AN ORGANIZED HEALTH CARE EDUCATION/TRAINING PROGRAM | Admitting: STUDENT IN AN ORGANIZED HEALTH CARE EDUCATION/TRAINING PROGRAM
Payer: COMMERCIAL

## 2025-02-02 ENCOUNTER — NURSE TRIAGE (OUTPATIENT)
Dept: NURSING | Facility: CLINIC | Age: 80
End: 2025-02-02
Payer: COMMERCIAL

## 2025-02-02 DIAGNOSIS — R11.2 NAUSEA AND VOMITING, UNSPECIFIED VOMITING TYPE: ICD-10-CM

## 2025-02-02 LAB
ALBUMIN SERPL BCG-MCNC: 3.5 G/DL (ref 3.5–5.2)
ALBUMIN UR-MCNC: 20 MG/DL
ALP SERPL-CCNC: 120 U/L (ref 40–150)
ALT SERPL W P-5'-P-CCNC: 19 U/L (ref 0–50)
ANION GAP SERPL CALCULATED.3IONS-SCNC: 10 MMOL/L (ref 7–15)
APPEARANCE UR: ABNORMAL
AST SERPL W P-5'-P-CCNC: 21 U/L (ref 0–45)
BASOPHILS # BLD AUTO: 0 10E3/UL (ref 0–0.2)
BASOPHILS NFR BLD AUTO: 0 %
BILIRUB DIRECT SERPL-MCNC: <0.2 MG/DL (ref 0–0.3)
BILIRUB SERPL-MCNC: 0.2 MG/DL
BILIRUB UR QL STRIP: NEGATIVE
BUN SERPL-MCNC: 11.6 MG/DL (ref 8–23)
CALCIUM SERPL-MCNC: 9 MG/DL (ref 8.8–10.4)
CHLORIDE SERPL-SCNC: 102 MMOL/L (ref 98–107)
COLOR UR AUTO: YELLOW
CREAT SERPL-MCNC: 0.64 MG/DL (ref 0.51–0.95)
EGFRCR SERPLBLD CKD-EPI 2021: 89 ML/MIN/1.73M2
EOSINOPHIL # BLD AUTO: 0 10E3/UL (ref 0–0.7)
EOSINOPHIL NFR BLD AUTO: 0 %
ERYTHROCYTE [DISTWIDTH] IN BLOOD BY AUTOMATED COUNT: 18.3 % (ref 10–15)
GLUCOSE SERPL-MCNC: 145 MG/DL (ref 70–99)
GLUCOSE UR STRIP-MCNC: NEGATIVE MG/DL
HCO3 SERPL-SCNC: 26 MMOL/L (ref 22–29)
HCT VFR BLD AUTO: 35.5 % (ref 35–47)
HGB BLD-MCNC: 11.2 G/DL (ref 11.7–15.7)
HGB UR QL STRIP: NEGATIVE
IMM GRANULOCYTES # BLD: 0 10E3/UL
IMM GRANULOCYTES NFR BLD: 0 %
KETONES UR STRIP-MCNC: NEGATIVE MG/DL
LEUKOCYTE ESTERASE UR QL STRIP: ABNORMAL
LIPASE SERPL-CCNC: 46 U/L (ref 13–60)
LYMPHOCYTES # BLD AUTO: 1 10E3/UL (ref 0.8–5.3)
LYMPHOCYTES NFR BLD AUTO: 11 %
MCH RBC QN AUTO: 25.7 PG (ref 26.5–33)
MCHC RBC AUTO-ENTMCNC: 31.5 G/DL (ref 31.5–36.5)
MCV RBC AUTO: 82 FL (ref 78–100)
MONOCYTES # BLD AUTO: 0.8 10E3/UL (ref 0–1.3)
MONOCYTES NFR BLD AUTO: 9 %
MUCOUS THREADS #/AREA URNS LPF: PRESENT /LPF
NEUTROPHILS # BLD AUTO: 7.2 10E3/UL (ref 1.6–8.3)
NEUTROPHILS NFR BLD AUTO: 79 %
NITRATE UR QL: NEGATIVE
NRBC # BLD AUTO: 0 10E3/UL
NRBC BLD AUTO-RTO: 0 /100
PH UR STRIP: 5.5 [PH] (ref 5–7)
PLATELET # BLD AUTO: 390 10E3/UL (ref 150–450)
POTASSIUM SERPL-SCNC: 4.1 MMOL/L (ref 3.4–5.3)
PROT SERPL-MCNC: 5.9 G/DL (ref 6.4–8.3)
RBC # BLD AUTO: 4.35 10E6/UL (ref 3.8–5.2)
RBC URINE: 4 /HPF
SODIUM SERPL-SCNC: 138 MMOL/L (ref 135–145)
SP GR UR STRIP: 1.02 (ref 1–1.03)
UROBILINOGEN UR STRIP-MCNC: <2 MG/DL
WBC # BLD AUTO: 9.1 10E3/UL (ref 4–11)
WBC URINE: 176 /HPF
YEAST #/AREA URNS HPF: ABNORMAL /HPF

## 2025-02-02 PROCEDURE — 99284 EMERGENCY DEPT VISIT MOD MDM: CPT | Mod: 25

## 2025-02-02 PROCEDURE — 258N000003 HC RX IP 258 OP 636: Performed by: STUDENT IN AN ORGANIZED HEALTH CARE EDUCATION/TRAINING PROGRAM

## 2025-02-02 PROCEDURE — 96361 HYDRATE IV INFUSION ADD-ON: CPT

## 2025-02-02 PROCEDURE — 96375 TX/PRO/DX INJ NEW DRUG ADDON: CPT

## 2025-02-02 PROCEDURE — 82310 ASSAY OF CALCIUM: CPT | Performed by: STUDENT IN AN ORGANIZED HEALTH CARE EDUCATION/TRAINING PROGRAM

## 2025-02-02 PROCEDURE — 85004 AUTOMATED DIFF WBC COUNT: CPT | Performed by: STUDENT IN AN ORGANIZED HEALTH CARE EDUCATION/TRAINING PROGRAM

## 2025-02-02 PROCEDURE — 82248 BILIRUBIN DIRECT: CPT | Performed by: STUDENT IN AN ORGANIZED HEALTH CARE EDUCATION/TRAINING PROGRAM

## 2025-02-02 PROCEDURE — 81003 URINALYSIS AUTO W/O SCOPE: CPT | Performed by: STUDENT IN AN ORGANIZED HEALTH CARE EDUCATION/TRAINING PROGRAM

## 2025-02-02 PROCEDURE — 250N000011 HC RX IP 250 OP 636: Performed by: STUDENT IN AN ORGANIZED HEALTH CARE EDUCATION/TRAINING PROGRAM

## 2025-02-02 PROCEDURE — 85041 AUTOMATED RBC COUNT: CPT | Performed by: STUDENT IN AN ORGANIZED HEALTH CARE EDUCATION/TRAINING PROGRAM

## 2025-02-02 PROCEDURE — 80053 COMPREHEN METABOLIC PANEL: CPT | Performed by: STUDENT IN AN ORGANIZED HEALTH CARE EDUCATION/TRAINING PROGRAM

## 2025-02-02 PROCEDURE — 96374 THER/PROPH/DIAG INJ IV PUSH: CPT

## 2025-02-02 PROCEDURE — 36415 COLL VENOUS BLD VENIPUNCTURE: CPT | Performed by: STUDENT IN AN ORGANIZED HEALTH CARE EDUCATION/TRAINING PROGRAM

## 2025-02-02 PROCEDURE — 87186 SC STD MICRODIL/AGAR DIL: CPT | Performed by: STUDENT IN AN ORGANIZED HEALTH CARE EDUCATION/TRAINING PROGRAM

## 2025-02-02 PROCEDURE — 80048 BASIC METABOLIC PNL TOTAL CA: CPT | Performed by: STUDENT IN AN ORGANIZED HEALTH CARE EDUCATION/TRAINING PROGRAM

## 2025-02-02 PROCEDURE — 83690 ASSAY OF LIPASE: CPT | Performed by: STUDENT IN AN ORGANIZED HEALTH CARE EDUCATION/TRAINING PROGRAM

## 2025-02-02 RX ORDER — RIVASTIGMINE TARTRATE 4.5 MG/1
4.5 CAPSULE ORAL 2 TIMES DAILY
COMMUNITY

## 2025-02-02 RX ORDER — NORTRIPTYLINE HYDROCHLORIDE 50 MG/1
50 CAPSULE ORAL AT BEDTIME
COMMUNITY

## 2025-02-02 RX ORDER — METOCLOPRAMIDE HYDROCHLORIDE 5 MG/ML
10 INJECTION INTRAMUSCULAR; INTRAVENOUS ONCE
Status: COMPLETED | OUTPATIENT
Start: 2025-02-02 | End: 2025-02-02

## 2025-02-02 RX ORDER — METOPROLOL TARTRATE 25 MG/1
25 TABLET, FILM COATED ORAL DAILY
COMMUNITY

## 2025-02-02 RX ORDER — DIPHENHYDRAMINE HYDROCHLORIDE 50 MG/ML
25 INJECTION INTRAMUSCULAR; INTRAVENOUS ONCE
Status: COMPLETED | OUTPATIENT
Start: 2025-02-02 | End: 2025-02-02

## 2025-02-02 RX ORDER — MEMANTINE HYDROCHLORIDE 5 MG/1
5 TABLET ORAL SEE ADMIN INSTRUCTIONS
COMMUNITY

## 2025-02-02 RX ORDER — MEMANTINE HYDROCHLORIDE 10 MG/1
10 TABLET ORAL 2 TIMES DAILY
COMMUNITY

## 2025-02-02 RX ORDER — BUSPIRONE HYDROCHLORIDE 7.5 MG/1
7.5 TABLET ORAL 2 TIMES DAILY
COMMUNITY

## 2025-02-02 RX ORDER — CELECOXIB 200 MG/1
200 CAPSULE ORAL DAILY
COMMUNITY

## 2025-02-02 RX ADMIN — SODIUM CHLORIDE 1000 ML: 9 INJECTION, SOLUTION INTRAVENOUS at 23:56

## 2025-02-02 RX ADMIN — DIPHENHYDRAMINE HYDROCHLORIDE 25 MG: 50 INJECTION, SOLUTION INTRAMUSCULAR; INTRAVENOUS at 21:25

## 2025-02-02 RX ADMIN — SODIUM CHLORIDE 1000 ML: 9 INJECTION, SOLUTION INTRAVENOUS at 21:25

## 2025-02-02 RX ADMIN — METOCLOPRAMIDE 10 MG: 5 INJECTION, SOLUTION INTRAMUSCULAR; INTRAVENOUS at 21:26

## 2025-02-02 ASSESSMENT — COLUMBIA-SUICIDE SEVERITY RATING SCALE - C-SSRS
1. IN THE PAST MONTH, HAVE YOU WISHED YOU WERE DEAD OR WISHED YOU COULD GO TO SLEEP AND NOT WAKE UP?: NO
6. HAVE YOU EVER DONE ANYTHING, STARTED TO DO ANYTHING, OR PREPARED TO DO ANYTHING TO END YOUR LIFE?: NO
2. HAVE YOU ACTUALLY HAD ANY THOUGHTS OF KILLING YOURSELF IN THE PAST MONTH?: NO

## 2025-02-02 ASSESSMENT — ACTIVITIES OF DAILY LIVING (ADL)
ADLS_ACUITY_SCORE: 58

## 2025-02-03 VITALS
OXYGEN SATURATION: 100 % | TEMPERATURE: 98.4 F | BODY MASS INDEX: 17.39 KG/M2 | DIASTOLIC BLOOD PRESSURE: 67 MMHG | SYSTOLIC BLOOD PRESSURE: 144 MMHG | WEIGHT: 83.2 LBS | HEART RATE: 94 BPM | RESPIRATION RATE: 20 BRPM

## 2025-02-03 PROCEDURE — 96361 HYDRATE IV INFUSION ADD-ON: CPT

## 2025-02-03 PROCEDURE — 250N000011 HC RX IP 250 OP 636: Performed by: STUDENT IN AN ORGANIZED HEALTH CARE EDUCATION/TRAINING PROGRAM

## 2025-02-03 RX ORDER — HEPARIN SODIUM (PORCINE) LOCK FLUSH IV SOLN 100 UNIT/ML 100 UNIT/ML
SOLUTION INTRAVENOUS
Status: COMPLETED
Start: 2025-02-03 | End: 2025-02-03

## 2025-02-03 RX ORDER — METOCLOPRAMIDE 10 MG/1
10 TABLET ORAL
Qty: 28 TABLET | Refills: 0 | Status: SHIPPED | OUTPATIENT
Start: 2025-02-03 | End: 2025-02-04

## 2025-02-03 RX ORDER — HEPARIN SODIUM,PORCINE 10 UNIT/ML
VIAL (ML) INTRAVENOUS
Status: DISCONTINUED
Start: 2025-02-03 | End: 2025-02-03 | Stop reason: HOSPADM

## 2025-02-03 RX ORDER — HEPARIN SODIUM (PORCINE) LOCK FLUSH IV SOLN 100 UNIT/ML 100 UNIT/ML
5-10 SOLUTION INTRAVENOUS
Status: DISCONTINUED | OUTPATIENT
Start: 2025-02-03 | End: 2025-02-03 | Stop reason: HOSPADM

## 2025-02-03 RX ADMIN — HEPARIN SODIUM (PORCINE) LOCK FLUSH IV SOLN 100 UNIT/ML 5 ML: 100 SOLUTION at 02:05

## 2025-02-03 RX ADMIN — Medication 5 ML: at 02:05

## 2025-02-03 ASSESSMENT — ACTIVITIES OF DAILY LIVING (ADL)
ADLS_ACUITY_SCORE: 58
ADLS_ACUITY_SCORE: 58

## 2025-02-03 NOTE — MEDICATION SCRIBE - ADMISSION MEDICATION HISTORY
Medication Scribe Admission Medication History    Admission medication history is complete. The information provided in this note is only as accurate as the sources available at the time of the update.    Information Source(s): Family member via in-person    Pertinent Information: Patient's daughters at bedside and provided updated PTA med list.   Per daughter, patient have not yet start taking Memantine 5 mg and is currently on hold. Memantine 10 mg is scheduled to start in the future.      Changes made to PTA medication list:  Added: Rivastigmine (per patient family and fill history)   Deleted: Tylenol #3 (per patient family)  Changed: None    Allergies reviewed with patient and updates made in EHR: yes    Medication History Completed By: Yeny Alvarado 2/2/2025 10:53 PM    PTA Med List   Medication Sig Last Dose/Taking    acarbose (PRECOSE) 25 MG tablet Take 25 mg by mouth 3 times daily (with meals) 2/2/2025 Morning    acetaminophen (TYLENOL) 325 MG tablet Take 325-650 mg by mouth every 6 hours as needed for mild pain Taking As Needed    amLODIPine (NORVASC) 5 MG tablet Take 1 tablet (5 mg) by mouth daily 2/2/2025 Morning    Biotin 5 MG TABS Take 5,000 mcg by mouth at bedtime. 2/1/2025 Bedtime    busPIRone (BUSPAR) 7.5 MG tablet Take 7.5 mg by mouth 2 times daily. 2/2/2025 Morning    celecoxib (CELEBREX) 200 MG capsule Take 200 mg by mouth daily. 2/2/2025 Morning    denosumab (PROLIA) 60 MG/ML SOSY injection Inject 60 mg Subcutaneous every 6 months Past Month    eszopiclone (LUNESTA) 1 MG tablet Take 1 tablet (1 mg) by mouth at bedtime. 2/1/2025 Bedtime    memantine (NAMENDA) 10 MG tablet Take 10 mg by mouth 2 times daily. Take 1 tablet (10 mg) by mouth 2 times daily. Unknown    memantine (NAMENDA) 5 MG tablet Take 5 mg by mouth See Admin Instructions. 1 PO every day x 7 days, then 1 PO BID x 7 days, then 2 PO Q am & 1 PO at bedtime x 7 days, then 2 PO BID thereafter Unknown    metoprolol tartrate  (LOPRESSOR) 25 MG tablet Take 25 mg by mouth daily. 2/2/2025 Morning    multivitamin with minerals (THERA-M) 9 mg iron-400 mcg Tab tablet Take 1 tablet by mouth at bedtime. 2/1/2025 Bedtime    nortriptyline (PAMELOR) 50 MG capsule Take 50 mg by mouth at bedtime. 2/1/2025 Bedtime    omeprazole (PRILOSEC) 20 MG capsule [OMEPRAZOLE (PRILOSEC) 20 MG CAPSULE] Take 20 mg by mouth daily before breakfast. 2/2/2025 Morning    ondansetron (ZOFRAN ODT) 4 MG ODT tab Take 4 mg by mouth every 8 hours as needed for nausea. Taking As Needed    polyethylene glycol (MIRALAX) 17 GM/Dose powder Take 17 g by mouth daily. 2/1/2025 Morning    Probiotic Product (SOLUBLE FIBER/PROBIOTICS PO) 2 tablespoons in 8 oz of liquid daily 2/2/2025 Morning    rivastigmine (EXELON) 4.5 MG capsule Take 4.5 mg by mouth 2 times daily. 2/2/2025 Morning    sennosides (SENOKOT) 8.6 MG tablet Take 2 tablets by mouth daily as needed for constipation Taking As Needed    simethicone (MYLICON) 125 MG chewable tablet Take 125 mg by mouth as needed. Taking As Needed

## 2025-02-03 NOTE — TELEPHONE ENCOUNTER
"Nurse Triage SBAR    Is this a 2nd Level Triage? NO    Situation: Vomiting with diarrhea, babin catheter output low    Background:   -Colorectal surgery on 1/23, with specialty center, prolapsed rectum repair, ever since surgery Nauseous, episodes of vomiting. Also treated for UTI in hospital    Assessment:   Hx: Had surgical repair of Prolapsed rectum, with \"colorectal surgery\"  -Was in hospital 6 days, then discharged from [facility]  -Still nauseous, spoke to doctor Basilio at colorectal surgery yesterday (not affiliated with , cannot see notein chart review)MD  Rx: Nausea pills  -Tried essential oil, ginger tea and nothing has helped  -Continues to vomiting, diarrhea, and nausea  -Babin catheter: Output is low, very orange, (normally fill the bag, since this am, Markedly decreased,   = Total Urine output decreased to only 350 mL in 18 hours approx. Only 0.5 ml/kg/hour  -Pt. May be more confused than baseline  -Colorectal center clinic: Dr. Basilio, advised yesterday, to bring her in if vomiting persists  -Hx: Lymphoma in remission, PET scan negative  -Port a cath: Right side  -Only hospital to go to is [facility]:   -Home Nursing, just saw her on Friday,               Protocol Recommended Disposition:   Go to ED Now (Or PCP Triage)    Recommendation:   -At end of triage, was going to 2LT, however daughter stated, My mom has a port and we have Home Care: Nursing, maybe they could come out to give IV Fluids.  She will call back within 15 minutes, to see if I need to proceed with PCP: 2LT. Dr. Zack Woody, FM: [facility]    -[facility]: cannot reach on call provider    -Family called back and upon further discussion, now blood pressure is low they are going to bring her to the ER at [facility]        Reason for Disposition   [1] MODERATE vomiting (e.g., 3 - 5 times/day) AND [2] age > 60 years    Additional Information   Negative: Shock suspected (e.g., cold/pale/clammy skin, too weak to stand, low BP, " "rapid pulse)   Negative: Difficult to awaken or acting confused (e.g., disoriented, slurred speech)   Negative: Sounds like a life-threatening emergency to the triager   Negative: Vomiting occurs only while coughing   Negative: [1] Pregnant < 20 Weeks AND [2] nausea/vomiting began in early pregnancy (i.e., 4-8 weeks pregnant)   Negative: Chest pain   Negative: Headache is main symptom   Negative: Vomiting (or Nausea) in a cancer patient who is currently (or recently) receiving chemotherapy or radiation therapy, or cancer patient who has metastatic or end-stage cancer and is receiving palliative care   Negative: [1] Vomiting AND [2] contains red blood or black (\"coffee ground\") material  (Exception: Few red streaks in vomit that only happened once.)   Negative: Severe pain in one eye   Negative: Recent head injury (within last 3 days)   Negative: Recent abdominal injury (within last 3 days)   Negative: [1] Insulin-dependent diabetes (Type I) AND [2] glucose > 400 mg/dl (22 mmol/l)   Negative: [1] Vomiting AND [2] hernia is more painful or swollen than usual   Negative: [1] SEVERE vomiting (e.g., 6 or more times/day) AND [2] present > 8 hours (Exception: Patient sounds well, is drinking liquids, does not sound dehydrated, and vomiting has lasted less than 24 hours.)    Protocols used: Vomiting-A-  Fannie Ma RN, Triage Nurse Advisor, 2/2/2025 6:52 PM  "

## 2025-02-03 NOTE — ED PROVIDER NOTES
EMERGENCY DEPARTMENT ENCOUNTER      NAME: Bibiana Mcrae  AGE: 79 year old female  YOB: 1945  MRN: [id number]  EVALUATION DATE & TIME: 2/2/2025  8:57 PM    PCP: Zack Woody    ED PROVIDER: Brent Murry MD      Chief Complaint   Patient presents with    Dehydration    Nausea         FINAL IMPRESSION:  1. Nausea and vomiting, unspecified vomiting type          ED COURSE & MEDICAL DECISION MAKING:    Pertinent Labs & Imaging studies reviewed. (See chart for details)  79 year old female presents to the Emergency Department for evaluation of dehydration, nausea    ED Course as of 02/03/25 0147   Sun Feb 02, 2025   2110 Patient is a 79-year-old female with a past medical history significant for lymphoma currently in remission with a PET scan done 2 days ago, in addition to recent surgery with colorectal surgery for prolapsed rectum, discharged from the hospital 4 days ago.  Ever since the surgery she has been having persistent nausea and vomiting, which has not improved at home, and now she is feeling dehydrated and having low urine output, approximately 350 cc in the past 16 hours.  She has no pain, and on exam has no abdominal tenderness.  She appears frail, dry, tired.  Plan for Reglan and Benadryl and fluids for her symptoms.  Will also obtain blood work to evaluate for kidney abnormalities or electrolyte abnormalities.  Given the lack of tenderness or pain, do not feel that imaging is indicated at this time, especially since she just had a CT scan 2 days ago.   2205 Urinalysis is not indicative of infection.  She does not have a leukocytosis or significant anemia.  No electrolyte abnormalities or kidney injury.  No transaminitis or pancreatitis.   Mon Feb 03, 2025   0129 After second liter of fluids, patient continues to feel better, has tolerated p.o., blood pressure also improved.  Will discharge at this time with return precautions. Reglan prescribed for nausea.         Medical Decision  Making  Obtained supplemental history:Supplemental history obtained?: No  Reviewed external records: External records reviewed?: No  Care impacted by chronic illness:Documented in Chart  Did you consider but not order tests?: Work up considered but not performed and documented in chart, if applicable  Did you interpret images independently?: Independent interpretation of ECG and images noted in documentation, when applicable.  Consultation discussion with other provider:Did you involve another provider (consultant, , pharmacy, etc.)?: No  Discharge. I prescribed additional prescription strength medication(s) as charted. See documentation for any additional details.    MIPS: Not Applicable          At the conclusion of the encounter I discussed the results of all of the tests and the disposition. The questions were answered. The patient or family acknowledged understanding and was agreeable with the care plan.     0 minutes of critical care time     MEDICATIONS GIVEN IN THE EMERGENCY:  Medications   sodium chloride 0.9% BOLUS 1,000 mL (0 mLs Intravenous Stopped 2/2/25 2344)   metoclopramide (REGLAN) injection 10 mg (10 mg Intravenous $Given 2/2/25 2126)   diphenhydrAMINE (BENADRYL) injection 25 mg (25 mg Intravenous $Given 2/2/25 2125)   sodium chloride 0.9% BOLUS 1,000 mL (1,000 mLs Intravenous $New Bag 2/2/25 4686)       NEW PRESCRIPTIONS STARTED AT TODAY'S ER VISIT  New Prescriptions    METOCLOPRAMIDE (REGLAN) 10 MG TABLET    Take 1 tablet (10 mg) by mouth 4 times daily (before meals and nightly) for 7 days.          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    The patient had perineal rectosigmoidectomy on 01/23/2025 and currently has a urethral babin catheter in place. She has been nauseous accompanied with vomiting episodes constantly after being discharged from admission. She has also been having diarrhea. Her daughter reports the patient has a decreased output in her catheter bag. She has been unable to keep fluids down. She has been taking Zofran as prescribed without any signficant relief. She had a low-grade temperature of 99 F today. Her daughter reports the patient does have confusion at night. She is in remission for lymphoma. No complaints of abdominal pain or any other associated symptoms at this time.      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)     Diverticular disease of large intestine     Gastroesophageal reflux disease     History of blood transfusion     History of cholecystectomy 08/24/2023    Hypertension     Major neurocognitive disorder due to Alzheimer's disease (H)     Memory loss     Osteoporosis     PONV (postoperative nausea and vomiting)     Rectal prolapse        PAST SURGICAL HISTORY:  Past Surgical History:   Procedure Laterality Date    BREAST CYST ASPIRATION Left     While ago    HYSTERECTOMY  1985    IR CHEST PORT PLACEMENT > 5 YRS OF AGE  5/1/2024    OOPHORECTOMY Bilateral 1985    RECTOSIGMOIDECTOMY PERINEAL N/A 1/23/2025    Procedure: PERINEAL RECTOSIGMOIDECTOMY;  Surgeon: Josee Basilio MD;  Location: [facility] OR    [facility] REMOVAL OF OVARY(S)      Description: Oophorectomy;  Recorded: 12/17/2009;    [facility] TOTAL ABDOM HYSTERECTOMY      Description: Total Abdominal Hysterectomy;  Recorded: 12/17/2009;           CURRENT MEDICATIONS:     acarbose (PRECOSE) 25 MG tablet  acetaminophen (TYLENOL) 325 MG tablet  amLODIPine (NORVASC) 5 MG tablet  Biotin 5 MG TABS  busPIRone (BUSPAR) 7.5 MG tablet  celecoxib (CELEBREX) 200 MG capsule  denosumab (PROLIA) 60 MG/ML SOSY injection  eszopiclone (LUNESTA) 1 MG tablet  memantine (NAMENDA) 10 MG tablet  memantine (NAMENDA) 5 MG tablet  metoprolol tartrate (LOPRESSOR) 25 MG tablet  multivitamin with minerals (THERA-M) 9 mg iron-400 mcg Tab tablet  nortriptyline (PAMELOR) 50 MG capsule  omeprazole (PRILOSEC) 20 MG capsule  ondansetron (ZOFRAN ODT) 4 MG ODT tab  polyethylene glycol (MIRALAX) 17 GM/Dose powder  Probiotic Product (SOLUBLE FIBER/PROBIOTICS PO)  rivastigmine (EXELON) 4.5 MG capsule  sennosides (SENOKOT) 8.6 MG tablet  simethicone (MYLICON) 125 MG chewable tablet  metoclopramide (REGLAN) 10 MG tablet        ALLERGIES:  Allergies   Allergen Reactions    Blood Transfusion Related (Informational Only) Other (See Comments)     Patient has a history of a Clinically Significant antibody against RBC antigens. A delay in compatible RBCs may occur. 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    Prochlorperazine Edisylate [Prochlorperazine] Shortness Of Breath     Extrapyramidal side effects       FAMILY HISTORY:  Family History   Problem Relation Age of Onset    Breast Cancer Mother 52.00    Cancer Maternal Grandfather 71.00        prostate    Prostate Cancer Maternal Grandfather        SOCIAL HISTORY:   Social History     Socioeconomic History    Marital status:    Tobacco Use    Smoking status: Never     Passive exposure: Never    Smokeless tobacco: Never   Vaping Use    Vaping status: Never Used   Substance and Sexual Activity    Alcohol use: No    Drug use: Never     Social Drivers of Health     Financial Resource Strain: Low Risk  (1/24/2025)    Financial Resource Strain     Within the past 12 months, have you or your family members you live with been unable to get utilities (heat,  electricity) when it was really needed?: No   Food Insecurity: Low Risk  (1/24/2025)    Food Insecurity     Within the past 12 months, did you worry that your food would run out before you got money to buy more?: No     Within the past 12 months, did the food you bought just not last and you didn t have money to get more?: No   Transportation Needs: Low Risk  (1/24/2025)    Transportation Needs     Within the past 12 months, has lack of transportation kept you from medical appointments, getting your medicines, non-medical meetings or appointments, work, or from getting things that you need?: No   Physical Activity: Unknown (4/25/2024)    Exercise Vital Sign     Days of Exercise per Week: 0 days   Stress: Stress Concern Present (4/25/2024)    [employer] - Occupational Stress Questionnaire     Feeling of Stress : Rather much   Social Connections: Unknown (4/25/2024)    Social Connection and Isolation Panel [NHANES]     Frequency of Social Gatherings with Friends and Family: Once a week   Interpersonal Safety: Low Risk  (1/23/2025)    Interpersonal Safety     Do you feel physically and emotionally safe where you currently live?: Yes     Within the past 12 months, have you been hit, slapped, kicked or otherwise physically hurt by someone?: No     Within the past 12 months, have you been humiliated or emotionally abused in other ways by your partner or ex-partner?: No   Housing Stability: Low Risk  (1/24/2025)    Housing Stability     Do you have housing? : Yes     Are you worried about losing your housing?: No       VITALS:  BP (!) 145/67   Pulse 89   Temp 98.4  F (36.9  C) (Oral)   Resp 20   Wt 37.7 kg (83 lb 3.2 oz)   LMP  (LMP Unknown)   SpO2 100%   BMI 17.39 kg/m      PHYSICAL EXAM    Physical Exam  Vitals and nursing note reviewed.   Constitutional:       General: She is not in acute distress.     Appearance: Normal appearance. She is normal weight. She is ill-appearing. She is  not diaphoretic.      Comments: Frail, tired   HENT:      Head: Normocephalic and atraumatic.      Nose: Nose normal.      Mouth/Throat:      Mouth: Mucous membranes are dry.      Pharynx: Oropharynx is clear.   Eyes:      Extraocular Movements: Extraocular movements intact.      Conjunctiva/sclera: Conjunctivae normal.      Pupils: Pupils are equal, round, and reactive to light.   Cardiovascular:      Rate and Rhythm: Normal rate and regular rhythm.      Pulses: Normal pulses.      Heart sounds: Normal heart sounds. No murmur heard.  Pulmonary:      Effort: Pulmonary effort is normal. No respiratory distress.      Breath sounds: Normal breath sounds.   Abdominal:      General: Abdomen is flat. There is no distension.      Palpations: Abdomen is soft.      Tenderness: There is no abdominal tenderness.   Musculoskeletal:         General: Normal range of motion.      Cervical back: Normal range of motion.      Right lower leg: No edema.      Left lower leg: No edema.   Skin:     General: Skin is warm and dry.      Capillary Refill: Capillary refill takes less than 2 seconds.   Neurological:      General: No focal deficit present.      Mental Status: She is alert and oriented to person, place, and time. Mental status is at baseline.   Psychiatric:         Mood and Affect: Mood normal.         Behavior: Behavior normal.         Thought Content: Thought content normal.         Judgment: Judgment normal.            LAB:  All pertinent labs reviewed and interpreted.  Results for orders placed or performed during the hospital encounter of 02/02/25   UA with Microscopic reflex to Culture    Specimen: Urine, Catheter   Result Value Ref Range    Color Urine Yellow Colorless, Straw, Light Yellow, Yellow    Appearance Urine Turbid (A) Clear    Glucose Urine Negative Negative mg/dL    Bilirubin Urine Negative Negative    Ketones Urine Negative Negative mg/dL    Specific Gravity Urine 1.021 1.001 - 1.030    Blood Urine Negative  Negative    pH Urine 5.5 5.0 - 7.0    Protein Albumin Urine 20 (A) Negative mg/dL    Urobilinogen Urine <2.0 <2.0 mg/dL    Nitrite Urine Negative Negative    Leukocyte Esterase Urine 500 Jud/uL (A) Negative    Hyphal Yeast Urine Few (A) None Seen /HPF    Mucus Urine Present (A) None Seen /LPF    RBC Urine 4 (H) <=2 /HPF    WBC Urine 176 (H) <=5 /HPF   Basic metabolic panel   Result Value Ref Range    Sodium 138 135 - 145 mmol/L    Potassium 4.1 3.4 - 5.3 mmol/L    Chloride 102 98 - 107 mmol/L    Carbon Dioxide (CO2) 26 22 - 29 mmol/L    Anion Gap 10 7 - 15 mmol/L    Urea Nitrogen 11.6 8.0 - 23.0 mg/dL    Creatinine 0.64 0.51 - 0.95 mg/dL    GFR Estimate 89 >60 mL/min/1.73m2    Calcium 9.0 8.8 - 10.4 mg/dL    Glucose 145 (H) 70 - 99 mg/dL   Hepatic function panel   Result Value Ref Range    Protein Total 5.9 (L) 6.4 - 8.3 g/dL    Albumin 3.5 3.5 - 5.2 g/dL    Bilirubin Total 0.2 <=1.2 mg/dL    Alkaline Phosphatase 120 40 - 150 U/L    AST 21 0 - 45 U/L    ALT 19 0 - 50 U/L    Bilirubin Direct <0.20 0.00 - 0.30 mg/dL   Result Value Ref Range    Lipase 46 13 - 60 U/L   CBC with platelets and differential   Result Value Ref Range    WBC Count 9.1 4.0 - 11.0 10e3/uL    RBC Count 4.35 3.80 - 5.20 10e6/uL    Hemoglobin 11.2 (L) 11.7 - 15.7 g/dL    Hematocrit 35.5 35.0 - 47.0 %    MCV 82 78 - 100 fL    MCH 25.7 (L) 26.5 - 33.0 pg    MCHC 31.5 31.5 - 36.5 g/dL    RDW 18.3 (H) 10.0 - 15.0 %    Platelet Count 390 150 - 450 10e3/uL    % Neutrophils 79 %    % Lymphocytes 11 %    % Monocytes 9 %    % Eosinophils 0 %    % Basophils 0 %    % Immature Granulocytes 0 %    NRBCs per 100 WBC 0 <1 /100    Absolute Neutrophils 7.2 1.6 - 8.3 10e3/uL    Absolute Lymphocytes 1.0 0.8 - 5.3 10e3/uL    Absolute Monocytes 0.8 0.0 - 1.3 10e3/uL    Absolute Eosinophils 0.0 0.0 - 0.7 10e3/uL    Absolute Basophils 0.0 0.0 - 0.2 10e3/uL    Absolute Immature Granulocytes 0.0 <=0.4 10e3/uL    Absolute NRBCs 0.0 10e3/uL       RADIOLOGY:  Reviewed all  pertinent imaging. Please see official radiology report.  No orders to display       PROCEDURES:   None      [facility] Documentation:   CMS Diagnoses:              I, Sesar Ventura, am serving as a scribe to document services personally performed by Brent Murry MD  based on my observation and the provider's statements to me. I, Brent Murry, attest that Sesar Ventura is acting in a scribe capacity, has observed my performance of the services and has documented them in accordance with my direction.    Brent Murry MD  [facility] EMERGENCY DEPARTMENT  [address] [place] [id number]  [phone]       Brent Murry MD  02/03/25 0147

## 2025-02-03 NOTE — ED TRIAGE NOTES
Pt had surgery for prolapsed rectum on 1/23/25.  Had babin placed then.  Pt discharged Wednesday.  Since surgery has had nausea and dry heaves.  Decreased urine output in babin and low bp. Pt has taken 2 zofran today.  Pt has lymphoma     Triage Assessment (Adult)       Row Name 02/02/25 1941          Triage Assessment    Airway WDL WDL        Respiratory WDL    Respiratory WDL WDL        Skin Circulation/Temperature WDL    Skin Circulation/Temperature WDL WDL        Cardiac WDL    Cardiac WDL WDL        Peripheral/Neurovascular WDL    Peripheral Neurovascular WDL WDL        Cognitive/Neuro/Behavioral WDL    Cognitive/Neuro/Behavioral WDL WDL

## 2025-02-03 NOTE — DISCHARGE INSTRUCTIONS
Fortunately your kidney function, electrolytes were all normal today.  We gave you 2 liters of fluids to help rehydrate you.    We prescribed you Reglan for nausea. Do not take this with Zofran, as this is in the same drug class.    Please return to the emergency department if your symptoms worsen.

## 2025-02-04 ENCOUNTER — TELEPHONE (OUTPATIENT)
Dept: NURSING | Facility: CLINIC | Age: 80
End: 2025-02-04
Payer: COMMERCIAL

## 2025-02-04 ENCOUNTER — TELEPHONE (OUTPATIENT)
Dept: ONCOLOGY | Facility: HOSPITAL | Age: 80
End: 2025-02-04
Payer: COMMERCIAL

## 2025-02-04 DIAGNOSIS — K21.00 GASTROESOPHAGEAL REFLUX DISEASE WITH ESOPHAGITIS WITHOUT HEMORRHAGE: Primary | ICD-10-CM

## 2025-02-04 RX ORDER — CEFPODOXIME PROXETIL 200 MG/1
200 TABLET, FILM COATED ORAL 2 TIMES DAILY
Qty: 14 TABLET | Refills: 0 | Status: SHIPPED | OUTPATIENT
Start: 2025-02-04 | End: 2025-02-11

## 2025-02-04 RX ORDER — METOCLOPRAMIDE 10 MG/1
10 TABLET ORAL
Qty: 360 TABLET | Refills: 0 | Status: SHIPPED | OUTPATIENT
Start: 2025-02-04 | End: 2025-05-05

## 2025-02-04 NOTE — TELEPHONE ENCOUNTER
[facility]    Reason for call: Lab Result Notification     Lab Result (including Rx patient on, if applicable).  If culture, copy of lab report at bottom.  Lab Result: Preliminary urine culture report on 2/4/25 shows the presence of bacteria(s):  >100,000 CFU/ML gram negative bacilli   Emergency Dept/Urgent Care discharge antibiotic: None  Recommendations per [facility] ED Lab result Urine culture protocol.     Creatinine Level (mg/dl)   Creatinine   Date Value Ref Range Status   02/02/2025 0.64 0.51 - 0.95 mg/dL Final    Creatinine clearance (ml/min), if applicable    Serum creatinine: 0.64 mg/dL 02/02/25 2126  Estimated creatinine clearance: 42.4 mL/min     Allergies:  confirmed.  No allergies to antibiotics     Allergies   Allergen Reactions    Blood Transfusion Related (Informational Only) Other (See Comments)     Patient has a history of a Clinically Significant antibody against RBC antigens. A delay in compatible RBCs may occur. 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    Prochlorperazine Edisylate [Prochlorperazine] Shortness Of Breath     Extrapyramidal side effects     On Coumadin/Warfarin:     Patient's current Symptoms:   Contacted Rosa Isela, daughter (Consent on file)  Per daughter she is doing better but still some nausea.  She is taking Reglan for the nausea  Daughter is working pushing fluids.    Urine is cloudy and dark yellow    RN Recommendations/Instructions per [place] ED lab result protocol:   [facility] ED lab result protocol utilized: urine culture  Instruct to start antibiotic: Cefpodoxime 200 mg PO BID for 7 days    Patient/care giver notified to contact your PCP clinic or return to the Emergency department if your:  Symptoms worsen or other concerning symptoms.        Da Paiz RN

## 2025-02-04 NOTE — TELEPHONE ENCOUNTER
I received a phone call today from Bibiana's daughter, Rosa Isela. She is calling to see if her Mom's appointment on 2/6 could be changed to a video visit. Per Rosa Isela Mccarty was in the ER recently and was found to have a bladder infection. She is on antibiotics but is pretty weak and tired. She is also requesting a refill of her Mom's reglan. She is taking this for nausea and it is helping. She would like a 90 day supply If possible.     Dr. Friedell is okay with changing the appointment to a video visit and he sent in a new prescription for Reglan. I called Rosa Isela back and discussed.     Priya Barrett RN on 2/4/2025 at 2:26 PM

## 2025-02-05 LAB
BACTERIA UR CULT: ABNORMAL
BACTERIA UR CULT: ABNORMAL

## 2025-02-06 ENCOUNTER — VIRTUAL VISIT (OUTPATIENT)
Dept: ONCOLOGY | Facility: HOSPITAL | Age: 80
End: 2025-02-06
Attending: INTERNAL MEDICINE
Payer: COMMERCIAL

## 2025-02-06 ENCOUNTER — PATIENT OUTREACH (OUTPATIENT)
Dept: ONCOLOGY | Facility: HOSPITAL | Age: 80
End: 2025-02-06

## 2025-02-06 VITALS — HEIGHT: 58 IN | BODY MASS INDEX: 17.21 KG/M2 | WEIGHT: 82 LBS

## 2025-02-06 DIAGNOSIS — C83.33 DIFFUSE LARGE B-CELL LYMPHOMA OF INTRA-ABDOMINAL LYMPH NODES (H): Primary | ICD-10-CM

## 2025-02-06 LAB
PATH REPORT.COMMENTS IMP SPEC: NORMAL
PATH REPORT.FINAL DX SPEC: NORMAL
PATH REPORT.GROSS SPEC: NORMAL
PATH REPORT.MICROSCOPIC SPEC OTHER STN: NORMAL
PATH REPORT.RELEVANT HX SPEC: NORMAL
PHOTO IMAGE: NORMAL

## 2025-02-06 PROCEDURE — 88307 TISSUE EXAM BY PATHOLOGIST: CPT | Mod: 26 | Performed by: PATHOLOGY

## 2025-02-06 ASSESSMENT — PAIN SCALES - GENERAL: PAINLEVEL_OUTOF10: NO PAIN (0)

## 2025-02-06 NOTE — NURSING NOTE
Patient confirms medications and allergies are accurate via patients echeck in completion, and or denies any changes since last reviewed/verified.       Current patient location: [address] [facility] [address]    Is the patient currently in the state of [place]? YES    Visit mode: VIDEO    If the visit is dropped, the patient can be reconnected by:VIDEO VISIT: Text to cell phone:   Telephone Information:   Mobile [phone]       Will anyone else be joining the visit? Rosa Isela Bledsoe Daughters  (If patient encounters technical issues they should call [phone] :[phone])    Are changes needed to the allergy or medication list? No    Are refills needed on medications prescribed by this physician? NO    Rooming Documentation:  Questions stated, provider joined while asking.     Reason for visit: RECHECK (PET scan )    Valentina ESCALERA

## 2025-02-06 NOTE — PROGRESS NOTES
Virtual Visit Details    Type of service:  Video Visit   Virtual Visit Details     Type of service:  Video Visit   Video Start Time:  2:36pm  Video End Time: 2:46pm    Originating Location (pt. Location): Home     Distant Location (provider location):  On-site  Platform used for Video Visit: [employer]

## 2025-02-06 NOTE — PROGRESS NOTES
Virtual Visit Details    Type of service:  Video Visit   Video Start Time:  2:36pm  Video End Time: 2:46pm    Originating Location (pt. 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  This is complicated by a urinary tract infection.  She still has Deutsch catheter in and is taking antibiotics.  Today she feels poorly related to antibiotics which are upsetting her stomach and resulting in loss of appetite and general fatigue.  Repeat PET CT scan showed that the area of FDG avidity near the pancreas is resolving.  There is no evidence of disease progression plan repeat CT scan with PET scan in about 12 weeks to monitor her progress.  She will return earlier if she develops symptoms related to lymphoma.      ______________________________________________________________________________    Staging History   Cancer Staging   No matching staging information was found for the patient.    ECOG Performance = 1    History of Present Illness  Disease history per Dr. Florian         Diagnosis: Diffuse large B-cell lymphoma.  This was found incidentally in April 2024.  She had a fall and came to the ER and they did a CT scan.  This showed an abdominal mass.  -4/15/24: Biopsy done of abdominal mass that was labeled as pancreatic mass.  It shows a CD20 positive large cell lymphoma.  -5/2/24: Hypermetabolic lymphadenopathy above and below the diaphragm, consistent with biopsy-proven lymphoma. Radiotracer avid mixed lytic and sclerotic observations in  "the left ischium and right distal femur metaphysis are nonspecific but raise possibility of marrow involvement  9/10/2024 PET/CT shows near complete treatment response.  11/27/2024 Pet CT shows Increased activity at the site of the original lesion. New biopsy is planned  12/5/2024 Ct scan of the abdomen shows no mass to biopsy.  Plan repeat pet with contrast ct in about 8 weeks.  1/31/20205 repeat CT scan shows continued resolution of the FDG avid area.     Treatment:  -5/3/2024: Patient started R mini CHOP. PET after 3 cycles showed CR.   8/16/2024 6th and final treatment completed     Interim history:  Patient recently underwent surgery for rectal prolapse.  Postoperative recovery is complicated by urinary tract infection which has required a Deutsch catheter and antibiotics.  Patient feels poorly with loss of appetite and upset stomach.        Review of systems.  Pertinent Findings are included in the History of Present Illness    Physical Exam    Ht 1.48 m (4' 10.25\")   Wt 37.2 kg (82 lb)   LMP  (LMP Unknown)   BMI 16.99 kg/m       Not examined, video visit    Medications:    Current Outpatient Medications   Medication Sig Dispense Refill    acarbose (PRECOSE) 25 MG tablet Take 25 mg by mouth 3 times daily (with meals)      acetaminophen (TYLENOL) 325 MG tablet Take 325-650 mg by mouth every 6 hours as needed for mild pain      amLODIPine (NORVASC) 5 MG tablet Take 1 tablet (5 mg) by mouth daily 90 tablet 3    Biotin 5 MG TABS Take 5,000 mcg by mouth at bedtime.      busPIRone (BUSPAR) 7.5 MG tablet Take 7.5 mg by mouth 2 times daily.      cefpodoxime (VANTIN) 200 MG tablet Take 1 tablet (200 mg) by mouth 2 times daily for 7 days. 14 tablet 0    celecoxib (CELEBREX) 200 MG capsule Take 200 mg by mouth daily.      denosumab (PROLIA) 60 MG/ML SOSY injection Inject 60 mg Subcutaneous every 6 months      eszopiclone (LUNESTA) 1 MG tablet Take 1 tablet (1 mg) by mouth at bedtime. 90 tablet 1    memantine (NAMENDA) " 10 MG tablet Take 10 mg by mouth 2 times daily. Take 1 tablet (10 mg) by mouth 2 times daily.      memantine (NAMENDA) 5 MG tablet Take 5 mg by mouth See Admin Instructions. 1 PO every day x 7 days, then 1 PO BID x 7 days, then 2 PO Q am & 1 PO at bedtime x 7 days, then 2 PO BID thereafter      metoclopramide (REGLAN) 10 MG tablet Take 1 tablet (10 mg) by mouth 4 times daily (before meals and nightly) for 360 doses. 360 tablet 0    metoprolol tartrate (LOPRESSOR) 25 MG tablet Take 25 mg by mouth daily.      multivitamin with minerals (THERA-M) 9 mg iron-400 mcg Tab tablet Take 1 tablet by mouth at bedtime.      nortriptyline (PAMELOR) 50 MG capsule Take 50 mg by mouth at bedtime.      omeprazole (PRILOSEC) 20 MG capsule [OMEPRAZOLE (PRILOSEC) 20 MG CAPSULE] Take 20 mg by mouth daily before breakfast.      ondansetron (ZOFRAN ODT) 4 MG ODT tab Take 4 mg by mouth every 8 hours as needed for nausea.      polyethylene glycol (MIRALAX) 17 GM/Dose powder Take 17 g by mouth daily. 510 g 1    Probiotic Product (SOLUBLE FIBER/PROBIOTICS PO) 2 tablespoons in 8 oz of liquid daily      rivastigmine (EXELON) 4.5 MG capsule Take 4.5 mg by mouth 2 times daily.      sennosides (SENOKOT) 8.6 MG tablet Take 2 tablets by mouth daily as needed for constipation      simethicone (MYLICON) 125 MG chewable tablet Take 125 mg by mouth as needed.       No current facility-administered medications for this visit.           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)     Diverticular disease of large intestine     Gastroesophageal reflux disease     History of blood transfusion     History of cholecystectomy 08/24/2023    Hypertension     Major neurocognitive disorder due to  Alzheimer's disease (H)     Memory loss     Osteoporosis     PONV (postoperative nausea and vomiting)     Rectal prolapse      Past Surgical History:   Procedure Laterality Date    BREAST CYST ASPIRATION Left     While ago    HYSTERECTOMY  1985    IR CHEST PORT PLACEMENT > 5 YRS OF AGE  5/1/2024    OOPHORECTOMY Bilateral 1985    RECTOSIGMOIDECTOMY PERINEAL N/A 1/23/2025    Procedure: PERINEAL RECTOSIGMOIDECTOMY;  Surgeon: Josee Basilio MD;  Location: [facility]    [facility] REMOVAL OF OVARY(S)      Description: Oophorectomy;  Recorded: 12/17/2009;    [facility] TOTAL ABDOM HYSTERECTOMY      Description: Total Abdominal Hysterectomy;  Recorded: 12/17/2009;     Allergies   Allergen Reactions    Blood Transfusion Related (Informational Only) Other (See Comments)     Patient has a history of a Clinically Significant antibody against RBC antigens. A delay in compatible RBCs may occur. 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    Prochlorperazine Edisylate [Prochlorperazine] Shortness Of Breath     Extrapyramidal side effects     Family History   Problem Relation Age of Onset    Breast Cancer Mother 52.00    Cancer Maternal Grandfather 71.00        prostate    Prostate Cancer Maternal Grandfather      Social History     Socioeconomic History    Marital status:      Spouse name: None    Number of children: None    Years of education: None    Highest education level: None   Tobacco Use    Smoking status: Never     Passive exposure: Never    Smokeless tobacco: Never   Vaping Use    Vaping status: Never Used   Substance and Sexual Activity    Alcohol use: No    Drug use: Never     Social Determinants of Health     Financial Resource Strain: Low Risk  (4/25/2024)    Financial Resource Strain     Within the past 12 months, have you or your family members you live with been unable to get utilities (heat, electricity) when it was really needed?: No   Food Insecurity: Low Risk  (4/25/2024)    Food Insecurity      Within the past 12 months, did you worry that your food would run out before you got money to buy more?: No     Within the past 12 months, did the food you bought just not last and you didn t have money to get more?: No   Transportation Needs: Low Risk  (4/25/2024)    Transportation Needs     Within the past 12 months, has lack of transportation kept you from medical appointments, getting your medicines, non-medical meetings or appointments, work, or from getting things that you need?: No   Physical Activity: Unknown (4/25/2024)    Exercise Vital Sign     Days of Exercise per Week: 0 days   Stress: Stress Concern Present (4/25/2024)    [employer] - Occupational Stress Questionnaire     Feeling of Stress : Rather much   Social Connections: Unknown (4/25/2024)    Social Connection and Isolation Panel [NHANES]     Frequency of Social Gatherings with Friends and Family: Once a week   Interpersonal Safety: Low Risk  (4/25/2024)    Interpersonal Safety     Do you feel physically and emotionally safe where you currently live?: Yes     Within the past 12 months, have you been hit, slapped, kicked or otherwise physically hurt by someone?: No     Within the past 12 months, have you been humiliated or emotionally abused in other ways by your partner or ex-partner?: No   Housing Stability: Low Risk  (4/25/2024)    Housing Stability     Do you have housing? : Yes     Are you worried about losing your housing?: No           Lab Results    Recent Results (from the past 240 hours)   Potassium    Collection Time: 01/27/25  5:12 PM   Result Value Ref Range    Potassium 3.6 3.4 - 5.3 mmol/L   Potassium    Collection Time: 01/28/25  6:21 AM   Result Value Ref Range    Potassium 3.7 3.4 - 5.3 mmol/L   Platelet count    Collection Time: 01/29/25  5:13 AM   Result Value Ref Range    Platelet Count 315 150 - 450 10e3/uL   Potassium    Collection Time: 01/29/25  5:13 AM   Result Value Ref Range    Potassium 3.6  3.4 - 5.3 mmol/L   Glucose by meter    Collection Time: 01/31/25 10:18 AM   Result Value Ref Range    GLUCOSE BY METER POCT 109 (H) 70 - 99 mg/dL   Basic metabolic panel    Collection Time: 02/02/25  9:26 PM   Result Value Ref Range    Sodium 138 135 - 145 mmol/L    Potassium 4.1 3.4 - 5.3 mmol/L    Chloride 102 98 - 107 mmol/L    Carbon Dioxide (CO2) 26 22 - 29 mmol/L    Anion Gap 10 7 - 15 mmol/L    Urea Nitrogen 11.6 8.0 - 23.0 mg/dL    Creatinine 0.64 0.51 - 0.95 mg/dL    GFR Estimate 89 >60 mL/min/1.73m2    Calcium 9.0 8.8 - 10.4 mg/dL    Glucose 145 (H) 70 - 99 mg/dL   Hepatic function panel    Collection Time: 02/02/25  9:26 PM   Result Value Ref Range    Protein Total 5.9 (L) 6.4 - 8.3 g/dL    Albumin 3.5 3.5 - 5.2 g/dL    Bilirubin Total 0.2 <=1.2 mg/dL    Alkaline Phosphatase 120 40 - 150 U/L    AST 21 0 - 45 U/L    ALT 19 0 - 50 U/L    Bilirubin Direct <0.20 0.00 - 0.30 mg/dL   Lipase    Collection Time: 02/02/25  9:26 PM   Result Value Ref Range    Lipase 46 13 - 60 U/L   CBC with platelets and differential    Collection Time: 02/02/25  9:26 PM   Result Value Ref Range    WBC Count 9.1 4.0 - 11.0 10e3/uL    RBC Count 4.35 3.80 - 5.20 10e6/uL    Hemoglobin 11.2 (L) 11.7 - 15.7 g/dL    Hematocrit 35.5 35.0 - 47.0 %    MCV 82 78 - 100 fL    MCH 25.7 (L) 26.5 - 33.0 pg    MCHC 31.5 31.5 - 36.5 g/dL    RDW 18.3 (H) 10.0 - 15.0 %    Platelet Count 390 150 - 450 10e3/uL    % Neutrophils 79 %    % Lymphocytes 11 %    % Monocytes 9 %    % Eosinophils 0 %    % Basophils 0 %    % Immature Granulocytes 0 %    NRBCs per 100 WBC 0 <1 /100    Absolute Neutrophils 7.2 1.6 - 8.3 10e3/uL    Absolute Lymphocytes 1.0 0.8 - 5.3 10e3/uL    Absolute Monocytes 0.8 0.0 - 1.3 10e3/uL    Absolute Eosinophils 0.0 0.0 - 0.7 10e3/uL    Absolute Basophils 0.0 0.0 - 0.2 10e3/uL    Absolute Immature Granulocytes 0.0 <=0.4 10e3/uL    Absolute NRBCs 0.0 10e3/uL   UA with Microscopic reflex to Culture    Collection Time: 02/02/25  9:42 PM     Specimen: Urine, Catheter   Result Value Ref Range    Color Urine Yellow Colorless, Straw, Light Yellow, Yellow    Appearance Urine Turbid (A) Clear    Glucose Urine Negative Negative mg/dL    Bilirubin Urine Negative Negative    Ketones Urine Negative Negative mg/dL    Specific Gravity Urine 1.021 1.001 - 1.030    Blood Urine Negative Negative    pH Urine 5.5 5.0 - 7.0    Protein Albumin Urine 20 (A) Negative mg/dL    Urobilinogen Urine <2.0 <2.0 mg/dL    Nitrite Urine Negative Negative    Leukocyte Esterase Urine 500 Jud/uL (A) Negative    Hyphal Yeast Urine Few (A) None Seen /HPF    Mucus Urine Present (A) None Seen /LPF    RBC Urine 4 (H) <=2 /HPF    WBC Urine 176 (H) <=5 /HPF   Urine Culture    Collection Time: 02/02/25  9:42 PM    Specimen: Urine, Catheter   Result Value Ref Range    Culture >100,000 CFU/mL Pseudomonas aeruginosa (A)     Culture >100,000 CFU/mL Enterococcus faecalis (A)        Susceptibility    Enterococcus faecalis - JOSE LUIS     Ampicillin <=2 Susceptible ug/mL     Vancomycin 1 Susceptible ug/mL     Nitrofurantoin <=16 Susceptible ug/mL    Pseudomonas aeruginosa - JOSE LUIS     Piperacillin/Tazobactam 8 Susceptible ug/mL     Ceftazidime 2 Susceptible ug/mL     Cefepime 2 Susceptible ug/mL     Meropenem 1 Susceptible ug/mL     Ciprofloxacin 0.12 Susceptible ug/mL     Levofloxacin 0.5 Susceptible ug/mL         Imaging Results    PET Oncology Whole Body    Result Date: 1/31/2025  EXAM: PET ONCOLOGY WHOLE BODY, CT CHEST/ABDOMEN/PELVIS W CONTRAST LOCATION: [facility] DATE: 1/31/2025 INDICATION: Lymphoma, non-Hodgkin, restaging. Diffuse large B-cell lymphoma, intra-abdominal lymph nodes. Status post systemic chemotherapy. Active surveillance. Subsequent treatment strategy. COMPARISON: PET/CT 11/27/2024. CT abdomen pelvis 12/5/2024, 1/15/2025, PET/CT 5/2/2024 also reviewed. CONTRAST: 42 mL Isovue-370 TECHNIQUE: Serum glucose level 109 mg/dL. One hour post intravenous administration  of 10.2 mCi F18 FDG, PET imaging was performed from the skull vertex to feet, utilizing attenuation correction with concurrent axial CT and PET/CT image fusion. Separate diagnostic CT of the chest, abdomen, and pelvis was performed. Dose reduction techniques were used. PET/CT FINDINGS: Since 11/27/2024, a prior focal area of uptake involving the anterior pancreatic body region has decreased in size and degree of now moderate uptake (SUVmax 4.5, previously 8.6). In the absence of any interval treatment, findings favor an improving inflammatory process rather than a site of active lymphoma. No new or enlarging FDG avid adenopathy above or below the diaphragm. Normal size liver and spleen with normal uptake. No suspicious focal skeletal uptake. Prominent moderate circumferential mural thickening with increased uptake has developed involving the lower rectum suggesting proctitis. Few minor groundglass opacities have developed in the posterior right lower lobe with low-level uptake, likely infectious/inflammatory. CT FINDINGS: Moderate senescent intracranial changes. Moderate mucosal thickening left sphenoid sinus. Right IJ Port-A-Cath with tip near the SVC/RA junction. Marked atherosclerotic calcifications including the coronary arteries. Dense mitral annulus calcification. Cholecystectomy. Stable mild biliary prominence from reservoir effect postcholecystectomy. Partially duplicated left renal collecting system with chronic left lower pole tubular fluid structure suggesting severe hydronephrosis and ureterectasis, unchanged. Moderate colonic diverticulosis. Deutsch catheter in the urinary bladder. Mild thoracolumbar curve. Moderate scattered hypertrophic degenerative changes in the spine.     IMPRESSION: 1. No current evidence of FDG avid malignancy. 2. Prior focal area of uptake anterior pancreatic body region has decreased in size and degree of uptake. In the absence of any interval treatment, findings favor an  improving inflammatory process rather than a site of active lymphoma. Continued attention on follow-up recommended. 3. FDG avid circumferential mural thickening has developed involving the lower rectum suggesting proctitis. 4. A few minor pulmonary groundglass infectious/inflammatory opacities have developed in the right lower lobe.     CT Chest/Abdomen/Pelvis w Contrast    Result Date: 1/31/2025  EXAM: PET ONCOLOGY WHOLE BODY, CT CHEST/ABDOMEN/PELVIS W CONTRAST LOCATION: [facility] DATE: 1/31/2025 INDICATION: Lymphoma, non-Hodgkin, restaging. Diffuse large B-cell lymphoma, intra-abdominal lymph nodes. Status post systemic chemotherapy. Active surveillance. Subsequent treatment strategy. COMPARISON: PET/CT 11/27/2024. CT abdomen pelvis 12/5/2024, 1/15/2025, PET/CT 5/2/2024 also reviewed. CONTRAST: 42 mL Isovue-370 TECHNIQUE: Serum glucose level 109 mg/dL. One hour post intravenous administration of 10.2 mCi F18 FDG, PET imaging was performed from the skull vertex to feet, utilizing attenuation correction with concurrent axial CT and PET/CT image fusion. Separate diagnostic CT of the chest, abdomen, and pelvis was performed. Dose reduction techniques were used. PET/CT FINDINGS: Since 11/27/2024, a prior focal area of uptake involving the anterior pancreatic body region has decreased in size and degree of now moderate uptake (SUVmax 4.5, previously 8.6). In the absence of any interval treatment, findings favor an improving inflammatory process rather than a site of active lymphoma. No new or enlarging FDG avid adenopathy above or below the diaphragm. Normal size liver and spleen with normal uptake. No suspicious focal skeletal uptake. Prominent moderate circumferential mural thickening with increased uptake has developed involving the lower rectum suggesting proctitis. Few minor groundglass opacities have developed in the posterior right lower lobe with low-level uptake, likely  infectious/inflammatory. CT FINDINGS: Moderate senescent intracranial changes. Moderate mucosal thickening left sphenoid sinus. Right IJ Port-A-Cath with tip near the SVC/RA junction. Marked atherosclerotic calcifications including the coronary arteries. Dense mitral annulus calcification. Cholecystectomy. Stable mild biliary prominence from reservoir effect postcholecystectomy. Partially duplicated left renal collecting system with chronic left lower pole tubular fluid structure suggesting severe hydronephrosis and ureterectasis, unchanged. Moderate colonic diverticulosis. Deutsch catheter in the urinary bladder. Mild thoracolumbar curve. Moderate scattered hypertrophic degenerative changes in the spine.     IMPRESSION: 1. No current evidence of FDG avid malignancy. 2. Prior focal area of uptake anterior pancreatic body region has decreased in size and degree of uptake. In the absence of any interval treatment, findings favor an improving inflammatory process rather than a site of active lymphoma. Continued attention on follow-up recommended. 3. FDG avid circumferential mural thickening has developed involving the lower rectum suggesting proctitis. 4. A few minor pulmonary groundglass infectious/inflammatory opacities have developed in the right lower lobe.     XR Chest Port 1 View    Result Date: 1/23/2025  EXAM: XR CHEST PORT 1 VIEW LOCATION: [facility] DATE: 1/23/2025 INDICATION: tachy COMPARISON: CT abdomen pelvis 1/15/2025, PET/CT 11/27/2024     IMPRESSION: Normal heart size. Lungs are clear. No pleural effusion or pneumothorax. Right IJ port terminates in the SVC at the cavoatrial junction. Moderate free air under the right hemidiaphragm compatible with recent surgery.    CT Abdomen Pelvis w Contrast    Result Date: 1/15/2025  EXAM: CT ABDOMEN PELVIS W CONTRAST LOCATION: [facility] DATE: 1/15/2025 INDICATION: Recurrent rectal prolapse status post reduction,  persistent rectal pain, no bowel movement in >2 weeks COMPARISON: 12/5/2024, PET/CT 11/27/2024 TECHNIQUE: CT scan of the abdomen and pelvis was performed following injection of IV contrast. Multiplanar reformats were obtained. Dose reduction techniques were used. CONTRAST: 44mL ISOVUE 370 FINDINGS: LOWER CHEST: Prominent mitral annular calcification. Lung bases are clear. HEPATOBILIARY: Prior cholecystectomy. Moderate biliary ectasia but no ductal calcification. PANCREAS: Normal. SPLEEN: Normal. ADRENAL GLANDS: Normal. KIDNEYS/BLADDER: Chronic severe left hydronephrosis, hydroureter, and left ureterocele. Marked urinary bladder distention measuring 16 cm x 10 cm x 12 cm. No bladder wall thickening or calcification. BOWEL: There is mild rectal wall thickening and mucosal hyperenhancement. There is mild surrounding perirectal edema. No free fluid or free air. No abscess. Large low stool in the colon. Small bowel normal in caliber. Postsurgical change in the stomach. LYMPH NODES: Normal. VASCULATURE: Normal. PELVIC ORGANS: Prior hysterectomy. MUSCULOSKELETAL: No suspicious bone lesion. Severe degenerative change in the hips. Severe degenerative change in the lumbar spine. Bilateral spondylolysis at L5 with moderate anterolisthesis of L5 on S1.     IMPRESSION: 1.  There is mild rectal wall thickening, mucosal hyperenhancement, and surrounding perirectal edema compatible with recent prolapse and reduction. No free air, free fluid, or evidence for other complication. 2.  Markedly distended urinary bladder. Stable appearance of left hydronephrosis, hydroureter, and ureterocele.       I spent 21 minutes on the patient's video visit today.  This included preparation for the visit, face-to-face time with the patient and documentation following the visit.  It did not include teaching or procedure time.    Signed by: Peter E. Friedell, MD

## 2025-02-06 NOTE — TELEPHONE ENCOUNTER
[facility]    Reason for call: Lab Result Notification     Lab Result (including Rx patient on, if applicable).  If culture, copy of lab report at bottom.  Lab Result: Urine Culture  2/2/25 No antibiotic prescribed in ED  2/4/25 Prescribed Cefpodoxime (VANTIN) 200 MG tablet Take 1 tablet (200 mg) by mouth 2 times daily for 7 days., Disp-14 tablet, R-0 (SUSCEPTIBLE >100,000 CFU/mL Pseudomonas aeruginosa Abnormal)  (>100,000 CFU/mL Enterococcus faecalis Abnormal  NOT TESTED)    Creatinine Level (mg/dl)   Creatinine   Date Value Ref Range Status   02/02/2025 0.64 0.51 - 0.95 mg/dL Final    Creatinine clearance (ml/min), if applicable    Serum creatinine: 0.64 mg/dL 02/02/25 2126  Estimated creatinine clearance: 42.4 mL/min     RN Recommendations/Instructions per [facility] ED lab result protocol:   [facility] ED lab result protocol utilized: Urine Culture    2/2/25 Presented to ED with symptoms of:  Nausea and vomiting,     2/6/25 Patient's current Symptoms at time of telephone encounter:   Daughter (consent on file to communicate).  Daughter states pt is doing better with new antibiotic.  Pt is taking Reglan for nausea which helps.  Pt is hydrated and putting out urine every 10-12 hrs.  Pt states she still has pain and burning with urination.  Over all feels better than when seen in ED on 2/2/25.  Pt has catheter.  Daughter states pt has a Urologist with [facility] and daughter will call Urologist now and inform of Urine Culture results and any next steps.  Pt is scheduled to see Urologist 2/12/25.    Patient/care giver notified to contact your PCP clinic or return to the Emergency department if your:  Symptoms return.  Symptoms do not improve after 3 days on antibiotic.  Symptoms do not resolve after completing antibiotic.  Symptoms worsen or other concerning symptoms.         Cheli Doran RN

## 2025-02-06 NOTE — PROGRESS NOTES
[facility]: Cancer Care                                                                                          Situation: Chart reviewed by RN Care Coordinator.    Background: Patient was seen virtually today for follow-up regarding B-cell lymphoma.     Assessment: Patient is recovering from surgery to repair a rectal prolapse.    Plan/Recommendations: Patient is to return to clinic in about 12 weeks with labs and scan prior.      Signature:  Lolita Keene  RN Care Coordinator  [facility]

## 2025-02-10 ENCOUNTER — TELEPHONE (OUTPATIENT)
Dept: FAMILY MEDICINE | Facility: CLINIC | Age: 80
End: 2025-02-10
Payer: COMMERCIAL

## 2025-02-10 NOTE — TELEPHONE ENCOUNTER
Forms/Letter Request    Type of form/letter: OTHER: [employer] [facility]       Do we have the form/letter: Yes: placed in provider inbox    Who is the form from? Home care    Where did/will the form come from? form was faxed in    When is form/letter needed by: any    How would you like the form/letter returned: Fax : [phone]    OKAY FOR OT TO EVAL AND TREAT

## 2025-02-11 ENCOUNTER — MEDICAL CORRESPONDENCE (OUTPATIENT)
Dept: HEALTH INFORMATION MANAGEMENT | Facility: CLINIC | Age: 80
End: 2025-02-11

## 2025-02-11 ENCOUNTER — NURSE TRIAGE (OUTPATIENT)
Dept: NURSING | Facility: CLINIC | Age: 80
End: 2025-02-11
Payer: COMMERCIAL

## 2025-02-11 ENCOUNTER — PATIENT OUTREACH (OUTPATIENT)
Dept: CARE COORDINATION | Facility: CLINIC | Age: 80
End: 2025-02-11
Payer: COMMERCIAL

## 2025-02-11 ENCOUNTER — TRANSFERRED RECORDS (OUTPATIENT)
Dept: HEALTH INFORMATION MANAGEMENT | Facility: CLINIC | Age: 80
End: 2025-02-11
Payer: COMMERCIAL

## 2025-02-11 NOTE — TELEPHONE ENCOUNTER
Nurse Triage SBAR    Is this a 2nd Level Triage? NO    Situation: Urinary Symptoms    Background: Patient currently being treated for UTI with 2 antibiotics. She had babin catheter placed during 1/23/25 hospital stay.    Assessment: Patient's daughter (CTC located in chart) calling to report new urinary incontinence after removing babin catheter this evening at 2300. Patient continues to have dark urine with strong odor and pain/burning with voiding and standing. Patient continues to vomit since 1/23/25. Denies severe weakness, hematuria,  urinary retention, flank pain, or fever.    Protocol Recommended Disposition:   See PCP Within 24 Hours    Recommendation: According to the protocol, patient should see provider within 24 hours.  Care advice given.     CALL BACK IF: * Fever occurs * Unable to urinate and bladder feels full * You become worse     Patient verbalizes understanding and agrees with plan of care. Patient has urology appointment today.     Rosana Vargas RN  02/11/25 12:54 AM  [facility] Nurse Advisor    Reason for Disposition   Urinating more frequently than usual (i.e., frequency)   Bad or foul-smelling urine   [1] Can't control passage of urine (i.e., urinary incontinence) AND [2] new-onset (< 2 weeks) or worsening    Additional Information   Negative: Shock suspected (e.g., cold/pale/clammy skin, too weak to stand, low BP, rapid pulse)   Negative: Sounds like a life-threatening emergency to the triager   Negative: [1] Unable to urinate (or only a few drops) > 4 hours AND [2] bladder feels very full (e.g., palpable bladder or strong urge to urinate)   Negative: Symptoms arising from use of a urinary catheter (e.g., Coude, Babin)   Negative: [1] Decreased urination and [2] drinking very little AND [2] dehydration suspected (e.g., dark urine, no urine > 12 hours, very dry mouth, very lightheaded)   Negative: Patient sounds very sick or weak to the triager   Negative: Fever > 100.4 F (38.0  C)   Negative: Side (flank) or lower back pain present    Protocols used: Urinary Symptoms-A-AH

## 2025-02-11 NOTE — PROGRESS NOTES
Social Work - Distress Screen Intervention  [facility]    Identified Concern and Score from Distress Screenin. How concerned are you about your ability to eat? 0 (Family Concerned. PT is not.)     2. How concerned are you about unintended weight loss or your current weight? 0 (Family Concerned. PT is not.)     3. How concerned are you about feeling depressed or very sad?  0     4. How concerned are you about feeling anxious or very scared?  (!) 8     5. Do you struggle with the loss of meaning and real in your life?  -- (Provider joined while asking questions.)     6. How concerned are you about work and home life issues that may be affected by your cancer?  0     7. How concerned are you about knowing what resources are available to help you?  0     8. Do you currently have what you would describe as Sikhism or spiritual struggles? Not at all     9. If you want to be contacted by one of our professionals, I can send a message to them right now.  No data recorded     Date of Distress Screen: 25  Data: At time of last visit, patient scored positive on distress screening.  outreached to patient today to follow up on elevated distress and introduce psychosocial services and support.  Intervention/Education provided:  contacted patient by phone to discuss distress screening results. Left voicemail message  Follow-up Required:  to remain available for support and await return call from patient    ANDREW Roman, [facility]  Adult Oncology Clinics  [place] (M,W), [place] (T) & [place] (Th)  *I am off Friday  Office: [phone]

## 2025-02-13 ENCOUNTER — HOSPITAL ENCOUNTER (EMERGENCY)
Facility: HOSPITAL | Age: 80
End: 2025-02-13
Attending: EMERGENCY MEDICINE
Payer: COMMERCIAL

## 2025-02-13 ENCOUNTER — APPOINTMENT (OUTPATIENT)
Dept: CT IMAGING | Facility: HOSPITAL | Age: 80
End: 2025-02-13
Attending: EMERGENCY MEDICINE
Payer: COMMERCIAL

## 2025-02-13 ENCOUNTER — DOCUMENTATION ONLY (OUTPATIENT)
Dept: OTHER | Facility: CLINIC | Age: 80
End: 2025-02-13
Payer: COMMERCIAL

## 2025-02-13 VITALS
SYSTOLIC BLOOD PRESSURE: 156 MMHG | OXYGEN SATURATION: 100 % | RESPIRATION RATE: 16 BRPM | HEART RATE: 89 BPM | TEMPERATURE: 98.4 F | WEIGHT: 84.6 LBS | DIASTOLIC BLOOD PRESSURE: 87 MMHG | HEIGHT: 61 IN | BODY MASS INDEX: 15.97 KG/M2

## 2025-02-13 DIAGNOSIS — R53.1 GENERALIZED WEAKNESS: ICD-10-CM

## 2025-02-13 DIAGNOSIS — R11.2 NAUSEA AND VOMITING, UNSPECIFIED VOMITING TYPE: ICD-10-CM

## 2025-02-13 DIAGNOSIS — R33.9 URINARY RETENTION: ICD-10-CM

## 2025-02-13 LAB
ANION GAP SERPL CALCULATED.3IONS-SCNC: 9 MMOL/L (ref 7–15)
BUN SERPL-MCNC: 8.8 MG/DL (ref 8–23)
CALCIUM SERPL-MCNC: 9 MG/DL (ref 8.8–10.4)
CHLORIDE SERPL-SCNC: 94 MMOL/L (ref 98–107)
CREAT SERPL-MCNC: 0.52 MG/DL (ref 0.51–0.95)
EGFRCR SERPLBLD CKD-EPI 2021: >90 ML/MIN/1.73M2
ERYTHROCYTE [DISTWIDTH] IN BLOOD BY AUTOMATED COUNT: 18 % (ref 10–15)
GLUCOSE SERPL-MCNC: 124 MG/DL (ref 70–99)
HCO3 SERPL-SCNC: 29 MMOL/L (ref 22–29)
HCT VFR BLD AUTO: 38.8 % (ref 35–47)
HGB BLD-MCNC: 12.6 G/DL (ref 11.7–15.7)
LACTATE SERPL-SCNC: 0.9 MMOL/L (ref 0.7–2)
MCH RBC QN AUTO: 26.3 PG (ref 26.5–33)
MCHC RBC AUTO-ENTMCNC: 32.5 G/DL (ref 31.5–36.5)
MCV RBC AUTO: 81 FL (ref 78–100)
PLATELET # BLD AUTO: 386 10E3/UL (ref 150–450)
POTASSIUM SERPL-SCNC: 3.8 MMOL/L (ref 3.4–5.3)
RBC # BLD AUTO: 4.8 10E6/UL (ref 3.8–5.2)
SODIUM SERPL-SCNC: 132 MMOL/L (ref 135–145)
WBC # BLD AUTO: 7.4 10E3/UL (ref 4–11)

## 2025-02-13 PROCEDURE — 83605 ASSAY OF LACTIC ACID: CPT | Performed by: EMERGENCY MEDICINE

## 2025-02-13 PROCEDURE — 258N000003 HC RX IP 258 OP 636: Performed by: EMERGENCY MEDICINE

## 2025-02-13 PROCEDURE — 99285 EMERGENCY DEPT VISIT HI MDM: CPT | Mod: 25

## 2025-02-13 PROCEDURE — 250N000011 HC RX IP 250 OP 636: Performed by: EMERGENCY MEDICINE

## 2025-02-13 PROCEDURE — 82310 ASSAY OF CALCIUM: CPT | Performed by: EMERGENCY MEDICINE

## 2025-02-13 PROCEDURE — 96374 THER/PROPH/DIAG INJ IV PUSH: CPT

## 2025-02-13 PROCEDURE — 36415 COLL VENOUS BLD VENIPUNCTURE: CPT | Performed by: EMERGENCY MEDICINE

## 2025-02-13 PROCEDURE — 74177 CT ABD & PELVIS W/CONTRAST: CPT

## 2025-02-13 PROCEDURE — 96361 HYDRATE IV INFUSION ADD-ON: CPT

## 2025-02-13 PROCEDURE — 85041 AUTOMATED RBC COUNT: CPT | Performed by: EMERGENCY MEDICINE

## 2025-02-13 RX ORDER — IOPAMIDOL 755 MG/ML
50 INJECTION, SOLUTION INTRAVASCULAR ONCE
Status: COMPLETED | OUTPATIENT
Start: 2025-02-13 | End: 2025-02-13

## 2025-02-13 RX ORDER — ONDANSETRON 2 MG/ML
4 INJECTION INTRAMUSCULAR; INTRAVENOUS ONCE
Status: COMPLETED | OUTPATIENT
Start: 2025-02-13 | End: 2025-02-13

## 2025-02-13 RX ORDER — SODIUM CHLORIDE 9 MG/ML
INJECTION, SOLUTION INTRAVENOUS CONTINUOUS
Status: DISCONTINUED | OUTPATIENT
Start: 2025-02-13 | End: 2025-02-14

## 2025-02-13 RX ADMIN — ONDANSETRON 4 MG: 2 INJECTION, SOLUTION INTRAMUSCULAR; INTRAVENOUS at 22:20

## 2025-02-13 RX ADMIN — IOPAMIDOL 50 ML: 755 INJECTION, SOLUTION INTRAVENOUS at 23:26

## 2025-02-13 RX ADMIN — SODIUM CHLORIDE 500 ML: 0.9 INJECTION, SOLUTION INTRAVENOUS at 22:20

## 2025-02-13 RX ADMIN — SODIUM CHLORIDE: 0.9 INJECTION, SOLUTION INTRAVENOUS at 23:07

## 2025-02-13 ASSESSMENT — ACTIVITIES OF DAILY LIVING (ADL): ADLS_ACUITY_SCORE: 58

## 2025-02-13 ASSESSMENT — COLUMBIA-SUICIDE SEVERITY RATING SCALE - C-SSRS
1. IN THE PAST MONTH, HAVE YOU WISHED YOU WERE DEAD OR WISHED YOU COULD GO TO SLEEP AND NOT WAKE UP?: NO
2. HAVE YOU ACTUALLY HAD ANY THOUGHTS OF KILLING YOURSELF IN THE PAST MONTH?: NO
6. HAVE YOU EVER DONE ANYTHING, STARTED TO DO ANYTHING, OR PREPARED TO DO ANYTHING TO END YOUR LIFE?: NO

## 2025-02-14 ENCOUNTER — MEDICAL CORRESPONDENCE (OUTPATIENT)
Dept: HEALTH INFORMATION MANAGEMENT | Facility: CLINIC | Age: 80
End: 2025-02-14

## 2025-02-14 ENCOUNTER — TELEPHONE (OUTPATIENT)
Dept: FAMILY MEDICINE | Facility: CLINIC | Age: 80
End: 2025-02-14

## 2025-02-14 VITALS
TEMPERATURE: 98.6 F | WEIGHT: 84.6 LBS | SYSTOLIC BLOOD PRESSURE: 132 MMHG | BODY MASS INDEX: 15.97 KG/M2 | RESPIRATION RATE: 15 BRPM | OXYGEN SATURATION: 99 % | HEART RATE: 78 BPM | DIASTOLIC BLOOD PRESSURE: 89 MMHG | HEIGHT: 61 IN

## 2025-02-14 PROBLEM — R53.1 GENERALIZED WEAKNESS: Status: ACTIVE | Noted: 2025-02-14

## 2025-02-14 PROBLEM — R33.9 URINARY RETENTION: Status: ACTIVE | Noted: 2025-02-14

## 2025-02-14 PROBLEM — R11.2 NAUSEA AND VOMITING, UNSPECIFIED VOMITING TYPE: Status: ACTIVE | Noted: 2025-02-14

## 2025-02-14 LAB
ALBUMIN SERPL BCG-MCNC: 3.7 G/DL (ref 3.5–5.2)
ALBUMIN UR-MCNC: NEGATIVE MG/DL
ALP SERPL-CCNC: 106 U/L (ref 40–150)
ALT SERPL W P-5'-P-CCNC: 15 U/L (ref 0–50)
ANION GAP SERPL CALCULATED.3IONS-SCNC: 9 MMOL/L (ref 7–15)
APPEARANCE UR: CLEAR
AST SERPL W P-5'-P-CCNC: 18 U/L (ref 0–45)
BACTERIA #/AREA URNS HPF: ABNORMAL /HPF
BASOPHILS # BLD AUTO: 0 10E3/UL (ref 0–0.2)
BASOPHILS NFR BLD AUTO: 1 %
BILIRUB SERPL-MCNC: 0.5 MG/DL
BILIRUB UR QL STRIP: NEGATIVE
BUN SERPL-MCNC: 5.9 MG/DL (ref 8–23)
CALCIUM SERPL-MCNC: 8.1 MG/DL (ref 8.8–10.4)
CHLORIDE SERPL-SCNC: 103 MMOL/L (ref 98–107)
COLOR UR AUTO: COLORLESS
CREAT SERPL-MCNC: 0.45 MG/DL (ref 0.51–0.95)
EGFRCR SERPLBLD CKD-EPI 2021: >90 ML/MIN/1.73M2
EOSINOPHIL # BLD AUTO: 0 10E3/UL (ref 0–0.7)
EOSINOPHIL NFR BLD AUTO: 1 %
ERYTHROCYTE [DISTWIDTH] IN BLOOD BY AUTOMATED COUNT: 18.4 % (ref 10–15)
GLUCOSE SERPL-MCNC: 131 MG/DL (ref 70–99)
GLUCOSE UR STRIP-MCNC: NEGATIVE MG/DL
HCO3 SERPL-SCNC: 25 MMOL/L (ref 22–29)
HCT VFR BLD AUTO: 35.3 % (ref 35–47)
HGB BLD-MCNC: 11.8 G/DL (ref 11.7–15.7)
HGB UR QL STRIP: NEGATIVE
IMM GRANULOCYTES # BLD: 0 10E3/UL
IMM GRANULOCYTES NFR BLD: 0 %
KETONES UR STRIP-MCNC: NEGATIVE MG/DL
LEUKOCYTE ESTERASE UR QL STRIP: NEGATIVE
LYMPHOCYTES # BLD AUTO: 1.1 10E3/UL (ref 0.8–5.3)
LYMPHOCYTES NFR BLD AUTO: 19 %
MCH RBC QN AUTO: 27.3 PG (ref 26.5–33)
MCHC RBC AUTO-ENTMCNC: 33.4 G/DL (ref 31.5–36.5)
MCV RBC AUTO: 82 FL (ref 78–100)
MONOCYTES # BLD AUTO: 0.8 10E3/UL (ref 0–1.3)
MONOCYTES NFR BLD AUTO: 13 %
NEUTROPHILS # BLD AUTO: 4 10E3/UL (ref 1.6–8.3)
NEUTROPHILS NFR BLD AUTO: 67 %
NITRATE UR QL: NEGATIVE
NRBC # BLD AUTO: 0 10E3/UL
NRBC BLD AUTO-RTO: 0 /100
PH UR STRIP: 7 [PH] (ref 5–7)
PLATELET # BLD AUTO: 328 10E3/UL (ref 150–450)
POTASSIUM SERPL-SCNC: 3.7 MMOL/L (ref 3.4–5.3)
PROT SERPL-MCNC: 5.7 G/DL (ref 6.4–8.3)
RBC # BLD AUTO: 4.32 10E6/UL (ref 3.8–5.2)
RBC URINE: <1 /HPF
SODIUM SERPL-SCNC: 137 MMOL/L (ref 135–145)
SP GR UR STRIP: 1.01 (ref 1–1.03)
UROBILINOGEN UR STRIP-MCNC: <2 MG/DL
WBC # BLD AUTO: 6.1 10E3/UL (ref 4–11)
WBC URINE: 5 /HPF

## 2025-02-14 PROCEDURE — 99236 HOSP IP/OBS SAME DATE HI 85: CPT | Performed by: INTERNAL MEDICINE

## 2025-02-14 PROCEDURE — 36415 COLL VENOUS BLD VENIPUNCTURE: CPT | Performed by: INTERNAL MEDICINE

## 2025-02-14 PROCEDURE — 81001 URINALYSIS AUTO W/SCOPE: CPT | Performed by: EMERGENCY MEDICINE

## 2025-02-14 PROCEDURE — 51702 INSERT TEMP BLADDER CATH: CPT

## 2025-02-14 PROCEDURE — 99418 PROLNG IP/OBS E/M EA 15 MIN: CPT | Performed by: INTERNAL MEDICINE

## 2025-02-14 PROCEDURE — G0378 HOSPITAL OBSERVATION PER HR: HCPCS

## 2025-02-14 PROCEDURE — 250N000011 HC RX IP 250 OP 636: Performed by: INTERNAL MEDICINE

## 2025-02-14 PROCEDURE — 258N000003 HC RX IP 258 OP 636: Performed by: EMERGENCY MEDICINE

## 2025-02-14 PROCEDURE — 99207 PR APP CREDIT; MD BILLING SHARED VISIT: CPT | Performed by: FAMILY MEDICINE

## 2025-02-14 PROCEDURE — 96361 HYDRATE IV INFUSION ADD-ON: CPT

## 2025-02-14 PROCEDURE — 250N000011 HC RX IP 250 OP 636: Performed by: FAMILY MEDICINE

## 2025-02-14 PROCEDURE — 82565 ASSAY OF CREATININE: CPT | Performed by: INTERNAL MEDICINE

## 2025-02-14 PROCEDURE — 250N000013 HC RX MED GY IP 250 OP 250 PS 637: Performed by: INTERNAL MEDICINE

## 2025-02-14 PROCEDURE — 85025 COMPLETE CBC W/AUTO DIFF WBC: CPT | Performed by: INTERNAL MEDICINE

## 2025-02-14 RX ORDER — CEFPODOXIME PROXETIL 200 MG/1
200 TABLET, FILM COATED ORAL 2 TIMES DAILY
COMMUNITY
End: 2025-02-14

## 2025-02-14 RX ORDER — MEMANTINE HYDROCHLORIDE 5 MG/1
5 TABLET ORAL DAILY
Status: DISCONTINUED | OUTPATIENT
Start: 2025-02-15 | End: 2025-02-14 | Stop reason: HOSPADM

## 2025-02-14 RX ORDER — ACARBOSE 25 MG/1
25 TABLET ORAL
Status: DISCONTINUED | OUTPATIENT
Start: 2025-02-14 | End: 2025-02-14 | Stop reason: HOSPADM

## 2025-02-14 RX ORDER — AMLODIPINE BESYLATE 5 MG/1
5 TABLET ORAL DAILY
Status: DISCONTINUED | OUTPATIENT
Start: 2025-02-14 | End: 2025-02-14 | Stop reason: HOSPADM

## 2025-02-14 RX ORDER — ONDANSETRON 4 MG/1
4 TABLET, ORALLY DISINTEGRATING ORAL EVERY 6 HOURS PRN
Status: DISCONTINUED | OUTPATIENT
Start: 2025-02-14 | End: 2025-02-14 | Stop reason: HOSPADM

## 2025-02-14 RX ORDER — RIVASTIGMINE TARTRATE 1.5 MG/1
4.5 CAPSULE ORAL DAILY
Status: DISCONTINUED | OUTPATIENT
Start: 2025-02-14 | End: 2025-02-14

## 2025-02-14 RX ORDER — CEFPODOXIME PROXETIL 200 MG/1
200 TABLET, FILM COATED ORAL 2 TIMES DAILY
Status: DISCONTINUED | OUTPATIENT
Start: 2025-02-14 | End: 2025-02-14

## 2025-02-14 RX ORDER — ESZOPICLONE 1 MG/1
1 TABLET, FILM COATED ORAL AT BEDTIME
Status: DISCONTINUED | OUTPATIENT
Start: 2025-02-14 | End: 2025-02-14 | Stop reason: HOSPADM

## 2025-02-14 RX ORDER — SODIUM CHLORIDE 9 MG/ML
INJECTION, SOLUTION INTRAVENOUS CONTINUOUS
Status: DISCONTINUED | OUTPATIENT
Start: 2025-02-14 | End: 2025-02-14 | Stop reason: HOSPADM

## 2025-02-14 RX ORDER — NORTRIPTYLINE HYDROCHLORIDE 50 MG/1
50 CAPSULE ORAL AT BEDTIME
Status: DISCONTINUED | OUTPATIENT
Start: 2025-02-14 | End: 2025-02-14 | Stop reason: HOSPADM

## 2025-02-14 RX ORDER — MEMANTINE HYDROCHLORIDE 5 MG/1
5 TABLET ORAL SEE ADMIN INSTRUCTIONS
Status: DISCONTINUED | OUTPATIENT
Start: 2025-02-14 | End: 2025-02-14

## 2025-02-14 RX ORDER — METOCLOPRAMIDE 5 MG/1
5 TABLET ORAL
Status: DISCONTINUED | OUTPATIENT
Start: 2025-02-14 | End: 2025-02-14 | Stop reason: HOSPADM

## 2025-02-14 RX ORDER — BUSPIRONE HYDROCHLORIDE 7.5 MG/1
7.5 TABLET ORAL 2 TIMES DAILY
Status: DISCONTINUED | OUTPATIENT
Start: 2025-02-14 | End: 2025-02-14 | Stop reason: HOSPADM

## 2025-02-14 RX ORDER — MULTIPLE VITAMINS W/ MINERALS TAB 9MG-400MCG
1 TAB ORAL AT BEDTIME
Status: DISCONTINUED | OUTPATIENT
Start: 2025-02-14 | End: 2025-02-14 | Stop reason: HOSPADM

## 2025-02-14 RX ORDER — ONDANSETRON 2 MG/ML
4 INJECTION INTRAMUSCULAR; INTRAVENOUS EVERY 6 HOURS PRN
Status: DISCONTINUED | OUTPATIENT
Start: 2025-02-14 | End: 2025-02-14 | Stop reason: HOSPADM

## 2025-02-14 RX ORDER — HEPARIN SODIUM,PORCINE 10 UNIT/ML
5-10 VIAL (ML) INTRAVENOUS
Status: DISCONTINUED | OUTPATIENT
Start: 2025-02-14 | End: 2025-02-14 | Stop reason: HOSPADM

## 2025-02-14 RX ORDER — HEPARIN SODIUM,PORCINE 10 UNIT/ML
5-10 VIAL (ML) INTRAVENOUS EVERY 24 HOURS
Status: DISCONTINUED | OUTPATIENT
Start: 2025-02-14 | End: 2025-02-14 | Stop reason: HOSPADM

## 2025-02-14 RX ORDER — CELECOXIB 200 MG/1
200 CAPSULE ORAL AT BEDTIME
Status: DISCONTINUED | OUTPATIENT
Start: 2025-02-14 | End: 2025-02-14

## 2025-02-14 RX ORDER — MEMANTINE HYDROCHLORIDE 10 MG/1
10 TABLET ORAL 2 TIMES DAILY
Status: DISCONTINUED | OUTPATIENT
Start: 2025-02-14 | End: 2025-02-14

## 2025-02-14 RX ORDER — ACETAMINOPHEN 325 MG/1
325-650 TABLET ORAL EVERY 6 HOURS PRN
Status: DISCONTINUED | OUTPATIENT
Start: 2025-02-14 | End: 2025-02-14 | Stop reason: HOSPADM

## 2025-02-14 RX ORDER — METOCLOPRAMIDE 10 MG/1
10 TABLET ORAL DAILY
COMMUNITY

## 2025-02-14 RX ORDER — PANTOPRAZOLE SODIUM 40 MG/1
40 TABLET, DELAYED RELEASE ORAL
Status: DISCONTINUED | OUTPATIENT
Start: 2025-02-14 | End: 2025-02-14 | Stop reason: HOSPADM

## 2025-02-14 RX ORDER — BIOTIN 5 MG
5000 TABLET ORAL AT BEDTIME
Status: DISCONTINUED | OUTPATIENT
Start: 2025-02-14 | End: 2025-02-14

## 2025-02-14 RX ORDER — ONDANSETRON 4 MG/1
4 TABLET, ORALLY DISINTEGRATING ORAL EVERY 8 HOURS PRN
Status: DISCONTINUED | OUTPATIENT
Start: 2025-02-14 | End: 2025-02-14

## 2025-02-14 RX ORDER — SIMETHICONE 80 MG
80 TABLET,CHEWABLE ORAL 4 TIMES DAILY PRN
Status: DISCONTINUED | OUTPATIENT
Start: 2025-02-14 | End: 2025-02-14 | Stop reason: HOSPADM

## 2025-02-14 RX ORDER — METOPROLOL TARTRATE 25 MG/1
25 TABLET, FILM COATED ORAL DAILY
Status: DISCONTINUED | OUTPATIENT
Start: 2025-02-14 | End: 2025-02-14 | Stop reason: HOSPADM

## 2025-02-14 RX ORDER — AMPICILLIN 500 MG/1
500 CAPSULE ORAL 4 TIMES DAILY
COMMUNITY
End: 2025-02-14

## 2025-02-14 RX ORDER — METOCLOPRAMIDE 10 MG/1
10 TABLET ORAL DAILY
Status: DISCONTINUED | OUTPATIENT
Start: 2025-02-14 | End: 2025-02-14

## 2025-02-14 RX ORDER — HEPARIN SODIUM (PORCINE) LOCK FLUSH IV SOLN 100 UNIT/ML 100 UNIT/ML
5-10 SOLUTION INTRAVENOUS
Status: DISCONTINUED | OUTPATIENT
Start: 2025-02-14 | End: 2025-02-14 | Stop reason: HOSPADM

## 2025-02-14 RX ADMIN — AMLODIPINE BESYLATE 5 MG: 5 TABLET ORAL at 07:55

## 2025-02-14 RX ADMIN — ACARBOSE 25 MG: 25 TABLET ORAL at 09:29

## 2025-02-14 RX ADMIN — METOCLOPRAMIDE 10 MG: 10 TABLET ORAL at 07:55

## 2025-02-14 RX ADMIN — METOPROLOL TARTRATE 25 MG: 25 TABLET, FILM COATED ORAL at 07:55

## 2025-02-14 RX ADMIN — ACARBOSE 25 MG: 25 TABLET ORAL at 12:57

## 2025-02-14 RX ADMIN — RIVASTIGMINE TARTRATE 4.5 MG: 1.5 CAPSULE ORAL at 09:29

## 2025-02-14 RX ADMIN — MEMANTINE 10 MG: 10 TABLET ORAL at 09:28

## 2025-02-14 RX ADMIN — SODIUM CHLORIDE: 0.9 INJECTION, SOLUTION INTRAVENOUS at 08:23

## 2025-02-14 RX ADMIN — ONDANSETRON 4 MG: 4 TABLET, ORALLY DISINTEGRATING ORAL at 11:19

## 2025-02-14 RX ADMIN — BUSPIRONE HYDROCHLORIDE 7.5 MG: 7.5 TABLET ORAL at 09:28

## 2025-02-14 RX ADMIN — HEPARIN 5 ML: 100 SYRINGE at 15:59

## 2025-02-14 RX ADMIN — PANTOPRAZOLE SODIUM 40 MG: 40 TABLET, DELAYED RELEASE ORAL at 07:45

## 2025-02-14 ASSESSMENT — ACTIVITIES OF DAILY LIVING (ADL)
ADLS_ACUITY_SCORE: 58
DEPENDENT_IADLS:: CLEANING;LAUNDRY;SHOPPING;TRANSPORTATION
ADLS_ACUITY_SCORE: 58

## 2025-02-14 NOTE — PROGRESS NOTES
[facility]    Medicine Progress Note - Hospitalist Service    Date of Admission:  2/13/2025    Assessment & Plan   70 year old female with hx dementia, b cell lymphoma and rectal prolapse who recently underwent an elective perineal rectosigmoidectomy complicated by acute urinary retention presented with nausea, vomiting and dehydration , found to have ongoing urinary retention causing    Acute urinary retension  ---babin replaced in ED, continue  ---consult urology  ---UA unremarkable  ---stop IVF given normal labs  ---stop recent abx    Weakness  ---possible secondary to above  ---consult PT    Rectal prolapse  ---s/p elective     B cell lymphoma  ---recent PET negative     Dementia  ---discussed with pharmacy   ---reviewed neurology (Mann) recent note  --was titrating off exelon and  start namenda to increase dose   ---discontinue exelon  ---start namenda    Hypertension    GERD    ALLISON           Observation Goals: -diagnostic tests and consults completed and resulted, -vital signs normal or at patient baseline, -tolerating oral intake to maintain hydration, -adequate pain control on oral analgesics, -tolerating oral antibiotics or has plans for home infusion setup, -infection is improving, -dyspnea improved and O2 sats greater than 88% on room air or prior home oxygen levels, -returns to baseline functional status, -safe disposition plan has been identified, Nurse to notify provider when observation goals have been met and patient is ready for discharge.  Diet: Clear Liquid Diet    DVT Prophylaxis: {DVT PROPHYLAXIS:608772}  Babin Catheter: Not present  Lines: PRESENT    { TIP  Link to Avatar to review     :61727}  Port A Cath Single 05/01/24 Right Chest wall-Site Assessment: WDL      Cardiac Monitoring: ACTIVE order. Indication: Tachyarrhythmias, acute (48 hours)  Code Status: Full Code      Clinically Significant Risk Factors Present on Admission   { TIP  This section helps capture the  "illness of the patient on admission.     - Review diagnoses highlighted in blue; right click, edit & delete if not appropriate   - If blank, no additional diagnoses identified   :92781}      # Hyponatremia: Lowest Na = 132 mmol/L in last 2 days, will monitor as appropriate  # Hypochloremia: Lowest Cl = 94 mmol/L in last 2 days, will monitor as appropriate          # Hypertension: Noted on problem list     # Dementia: noted on problem list       # Cachexia: Estimated body mass index is 15.99 kg/m  as calculated from the following:    Height as of this encounter: 1.549 m (5' 1\").    Weight as of this encounter: 38.4 kg (84 lb 9.6 oz).              Social Drivers of Health    Physical Activity: Unknown (4/25/2024)    Exercise Vital Sign     Days of Exercise per Week: 0 days   Stress: Stress Concern Present (4/25/2024)    [employer] - Occupational Stress Questionnaire     Feeling of Stress : Rather much   Social Connections: Unknown (4/25/2024)    Social Connection and Isolation Panel [NHANES]     Frequency of Social Gatherings with Friends and Family: Once a week          Disposition Plan   {TIP  The patient's Medical Readiness for Discharge [MRD] has been documented today or is 'Ready Now'. Last Documentation- Anticipated in 2-4 Days   Use SmartList below if a change is needed.  :64185}  Medically Ready for Discharge: {TIME; MEDICALLY READY FOR DISCHARGE:64597629}             Naz Gomez MD  Hospitalist Service  [facility]  Securely message with [employer] (more info)  Text page via [employer] Paging/Directory   ______________________________________________________________________    Interval History   {Pertinent overnight events  ;***}    Physical Exam   Vital Signs: Temp: 98.4  F (36.9  C) Temp src: Oral BP: (!) 144/75 Pulse: 87   Resp: 16 SpO2: 96 % O2 Device: None (Room air)    Weight: 84 lbs 9.6 oz    {Recommend personal SmartPhrase or Notewriter for exam (OPTIONAL)  " " :144015}    Medical Decision Making   { TIP   MDM Calculator    MDM grid (w/ times)    Coding Support Chat  Billing is now based on time OR medical decision making complexity. Medical decision making included in your A&P does NOT need to be re-documented here.    :45469}    {Time  :127296::\"*** MINUTES SPENT BY ME on the date of service doing chart review, history, exam, documentation & further activities per the note.\"}      Data   {INSERT LABS/IMAGING (OPTIONAL)   :770494}  "

## 2025-02-14 NOTE — ED NOTES
EMERGENCY DEPARTMENT SIGN OUT NOTE        ED COURSE AND MEDICAL DECISION MAKING  Patient was signed out to me by Dr Hiram Stephenson and Jayme Fields at 12:55 AM  1:08 AM I paged the Hospitalist.  1:35 AM I consulted the Hospitalist, Dr. Gallegos, who accepted the patient for admission.    In brief, Bibiana Mcrae is a 79 year old female who initially presented with nausea, vomiting, and UTI.     At time of sign out, disposition was pending CT results and UA.  Patient does not have any evidence of definitive infection of the urine, but did have obvious bladder distention on CT.  I was following the CT that we did obtain a Deutsch catheter which was able to evacuate the bladder.  Because of the patient's weakness, persistent nausea, and the urinary retention without obvious cause, plan is for admission for further management and evaluation with both urology and with her colorectal surgery team.    FINAL IMPRESSION    1. Nausea and vomiting, unspecified vomiting type    2. Generalized weakness    3. Urinary retention        ED MEDS  Medications   sodium chloride 0.9 % infusion ( Intravenous $New Bag 2/13/25 2307)   ondansetron (ZOFRAN ODT) ODT tab 4 mg (has no administration in time range)     Or   ondansetron (ZOFRAN) injection 4 mg (has no administration in time range)   sodium chloride 0.9% BOLUS 500 mL (0 mLs Intravenous Stopped 2/13/25 2306)   ondansetron (ZOFRAN) injection 4 mg (4 mg Intravenous $Given 2/13/25 2220)   iopamidol (ISOVUE-370) solution 50 mL (50 mLs Intravenous $Given 2/13/25 2326)       LAB  Labs Ordered and Resulted from Time of ED Arrival to Time of ED Departure   CBC WITH PLATELETS - Abnormal       Result Value    WBC Count 7.4      RBC Count 4.80      Hemoglobin 12.6      Hematocrit 38.8      MCV 81      MCH 26.3 (*)     MCHC 32.5      RDW 18.0 (*)     Platelet Count 386     BASIC METABOLIC PANEL - Abnormal    Sodium 132 (*)     Potassium 3.8      Chloride 94 (*)     Carbon Dioxide (CO2) 29       Anion Gap 9      Urea Nitrogen 8.8      Creatinine 0.52      GFR Estimate >90      Calcium 9.0      Glucose 124 (*)    ROUTINE UA WITH MICROSCOPIC REFLEX TO CULTURE - Abnormal    Color Urine Colorless      Appearance Urine Clear      Glucose Urine Negative      Bilirubin Urine Negative      Ketones Urine Negative      Specific Gravity Urine 1.007      Blood Urine Negative      pH Urine 7.0      Protein Albumin Urine Negative      Urobilinogen Urine <2.0      Nitrite Urine Negative      Leukocyte Esterase Urine Negative      Bacteria Urine Few (*)     RBC Urine <1      WBC Urine 5     LACTIC ACID WHOLE BLOOD WITH 1X REPEAT IN 2 HR WHEN >2 - Normal    Lactic Acid, Initial 0.9     COMPREHENSIVE METABOLIC PANEL         RADIOLOGY    CT Abdomen Pelvis w Contrast   Final Result   IMPRESSION:    1.  Moderate to marked distention of the urinary bladder. 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    2.  No other acute abnormality identified in the abdomen or pelvis.   3.  A region of nonspecific patchy sclerosis in the left ischium again noted.          DISCHARGE MEDS  New Prescriptions    No medications on file       Inocencio Good DO  [facility] EMERGENCY DEPARTMENT  [facility] [facility] [id number] [phone]         Inocencio Good DO  02/14/25 0314

## 2025-02-14 NOTE — CONSULTS
[facility] CONSULTATION    Type of Consult: emergency room  Place of Service: [facility]   Reason for consult: urinary retention, hydronephrosis  Request for consult by: Blanquita Gallegos MD     History of present illness:   Bibiana Mcrae is a 79 year old female that was admitted for nausea, vomiting and abdominal pain. Urology is being consulted for see above. History obtained through patient, and chart review.     Patient is a 79-year-old female with medical history of dementia, hypertension, anxiety disorder, chronic anemia and rectal prolapse.  She recently underwent elective perineal rectosigmoidectomy.  Postoperative course was complicated by acute urinary retention, indwelling Deutsch catheter was placed.  She was then evaluated by [facility] on 2/11/2025.  At that visit she underwent a voiding trial which he passed.    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  She is asymptomatic and has a normal creatinine.  At her visit with [facility] it was determined that they would obtain a Lasix renogram as well as a CT urogram to better characterize the extent of obstruction.  At that time she was noted to say that she would strongly wish to avoid surgery unless strongly indicated.    Since then, she developed inability to urinate accompanied by suprapubic pain/distention with associated nausea and vomiting.  Ultimately she returned to the emergency department on 2/13/2025.  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  There is also persistent prominent dilation of one of the left ureters that was present on the prior studies.    Here she is vitally stable and afebrile.  UA is negative for infection.  Her  creatinine is normal at 0.45.  She has no leukocytosis.  Since Deutsch catheters were placed, she notes complete resolution of her nausea and vomiting as well as suprapubic pain.    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)     Diverticular disease of large intestine     Gastroesophageal reflux disease     History of blood transfusion     History of cholecystectomy 08/24/2023    Hypertension     Major neurocognitive disorder due to Alzheimer's disease (H)     Memory loss     Osteoporosis     PONV (postoperative nausea and vomiting)     Rectal prolapse        Past Surgical History:  Past Surgical History:   Procedure Laterality Date    BREAST CYST ASPIRATION Left     While ago    HYSTERECTOMY  1985    IR CHEST PORT PLACEMENT > 5 YRS OF AGE  5/1/2024    OOPHORECTOMY Bilateral 1985    RECTOSIGMOIDECTOMY PERINEAL N/A 1/23/2025    Procedure: PERINEAL RECTOSIGMOIDECTOMY;  Surgeon: Josee Basilio MD;  Location: [facility] [place] OR    [facility] REMOVAL OF OVARY(S)      Description: Oophorectomy;  Recorded: 12/17/2009;    [facility] TOTAL ABDOM HYSTERECTOMY      Description: Total Abdominal Hysterectomy;  Recorded: 12/17/2009;       Social History:  Social History     Socioeconomic History    Marital status:      Spouse name: Not on file    Number of children: Not on file    Years of education: Not on file    Highest education level: Not on file   Occupational History    Not on file   Tobacco Use    Smoking status: Never     Passive exposure: Never    Smokeless tobacco: Never   Vaping Use    Vaping status: Never Used   Substance and Sexual Activity    Alcohol use: No    Drug use: Never    Sexual activity: Not on file   Other Topics Concern    Not on file    Social History Narrative    Not on file     Social Drivers of Health     Financial Resource Strain: Low Risk  (1/24/2025)    Financial Resource Strain     Within the past 12 months, have you or your family members you live with been unable to get utilities (heat, electricity) when it was really needed?: No   Food Insecurity: Low Risk  (1/24/2025)    Food Insecurity     Within the past 12 months, did you worry that your food would run out before you got money to buy more?: No     Within the past 12 months, did the food you bought just not last and you didn t have money to get more?: No   Transportation Needs: Low Risk  (1/24/2025)    Transportation Needs     Within the past 12 months, has lack of transportation kept you from medical appointments, getting your medicines, non-medical meetings or appointments, work, or from getting things that you need?: No   Physical Activity: Unknown (4/25/2024)    Exercise Vital Sign     Days of Exercise per Week: 0 days     Minutes of Exercise per Session: Not on file   Stress: Stress Concern Present (4/25/2024)    [employer] - Occupational Stress Questionnaire     Feeling of Stress : Rather much   Social Connections: Unknown (4/25/2024)    Social Connection and Isolation Panel [NHANES]     Frequency of Communication with Friends and Family: Not on file     Frequency of Social Gatherings with Friends and Family: Once a week     Attends [employer] Services: Not on file     Active Member of Clubs or Organizations: Not on file     Attends Club or Organization Meetings: Not on file     Marital Status: Not on file   Interpersonal Safety: Low Risk  (1/23/2025)    Interpersonal Safety     Do you feel physically and emotionally safe where you currently live?: Yes     Within the past 12 months, have you been hit, slapped, kicked or otherwise physically hurt by someone?: No     Within the past 12 months, have you been humiliated or emotionally abused in other  ways by your partner or ex-partner?: No   Housing Stability: Low Risk  (1/24/2025)    Housing Stability     Do you have housing? : Yes     Are you worried about losing your housing?: No       Medications:  Current Facility-Administered Medications   Medication Dose Route Frequency Provider Last Rate Last Admin    acarbose (PRECOSE) tablet 25 mg  25 mg Oral TID w/meals Blanquita Gallegos MD   25 mg at 02/14/25 1257    acetaminophen (TYLENOL) tablet 325-650 mg  325-650 mg Oral Q6H PRN Blanquita Gallegos MD        amLODIPine (NORVASC) tablet 5 mg  5 mg Oral Daily Blanquita Gallegos MD   5 mg at 02/14/25 0755    busPIRone (BUSPAR) tablet 7.5 mg  7.5 mg Oral BID Blanquita Gallegos MD   7.5 mg at 02/14/25 0928    eszopiclone (LUNESTA) tablet 1 mg  1 mg Oral At Bedtime Blanquita Gallegos MD        [START ON 2/15/2025] memantine (NAMENDA) tablet 5 mg  5 mg Oral Daily Naz Gomez MD        metoclopramide (REGLAN) tablet 5 mg  5 mg Oral TID AC Naz Gomez MD        metoprolol tartrate (LOPRESSOR) tablet 25 mg  25 mg Oral Daily Blanquita Gallegos MD   25 mg at 02/14/25 0755    multivitamin w/minerals (THERA-VIT-M) tablet 1 tablet  1 tablet Oral At Bedtime Blanquita Gallegos MD        nortriptyline (PAMELOR) capsule 50 mg  50 mg Oral At Bedtime Blanquita Gallegos MD        ondansetron (ZOFRAN ODT) ODT tab 4 mg  4 mg Oral Q6H PRN Blanquita Gallegos MD   4 mg at 02/14/25 1119    Or    ondansetron (ZOFRAN) injection 4 mg  4 mg Intravenous Q6H PRN Blanquita Gallegos MD        pantoprazole (PROTONIX) EC tablet 40 mg  40 mg Oral QAM AC Blanquita Gallegos MD   40 mg at 02/14/25 0745    simethicone (MYLICON) chewable tablet 80 mg  80 mg Oral 4x Daily PRN Blanquita Gallegos MD        sodium chloride 0.9 % infusion   Intravenous Continuous Naz Gomez MD         Current Outpatient Medications   Medication Sig Dispense Refill    acarbose (PRECOSE) 25 MG tablet Take 25 mg by mouth 3 times daily (with meals)      acetaminophen (TYLENOL) 325 MG tablet Take 325-650 mg by  mouth every 6 hours as needed for mild pain      amLODIPine (NORVASC) 5 MG tablet Take 1 tablet (5 mg) by mouth daily 90 tablet 3    ampicillin (PRINCIPEN) 500 MG capsule Take 500 mg by mouth 4 times daily.      Biotin 5 MG TABS Take 5,000 mcg by mouth at bedtime.      busPIRone (BUSPAR) 7.5 MG tablet Take 7.5 mg by mouth 2 times daily.      denosumab (PROLIA) 60 MG/ML SOSY injection Inject 60 mg Subcutaneous every 6 months      eszopiclone (LUNESTA) 1 MG tablet Take 1 tablet (1 mg) by mouth at bedtime. 90 tablet 1    metoclopramide (REGLAN) 10 MG tablet Take 10 mg by mouth daily.      metoprolol tartrate (LOPRESSOR) 25 MG tablet Take 25 mg by mouth daily.      multivitamin with minerals (THERA-M) 9 mg iron-400 mcg Tab tablet Take 1 tablet by mouth at bedtime.      nortriptyline (PAMELOR) 50 MG capsule Take 50 mg by mouth at bedtime.      omeprazole (PRILOSEC) 20 MG capsule [OMEPRAZOLE (PRILOSEC) 20 MG CAPSULE] Take 20 mg by mouth daily before breakfast.      ondansetron (ZOFRAN ODT) 4 MG ODT tab Take 4 mg by mouth every 8 hours as needed for nausea.      Probiotic Product (SOLUBLE FIBER/PROBIOTICS PO) 2 tablespoons in 8 oz of liquid daily      rivastigmine (EXELON) 4.5 MG capsule Take 4.5 mg by mouth daily.      sennosides (SENOKOT) 8.6 MG tablet Take 2 tablets by mouth daily as needed for constipation      simethicone (MYLICON) 125 MG chewable tablet Take 125 mg by mouth as needed.      celecoxib (CELEBREX) 200 MG capsule Take 200 mg by mouth at bedtime.      memantine (NAMENDA) 5 MG tablet Take 5 mg by mouth See Admin Instructions. 1 PO every day x 7 days, then 1 PO BID x 7 days, then 2 PO Q am & 1 PO at bedtime x 7 days, then 2 PO BID thereafter         Allergies:   Allergies   Allergen Reactions    Blood Transfusion Related (Informational Only) Other (See Comments)     Patient has a history of a Clinically Significant antibody against RBC antigens. A delay in compatible RBCs may occur. 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    Prochlorperazine Edisylate [Prochlorperazine] Shortness Of Breath     Extrapyramidal side effects       Review of Systems:   A full 12 point review of systems was taken and is negative aside from what is noted above in the HPI    Physical Exam:  Temp:  [98.4  F (36.9  C)] 98.4  F (36.9  C)  Pulse:  [67-91] 77  Resp:  [16] 16  BP: (118-179)/(68-96) 158/79  SpO2:  [95 %-100 %] 97 %  General: NAD, alert, cooperative  Head: normocephalic, without abnormality / atraumatic  Abdomen: soft, non tender, non distended. no suprapubic fullness/tenderness. no CVA tenderness noted  Geniturinary: Deutsch catheter is in place, patent draining clear yellow urine  Psychological: alert and oriented, answers questions appropriately      Labs:   Creatinine   Date Value Ref Range Status   02/14/2025 0.45 (L) 0.51 - 0.95 mg/dL Final       Lab Results   Component Value Date    WBC 6.1 02/14/2025     Lab Results   Component Value Date    HGB 11.8 02/14/2025     Lab Results   Component Value Date     02/14/2025       UA RESULTS:  Recent Labs   Lab Test 02/14/25  0022   COLOR Colorless   APPEARANCE Clear   URINEGLC Negative   URINEBILI Negative   URINEKETONE Negative   SG 1.007   UBLD Negative   URINEPH 7.0   PROTEIN Negative   NITRITE Negative   LEUKEST Negative   RBCU <1   WBCU 5       Lab Results: personally reviewed     Imaging:  EXAM: CT ABDOMEN AND PELVIS WITH CONTRAST  LOCATION: [facility]  DATE/TIME: 02/13/2025 11:34 PM CST     INDICATION: Abdominal pain.  COMPARISON: 01/31/2025 - PET/CT scan whole body.     TECHNIQUE: CT scan of the abdomen and pelvis was performed following injection of IV contrast. Multiplanar reformats were obtained. Dose reduction techniques were used.  CONTRAST: 50 mL Isovue 370.     FINDINGS:     LOWER CHEST: Coronary artery calcification. Calcification in the region of the aortic and mitral valves. Partial visualization of a vascular catheter with  distal tip in the right atrium.     HEPATOBILIARY: Prior cholecystectomy. Mild prominence in caliber of the common bile duct and central intrahepatic bile ducts again noted.     SPLEEN: Unremarkable.     PANCREAS: Moderate diffuse parenchymal atrophy.     ADRENAL GLANDS: Unremarkable.     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 A large 9 cm cystic structure abutting the inferior pole of the left kidney may represent a dilated collecting system associated with the inferior pole moiety versus a large   exophytic renal cyst. There is prominent dilatation of one of the ureters extending all the way to the bladder, where there may be a ureterocele and an ectopic ureteral insertion more inferiorly than typical into the bladder. Mild dilatation of the right   intrarenal collecting system and mild to moderate intrarenal collecting system dilatation of the upper pole moiety of the left kidney. Moderate to marked distention of the urinary bladder.     BOWEL: Prior gastric surgery. The stomach, small and large bowel are normal in caliber. The appendix is not visualized. No visualized bowel wall thickening, pneumatosis, or free intraperitoneal gas.     LYMPH NODES: Unremarkable.     PELVIC ORGANS: No acute findings.     MUSCULOSKELETAL: A region of patchy sclerosis in the left ischium again noted (series 3 image 166). Bilateral L5 pars interarticularis defects.     OTHER: Atherosclerotic calcification in the abdominal aorta.                                                                      IMPRESSION:   1.  Moderate to marked distention of the urinary bladder. 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 Of note, there is persistent prominent dilatation of one of the left ureters that was present on  the prior study and likely relates to a ureterocele and a chronic distal ureteral obstruction in the setting of a left   renal duplication.   2.  No other acute abnormality identified in the abdomen or pelvis.  3.  A region of nonspecific patchy sclerosis in the left ischium again noted.    EXAM: CT ABDOMEN PELVIS W CONTRAST  LOCATION: [facility]  DATE: 1/15/2025     INDICATION: Recurrent rectal prolapse status post reduction, persistent rectal pain, no bowel movement in >2 weeks  COMPARISON: 12/5/2024, PET/CT 11/27/2024  TECHNIQUE: CT scan of the abdomen and pelvis was performed following injection of IV contrast. Multiplanar reformats were obtained. Dose reduction techniques were used.  CONTRAST: 44mL ISOVUE 370     FINDINGS:   LOWER CHEST: Prominent mitral annular calcification. Lung bases are clear.     HEPATOBILIARY: Prior cholecystectomy. Moderate biliary ectasia but no ductal calcification.     PANCREAS: Normal.     SPLEEN: Normal.     ADRENAL GLANDS: Normal.     KIDNEYS/BLADDER: Chronic severe left hydronephrosis, hydroureter, and left ureterocele. Marked urinary bladder distention measuring 16 cm x 10 cm x 12 cm. No bladder wall thickening or calcification.     BOWEL: There is mild rectal wall thickening and mucosal hyperenhancement. There is mild surrounding perirectal edema. No free fluid or free air. No abscess. Large low stool in the colon. Small bowel normal in caliber. Postsurgical change in the stomach.     LYMPH NODES: Normal.     VASCULATURE: Normal.     PELVIC ORGANS: Prior hysterectomy.     MUSCULOSKELETAL: No suspicious bone lesion. Severe degenerative change in the hips. Severe degenerative change in the lumbar spine. Bilateral spondylolysis at L5 with moderate anterolisthesis of L5 on S1.                                                                      IMPRESSION:   1.  There is mild rectal wall thickening, mucosal hyperenhancement, and surrounding perirectal edema  compatible with recent prolapse and reduction. No free air, free fluid, or evidence for other complication.  2.  Markedly distended urinary bladder. Stable appearance of left hydronephrosis, hydroureter, and ureterocele.    I have personally reviewed the imaging reports above.     Assessment / Plan : Bibiana Mcrae is being seen by [facility] for urinary retention.    79-year-old female with history of rectal prolapse and chronic left hydronephrosis with duplicated collecting system is admitted for new onset nausea and vomiting with associated suprapubic distention and inability to urinate.    She underwent elective rectosigmoid right ectomy and developed postoperative urinary retention.  She underwent voiding trial on 2/11/2025 which she initially passed but is gone back and urinary retention.  Deutsch catheter was placed in the emergency department.    -UA negative for infection.  No antibiotics necessary.  -Maintain Deutsch catheter at discharge.  Will arrange for outpatient voiding trial.  -Regarding chronic left hydronephrosis, this can be worked up with an outpatient Lasix renogram.  She is asymptomatic without flank pain, no DARLENE and there is no progression on imaging.  -Okay to discharge from urological standpoint.    Thank you for consulting [facility] regarding this patient's care. Please contact us with questions or concerns.     Zain Tripp PA-C  [facility]   [phone]

## 2025-02-14 NOTE — ED TRIAGE NOTES
Patient presents to ED for nausea/vomiting and urinary tract infection that started on the 23rd of January following colorectal surgery.  Patient has not been able to tolerate much fluid or food for much of that time.  Currently on antibiotics.  .     Triage Assessment (Adult)       Row Name 02/13/25 1846          Triage Assessment    Airway WDL WDL        Respiratory WDL    Respiratory WDL WDL        Skin Circulation/Temperature WDL    Skin Circulation/Temperature WDL WDL        Cardiac WDL    Cardiac WDL WDL        Peripheral/Neurovascular WDL    Peripheral Neurovascular WDL WDL        Cognitive/Neuro/Behavioral WDL    Cognitive/Neuro/Behavioral WDL WDL

## 2025-02-14 NOTE — H&P
[facility]    History and Physical - Hospitalist Service       Date of Admission:  2/13/2025    Assessment & Plan   Bibiana Mcrae is a 79-year-old female with a medical history of dementia, hypertension, anxiety disorder, chronic anemia, and rectal prolapse, for which she recently underwent an elective perineal rectosigmoidectomy. Her postoperative course was complicated by acute urinary retention.  Patient comes in with ongoing nausea, vomiting and dehydration           Patient Active Problem List   Diagnosis    Essential hypertension with goal blood pressure less than 130/80    Anxiety    Osteoporosis, senile    Bilateral sensorineural hearing loss    Impaired fasting glucose    Diverticular disease of large intestine    Gastroesophageal reflux disease with esophagitis without hemorrhage    Bronchiectasis without complication (H)    Urinary incontinence    Irregular bowel habits    Status post dilation of esophageal narrowing    Metal foreign body in eye region    MRI contraindicated due to metal in eye    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)    Chemotherapy-induced neutropenia    Major neurocognitive disorder due to Alzheimer's disease (H)    Rectal prolapse    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    Plan:  Continue Deutsch catheterization to ensure bladder decompression.  Urology has been consulted for further evaluation and management.  Monitor urine output and renal function  closely.  Continue cefpodoxime for recent UTI    Generalized Weakness:  Assessment: Likely multifactorial, potentially exacerbated by dehydration and recent surgery.  Plan:  Initiate physical and occupational therapy as tolerated to improve strength and mobility.  Anticipate improvement with adequate hydration and nutritional support.  Defer PT/OT timing to primary team    Dehydration:    Assessment: Secondary to decreased oral intake as per reports, though creatinine remains within normal limits.  Plan:  Administer intravenous fluids to restore hydration.  Encourage oral intake as tolerated and monitor fluid status.    Hypertension:    Assessment: Chronic condition, currently stable.  Plan:  Resume outpatient antihypertensive medications.  Monitor blood pressure regularly during admission.    B-cell Lymphoma:    Assessment: Chronic condition under outpatient management. Recent PET scan done showed                                                             IMPRESSION:  Near complete partial favorable treatment response. No new site of involvement.  Plan:  Continue with current outpatient follow-up schedule.  Ensure coordination with oncology for ongoing management.    Gastroesophageal Reflux Disease (GERD):    Assessment: Chronic condition managed with medication.  Plan:  Continue Prilosec as prescribed.  Monitor for any exacerbation of symptoms.  Anxiety:    Assessment: Chronic anxiety disorder.  Plan:  Resume Buspar as per outpatient regimen.  Consider consultation with psychiatry if anxiety symptoms are not well controlled.  Additional Notes:    Rectal prolase- s/p elective perineal rectosigmoidectomy.    Continue to manage the patient's other chronic medical conditions as previously established.  Regularly assess the patient's overall condition and adjust the plan of care as needed.     Diet:  regular  DVT Prophylaxis: Pneumatic Compression Devices  Deutsch Catheter: Not present  Lines: PRESENT      Port A Cath  "Single 05/01/24 Right Chest wall-Site Assessment: WDL      Cardiac Monitoring: None  Code Status:  full code    Clinically Significant Risk Factors Present on Admission         # Hyponatremia: Lowest Na = 132 mmol/L in last 2 days, will monitor as appropriate  # Hypochloremia: Lowest Cl = 94 mmol/L in last 2 days, will monitor as appropriate          # Hypertension: Noted on problem list     # Dementia: noted on problem list       # Cachexia: Estimated body mass index is 15.99 kg/m  as calculated from the following:    Height as of this encounter: 1.549 m (5' 1\").    Weight as of this encounter: 38.4 kg (84 lb 9.6 oz).              Disposition Plan     Medically Ready for Discharge: Anticipated in 2-4 Days           Blanquita Gallegos MD  Hospitalist Service  [facility]  Securely message with [employer] (more info)  Text page via [employer] ([employer]) Paging/Directory     ______________________________________________________________________    Chief Complaint   weakness        History of Present Illness   Bibiana Mcrae is a 79-year-old female with a medical history of lymphoma, dementia, hypertension, anxiety disorder, chronic anemia, and rectal prolapse, for which she recently underwent an elective perineal rectosigmoidectomy. Her postoperative course was complicated by acute urinary retention s/p babin placement  Patient comes in with ongoing nausea, vomiting and dehydration        Despite a plan for outpatient management by urology, she presented to the emergency department with ongoing issues of nausea, vomiting, and suspected dehydration. Her daughter reports that since returning home post-surgery, Bibiana has been unable to keep food or fluids down, leading to significant dehydration. She has also been dealing with a urinary tract infection for which she is currently on antibiotics. There are no recent fevers or significant abdominal pain reported.        In the emergency department, the patient was " evaluated for her symptoms of nausea and vomiting, and a Deutsch catheter was placed to relieve urinary retention, which showed marked distention of the urinary bladder.     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                                                               IMPRESSION:   1.  Moderate to marked distention of the urinary bladder. 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   2.  No other acute abnormality identified in the abdomen or pelvis.  3.  A region of nonspecific patchy sclerosis in the left ischium again noted.    Patient is being admitted for further inpatient cares.  Urine catheter was placed.  Urology consulted and colorectal surgery consulted as well.      Recent PET scan done showed results below  EXAM: PET ONCOLOGY WHOLE BODY  LOCATION: [facility]  DATE: 9/9/2024     INDICATION: Lymphoma, non-Hodgkin, restaging. Diffuse large B-cell lymphoma of intra-abdominal lymph nodes. Status post systemic chemotherapy. Subsequent treatment strategy.  COMPARISON: PET/CT 7/1/2024. Baseline PET/CT 5/2/2024 also reviewed.  TECHNIQUE: Serum glucose level 84 mg/dL. One hour post intravenous administration of 10.2 mCi F-18 FDG, PET imaging was performed from the skull vertex to feet, utilizing attenuation correction with concurrent axial CT and PET/CT image fusion. Dose    reduction techniques were used.     PET/CT FINDINGS: Since 7/1/2024, residual mass involving the pancreas has continued to decrease in size now measuring 1.0 x 1.9 cm (previously 1.6 x 2.9 cm at similar level measurements) and associated FDG uptake has continued to decrease, now mild   (SUVmax 3.3, previously 6.1), remains just above background liver level, Deauville 4, suggesting a near complete partial favorable treatment response. No evidence of FDG avid malignancy elsewhere. No new or enlarging FDG avid adenopathy above or below   the diaphragm. Normal size liver and spleen with normal uptake. No suspicious focal skeletal uptake.      CT FINDINGS: Moderate senescent intracranial changes. Opacification left sphenoid sinus. Right IJ Port-A-Cath with tip near the SVC/RA junction. Mild coronary artery calcification. Dense mitral annulus calcification. Lower thoracic surgical clips.   Cholecystectomy. Left retroperitoneal cystic structure with benign appearance, unchanged. Hysterectomy. Moderate colonic diverticulosis. Mild scattered hypertrophic degenerative changes in the spine. Grade II anterolisthesis L5 on S1.                                                                      IMPRESSION:  Near complete partial favorable treatment response. No new site of involvement.        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)     Diverticular disease of large intestine     Gastroesophageal reflux disease     History of blood transfusion     History of cholecystectomy 08/24/2023    Hypertension     Major neurocognitive disorder due to Alzheimer's disease (H)     Memory loss     Osteoporosis     PONV (postoperative nausea and vomiting)     Rectal  prolapse        Past Surgical History   Past Surgical History:   Procedure Laterality Date    BREAST CYST ASPIRATION Left     While ago    HYSTERECTOMY  1985    IR CHEST PORT PLACEMENT > 5 YRS OF AGE  2024    OOPHORECTOMY Bilateral     RECTOSIGMOIDECTOMY PERINEAL N/A 2025    Procedure: PERINEAL RECTOSIGMOIDECTOMY;  Surgeon: Josee Basilio MD;  Location: [facility] [place] OR    [facility] REMOVAL OF OVARY(S)      Description: Oophorectomy;  Recorded: 2009;    [facility] TOTAL ABDOM HYSTERECTOMY      Description: Total Abdominal Hysterectomy;  Recorded: 2009;       Prior to Admission Medications   Prior to Admission Medications   Prescriptions Last Dose Informant Patient Reported? Taking?   Biotin 5 MG TABS  Daughter Yes No   Sig: Take 5,000 mcg by mouth at bedtime.   Probiotic Product (SOLUBLE FIBER/PROBIOTICS PO)  Daughter Yes No   Si tablespoons in 8 oz of liquid daily   acarbose (PRECOSE) 25 MG tablet  Daughter Yes No   Sig: Take 25 mg by mouth 3 times daily (with meals)   acetaminophen (TYLENOL) 325 MG tablet  Daughter Yes No   Sig: Take 325-650 mg by mouth every 6 hours as needed for mild pain   amLODIPine (NORVASC) 5 MG tablet  Daughter No No   Sig: Take 1 tablet (5 mg) by mouth daily   busPIRone (BUSPAR) 7.5 MG tablet  Daughter Yes No   Sig: Take 7.5 mg by mouth 2 times daily.   celecoxib (CELEBREX) 200 MG capsule  Daughter Yes No   Sig: Take 200 mg by mouth daily.   denosumab (PROLIA) 60 MG/ML SOSY injection  Daughter Yes No   Sig: Inject 60 mg Subcutaneous every 6 months   eszopiclone (LUNESTA) 1 MG tablet  Daughter No No   Sig: Take 1 tablet (1 mg) by mouth at bedtime.   memantine (NAMENDA) 10 MG tablet  Daughter Yes No   Sig: Take 10 mg by mouth 2 times daily. Take 1 tablet (10 mg) by mouth 2 times daily.   memantine (NAMENDA) 5 MG tablet  Daughter Yes No   Sig: Take 5 mg by mouth See Admin Instructions. 1 PO every day x 7 days, then 1 PO BID x 7 days, then 2 PO Q am & 1 PO at bedtime x 7  days, then 2 PO BID thereafter   metoclopramide (REGLAN) 10 MG tablet   No No   Sig: Take 1 tablet (10 mg) by mouth 4 times daily (before meals and nightly) for 360 doses.   metoprolol tartrate (LOPRESSOR) 25 MG tablet  Daughter Yes No   Sig: Take 25 mg by mouth daily.   multivitamin with minerals (THERA-M) 9 mg iron-400 mcg Tab tablet  Daughter Yes No   Sig: Take 1 tablet by mouth at bedtime.   nortriptyline (PAMELOR) 50 MG capsule  Daughter Yes No   Sig: Take 50 mg by mouth at bedtime.   omeprazole (PRILOSEC) 20 MG capsule  Daughter Yes No   Sig: [OMEPRAZOLE (PRILOSEC) 20 MG CAPSULE] Take 20 mg by mouth daily before breakfast.   ondansetron (ZOFRAN ODT) 4 MG ODT tab  Daughter Yes No   Sig: Take 4 mg by mouth every 8 hours as needed for nausea.   polyethylene glycol (MIRALAX) 17 GM/Dose powder  Daughter No No   Sig: Take 17 g by mouth daily.   rivastigmine (EXELON) 4.5 MG capsule  Daughter Yes No   Sig: Take 4.5 mg by mouth 2 times daily.   sennosides (SENOKOT) 8.6 MG tablet  Daughter Yes No   Sig: Take 2 tablets by mouth daily as needed for constipation   simethicone (MYLICON) 125 MG chewable tablet  Daughter Yes No   Sig: Take 125 mg by mouth as needed.      Facility-Administered Medications: None        Review of Systems    The 10 point Review of Systems is negative other than noted in the HPI or here.     Social History   I have reviewed this patient's social history and updated it with pertinent information if needed.  Social History     Tobacco Use    Smoking status: Never     Passive exposure: Never    Smokeless tobacco: Never   Vaping Use    Vaping status: Never Used   Substance Use Topics    Alcohol use: No    Drug use: Never         Family History   I have reviewed this patient's family history and updated it with pertinent information if needed.  Family History   Problem Relation Age of Onset    Breast Cancer Mother 52.00    Cancer Maternal Grandfather 71.00        prostate    Prostate Cancer Maternal  Grandfather          Allergies   Allergies   Allergen Reactions    Blood Transfusion Related (Informational Only) Other (See Comments)     Patient has a history of a Clinically Significant antibody against RBC antigens. A delay in compatible RBCs may occur. 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    Prochlorperazine Edisylate [Prochlorperazine] Shortness Of Breath     Extrapyramidal side effects        Physical Exam   Vital Signs: Temp: 98.4  F (36.9  C) Temp src: Oral BP: (!) 151/88 Pulse: 89   Resp: 16 SpO2: 100 % O2 Device: None (Room air)    Weight: 84 lbs 9.6 oz      General Aox3, appropriate affect, NAD, on RA  HEENT  MMM, EOMI, PERRL  Chest Adeq E b/l, No wheezing  Heart RRR, No M/R/G  Abd- Soft, NT, BS+ + babin  - Deferred,   Extremity- Moving all extremities, No digital clubbing,   No edema  Neuro- Aox3, moving all extremities  gait not checked  Skin  Has no tattoo, No skin rash     Medical Decision Making       85 MINUTES SPENT BY ME on the date of service doing chart review, history, exam, documentation & further activities per the note.      ------------------ MEDICAL DECISION MAKING ------------------------------------------------------------------------------------------------------  MANAGEMENT DISCUSSED with the following over the past 24 hours: patient and care team       Data   ------------------------- PAST 24 HR DATA REVIEWED -----------------------------------------------    I have personally reviewed the following data over the past 24 hrs:    7.4  \   12.6   / 386     132 (L) 94 (L) 8.8 /  124 (H)   3.8 29 0.52 \     Procal: N/A CRP: N/A Lactic Acid: 0.9         Imaging results reviewed over the past 24 hrs:   Recent Results (from the past 24 hours)   CT Abdomen Pelvis w Contrast    Narrative    EXAM: CT ABDOMEN AND PELVIS WITH CONTRAST  LOCATION: [facility]  DATE/TIME: 02/13/2025 11:34 PM CST    INDICATION: Abdominal pain.  COMPARISON: 01/31/2025 - PET/CT  scan whole body.    TECHNIQUE: CT scan of the abdomen and pelvis was performed following injection of IV contrast. Multiplanar reformats were obtained. Dose reduction techniques were used.  CONTRAST: 50 mL Isovue 370.    FINDINGS:    LOWER CHEST: Coronary artery calcification. Calcification in the region of the aortic and mitral valves. Partial visualization of a vascular catheter with distal tip in the right atrium.    HEPATOBILIARY: Prior cholecystectomy. Mild prominence in caliber of the common bile duct and central intrahepatic bile ducts again noted.    SPLEEN: Unremarkable.    PANCREAS: Moderate diffuse parenchymal atrophy.    ADRENAL GLANDS: Unremarkable.    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 A large 9 cm cystic structure abutting the inferior pole of the left kidney may represent a dilated collecting system associated with the inferior pole moiety versus a large   exophytic renal cyst. There is prominent dilatation of one of the ureters extending all the way to the bladder, where there may be a ureterocele and an ectopic ureteral insertion more inferiorly than typical into the bladder. Mild dilatation of the right   intrarenal collecting system and mild to moderate intrarenal collecting system dilatation of the upper pole moiety of the left kidney. Moderate to marked distention of the urinary bladder.    BOWEL: Prior gastric surgery. The stomach, small and large bowel are normal in caliber. The appendix is not visualized. No visualized bowel wall thickening, pneumatosis, or free intraperitoneal gas.    LYMPH NODES: Unremarkable.    PELVIC ORGANS: No acute findings.    MUSCULOSKELETAL: A region of patchy sclerosis in the left ischium again noted (series 3 image 166). Bilateral L5 pars interarticularis defects.    OTHER: Atherosclerotic calcification in the abdominal aorta.      Impression    IMPRESSION:   1.  Moderate to marked distention of the urinary bladder.  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   2.  No other acute abnormality identified in the abdomen or pelvis.  3.  A region of nonspecific patchy sclerosis in the left ischium again noted.

## 2025-02-14 NOTE — CONSULTS
[facility]   Consultation      Place of Service: [facility]  Reason for Consultation: Surgery follow up, nausea and vomiting  Consult Requested by: Dr. Blanquita Gallegos     History of Present Illness: Bibiana Mcrae is a 78 y/o F with history of dementia, Large B-cell lymphoma, anxiety, and rectal prolapse s/p perineal rectosigmoidectomy (Altemeier) on 1/23/25 with Dr. Basilio. Her postoperative course was complicated by urinary retention and a UTI that was managed with antibiotics and outpatient urology follow up.     History today was gathered mostly from the patient's daughter. She has struggles with nausea and vomiting since the surgery, and she presented to the ED for this on 2/2 and was discharged to home. She has continued to have nausea and vomiting, which they suspect may be due to the antibiotics for the UTI. They were having issues with the babin catheter, so they removed it at home. She now presents with urinary retention, nausea, and vomiting. She has also been having 2-3 loose stools per day. The patient denies any abdominal pain, rectal pain, or rectal bleeding. No recurrent prolapse. She is not taking MiraLax currently due to the loose stools.     She is vitally stable, normal WBC and Hgb. CT scan showed bladder distention. There is no dilation of the stomach, small bowel, or colon and no evidence of a bowel obstruction or abscess.     Pertinent Labs:   Lab Results: personally reviewed   CBC RESULTS:   Recent Labs   Lab Test 02/14/25  0829   WBC 6.1   RBC 4.32   HGB 11.8   HCT 35.3   MCV 82   MCH 27.3   MCHC 33.4   RDW 18.4*         Last Comprehensive Metabolic Panel:  Lab Results   Component Value Date     02/14/2025    POTASSIUM 3.7 02/14/2025    CHLORIDE 103 02/14/2025    CO2 25 02/14/2025    ANIONGAP 9 02/14/2025     (H) 02/14/2025    BUN 5.9 (L) 02/14/2025    CR 0.45 (L) 02/14/2025    GFRESTIMATED >90 02/14/2025    JUSTIN 8.1 (L) 02/14/2025          Pertinent Radiology: EXAM: CT ABDOMEN AND PELVIS WITH CONTRAST  LOCATION: [facility]  DATE/TIME: 02/13/2025 11:34 PM CST     INDICATION: Abdominal pain.  COMPARISON: 01/31/2025 - PET/CT scan whole body.     TECHNIQUE: CT scan of the abdomen and pelvis was performed following injection of IV contrast. Multiplanar reformats were obtained. Dose reduction techniques were used.  CONTRAST: 50 mL Isovue 370.     FINDINGS:     LOWER CHEST: Coronary artery calcification. Calcification in the region of the aortic and mitral valves. Partial visualization of a vascular catheter with distal tip in the right atrium.     HEPATOBILIARY: Prior cholecystectomy. Mild prominence in caliber of the common bile duct and central intrahepatic bile ducts again noted.     SPLEEN: Unremarkable.     PANCREAS: Moderate diffuse parenchymal atrophy.     ADRENAL GLANDS: Unremarkable.     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 A large 9 cm cystic structure abutting the inferior pole of the left kidney may represent a dilated collecting system associated with the inferior pole moiety versus a large   exophytic renal cyst. There is prominent dilatation of one of the ureters extending all the way to the bladder, where there may be a ureterocele and an ectopic ureteral insertion more inferiorly than typical into the bladder. Mild dilatation of the right   intrarenal collecting system and mild to moderate intrarenal collecting system dilatation of the upper pole moiety of the left kidney. Moderate to marked distention of the urinary bladder.     BOWEL: Prior gastric surgery. The stomach, small and large bowel are normal in caliber. The appendix is not visualized. No visualized bowel wall thickening, pneumatosis, or free intraperitoneal gas.     LYMPH NODES: Unremarkable.     PELVIC ORGANS: No acute findings.     MUSCULOSKELETAL: A region of patchy sclerosis in the left ischium again  noted (series 3 image 166). Bilateral L5 pars interarticularis defects.     OTHER: Atherosclerotic calcification in the abdominal aorta.                                                                      IMPRESSION:   1.  Moderate to marked distention of the urinary bladder. 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   2.  No other acute abnormality identified in the abdomen or pelvis.  3.  A region of nonspecific patchy sclerosis in the left ischium again noted.      Past Medical History:   Diagnosis Date    Anemia     Anxiety     Anxiety     Bilateral sensorineural hearing loss     Bilateral sensorineural hearing loss     Breast cyst     While ago    Bronchiectasis without complication (H)     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)     Diverticular disease of large intestine     Gastroesophageal reflux disease     History of blood transfusion     History of cholecystectomy 08/24/2023    Hypertension     Major neurocognitive disorder due to Alzheimer's disease (H)     Memory loss     Osteoporosis     PONV (postoperative nausea and vomiting)     Rectal prolapse        Past Surgical History:   Procedure Laterality Date    BREAST CYST ASPIRATION Left     While ago    HYSTERECTOMY  1985    IR CHEST PORT PLACEMENT > 5 YRS OF AGE  5/1/2024    OOPHORECTOMY Bilateral 1985    RECTOSIGMOIDECTOMY PERINEAL N/A 1/23/2025    Procedure: PERINEAL RECTOSIGMOIDECTOMY;  Surgeon: Josee Basilio MD;  Location: [facility] OR    [facility] REMOVAL OF OVARY(S)      Description:  Oophorectomy;  Recorded: 12/17/2009;    ZZC TOTAL ABDOM HYSTERECTOMY      Description: Total Abdominal Hysterectomy;  Recorded: 12/17/2009;       Family History   Problem Relation Age of Onset    Breast Cancer Mother 52.00    Cancer Maternal Grandfather 71.00        prostate    Prostate Cancer Maternal Grandfather        Social History     Socioeconomic History    Marital status:      Spouse name: Not on file    Number of children: Not on file    Years of education: Not on file    Highest education level: Not on file   Occupational History    Not on file   Tobacco Use    Smoking status: Never     Passive exposure: Never    Smokeless tobacco: Never   Vaping Use    Vaping status: Never Used   Substance and Sexual Activity    Alcohol use: No    Drug use: Never    Sexual activity: Not on file   Other Topics Concern    Not on file   Social History Narrative    Not on file     Social Drivers of Health     Financial Resource Strain: Low Risk  (1/24/2025)    Financial Resource Strain     Within the past 12 months, have you or your family members you live with been unable to get utilities (heat, electricity) when it was really needed?: No   Food Insecurity: Low Risk  (1/24/2025)    Food Insecurity     Within the past 12 months, did you worry that your food would run out before you got money to buy more?: No     Within the past 12 months, did the food you bought just not last and you didn t have money to get more?: No   Transportation Needs: Low Risk  (1/24/2025)    Transportation Needs     Within the past 12 months, has lack of transportation kept you from medical appointments, getting your medicines, non-medical meetings or appointments, work, or from getting things that you need?: No   Physical Activity: Unknown (4/25/2024)    Exercise Vital Sign     Days of Exercise per Week: 0 days     Minutes of Exercise per Session: Not on file   Stress: Stress Concern Present (4/25/2024)    [employer] - Occupational Stress Questionnaire     Feeling of Stress : Rather much   Social Connections: Unknown (4/25/2024)    Social Connection and Isolation Panel [NHANES]     Frequency of Communication with Friends and Family: Not on file     Frequency of Social Gatherings with Friends and Family: Once a week     Attends Congregation Services: Not on file     Active Member of Clubs or Organizations: Not on file     Attends Club or Organization Meetings: Not on file     Marital Status: Not on file   Interpersonal Safety: Low Risk  (1/23/2025)    Interpersonal Safety     Do you feel physically and emotionally safe where you currently live?: Yes     Within the past 12 months, have you been hit, slapped, kicked or otherwise physically hurt by someone?: No     Within the past 12 months, have you been humiliated or emotionally abused in other ways by your partner or ex-partner?: No   Housing Stability: Low Risk  (1/24/2025)    Housing Stability     Do you have housing? : Yes     Are you worried about losing your housing?: No       Current Facility-Administered Medications   Medication Dose Route Frequency Provider Last Rate Last Admin    acarbose (PRECOSE) tablet 25 mg  25 mg Oral TID w/meals Blanquita Gallegos MD   25 mg at 02/14/25 0929    acetaminophen (TYLENOL) tablet 325-650 mg  325-650 mg Oral Q6H PRN Blanquita Gallegos MD        amLODIPine (NORVASC) tablet 5 mg  5 mg Oral Daily Blanquita Gallegos MD   5 mg at 02/14/25 0755    busPIRone (BUSPAR) tablet 7.5 mg  7.5 mg Oral BID Blanquita Gallegos MD   7.5 mg at 02/14/25 0928    celecoxib (celeBREX) capsule 200 mg  200 mg Oral At Bedtime Blanquita Gallegos MD        eszopiclone (LUNESTA) tablet 1 mg  1 mg Oral At Bedtime Blanquita Gallegos MD        [START ON 2/15/2025] memantine (NAMENDA) tablet 5 mg  5 mg Oral Daily Naz Gomez MD        metoclopramide (REGLAN) tablet 10 mg  10 mg Oral Daily Blanquita Gallegos MD   10 mg at 02/14/25 0755    metoprolol tartrate (LOPRESSOR) tablet 25 mg  25  mg Oral Daily Blanquita Gallegos MD   25 mg at 02/14/25 0755    multivitamin w/minerals (THERA-VIT-M) tablet 1 tablet  1 tablet Oral At Bedtime Blanquita Gallegos MD        nortriptyline (PAMELOR) capsule 50 mg  50 mg Oral At Bedtime Blanquita Gallegos MD        ondansetron (ZOFRAN ODT) ODT tab 4 mg  4 mg Oral Q6H PRN Blanquita Gallegos MD        Or    ondansetron (ZOFRAN) injection 4 mg  4 mg Intravenous Q6H PRN Blanquita Gallegos MD        pantoprazole (PROTONIX) EC tablet 40 mg  40 mg Oral QAM AC Blanquita Gallegos MD   40 mg at 02/14/25 0745    simethicone (MYLICON) chewable tablet 80 mg  80 mg Oral 4x Daily PRN Blanquita Gallegos MD         Current Outpatient Medications   Medication Sig Dispense Refill    acarbose (PRECOSE) 25 MG tablet Take 25 mg by mouth 3 times daily (with meals)      acetaminophen (TYLENOL) 325 MG tablet Take 325-650 mg by mouth every 6 hours as needed for mild pain      amLODIPine (NORVASC) 5 MG tablet Take 1 tablet (5 mg) by mouth daily 90 tablet 3    ampicillin (PRINCIPEN) 500 MG capsule Take 500 mg by mouth 4 times daily.      Biotin 5 MG TABS Take 5,000 mcg by mouth at bedtime.      busPIRone (BUSPAR) 7.5 MG tablet Take 7.5 mg by mouth 2 times daily.      denosumab (PROLIA) 60 MG/ML SOSY injection Inject 60 mg Subcutaneous every 6 months      eszopiclone (LUNESTA) 1 MG tablet Take 1 tablet (1 mg) by mouth at bedtime. 90 tablet 1    metoclopramide (REGLAN) 10 MG tablet Take 10 mg by mouth daily.      metoprolol tartrate (LOPRESSOR) 25 MG tablet Take 25 mg by mouth daily.      multivitamin with minerals (THERA-M) 9 mg iron-400 mcg Tab tablet Take 1 tablet by mouth at bedtime.      nortriptyline (PAMELOR) 50 MG capsule Take 50 mg by mouth at bedtime.      omeprazole (PRILOSEC) 20 MG capsule [OMEPRAZOLE (PRILOSEC) 20 MG CAPSULE] Take 20 mg by mouth daily before breakfast.      ondansetron (ZOFRAN ODT) 4 MG ODT tab Take 4 mg by mouth every 8 hours as needed for nausea.      Probiotic Product (SOLUBLE  "FIBER/PROBIOTICS PO) 2 tablespoons in 8 oz of liquid daily      rivastigmine (EXELON) 4.5 MG capsule Take 4.5 mg by mouth daily.      sennosides (SENOKOT) 8.6 MG tablet Take 2 tablets by mouth daily as needed for constipation      simethicone (MYLICON) 125 MG chewable tablet Take 125 mg by mouth as needed.      celecoxib (CELEBREX) 200 MG capsule Take 200 mg by mouth at bedtime.      memantine (NAMENDA) 5 MG tablet Take 5 mg by mouth See Admin Instructions. 1 PO every day x 7 days, then 1 PO BID x 7 days, then 2 PO Q am & 1 PO at bedtime x 7 days, then 2 PO BID thereafter         Allergies   Allergen Reactions    Blood Transfusion Related (Informational Only) Other (See Comments)     Patient has a history of a Clinically Significant antibody against RBC antigens. A delay in compatible RBCs may occur. 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    Prochlorperazine Edisylate [Prochlorperazine] Shortness Of Breath     Extrapyramidal side effects       Review of Systems:   \"A full 10 point review of systems was taken and is negative aside from what is noted above in the HPI.\"       Intake/Output past 24 hrs:    Intake/Output Summary (Last 24 hours) at 2/14/2025 1027  Last data filed at 2/14/2025 1010  Gross per 24 hour   Intake 1730 ml   Output 1750 ml   Net -20 ml       Physical Exam:  Temp:  [98.4  F (36.9  C)] 98.4  F (36.9  C)  Pulse:  [74-91] 87  Resp:  [16] 16  BP: (120-179)/(71-96) 146/72  SpO2:  [95 %-100 %] 96 %    General: NAD, alert, cooperative  Head: normocephalic, without abnormality / atraumatic  Respiratory: non-labored breathing  Abdomen: soft, non-tender, non-distended.  No guarding or rebound  Perianal/Rectal: this is a chaperoned exam. No rectal prolapse seen externally. Digital rectal exam with intact anastomosis. No tenderness with exam. No bleeding present on my gloved finger following the exam  Skin: no rashes or lesions  Musculoskeletal: moves all four extremities equally; no calf edema " or tenderness  Psychological: alert and oriented, answers questions appropriately  Neurological: cranial nerves grossly intact    Assessment/Plan: Bibiana Mcrae is a 78 y/o F with history of dementia and rectal prolapse s/p perineal rectosigmoidectomy (Altemeier) on 1/23/25 with Dr. Basilio. Her postoperative course was complicated by urinary retention and a UTI that was managed with antibiotics. She presents with urinary retention, and persistent nausea, vomiting, and loose stools. CT showing urinary retention with no bowel obstruction or other intra-abdominal process. Nausea and vomiting may potentially be from the antibiotics, urinary retention, or lymphoma.     1. No colorectal issues at present  2. Urinary retention per Urology  3. Could consider stool studies to rule out C Diff or viral enteritis  4. CRS will sign off, please page with any questions or concerns.    Medical Decision Making:   Additional workup / testing ordered: none  Procedure / Surgery recommended: no surgical intervention indicated   Old records reviewed - Discharge summary, ED note 2/2, labs, Urinalysis, CT scan, ED noted 2/14  Medications added / changed: none  Medical Decision Making complexity: MODERATE     This case was discussed with: Dr. Josee Basilio    Thank you for consulting [facility] regarding this patient's care. Please contact us with questions or concerns.     Today's total time spent including direct patient care and management, and care coordination: 45 minutes    Bernadine Anderson PA-C  [facility]  [phone]

## 2025-02-14 NOTE — DISCHARGE SUMMARY
"[facility]  Hospitalist Discharge Summary      Date of Admission:  2/13/2025  Date of Discharge:  2/14/2025  Discharging Provider: Naz Gomez MD  Discharge Service: Hospitalist Service    Discharge Diagnoses     Acute urinary retention  Chronic left hydronephrosis  Rectal prolapse, status post recent repair  B-cell lymphoma  Dementia  Hypertension  GERD  Anxiety    Clinically Significant Risk Factors     # Cachexia: Estimated body mass index is 15.99 kg/m  as calculated from the following:    Height as of this encounter: 1.549 m (5' 1\").    Weight as of this encounter: 38.4 kg (84 lb 9.6 oz).       Follow-ups Needed After Discharge   Follow-up Appointments       Follow Up      Maintain Deutsch catheter at discharge.    [facility] will arrange for outpatient voiding trial.  Regarding chronic left hydronephrosis, [facility] will arrange follow up with an outpatient Lasix renogram.        Hospital Follow-up with Existing Primary Care Provider (PCP)      Please see details below         Schedule Primary Care visit within: 14 Days   Recommended labs and Imaging (to be ordered by Primary Care Provider): BMP                   Discharge Disposition   Discharged to home with resumption of home care services previously ordered  Condition at discharge: Stable     Hospital Course   79-year-old female with history of dementia and recent elective perineal rectosigmoidectomy due to rectal prolapse complicated by urinary retention presented with nausea and vomiting for 2 days.  ED workup revealed recurrent urinary retention for which patient was asymptomatic other than nausea and vomiting.  Patient had Deutsch catheter placed.  UA was unremarkable for infection.  History was that she had had catheter removed approximately 72 hours before presentation.  Had 1 good day and then 2 bad days with nausea and vomiting.    Seen by colorectal surgery while here in felt healing stable.  No further workup indicated.  Seen " by urology here who recommended continuation of Deutsch catheter upon discharge, outpatient trial of void, and outpatient workup for chronic left hydronephrosis with Lasix renogram.    No evidence for significant dehydration on labs.  Patient tolerating diet with mild nausea noted.  Given IV fluids and discharging to home in good condition.    Consultations This Hospital Stay   UROLOGY IP CONSULT  COLORECTAL SURGERY IP CONSULT  CARE MANAGEMENT / SOCIAL WORK IP CONSULT    Code Status   Full Code    Time Spent on this Encounter   I, Naz Gomez MD, personally saw the patient today and spent greater than 30 minutes discharging this patient.       Naz Gomez MD  [facility] EMERGENCY DEPARTMENT  [address] [address]  Phone: [phone]  ______________________________________________________________________    Physical Exam   Vital Signs: Temp: 98.4  F (36.9  C) Temp src: Oral BP: (!) 158/79 Pulse: 77   Resp: 16 SpO2: 97 % O2 Device: None (Room air)    Weight: 84 lbs 9.6 oz  General Appearance: Pleasant but anxious frail-appearing female  Respiratory: Port in right chest, otherwise clear to auscultation  Cardiovascular: Regular rate and rhythm  GI: Soft tender without hepatosplenomegaly  Skin: No significant lower extremity edema  Other: Neurologically grossly intact.  No focal deficits appreciated.       Primary Care Physician   Zack Woody    Discharge Orders      Primary Care - Care Coordination Referral      Reason for your hospital stay    Recurrent urinary retention  Chronic left hydronephrosis     Activity    Your activity upon discharge: activity as tolerated     Tubes and Drains    Current Tubes and Drains:     CVC Line  Duration           Port A Cath Single 05/01/24 Right Chest wall 289 days     Urinary catheter placed 2/14/2025     Follow Up    Maintain Deutsch catheter at discharge.    [facility] will arrange for outpatient voiding trial.  Regarding chronic left  hydronephrosis, [facility] will arrange follow up with an outpatient Lasix renogram.     Resume Home Care Services     Diet    Follow this diet upon discharge: Current Diet:Orders Placed This Encounter      Clear Liquid Diet     Hospital Follow-up with Existing Primary Care Provider (PCP)    Please see details below            Significant Results and Procedures   Most Recent 3 CBC's:  Recent Labs   Lab Test 02/14/25  0829 02/13/25 2213 02/02/25 2126   WBC 6.1 7.4 9.1   HGB 11.8 12.6 11.2*   MCV 82 81 82    386 390     Most Recent 3 BMP's:  Recent Labs   Lab Test 02/14/25  0829 02/13/25  2213 02/02/25  2126    132* 138   POTASSIUM 3.7 3.8 4.1   CHLORIDE 103 94* 102   CO2 25 29 26   BUN 5.9* 8.8 11.6   CR 0.45* 0.52 0.64   ANIONGAP 9 9 10   JUSTIN 8.1* 9.0 9.0   * 124* 145*     Most Recent Urinalysis:  Recent Labs   Lab Test 02/14/25  0022   COLOR Colorless   APPEARANCE Clear   URINEGLC Negative   URINEBILI Negative   URINEKETONE Negative   SG 1.007   UBLD Negative   URINEPH 7.0   PROTEIN Negative   NITRITE Negative   LEUKEST Negative   RBCU <1   WBCU 5   ,   Results for orders placed or performed during the hospital encounter of 02/13/25   CT Abdomen Pelvis w Contrast    Narrative    EXAM: CT ABDOMEN AND PELVIS WITH CONTRAST  LOCATION: [facility]  DATE/TIME: 02/13/2025 11:34 PM CST    INDICATION: Abdominal pain.  COMPARISON: 01/31/2025 - PET/CT scan whole body.    TECHNIQUE: CT scan of the abdomen and pelvis was performed following injection of IV contrast. Multiplanar reformats were obtained. Dose reduction techniques were used.  CONTRAST: 50 mL Isovue 370.    FINDINGS:    LOWER CHEST: Coronary artery calcification. Calcification in the region of the aortic and mitral valves. Partial visualization of a vascular catheter with distal tip in the right atrium.    HEPATOBILIARY: Prior cholecystectomy. Mild prominence in caliber of the common bile duct and central intrahepatic  bile ducts again noted.    SPLEEN: Unremarkable.    PANCREAS: Moderate diffuse parenchymal atrophy.    ADRENAL GLANDS: Unremarkable.    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 A large 9 cm cystic structure abutting the inferior pole of the left kidney may represent a dilated collecting system associated with the inferior pole moiety versus a large   exophytic renal cyst. There is prominent dilatation of one of the ureters extending all the way to the bladder, where there may be a ureterocele and an ectopic ureteral insertion more inferiorly than typical into the bladder. Mild dilatation of the right   intrarenal collecting system and mild to moderate intrarenal collecting system dilatation of the upper pole moiety of the left kidney. Moderate to marked distention of the urinary bladder.    BOWEL: Prior gastric surgery. The stomach, small and large bowel are normal in caliber. The appendix is not visualized. No visualized bowel wall thickening, pneumatosis, or free intraperitoneal gas.    LYMPH NODES: Unremarkable.    PELVIC ORGANS: No acute findings.    MUSCULOSKELETAL: A region of patchy sclerosis in the left ischium again noted (series 3 image 166). Bilateral L5 pars interarticularis defects.    OTHER: Atherosclerotic calcification in the abdominal aorta.      Impression    IMPRESSION:   1.  Moderate to marked distention of the urinary bladder. 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   2.  No other acute abnormality identified in the abdomen or  pelvis.  3.  A region of nonspecific patchy sclerosis in the left ischium again noted.       Discharge Medications   Current Discharge Medication List        CONTINUE these medications which have NOT CHANGED    Details   acarbose (PRECOSE) 25 MG tablet Take 25 mg by mouth 3 times daily (with meals)      acetaminophen (TYLENOL) 325 MG tablet Take 325-650 mg by mouth every 6 hours as needed for mild pain      amLODIPine (NORVASC) 5 MG tablet Take 1 tablet (5 mg) by mouth daily  Qty: 90 tablet, Refills: 3    Associated Diagnoses: Primary hypertension      Biotin 5 MG TABS Take 5,000 mcg by mouth at bedtime.      busPIRone (BUSPAR) 7.5 MG tablet Take 7.5 mg by mouth 2 times daily.      denosumab (PROLIA) 60 MG/ML SOSY injection Inject 60 mg Subcutaneous every 6 months      eszopiclone (LUNESTA) 1 MG tablet Take 1 tablet (1 mg) by mouth at bedtime.  Qty: 90 tablet, Refills: 1    Associated Diagnoses: Insomnia, unspecified type      metoclopramide (REGLAN) 10 MG tablet Take 10 mg by mouth daily.      metoprolol tartrate (LOPRESSOR) 25 MG tablet Take 25 mg by mouth daily.      multivitamin with minerals (THERA-M) 9 mg iron-400 mcg Tab tablet Take 1 tablet by mouth at bedtime.      nortriptyline (PAMELOR) 50 MG capsule Take 50 mg by mouth at bedtime.      omeprazole (PRILOSEC) 20 MG capsule [OMEPRAZOLE (PRILOSEC) 20 MG CAPSULE] Take 20 mg by mouth daily before breakfast.      ondansetron (ZOFRAN ODT) 4 MG ODT tab Take 4 mg by mouth every 8 hours as needed for nausea.      Probiotic Product (SOLUBLE FIBER/PROBIOTICS PO) 2 tablespoons in 8 oz of liquid daily      sennosides (SENOKOT) 8.6 MG tablet Take 2 tablets by mouth daily as needed for constipation      simethicone (MYLICON) 125 MG chewable tablet Take 125 mg by mouth as needed.      memantine (NAMENDA) 5 MG tablet Take 5 mg by mouth See Admin Instructions. 1 PO every day x 7 days, then 1 PO BID x 7 days, then 2 PO Q am & 1 PO at bedtime x 7 days, then 2 PO BID  thereafter           STOP taking these medications       ampicillin (PRINCIPEN) 500 MG capsule Comments:   Reason for Stopping:         celecoxib (CELEBREX) 200 MG capsule Comments:   Reason for Stopping:         rivastigmine (EXELON) 4.5 MG capsule Comments:   Reason for Stopping:             Allergies   Allergies   Allergen Reactions    Blood Transfusion Related (Informational Only) Other (See Comments)     Patient has a history of a Clinically Significant antibody against RBC antigens. A delay in compatible RBCs may occur. 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    Prochlorperazine Edisylate [Prochlorperazine] Shortness Of Breath     Extrapyramidal side effects

## 2025-02-14 NOTE — PLAN OF CARE
Goal Outcome Evaluation:      Plan of Care Reviewed With: patient, child          Outcome Evaluation: Return home with current home care services when medically ready for discharge.

## 2025-02-14 NOTE — CONSULTS
Care Management Initial Consult    General Information  Assessment completed with: Patient, Children, Bibiana and jacob Bledsoe  Type of CM/SW Visit: Initial Assessment    Primary Care Provider verified and updated as needed: Yes   Readmission within the last 30 days: previous discharge plan unsuccessful   Return Category: Exacerbation of disease  Reason for Consult: discharge planning, utilization management concerns  Advance Care Planning: Advance Care Planning Reviewed: present on chart          Communication Assessment  Patient's communication style: spoken language (English or Bilingual)             Cognitive  Cognitive/Neuro/Behavioral: WDL                      Living Environment:   People in home: alone     Current living Arrangements: house      Able to return to prior arrangements: yes       Family/Social Support:  Care provided by: self, child(maryuri)  Provides care for: no one  Marital Status:   Support system: Children          Description of Support System: Supportive, Involved    Support Assessment: Adequate family and caregiver support    Current Resources:   Patient receiving home care services: Yes  Skilled Home Care Services: Skilled Nursing (plan to start PT and OT too but hasn't been feeling well enough)     Community Resources: Home Care  Equipment currently used at home: walker, standard  Supplies currently used at home: Other (babin catheter)    Employment/Financial:  Employment Status: retired        Financial Concerns: none   Referral to Financial Worker: No       Does the patient's insurance plan have a 3 day qualifying hospital stay waiver?  Yes     Which insurance plan 3 day waiver is available? Alternative insurance waiver    Will the waiver be used for post-acute placement? No    Lifestyle & Psychosocial Needs:  Social Drivers of Health     Food Insecurity: Low Risk  (1/24/2025)    Food Insecurity     Within the past 12 months, did you worry that your food would run out before you  got money to buy more?: No     Within the past 12 months, did the food you bought just not last and you didn t have money to get more?: No   Depression: Not at risk (1/6/2025)    PHQ-2     PHQ-2 Score: 0   Housing Stability: Low Risk  (1/24/2025)    Housing Stability     Do you have housing? : Yes     Are you worried about losing your housing?: No   Tobacco Use: Low Risk  (2/6/2025)    Patient History     Smoking Tobacco Use: Never     Smokeless Tobacco Use: Never     Passive Exposure: Never   Financial Resource Strain: Low Risk  (1/24/2025)    Financial Resource Strain     Within the past 12 months, have you or your family members you live with been unable to get utilities (heat, electricity) when it was really needed?: No   Alcohol Use: Not At Risk (7/27/2020)    Received from [facility]    AUDIT-C     Frequency of Alcohol Consumption: Never     Average Number of Drinks: Not on file     Frequency of Binge Drinking: Not on file   Transportation Needs: Low Risk  (1/24/2025)    Transportation Needs     Within the past 12 months, has lack of transportation kept you from medical appointments, getting your medicines, non-medical meetings or appointments, work, or from getting things that you need?: No   Physical Activity: Unknown (4/25/2024)    Exercise Vital Sign     Days of Exercise per Week: 0 days     Minutes of Exercise per Session: Not on file   Interpersonal Safety: Low Risk  (1/23/2025)    Interpersonal Safety     Do you feel physically and emotionally safe where you currently live?: Yes     Within the past 12 months, have you been hit, slapped, kicked or otherwise physically hurt by someone?: No     Within the past 12 months, have you been humiliated or emotionally abused in other ways by your partner or ex-partner?: No   Stress: Stress Concern Present (4/25/2024)    [employer] [place] [employer] - Occupational Stress Questionnaire     Feeling of Stress : Rather much   Social  Connections: Unknown (4/25/2024)    Social Connection and Isolation Panel [NHANES]     Frequency of Communication with Friends and Family: Not on file     Frequency of Social Gatherings with Friends and Family: Once a week     Attends Anabaptism Services: Not on file     Active Member of Clubs or Organizations: Not on file     Attends Club or Organization Meetings: Not on file     Marital Status: Not on file   Health Literacy: Not on file       Functional Status:  Prior to admission patient needed assistance:   Dependent ADLs:: Ambulation-walker, Independent  Dependent IADLs:: Cleaning, Laundry, Shopping, Transportation  Assesssment of Functional Status: Not at  functional baseline    Mental Health Status:  Mental Health Status: No Current Concerns       Chemical Dependency Status:  Chemical Dependency Status: No Current Concerns             Values/Beliefs:  Spiritual, Cultural Beliefs, Anabaptism Practices, Values that affect care: no               Discussed  Partnership in Safe Discharge Planning  document with patient/family: No    Additional Information:  CM consult received for discharge planning.  Met with patient and daughter Ladi at bedside to introduce CM role and perform initial assessment.  Demographics verified and updated as needed.  Reviewed observation status billing under Medicare guidelines and provided a copy of the [employer] brochure.  Bibiana lives alone in a house.  She was hospitalized at [facility] 1/23-1/29 and discharged home with home care nursing [facility].  Per daughter plan is to start home care PT and OT as well but patient hasn't been feeling well enough for the activity.  Bibiana ambulates with a walker.  She is independent with ADLs, and both daughters assist with IADLs.  Daughter Rosa Isela lives 1 mile away and can come to patient's home if needed.  Anticipate return home with current home care services when medically ready for discharge.  Family can transport.         Next  Steps: CM to follow for medical progression of care, discharge recommendations, and final discharge plan.      Jason Ball CRNI

## 2025-02-14 NOTE — MEDICATION SCRIBE - ADMISSION MEDICATION HISTORY
2/14 AM: clarified with Patient's daughter: she has not yet started memantine     Patient's most recent antibiotic for UTI was ampicillin, started 6 days on 2/7, also has received courses of Cipro & cefpodoxime     Medication Scribe Admission Medication History    Admission medication history is complete. The information provided in this note is only as accurate as the sources available at the time of the update.    Information Source(s): Family member via in-person    Pertinent Information: Patient's daughter at bedside and provided updated PTA med list.    Changes made to PTA medication list:  Added: Cefpodoxime (per patient's daughter and fill history)  Deleted: Miralax(per patient's daughter)  Changed: None    Allergies reviewed with patient and updates made in EHR: yes    Medication History Completed By: Yeny Alvarado 2/14/2025 2:51 AM    PTA Med List   Medication Sig Note Last Dose/Taking    acarbose (PRECOSE) 25 MG tablet Take 25 mg by mouth 3 times daily (with meals)  2/13/2025 Morning    acetaminophen (TYLENOL) 325 MG tablet Take 325-650 mg by mouth every 6 hours as needed for mild pain  Taking As Needed    amLODIPine (NORVASC) 5 MG tablet Take 1 tablet (5 mg) by mouth daily  2/12/2025 Bedtime    ampicillin (PRINCIPEN) 500 MG capsule Take 500 mg by mouth 4 times daily. 2/14/2025: Started 2/7/25 for 7 days  2/13/2025 Evening    Biotin 5 MG TABS Take 5,000 mcg by mouth at bedtime.  2/12/2025 Bedtime    busPIRone (BUSPAR) 7.5 MG tablet Take 7.5 mg by mouth 2 times daily.  2/13/2025 Morning    denosumab (PROLIA) 60 MG/ML SOSY injection Inject 60 mg Subcutaneous every 6 months  Past Month    eszopiclone (LUNESTA) 1 MG tablet Take 1 tablet (1 mg) by mouth at bedtime.  2/12/2025 Bedtime    metoclopramide (REGLAN) 10 MG tablet Take 10 mg by mouth daily.  2/13/2025 Morning    metoprolol tartrate (LOPRESSOR) 25 MG tablet Take 25 mg by mouth daily.  2/12/2025 Bedtime    multivitamin with minerals (THERA-M) 9 mg  iron-400 mcg Tab tablet Take 1 tablet by mouth at bedtime.  2/12/2025 Bedtime    nortriptyline (PAMELOR) 50 MG capsule Take 50 mg by mouth at bedtime.  2/12/2025 Bedtime    omeprazole (PRILOSEC) 20 MG capsule [OMEPRAZOLE (PRILOSEC) 20 MG CAPSULE] Take 20 mg by mouth daily before breakfast.  2/13/2025 Morning    ondansetron (ZOFRAN ODT) 4 MG ODT tab Take 4 mg by mouth every 8 hours as needed for nausea.  Taking As Needed    Probiotic Product (SOLUBLE FIBER/PROBIOTICS PO) 2 tablespoons in 8 oz of liquid daily  2/13/2025 Morning    rivastigmine (EXELON) 4.5 MG capsule Take 4.5 mg by mouth daily.  2/13/2025 Morning    sennosides (SENOKOT) 8.6 MG tablet Take 2 tablets by mouth daily as needed for constipation  Taking As Needed    simethicone (MYLICON) 125 MG chewable tablet Take 125 mg by mouth as needed.  Taking As Needed

## 2025-02-14 NOTE — TELEPHONE ENCOUNTER
Forms/Letter Request    Type of form/letter: Therapy Plan      Do we have the form/letter: Yes: Placed in Dr. Woody in box    Who is the form from? Home care    Where did/will the form come from? form was faxed in    When is form/letter needed by: when done    How would you like the form/letter returned: Fax : [phone]      Order number (if applicable): [id number]

## 2025-02-14 NOTE — H&P
ecimen Information: Urine, Catheter   0 Result Notes  Culture >100,000 CFU/mL Pseudomonas aeruginosa Abnormal    >100,000 CFU/mL Enterococcus faecalis Abnormal         Resulting Agency: [facility]     Susceptibility     Pseudomonas aeruginosa Enterococcus faecalis     JOSE LUIS JOSE LUIS     Ampicillin   <=2 ug/mL Susceptible     Cefepime 2 ug/mL Susceptible       Ceftazidime 2 ug/mL Susceptible       Ciprofloxacin 0.12 ug/mL Susceptible       Levofloxacin 0.5 ug/mL Susceptible       Meropenem 1 ug/mL Susceptible       Nitrofurantoin   <=16 ug/mL Susceptible     Piperacillin/Tazobactam 8 ug/mL Susceptible       Vancomycin   1 ug/mL Susceptible                   Specimen Collected: 02/02/25  9:42 PM       Patient has has 3 days of ciprofloxacin, a few days of cefpodoxine e and 6 days of ampiclin. Ampicilin is causing nausea. Ok to stop. UA unremarkable here.   Am team to follow. D/w pharmacy

## 2025-02-14 NOTE — ED PROVIDER NOTES
EMERGENCY DEPARTMENT ENCOUnter      NAME: Bibiana Mcrae  AGE: 79 year old female  YOB: 1945  MRN: [id number]  EVALUATION DATE & TIME: No admission date for patient encounter.    PCP: Zack Woody    ED PROVIDER: Jayme Fields DO      Chief Complaint   Patient presents with    Nausea & Vomiting    Urinary tract infection         FINAL IMPRESSION:  No diagnosis found.      ED COURSE & MEDICAL DECISION MAKING:        Medical Decision Making  Obtained supplemental history:{Supplemental history obtained?:835549}  Reviewed external records: {External records reviewed?:352680}  Care impacted by chronic illness:{CHRONIC ILLNESS:221285}  {Did you consider but not order tests?:300338}  {Did you interpret images independently?:174485}  Consultation discussion with other provider:{Did you involve another provider (consultant, , pharmacy, etc.)?:131908}  {ADMIT VS D/C:904243}    MIPS (CTPE, Dental pain, Deutsch, Sinusitis, Asthma/COPD, Head Trauma): {[facility] MIPS DOCUMENTATION:743783}      At the conclusion of the encounter I discussed the results of all of the tests and the disposition. The questions were answered. The patient or family acknowledged understanding and was agreeable with the care plan.         MEDICATIONS GIVEN IN THE EMERGENCY:  Medications   sodium chloride 0.9% BOLUS 500 mL (has no administration in time range)   ondansetron (ZOFRAN) injection 4 mg (has no administration in time range)       NEW PRESCRIPTIONS STARTED AT TODAY'S ER VISIT  New Prescriptions    No medications on file          =================================================================    HPI        Bibiana Mcrae is a 79 year old female with a pertinent history of lymphoma who presents to the emergency department today with nausea, vomiting, and diarrhea.  She had a recent colorectal surgery due to a rectal prolapse.  Daughter states that since the patient has come home, she has been dealing with significant nausea and  vomiting and not able to keep anything down.  They feel that she is significantly dehydrated.  She has also been battling a urinary tract infection.  She has been following with urology for this and is currently on antibiotics.  No recent fevers.  No significant abdominal pain.  No other current complaints.        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)     Diverticular disease of large intestine     Gastroesophageal reflux disease     History of blood transfusion     History of cholecystectomy 08/24/2023    Hypertension     Major neurocognitive disorder due to Alzheimer's disease (H)     Memory loss     Osteoporosis     PONV (postoperative nausea and vomiting)     Rectal prolapse        PAST SURGICAL HISTORY:  Past Surgical History:   Procedure Laterality Date    BREAST CYST ASPIRATION Left     While ago    HYSTERECTOMY  1985    IR CHEST PORT PLACEMENT > 5 YRS OF AGE  5/1/2024    OOPHORECTOMY Bilateral 1985    RECTOSIGMOIDECTOMY PERINEAL N/A 1/23/2025    Procedure: PERINEAL RECTOSIGMOIDECTOMY;  Surgeon: Josee Basilio MD;  Location: [facility] [place]    [facility] REMOVAL OF OVARY(S)      Description: Oophorectomy;  Recorded: 12/17/2009;    [facility] TOTAL ABDOM HYSTERECTOMY      Description: Total Abdominal Hysterectomy;  Recorded: 12/17/2009;           CURRENT MEDICATIONS:    acarbose (PRECOSE) 25 MG tablet  acetaminophen (TYLENOL) 325 MG tablet  amLODIPine (NORVASC) 5 MG tablet  Biotin 5 MG TABS  busPIRone (BUSPAR) 7.5 MG tablet  celecoxib (CELEBREX) 200 MG capsule  denosumab (PROLIA) 60 MG/ML SOSY injection  eszopiclone (LUNESTA) 1 MG tablet  memantine (NAMENDA) 10 MG tablet  memantine (NAMENDA) 5 MG tablet  metoclopramide (REGLAN) 10 MG  tablet  metoprolol tartrate (LOPRESSOR) 25 MG tablet  multivitamin with minerals (THERA-M) 9 mg iron-400 mcg Tab tablet  nortriptyline (PAMELOR) 50 MG capsule  omeprazole (PRILOSEC) 20 MG capsule  ondansetron (ZOFRAN ODT) 4 MG ODT tab  polyethylene glycol (MIRALAX) 17 GM/Dose powder  Probiotic Product (SOLUBLE FIBER/PROBIOTICS PO)  rivastigmine (EXELON) 4.5 MG capsule  sennosides (SENOKOT) 8.6 MG tablet  simethicone (MYLICON) 125 MG chewable tablet        ALLERGIES:  Allergies   Allergen Reactions    Blood Transfusion Related (Informational Only) Other (See Comments)     Patient has a history of a Clinically Significant antibody against RBC antigens. A delay in compatible RBCs may occur. 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    Prochlorperazine Edisylate [Prochlorperazine] Shortness Of Breath     Extrapyramidal side effects       FAMILY HISTORY:  Family History   Problem Relation Age of Onset    Breast Cancer Mother 52.00    Cancer Maternal Grandfather 71.00        prostate    Prostate Cancer Maternal Grandfather        SOCIAL HISTORY:   Social History     Socioeconomic History    Marital status:    Tobacco Use    Smoking status: Never     Passive exposure: Never    Smokeless tobacco: Never   Vaping Use    Vaping status: Never Used   Substance and Sexual Activity    Alcohol use: No    Drug use: Never     Social Drivers of Health     Financial Resource Strain: Low Risk  (1/24/2025)    Financial Resource Strain     Within the past 12 months, have you or your family members you live with been unable to get utilities (heat, electricity) when it was really needed?: No   Food Insecurity: Low Risk  (1/24/2025)    Food Insecurity     Within the past 12 months, did you worry that your food would run out before you got money to buy more?: No     Within the past 12 months, did the food you bought just not last and you didn t have money to get more?: No   Transportation Needs: Low Risk  (1/24/2025)     "Transportation Needs     Within the past 12 months, has lack of transportation kept you from medical appointments, getting your medicines, non-medical meetings or appointments, work, or from getting things that you need?: No   Physical Activity: Unknown (4/25/2024)    Exercise Vital Sign     Days of Exercise per Week: 0 days   Stress: Stress Concern Present (4/25/2024)    [employer] - Occupational Stress Questionnaire     Feeling of Stress : Rather much   Social Connections: Unknown (4/25/2024)    Social Connection and Isolation Panel [NHANES]     Frequency of Social Gatherings with Friends and Family: Once a week   Interpersonal Safety: Low Risk  (1/23/2025)    Interpersonal Safety     Do you feel physically and emotionally safe where you currently live?: Yes     Within the past 12 months, have you been hit, slapped, kicked or otherwise physically hurt by someone?: No     Within the past 12 months, have you been humiliated or emotionally abused in other ways by your partner or ex-partner?: No   Housing Stability: Low Risk  (1/24/2025)    Housing Stability     Do you have housing? : Yes     Are you worried about losing your housing?: No       VITALS:  Patient Vitals for the past 24 hrs:   BP Temp Temp src Pulse Resp SpO2 Height Weight   02/13/25 1845 (!) 168/96 98.4  F (36.9  C) Oral 74 16 99 % 1.549 m (5' 1\") 38.4 kg (84 lb 9.6 oz)       PHYSICAL EXAM    Constitutional:  Well developed, Well nourished,  HENT:  Normocephalic, Atraumatic, Oropharynx moist, Nose normal.   Eyes:  EOMI, Conjunctiva normal, No discharge.   Respiratory:  Normal breath sounds, No respiratory distress, No wheezing, No chest tenderness.   Cardiovascular:  Normal heart rate, Normal rhythm, No murmurs  GI:  Soft, No tenderness, No guarding, No CVA tenderness.   Musculoskeletal:  No tenderness to palpation or major deformities noted.   Extremities: No lower extremity edema.  Neurologic:  Alert & oriented x 3, No " focal deficits noted.   Psychiatric:  Affect normal, Judgment normal, Mood normal.        LAB:  All pertinent labs reviewed and interpreted.       RADIOLOGY:  I have independently reviewed and interpreted the above imaging, pending the final radiology read.  CT Abdomen Pelvis w Contrast    (Results Pending)       EKG:        I have independently reviewed and interpreted this EKG          I, ***, am serving as a scribe to document services personally performed by Dr. Fields based on my observation and the provider's statements to me. I, Jayme Fields, DO attest that *** is acting in a scribe capacity, has observed my performance of the services and has documented them in accordance with my direction.    Jayme Fields, DO  Emergency Medicine  [facility] EMERGENCY DEPARTMENT  [facility] [facility] [id number] [phone]  Dept: [phone]

## 2025-02-14 NOTE — ED NOTES
Patient's port de-accessed and heparin flushed. Discharge instruction and education given with teach back with family and patient. Understand next steps and no questions at this time.

## 2025-02-17 ENCOUNTER — TELEPHONE (OUTPATIENT)
Dept: FAMILY MEDICINE | Facility: CLINIC | Age: 80
End: 2025-02-17
Payer: COMMERCIAL

## 2025-02-17 NOTE — TELEPHONE ENCOUNTER
Forms/Letter Request    Type of form/letter: Therapy Plan      Do we have the form/letter: Yes: Placed in Dr. Woody in box    Who is the form from? Home care    Where did/will the form come from? form was faxed in    When is form/letter needed by: when done    How would you like the form/letter returned: [phone]        Order number (if applicable): [id number]

## 2025-02-18 ENCOUNTER — PATIENT OUTREACH (OUTPATIENT)
Dept: CARE COORDINATION | Facility: CLINIC | Age: 80
End: 2025-02-18
Payer: COMMERCIAL

## 2025-02-18 ENCOUNTER — MEDICAL CORRESPONDENCE (OUTPATIENT)
Dept: HEALTH INFORMATION MANAGEMENT | Facility: CLINIC | Age: 80
End: 2025-02-18
Payer: COMMERCIAL

## 2025-02-18 NOTE — PROGRESS NOTES
Clinic Care Coordination Contact  Care Coordination Clinician Chart Review    Situation: Patient chart reviewed by care coordinator.    Background: Clinic Care Coordination Referral received from inpatient care team for transition handoff communication following hospital admission.    Assessment: Upon chart review, patient is not a candidate for Primary Care Clinic Care Coordination enrollment due to reason stated below:  Primary Care Clinic Care Coordination will defer follow-up outreach to Specialty Clinic Care Coordination team who are already closely following patient.    Plan/Recommendations: Clinic Care Coordination Referral/order cancelled. RN/SW CC will perform no further monitoring/outreaches at this time and will remain available as needed. If new needs arise, a new Care Coordination Referral may be placed.    David Myhre, RN   [facility] RN  [phone]

## 2025-02-18 NOTE — TELEPHONE ENCOUNTER
02/18/2025    Cesia @ [facility] returned call. TC relayed message. Nothing else needed at this time.    Delaney Duque

## 2025-02-24 ENCOUNTER — VIRTUAL VISIT (OUTPATIENT)
Dept: FAMILY MEDICINE | Facility: CLINIC | Age: 80
End: 2025-02-24
Payer: COMMERCIAL

## 2025-02-24 ENCOUNTER — TELEPHONE (OUTPATIENT)
Dept: FAMILY MEDICINE | Facility: CLINIC | Age: 80
End: 2025-02-24

## 2025-02-24 ENCOUNTER — TRANSFERRED RECORDS (OUTPATIENT)
Dept: HEALTH INFORMATION MANAGEMENT | Facility: CLINIC | Age: 80
End: 2025-02-24

## 2025-02-24 DIAGNOSIS — N13.30 HYDRONEPHROSIS, UNSPECIFIED HYDRONEPHROSIS TYPE: ICD-10-CM

## 2025-02-24 DIAGNOSIS — R11.0 NAUSEA: Primary | ICD-10-CM

## 2025-02-24 PROCEDURE — 98005 SYNCH AUDIO-VIDEO EST LOW 20: CPT | Performed by: NURSE PRACTITIONER

## 2025-02-24 NOTE — PROGRESS NOTES
"Bibiana is a 79 year old who is being evaluated via a billable video visit.    How would you like to obtain your AVS? MyChart  If the video visit is dropped, the invitation should be resent by: Text to cell phone: [phone]  Will anyone else be joining your video visit? No      Assessment & Plan     Nausea  *    -They will stop multivitamin in the evening.  We reviewed medications together today to assess if there is a trigger for her nausea in the morning.  It appears that her nausea has been ongoing.  It is better now that she is not vomiting and has been eating, appetite is good, and staying hydrated.  I suggest continue to monitor.   -As the patient is now cleared with senna.   -Patient's daughter is requesting nephrology referral.  Already has an appointment set up with nephrology consultants.  Referral placed.   -She will continue to follow-up with her PCP for chronic conditions.     The longitudinal plan of care for the diagnosis(es)/condition(s) as documented were addressed during this visit. Due to the added complexity in care, I will continue to support Bibiana in the subsequent management and with ongoing continuity of care.          MED REC REQUIRED  Post Medication Reconciliation Status: discharge medications reconciled, continue medications without change        Subjective   Bibiana is a 79 year old, presenting for the following health issues:  Nausea (IN AM only.)    -Patient and daughter is present during video visit.  Patient stated \" feeling good\".  She has a catheter placed, and left hydronephrosis was noted on CT. will have a follow-up CT in 2 weeks.  Daughter has a nephrologist appointment scheduled for 2 weeks.  She would like a referral.  This is related to the left side hydronephrosis.  After surgery, the patient was throwing up and nauseated in the morning.  For about 5 days now, she has not vomited or thrown up.  Her appetite has been back to normal, appetite is good, and her daughter " has been pushing fluids.  The patient stated that for months she has been dealing with nausea.  Her oncologist placed her on ondansetron 8 mg as needed.  She takes her anxiety medication more in the evening.  She takes Lunesta before bed.  She will take a multivitamin in the evening.  She has no abdominal pain.  The Senokot has helped her constipation.  She has home care still set up.         1/21/2025     2:46 PM   Additional Questions   Roomed by Matheus   Accompanied by daughter       FRANCESCO                 Objective           Vitals:  No vitals were obtained today due to virtual visit.    Physical Exam   GENERAL: alert and no distress  EYES: Eyes grossly normal to inspection.  No discharge or erythema, or obvious scleral/conjunctival abnormalities.  RESP: No audible wheeze, cough, or visible cyanosis.    SKIN: Visible skin clear. No significant rash, abnormal pigmentation or lesions.  NEURO: Cranial nerves grossly intact.  Mentation and speech appropriate for age.  PSYCH: Appropriate affect, tone, and pace of words    Admission on 02/13/2025, Discharged on 02/14/2025   Component Date Value Ref Range Status    WBC Count 02/13/2025 7.4  4.0 - 11.0 10e3/uL Final    RBC Count 02/13/2025 4.80  3.80 - 5.20 10e6/uL Final    Hemoglobin 02/13/2025 12.6  11.7 - 15.7 g/dL Final    Hematocrit 02/13/2025 38.8  35.0 - 47.0 % Final    MCV 02/13/2025 81  78 - 100 fL Final    MCH 02/13/2025 26.3 (L)  26.5 - 33.0 pg Final    MCHC 02/13/2025 32.5  31.5 - 36.5 g/dL Final    RDW 02/13/2025 18.0 (H)  10.0 - 15.0 % Final    Platelet Count 02/13/2025 386  150 - 450 10e3/uL Final    Sodium 02/13/2025 132 (L)  135 - 145 mmol/L Final    Potassium 02/13/2025 3.8  3.4 - 5.3 mmol/L Final    Chloride 02/13/2025 94 (L)  98 - 107 mmol/L Final    Carbon Dioxide (CO2) 02/13/2025 29  22 - 29 mmol/L Final    Anion Gap 02/13/2025 9  7 - 15 mmol/L Final    Urea Nitrogen 02/13/2025 8.8  8.0 - 23.0 mg/dL Final    Creatinine 02/13/2025 0.52  0.51 - 0.95 mg/dL  Final    GFR Estimate 02/13/2025 >90  >60 mL/min/1.73m2 Final    eGFR calculated using 2021 CKD-EPI equation.    Calcium 02/13/2025 9.0  8.8 - 10.4 mg/dL Final    Glucose 02/13/2025 124 (H)  70 - 99 mg/dL Final    Color Urine 02/14/2025 Colorless  Colorless, Straw, Light Yellow, Yellow Final    Appearance Urine 02/14/2025 Clear  Clear Final    Glucose Urine 02/14/2025 Negative  Negative mg/dL Final    Bilirubin Urine 02/14/2025 Negative  Negative Final    Ketones Urine 02/14/2025 Negative  Negative mg/dL Final    Specific Gravity Urine 02/14/2025 1.007  1.001 - 1.030 Final    Blood Urine 02/14/2025 Negative  Negative Final    pH Urine 02/14/2025 7.0  5.0 - 7.0 Final    Protein Albumin Urine 02/14/2025 Negative  Negative mg/dL Final    Urobilinogen Urine 02/14/2025 <2.0  <2.0 mg/dL Final    Nitrite Urine 02/14/2025 Negative  Negative Final    Leukocyte Esterase Urine 02/14/2025 Negative  Negative Final    Bacteria Urine 02/14/2025 Few (A)  None Seen /HPF Final    RBC Urine 02/14/2025 <1  <=2 /HPF Final    WBC Urine 02/14/2025 5  <=5 /HPF Final    Lactic Acid, Initial 02/13/2025 0.9  0.7 - 2.0 mmol/L Final    Sodium 02/14/2025 137  135 - 145 mmol/L Final    Potassium 02/14/2025 3.7  3.4 - 5.3 mmol/L Final    Carbon Dioxide (CO2) 02/14/2025 25  22 - 29 mmol/L Final    Anion Gap 02/14/2025 9  7 - 15 mmol/L Final    Urea Nitrogen 02/14/2025 5.9 (L)  8.0 - 23.0 mg/dL Final    Creatinine 02/14/2025 0.45 (L)  0.51 - 0.95 mg/dL Final    GFR Estimate 02/14/2025 >90  >60 mL/min/1.73m2 Final    eGFR calculated using 2021 CKD-EPI equation.    Calcium 02/14/2025 8.1 (L)  8.8 - 10.4 mg/dL Final    Chloride 02/14/2025 103  98 - 107 mmol/L Final    Glucose 02/14/2025 131 (H)  70 - 99 mg/dL Final    Alkaline Phosphatase 02/14/2025 106  40 - 150 U/L Final    AST 02/14/2025 18  0 - 45 U/L Final    ALT 02/14/2025 15  0 - 50 U/L Final    Protein Total 02/14/2025 5.7 (L)  6.4 - 8.3 g/dL Final    Albumin 02/14/2025 3.7  3.5 - 5.2 g/dL  Final    Bilirubin Total 02/14/2025 0.5  <=1.2 mg/dL Final    WBC Count 02/14/2025 6.1  4.0 - 11.0 10e3/uL Final    RBC Count 02/14/2025 4.32  3.80 - 5.20 10e6/uL Final    Hemoglobin 02/14/2025 11.8  11.7 - 15.7 g/dL Final    Hematocrit 02/14/2025 35.3  35.0 - 47.0 % Final    MCV 02/14/2025 82  78 - 100 fL Final    MCH 02/14/2025 27.3  26.5 - 33.0 pg Final    MCHC 02/14/2025 33.4  31.5 - 36.5 g/dL Final    RDW 02/14/2025 18.4 (H)  10.0 - 15.0 % Final    Platelet Count 02/14/2025 328  150 - 450 10e3/uL Final    % Neutrophils 02/14/2025 67  % Final    % Lymphocytes 02/14/2025 19  % Final    % Monocytes 02/14/2025 13  % Final    % Eosinophils 02/14/2025 1  % Final    % Basophils 02/14/2025 1  % Final    % Immature Granulocytes 02/14/2025 0  % Final    NRBCs per 100 WBC 02/14/2025 0  <1 /100 Final    Absolute Neutrophils 02/14/2025 4.0  1.6 - 8.3 10e3/uL Final    Absolute Lymphocytes 02/14/2025 1.1  0.8 - 5.3 10e3/uL Final    Absolute Monocytes 02/14/2025 0.8  0.0 - 1.3 10e3/uL Final    Absolute Eosinophils 02/14/2025 0.0  0.0 - 0.7 10e3/uL Final    Absolute Basophils 02/14/2025 0.0  0.0 - 0.2 10e3/uL Final    Absolute Immature Granulocytes 02/14/2025 0.0  <=0.4 10e3/uL Final    Absolute NRBCs 02/14/2025 0.0  10e3/uL Final         Video-Visit Details    Type of service:  Video Visit   Video End Time:  Originating Location (pt. Location): Home    Distant Location (provider location):  On-site  Platform used for Video Visit: [employer]  Signed Electronically by: CHRISTOPHER Ceron CNP

## 2025-02-24 NOTE — TELEPHONE ENCOUNTER
Forms/Letter Request    Type of form/letter: Home Health Certification #[id number]    Do we have the form/letter: Yes:     Who is the form from? Home care    Where did/will the form come from? form was faxed in    When is form/letter needed by:     How would you like the form/letter returned: Fax : [phone]    Patient Notified form requests are processed in 5-7 business days:N/A    Could we send this information to you in [facility] or would you prefer to receive a phone call?:

## 2025-02-25 ENCOUNTER — MEDICAL CORRESPONDENCE (OUTPATIENT)
Dept: HEALTH INFORMATION MANAGEMENT | Facility: CLINIC | Age: 80
End: 2025-02-25

## 2025-02-27 ENCOUNTER — MEDICAL CORRESPONDENCE (OUTPATIENT)
Dept: HEALTH INFORMATION MANAGEMENT | Facility: CLINIC | Age: 80
End: 2025-02-27

## 2025-03-03 ENCOUNTER — TELEPHONE (OUTPATIENT)
Dept: FAMILY MEDICINE | Facility: CLINIC | Age: 80
End: 2025-03-03
Payer: COMMERCIAL

## 2025-03-03 NOTE — TELEPHONE ENCOUNTER
ID [id number]  Forms/Letter Request    Type of form/letter: Home Health Certification    Do we have the form/letter: Yes: in Dr Woody's inbox    Who is the form from? Home care    Where did/will the form come from? form was faxed in    When is form/letter needed by: when complete    How would you like the form/letter returned: Fax : [phone]    Patient Notified form requests are processed in 5-7 business days:    Could we send this information to you in [employer] or would you prefer to receive a phone call?:

## 2025-03-04 ENCOUNTER — MEDICAL CORRESPONDENCE (OUTPATIENT)
Dept: HEALTH INFORMATION MANAGEMENT | Facility: CLINIC | Age: 80
End: 2025-03-04
Payer: COMMERCIAL

## 2025-03-30 DIAGNOSIS — F41.9 ANXIETY: Primary | ICD-10-CM

## 2025-03-31 ENCOUNTER — TELEPHONE (OUTPATIENT)
Dept: FAMILY MEDICINE | Facility: CLINIC | Age: 80
End: 2025-03-31
Payer: COMMERCIAL

## 2025-03-31 RX ORDER — BUSPIRONE HYDROCHLORIDE 7.5 MG/1
7.5 TABLET ORAL
Qty: 180 TABLET | Refills: 1 | Status: SHIPPED | OUTPATIENT
Start: 2025-03-31

## 2025-03-31 NOTE — TELEPHONE ENCOUNTER
Forms/Letter Request    Type of form/letter: Home Health Certification      Do we have the form/letter: Yes: Dr Woody's inbox    Who is the form from? Home care    Where did/will the form come from? form was faxed in    When is form/letter needed by: when complete    How would you like the form/letter returned: Fax : [phone]    Patient Notified form requests are processed in 5-7 business days:    Could we send this information to you in [employer] or would you prefer to receive a phone call?:         [phone]

## 2025-04-03 DIAGNOSIS — I10 PRIMARY HYPERTENSION: Primary | ICD-10-CM

## 2025-04-03 RX ORDER — METOPROLOL TARTRATE 25 MG/1
25 TABLET, FILM COATED ORAL DAILY
Qty: 90 TABLET | Refills: 1 | Status: SHIPPED | OUTPATIENT
Start: 2025-04-03

## 2025-04-03 NOTE — TELEPHONE ENCOUNTER
Medication Question or Refill    Contacts       Contact Date/Time Type Contact Phone/Fax    04/03/2025 11:35 AM CDT Phone (Incoming) Rosa Isela darin (Emergency Contact) [phone] (M)            What medication are you calling about (include dose and sig)?: metoprolol    Preferred Pharmacy:   [id number] [address] [facility] [place] [address] [address] [facility] [facility] [address]  Phone: [phone] Fax: [phone]      Controlled Substance Agreement on file:   CSA -- Patient Level:    CSA: None found at the patient level.       Who prescribed the medication?: Woody    Do you need a refill? Yes    When did you use the medication last? daily    Patient offered an appointment? No    Do you have any questions or concerns?  Yes: she only has couple left      Could we send this information to you in [employer] or would you prefer to receive a phone call?:   Patient would prefer a phone call   Okay to leave a detailed message?: Yes at Other phone number:  Rosa Isela [phone]

## 2025-04-08 ENCOUNTER — TELEPHONE (OUTPATIENT)
Dept: FAMILY MEDICINE | Facility: CLINIC | Age: 80
End: 2025-04-08
Payer: COMMERCIAL

## 2025-04-08 NOTE — TELEPHONE ENCOUNTER
Forms/Letter Request    Type of form/letter: OTHER: disability   Daughter states that Bibiana needs a  written letter stating that she is permanently disabled. The letter is for a utility benefit. Does not need to state her diagnosis.      Do we have the form/letter: No    Who is the form from? Needs letter from the MD    Where did/will the form come from? No form     When is form/letter needed by: as soon as possible     How would you like the form/letter returned: [employer]    Patient Notified form requests are processed in 5-7 business days:Yes    Could we send this information to you in [employer] or would you prefer to receive a phone call?:   Patient would like to be contacted via [employer]

## 2025-04-08 NOTE — TELEPHONE ENCOUNTER
VIKTORIYA:  Discussed with patient's daughter, Rosa Isela.  Letter written stating due to B-cell lymphoma patient considered to have total and permanent disability.

## 2025-04-18 DIAGNOSIS — R41.3 MEMORY LOSS: ICD-10-CM

## 2025-04-19 DIAGNOSIS — G47.00 INSOMNIA, UNSPECIFIED TYPE: ICD-10-CM

## 2025-04-19 RX ORDER — ESZOPICLONE 1 MG/1
1 TABLET, FILM COATED ORAL AT BEDTIME
Qty: 90 TABLET | Refills: 1 | Status: SHIPPED | OUTPATIENT
Start: 2025-04-19

## 2025-04-21 ENCOUNTER — INFUSION THERAPY VISIT (OUTPATIENT)
Dept: INFUSION THERAPY | Facility: HOSPITAL | Age: 80
End: 2025-04-21
Attending: INTERNAL MEDICINE
Payer: COMMERCIAL

## 2025-04-21 DIAGNOSIS — C83.33 DIFFUSE LARGE B-CELL LYMPHOMA OF INTRA-ABDOMINAL LYMPH NODES (H): Primary | ICD-10-CM

## 2025-04-21 PROCEDURE — 96523 IRRIG DRUG DELIVERY DEVICE: CPT

## 2025-04-21 PROCEDURE — 250N000011 HC RX IP 250 OP 636: Performed by: INTERNAL MEDICINE

## 2025-04-21 RX ORDER — HEPARIN SODIUM (PORCINE) LOCK FLUSH IV SOLN 100 UNIT/ML 100 UNIT/ML
5 SOLUTION INTRAVENOUS
OUTPATIENT
Start: 2025-04-21

## 2025-04-21 RX ORDER — DONEPEZIL HYDROCHLORIDE 5 MG/1
5 TABLET, FILM COATED ORAL AT BEDTIME
Qty: 90 TABLET | Refills: 3 | OUTPATIENT
Start: 2025-04-21

## 2025-04-21 RX ORDER — HEPARIN SODIUM (PORCINE) LOCK FLUSH IV SOLN 100 UNIT/ML 100 UNIT/ML
5 SOLUTION INTRAVENOUS
Status: DISCONTINUED | OUTPATIENT
Start: 2025-04-21 | End: 2025-04-21 | Stop reason: HOSPADM

## 2025-04-21 RX ADMIN — Medication 5 ML: at 14:02

## 2025-04-26 ENCOUNTER — HEALTH MAINTENANCE LETTER (OUTPATIENT)
Age: 80
End: 2025-04-26

## 2025-04-28 SDOH — HEALTH STABILITY: PHYSICAL HEALTH: ON AVERAGE, HOW MANY MINUTES DO YOU ENGAGE IN EXERCISE AT THIS LEVEL?: 20 MIN

## 2025-04-28 SDOH — HEALTH STABILITY: PHYSICAL HEALTH: ON AVERAGE, HOW MANY DAYS PER WEEK DO YOU ENGAGE IN MODERATE TO STRENUOUS EXERCISE (LIKE A BRISK WALK)?: 3 DAYS

## 2025-04-28 ASSESSMENT — SOCIAL DETERMINANTS OF HEALTH (SDOH): HOW OFTEN DO YOU GET TOGETHER WITH FRIENDS OR RELATIVES?: THREE TIMES A WEEK

## 2025-04-29 ENCOUNTER — TELEPHONE (OUTPATIENT)
Dept: FAMILY MEDICINE | Facility: CLINIC | Age: 80
End: 2025-04-29
Payer: COMMERCIAL

## 2025-04-29 NOTE — TELEPHONE ENCOUNTER
Reason for Call:  Appointment Request    Patient requesting this type of appt:  Nurse, injection, prolia    Requested provider: RN    Reason patient unable to be scheduled: Needs to be scheduled by clinic    When does patient want to be seen/preferred time:  06/17/2025    Comments: The patient is requesting the Prolia injection/order.  She reported she had this injection before.  She is scheduled to see Dr. Gavin and have DEXA on 06/17/2025, and she would like to get this injection on that day.      Could we send this information to you in [employer] or would you prefer to receive a phone call?:   Patient would prefer a phone call   Okay to leave a detailed message?: Yes at Home number on file [phone] (home)    Call taken on 4/29/2025 at 5:54 PM by Irma Domingo

## 2025-04-30 NOTE — TELEPHONE ENCOUNTER
Returned call. Daughter answered phone. Per chart review, patient was scheduled too early for injection.     She would like to have dxa and prolia appointment with MD the same day. Routed call to scheduling to assist.

## 2025-05-01 ENCOUNTER — OFFICE VISIT (OUTPATIENT)
Dept: FAMILY MEDICINE | Facility: CLINIC | Age: 80
End: 2025-05-01
Attending: FAMILY MEDICINE
Payer: COMMERCIAL

## 2025-05-01 VITALS
DIASTOLIC BLOOD PRESSURE: 56 MMHG | RESPIRATION RATE: 18 BRPM | HEIGHT: 58 IN | WEIGHT: 90.1 LBS | SYSTOLIC BLOOD PRESSURE: 104 MMHG | HEART RATE: 70 BPM | TEMPERATURE: 99.2 F | OXYGEN SATURATION: 99 % | BODY MASS INDEX: 18.91 KG/M2

## 2025-05-01 DIAGNOSIS — K21.00 GASTROESOPHAGEAL REFLUX DISEASE WITH ESOPHAGITIS WITHOUT HEMORRHAGE: ICD-10-CM

## 2025-05-01 DIAGNOSIS — Z00.00 MEDICARE ANNUAL WELLNESS VISIT, SUBSEQUENT: Primary | ICD-10-CM

## 2025-05-01 DIAGNOSIS — I10 ESSENTIAL HYPERTENSION WITH GOAL BLOOD PRESSURE LESS THAN 130/80: ICD-10-CM

## 2025-05-01 DIAGNOSIS — R73.01 IMPAIRED FASTING GLUCOSE: ICD-10-CM

## 2025-05-01 DIAGNOSIS — H90.3 BILATERAL SENSORINEURAL HEARING LOSS: ICD-10-CM

## 2025-05-01 DIAGNOSIS — D64.9 NORMOCHROMIC NORMOCYTIC ANEMIA: ICD-10-CM

## 2025-05-01 DIAGNOSIS — M81.0 OSTEOPOROSIS, SENILE: ICD-10-CM

## 2025-05-01 DIAGNOSIS — Z23 ENCOUNTER FOR IMMUNIZATION: ICD-10-CM

## 2025-05-01 DIAGNOSIS — F02.80 MAJOR NEUROCOGNITIVE DISORDER DUE TO ALZHEIMER'S DISEASE (H): ICD-10-CM

## 2025-05-01 DIAGNOSIS — G30.9 MAJOR NEUROCOGNITIVE DISORDER DUE TO ALZHEIMER'S DISEASE (H): ICD-10-CM

## 2025-05-01 DIAGNOSIS — C84.78 ANAPLASTIC ALK-NEGATIVE LARGE CELL LYMPHOMA OF LYMPH NODES OF MULTIPLE REGIONS (H): ICD-10-CM

## 2025-05-01 RX ORDER — LIDOCAINE 5% 5 G/100G
CREAM TOPICAL SEE ADMIN INSTRUCTIONS
COMMUNITY
Start: 2025-03-28

## 2025-05-01 RX ORDER — TAMSULOSIN HYDROCHLORIDE 0.4 MG/1
CAPSULE ORAL
COMMUNITY
Start: 2025-02-11

## 2025-05-01 RX ORDER — HYDROCORTISONE ACETATE 25 MG
SUPPOSITORY, RECTAL RECTAL
COMMUNITY
Start: 2025-03-31

## 2025-05-01 RX ORDER — RIVASTIGMINE TARTRATE 4.5 MG/1
CAPSULE ORAL
COMMUNITY
Start: 2025-02-17

## 2025-05-01 RX ORDER — CYCLOBENZAPRINE HCL 5 MG
1 TABLET ORAL
COMMUNITY
Start: 2025-03-28

## 2025-05-01 ASSESSMENT — PAIN SCALES - GENERAL: PAINLEVEL_OUTOF10: NO PAIN (0)

## 2025-05-01 NOTE — PROGRESS NOTES
Preventive Care Visit  [facility]  Zack Woody MD, Family Medicine  May 1, 2025      Assessment & Plan     Medicare annual wellness visit, subsequent  Annual Wellness Visit completed.  Risk questionaire reviewed in detail.  Appropriate preventive services were discussed with this patient, including applicable screening as appropriate for fall prevention, nutrition, physical activity, tobacco-use cessation, weight loss, and cognition.  Annual Wellness Visits recommended to continue.     Major neurocognitive disorder due to Alzheimer's disease (H)  Alzheimer's disease, progressive.  Prior intolerance to Aricept and did switch to Exelon patch and increasing the 9.5 mg per 24 hours however apparent intolerance to adhesives and discontinued.  Has scheduled follow-up with Dr. Mann July 16, 2025.    Essential hypertension with goal blood pressure less than 130/80  Hypertension appears stable.  Continuing amlodipine 5 mg daily and metoprolol tartrate 25 mg daily.    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 Florian May 9, 2025 in order to review.    Osteoporosis, senile  Follows with Dr. Tran twice a year and scheduled follow-up July 15, 2025 with history of Prolia administration.    Bilateral sensorineural hearing loss  Hearing loss.  Hearing aid utilization.    Impaired fasting glucose  .  Fasting glucose and will update A1c 8 with lab draw May 6, 2025.  - Hemoglobin A1c    Normochromic normocytic anemia  History of normochromic normocytic anemia.  Lab follow-up scheduled May 6, 2025 with Dr. Florian's office.    Gastroesophageal reflux disease with esophagitis without hemorrhage  History of described reflux with benefits of omeprazole 20 mg daily.    Encounter for immunization  COVID booster to be provided today.  - COVID-19 12+ (PFIZER)      Patient has been advised of split billing  requirements and indicates understanding: Yes        Counseling  Appropriate preventive services were addressed with this patient via screening, questionnaire, or discussion as appropriate for fall prevention, nutrition, physical activity, Tobacco-use cessation, social engagement, weight loss and cognition.  Checklist reviewing preventive services available has been given to the patient.  Reviewed patient's diet, addressing concerns and/or questions.   She is at risk for lack of exercise and has been provided with information to increase physical activity for the benefit of her well-being.   The patient was provided with written information regarding signs of hearing loss.           Adan Mccarty is a 80 year old, presenting for the following:  Physical        5/1/2025     2:55 PM   Additional Questions   Roomed by Sunitha MAYA    Patient seen today for annual wellness visit.  Does have underlying Alzheimer's disease and is followed with Dr. Mann on annual basis.  States did not tolerate Exelon patch apparently due to adhesive and not utilizing currently.  Patient does continue management for underlying hypertension with amlodipine 5 mg daily along with metoprolol tartrate 25 mg daily.  Underlying history of lymphoma also follows with Dr. Peter Friedell and was seen February 6, 2025 with scheduled follow-up with Dr. Florian May 9, 2025.  Continues to follow with osteoporosis clinic and has Prolia injections twice a year.  Follow-up scheduled July 15, 2025.  Sensorineural hearing loss.  Impaired fasting glucose.  Normochromic normocytic anemia historically.  Reflux managed with omeprazole 20 mg daily.  Needs updated COVID booster today.  History of rectal prolapse status postsurgical correction however feels recurrent issues and will follow-up with surgeon.  Comprehensive review of systems as above otherwise all negative.        Reviewed office note from Dr. 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  This is complicated by a urinary tract infection.  She still has Deutsch catheter in and is taking antibiotics.  Today she feels poorly related to antibiotics which are upsetting her stomach and resulting in loss of appetite and general fatigue.  Repeat PET CT scan showed that the area of FDG avidity near the pancreas is resolving.  There is no evidence of disease progression plan repeat CT scan with PET scan in about 12 weeks to monitor her progress.  She will return earlier if she develops symptoms related to lymphoma.          Reviewed office note from Dr. Mann from 24:  Visit Diagnosis  Major neurocognitive disorder due to Alzheimer's disease (H)      Major neurocognitive disorder due to Alzheimer's dementia  79-year-old female with HTN, SNHL, anxiety, large B-cell lymphoma who is here for follow-up of major neurocognitive disorder due to Alzheimer's dementia.  She was initially started on Aricept but could not tolerate and was switched to Exelon patch that has stabilized her condition and she has not experienced any symptoms.  Workup in the past included normal lab work and CT brain that showed age-related changes.  I have recommended:     1.  Increase Exelon patch to 9.5 mg per 24-hour  2.  She recently had a hepatic panel checked that was normal  3.  Patient was told to remain socially active do mental exercises  4.  Follow-up in 1 year     Thank you again for this referral, please feel free to contact me if you have any questions.     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   No siblings   Surgery: emergency RLQ incarcerated strangulated femoral hernia repair 08; AUSTIN-BSO 1985 due to endometriosis; ulcer surgery ; [id number] hernia repair (Dr. JULY Wen); lap choly 12 with Dr. Hayder Montgomery; left eye cataract 21 and right eye cataract 4/15/21     SLUMS = 17/30 on 23     [id number]               Advance Care Planning    Document on file is a Health Care Directive or POLST.        2025   General Health   How would you rate your overall physical health? Good         2025   Nutrition   Diet: Regular (no restrictions)         2025   Exercise   Days per week of moderate/strenous exercise 3 days   Average minutes spent exercising at this level 20 min         2025   Social Factors   Frequency of gathering with friends or relatives Three times a week   Worry food won't last until get money to buy more No   Food not last or not have enough money for food? No   Do you have housing? (Housing is defined as stable permanent housing and does not include staying outside in a car, in a tent, in an abandoned building, in an overnight shelter, or couch-surfing.) Yes   Are you worried about losing your housing? No   Lack of transportation? No   Unable to get utilities (heat,electricity)? No         2025   Fall Risk   Fallen 2 or more times in the past year? No   Trouble with walking or balance? No          2025   Activities of Daily Living- Home Safety   Needs help with the following daily activites None of the above   Safety concerns in the home None of the above         2025   Dental   Dentist two times every year? Yes         2025   Hearing Screening   Hearing concerns? (!) I NEED TO ASK PEOPLE TO SPEAK UP OR REPEAT THEMSELVES.    (!) TROUBLE UNDESTANDING A SPEAKER IN A PUBLIC MEETING OR Syn[employer] SERVICE.    (!) TROUBLE UNDERSTANDING SOFT OR  WHISPERED SPEECH.       Multiple values from one day are sorted in reverse-chronological order         2025   Driving Risk Screening   Patient/family members have concerns about driving No         2025   General Alertness/Fatigue Screening   Have you been more tired than usual lately? No         2025   Urinary Incontinence Screening   Bothered by leaking urine in past 6 months No         Today's PHQ-2 Score:       2025     2:54 PM   PHQ-2 (  [employer])   Q1: Little interest or pleasure in doing things 0    Q2: Feeling down, depressed or hopeless 0    PHQ-2 Score 0    Q1: Little interest or pleasure in doing things Not at all   Q2: Feeling down, depressed or hopeless Not at all   PHQ-2 Score 0       Proxy-reported           2025   Substance Use   Alcohol more than 3/day or more than 7/wk Not Applicable   Do you have a current opioid prescription? No   How severe/bad is pain from 1 to 10? 10   Do you use any other substances recreationally? No     Social History     Tobacco Use    Smoking status: Never     Passive exposure: Never    Smokeless tobacco: Never   Vaping Use    Vaping status: Never Used   Substance Use Topics    Alcohol use: No    Drug use: Never           2024   LAST FHS-7 RESULTS   1st degree relative breast or ovarian cancer Yes   Any relative bilateral breast cancer No   Any male have breast cancer No   Any ONE woman have BOTH breast AND ovarian cancer No   Any woman with breast cancer before 50yrs No   2 or more relatives with breast AND/OR ovarian cancer No   2 or more relatives with breast AND/OR bowel cancer No        Mammogram Screening - After age 74- determine frequency with patient based on health status, life expectancy and patient goals      Fracture Risk Assessment Tool  Link to Frax Calculator  Use the information below to complete the Frax calculator  : 1945  Sex: female  Weight (kg): 40.9 kg (actual weight)  Height (cm): 147.5 cm  Previous  Fragility Fracture:  No  History of parent with fractured hip:  No  Current Smoking:  No  Patient has been on glucocorticoids for more than 3 months (5mg/day or more): No  Rheumatoid Arthritis on Problem List:  No  Secondary Osteoporosis on Problem List:  No  Consumes 3 or more units of alcohol per day: No  Femoral Neck BMD (g/cm2)            Reviewed and updated as needed this visit by Provider                    Past Medical History:   Diagnosis Date    Anemia     Anxiety     Anxiety     Bilateral sensorineural hearing loss     Bilateral sensorineural hearing loss     Breast cyst     While ago    Bronchiectasis without complication (H)     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)     Diverticular disease of large intestine     Gastroesophageal reflux disease     History of blood transfusion     History of cholecystectomy 2023    Hypertension     Major neurocognitive disorder due to Alzheimer's disease (H)     Memory loss     Osteoporosis     PONV (postoperative nausea and vomiting)     Rectal prolapse      Past Surgical History:   Procedure Laterality Date    BREAST CYST ASPIRATION Left     While ago    HYSTERECTOMY      IR CHEST PORT PLACEMENT > 5 YRS OF AGE  2024    OOPHORECTOMY Bilateral     RECTOSIGMOIDECTOMY PERINEAL N/A 2025    Procedure: PERINEAL RECTOSIGMOIDECTOMY;  Surgeon: Josee Basilio MD;  Location: [facility] [place] [facility]    [facility] REMOVAL OF OVARY(S)      Description: Oophorectomy;  Recorded: 2009;    [facility] TOTAL ABDOM HYSTERECTOMY      Description: Total Abdominal Hysterectomy;  Recorded: 2009;     OB History    Para Term  AB Living   2 2 2 0 0 0   SAB IAB Ectopic Multiple Live Births   0 0 0 0 0      # Outcome Date GA Lbr Antelmo/2nd Weight Sex Type Anes PTL Lv   2 Term            1 Term              Lab work is in process  Labs reviewed in EPIC  BP Readings from Last 3 Encounters:   25 104/56   25 132/89   25 (!)  144/67    Wt Readings from Last 3 Encounters:   05/01/25 40.9 kg (90 lb 1.6 oz)   02/13/25 38.4 kg (84 lb 9.6 oz)   02/06/25 37.2 kg (82 lb)                  Patient Active Problem List   Diagnosis    Essential hypertension with goal blood pressure less than 130/80    Anxiety    Osteoporosis, senile    Bilateral sensorineural hearing loss    Impaired fasting glucose    Diverticular disease of large intestine    Gastroesophageal reflux disease with esophagitis without hemorrhage    Bronchiectasis without complication (H)    Urinary incontinence    Irregular bowel habits    Status post dilation of esophageal narrowing    Metal foreign body in eye region    MRI contraindicated due to metal in eye    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)    Chemotherapy-induced neutropenia    Major neurocognitive disorder due to Alzheimer's disease (H)    Rectal prolapse    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)    Constipation    Urinary retention    Generalized weakness    Nausea and vomiting, unspecified vomiting type     Past Surgical History:   Procedure Laterality Date    BREAST CYST ASPIRATION Left     While ago    HYSTERECTOMY  1985    IR CHEST PORT PLACEMENT > 5 YRS OF AGE  5/1/2024    OOPHORECTOMY Bilateral 1985    RECTOSIGMOIDECTOMY PERINEAL N/A 1/23/2025    Procedure: PERINEAL RECTOSIGMOIDECTOMY;  Surgeon: Josee Basilio MD;  Location: [facility] [place] [facility]    [facility] REMOVAL OF OVARY(S)      Description: Oophorectomy;  Recorded: 12/17/2009;    [facility] TOTAL ABDOM HYSTERECTOMY      Description: Total Abdominal Hysterectomy;  Recorded: 12/17/2009;       Social History     Tobacco Use    Smoking status: Never     Passive exposure: Never    Smokeless tobacco: Never   Substance Use Topics    Alcohol use: No     Family History   Problem Relation Age of Onset    Breast Cancer Mother 52.00    Cancer Maternal Grandfather 71.00        prostate    Prostate Cancer  Maternal Grandfather          Current Outpatient Medications   Medication Sig Dispense Refill    acarbose (PRECOSE) 25 MG tablet Take 25 mg by mouth 3 times daily (with meals)      acetaminophen (TYLENOL) 325 MG tablet Take 325-650 mg by mouth every 6 hours as needed for mild pain      ANUCORT-HC 25 MG suppository UNWRAP AND INSERT 1 SUPPOSITORY RECTALLY AT BEDTIME AS NEEDED      Biotin 5 MG TABS Take 5,000 mcg by mouth at bedtime.      busPIRone (BUSPAR) 7.5 MG tablet TAKE 1 TABLET BY MOUTH TWICE  DAILY 180 tablet 1    cyclobenzaprine (FLEXERIL) 5 MG tablet Take 1 tablet by mouth 3 times daily.      denosumab (PROLIA) 60 MG/ML SOSY injection Inject 60 mg Subcutaneous every 6 months      eszopiclone (LUNESTA) 1 MG tablet TAKE 1 TABLET(1 MG) BY MOUTH AT BEDTIME 90 tablet 1    multivitamin with minerals (THERA-M) 9 mg iron-400 mcg Tab tablet Take 1 tablet by mouth at bedtime.      nortriptyline (PAMELOR) 50 MG capsule Take 50 mg by mouth at bedtime.      omeprazole (PRILOSEC) 20 MG capsule [OMEPRAZOLE (PRILOSEC) 20 MG CAPSULE] Take 20 mg by mouth daily before breakfast.      ondansetron (ZOFRAN ODT) 4 MG ODT tab Take 4 mg by mouth every 8 hours as needed for nausea.      Probiotic Product (SOLUBLE FIBER/PROBIOTICS PO) 2 tablespoons in 8 oz of liquid daily      RECTASMOOTHE 5 % CREA See Admin Instructions.      sennosides (SENOKOT) 8.6 MG tablet Take 2 tablets by mouth daily as needed for constipation      simethicone (MYLICON) 125 MG chewable tablet Take 125 mg by mouth as needed.      amLODIPine (NORVASC) 5 MG tablet Take 1 tablet (5 mg) by mouth daily 90 tablet 3    memantine (NAMENDA) 10 MG tablet Take 1 tablet (10 mg) by mouth 2 times daily. 60 tablet 11    memantine (NAMENDA) 5 MG tablet Take 5 mg by mouth See Admin Instructions. 1 PO every day x 7 days, then 1 PO BID x 7 days, then 2 PO Q am & 1 PO at bedtime x 7 days, then 2 PO BID thereafter      metoclopramide (REGLAN) 10 MG tablet Take 10 mg by mouth daily.       metoprolol tartrate (LOPRESSOR) 25 MG tablet Take 1 tablet (25 mg) by mouth daily. 90 tablet 1    rivastigmine (EXELON) 4.5 MG capsule       tamsulosin (FLOMAX) 0.4 MG capsule take 1 capsule by mouth every day as directed       Allergies   Allergen Reactions    Blood Transfusion Related (Informational Only) Other (See Comments)     Patient has a history of a Clinically Significant antibody against RBC antigens. A delay in compatible RBCs may occur. 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    Prochlorperazine Edisylate [Prochlorperazine] Shortness Of Breath     Extrapyramidal side effects     Current providers sharing in care for this patient include:  Patient Care Team:  Zack Woody MD as PCP - General (Family Medicine)  Zack Woody MD as Assigned PCP  Gustavo Tran MD as Physician (Internal Medicine)  Emanuel Mann MD as MD (Neurology)  Inocencio Florian MD (Internal Medicine)  Ghulam Reina DO as Physician (Cardiovascular Disease)  Sophie Cespedes, RN as Specialty Care Coordinator (Hematology & Oncology)  Ghulam Reina DO as Assigned Heart and Vascular Provider  Emanuel Mann MD as Assigned Neuroscience Provider  Friedell, Peter E, MD as Assigned Cancer Care Provider    The following health maintenance items are reviewed in Epic and correct as of today:  Health Maintenance   Topic Date Due    COVID-19 Vaccine (9 - Moderna risk 2024-25 season) 04/28/2025    MEDICARE ANNUAL WELLNESS VISIT  04/25/2025    BMP  02/14/2026    ANNUAL REVIEW OF HM ORDERS  05/01/2026    FALL RISK ASSESSMENT  05/01/2026    LIPID  12/29/2027    DIABETES SCREENING  02/14/2028    DTAP/TDAP/TD IMMUNIZATION (6 - Td or Tdap) 11/23/2028    ADVANCE CARE PLANNING  05/01/2030    DEXA  06/21/2038    PHQ-2 (once per calendar year)  Completed    INFLUENZA VACCINE  Completed    Pneumococcal Vaccine: 50+ Years  Completed    ZOSTER IMMUNIZATION  Completed    RSV VACCINE  Completed    HPV IMMUNIZATION   "Aged Out    MENINGITIS IMMUNIZATION  Aged Out    COLORECTAL CANCER SCREENING  Discontinued         Review of Systems  Constitutional, HEENT, cardiovascular, pulmonary, GI, , musculoskeletal, neuro, skin, endocrine and psych systems are negative, except as otherwise noted.     Objective    Exam  /56 (BP Location: Left arm, Patient Position: Sitting, Cuff Size: Adult Regular)   Pulse 70   Temp 99.2  F (37.3  C) (Temporal)   Resp 18   Ht 1.475 m (4' 10.07\")   Wt 40.9 kg (90 lb 1.6 oz)   LMP  (LMP Unknown)   SpO2 99%   BMI 18.78 kg/m     Estimated body mass index is 18.78 kg/m  as calculated from the following:    Height as of this encounter: 1.475 m (4' 10.07\").    Weight as of this encounter: 40.9 kg (90 lb 1.6 oz).    Physical Exam    GENERAL: alert and no distress  EYES: Eyes grossly normal to inspection, PERRL and conjunctivae and sclerae normal  HENT: ear canals and TM's normal, nose and mouth without ulcers or lesions  NECK: no adenopathy, no asymmetry, masses, or scars  RESP: lungs clear to auscultation - no rales, rhonchi or wheezes  CV: regular rate and rhythm, normal S1 S2, no S3 or S4, no murmur, click or rub, no peripheral edema  ABDOMEN: soft, nontender, no hepatosplenomegaly, no masses and bowel sounds normal  MS: no gross musculoskeletal defects noted, no edema  SKIN: no suspicious lesions or rashes  NEURO: Normal strength and tone, mentation intact and speech normal  PSYCH: mentation appears normal, affect normal/bright           5/1/2025   Mini Cog   Clock Draw Score 0 Abnormal   3 Item Recall 2 objects recalled   Mini Cog Total Score 2              Signed Electronically by: Zack Woody MD    "

## 2025-05-06 ENCOUNTER — INFUSION THERAPY VISIT (OUTPATIENT)
Dept: INFUSION THERAPY | Facility: HOSPITAL | Age: 80
End: 2025-05-06
Attending: INTERNAL MEDICINE
Payer: COMMERCIAL

## 2025-05-06 ENCOUNTER — HOSPITAL ENCOUNTER (OUTPATIENT)
Dept: PET IMAGING | Facility: HOSPITAL | Age: 80
Discharge: HOME | End: 2025-05-06
Attending: INTERNAL MEDICINE
Payer: COMMERCIAL

## 2025-05-06 DIAGNOSIS — C83.33 DIFFUSE LARGE B-CELL LYMPHOMA OF INTRA-ABDOMINAL LYMPH NODES (H): ICD-10-CM

## 2025-05-06 DIAGNOSIS — C83.33 DIFFUSE LARGE B-CELL LYMPHOMA OF INTRA-ABDOMINAL LYMPH NODES (H): Primary | ICD-10-CM

## 2025-05-06 DIAGNOSIS — R73.01 IMPAIRED FASTING GLUCOSE: ICD-10-CM

## 2025-05-06 LAB
ALBUMIN SERPL BCG-MCNC: 3.8 G/DL (ref 3.5–5.2)
ALP SERPL-CCNC: 77 U/L (ref 40–150)
ALT SERPL W P-5'-P-CCNC: 29 U/L (ref 0–50)
ANION GAP SERPL CALCULATED.3IONS-SCNC: 6 MMOL/L (ref 7–15)
AST SERPL W P-5'-P-CCNC: 24 U/L (ref 0–45)
BASOPHILS # BLD AUTO: 0.1 10E3/UL (ref 0–0.2)
BASOPHILS NFR BLD AUTO: 1 %
BILIRUB SERPL-MCNC: 0.3 MG/DL
BUN SERPL-MCNC: 13.9 MG/DL (ref 8–23)
CALCIUM SERPL-MCNC: 8.3 MG/DL (ref 8.8–10.4)
CHLORIDE SERPL-SCNC: 105 MMOL/L (ref 98–107)
CREAT BLD-MCNC: 0.7 MG/DL (ref 0.5–1)
CREAT SERPL-MCNC: 0.59 MG/DL (ref 0.51–0.95)
EGFRCR SERPLBLD CKD-EPI 2021: >60 ML/MIN/1.73M2
EGFRCR SERPLBLD CKD-EPI 2021: >90 ML/MIN/1.73M2
EOSINOPHIL # BLD AUTO: 0.2 10E3/UL (ref 0–0.7)
EOSINOPHIL NFR BLD AUTO: 3 %
ERYTHROCYTE [DISTWIDTH] IN BLOOD BY AUTOMATED COUNT: 16.2 % (ref 10–15)
EST. AVERAGE GLUCOSE BLD GHB EST-MCNC: 123 MG/DL
GLUCOSE BLDC GLUCOMTR-MCNC: 96 MG/DL (ref 70–99)
GLUCOSE SERPL-MCNC: 91 MG/DL (ref 70–99)
HBA1C MFR BLD: 5.9 %
HCO3 SERPL-SCNC: 28 MMOL/L (ref 22–29)
HCT VFR BLD AUTO: 31.7 % (ref 35–47)
HGB BLD-MCNC: 10 G/DL (ref 11.7–15.7)
IMM GRANULOCYTES # BLD: 0 10E3/UL
IMM GRANULOCYTES NFR BLD: 0 %
LDH SERPL L TO P-CCNC: 152 U/L (ref 0–250)
LYMPHOCYTES # BLD AUTO: 1.4 10E3/UL (ref 0.8–5.3)
LYMPHOCYTES NFR BLD AUTO: 25 %
MCH RBC QN AUTO: 25.3 PG (ref 26.5–33)
MCHC RBC AUTO-ENTMCNC: 31.5 G/DL (ref 31.5–36.5)
MCV RBC AUTO: 80 FL (ref 78–100)
MONOCYTES # BLD AUTO: 0.7 10E3/UL (ref 0–1.3)
MONOCYTES NFR BLD AUTO: 12 %
NEUTROPHILS # BLD AUTO: 3.4 10E3/UL (ref 1.6–8.3)
NEUTROPHILS NFR BLD AUTO: 60 %
NRBC # BLD AUTO: 0 10E3/UL
NRBC BLD AUTO-RTO: 0 /100
PLATELET # BLD AUTO: 288 10E3/UL (ref 150–450)
POTASSIUM SERPL-SCNC: 4.3 MMOL/L (ref 3.4–5.3)
PROT SERPL-MCNC: 6 G/DL (ref 6.4–8.3)
RBC # BLD AUTO: 3.96 10E6/UL (ref 3.8–5.2)
SODIUM SERPL-SCNC: 139 MMOL/L (ref 135–145)
WBC # BLD AUTO: 5.6 10E3/UL (ref 4–11)

## 2025-05-06 PROCEDURE — 250N000011 HC RX IP 250 OP 636: Performed by: INTERNAL MEDICINE

## 2025-05-06 PROCEDURE — 36591 DRAW BLOOD OFF VENOUS DEVICE: CPT

## 2025-05-06 PROCEDURE — 83036 HEMOGLOBIN GLYCOSYLATED A1C: CPT

## 2025-05-06 PROCEDURE — 83615 LACTATE (LD) (LDH) ENZYME: CPT

## 2025-05-06 PROCEDURE — 85004 AUTOMATED DIFF WBC COUNT: CPT

## 2025-05-06 PROCEDURE — 82962 GLUCOSE BLOOD TEST: CPT

## 2025-05-06 PROCEDURE — 78816 PET IMAGE W/CT FULL BODY: CPT | Mod: PS

## 2025-05-06 PROCEDURE — 74177 CT ABD & PELVIS W/CONTRAST: CPT

## 2025-05-06 PROCEDURE — 343N000001 HC RX 343 MED OP 636: Performed by: INTERNAL MEDICINE

## 2025-05-06 PROCEDURE — 84075 ASSAY ALKALINE PHOSPHATASE: CPT

## 2025-05-06 PROCEDURE — A9552 F18 FDG: HCPCS | Performed by: INTERNAL MEDICINE

## 2025-05-06 PROCEDURE — 82565 ASSAY OF CREATININE: CPT

## 2025-05-06 RX ORDER — FLUDEOXYGLUCOSE F 18 200 MCI/ML
7-18 INJECTION, SOLUTION INTRAVENOUS ONCE
Status: COMPLETED | OUTPATIENT
Start: 2025-05-06 | End: 2025-05-06

## 2025-05-06 RX ORDER — IOPAMIDOL 755 MG/ML
50-150 INJECTION, SOLUTION INTRAVASCULAR ONCE
Status: COMPLETED | OUTPATIENT
Start: 2025-05-06 | End: 2025-05-06

## 2025-05-06 RX ORDER — HEPARIN SODIUM (PORCINE) LOCK FLUSH IV SOLN 100 UNIT/ML 100 UNIT/ML
5 SOLUTION INTRAVENOUS
Status: DISCONTINUED | OUTPATIENT
Start: 2025-05-06 | End: 2025-05-06 | Stop reason: HOSPADM

## 2025-05-06 RX ORDER — HEPARIN SODIUM (PORCINE) LOCK FLUSH IV SOLN 100 UNIT/ML 100 UNIT/ML
5 SOLUTION INTRAVENOUS
OUTPATIENT
Start: 2025-05-06

## 2025-05-06 RX ADMIN — Medication 5 ML: at 11:44

## 2025-05-06 RX ADMIN — IOPAMIDOL 44 ML: 755 INJECTION, SOLUTION INTRAVENOUS at 11:14

## 2025-05-06 RX ADMIN — FLUDEOXYGLUCOSE F 18 10.19 MILLICURIE: 200 INJECTION, SOLUTION INTRAVENOUS at 10:12

## 2025-05-08 DIAGNOSIS — I10 PRIMARY HYPERTENSION: ICD-10-CM

## 2025-05-08 RX ORDER — AMLODIPINE BESYLATE 5 MG/1
5 TABLET ORAL DAILY
Qty: 90 TABLET | Refills: 2 | Status: SHIPPED | OUTPATIENT
Start: 2025-05-08

## 2025-05-15 ENCOUNTER — TELEPHONE (OUTPATIENT)
Dept: ONCOLOGY | Facility: HOSPITAL | Age: 80
End: 2025-05-15
Payer: COMMERCIAL

## 2025-05-15 NOTE — TELEPHONE ENCOUNTER
I called Bibiana and Rosa Isela today at the request of PHUC Barrios to ask if Bibiana wants to keep her port in for now or see if she would like it to be removed.  Per Rosa Isela, she wants to keep her port in place for now.  She states she does have port flushes set up appropriately.  They will call us back if they change their mind.    Priya Barrett RN on 5/15/2025 at 12:33 PM

## 2025-05-28 ENCOUNTER — TELEPHONE (OUTPATIENT)
Dept: FAMILY MEDICINE | Facility: CLINIC | Age: 80
End: 2025-05-28
Payer: COMMERCIAL

## 2025-05-28 NOTE — TELEPHONE ENCOUNTER
Forms/Letter Request    Type of form/letter: Home Health Certification      Do we have the form/letter: Yes: Placed in Dr. Woody inbox    Who is the form from? Home care    Where did/will the form come from? form was faxed in    When is form/letter needed by: when done    How would you like the form/letter returned: Fax : [phone]          Order details/form description: 2 pages    Order number (if applicable): [id number]

## 2025-05-29 ENCOUNTER — MEDICAL CORRESPONDENCE (OUTPATIENT)
Dept: HEALTH INFORMATION MANAGEMENT | Facility: CLINIC | Age: 80
End: 2025-05-29

## 2025-06-19 DIAGNOSIS — Z92.29 PERSONAL HISTORY OF OTHER DRUG THERAPY: ICD-10-CM

## 2025-06-19 DIAGNOSIS — M81.0 OSTEOPOROSIS, SENILE: Primary | ICD-10-CM

## 2025-06-25 ENCOUNTER — TRANSFERRED RECORDS (OUTPATIENT)
Dept: HEALTH INFORMATION MANAGEMENT | Facility: CLINIC | Age: 80
End: 2025-06-25
Payer: COMMERCIAL

## 2025-07-01 ENCOUNTER — PATIENT OUTREACH (OUTPATIENT)
Dept: ONCOLOGY | Facility: HOSPITAL | Age: 80
End: 2025-07-01
Payer: COMMERCIAL

## 2025-07-01 ENCOUNTER — TELEPHONE (OUTPATIENT)
Dept: FAMILY MEDICINE | Facility: CLINIC | Age: 80
End: 2025-07-01
Payer: COMMERCIAL

## 2025-07-01 ENCOUNTER — TELEPHONE (OUTPATIENT)
Dept: ONCOLOGY | Facility: HOSPITAL | Age: 80
End: 2025-07-01
Payer: COMMERCIAL

## 2025-07-01 DIAGNOSIS — R11.0 NAUSEA: Primary | ICD-10-CM

## 2025-07-01 DIAGNOSIS — D64.9 ANEMIA, UNSPECIFIED TYPE: Primary | ICD-10-CM

## 2025-07-01 RX ORDER — ONDANSETRON 4 MG/1
4 TABLET, ORALLY DISINTEGRATING ORAL EVERY 8 HOURS PRN
Qty: 30 TABLET | Refills: 1 | Status: SHIPPED | OUTPATIENT
Start: 2025-07-01

## 2025-07-01 NOTE — TELEPHONE ENCOUNTER
Patient's daughter Rosa Isela is called in regards to a refill question and Rosa Isela reports that patient has not been called about lab results from 6/27. Rosa Isela is concerned that Hgb has been dropping.     Message will be sent to RN[facility] to follow up with patient.     Megha Gomez RN

## 2025-07-01 NOTE — TELEPHONE ENCOUNTER
Sophie Cespedes RNCC will call patient with results and plan. See further documentation in patient outreach encounter.    Megha Gomez RN

## 2025-07-01 NOTE — PROGRESS NOTES
[facility]: Cancer Care                                                                                          Writer called patient's daughter to explain that per Cristal MULLER, patient's hgb is declining so Cristal would like for patient to get additional labs drawn to look at iron level and evaluate the issue further. Patient's daughter verbalized understanding and was agreeable to the plan. Patient will plan to get labs scheduled on 7/7.     Signature:  Sophie Cespedes RN

## 2025-07-01 NOTE — TELEPHONE ENCOUNTER
Call placed to daughter Rosa Isela, she reports that patient still has occasional nausea.     Last Written Prescription Date:  7/8/24  Last Fill Quantity: 30,  # refills: 1   Last office visit provider:  5/9/25 with Cristal Arrington PA-C     Requested Prescriptions   Pending Prescriptions Disp Refills    ondansetron (ZOFRAN ODT) 4 MG ODT tab 30 tablet      Sig: Take 1 tablet (4 mg) by mouth every 8 hours as needed for nausea.       There is no refill protocol information for this order          Megha Gomez RN 07/01/25 3:15 PM

## 2025-07-01 NOTE — TELEPHONE ENCOUNTER
Test Results        Who ordered the test:  Dr Saldana    Type of test: Lab    Date of test:  Friday 6-27    Where was the test performed:  [facility]    What are your questions/concerns?:  please call to follow-up on results and let them know where to go from here. She may be anemic.  Please call today     Could we send this information to you in [employer] or would you prefer to receive a phone call?:   Patient would prefer a phone call   Okay to leave a detailed message?: Yes at Other phone number:  [phone] daughter Rosa Isela

## 2025-07-01 NOTE — TELEPHONE ENCOUNTER
Contacts       Contact Date/Time Type Contact Phone/Fax    07/01/2025 10:59 AM CDT Phone (Incoming) Rosa Isela Mcrae (Emergency Contact) [phone] (M)    07/01/2025 11:21 AM CDT Phone (Outgoing) Rosa Isela Mcrae (Emergency Contact) [phone] (M)    Talked with Family Member           Attempted to reach patient to: Relay a message    Regarding: Informed daughter, Rosa Isela, that tests have been ordered and managed by Onc/Hem and that they should call their office to discuss results.  Rosa Isela states that they want PCP to look at tests and want to know why patient's protein drinks are not helping her labs. Please advise on test results as covering.

## 2025-07-03 NOTE — TELEPHONE ENCOUNTER
Called and relayed message from Rosa Isela Carr verbalized understanding and agreed to the plan and no further questions at this time.     Rustam Alegria RN  [facility]

## 2025-07-03 NOTE — TELEPHONE ENCOUNTER
Called and relayed message from Dr. Woody, Rosa Isela stated that what she was asking was based on her CBC from 6/27/25 what can they do right now today to stop her hemoglobin and anemia from getting worse, does she need to start an iron supplement or vitamins or is there anything they can do now to reverse anemia.     Rosa Isela stated she did not ask anything about protein shakes and not sure how that even got into the conversation. Rosa Isela would like a call back today with answers from a provider.     Rustam Alegria RN  [facility]

## 2025-07-03 NOTE — TELEPHONE ENCOUNTER
Notify patient/daughter that it would be okay to use an iron supplement daily but that she should have these additional labs completed as ordered by heme/onc specialist with underlying history of lymphoma noted.  The specialist will then be able to tell them how to improve her hemoglobin level.

## 2025-07-07 ENCOUNTER — INFUSION THERAPY VISIT (OUTPATIENT)
Dept: INFUSION THERAPY | Facility: HOSPITAL | Age: 80
End: 2025-07-07
Payer: COMMERCIAL

## 2025-07-07 DIAGNOSIS — D64.9 ANEMIA, UNSPECIFIED TYPE: ICD-10-CM

## 2025-07-07 DIAGNOSIS — C83.33 DIFFUSE LARGE B-CELL LYMPHOMA OF INTRA-ABDOMINAL LYMPH NODES (H): Primary | ICD-10-CM

## 2025-07-07 LAB
ALBUMIN SERPL BCG-MCNC: 3.8 G/DL (ref 3.5–5.2)
ALP SERPL-CCNC: 78 U/L (ref 40–150)
ALT SERPL W P-5'-P-CCNC: 25 U/L (ref 0–50)
ANION GAP SERPL CALCULATED.3IONS-SCNC: 10 MMOL/L (ref 7–15)
AST SERPL W P-5'-P-CCNC: 29 U/L (ref 0–45)
BASOPHILS # BLD AUTO: 0 10E3/UL (ref 0–0.2)
BASOPHILS NFR BLD AUTO: 1 %
BILIRUB SERPL-MCNC: 0.2 MG/DL
BUN SERPL-MCNC: 18.6 MG/DL (ref 8–23)
CALCIUM SERPL-MCNC: 8.5 MG/DL (ref 8.8–10.4)
CHLORIDE SERPL-SCNC: 106 MMOL/L (ref 98–107)
CREAT SERPL-MCNC: 0.63 MG/DL (ref 0.51–0.95)
EGFRCR SERPLBLD CKD-EPI 2021: 89 ML/MIN/1.73M2
EOSINOPHIL # BLD AUTO: 0.1 10E3/UL (ref 0–0.7)
EOSINOPHIL NFR BLD AUTO: 2 %
ERYTHROCYTE [DISTWIDTH] IN BLOOD BY AUTOMATED COUNT: 18.1 % (ref 10–15)
FERRITIN SERPL-MCNC: 11 NG/ML (ref 11–328)
FOLATE SERPL-MCNC: 38.5 NG/ML (ref 4.6–34.8)
GLUCOSE SERPL-MCNC: 123 MG/DL (ref 70–99)
HCO3 SERPL-SCNC: 24 MMOL/L (ref 22–29)
HCT VFR BLD AUTO: 30.8 % (ref 35–47)
HGB BLD-MCNC: 9.6 G/DL (ref 11.7–15.7)
IMM GRANULOCYTES # BLD: 0 10E3/UL
IMM GRANULOCYTES NFR BLD: 0 %
IRON BINDING CAPACITY (ROCHE): 388 UG/DL (ref 240–430)
IRON SATN MFR SERPL: 5 % (ref 15–46)
IRON SERPL-MCNC: 20 UG/DL (ref 37–145)
LDH SERPL L TO P-CCNC: 148 U/L (ref 0–250)
LYMPHOCYTES # BLD AUTO: 1 10E3/UL (ref 0.8–5.3)
LYMPHOCYTES NFR BLD AUTO: 18 %
MCH RBC QN AUTO: 23.7 PG (ref 26.5–33)
MCHC RBC AUTO-ENTMCNC: 31.2 G/DL (ref 31.5–36.5)
MCV RBC AUTO: 76 FL (ref 78–100)
MONOCYTES # BLD AUTO: 0.6 10E3/UL (ref 0–1.3)
MONOCYTES NFR BLD AUTO: 10 %
NEUTROPHILS # BLD AUTO: 3.8 10E3/UL (ref 1.6–8.3)
NEUTROPHILS NFR BLD AUTO: 69 %
NRBC # BLD AUTO: 0 10E3/UL
NRBC BLD AUTO-RTO: 0 /100
PLATELET # BLD AUTO: 265 10E3/UL (ref 150–450)
POTASSIUM SERPL-SCNC: 4.3 MMOL/L (ref 3.4–5.3)
PROT SERPL-MCNC: 6 G/DL (ref 6.4–8.3)
RBC # BLD AUTO: 4.05 10E6/UL (ref 3.8–5.2)
SODIUM SERPL-SCNC: 140 MMOL/L (ref 135–145)
TSH SERPL DL<=0.005 MIU/L-ACNC: 1.11 UIU/ML (ref 0.3–4.2)
VIT B12 SERPL-MCNC: 554 PG/ML (ref 232–1245)
WBC # BLD AUTO: 5.5 10E3/UL (ref 4–11)

## 2025-07-07 PROCEDURE — 36591 DRAW BLOOD OFF VENOUS DEVICE: CPT

## 2025-07-07 PROCEDURE — 84443 ASSAY THYROID STIM HORMONE: CPT

## 2025-07-07 PROCEDURE — 250N000011 HC RX IP 250 OP 636: Performed by: INTERNAL MEDICINE

## 2025-07-07 PROCEDURE — 80051 ELECTROLYTE PANEL: CPT

## 2025-07-07 PROCEDURE — 85041 AUTOMATED RBC COUNT: CPT

## 2025-07-07 PROCEDURE — 83615 LACTATE (LD) (LDH) ENZYME: CPT

## 2025-07-07 PROCEDURE — 82728 ASSAY OF FERRITIN: CPT

## 2025-07-07 PROCEDURE — 83540 ASSAY OF IRON: CPT

## 2025-07-07 PROCEDURE — 82746 ASSAY OF FOLIC ACID SERUM: CPT

## 2025-07-07 PROCEDURE — 82607 VITAMIN B-12: CPT

## 2025-07-07 RX ORDER — HEPARIN SODIUM (PORCINE) LOCK FLUSH IV SOLN 100 UNIT/ML 100 UNIT/ML
5 SOLUTION INTRAVENOUS
Status: DISCONTINUED | OUTPATIENT
Start: 2025-07-07 | End: 2025-07-07 | Stop reason: HOSPADM

## 2025-07-07 RX ADMIN — Medication 5 ML: at 13:55

## 2025-07-08 DIAGNOSIS — D50.9 IRON DEFICIENCY ANEMIA, UNSPECIFIED IRON DEFICIENCY ANEMIA TYPE: Primary | ICD-10-CM

## 2025-07-08 RX ORDER — FERROUS SULFATE 325(65) MG
325 TABLET ORAL
Qty: 60 TABLET | Refills: 1 | Status: SHIPPED | OUTPATIENT
Start: 2025-07-08

## 2025-07-10 ENCOUNTER — PATIENT OUTREACH (OUTPATIENT)
Dept: ONCOLOGY | Facility: HOSPITAL | Age: 80
End: 2025-07-10
Payer: COMMERCIAL

## 2025-07-10 NOTE — PROGRESS NOTES
[facility]: Cancer Care                                                                                          Writer called patient's daughter Rosa Isela to let her know that per Cristal MULLER, the patient's lab results are still showing that patient has iron deficiency and Cristal would like patient to get a colonoscopy and EGD done to rule out any GI bleeding.  Patient's daughter Rosa Isela stated that the patient has confirmed chronic rectal bleeding due to a prolapsed rectum and is not able to have a colonoscopy because of that.  Writer told patient's daughter Rosa Isela that she would relay that information over to Cristal.  Writer told patient's daughter Rosa Isela that Cristal sent over an iron prescription for the patient to start taking daily.  Patient's daughter Rosa Isela verbalized understanding and confirmed that the patient would start taking the iron prescription daily.    Signature:  Sophie Cespedes RN

## 2025-07-10 NOTE — PROGRESS NOTES
NEUROLOGY FOLLOW UP VISIT  NOTE       [facility] NEUROLOGY [place]  [address] [place] [address]  Tel: [phone]  Fax: [phone]  [email or web address]     Bibiana Mcrae,  1945, MRN [id number]  PCP: Zack Woody  Date: 2025      ASSESSMENT & PLAN     Visit Diagnosis  Major neurocognitive disorder due to Alzheimer's disease (H)     Major neurocognitive disorder due to Alzheimer's dementia  80-year-old female with HTN, SNHL, anxiety, large B-cell lymphoma who returns for follow-up for major neurocognitive disorder due to Alzheimer's dementia.  She was initially tried on Aricept that she could not tolerate and subsequently was on Exelon patch but developed a rash.  She could not tolerate Exelon tablets and was switched to Namenda and ongoing given instructions to gradually increase to 10 mg twice daily she was only taking 5 mg daily.  Family reports intermittent confusion usually associated with increased fatigue.  I have recommended:    1.  Gradually increase Namenda to 10 mg twice daily in 1 month  2.  Check   3.  Follow-up in 1 year    Thank you again for this referral, please feel free to contact me if you have any questions.    Emanuel Mann MD  [facility]     HISTORY OF PRESENT ILLNESS     Patient is a 80-year-old female with HTN, SNHL, anxiety, large B-cell lymphoma who returns for yearly follow-up for major neurocognitive disorder due to Alzheimer's dementia.  She was initially started on Aricept that she could not tolerate and was switched to Exelon patch.  Dose of Exelon patch was increased to 9.5 mg per 24 hours during her last visit and was told to remain socially active.  Patient called as she was having a rash due to the patch and was switched to tablet that cause GI upset.  Subsequently she was switched to Namenda and although I told her to gradually increase the dose to 10 mg twice daily she was only taking 5 mg  daily.  Family reports that there are times when she gets confused.  During recent hospitalization she was much more confused and hallucinating that improved after she returned home    She was also diagnosed with pancreatic mass and the family wanted to complete oncology evaluation before deciding on long-term treatment.  She had CT of the brain that showed atrophy and chronic small vessel ischemic disease.  Additionally there was a nonspecific lobulated lesion nearly completely opacifying the left sphenoid sinus.  She was diagnosed with diffuse large B-cell lymphoma and was found incidentally and had biopsy-proven lymphoma.  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)    Chemotherapy-induced neutropenia    Major neurocognitive disorder due to Alzheimer's disease (H)    Rectal prolapse    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)    Urinary retention    Gastroesophageal reflux disease    Hypercholesterolemia         PAST MEDICAL & SURGICAL HISTORY     Past Medical History:   Patient  has a past medical history of Anemia, Anxiety, Anxiety, Bilateral sensorineural hearing loss, Bilateral sensorineural hearing loss, Breast cyst, Bronchiectasis without complication (H), 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    Surgical History:  She  has a past surgical history that includes TOTAL ABDOM HYSTERECTOMY; REMOVAL OF OVARY(S); Hysterectomy (1985); Oophorectomy (Bilateral, 1985); Breast Cyst Aspiration (Left); IR Chest Port Placement > 5 Yrs of Age (5/1/2024); and Rectosigmoidectomy perineal (N/A, 1/23/2025).     SOCIAL HISTORY     Reviewed, and she  reports that she has never smoked. She has never been exposed to tobacco smoke. She has never used smokeless tobacco. She reports that she does not drink alcohol and does not use drugs.     FAMILY HISTORY     Reviewed, and family history includes Breast Cancer (age of onset: 52.00) in her mother; Cancer (age of onset: 71.00) in her maternal grandfather; Prostate Cancer in her maternal grandfather.     ALLERGIES     Allergies   Allergen Reactions    Blood Transfusion Related (Informational Only) Other (See Comments)     Patient has a history of a Clinically Significant antibody against RBC antigens. A delay in compatible RBCs may occur. 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    Prochlorperazine Shortness Of Breath     Extrapyramidal side effects    prochlorperazine         REVIEW OF SYSTEMS     A 12 point review of system was performed and was negative except as outlined in the history of present illness.     HOME MEDICATIONS     Current Outpatient Rx   Medication Sig Dispense Refill    acarbose (PRECOSE) 25 MG tablet Take 25 mg by mouth 3 times daily (with meals)      acetaminophen (TYLENOL) 325 MG tablet Take 325-650 mg by mouth every 6 hours as needed for mild pain      allopurinol (ZYLOPRIM) 300 MG tablet Take 300 mg by mouth daily.      amLODIPine (NORVASC) 5 MG tablet TAKE 1 TABLET(5 MG) BY MOUTH DAILY 90 tablet 2    ANUCORT-HC 25 MG suppository UNWRAP AND  INSERT 1 SUPPOSITORY RECTALLY AT BEDTIME AS NEEDED      Biotin 5 MG TABS Take 5,000 mcg by mouth at bedtime.      busPIRone (BUSPAR) 7.5 MG tablet TAKE 1 TABLET BY MOUTH TWICE  DAILY 180 tablet 1    eszopiclone (LUNESTA) 1 MG tablet TAKE 1 TABLET(1 MG) BY MOUTH AT BEDTIME 90 tablet 1    ferrous sulfate (FEROSUL) 325 (65 Fe) MG tablet Take 1 tablet (325 mg) by mouth daily (with breakfast). 60 tablet 1    [START ON 8/16/2025] memantine (NAMENDA) 10 MG tablet Take 1 tablet (10 mg) by mouth 2 times daily. 60 tablet 11    memantine (NAMENDA) 5 MG tablet 1 PO every day x 7 days, then 1 PO BID x 7 days, then 2 PO Q am & 1 PO at bedtime x 7 days, then 2 PO BID thereafter 70 tablet 0    metoclopramide (REGLAN) 10 MG tablet Take 10 mg by mouth daily.      metoprolol tartrate (LOPRESSOR) 25 MG tablet Take 1 tablet (25 mg) by mouth daily. 90 tablet 1    multivitamin with minerals (THERA-M) 9 mg iron-400 mcg Tab tablet Take 1 tablet by mouth at bedtime.      nortriptyline (PAMELOR) 50 MG capsule Take 50 mg by mouth at bedtime.      omeprazole (PRILOSEC) 20 MG capsule [OMEPRAZOLE (PRILOSEC) 20 MG CAPSULE] Take 20 mg by mouth daily before breakfast.      ondansetron (ZOFRAN ODT) 4 MG ODT tab Take 1 tablet (4 mg) by mouth every 8 hours as needed for nausea. 30 tablet 1    Probiotic Product (SOLUBLE FIBER/PROBIOTICS PO) 2 tablespoons in 8 oz of liquid daily      RECTASMOOTHE 5 % CREA See Admin Instructions.      sennosides (SENOKOT) 8.6 MG tablet Take 2 tablets by mouth daily as needed for constipation      traZODone (DESYREL) 50 MG tablet Take 50 mg by mouth at bedtime.      denosumab (PROLIA) 60 MG/ML SOSY injection Inject 60 mg Subcutaneous every 6 months           PHYSICAL EXAM     Vital signs  /60   Pulse 68   Resp 16   Wt 41.7 kg (92 lb)   LMP  (LMP Unknown)   BMI 19.06 kg/m      Weight:   92 lbs 0 oz      4/19/2024    11:22 AM   MOCA   Visuospatial/Executive  1   Naming 3   Attention - Digits 1   Attention -  Letters 1   Attention - Subtraction 3   Language - Repeat 2   Language - Fluency  0   Abstraction 2   Delayed Recall 3   Orientation 6   Education 1   MOCA Score 23   Administered by:  Candi Smith CMA       Elderly female who is alert and oriented vital signs are reviewed and documented in electronic medical record. Neck supple. Neurologically speech normal cranial nerves II through XII are intact except she is hard of hearing. She has decreased mass with normal tone strength 5/5 reflexes 1+ she walks with a walker.      PERTINENT DIAGNOSTIC STUDIES     Following studies were reviewed:     CT BRAIN 4/10/2024  1.  No acute intracranial hemorrhage, extra-axial fluid collection, or mass effect.  2.  Brain atrophy and presumed chronic small vessel ischemic changes, as described.  3.  Unchanged nonspecific lobulated lesion nearly completely opacifying the left sphenoid sinus.     CT CERVICAL SPINE 4/10/2024  1.  No acute cervical spine fracture.  2.  Multilevel degenerative changes (moderate/advanced), as described.     CT THORACIC SPINE 4/10/2024  1.  No acute thoracic spine fracture identified.  2.  Degenerative changes, as described.     CT LUMBAR SPINE 4/10/2024  1.  Transitional anatomy at the lumbosacral junction, as described.  2.  No acute lumbar spine fracture is identified.  3.  Grade I anterolisthesis of L3 on L4 and grade II anterolisthesis of L4 on L5.   4.  Multilevel degenerative changes, as described.  5.  Indeterminate findings in the partially visualized abdomen and pelvis, as described. Recommend follow-up CT abdomen and pelvis with contrast for further evaluation.     CT ABDOMEN PELVIS 4/11/2024  1.  Multilobulated soft tissue masses in the pancreatic/peripancreatic region. Primary considerations include pancreatic ductal adenocarcinoma, gastrointestinal stromal tumor (arising from the adjacent stomach), or malignant periceliac lymph nodes.   Suggest tissue sampling.  2.  Severely dilated left  ureter with morphology suggesting duplicated collecting system. 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     PERTINENT LABS  Following labs were reviewed:  Office Visit on 07/15/2025   Component Date Value Ref Range Status    Sodium 07/15/2025 140  135 - 145 mmol/L Final    Potassium 07/15/2025 4.6  3.4 - 5.3 mmol/L Final    Chloride 07/15/2025 102  98 - 107 mmol/L Final    Carbon Dioxide (CO2) 07/15/2025 27  22 - 29 mmol/L Final    Anion Gap 07/15/2025 11  7 - 15 mmol/L Final    Urea Nitrogen 07/15/2025 21.5  8.0 - 23.0 mg/dL Final    Creatinine 07/15/2025 0.69  0.51 - 0.95 mg/dL Final    GFR Estimate 07/15/2025 87  >60 mL/min/1.73m2 Final    Calcium 07/15/2025 9.5  8.8 - 10.4 mg/dL Final    Glucose 07/15/2025 101 (H)  70 - 99 mg/dL Final    Calcium Ionized Whole Blood 07/15/2025 4.8  4.4 - 5.2 mg/dL Final    Vitamin D, Total (25-Hydroxy) 07/15/2025 39  20 - 50 ng/mL Final   Infusion Therapy Visit on 07/07/2025   Component Date Value Ref Range Status    Sodium 07/07/2025 140  135 - 145 mmol/L Final    Potassium 07/07/2025 4.3  3.4 - 5.3 mmol/L Final    Carbon Dioxide (CO2) 07/07/2025 24  22 - 29 mmol/L Final    Anion Gap 07/07/2025 10  7 - 15 mmol/L Final    Urea Nitrogen 07/07/2025 18.6  8.0 - 23.0 mg/dL Final    Creatinine 07/07/2025 0.63  0.51 - 0.95 mg/dL Final    GFR Estimate 07/07/2025 89  >60 mL/min/1.73m2 Final    Calcium 07/07/2025 8.5 (L)  8.8 - 10.4 mg/dL Final    Chloride 07/07/2025 106  98 - 107 mmol/L Final    Glucose 07/07/2025 123 (H)  70 - 99 mg/dL Final    Alkaline Phosphatase 07/07/2025 78  40 - 150 U/L Final    AST 07/07/2025 29  0 - 45 U/L Final    ALT 07/07/2025 25  0 - 50 U/L Final    Protein Total 07/07/2025 6.0 (L)  6.4 - 8.3 g/dL Final    Albumin 07/07/2025 3.8  3.5 - 5.2 g/dL Final    Bilirubin Total 07/07/2025 0.2  <=1.2 mg/dL Final    Lactate Dehydrogenase 07/07/2025 148  0 - 250 U/L Final    Ferritin 07/07/2025 11  11 -  328 ng/mL Final    Vitamin B12 07/07/2025 554  232 - 1,245 pg/mL Final    Folic Acid 07/07/2025 38.5 (H)  4.6 - 34.8 ng/mL Final    TSH 07/07/2025 1.11  0.30 - 4.20 uIU/mL Final    Iron 07/07/2025 20 (L)  37 - 145 ug/dL Final    Iron Binding Capacity 07/07/2025 388  240 - 430 ug/dL Final    Iron Sat Index 07/07/2025 5 (L)  15 - 46 % Final    WBC Count 07/07/2025 5.5  4.0 - 11.0 10e3/uL Final    RBC Count 07/07/2025 4.05  3.80 - 5.20 10e6/uL Final    Hemoglobin 07/07/2025 9.6 (L)  11.7 - 15.7 g/dL Final    Hematocrit 07/07/2025 30.8 (L)  35.0 - 47.0 % Final    MCV 07/07/2025 76 (L)  78 - 100 fL Final    MCH 07/07/2025 23.7 (L)  26.5 - 33.0 pg Final    MCHC 07/07/2025 31.2 (L)  31.5 - 36.5 g/dL Final    RDW 07/07/2025 18.1 (H)  10.0 - 15.0 % Final    Platelet Count 07/07/2025 265  150 - 450 10e3/uL Final    % Neutrophils 07/07/2025 69  % Final    % Lymphocytes 07/07/2025 18  % Final    % Monocytes 07/07/2025 10  % Final    % Eosinophils 07/07/2025 2  % Final    % Basophils 07/07/2025 1  % Final    % Immature Granulocytes 07/07/2025 0  % Final    NRBCs per 100 WBC 07/07/2025 0  <1 /100 Final    Absolute Neutrophils 07/07/2025 3.8  1.6 - 8.3 10e3/uL Final    Absolute Lymphocytes 07/07/2025 1.0  0.8 - 5.3 10e3/uL Final    Absolute Monocytes 07/07/2025 0.6  0.0 - 1.3 10e3/uL Final    Absolute Eosinophils 07/07/2025 0.1  0.0 - 0.7 10e3/uL Final    Absolute Basophils 07/07/2025 0.0  0.0 - 0.2 10e3/uL Final    Absolute Immature Granulocytes 07/07/2025 0.0  <=0.4 10e3/uL Final    Absolute NRBCs 07/07/2025 0.0  10e3/uL Final   Infusion Therapy Visit on 06/27/2025   Component Date Value Ref Range Status    WBC Count 06/27/2025 5.0  4.0 - 11.0 10e3/uL Final    RBC Count 06/27/2025 4.19  3.80 - 5.20 10e6/uL Final    Hemoglobin 06/27/2025 9.7 (L)  11.7 - 15.7 g/dL Final    Hematocrit 06/27/2025 32.4 (L)  35.0 - 47.0 % Final    MCV 06/27/2025 77 (L)  78 - 100 fL Final    MCH 06/27/2025 23.2 (L)  26.5 - 33.0 pg Final    MCHC  06/27/2025 29.9 (L)  31.5 - 36.5 g/dL Final    RDW 06/27/2025 18.0 (H)  10.0 - 15.0 % Final    Platelet Count 06/27/2025 265  150 - 450 10e3/uL Final    % Neutrophils 06/27/2025 59  % Final    % Lymphocytes 06/27/2025 23  % Final    % Monocytes 06/27/2025 13  % Final    % Eosinophils 06/27/2025 3  % Final    % Basophils 06/27/2025 1  % Final    % Immature Granulocytes 06/27/2025 0  % Final    NRBCs per 100 WBC 06/27/2025 0  <1 /100 Final    Absolute Neutrophils 06/27/2025 3.0  1.6 - 8.3 10e3/uL Final    Absolute Lymphocytes 06/27/2025 1.2  0.8 - 5.3 10e3/uL Final    Absolute Monocytes 06/27/2025 0.7  0.0 - 1.3 10e3/uL Final    Absolute Eosinophils 06/27/2025 0.1  0.0 - 0.7 10e3/uL Final    Absolute Basophils 06/27/2025 0.0  0.0 - 0.2 10e3/uL Final    Absolute Immature Granulocytes 06/27/2025 0.0  <=0.4 10e3/uL Final    Absolute NRBCs 06/27/2025 0.0  10e3/uL Final   Hospital Outpatient Visit on 05/06/2025   Component Date Value Ref Range Status    Creatinine POCT 05/06/2025 0.7  0.5 - 1.0 mg/dL Final    GFR, ESTIMATED POCT 05/06/2025 >60  >60 mL/min/1.73m2 Final    GLUCOSE BY METER POCT 05/06/2025 96  70 - 99 mg/dL Final   Infusion Therapy Visit on 05/06/2025   Component Date Value Ref Range Status    Sodium 05/06/2025 139  135 - 145 mmol/L Final    Potassium 05/06/2025 4.3  3.4 - 5.3 mmol/L Final    Carbon Dioxide (CO2) 05/06/2025 28  22 - 29 mmol/L Final    Anion Gap 05/06/2025 6 (L)  7 - 15 mmol/L Final    Urea Nitrogen 05/06/2025 13.9  8.0 - 23.0 mg/dL Final    Creatinine 05/06/2025 0.59  0.51 - 0.95 mg/dL Final    GFR Estimate 05/06/2025 >90  >60 mL/min/1.73m2 Final    Calcium 05/06/2025 8.3 (L)  8.8 - 10.4 mg/dL Final    Chloride 05/06/2025 105  98 - 107 mmol/L Final    Glucose 05/06/2025 91  70 - 99 mg/dL Final    Alkaline Phosphatase 05/06/2025 77  40 - 150 U/L Final    AST 05/06/2025 24  0 - 45 U/L Final    ALT 05/06/2025 29  0 - 50 U/L Final    Protein Total 05/06/2025 6.0 (L)  6.4 - 8.3 g/dL Final     Albumin 05/06/2025 3.8  3.5 - 5.2 g/dL Final    Bilirubin Total 05/06/2025 0.3  <=1.2 mg/dL Final    Lactate Dehydrogenase 05/06/2025 152  0 - 250 U/L Final    Estimated Average Glucose 05/06/2025 123 (H)  <117 mg/dL Final    Hemoglobin A1C 05/06/2025 5.9 (H)  <5.7 % Final    WBC Count 05/06/2025 5.6  4.0 - 11.0 10e3/uL Final    RBC Count 05/06/2025 3.96  3.80 - 5.20 10e6/uL Final    Hemoglobin 05/06/2025 10.0 (L)  11.7 - 15.7 g/dL Final    Hematocrit 05/06/2025 31.7 (L)  35.0 - 47.0 % Final    MCV 05/06/2025 80  78 - 100 fL Final    MCH 05/06/2025 25.3 (L)  26.5 - 33.0 pg Final    MCHC 05/06/2025 31.5  31.5 - 36.5 g/dL Final    RDW 05/06/2025 16.2 (H)  10.0 - 15.0 % Final    Platelet Count 05/06/2025 288  150 - 450 10e3/uL Final    % Neutrophils 05/06/2025 60  % Final    % Lymphocytes 05/06/2025 25  % Final    % Monocytes 05/06/2025 12  % Final    % Eosinophils 05/06/2025 3  % Final    % Basophils 05/06/2025 1  % Final    % Immature Granulocytes 05/06/2025 0  % Final    NRBCs per 100 WBC 05/06/2025 0  <1 /100 Final    Absolute Neutrophils 05/06/2025 3.4  1.6 - 8.3 10e3/uL Final    Absolute Lymphocytes 05/06/2025 1.4  0.8 - 5.3 10e3/uL Final    Absolute Monocytes 05/06/2025 0.7  0.0 - 1.3 10e3/uL Final    Absolute Eosinophils 05/06/2025 0.2  0.0 - 0.7 10e3/uL Final    Absolute Basophils 05/06/2025 0.1  0.0 - 0.2 10e3/uL Final    Absolute Immature Granulocytes 05/06/2025 0.0  <=0.4 10e3/uL Final    Absolute NRBCs 05/06/2025 0.0  10e3/uL Final         Total time spent for face to face visit, reviewing labs/imaging studies, counseling and coordination of care was: 30 Minutes spent on the date of the encounter doing chart review, review of outside records, review of test results, interpretation of tests, patient visit, documentation, and discussion with family     The longitudinal plan of care for the diagnosis(es)/condition(s) as documented were addressed during this visit. Due to the added complexity in care, I will  continue to support Bibiana in the subsequent management and with ongoing continuity of care.    This note was dictated using voice recognition software.  Any grammatical or context distortions are unintentional and inherent to the software.    No orders of the defined types were placed in this encounter.     New Prescriptions    MEMANTINE (NAMENDA) 10 MG TABLET    Take 1 tablet (10 mg) by mouth 2 times daily.    MEMANTINE (NAMENDA) 5 MG TABLET    1 PO every day x 7 days, then 1 PO BID x 7 days, then 2 PO Q am & 1 PO at bedtime x 7 days, then 2 PO BID thereafter     Modified Medications    No medications on file

## 2025-07-15 ENCOUNTER — ANCILLARY PROCEDURE (OUTPATIENT)
Dept: BONE DENSITY | Facility: CLINIC | Age: 80
End: 2025-07-15
Attending: INTERNAL MEDICINE
Payer: COMMERCIAL

## 2025-07-15 ENCOUNTER — OFFICE VISIT (OUTPATIENT)
Dept: INTERNAL MEDICINE | Facility: CLINIC | Age: 80
End: 2025-07-15
Payer: COMMERCIAL

## 2025-07-15 VITALS
TEMPERATURE: 97.8 F | SYSTOLIC BLOOD PRESSURE: 130 MMHG | BODY MASS INDEX: 19.06 KG/M2 | WEIGHT: 92 LBS | OXYGEN SATURATION: 97 % | DIASTOLIC BLOOD PRESSURE: 62 MMHG | RESPIRATION RATE: 12 BRPM | HEART RATE: 68 BPM

## 2025-07-15 DIAGNOSIS — M81.0 OSTEOPOROSIS, SENILE: ICD-10-CM

## 2025-07-15 DIAGNOSIS — M81.0 OSTEOPOROSIS, SENILE: Primary | ICD-10-CM

## 2025-07-15 DIAGNOSIS — E83.51 HYPOCALCEMIA: ICD-10-CM

## 2025-07-15 DIAGNOSIS — C83.33 DIFFUSE LARGE B-CELL LYMPHOMA OF INTRA-ABDOMINAL LYMPH NODES (H): ICD-10-CM

## 2025-07-15 PROBLEM — R11.2 NAUSEA AND VOMITING, UNSPECIFIED VOMITING TYPE: Status: RESOLVED | Noted: 2025-02-14 | Resolved: 2025-07-15

## 2025-07-15 LAB
ANION GAP SERPL CALCULATED.3IONS-SCNC: 11 MMOL/L (ref 7–15)
BUN SERPL-MCNC: 21.5 MG/DL (ref 8–23)
CA-I BLD-MCNC: 4.8 MG/DL (ref 4.4–5.2)
CALCIUM SERPL-MCNC: 9.5 MG/DL (ref 8.8–10.4)
CHLORIDE SERPL-SCNC: 102 MMOL/L (ref 98–107)
CREAT SERPL-MCNC: 0.69 MG/DL (ref 0.51–0.95)
EGFRCR SERPLBLD CKD-EPI 2021: 87 ML/MIN/1.73M2
GLUCOSE SERPL-MCNC: 101 MG/DL (ref 70–99)
HCO3 SERPL-SCNC: 27 MMOL/L (ref 22–29)
POTASSIUM SERPL-SCNC: 4.6 MMOL/L (ref 3.4–5.3)
SODIUM SERPL-SCNC: 140 MMOL/L (ref 135–145)
VIT D+METAB SERPL-MCNC: 39 NG/ML (ref 20–50)

## 2025-07-15 PROCEDURE — 82306 VITAMIN D 25 HYDROXY: CPT | Performed by: INTERNAL MEDICINE

## 2025-07-15 PROCEDURE — 77080 DXA BONE DENSITY AXIAL: CPT | Mod: TC | Performed by: RADIOLOGY

## 2025-07-15 PROCEDURE — 36415 COLL VENOUS BLD VENIPUNCTURE: CPT | Performed by: INTERNAL MEDICINE

## 2025-07-15 PROCEDURE — 77081 DXA BONE DENSITY APPENDICULR: CPT | Mod: TC | Performed by: RADIOLOGY

## 2025-07-15 PROCEDURE — 3075F SYST BP GE 130 - 139MM HG: CPT | Performed by: INTERNAL MEDICINE

## 2025-07-15 PROCEDURE — 77091 TBS TECHL CALCULATION ONLY: CPT | Performed by: RADIOLOGY

## 2025-07-15 PROCEDURE — 82330 ASSAY OF CALCIUM: CPT | Performed by: INTERNAL MEDICINE

## 2025-07-15 PROCEDURE — 3078F DIAST BP <80 MM HG: CPT | Performed by: INTERNAL MEDICINE

## 2025-07-15 PROCEDURE — 80048 BASIC METABOLIC PNL TOTAL CA: CPT | Performed by: INTERNAL MEDICINE

## 2025-07-15 PROCEDURE — 99214 OFFICE O/P EST MOD 30 MIN: CPT | Performed by: INTERNAL MEDICINE

## 2025-07-15 PROCEDURE — G2211 COMPLEX E/M VISIT ADD ON: HCPCS | Performed by: INTERNAL MEDICINE

## 2025-07-15 NOTE — PATIENT INSTRUCTIONS
Prolia 19th next week.  Prolia 20th in 6 months with me.    DXA in 2 years .   Phone number to schedule [phone].    Daily calcium need is [id number] mg a day from the diet and supplements.  Calcium citrate is easier to digest.You need to take calcium pill 600 mg twice a day.  Vitamin D 1000 IU daily recommended.    Risk of rebound vertebral fractures is higher when Prolia suddenly stopped or dose was missed.      Prolia and Covid vaccine should be  for at least a week.    Patient education:  Patients advised to maintain good oral hygiene during treatment, because of the risk for osteonecrosis of the jaw.   The risk of osteonecrosis of the jaw with Prolia therapy is extremely low.   If a patient is late for Prolia therapy (>3 wks) a rapid rebound of bone loss can occur which is highly concerning and important to try to prevent to optimize bone health.   Therefore if an invasive dental procedure is required, primarily extraction or implant, while on Prolia therapy, the recommendation is to time the dental procedure optimally 4 months after the most recent dose of Prolia allowing 6-8 weeks for healing prior to next dose of Prolia.   This is based on the updated 2022 Recommendations from the American Association of Oral and Maxillofacial Surgeons.   We also recommend discussing with dentist or oral surgeon if pretreatment prophylactic oral rinses and antibiotics would be beneficial.   Optimizing oral hygiene before any invasive dental procedure significantly lowers ONJ risk.     There is a greater risk of severe hypocalcemia following denosumab administration and patient is advised that we will have to monitor calcium level.  Risk of infection is increased with denosumab use, so patient will inform me if has more frequent infections.     Atypical femur fracture  has been reported in patients receiving denosumab. The fractures may occur anywhere along the femoral shaft (may be bilateral) and commonly occur  with minimal to no trauma to the area. Some patients experience prodromal thigh or groin pain weeks or months before the fracture occurs. Contralateral limb should be assessed if AFF occurs.     Multiple vertebral column fracture has been reported following discontinuation of therapy. Hypertension and increased cholesterol were reported with denosumab use.      Discussed 10-year data of Prolia. Discussed the importance of being on time and consistent with Prolia use to prevent rapid bone of osteoclastic activity.  Discussed that if Prolia is discontinued we will likely need to utilize an antiresorptive, such as Reclast, to help prevent rapid rebound of osteoclast activity. Often more than 1 dose is required.  Labs yearly to ensure calcium and vitamin D optimized while patient on Prolia.

## 2025-07-15 NOTE — PROGRESS NOTES
Patient is here today for the Prolia injection. Patient tolerated previous injections well. No recent falls, new fractures, medication change, hospitalizations or surgeries. We discussed calcium and vit D daily needs today.   We discussed high risk of rebound vertebral fractures when Prolia suddenly stopped.      (M81.0) Osteoporosis, senile  (primary encounter diagnosis)  Previous treatment - Alendronate for 4 years.   On Prolia for 10 years, tolerating it well.     DXA was done today and per my review showed stable osteopenia.    She is not taking calcium supplements and with Ca level being chronically low, we will not be able to continue Prolia.  She will improve calcium intake and will come to see me next week. We discussed risk of hypocalcemia with Prolia and cardiovascular and seizure risk.    Plan: Basic metabolic panel, Ionized Calcium, Vitamin        D Deficiency            (E83.51) Hypocalcemia  Comment: She is not taking calcium supplements and with Ca level being chronically low, we will not be able to continue Prolia.  She will improve calcium intake and will come to see me next week. We discussed risk of hypocalcemia with Prolia and cardiovascular and seizure risk.  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 The patient and her daughter discussed continuing Prolia with Oncology and it was approved.     The longitudinal plan of care for the diagnosis(es)/condition(s) as documented were addressed during this visit. Due to the added complexity in care, I will continue to support Bibiana in the subsequent management and with ongoing continuity of care.    Patient was educated on safety of Prolia utilizing Patient Counseling Chart for Healthcare Providers, as outlined by the Prolia REMS progam.     Return in about 6 months (around 1/15/2026) for Follow  up, Prolia.    Patient Instructions   Prolia 19th next week.  Prolia 20th in 6 months with me.    DXA in 2 years .   Phone number to schedule [phone].    Daily calcium need is 9443-8108 mg a day from the diet and supplements.  Calcium citrate is easier to digest.You need to take calcium pill 600 mg twice a day.  Vitamin D 1000 IU daily recommended.    Risk of rebound vertebral fractures is higher when Prolia suddenly stopped or dose was missed.      Prolia and Covid vaccine should be  for at least a week.    Patient education:  Patients advised to maintain good oral hygiene during treatment, because of the risk for osteonecrosis of the jaw.   The risk of osteonecrosis of the jaw with Prolia therapy is extremely low.   If a patient is late for Prolia therapy (>3 wks) a rapid rebound of bone loss can occur which is highly concerning and important to try to prevent to optimize bone health.   Therefore if an invasive dental procedure is required, primarily extraction or implant, while on Prolia therapy, the recommendation is to time the dental procedure optimally 4 months after the most recent dose of Prolia allowing 6-8 weeks for healing prior to next dose of Prolia.   This is based on the updated 2022 Recommendations from the [employer] Oral [employer].   We also recommend discussing with dentist or oral surgeon if pretreatment prophylactic oral rinses and antibiotics would be beneficial.   Optimizing oral hygiene before any invasive dental procedure significantly lowers ONJ risk.     There is a greater risk of severe hypocalcemia following denosumab administration and patient is advised that we will have to monitor calcium level.  Risk of infection is increased with denosumab use, so patient will inform me if has more frequent infections.     Atypical femur fracture  has been reported in patients receiving denosumab. The fractures may occur anywhere along the femoral shaft  (may be bilateral) and commonly occur with minimal to no trauma to the area. Some patients experience prodromal thigh or groin pain weeks or months before the fracture occurs. Contralateral limb should be assessed if AFF occurs.     Multiple vertebral column fracture has been reported following discontinuation of therapy. Hypertension and increased cholesterol were reported with denosumab use.      Discussed 10-year data of Prolia. Discussed the importance of being on time and consistent with Prolia use to prevent rapid bone of osteoclastic activity.  Discussed that if Prolia is discontinued we will likely need to utilize an antiresorptive, such as Reclast, to help prevent rapid rebound of osteoclast activity. Often more than 1 dose is required.  Labs yearly to ensure calcium and vitamin D optimized while patient on Prolia.            /62 (BP Location: Right arm, Patient Position: Sitting, Cuff Size: Adult Regular)   Pulse 68   Temp 97.8  F (36.6  C)   Resp 12   Wt 41.7 kg (92 lb)   LMP  (LMP Unknown)   SpO2 97%   BMI 19.06 kg/m        Did you experience any problems with previous Prolia injection? no  Any medication change in the last 6 months? no  Did you take prednisone or other immunosupressant drugs in the last 6 months   (chemo, transplant, rheum, dermatology conditions)? no  Did you have any serious infection in the last 6 months?no  Any recent hospitalizations?no  Do you plan any dental work in the next 2-3 months?no  How much calcium do you take daily from the diet and supplements?1200 mg  How much vit D do you take daily? 2000 IU  Last DXA? Done today, Reviewed and discussed            This note has been dictated using voice recognition software. Any grammatical or context distortions are unintentional and inherent to the software      Patient Active Problem List   Diagnosis    Essential hypertension with goal blood pressure less than 130/80    Anxiety    Osteoporosis, senile    Bilateral  sensorineural hearing loss    Impaired fasting glucose    Diverticular disease of large intestine    Gastroesophageal reflux disease with esophagitis without hemorrhage    Bronchiectasis without complication (H)    Urinary incontinence    Irregular bowel habits    Status post dilation of esophageal narrowing    Metal foreign body in eye region    MRI contraindicated due to metal in eye    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)    Chemotherapy-induced neutropenia    Major neurocognitive disorder due to Alzheimer's disease (H)    Rectal prolapse    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      busPIRone (BUSPAR) 7.5 MG tablet TAKE 1 TABLET BY MOUTH TWICE  DAILY 180 tablet 1    cyclobenzaprine (FLEXERIL) 5 MG tablet Take 1 tablet by mouth 3 times daily.      denosumab (PROLIA) 60 MG/ML SOSY injection Inject 60 mg Subcutaneous every 6 months      eszopiclone (LUNESTA) 1 MG tablet TAKE 1 TABLET(1 MG) BY MOUTH AT BEDTIME 90 tablet 1    ferrous sulfate (FEROSUL) 325 (65 Fe) MG tablet Take 1 tablet (325 mg) by mouth daily (with breakfast). 60 tablet 1    memantine (NAMENDA) 10 MG tablet Take 1 tablet (10 mg) by mouth 2 times daily. 60 tablet 11    metoclopramide (REGLAN) 10 MG tablet Take 10 mg by mouth daily.      metoprolol tartrate (LOPRESSOR) 25 MG tablet Take 1 tablet (25 mg) by mouth daily. 90 tablet 1     multivitamin with minerals (THERA-M) 9 mg iron-400 mcg Tab tablet Take 1 tablet by mouth at bedtime.      nortriptyline (PAMELOR) 50 MG capsule Take 50 mg by mouth at bedtime.      omeprazole (PRILOSEC) 20 MG capsule [OMEPRAZOLE (PRILOSEC) 20 MG CAPSULE] Take 20 mg by mouth daily before breakfast.      ondansetron (ZOFRAN ODT) 4 MG ODT tab Take 1 tablet (4 mg) by mouth every 8 hours as needed for nausea. 30 tablet 1    Probiotic Product (SOLUBLE FIBER/PROBIOTICS PO) 2 tablespoons in 8 oz of liquid daily      RECTASMOOTHE 5 % CREA See Admin Instructions.      rivastigmine (EXELON) 4.5 MG capsule       sennosides (SENOKOT) 8.6 MG tablet Take 2 tablets by mouth daily as needed for constipation      simethicone (MYLICON) 125 MG chewable tablet Take 125 mg by mouth as needed.      tamsulosin (FLOMAX) 0.4 MG capsule take 1 capsule by mouth every day as directed       Current Facility-Administered Medications   Medication Dose Route Frequency Provider Last Rate Last Admin    denosumab (PROLIA) injection 60 mg  60 mg Subcutaneous Q6 Months

## 2025-07-16 ENCOUNTER — TELEPHONE (OUTPATIENT)
Dept: ONCOLOGY | Facility: HOSPITAL | Age: 80
End: 2025-07-16

## 2025-07-16 ENCOUNTER — OFFICE VISIT (OUTPATIENT)
Dept: NEUROLOGY | Facility: CLINIC | Age: 80
End: 2025-07-16
Payer: COMMERCIAL

## 2025-07-16 VITALS
HEART RATE: 68 BPM | RESPIRATION RATE: 16 BRPM | SYSTOLIC BLOOD PRESSURE: 107 MMHG | WEIGHT: 92 LBS | BODY MASS INDEX: 19.06 KG/M2 | DIASTOLIC BLOOD PRESSURE: 60 MMHG

## 2025-07-16 DIAGNOSIS — F02.80 MAJOR NEUROCOGNITIVE DISORDER DUE TO ALZHEIMER'S DISEASE (H): Primary | ICD-10-CM

## 2025-07-16 DIAGNOSIS — G30.9 MAJOR NEUROCOGNITIVE DISORDER DUE TO ALZHEIMER'S DISEASE (H): Primary | ICD-10-CM

## 2025-07-16 PROBLEM — K21.9 GASTROESOPHAGEAL REFLUX DISEASE: Status: ACTIVE | Noted: 2025-02-23

## 2025-07-16 PROBLEM — E78.00 HYPERCHOLESTEROLEMIA: Status: ACTIVE | Noted: 2025-02-10

## 2025-07-16 PROBLEM — K59.00 CONSTIPATION: Status: RESOLVED | Noted: 2025-01-23 | Resolved: 2025-07-16

## 2025-07-16 PROBLEM — R53.1 GENERALIZED WEAKNESS: Status: RESOLVED | Noted: 2025-02-14 | Resolved: 2025-07-16

## 2025-07-16 PROBLEM — K21.00 GASTROESOPHAGEAL REFLUX DISEASE WITH ESOPHAGITIS WITHOUT HEMORRHAGE: Status: RESOLVED | Noted: 2022-06-16 | Resolved: 2025-07-16

## 2025-07-16 PROCEDURE — G2211 COMPLEX E/M VISIT ADD ON: HCPCS | Performed by: PSYCHIATRY & NEUROLOGY

## 2025-07-16 PROCEDURE — 3074F SYST BP LT 130 MM HG: CPT | Performed by: PSYCHIATRY & NEUROLOGY

## 2025-07-16 PROCEDURE — 3078F DIAST BP <80 MM HG: CPT | Performed by: PSYCHIATRY & NEUROLOGY

## 2025-07-16 PROCEDURE — 99214 OFFICE O/P EST MOD 30 MIN: CPT | Performed by: PSYCHIATRY & NEUROLOGY

## 2025-07-16 RX ORDER — ALLOPURINOL 300 MG/1
300 TABLET ORAL DAILY
COMMUNITY

## 2025-07-16 RX ORDER — DONEPEZIL HYDROCHLORIDE 5 MG/1
5 TABLET, FILM COATED ORAL AT BEDTIME
COMMUNITY
End: 2025-07-16

## 2025-07-16 RX ORDER — MEMANTINE HYDROCHLORIDE 5 MG/1
TABLET ORAL
Qty: 70 TABLET | Refills: 0 | Status: SHIPPED | OUTPATIENT
Start: 2025-07-16 | End: 2025-08-15

## 2025-07-16 RX ORDER — MEMANTINE HYDROCHLORIDE 10 MG/1
10 TABLET ORAL 2 TIMES DAILY
Qty: 60 TABLET | Refills: 11 | Status: SHIPPED | OUTPATIENT
Start: 2025-08-16

## 2025-07-16 RX ORDER — TRAZODONE HYDROCHLORIDE 50 MG/1
50 TABLET ORAL AT BEDTIME
COMMUNITY

## 2025-07-16 NOTE — LETTER
2025      Bibiana Mcrae  [address]  [facility] [address]      Dear Colleague,    Thank you for referring your patient, Bibiana Mcrae, to the [facility] [place]. Please see a copy of my visit note below.    NEUROLOGY FOLLOW UP VISIT  NOTE       [facility] [place]  [address] [place] [address]  Tel: [phone]  Fax: [phone]  [email or web address]     Bibiana Mcrae,  1945, MRN [id number]  PCP: Zack Woody  Date: 2025      ASSESSMENT & PLAN     Visit Diagnosis  Major neurocognitive disorder due to Alzheimer's disease (H)     Major neurocognitive disorder due to Alzheimer's dementia  80-year-old female with HTN, SNHL, anxiety, large B-cell lymphoma who returns for follow-up for major neurocognitive disorder due to Alzheimer's dementia.  She was initially tried on Aricept that she could not tolerate and subsequently was on Exelon patch but developed a rash.  She could not tolerate Exelon tablets and was switched to Namenda and ongoing given instructions to gradually increase to 10 mg twice daily she was only taking 5 mg daily.  Family reports intermittent confusion usually associated with increased fatigue.  I have recommended:    1.  Gradually increase Namenda to 10 mg twice daily in 1 month  2.  Check   3.  Follow-up in 1 year    Thank you again for this referral, please feel free to contact me if you have any questions.    Emanuel Mann MD  [facility] NEUR[facility], [place]     HISTORY OF PRESENT ILLNESS     Patient is a 80-year-old female with HTN, SNHL, anxiety, large B-cell lymphoma who returns for yearly follow-up for major neurocognitive disorder due to Alzheimer's dementia.  She was initially started on Aricept that she could not tolerate and was switched to Exelon patch.  Dose of Exelon patch was increased to 9.5 mg per 24 hours during her last visit and was told to remain socially active.   Patient called as she was having a rash due to the patch and was switched to tablet that cause GI upset.  Subsequently she was switched to Namenda and although I told her to gradually increase the dose to 10 mg twice daily she was only taking 5 mg daily.  Family reports that there are times when she gets confused.  During recent hospitalization she was much more confused and hallucinating that improved after she returned home    She was also diagnosed with pancreatic mass and the family wanted to complete oncology evaluation before deciding on long-term treatment.  She had CT of the brain that showed atrophy and chronic small vessel ischemic disease.  Additionally there was a nonspecific lobulated lesion nearly completely opacifying the left sphenoid sinus.  She was diagnosed with diffuse large B-cell lymphoma and was found incidentally and had biopsy-proven lymphoma.  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)     Chemotherapy-induced neutropenia     Major neurocognitive disorder due to Alzheimer's disease (H)     Rectal prolapse     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)     Urinary retention     Gastroesophageal reflux disease     Hypercholesterolemia         PAST MEDICAL & SURGICAL HISTORY     Past Medical History:   Patient  has a past medical history of Anemia, Anxiety, Anxiety, Bilateral sensorineural hearing  loss, Bilateral sensorineural hearing loss, Breast cyst, Bronchiectasis without complication (H), 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    Surgical History:  She  has a past surgical history that includes TOTAL ABDOM HYSTERECTOMY; REMOVAL OF OVARY(S); Hysterectomy (1985); Oophorectomy (Bilateral, 1985); Breast Cyst Aspiration (Left); IR Chest Port Placement > 5 Yrs of Age (5/1/2024); and Rectosigmoidectomy perineal (N/A, 1/23/2025).     SOCIAL HISTORY     Reviewed, and she  reports that she has never smoked. She has never been exposed to tobacco smoke. She has never used smokeless tobacco. She reports that she does not drink alcohol and does not use drugs.     FAMILY HISTORY     Reviewed, and family history includes Breast Cancer (age of onset: 52.00) in her mother; Cancer (age of onset: 71.00) in her maternal grandfather; Prostate Cancer in her maternal grandfather.     ALLERGIES     Allergies   Allergen Reactions     Blood Transfusion Related (Informational Only) Other (See Comments)     Patient has a history of a Clinically Significant antibody against RBC antigens. A delay in compatible RBCs may occur. 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     Prochlorperazine Shortness Of Breath     Extrapyramidal side effects    prochlorperazine         REVIEW OF SYSTEMS     A 12 point review of system was performed and was negative except as outlined in the history of present illness.     HOME MEDICATIONS     Current Outpatient Rx   Medication Sig Dispense Refill     acarbose (PRECOSE) 25 MG tablet Take 25 mg by mouth 3 times daily (with meals)       acetaminophen (TYLENOL) 325 MG  tablet Take 325-650 mg by mouth every 6 hours as needed for mild pain       allopurinol (ZYLOPRIM) 300 MG tablet Take 300 mg by mouth daily.       amLODIPine (NORVASC) 5 MG tablet TAKE 1 TABLET(5 MG) BY MOUTH DAILY 90 tablet 2     ANUCORT-HC 25 MG suppository UNWRAP AND INSERT 1 SUPPOSITORY RECTALLY AT BEDTIME AS NEEDED       Biotin 5 MG TABS Take 5,000 mcg by mouth at bedtime.       busPIRone (BUSPAR) 7.5 MG tablet TAKE 1 TABLET BY MOUTH TWICE  DAILY 180 tablet 1     eszopiclone (LUNESTA) 1 MG tablet TAKE 1 TABLET(1 MG) BY MOUTH AT BEDTIME 90 tablet 1     ferrous sulfate (FEROSUL) 325 (65 Fe) MG tablet Take 1 tablet (325 mg) by mouth daily (with breakfast). 60 tablet 1     [START ON 8/16/2025] memantine (NAMENDA) 10 MG tablet Take 1 tablet (10 mg) by mouth 2 times daily. 60 tablet 11     memantine (NAMENDA) 5 MG tablet 1 PO every day x 7 days, then 1 PO BID x 7 days, then 2 PO Q am & 1 PO at bedtime x 7 days, then 2 PO BID thereafter 70 tablet 0     metoclopramide (REGLAN) 10 MG tablet Take 10 mg by mouth daily.       metoprolol tartrate (LOPRESSOR) 25 MG tablet Take 1 tablet (25 mg) by mouth daily. 90 tablet 1     multivitamin with minerals (THERA-M) 9 mg iron-400 mcg Tab tablet Take 1 tablet by mouth at bedtime.       nortriptyline (PAMELOR) 50 MG capsule Take 50 mg by mouth at bedtime.       omeprazole (PRILOSEC) 20 MG capsule [OMEPRAZOLE (PRILOSEC) 20 MG CAPSULE] Take 20 mg by mouth daily before breakfast.       ondansetron (ZOFRAN ODT) 4 MG ODT tab Take 1 tablet (4 mg) by mouth every 8 hours as needed for nausea. 30 tablet 1     Probiotic Product (SOLUBLE FIBER/PROBIOTICS PO) 2 tablespoons in 8 oz of liquid daily       RECTASMOOTHE 5 % CREA See Admin Instructions.       sennosides (SENOKOT) 8.6 MG tablet Take 2 tablets by mouth daily as needed for constipation       traZODone (DESYREL) 50 MG tablet Take 50 mg by mouth at bedtime.       denosumab (PROLIA) 60 MG/ML SOSY injection Inject 60 mg Subcutaneous every  6 months           PHYSICAL EXAM     Vital signs  /60   Pulse 68   Resp 16   Wt 41.7 kg (92 lb)   LMP  (LMP Unknown)   BMI 19.06 kg/m      Weight:   92 lbs 0 oz      4/19/2024    11:22 AM   MOCA   Visuospatial/Executive  1   Naming 3   Attention - Digits 1   Attention - Letters 1   Attention - Subtraction 3   Language - Repeat 2   Language - Fluency  0   Abstraction 2   Delayed Recall 3   Orientation 6   Education 1   MOCA Score 23   Administered by:  Candi Smith CMA       Elderly female who is alert and oriented vital signs are reviewed and documented in electronic medical record. Neck supple. Neurologically speech normal cranial nerves II through XII are intact except she is hard of hearing. She has decreased mass with normal tone strength 5/5 reflexes 1+ she walks with a walker.      PERTINENT DIAGNOSTIC STUDIES     Following studies were reviewed:     CT BRAIN 4/10/2024  1.  No acute intracranial hemorrhage, extra-axial fluid collection, or mass effect.  2.  Brain atrophy and presumed chronic small vessel ischemic changes, as described.  3.  Unchanged nonspecific lobulated lesion nearly completely opacifying the left sphenoid sinus.     CT CERVICAL SPINE 4/10/2024  1.  No acute cervical spine fracture.  2.  Multilevel degenerative changes (moderate/advanced), as described.     CT THORACIC SPINE 4/10/2024  1.  No acute thoracic spine fracture identified.  2.  Degenerative changes, as described.     CT LUMBAR SPINE 4/10/2024  1.  Transitional anatomy at the lumbosacral junction, as described.  2.  No acute lumbar spine fracture is identified.  3.  Grade I anterolisthesis of L3 on L4 and grade II anterolisthesis of L4 on L5.   4.  Multilevel degenerative changes, as described.  5.  Indeterminate findings in the partially visualized abdomen and pelvis, as described. Recommend follow-up CT abdomen and pelvis with contrast for further evaluation.     CT ABDOMEN PELVIS 4/11/2024  1.  Multilobulated soft  tissue masses in the pancreatic/peripancreatic region. Primary considerations include pancreatic ductal adenocarcinoma, gastrointestinal stromal tumor (arising from the adjacent stomach), or malignant periceliac lymph nodes.   Suggest tissue sampling.  2.  Severely dilated left ureter with morphology suggesting duplicated collecting system. 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     PERTINENT LABS  Following labs were reviewed:  Office Visit on 07/15/2025   Component Date Value Ref Range Status     Sodium 07/15/2025 140  135 - 145 mmol/L Final     Potassium 07/15/2025 4.6  3.4 - 5.3 mmol/L Final     Chloride 07/15/2025 102  98 - 107 mmol/L Final     Carbon Dioxide (CO2) 07/15/2025 27  22 - 29 mmol/L Final     Anion Gap 07/15/2025 11  7 - 15 mmol/L Final     Urea Nitrogen 07/15/2025 21.5  8.0 - 23.0 mg/dL Final     Creatinine 07/15/2025 0.69  0.51 - 0.95 mg/dL Final     GFR Estimate 07/15/2025 87  >60 mL/min/1.73m2 Final     Calcium 07/15/2025 9.5  8.8 - 10.4 mg/dL Final     Glucose 07/15/2025 101 (H)  70 - 99 mg/dL Final     Calcium Ionized Whole Blood 07/15/2025 4.8  4.4 - 5.2 mg/dL Final     Vitamin D, Total (25-Hydroxy) 07/15/2025 39  20 - 50 ng/mL Final   Infusion Therapy Visit on 07/07/2025   Component Date Value Ref Range Status     Sodium 07/07/2025 140  135 - 145 mmol/L Final     Potassium 07/07/2025 4.3  3.4 - 5.3 mmol/L Final     Carbon Dioxide (CO2) 07/07/2025 24  22 - 29 mmol/L Final     Anion Gap 07/07/2025 10  7 - 15 mmol/L Final     Urea Nitrogen 07/07/2025 18.6  8.0 - 23.0 mg/dL Final     Creatinine 07/07/2025 0.63  0.51 - 0.95 mg/dL Final     GFR Estimate 07/07/2025 89  >60 mL/min/1.73m2 Final     Calcium 07/07/2025 8.5 (L)  8.8 - 10.4 mg/dL Final     Chloride 07/07/2025 106  98 - 107 mmol/L Final     Glucose 07/07/2025 123 (H)  70 - 99 mg/dL Final     Alkaline Phosphatase 07/07/2025 78  40 - 150 U/L Final     AST 07/07/2025 29  0 -  45 U/L Final     ALT 07/07/2025 25  0 - 50 U/L Final     Protein Total 07/07/2025 6.0 (L)  6.4 - 8.3 g/dL Final     Albumin 07/07/2025 3.8  3.5 - 5.2 g/dL Final     Bilirubin Total 07/07/2025 0.2  <=1.2 mg/dL Final     Lactate Dehydrogenase 07/07/2025 148  0 - 250 U/L Final     Ferritin 07/07/2025 11  11 - 328 ng/mL Final     Vitamin B12 07/07/2025 554  232 - 1,245 pg/mL Final     Folic Acid 07/07/2025 38.5 (H)  4.6 - 34.8 ng/mL Final     TSH 07/07/2025 1.11  0.30 - 4.20 uIU/mL Final     Iron 07/07/2025 20 (L)  37 - 145 ug/dL Final     Iron Binding Capacity 07/07/2025 388  240 - 430 ug/dL Final     Iron Sat Index 07/07/2025 5 (L)  15 - 46 % Final     WBC Count 07/07/2025 5.5  4.0 - 11.0 10e3/uL Final     RBC Count 07/07/2025 4.05  3.80 - 5.20 10e6/uL Final     Hemoglobin 07/07/2025 9.6 (L)  11.7 - 15.7 g/dL Final     Hematocrit 07/07/2025 30.8 (L)  35.0 - 47.0 % Final     MCV 07/07/2025 76 (L)  78 - 100 fL Final     MCH 07/07/2025 23.7 (L)  26.5 - 33.0 pg Final     MCHC 07/07/2025 31.2 (L)  31.5 - 36.5 g/dL Final     RDW 07/07/2025 18.1 (H)  10.0 - 15.0 % Final     Platelet Count 07/07/2025 265  150 - 450 10e3/uL Final     % Neutrophils 07/07/2025 69  % Final     % Lymphocytes 07/07/2025 18  % Final     % Monocytes 07/07/2025 10  % Final     % Eosinophils 07/07/2025 2  % Final     % Basophils 07/07/2025 1  % Final     % Immature Granulocytes 07/07/2025 0  % Final     NRBCs per 100 WBC 07/07/2025 0  <1 /100 Final     Absolute Neutrophils 07/07/2025 3.8  1.6 - 8.3 10e3/uL Final     Absolute Lymphocytes 07/07/2025 1.0  0.8 - 5.3 10e3/uL Final     Absolute Monocytes 07/07/2025 0.6  0.0 - 1.3 10e3/uL Final     Absolute Eosinophils 07/07/2025 0.1  0.0 - 0.7 10e3/uL Final     Absolute Basophils 07/07/2025 0.0  0.0 - 0.2 10e3/uL Final     Absolute Immature Granulocytes 07/07/2025 0.0  <=0.4 10e3/uL Final     Absolute NRBCs 07/07/2025 0.0  10e3/uL Final   Infusion Therapy Visit on 06/27/2025   Component Date Value Ref Range  Status     WBC Count 06/27/2025 5.0  4.0 - 11.0 10e3/uL Final     RBC Count 06/27/2025 4.19  3.80 - 5.20 10e6/uL Final     Hemoglobin 06/27/2025 9.7 (L)  11.7 - 15.7 g/dL Final     Hematocrit 06/27/2025 32.4 (L)  35.0 - 47.0 % Final     MCV 06/27/2025 77 (L)  78 - 100 fL Final     MCH 06/27/2025 23.2 (L)  26.5 - 33.0 pg Final     MCHC 06/27/2025 29.9 (L)  31.5 - 36.5 g/dL Final     RDW 06/27/2025 18.0 (H)  10.0 - 15.0 % Final     Platelet Count 06/27/2025 265  150 - 450 10e3/uL Final     % Neutrophils 06/27/2025 59  % Final     % Lymphocytes 06/27/2025 23  % Final     % Monocytes 06/27/2025 13  % Final     % Eosinophils 06/27/2025 3  % Final     % Basophils 06/27/2025 1  % Final     % Immature Granulocytes 06/27/2025 0  % Final     NRBCs per 100 WBC 06/27/2025 0  <1 /100 Final     Absolute Neutrophils 06/27/2025 3.0  1.6 - 8.3 10e3/uL Final     Absolute Lymphocytes 06/27/2025 1.2  0.8 - 5.3 10e3/uL Final     Absolute Monocytes 06/27/2025 0.7  0.0 - 1.3 10e3/uL Final     Absolute Eosinophils 06/27/2025 0.1  0.0 - 0.7 10e3/uL Final     Absolute Basophils 06/27/2025 0.0  0.0 - 0.2 10e3/uL Final     Absolute Immature Granulocytes 06/27/2025 0.0  <=0.4 10e3/uL Final     Absolute NRBCs 06/27/2025 0.0  10e3/uL Final   Hospital Outpatient Visit on 05/06/2025   Component Date Value Ref Range Status     Creatinine POCT 05/06/2025 0.7  0.5 - 1.0 mg/dL Final     GFR, ESTIMATED POCT 05/06/2025 >60  >60 mL/min/1.73m2 Final     GLUCOSE BY METER POCT 05/06/2025 96  70 - 99 mg/dL Final   Infusion Therapy Visit on 05/06/2025   Component Date Value Ref Range Status     Sodium 05/06/2025 139  135 - 145 mmol/L Final     Potassium 05/06/2025 4.3  3.4 - 5.3 mmol/L Final     Carbon Dioxide (CO2) 05/06/2025 28  22 - 29 mmol/L Final     Anion Gap 05/06/2025 6 (L)  7 - 15 mmol/L Final     Urea Nitrogen 05/06/2025 13.9  8.0 - 23.0 mg/dL Final     Creatinine 05/06/2025 0.59  0.51 - 0.95 mg/dL Final     GFR Estimate 05/06/2025 >90  >60  mL/min/1.73m2 Final     Calcium 05/06/2025 8.3 (L)  8.8 - 10.4 mg/dL Final     Chloride 05/06/2025 105  98 - 107 mmol/L Final     Glucose 05/06/2025 91  70 - 99 mg/dL Final     Alkaline Phosphatase 05/06/2025 77  40 - 150 U/L Final     AST 05/06/2025 24  0 - 45 U/L Final     ALT 05/06/2025 29  0 - 50 U/L Final     Protein Total 05/06/2025 6.0 (L)  6.4 - 8.3 g/dL Final     Albumin 05/06/2025 3.8  3.5 - 5.2 g/dL Final     Bilirubin Total 05/06/2025 0.3  <=1.2 mg/dL Final     Lactate Dehydrogenase 05/06/2025 152  0 - 250 U/L Final     Estimated Average Glucose 05/06/2025 123 (H)  <117 mg/dL Final     Hemoglobin A1C 05/06/2025 5.9 (H)  <5.7 % Final     WBC Count 05/06/2025 5.6  4.0 - 11.0 10e3/uL Final     RBC Count 05/06/2025 3.96  3.80 - 5.20 10e6/uL Final     Hemoglobin 05/06/2025 10.0 (L)  11.7 - 15.7 g/dL Final     Hematocrit 05/06/2025 31.7 (L)  35.0 - 47.0 % Final     MCV 05/06/2025 80  78 - 100 fL Final     MCH 05/06/2025 25.3 (L)  26.5 - 33.0 pg Final     MCHC 05/06/2025 31.5  31.5 - 36.5 g/dL Final     RDW 05/06/2025 16.2 (H)  10.0 - 15.0 % Final     Platelet Count 05/06/2025 288  150 - 450 10e3/uL Final     % Neutrophils 05/06/2025 60  % Final     % Lymphocytes 05/06/2025 25  % Final     % Monocytes 05/06/2025 12  % Final     % Eosinophils 05/06/2025 3  % Final     % Basophils 05/06/2025 1  % Final     % Immature Granulocytes 05/06/2025 0  % Final     NRBCs per 100 WBC 05/06/2025 0  <1 /100 Final     Absolute Neutrophils 05/06/2025 3.4  1.6 - 8.3 10e3/uL Final     Absolute Lymphocytes 05/06/2025 1.4  0.8 - 5.3 10e3/uL Final     Absolute Monocytes 05/06/2025 0.7  0.0 - 1.3 10e3/uL Final     Absolute Eosinophils 05/06/2025 0.2  0.0 - 0.7 10e3/uL Final     Absolute Basophils 05/06/2025 0.1  0.0 - 0.2 10e3/uL Final     Absolute Immature Granulocytes 05/06/2025 0.0  <=0.4 10e3/uL Final     Absolute NRBCs 05/06/2025 0.0  10e3/uL Final         Total time spent for face to face visit, reviewing labs/imaging studies,  counseling and coordination of care was: 30 Minutes spent on the date of the encounter doing chart review, review of outside records, review of test results, interpretation of tests, patient visit, documentation, and discussion with family     The longitudinal plan of care for the diagnosis(es)/condition(s) as documented were addressed during this visit. Due to the added complexity in care, I will continue to support Bibiana in the subsequent management and with ongoing continuity of care.    This note was dictated using voice recognition software.  Any grammatical or context distortions are unintentional and inherent to the software.    No orders of the defined types were placed in this encounter.     New Prescriptions    MEMANTINE (NAMENDA) 10 MG TABLET    Take 1 tablet (10 mg) by mouth 2 times daily.    MEMANTINE (NAMENDA) 5 MG TABLET    1 PO every day x 7 days, then 1 PO BID x 7 days, then 2 PO Q am & 1 PO at bedtime x 7 days, then 2 PO BID thereafter     Modified Medications    No medications on file                 Again, thank you for allowing me to participate in the care of your patient.        Sincerely,        Emanuel Mann MD    Electronically signed

## 2025-07-16 NOTE — TELEPHONE ENCOUNTER
I received a phone call today from patient's daughter, Rosa Isela.  She is calling because her mom saw her primary care doctor yesterday for her Prolia injection and she was told she needed to take calcium.  Rosa Isela recalls hearing that her mom should not take calcium because it would increase her risk of cancer recurrence.  Per Dr. Friedell, this is not true and she should take calcium as recommended by her PCP.  I called came back with this information.  She verbalized understanding and agrees with the plan.    Priya Barrett RN on 7/16/2025 at 12:59 PM

## 2025-08-01 ENCOUNTER — HOSPITAL ENCOUNTER (EMERGENCY)
Facility: HOSPITAL | Age: 80
Discharge: HOME | End: 2025-08-01
Attending: EMERGENCY MEDICINE | Admitting: EMERGENCY MEDICINE
Payer: COMMERCIAL

## 2025-08-01 VITALS
DIASTOLIC BLOOD PRESSURE: 65 MMHG | TEMPERATURE: 97.3 F | BODY MASS INDEX: 19.31 KG/M2 | SYSTOLIC BLOOD PRESSURE: 126 MMHG | OXYGEN SATURATION: 98 % | HEIGHT: 58 IN | RESPIRATION RATE: 18 BRPM | WEIGHT: 92 LBS | HEART RATE: 72 BPM

## 2025-08-01 DIAGNOSIS — Z87.19 HISTORY OF RECTAL BLEEDING: Primary | ICD-10-CM

## 2025-08-01 LAB
ABO + RH BLD: ABNORMAL
ANION GAP SERPL CALCULATED.3IONS-SCNC: 7 MMOL/L (ref 7–15)
ANTIBODY, PREVIOUSLY IDENTIFIED: NORMAL
BASOPHILS # BLD AUTO: 0 10E3/UL (ref 0–0.2)
BASOPHILS NFR BLD AUTO: 1 %
BLD GP AB SCN SERPL QL: POSITIVE
BUN SERPL-MCNC: 17.3 MG/DL (ref 8–23)
CALCIUM SERPL-MCNC: 9 MG/DL (ref 8.8–10.4)
CHLORIDE SERPL-SCNC: 105 MMOL/L (ref 98–107)
CREAT SERPL-MCNC: 0.66 MG/DL (ref 0.51–0.95)
EGFRCR SERPLBLD CKD-EPI 2021: 88 ML/MIN/1.73M2
EOSINOPHIL # BLD AUTO: 0.1 10E3/UL (ref 0–0.7)
EOSINOPHIL NFR BLD AUTO: 1 %
ERYTHROCYTE [DISTWIDTH] IN BLOOD BY AUTOMATED COUNT: 23.6 % (ref 10–15)
GLUCOSE SERPL-MCNC: 109 MG/DL (ref 70–99)
HCO3 SERPL-SCNC: 27 MMOL/L (ref 22–29)
HCT VFR BLD AUTO: 35.3 % (ref 35–47)
HGB BLD-MCNC: 11.1 G/DL (ref 11.7–15.7)
IMM GRANULOCYTES # BLD: 0 10E3/UL
IMM GRANULOCYTES NFR BLD: 0 %
LYMPHOCYTES # BLD AUTO: 1.2 10E3/UL (ref 0.8–5.3)
LYMPHOCYTES NFR BLD AUTO: 17 %
MCH RBC QN AUTO: 25.2 PG (ref 26.5–33)
MCHC RBC AUTO-ENTMCNC: 31.4 G/DL (ref 31.5–36.5)
MCV RBC AUTO: 80 FL (ref 78–100)
MONOCYTES # BLD AUTO: 0.7 10E3/UL (ref 0–1.3)
MONOCYTES NFR BLD AUTO: 11 %
NEUTROPHILS # BLD AUTO: 4.7 10E3/UL (ref 1.6–8.3)
NEUTROPHILS NFR BLD AUTO: 70 %
NRBC # BLD AUTO: 0 10E3/UL
NRBC BLD AUTO-RTO: 0 /100
PLATELET # BLD AUTO: 262 10E3/UL (ref 150–450)
POTASSIUM SERPL-SCNC: 5 MMOL/L (ref 3.4–5.3)
RBC # BLD AUTO: 4.4 10E6/UL (ref 3.8–5.2)
SODIUM SERPL-SCNC: 139 MMOL/L (ref 135–145)
SPECIMEN EXP DATE BLD: ABNORMAL
SPECIMEN EXP DATE BLD: NORMAL
WBC # BLD AUTO: 6.7 10E3/UL (ref 4–11)

## 2025-08-01 PROCEDURE — 85004 AUTOMATED DIFF WBC COUNT: CPT | Performed by: EMERGENCY MEDICINE

## 2025-08-01 PROCEDURE — 80048 BASIC METABOLIC PNL TOTAL CA: CPT | Performed by: EMERGENCY MEDICINE

## 2025-08-01 PROCEDURE — 99283 EMERGENCY DEPT VISIT LOW MDM: CPT | Performed by: EMERGENCY MEDICINE

## 2025-08-01 PROCEDURE — 86900 BLOOD TYPING SEROLOGIC ABO: CPT | Performed by: EMERGENCY MEDICINE

## 2025-08-01 PROCEDURE — 36415 COLL VENOUS BLD VENIPUNCTURE: CPT | Performed by: EMERGENCY MEDICINE

## 2025-08-01 RX ORDER — DOCUSATE SODIUM 100 MG/1
100 CAPSULE, LIQUID FILLED ORAL 2 TIMES DAILY
Qty: 30 CAPSULE | Refills: 0 | Status: SHIPPED | OUTPATIENT
Start: 2025-08-01

## 2025-08-01 ASSESSMENT — ACTIVITIES OF DAILY LIVING (ADL)
ADLS_ACUITY_SCORE: 58

## 2025-08-01 ASSESSMENT — COLUMBIA-SUICIDE SEVERITY RATING SCALE - C-SSRS
6. HAVE YOU EVER DONE ANYTHING, STARTED TO DO ANYTHING, OR PREPARED TO DO ANYTHING TO END YOUR LIFE?: NO
1. IN THE PAST MONTH, HAVE YOU WISHED YOU WERE DEAD OR WISHED YOU COULD GO TO SLEEP AND NOT WAKE UP?: NO
2. HAVE YOU ACTUALLY HAD ANY THOUGHTS OF KILLING YOURSELF IN THE PAST MONTH?: NO

## 2025-08-04 ENCOUNTER — PATIENT OUTREACH (OUTPATIENT)
Dept: FAMILY MEDICINE | Facility: CLINIC | Age: 80
End: 2025-08-04
Payer: COMMERCIAL

## 2025-08-11 ENCOUNTER — INFUSION THERAPY VISIT (OUTPATIENT)
Dept: INFUSION THERAPY | Facility: HOSPITAL | Age: 80
End: 2025-08-11
Payer: COMMERCIAL

## 2025-08-11 DIAGNOSIS — C83.33 DIFFUSE LARGE B-CELL LYMPHOMA OF INTRA-ABDOMINAL LYMPH NODES (H): Primary | ICD-10-CM

## 2025-08-11 LAB
ALBUMIN SERPL BCG-MCNC: 3.7 G/DL (ref 3.5–5.2)
ALP SERPL-CCNC: 93 U/L (ref 40–150)
ALT SERPL W P-5'-P-CCNC: 86 U/L (ref 0–50)
ANION GAP SERPL CALCULATED.3IONS-SCNC: 14 MMOL/L (ref 7–15)
AST SERPL W P-5'-P-CCNC: 73 U/L (ref 0–45)
BASOPHILS # BLD AUTO: 0 10E3/UL (ref 0–0.2)
BASOPHILS NFR BLD AUTO: 1 %
BILIRUB SERPL-MCNC: 0.3 MG/DL
BUN SERPL-MCNC: 17.1 MG/DL (ref 8–23)
CALCIUM SERPL-MCNC: 9.1 MG/DL (ref 8.8–10.4)
CHLORIDE SERPL-SCNC: 103 MMOL/L (ref 98–107)
CREAT SERPL-MCNC: 0.74 MG/DL (ref 0.51–0.95)
EGFRCR SERPLBLD CKD-EPI 2021: 81 ML/MIN/1.73M2
EOSINOPHIL # BLD AUTO: 0.1 10E3/UL (ref 0–0.7)
EOSINOPHIL NFR BLD AUTO: 3 %
ERYTHROCYTE [DISTWIDTH] IN BLOOD BY AUTOMATED COUNT: 24.2 % (ref 10–15)
FERRITIN SERPL-MCNC: 40 NG/ML (ref 11–328)
FOLATE SERPL-MCNC: 25.6 NG/ML (ref 4.6–34.8)
GLUCOSE SERPL-MCNC: 201 MG/DL (ref 70–99)
HCO3 SERPL-SCNC: 22 MMOL/L (ref 22–29)
HCT VFR BLD AUTO: 36.3 % (ref 35–47)
HGB BLD-MCNC: 11.1 G/DL (ref 11.7–15.7)
IMM GRANULOCYTES # BLD: 0 10E3/UL
IMM GRANULOCYTES NFR BLD: 1 %
IRON BINDING CAPACITY (ROCHE): 349 UG/DL (ref 240–430)
IRON SATN MFR SERPL: 83 % (ref 15–46)
IRON SERPL-MCNC: 291 UG/DL (ref 37–145)
LYMPHOCYTES # BLD AUTO: 0.9 10E3/UL (ref 0.8–5.3)
LYMPHOCYTES NFR BLD AUTO: 22 %
MCH RBC QN AUTO: 25.6 PG (ref 26.5–33)
MCHC RBC AUTO-ENTMCNC: 30.6 G/DL (ref 31.5–36.5)
MCV RBC AUTO: 84 FL (ref 78–100)
MONOCYTES # BLD AUTO: 0.5 10E3/UL (ref 0–1.3)
MONOCYTES NFR BLD AUTO: 11 %
NEUTROPHILS # BLD AUTO: 2.6 10E3/UL (ref 1.6–8.3)
NEUTROPHILS NFR BLD AUTO: 63 %
NRBC # BLD AUTO: 0 10E3/UL
NRBC BLD AUTO-RTO: 0 /100
PLATELET # BLD AUTO: 228 10E3/UL (ref 150–450)
POTASSIUM SERPL-SCNC: 4.3 MMOL/L (ref 3.4–5.3)
PROT SERPL-MCNC: 5.9 G/DL (ref 6.4–8.3)
RBC # BLD AUTO: 4.34 10E6/UL (ref 3.8–5.2)
SODIUM SERPL-SCNC: 139 MMOL/L (ref 135–145)
WBC # BLD AUTO: 4.2 10E3/UL (ref 4–11)

## 2025-08-11 PROCEDURE — 82040 ASSAY OF SERUM ALBUMIN: CPT

## 2025-08-11 PROCEDURE — 83550 IRON BINDING TEST: CPT

## 2025-08-11 PROCEDURE — 85041 AUTOMATED RBC COUNT: CPT

## 2025-08-11 PROCEDURE — 250N000011 HC RX IP 250 OP 636: Performed by: INTERNAL MEDICINE

## 2025-08-11 PROCEDURE — 82746 ASSAY OF FOLIC ACID SERUM: CPT

## 2025-08-11 PROCEDURE — 36591 DRAW BLOOD OFF VENOUS DEVICE: CPT

## 2025-08-11 PROCEDURE — 82728 ASSAY OF FERRITIN: CPT

## 2025-08-11 RX ORDER — HEPARIN SODIUM (PORCINE) LOCK FLUSH IV SOLN 100 UNIT/ML 100 UNIT/ML
5 SOLUTION INTRAVENOUS
Status: DISCONTINUED | OUTPATIENT
Start: 2025-08-11 | End: 2025-08-11 | Stop reason: HOSPADM

## 2025-08-11 RX ADMIN — Medication 5 ML: at 13:12

## 2025-08-12 ENCOUNTER — TELEPHONE (OUTPATIENT)
Dept: ONCOLOGY | Facility: HOSPITAL | Age: 80
End: 2025-08-12
Payer: COMMERCIAL

## 2025-08-14 ENCOUNTER — VIRTUAL VISIT (OUTPATIENT)
Dept: FAMILY MEDICINE | Facility: CLINIC | Age: 80
End: 2025-08-14
Payer: COMMERCIAL

## 2025-08-14 DIAGNOSIS — I10 ESSENTIAL HYPERTENSION WITH GOAL BLOOD PRESSURE LESS THAN 130/80: ICD-10-CM

## 2025-08-14 DIAGNOSIS — D64.9 NORMOCHROMIC NORMOCYTIC ANEMIA: ICD-10-CM

## 2025-08-14 DIAGNOSIS — N30.00 ACUTE CYSTITIS WITHOUT HEMATURIA: ICD-10-CM

## 2025-08-14 DIAGNOSIS — K62.5 RECTAL BLEEDING: Primary | ICD-10-CM

## 2025-08-14 DIAGNOSIS — R79.89 ABNORMAL LFTS: ICD-10-CM

## 2025-08-14 DIAGNOSIS — R73.01 IMPAIRED FASTING GLUCOSE: ICD-10-CM

## (undated) DEVICE — TRAY PREP DRY SKIN SCRUB 067

## (undated) DEVICE — SYR 10ML LL W/O NDL 302995

## (undated) DEVICE — SUCTION TIP YANKAUER W/O VENT K86

## (undated) DEVICE — SU VICRYL+ 2-0 TIES 54IN UND VCP286G

## (undated) DEVICE — SOL WATER IRRIG 1000ML BOTTLE 2F7114

## (undated) DEVICE — ESU GROUND PAD ADULT REM W/15' CORD E7507DB

## (undated) DEVICE — NEEDLE HYPO MAGELLAN SAFETY 22GA 1 1/2IN 8881850215

## (undated) DEVICE — CUSHION INSERT LG PRONE VIEW JACKSON TABLE

## (undated) DEVICE — GLOVE PI ULTRATCH M LF SZ 6.5 PF CUFF TEXT STRL LF 42665

## (undated) DEVICE — TAPE ADH POROUS 3IN CURITY STD 7046C

## (undated) DEVICE — SUCTION MANIFOLD NEPTUNE 2 SYS 1 PORT 702-025-000

## (undated) DEVICE — CUSTOM PACK GEN MAJOR SBA5BGMHEA

## (undated) DEVICE — BRIEF STRETCH XL MPS40

## (undated) DEVICE — VIAL DECANTER STERILE WHITE DYNJDEC06

## (undated) DEVICE — ESU LIGASURE OPEN SEALER/DIVIDER SM JAW 16.5MM LF1212A

## (undated) DEVICE — SPONGE LAP 18X18" X8435

## (undated) DEVICE — PREP POVIDONE-IODINE 10% SOLUTION 4OZ BOTTLE MDS093944

## (undated) DEVICE — CAUTERY BLADE NEEDLE 1IN COATED E1452

## (undated) DEVICE — GOWN LG DISP 9515

## (undated) DEVICE — TIP SUCTION FRAIZER 10FR DISP 163

## (undated) DEVICE — RETR ELASTIC STAYS LONE STAR SHARP 5MM 8/PACK 3311-8G

## (undated) DEVICE — GLOVE UNDER INDICATOR PI SZ 7.0 LF 41670

## (undated) DEVICE — DRSG KERLIX FLUFFS X5

## (undated) DEVICE — SUTURE VICRYL+ 2-0 27IN SH UND VCP417H

## (undated) DEVICE — CATH TRAY FOLEY SURESTEP 16FR DRAIN BAG STATOCK A899916

## (undated) DEVICE — JELLY LUBRICATING SURGILUBE 2OZ TUBE

## (undated) DEVICE — DRAPE OR LAPAROTOMY KC 89228*

## (undated) DEVICE — RETR RING LONE STAR 14.1X14.1CM 3307G

## (undated) RX ORDER — ONDANSETRON 2 MG/ML
INJECTION INTRAMUSCULAR; INTRAVENOUS
Status: DISPENSED
Start: 2024-04-15

## (undated) RX ORDER — HEPARIN SODIUM (PORCINE) LOCK FLUSH IV SOLN 100 UNIT/ML 100 UNIT/ML
SOLUTION INTRAVENOUS
Status: DISPENSED
Start: 2024-11-27

## (undated) RX ORDER — HEPARIN SODIUM (PORCINE) LOCK FLUSH IV SOLN 100 UNIT/ML 100 UNIT/ML
SOLUTION INTRAVENOUS
Status: DISPENSED
Start: 2025-01-31

## (undated) RX ORDER — HEPARIN SODIUM (PORCINE) LOCK FLUSH IV SOLN 100 UNIT/ML 100 UNIT/ML
SOLUTION INTRAVENOUS
Status: DISPENSED
Start: 2024-07-01

## (undated) RX ORDER — FENTANYL CITRATE 50 UG/ML
INJECTION, SOLUTION INTRAMUSCULAR; INTRAVENOUS
Status: DISPENSED
Start: 2024-04-15

## (undated) RX ORDER — FENTANYL CITRATE 50 UG/ML
INJECTION, SOLUTION INTRAMUSCULAR; INTRAVENOUS
Status: DISPENSED
Start: 2024-05-01

## (undated) RX ORDER — ONDANSETRON 2 MG/ML
INJECTION INTRAMUSCULAR; INTRAVENOUS
Status: DISPENSED
Start: 2024-05-01

## (undated) RX ORDER — DEXAMETHASONE SODIUM PHOSPHATE 10 MG/ML
INJECTION, SOLUTION INTRAMUSCULAR; INTRAVENOUS
Status: DISPENSED
Start: 2025-01-23

## (undated) RX ORDER — ONDANSETRON 2 MG/ML
INJECTION INTRAMUSCULAR; INTRAVENOUS
Status: DISPENSED
Start: 2025-01-23

## (undated) RX ORDER — BUPIVACAINE HYDROCHLORIDE AND EPINEPHRINE 2.5; 5 MG/ML; UG/ML
INJECTION, SOLUTION INFILTRATION; PERINEURAL
Status: DISPENSED
Start: 2025-01-23

## (undated) RX ORDER — HEPARIN SODIUM (PORCINE) LOCK FLUSH IV SOLN 100 UNIT/ML 100 UNIT/ML
SOLUTION INTRAVENOUS
Status: DISPENSED
Start: 2025-05-06

## (undated) RX ORDER — FENTANYL CITRATE 50 UG/ML
INJECTION, SOLUTION INTRAMUSCULAR; INTRAVENOUS
Status: DISPENSED
Start: 2025-01-23

## (undated) RX ORDER — GINSENG 100 MG
CAPSULE ORAL
Status: DISPENSED
Start: 2025-01-23